# Patient Record
Sex: FEMALE | Race: BLACK OR AFRICAN AMERICAN | NOT HISPANIC OR LATINO
[De-identification: names, ages, dates, MRNs, and addresses within clinical notes are randomized per-mention and may not be internally consistent; named-entity substitution may affect disease eponyms.]

---

## 2019-09-03 PROBLEM — Z00.00 ENCOUNTER FOR PREVENTIVE HEALTH EXAMINATION: Status: ACTIVE | Noted: 2019-09-03

## 2020-01-10 ENCOUNTER — APPOINTMENT (OUTPATIENT)
Dept: ENDOCRINOLOGY | Facility: CLINIC | Age: 60
End: 2020-01-10
Payer: MEDICAID

## 2020-01-10 PROCEDURE — G0108 DIAB MANAGE TRN  PER INDIV: CPT

## 2020-01-10 RX ORDER — LANCETS 33 GAUGE
EACH MISCELLANEOUS
Qty: 1 | Refills: 0 | Status: ACTIVE | COMMUNITY
Start: 2020-01-10 | End: 1900-01-01

## 2020-01-10 RX ORDER — BLOOD SUGAR DIAGNOSTIC
STRIP MISCELLANEOUS TWICE DAILY
Qty: 2 | Refills: 0 | Status: ACTIVE | COMMUNITY
Start: 2020-01-10 | End: 1900-01-01

## 2020-01-17 ENCOUNTER — APPOINTMENT (OUTPATIENT)
Dept: ENDOCRINOLOGY | Facility: CLINIC | Age: 60
End: 2020-01-17
Payer: MEDICAID

## 2020-01-17 VITALS
WEIGHT: 155 LBS | OXYGEN SATURATION: 97 % | HEART RATE: 89 BPM | SYSTOLIC BLOOD PRESSURE: 124 MMHG | HEIGHT: 55 IN | DIASTOLIC BLOOD PRESSURE: 82 MMHG | BODY MASS INDEX: 35.87 KG/M2

## 2020-01-17 DIAGNOSIS — Z82.49 FAMILY HISTORY OF ISCHEMIC HEART DISEASE AND OTHER DISEASES OF THE CIRCULATORY SYSTEM: ICD-10-CM

## 2020-01-17 DIAGNOSIS — Z86.39 PERSONAL HISTORY OF OTHER ENDOCRINE, NUTRITIONAL AND METABOLIC DISEASE: ICD-10-CM

## 2020-01-17 DIAGNOSIS — Z95.5 PRESENCE OF CORONARY ANGIOPLASTY IMPLANT AND GRAFT: ICD-10-CM

## 2020-01-17 PROCEDURE — 99205 OFFICE O/P NEW HI 60 MIN: CPT

## 2020-01-17 NOTE — PHYSICAL EXAM
[Alert] : alert [No Acute Distress] : no acute distress [Well Nourished] : well nourished [Well Developed] : well developed [PERRL] : pupils equal, round and reactive to light [Normal Sclera/Conjunctiva] : normal sclera/conjunctiva [EOMI] : extra ocular movement intact [Normal Outer Ear/Nose] : the ears and nose were normal in appearance [Normal TMs] : both tympanic membranes were normal [Normal Hearing] : hearing was normal [No Neck Mass] : no neck mass was observed [Supple] : the neck was supple [Thyroid Not Enlarged] : the thyroid was not enlarged [Normal Rate and Effort] : normal respiratory rhythm and effort [No Accessory Muscle Use] : no accessory muscle use [Clear to Auscultation] : lungs were clear to auscultation bilaterally [Normal Rate] : heart rate was normal  [Normal S1, S2] : normal S1 and S2 [Regular Rhythm] : with a regular rhythm [Normal Bowel Sounds] : normal bowel sounds [Not Tender] : non-tender [Not Distended] : not distended [Soft] : abdomen soft [Normal] : normal and non tender [No CVA Tenderness] : no ~M costovertebral angle tenderness [Normal Gait] : normal gait [No Joint Swelling] : no joint swelling seen [No Clubbing, Cyanosis] : no clubbing  or cyanosis of the fingernails [Normal Strength/Tone] : muscle strength and tone were normal [No Rash] : no rash [No Skin Lesions] : no skin lesions [Normal Reflexes] : deep tendon reflexes were 2+ and symmetric [No Motor Deficits] : the motor exam was normal [No Tremors] : no tremors [Oriented x3] : oriented to person, place, and time [Normal Affect] : the affect was normal [Normal Insight/Judgement] : insight and judgment were intact [Normal Mood] : the mood was normal

## 2020-01-20 NOTE — ASSESSMENT
[FreeTextEntry1] : 59 year old female with history of DM Type 2, HTN here for evaluation. \par Her HgA1C is 8.0% \par At this time, discussed with patient to cut back on high carb meals and have lower glycemic carbs.\par Will make an addition to jardiance at this time. \par \par -Continue Janumet 50/1000 mg BID \par -Continue Glyburide 2.5 mg BID \par -Will make the addition of Jardiance 10 mg daily \par -Carb consistent diet \par -Check SMBGs 2-3 times per day \par \par Hypertension \par -Continue Amlopidine 10 mg daily \par -Continue Ibersartan-Hctz \par -Continue Nifedipine 60 mg daily \par -Continue Metoprolol 100 mg daily \par \par HLD\par -Check lipid panel \par -Continue Simvastatin 10 mg daily \par \par -Follow up in 3-4 months \par

## 2020-01-20 NOTE — REVIEW OF SYSTEMS
[Decreased Appetite] : appetite not decreased [Fatigue] : no fatigue [Recent Weight Gain (___ Lbs)] : no recent weight gain [Visual Field Defect] : no visual field defect [Recent Weight Loss (___ Lbs)] : no recent weight loss [Blurry Vision] : no blurred vision [Dry Eyes] : no dryness of the eyes [Eyes Itch] : no itching of the eyes [Dysphagia] : no dysphagia [Dysphonia] : no dysphonia [Neck Pain] : no neck pain [Nasal Congestion] : no nasal congestion [Chest Pain] : no chest pain [Palpitations] : no palpitations [Heart Rate Is Slow] : the heart rate was not slow [Shortness Of Breath] : no shortness of breath [Wheezing] : no wheezing was heard [Heart Rate Is Fast] : the heart rate was not fast [Cough] : no cough [SOB on Exertion] : no shortness of breath during exertion [Vomiting] : no vomiting was observed [Nausea] : no nausea [Constipation] : no constipation [Polyuria] : no polyuria [Diarrhea] : no diarrhea [Joint Pain] : no joint pain [Irregular Menses] : regular menses [Muscle Weakness] : no muscle weakness [Muscle Cramps] : no muscle cramps [Joint Stiffness] : no joint stiffness [Acanthosis] : no acanthosis  [Acne] : no acne [Hirsutism] : no hirsutism [Hair Loss] : no hair loss [Headache] : no headaches [Dizziness] : no dizziness [Tremors] : no tremors [Anxiety] : no anxiety [Polydipsia] : no polydipsia [Depression] : no depression [Cold Intolerance] : cold tolerant [Galactorrhea] : no galactorrhea  [Heat Intolerance] : heat tolerant [Easy Bleeding] : no ~M tendency for easy bleeding [Swelling] : no swelling [Easy Bruising] : no tendency for easy bruising

## 2020-01-20 NOTE — HISTORY OF PRESENT ILLNESS
[FreeTextEntry1] : Diabetes New Patient HPI\par \par CC\par Patient referred by Dr. Meléndez  for diabetes management.\par \par \par HPI:\par \par Duration of Diabetes:  10 years      \par Is patient on Insulin?  No         \par If yes, how long on insulin?        \par \par List Current Medications for Glycemic control and the doses:\par 1-     Janumet 50/1000 mg BID      \par 2-     Glyburide 2.5 mg BID  \par 3-        \par \par SMBG (self monitored blood glucose) readings: \par - Name of glucometer:          \par - How often does the patient check BG?   once a day          \par - Does the patient keep a log?           \par \par If detailed record is available, what is the range of most of the BG readings?\par - Before Breakfast: 200s\par - Before Lunch:128\par - Before Dinner: 134-143\par - Before Bedtime:229-260\par \par \par Does patient get Hypoglycemic episodes? None              \par If yes how frequent?            \par Hoe low do the BG readings reach?           \par When do most of those episodes occur?           \par What symptoms does the patient get during those episodes?           \par \par Diabetic Complications: Is patient aware of having any of those complications?\par - Eyes: Retinopathy?  None          \par        	When was the last fully dilated eye exam? 2 months ago             \par - Feet: 	Neuropathy?   In the hands          \par         	Foot Ulcers?     None     \par 	When was the last time patient saw a Podiatrist? None        \par - Kidneys: Nephropathy?           \par - Erectile Dysfunction?        \par \par Diet: review patient's diet:      \par Breakfast: oatmeal, bagel, butter \par Lunch: Trade, italian bread \par Dinner: Fruits, yogurt, rice, fish\par Snacks: yogurt\par Juice or soda: None \par Dessert: Cake \par \par \par Exercise: review patient exercise habits:  Walking        \par \par \par

## 2020-01-21 LAB
GLUCOSE BLDC GLUCOMTR-MCNC: 120
HBA1C MFR BLD HPLC: 8

## 2020-05-18 ENCOUNTER — RX RENEWAL (OUTPATIENT)
Age: 60
End: 2020-05-18

## 2020-06-10 ENCOUNTER — APPOINTMENT (OUTPATIENT)
Dept: ENDOCRINOLOGY | Facility: CLINIC | Age: 60
End: 2020-06-10
Payer: MEDICAID

## 2020-06-10 VITALS
HEART RATE: 101 BPM | DIASTOLIC BLOOD PRESSURE: 78 MMHG | BODY MASS INDEX: 26.31 KG/M2 | TEMPERATURE: 98.6 F | OXYGEN SATURATION: 98 % | SYSTOLIC BLOOD PRESSURE: 124 MMHG | HEIGHT: 62 IN | WEIGHT: 143 LBS

## 2020-06-10 LAB
GLUCOSE BLDC GLUCOMTR-MCNC: 181
HBA1C MFR BLD HPLC: 7.7

## 2020-06-10 PROCEDURE — 82962 GLUCOSE BLOOD TEST: CPT

## 2020-06-10 PROCEDURE — 99214 OFFICE O/P EST MOD 30 MIN: CPT | Mod: 25

## 2020-06-10 PROCEDURE — 83036 HEMOGLOBIN GLYCOSYLATED A1C: CPT | Mod: QW

## 2020-06-10 NOTE — HISTORY OF PRESENT ILLNESS
[FreeTextEntry1] : CC\par Follow up for T2DM\par \par Since last visit in January:\par Insurance no longer covering Janumet, has only been on Metformin 1000 BID \par Saw the neurologist Dr. Chauhan ([phone]) for sleepwalking. MRI done with no pathology. Per patient, was started on Plavix per her neurologist, unclear why. \par Also endorses yeast infections since starting Jardiance. Uses OTC Vagisil cream for itchiness. \par \par PCP: Dr. Rico ([phone])\par Cardiologist: Dr. Mary Nazario\par \par HPI:\par \par Duration of Diabetes: 10 years \par Is patient on Insulin? No \par If yes, how long on insulin? \par \par List Current Medications for Glycemic control and the doses:\par 1- Metformin 1000 mg BID \par 2- Glyburide 2.5 mg BID \par 3- Jardiance 10 mg/daily\par \par SMBG (self monitored blood glucose) readings: \par - Name of glucometer: \par - How often does the patient check BG? twice a day, AM and after dinner\par - Does the patient keep a log? No\par \par If detailed record is available, what is the range of most of the BG readings?\par - Before Breakfast: 112-160\par - Before Lunch: N/A\par - After Dinner: 215-265\par - Before Bedtime: N/A\par \par Does patient get Hypoglycemic episodes? None \par If yes how frequent? \par How low do the BG readings reach? \par When do most of those episodes occur? \par What symptoms does the patient get during those episodes? \par \par Diabetic Complications: Is patient aware of having any of those complications?\par - Eyes: Retinopathy? None \par  	When was the last fully dilated eye exam? 1 week ago\par - Feet: 	Neuropathy? In the hands \par  	Foot Ulcers? None \par 	When was the last time patient saw a Podiatrist? None \par - Kidneys: Nephropathy? \par -CV: No Strokes or MIs in the past \par - Erectile Dysfunction? \par \par Diet: review patient's diet: \par Breakfast: oatmeal, black coffee\par Lunch: brown rice, fish rodríguez, diet iced tea\par Dinner: sandwich with whole wheat bread, egg, banana, water\par Snacks: no salt crackers\par Juice or soda: None \par Dessert: None\par \par Exercise: review patient exercise habits: Walking 30 mins daily\par \par \par \par

## 2020-06-10 NOTE — PHYSICAL EXAM
[Alert] : alert [No Acute Distress] : no acute distress [EOMI] : extra ocular movement intact [Normal Sclera/Conjunctiva] : normal sclera/conjunctiva [Normal Hearing] : hearing was normal [Normal Outer Ear/Nose] : the ears and nose were normal in appearance [No Neck Mass] : no neck mass was observed [No Respiratory Distress] : no respiratory distress [Normal Rate] : heart rate was normal [Kyphosis] : no kyphosis present [Normal Bowel Sounds] : normal bowel sounds [Scoliosis] : no scoliosis [Normal Gait] : normal gait [No Skin Lesions] : no skin lesions [No Rash] : no rash [No Motor Deficits] : the motor exam was normal [Oriented x3] : oriented to person, place, and time [No Tremors] : no tremors [Normal Affect] : the affect was normal

## 2020-06-10 NOTE — ASSESSMENT
[FreeTextEntry1] : 61 y/o female with PMHx of T2DM, HTN, HLD presenting for follow up visit. \par \par #T2DM\par -has not been taking Janumet 50/1000 since insurance stopped covering it in May. Has only been taking Metformin 1000 BID, Glyburide 2.5 BID, and jardiance 10 mg daily\par -A1c this visit 7.7 (last 8.0). Has been compliant with diet and exercise \par -stop Jardiance given yeast infections\par -start Trulicity 0.75 mg weekly, continue Metformin 1000 BID and Glyburide 2.5 BID. Can hold Januvia at this time and monitor response to Trulicity\par -check repeat CMP, Lipid panel, and microalbumin/Cr \par -off ASA, on Plavix per neurology\par -RTC in 3 months for follow up\par \par #HTN\par -in office BP stable, but patient endorses elevated SBP at home to 200s at times\par -recommended to discuss BP control with PCP and cardiologist\par -unclear of her home HTN meds, she knows Amlodipine was stopped at last visit \par \par #HLD \par -continue Simvastatin \par \par Follow up in 3 months

## 2020-06-12 LAB
ALBUMIN SERPL ELPH-MCNC: 5 G/DL
ALP BLD-CCNC: 82 U/L
ALT SERPL-CCNC: 48 U/L
ANION GAP SERPL CALC-SCNC: 19 MMOL/L
AST SERPL-CCNC: 33 U/L
BILIRUB SERPL-MCNC: <0.2 MG/DL
BUN SERPL-MCNC: 18 MG/DL
CALCIUM SERPL-MCNC: 10.3 MG/DL
CHLORIDE SERPL-SCNC: 95 MMOL/L
CHOLEST SERPL-MCNC: 167 MG/DL
CHOLEST/HDLC SERPL: 3.4 RATIO
CO2 SERPL-SCNC: 26 MMOL/L
CREAT SERPL-MCNC: 0.72 MG/DL
GLUCOSE SERPL-MCNC: 201 MG/DL
HDLC SERPL-MCNC: 48 MG/DL
LDLC SERPL CALC-MCNC: 87 MG/DL
POTASSIUM SERPL-SCNC: 4.3 MMOL/L
PROT SERPL-MCNC: 7.3 G/DL
SODIUM SERPL-SCNC: 140 MMOL/L
TRIGL SERPL-MCNC: 158 MG/DL

## 2020-06-15 LAB
CREAT SPEC-SCNC: 56 MG/DL
MICROALBUMIN 24H UR DL<=1MG/L-MCNC: 1.4 MG/DL
MICROALBUMIN/CREAT 24H UR-RTO: 24 MG/G

## 2020-09-15 ENCOUNTER — APPOINTMENT (OUTPATIENT)
Dept: ENDOCRINOLOGY | Facility: CLINIC | Age: 60
End: 2020-09-15
Payer: MEDICAID

## 2020-09-15 ENCOUNTER — RESULT CHARGE (OUTPATIENT)
Age: 60
End: 2020-09-15

## 2020-09-15 VITALS
SYSTOLIC BLOOD PRESSURE: 120 MMHG | HEART RATE: 93 BPM | WEIGHT: 147 LBS | OXYGEN SATURATION: 98 % | TEMPERATURE: 98.2 F | HEIGHT: 62 IN | BODY MASS INDEX: 27.05 KG/M2 | DIASTOLIC BLOOD PRESSURE: 80 MMHG

## 2020-09-15 LAB
GLUCOSE BLDC GLUCOMTR-MCNC: 125
HBA1C MFR BLD HPLC: 7

## 2020-09-15 PROCEDURE — 99214 OFFICE O/P EST MOD 30 MIN: CPT | Mod: 25

## 2020-09-15 PROCEDURE — 83036 HEMOGLOBIN GLYCOSYLATED A1C: CPT | Mod: QW

## 2020-09-15 RX ORDER — EMPAGLIFLOZIN 10 MG/1
10 TABLET, FILM COATED ORAL
Qty: 30 | Refills: 2 | Status: DISCONTINUED | COMMUNITY
Start: 2020-01-17 | End: 2020-09-15

## 2020-09-18 NOTE — HISTORY OF PRESENT ILLNESS
[FreeTextEntry1] : CC\par Follow up for T2DM\par \par Since last visit in January:\par Insurance no longer covering Janumet, has only been on Metformin 1000 BID \par Saw the neurologist Dr. Chauhan ([phone]) for sleepwalking. MRI done with no pathology. Per patient, was started on Plavix per her neurologist, unclear why. \par Also endorses yeast infections since starting Jardiance. Uses OTC Vagisil cream for itchiness. \par \par PCP: Dr. Rico ([phone])\par Cardiologist: Dr. Mary Nazario\par \par HPI:\par \par Duration of Diabetes: 10 years \par Is patient on Insulin? No \par If yes, how long on insulin? \par \par List Current Medications for Glycemic control and the doses:\par 1- Metformin 1000 mg BID \par 2- Glyburide 2.5 mg BID \par 3- Trulicity 0.75 mcg weekly \par \par SMBG (self monitored blood glucose) readings: \par - Name of glucometer: \par - How often does the patient check BG? twice a day, AM and after dinner\par - Does the patient keep a log? No\par \par If detailed record is available, what is the range of most of the BG readings?\par - Before Breakfast: 130-145 \par - Before Lunch: N/A\par - Before Dinner: 130-150\par - Before Bedtime: N/A\par \par Does patient get Hypoglycemic episodes? None \par If yes how frequent? \par How low do the BG readings reach? \par When do most of those episodes occur? \par What symptoms does the patient get during those episodes? \par \par Diabetic Complications: Is patient aware of having any of those complications?\par - Eyes: Retinopathy? None \par  	When was the last fully dilated eye exam? 06/2020\par - Feet: 	Neuropathy? In the hands \par  	Foot Ulcers? None \par 	When was the last time patient saw a Podiatrist? None \par - Kidneys: Nephropathy? \par -CV: No Strokes or MIs in the past \par - Erectile Dysfunction? \par \par Diet: review patient's diet: \par Breakfast: oatmeal, black coffee\par Lunch: brown rice, fish rodríguez, diet iced tea\par Dinner: sandwich with whole wheat bread, egg, banana, water\par Snacks: no salt crackers\par Juice or soda: None \par Dessert: None\par \par Exercise: review patient exercise habits: Walking 30 mins daily\par

## 2020-09-18 NOTE — ASSESSMENT
[FreeTextEntry1] : 61 y/o female with PMHx of T2DM, HTN, HLD presenting for follow up visit\par \par #T2DM\par -A1c this visit 7.0 %\par -Continue Trulicity 0.75 mg weekly,  Metformin 1000 BID and Glyburide 2.5 BID\par -Check repeat CMP, Lipid panel, and microalbumin/Cr \par -On Plavix per neurology\par -RTC in 3 months for follow up\par \par #HTN\par \par -On Nifedipine 30 mg, irbersartan and metoprolol \par -recommended to discuss BP control with PCP and cardiologist\par \par \par #HLD \par -continue Simvastatin \par \par Follow up in 3 months.

## 2020-09-18 NOTE — REVIEW OF SYSTEMS
[Fatigue] : no fatigue [Decreased Appetite] : appetite not decreased [Recent Weight Gain (___ Lbs)] : no recent weight gain [Recent Weight Loss (___ Lbs)] : no recent weight loss [Visual Field Defect] : no visual field defect [Dry Eyes] : no dryness [Neck Pain] : no neck pain [Nasal Congestion] : no nasal congestion [Chest Pain] : no chest pain [Slow Heart Rate] : heart rate is not slow [Palpitations] : no palpitations [Fast Heart Rate] : heart rate is not fast [Shortness Of Breath] : no shortness of breath [Cough] : no cough [Nausea] : no nausea [Constipation] : no constipation [Vomiting] : no vomiting [Diarrhea] : no diarrhea [Polyuria] : no polyuria [Irregular Menses] : regular menses [Joint Pain] : no joint pain [Muscle Weakness] : no muscle weakness [Headaches] : no headaches [Dizziness] : no dizziness [Tremors] : no tremors [Depression] : no depression [Polydipsia] : no polydipsia [Cold Intolerance] : no cold intolerance [Easy Bleeding] : no ~M tendency for easy bleeding [Easy Bruising] : no tendency for easy bruising

## 2020-09-18 NOTE — PHYSICAL EXAM
[Alert] : alert [Well Nourished] : well nourished [No Acute Distress] : no acute distress [Normal Sclera/Conjunctiva] : normal sclera/conjunctiva [EOMI] : extra ocular movement intact [PERRL] : pupils equal, round and reactive to light [No Proptosis] : no proptosis [Normal Outer Ear/Nose] : the ears and nose were normal in appearance [Normal Hearing] : hearing was normal [Normal TMs] : both tympanic membranes were normal [No Neck Mass] : no neck mass was observed [Thyroid Not Enlarged] : the thyroid was not enlarged [No Respiratory Distress] : no respiratory distress [Clear to Auscultation] : lungs were clear to auscultation bilaterally [Normal S1, S2] : normal S1 and S2 [Normal Rate] : heart rate was normal [Regular Rhythm] : with a regular rhythm [Normal Bowel Sounds] : normal bowel sounds [Not Tender] : non-tender [Soft] : abdomen soft [Normal Gait] : normal gait [No Clubbing, Cyanosis] : no clubbing  or cyanosis of the fingernails [No Joint Swelling] : no joint swelling seen [Normal Strength/Tone] : muscle strength and tone were normal [No Rash] : no rash [No Skin Lesions] : no skin lesions [No Motor Deficits] : the motor exam was normal [Normal Reflexes] : deep tendon reflexes were 2+ and symmetric [No Tremors] : no tremors [Oriented x3] : oriented to person, place, and time [Normal Affect] : the affect was normal [Normal Insight/Judgement] : insight and judgment were intact [Normal Mood] : the mood was normal

## 2021-01-13 ENCOUNTER — APPOINTMENT (OUTPATIENT)
Dept: NEUROLOGY | Facility: CLINIC | Age: 61
End: 2021-01-13
Payer: MEDICAID

## 2021-01-13 VITALS
WEIGHT: 150 LBS | DIASTOLIC BLOOD PRESSURE: 84 MMHG | TEMPERATURE: 98.2 F | HEART RATE: 97 BPM | HEIGHT: 62 IN | OXYGEN SATURATION: 98 % | SYSTOLIC BLOOD PRESSURE: 149 MMHG | BODY MASS INDEX: 27.6 KG/M2

## 2021-01-13 DIAGNOSIS — Z78.9 OTHER SPECIFIED HEALTH STATUS: ICD-10-CM

## 2021-01-13 DIAGNOSIS — F80.9 DEVELOPMENTAL DISORDER OF SPEECH AND LANGUAGE, UNSPECIFIED: ICD-10-CM

## 2021-01-13 PROCEDURE — 99205 OFFICE O/P NEW HI 60 MIN: CPT

## 2021-01-13 PROCEDURE — 99072 ADDL SUPL MATRL&STAF TM PHE: CPT

## 2021-01-13 RX ORDER — NIFEDIPINE 10 MG/1
10 CAPSULE ORAL
Refills: 0 | Status: DISCONTINUED | COMMUNITY
End: 2021-01-13

## 2021-01-13 RX ORDER — ASPIRIN 81 MG
81 TABLET, DELAYED RELEASE (ENTERIC COATED) ORAL
Refills: 0 | Status: ACTIVE | COMMUNITY

## 2021-01-13 RX ORDER — METFORMIN HYDROCHLORIDE 1000 MG/1
1000 TABLET, COATED ORAL
Refills: 0 | Status: DISCONTINUED | COMMUNITY
End: 2021-01-13

## 2021-01-13 RX ORDER — CLOPIDOGREL BISULFATE 75 MG/1
75 TABLET, FILM COATED ORAL
Refills: 0 | Status: ACTIVE | COMMUNITY

## 2021-01-13 RX ORDER — METOPROLOL SUCCINATE 100 MG/1
100 TABLET, EXTENDED RELEASE ORAL
Refills: 0 | Status: ACTIVE | COMMUNITY

## 2021-01-13 RX ORDER — CLOPIDOGREL BISULFATE 75 MG/1
75 TABLET, FILM COATED ORAL
Refills: 0 | Status: DISCONTINUED | COMMUNITY
End: 2021-01-13

## 2021-01-13 RX ORDER — METFORMIN HYDROCHLORIDE 1000 MG/1
1000 TABLET, COATED ORAL
Refills: 0 | Status: ACTIVE | COMMUNITY

## 2021-01-13 RX ORDER — DOCUSATE SODIUM 100 MG/1
100 CAPSULE, LIQUID FILLED ORAL
Refills: 0 | Status: ACTIVE | COMMUNITY

## 2021-01-13 RX ORDER — NIFEDIPINE 30 MG/1
30 TABLET, EXTENDED RELEASE ORAL
Refills: 0 | Status: ACTIVE | COMMUNITY

## 2021-01-13 NOTE — PHYSICAL EXAM
[General Appearance - Alert] : alert [General Appearance - In No Acute Distress] : in no acute distress [Person] : oriented to person [Place] : oriented to place [Time] : oriented to time [Registration Intact] : recent registration memory intact [Concentration Intact] : normal concentrating ability [Visual Intact] : visual attention was ~T not ~L decreased [Naming Objects] : no difficulty naming common objects [Repeating Phrases] : no difficulty repeating a phrase [Fluency] : fluency intact [Comprehension] : comprehension intact [Vocabulary] : adequate range of vocabulary [Cranial Nerves Optic (II)] : visual acuity intact bilaterally,  visual fields full to confrontation, pupils equal round and reactive to light [Cranial Nerves Oculomotor (III)] : extraocular motion intact [Cranial Nerves Trigeminal (V)] : facial sensation intact symmetrically [Cranial Nerves Facial (VII)] : face symmetrical [Cranial Nerves Vestibulocochlear (VIII)] : hearing was intact bilaterally [Cranial Nerves Glossopharyngeal (IX)] : tongue and palate midline [Cranial Nerves Accessory (XI - Cranial And Spinal)] : head turning and shoulder shrug symmetric [Cranial Nerves Hypoglossal (XII)] : there was no tongue deviation with protrusion [Motor Tone] : muscle tone was normal in all four extremities [Motor Strength] : muscle strength was normal in all four extremities [Involuntary Movements] : no involuntary movements were seen [Sensation Tactile Decrease] : light touch was intact [Sensation Pain / Temperature Decrease] : pain and temperature was intact [Sensation Vibration Decrease] : vibration was intact [Proprioception] : proprioception was intact [Romberg's Sign] : a positive Romberg's sign was present [Abnormal Walk] : normal gait [Balance] : balance was intact [1+] : Ankle jerk left 1+ [Full Pulse] : the pedal pulses are present [Coordination - Dysmetria Impaired Finger-to-Nose Bilateral] : not present [Coordination - Dysmetria Impaired Heel-to-Shin Bilateral] : not present [Plantar Reflex Right Only] : normal on the right [Plantar Reflex Left Only] : normal on the left [FreeTextEntry5] : fundi not visualized

## 2021-01-13 NOTE — CONSULT LETTER
[Dear  ___] : Dear  [unfilled], [Consult Letter:] : I had the pleasure of evaluating your patient, [unfilled]. [Please see my note below.] : Please see my note below. [Consult Closing:] : Thank you very much for allowing me to participate in the care of this patient.  If you have any questions, please do not hesitate to contact me. [Sincerely,] : Sincerely, [FreeTextEntry3] : Fabiana Vaughn MD, MPH\par

## 2021-01-13 NOTE — HISTORY OF PRESENT ILLNESS
[FreeTextEntry1] : The patient is here for evaluation of headache which started some time ago.  The headache could  be any place of the head, severe pain, with associated photo but no phonophobia.  No nausea or vomiting.  The headache occurs about twice per month, resolved with Tylenol.  \par \par Additionally, the patient says that her daughter in law says that since November 2021, she "doesn't sounds right."  She is not completing her sentences.  She denies any double or loss of vision, difficulty with speech or swallowing.  No focal weakness or sensory loss.  \par \par The patient has tingling in bilateral legs as well as balance problems present for the past few weeks.  NO falls.  SHe has had Type 2 DMx 3 years.

## 2021-01-13 NOTE — ASSESSMENT
[FreeTextEntry1] : Headache with some migrainous features\par Language problems concerning for a stroke\par Tingling in bl legs with +min Romberg concerning for peripheral neuropathy\par \par WIll get MRI/MRA head\par LDL\par neuropathy labs\par the patient will take Tylenol prn at onset of headache and repeat in 2 hours if she continues to have the headache

## 2021-01-13 NOTE — DATA REVIEWED
[de-identified] : endo note appreciated\par HbA1C 7\par EXAM: PELVIS CT WITH CONTRAST \par PROCEDURE DATE: Oct 25 2010 \par . \par INTERPRETATION: EXAM DATED: 10/25/2010 at 1818 hours \par CT ABDOMEN / PELVIS \par HISTORY: Right lower quadrant pain \par TECHNIQUE: 2.5 mm axial sections of the abdomen were obtained from the domes \par of the diaphragm to the pubic symphysis after administration of oral and 100 \par cc of intravenous Omnipaque -350. 0 cc were discarded. \par FINDINGS: No prior studies are available for comparison. \par The heart size is normal. There is minimal bibasilar linear atelectasis. \par The liver, gallbladder, pancreas, spleen, and adrenal glands are \par unremarkable. \par Multiple bilateral renal cysts are present. The ureters and urinary bladder \par are unremarkable. The uterus demonstrates multiple intramural fibroids which \par compress the endometrium. There is central low attenuation within the \par fibroids suggestive of necrosis. The uterus measures 8.4 x 12.6 x 8.9 cm. A \par few droplets of air are present in the lower uterine segment. Clinical \par correlation for possible endometritis or recent instrumentation is \par recommended. Small bilateral adnexal cysts are noted. \par The colon is unremarkable. The appendix fills with contrast proximally and \par measures 0.6 cm. There are no periappendiceal inflammatory changes to \par suggest acute appendicitis. The small bowel is within normal limits. There \par is no mesenteric, retroperitoneal, or inguinal lymphadenopathy. The osseous \par structures are within normal limits. \par IMPRESSION: No evidence of acute appendicitis. \par Enlarged fibroid uterus measuring 8.4, 12.6 x 0.9 cm. \par A few droplets of air in the endometrium. Clinical correlation for \par endometritis or recent truncation is recommended. \par This case was discussed with Dr. Barajas shortly after the exam was performed. \par YANDEL AVILES M.D., RADIOLOGY RESIDENT \par CONSTANZA AGUAYO M.D., ATTENDING RADIOLOGIST \par This examination was interpreted on:  {orig_read_dtime } This document has \par been electronically signed. Oct 25 2010 10:13PM

## 2021-01-14 ENCOUNTER — LABORATORY RESULT (OUTPATIENT)
Age: 61
End: 2021-01-14

## 2021-01-22 LAB
ACE BLD-CCNC: 35 U/L
ALBUMIN MFR SERPL ELPH: 59.3 %
ALBUMIN SERPL ELPH-MCNC: 5 G/DL
ALBUMIN SERPL-MCNC: 4.3 G/DL
ALBUMIN/GLOB SERPL: 1.4 RATIO
ALP BLD-CCNC: 89 U/L
ALPHA1 GLOB MFR SERPL ELPH: 3.8 %
ALPHA1 GLOB SERPL ELPH-MCNC: 0.3 G/DL
ALPHA2 GLOB MFR SERPL ELPH: 10.7 %
ALPHA2 GLOB SERPL ELPH-MCNC: 0.8 G/DL
ALT SERPL-CCNC: 82 U/L
ANA SER IF-ACNC: NEGATIVE
ANION GAP SERPL CALC-SCNC: 16 MMOL/L
AST SERPL-CCNC: 53 U/L
B BURGDOR AB SER-IMP: NEGATIVE
B BURGDOR IGM PATRN SER IB-IMP: NEGATIVE
B BURGDOR18KD IGG SER QL IB: NORMAL
B BURGDOR23KD IGG SER QL IB: NORMAL
B BURGDOR23KD IGM SER QL IB: NORMAL
B BURGDOR28KD IGG SER QL IB: NORMAL
B BURGDOR30KD IGG SER QL IB: NORMAL
B BURGDOR31KD IGG SER QL IB: NORMAL
B BURGDOR39KD IGG SER QL IB: NORMAL
B BURGDOR39KD IGM SER QL IB: NORMAL
B BURGDOR41KD IGG SER QL IB: PRESENT
B BURGDOR41KD IGM SER QL IB: NORMAL
B BURGDOR45KD IGG SER QL IB: NORMAL
B BURGDOR58KD IGG SER QL IB: NORMAL
B BURGDOR66KD IGG SER QL IB: NORMAL
B BURGDOR93KD IGG SER QL IB: NORMAL
B-GLOBULIN MFR SERPL ELPH: 14 %
B-GLOBULIN SERPL ELPH-MCNC: 1 G/DL
BASOPHILS # BLD AUTO: 0.04 K/UL
BASOPHILS NFR BLD AUTO: 0.6 %
BILIRUB SERPL-MCNC: 0.3 MG/DL
BUN SERPL-MCNC: 13 MG/DL
CALCIUM SERPL-MCNC: 10.2 MG/DL
CARDIOLIPIN AB SER IA-ACNC: NEGATIVE
CHLORIDE SERPL-SCNC: 102 MMOL/L
CHOLEST SERPL-MCNC: 148 MG/DL
CMV IGG AVIDITY SERPL IA-RTO: 1
CO2 SERPL-SCNC: 25 MMOL/L
CREAT SERPL-MCNC: 0.69 MG/DL
CRP SERPL-MCNC: 0.3 MG/DL
EBV DNA SERPL NAA+PROBE-ACNC: NOT DETECTED IU/ML
ENA SCL70 IGG SER IA-ACNC: <0.2 AL
ENA SS-A AB SER IA-ACNC: 1.2 AL
ENA SS-B AB SER IA-ACNC: <0.2 AL
EOSINOPHIL # BLD AUTO: 0.38 K/UL
EOSINOPHIL NFR BLD AUTO: 5.6 %
ERYTHROCYTE [SEDIMENTATION RATE] IN BLOOD BY WESTERGREN METHOD: 35 MM/HR
ESTIMATED AVERAGE GLUCOSE: 169 MG/DL
FOLATE SERPL-MCNC: >20 NG/ML
GAMMA GLOB FLD ELPH-MCNC: 0.9 G/DL
GAMMA GLOB MFR SERPL ELPH: 12.2 %
GLUCOSE BS SERPL-MCNC: 143 MG/DL
GLUCOSE SERPL-MCNC: 147 MG/DL
HBA1C MFR BLD HPLC: 7.5 %
HCT VFR BLD CALC: 43.1 %
HDLC SERPL-MCNC: 49 MG/DL
HGB BLD-MCNC: 13.3 G/DL
HIV1+2 AB SPEC QL IA.RAPID: NONREACTIVE
HTLV I+II AB SER QL: NORMAL
IMM GRANULOCYTES NFR BLD AUTO: 0.3 %
INTERPRETATION SERPL IEP-IMP: NORMAL
LDLC SERPL CALC-MCNC: 83 MG/DL
LUPUS ANTICOAGULANT CASCADE REFLEX: NORMAL
LYMPHOCYTES # BLD AUTO: 1.27 K/UL
LYMPHOCYTES NFR BLD AUTO: 18.8 %
M PROTEIN SPEC IFE-MCNC: NORMAL
MAN DIFF?: NORMAL
MCHC RBC-ENTMCNC: 24.5 PG
MCHC RBC-ENTMCNC: 30.9 GM/DL
MCV RBC AUTO: 79.4 FL
MONOCYTES # BLD AUTO: 0.65 K/UL
MONOCYTES NFR BLD AUTO: 9.6 %
NEUTROPHILS # BLD AUTO: 4.41 K/UL
NEUTROPHILS NFR BLD AUTO: 65.1 %
NONHDLC SERPL-MCNC: 99 MG/DL
PLATELET # BLD AUTO: 301 K/UL
POTASSIUM SERPL-SCNC: 4.1 MMOL/L
PROT SERPL-MCNC: 7.3 G/DL
RBC # BLD: 5.43 M/UL
RBC # FLD: 13.4 %
SODIUM SERPL-SCNC: 143 MMOL/L
T PALLIDUM AB SER QL IA: NEGATIVE
T3 SERPL-MCNC: 166 NG/DL
T4 SERPL-MCNC: 7.9 UG/DL
TRIGL SERPL-MCNC: 81 MG/DL
TSH SERPL-ACNC: 1.2 UIU/ML
VIT B12 SERPL-MCNC: 335 PG/ML
WBC # FLD AUTO: 6.77 K/UL

## 2021-01-26 LAB
TTG IGA SER IA-ACNC: <1.2 U/ML
TTG IGA SER-ACNC: NEGATIVE
TTG IGG SER IA-ACNC: <1.2 U/ML
TTG IGG SER IA-ACNC: NEGATIVE

## 2021-01-29 LAB — METHYLMALONATE SERPL-SCNC: 295 NMOL/L

## 2021-02-05 ENCOUNTER — APPOINTMENT (OUTPATIENT)
Dept: NEUROLOGY | Facility: CLINIC | Age: 61
End: 2021-02-05

## 2021-02-25 ENCOUNTER — APPOINTMENT (OUTPATIENT)
Dept: RHEUMATOLOGY | Facility: CLINIC | Age: 61
End: 2021-02-25
Payer: MEDICAID

## 2021-02-25 VITALS
WEIGHT: 152 LBS | HEART RATE: 88 BPM | RESPIRATION RATE: 16 BRPM | TEMPERATURE: 97.3 F | OXYGEN SATURATION: 98 % | SYSTOLIC BLOOD PRESSURE: 136 MMHG | BODY MASS INDEX: 27.97 KG/M2 | DIASTOLIC BLOOD PRESSURE: 90 MMHG | HEIGHT: 62 IN

## 2021-02-25 DIAGNOSIS — R20.0 ANESTHESIA OF SKIN: ICD-10-CM

## 2021-02-25 DIAGNOSIS — H04.123 DRY EYE SYNDROME OF BILATERAL LACRIMAL GLANDS: ICD-10-CM

## 2021-02-25 DIAGNOSIS — M25.561 PAIN IN RIGHT KNEE: ICD-10-CM

## 2021-02-25 DIAGNOSIS — M25.562 PAIN IN RIGHT KNEE: ICD-10-CM

## 2021-02-25 DIAGNOSIS — M25.579 PAIN IN UNSPECIFIED ANKLE AND JOINTS OF UNSPECIFIED FOOT: ICD-10-CM

## 2021-02-25 PROCEDURE — 99072 ADDL SUPL MATRL&STAF TM PHE: CPT

## 2021-02-25 PROCEDURE — 99204 OFFICE O/P NEW MOD 45 MIN: CPT

## 2021-03-03 ENCOUNTER — OUTPATIENT (OUTPATIENT)
Dept: OUTPATIENT SERVICES | Facility: HOSPITAL | Age: 61
LOS: 1 days | End: 2021-03-03
Payer: MEDICAID

## 2021-03-03 ENCOUNTER — APPOINTMENT (OUTPATIENT)
Dept: RADIOLOGY | Facility: CLINIC | Age: 61
End: 2021-03-03

## 2021-03-03 ENCOUNTER — RESULT REVIEW (OUTPATIENT)
Age: 61
End: 2021-03-03

## 2021-03-03 DIAGNOSIS — M25.561 PAIN IN RIGHT KNEE: ICD-10-CM

## 2021-03-03 DIAGNOSIS — M25.579 PAIN IN UNSPECIFIED ANKLE AND JOINTS OF UNSPECIFIED FOOT: ICD-10-CM

## 2021-03-03 PROCEDURE — 73610 X-RAY EXAM OF ANKLE: CPT

## 2021-03-03 PROCEDURE — 73610 X-RAY EXAM OF ANKLE: CPT | Mod: 26,50

## 2021-03-03 PROCEDURE — 73562 X-RAY EXAM OF KNEE 3: CPT

## 2021-03-03 PROCEDURE — 73562 X-RAY EXAM OF KNEE 3: CPT | Mod: 26,50

## 2021-03-04 ENCOUNTER — APPOINTMENT (OUTPATIENT)
Dept: NEUROLOGY | Facility: CLINIC | Age: 61
End: 2021-03-04
Payer: MEDICAID

## 2021-03-04 VITALS
HEART RATE: 72 BPM | SYSTOLIC BLOOD PRESSURE: 186 MMHG | OXYGEN SATURATION: 98 % | BODY MASS INDEX: 27.97 KG/M2 | WEIGHT: 152 LBS | HEIGHT: 62 IN | TEMPERATURE: 97.3 F | DIASTOLIC BLOOD PRESSURE: 87 MMHG

## 2021-03-04 VITALS — DIASTOLIC BLOOD PRESSURE: 93 MMHG | SYSTOLIC BLOOD PRESSURE: 161 MMHG

## 2021-03-04 PROCEDURE — 99214 OFFICE O/P EST MOD 30 MIN: CPT

## 2021-03-04 PROCEDURE — 99072 ADDL SUPL MATRL&STAF TM PHE: CPT

## 2021-03-04 NOTE — HISTORY OF PRESENT ILLNESS
[FreeTextEntry1] : The patient is here for evaluation of headache which started some time ago. The headache could be any place of the head, severe pain, with associated photo but no phonophobia. No nausea or vomiting. The headache occurs about twice per month, resolved with Tylenol. \par \par Additionally, the patient says that her daughter in law says that since November 2021, she "doesn't sounds right." She is not completing her sentences. She denies any double or loss of vision, difficulty with speech or swallowing. No focal weakness or sensory loss. \par \par The patient has tingling in bilateral legs as well as balance problems present for the past few weeks. NO falls. SHe has had Type 2 DMx 3 years. \par \par No clinical change since last visit.

## 2021-03-04 NOTE — PHYSICAL EXAM
[General Appearance - Alert] : alert [General Appearance - In No Acute Distress] : in no acute distress [Person] : oriented to person [Place] : oriented to place [Time] : oriented to time [Registration Intact] : recent registration memory intact [Naming Objects] : no difficulty naming common objects [Fluency] : fluency intact [Comprehension] : comprehension intact [Vocabulary] : adequate range of vocabulary [Romberg's Sign] : a positive Romberg's sign was present [Abnormal Walk] : normal gait [Balance] : balance was intact

## 2021-03-04 NOTE — ASSESSMENT
[FreeTextEntry1] : Headache with some migrainous features\par the patient will take Tylenol prn at onset of headache and repeat in 2 hours if she continues to have the headache.\par \par Tingling in bl legs with +min Romberg concerning for peripheral neuropathy, due to DM type 2 or Sjogren's (pending Shirmer tetsing), will get ncs/emg, will start Cymbalta 30mg for pain, patient advised on need to lose weight, control HTn and HLD and DM in order to improve the neuropathy.\par \par HTN, repeat BP improved but still not normal, patient will fu with PMD for hypertension management\par  \par

## 2021-03-04 NOTE — DATA REVIEWED
[de-identified] : wmid [de-identified] : rheum note appreciated\par labs notable for fasting glu 143, esr 43, HbA1C 7.5\par SS-A +min, SS-B -min; pending Shirmer testing

## 2021-03-23 ENCOUNTER — APPOINTMENT (OUTPATIENT)
Dept: RHEUMATOLOGY | Facility: CLINIC | Age: 61
End: 2021-03-23
Payer: MEDICAID

## 2021-03-23 VITALS
TEMPERATURE: 97.2 F | RESPIRATION RATE: 16 BRPM | HEIGHT: 62 IN | DIASTOLIC BLOOD PRESSURE: 92 MMHG | WEIGHT: 154 LBS | OXYGEN SATURATION: 98 % | SYSTOLIC BLOOD PRESSURE: 174 MMHG | HEART RATE: 80 BPM | BODY MASS INDEX: 28.34 KG/M2

## 2021-03-23 LAB
C4 SERPL-MCNC: 29 MG/DL
CCP AB SER IA-ACNC: 159 UNITS
RF+CCP IGG SER-IMP: ABNORMAL
RHEUMATOID FACT SER QL: <10 IU/ML

## 2021-03-23 PROCEDURE — 99072 ADDL SUPL MATRL&STAF TM PHE: CPT

## 2021-03-23 PROCEDURE — 99214 OFFICE O/P EST MOD 30 MIN: CPT

## 2021-03-23 NOTE — ASSESSMENT
[FreeTextEntry1] : #New onset of arthralgias of knees and ankles\par overall appear non inflammatory in pattern, no evidence of synovitis on exam\par +CCP, negative RF CRP normal/ ESR WNL adjusted to age\par \par #+SSA noted on recent labs, in the setting of reported dry eyes and arthralgias can be suggestive of Sjogren's \par \par #Numbness of hands and LE \par (RUTHIE negative, SSA 1.9, SPEP/IF normal,APS labs negative)\par neuropathy can be seen in Sjogren's, usually first line therapy is symptomatic, steroids not typically beneficial \par \par \par -Notes from neurology evaluation reviewed today\par -brain MRI showed microvascular changes, no acute findings\par -Blood work done at last visit reviewed today with patient +CCP \par -X-rays of the knees and ankles normal \par \par Recommend:\par -obtain baseline X-rays of the hands/feet to r.o erosive disease \par -opthalmology evaluation recommended for Shirmer testing for confirmation of sicca\par Patient saw Renan Nazario MD [phone]\par Will contact office to obtain report\par -EMG scheduled for 4/15/21\par \par \par Fup after above\par \par \par Patient aware of my assessment and plan, all questions answered.\par \par

## 2021-03-23 NOTE — PHYSICAL EXAM
[General Appearance - Alert] : alert [General Appearance - In No Acute Distress] : in no acute distress [Musculoskeletal - Swelling] : no joint swelling seen [Oriented To Time, Place, And Person] : oriented to person, place, and time

## 2021-03-23 NOTE — HISTORY OF PRESENT ILLNESS
[FreeTextEntry1] : 61 yo W, referred for evaluation of joint pain.\par \par Initial history:\par \par =started few months ago\par pain involves both knees, ankles \par pain is 3/10, intermittent, occurs when sitting \par no associated swelling or redness \par she reports numbness of the hands was using brace but stopped a month because it was uncomfortable\par \par -reports dry eyes, frequent watering \par saw opthalmology maybe a year ago \par uses refresh tears\par no dry mouth \par no swelling of glands\par no skin rashes or hives\par \par -recently evaluated by neurology Dr Vaughn for headaches and neuropathy, ordered MRI/MRA\par \par

## 2021-04-15 ENCOUNTER — APPOINTMENT (OUTPATIENT)
Dept: NEUROLOGY | Facility: CLINIC | Age: 61
End: 2021-04-15
Payer: MEDICAID

## 2021-04-15 VITALS — TEMPERATURE: 98.2 F

## 2021-04-15 PROCEDURE — 99072 ADDL SUPL MATRL&STAF TM PHE: CPT

## 2021-04-15 PROCEDURE — 95886 MUSC TEST DONE W/N TEST COMP: CPT

## 2021-04-15 PROCEDURE — 95910 NRV CNDJ TEST 7-8 STUDIES: CPT

## 2021-05-20 ENCOUNTER — APPOINTMENT (OUTPATIENT)
Dept: RHEUMATOLOGY | Facility: CLINIC | Age: 61
End: 2021-05-20
Payer: MEDICAID

## 2021-05-20 VITALS
RESPIRATION RATE: 16 BRPM | HEIGHT: 62 IN | WEIGHT: 150 LBS | DIASTOLIC BLOOD PRESSURE: 78 MMHG | TEMPERATURE: 97.4 F | SYSTOLIC BLOOD PRESSURE: 127 MMHG | HEART RATE: 79 BPM | OXYGEN SATURATION: 98 % | BODY MASS INDEX: 27.6 KG/M2

## 2021-05-20 DIAGNOSIS — R20.0 ANESTHESIA OF SKIN: ICD-10-CM

## 2021-05-20 DIAGNOSIS — R76.8 OTHER SPECIFIED ABNORMAL IMMUNOLOGICAL FINDINGS IN SERUM: ICD-10-CM

## 2021-05-20 DIAGNOSIS — R20.2 ANESTHESIA OF SKIN: ICD-10-CM

## 2021-05-20 PROCEDURE — 99213 OFFICE O/P EST LOW 20 MIN: CPT

## 2021-05-27 ENCOUNTER — APPOINTMENT (OUTPATIENT)
Dept: RADIOLOGY | Facility: CLINIC | Age: 61
End: 2021-05-27
Payer: MEDICAID

## 2021-05-27 ENCOUNTER — OUTPATIENT (OUTPATIENT)
Dept: OUTPATIENT SERVICES | Facility: HOSPITAL | Age: 61
LOS: 1 days | End: 2021-05-27
Payer: MEDICAID

## 2021-05-27 ENCOUNTER — RESULT REVIEW (OUTPATIENT)
Age: 61
End: 2021-05-27

## 2021-05-27 DIAGNOSIS — Z00.8 ENCOUNTER FOR OTHER GENERAL EXAMINATION: ICD-10-CM

## 2021-05-27 DIAGNOSIS — R76.8 OTHER SPECIFIED ABNORMAL IMMUNOLOGICAL FINDINGS IN SERUM: ICD-10-CM

## 2021-05-27 PROCEDURE — 73630 X-RAY EXAM OF FOOT: CPT | Mod: 26,50

## 2021-05-27 PROCEDURE — 73130 X-RAY EXAM OF HAND: CPT | Mod: 26,50

## 2021-05-27 PROCEDURE — 73630 X-RAY EXAM OF FOOT: CPT

## 2021-05-27 PROCEDURE — 73130 X-RAY EXAM OF HAND: CPT

## 2021-06-09 ENCOUNTER — APPOINTMENT (OUTPATIENT)
Dept: NEUROLOGY | Facility: CLINIC | Age: 61
End: 2021-06-09
Payer: MEDICAID

## 2021-06-09 VITALS
TEMPERATURE: 97.7 F | BODY MASS INDEX: 27.05 KG/M2 | WEIGHT: 147 LBS | DIASTOLIC BLOOD PRESSURE: 87 MMHG | HEART RATE: 80 BPM | HEIGHT: 62 IN | SYSTOLIC BLOOD PRESSURE: 152 MMHG | OXYGEN SATURATION: 100 %

## 2021-06-09 DIAGNOSIS — G62.9 POLYNEUROPATHY, UNSPECIFIED: ICD-10-CM

## 2021-06-09 DIAGNOSIS — R51.9 HEADACHE, UNSPECIFIED: ICD-10-CM

## 2021-06-09 DIAGNOSIS — G89.29 HEADACHE, UNSPECIFIED: ICD-10-CM

## 2021-06-09 PROCEDURE — 99214 OFFICE O/P EST MOD 30 MIN: CPT

## 2021-06-09 NOTE — HISTORY OF PRESENT ILLNESS
[FreeTextEntry1] : The patient is here for evaluation of headache which started some time ago. The headache could be any place of the head, severe pain, with associated photo but no phonophobia. No nausea or vomiting. The headache occurs about twice per month, resolved with Tylenol. \par \par Additionally, the patient says that her daughter in law says that since November 2021, she "doesn't sounds right." She is not completing her sentences. She denies any double or loss of vision, difficulty with speech or swallowing. No focal weakness or sensory loss. \par \par The patient has tingling in bilateral legs as well as balance problems present for the past few weeks. NO falls. SHe has had Type 2 DMx 3 years. \par \par No clinical change since last visit.  Had emg.  Has not tried Cymbalta yet.

## 2021-06-09 NOTE — DATA REVIEWED
[de-identified] : WMID, mucus retention cyst decreased in size since prior imaging [de-identified] : emg shows distal sensory axonal neuropathy\par rheum note appreciated

## 2021-06-09 NOTE — ASSESSMENT
[FreeTextEntry1] : Headache with some migrainous features\par the patient will take Tylenol prn at onset of headache and repeat in 2 hours if she continues to have the headache.\par \par Tingling in bl legs with +min Romberg concerning for peripheral neuropathy, due to DM type 2 or Sjogren's (pending Shirmer tetsing), will get ncs/emg, will start Cymbalta 30mg for pain, patient advised on need to lose weight, control HTn and HLD and DM in order to improve the neuropathy.\par \par HTN, pt will fu with PMD\par \par I spent the time noted on the day of this patient encounter preparing for, providing and documenting the above E/M service and counseling and educate patient on differential, workup, disease course, and treatment/management. Education was provided to the patient during this encounter. All questions and concerns were answered and addressed in detail. The patient verbalized understanding and agreed to plan. Patient was advised to continue to monitor for neurologic symptoms and to notify my office or go to the nearest emergency room if there are any changes. Any orders/referrals and communications were provided as well. \par Side effects of the above medications were discussed in detail including but not limited to applicable black box warning and teratogenicity as appropriate. \par Patient was advised to bring previous records to my office, including CD of imaging, when applicable. \par \par

## 2021-07-08 ENCOUNTER — APPOINTMENT (OUTPATIENT)
Dept: ENDOCRINOLOGY | Facility: CLINIC | Age: 61
End: 2021-07-08
Payer: MEDICAID

## 2021-07-08 VITALS
WEIGHT: 149 LBS | DIASTOLIC BLOOD PRESSURE: 82 MMHG | TEMPERATURE: 98 F | BODY MASS INDEX: 27.42 KG/M2 | HEIGHT: 62 IN | SYSTOLIC BLOOD PRESSURE: 132 MMHG | HEART RATE: 88 BPM | OXYGEN SATURATION: 98 %

## 2021-07-08 DIAGNOSIS — I10 ESSENTIAL (PRIMARY) HYPERTENSION: ICD-10-CM

## 2021-07-08 PROCEDURE — 83036 HEMOGLOBIN GLYCOSYLATED A1C: CPT | Mod: QW

## 2021-07-08 PROCEDURE — 99214 OFFICE O/P EST MOD 30 MIN: CPT

## 2021-07-08 NOTE — ASSESSMENT
[FreeTextEntry1] : 62 y/o female with PMHx of T2DM, HTN, HLD presenting for follow up visit.\par \par #T2DM\par -A1c this visit 7.5 %\par -Increase Trulicity  to 1.5 mcg weekly \par -Continue Metformin 1000 mg BID \par -Glimeperide 2 mg 1 tab in am and 1/2 tab in pm\par  -Check repeat CMP, Lipid panel, and microalbumin/Cr \par -On Plavix per neurology\par \par #HTN\par -On Nifedipine 30 mg, irbersartan and metoprolol \par -recommended to discuss BP control with PCP and cardiologist\par \par #HLD \par -continue Simvastatin \par \par -Follow up in 3 months. \par

## 2021-07-08 NOTE — HISTORY OF PRESENT ILLNESS
[FreeTextEntry1] : CC\par Follow up for T2DM\par \par Since last visit in January:\par Insurance no longer covering Janumet, has only been on Metformin 1000 BID \par Saw the neurologist Dr. Chauhan ([phone]) for sleepwalking. MRI done with no pathology. Per patient, was started on Plavix per her neurologist, unclear why. \par Also endorses yeast infections since starting Jardiance. Uses OTC Vagisil cream for itchiness. \par \par PCP: Dr. Rico ([phone])\par Cardiologist: Dr. Mayr Nazario\par \par \par Patient is currently on Duloxetine \par \par \par HPI:\par \par Duration of Diabetes: 10 years \par Is patient on Insulin? No \par If yes, how long on insulin? \par \par List Current Medications for Glycemic control and the doses:\par 1- Metformin 1000 mg BID \par 2- Glyburide 2.5 mg BID \par 3- Trulicity 0.75 mcg weekly \par \par SMBG (self monitored blood glucose) readings: \par - Name of glucometer: \par - How often does the patient check BG? twice a day, AM and after dinner\par - Does the patient keep a log? No\par \par If detailed record is available, what is the range of most of the BG readings?\par - Before Bedtime: 110-180s\par \par Does patient get Hypoglycemic episodes? None \par If yes how frequent? \par How low do the BG readings reach? \par When do most of those episodes occur? \par What symptoms does the patient get during those episodes? \par \par Diabetic Complications: Is patient aware of having any of those complications?\par - Eyes: Retinopathy? None, some eye pressure \par  	When was the last fully dilated eye exam? 06/2021\par - Feet: 	Neuropathy? In the hands and feet and has been placed on cymbalta \par  	Foot Ulcers? None \par 	When was the last time patient saw a Podiatrist? None \par \par \par Diet: review patient's diet: \par Breakfast: oatmeal, black coffee\par Lunch: brown rice, fish rodríguez, diet iced tea\par Dinner: sandwich with whole wheat bread, egg, banana, water\par Snacks: no salt crackers\par Juice or soda: None \par Dessert: None\par \par Exercise: review patient exercise habits: Walking 30 mins daily\par \par  \par

## 2021-07-25 LAB
ALBUMIN SERPL ELPH-MCNC: 4.7 G/DL
ALP BLD-CCNC: 82 U/L
ALT SERPL-CCNC: 75 U/L
ANION GAP SERPL CALC-SCNC: 14 MMOL/L
AST SERPL-CCNC: 55 U/L
BILIRUB SERPL-MCNC: 0.2 MG/DL
BUN SERPL-MCNC: 15 MG/DL
CALCIUM SERPL-MCNC: 10.4 MG/DL
CHLORIDE SERPL-SCNC: 101 MMOL/L
CHOLEST SERPL-MCNC: 152 MG/DL
CO2 SERPL-SCNC: 25 MMOL/L
CREAT SERPL-MCNC: 0.66 MG/DL
CREAT SPEC-SCNC: 145 MG/DL
GLUCOSE SERPL-MCNC: 104 MG/DL
HBA1C MFR BLD HPLC: 7.5
HDLC SERPL-MCNC: 51 MG/DL
LDLC SERPL CALC-MCNC: 86 MG/DL
MICROALBUMIN 24H UR DL<=1MG/L-MCNC: 2.7 MG/DL
MICROALBUMIN/CREAT 24H UR-RTO: 18 MG/G
NONHDLC SERPL-MCNC: 101 MG/DL
POTASSIUM SERPL-SCNC: 4.5 MMOL/L
PROT SERPL-MCNC: 7.4 G/DL
SODIUM SERPL-SCNC: 140 MMOL/L
TRIGL SERPL-MCNC: 76 MG/DL

## 2021-08-11 ENCOUNTER — FORM ENCOUNTER (OUTPATIENT)
Age: 61
End: 2021-08-11

## 2021-10-04 ENCOUNTER — RX RENEWAL (OUTPATIENT)
Age: 61
End: 2021-10-04

## 2021-10-11 ENCOUNTER — APPOINTMENT (OUTPATIENT)
Dept: NEUROLOGY | Facility: CLINIC | Age: 61
End: 2021-10-11

## 2021-11-02 ENCOUNTER — EMERGENCY (EMERGENCY)
Facility: HOSPITAL | Age: 61
LOS: 1 days | Discharge: HOME | End: 2021-11-02
Attending: STUDENT IN AN ORGANIZED HEALTH CARE EDUCATION/TRAINING PROGRAM
Payer: MEDICAID

## 2021-11-02 VITALS
DIASTOLIC BLOOD PRESSURE: 70 MMHG | RESPIRATION RATE: 18 BRPM | SYSTOLIC BLOOD PRESSURE: 148 MMHG | OXYGEN SATURATION: 99 % | HEART RATE: 98 BPM

## 2021-11-02 VITALS
DIASTOLIC BLOOD PRESSURE: 94 MMHG | HEART RATE: 116 BPM | WEIGHT: 147.93 LBS | OXYGEN SATURATION: 99 % | HEIGHT: 62 IN | SYSTOLIC BLOOD PRESSURE: 165 MMHG | TEMPERATURE: 98 F | RESPIRATION RATE: 16 BRPM

## 2021-11-02 LAB
ALBUMIN SERPL ELPH-MCNC: 4.6 G/DL — SIGNIFICANT CHANGE UP (ref 3.3–5)
ALP SERPL-CCNC: 109 U/L — SIGNIFICANT CHANGE UP (ref 40–120)
ALT FLD-CCNC: 51 U/L — HIGH (ref 10–45)
ANION GAP SERPL CALC-SCNC: 16 MMOL/L — SIGNIFICANT CHANGE UP (ref 5–17)
ANISOCYTOSIS BLD QL: SLIGHT — SIGNIFICANT CHANGE UP
APTT BLD: 29.3 SEC — SIGNIFICANT CHANGE UP (ref 27.5–35.5)
AST SERPL-CCNC: 32 U/L — SIGNIFICANT CHANGE UP (ref 10–40)
BASE EXCESS BLDV CALC-SCNC: 1.1 MMOL/L — SIGNIFICANT CHANGE UP (ref -2–2)
BASOPHILS # BLD AUTO: 0.15 K/UL — SIGNIFICANT CHANGE UP (ref 0–0.2)
BASOPHILS NFR BLD AUTO: 1.7 % — SIGNIFICANT CHANGE UP (ref 0–2)
BILIRUB SERPL-MCNC: 0.1 MG/DL — LOW (ref 0.2–1.2)
BUN SERPL-MCNC: 16 MG/DL — SIGNIFICANT CHANGE UP (ref 7–23)
CA-I SERPL-SCNC: 1.22 MMOL/L — SIGNIFICANT CHANGE UP (ref 1.15–1.33)
CALCIUM SERPL-MCNC: 9.6 MG/DL — SIGNIFICANT CHANGE UP (ref 8.4–10.5)
CHLORIDE BLDV-SCNC: 100 MMOL/L — SIGNIFICANT CHANGE UP (ref 96–108)
CHLORIDE SERPL-SCNC: 101 MMOL/L — SIGNIFICANT CHANGE UP (ref 96–108)
CO2 BLDV-SCNC: 29 MMOL/L — HIGH (ref 22–26)
CO2 SERPL-SCNC: 21 MMOL/L — LOW (ref 22–31)
CREAT SERPL-MCNC: 0.62 MG/DL — SIGNIFICANT CHANGE UP (ref 0.5–1.3)
DACRYOCYTES BLD QL SMEAR: SLIGHT — SIGNIFICANT CHANGE UP
ELLIPTOCYTES BLD QL SMEAR: SLIGHT — SIGNIFICANT CHANGE UP
EOSINOPHIL # BLD AUTO: 1.24 K/UL — HIGH (ref 0–0.5)
EOSINOPHIL NFR BLD AUTO: 13.8 % — HIGH (ref 0–6)
GAS PNL BLDV: 135 MMOL/L — LOW (ref 136–145)
GAS PNL BLDV: SIGNIFICANT CHANGE UP
GAS PNL BLDV: SIGNIFICANT CHANGE UP
GLUCOSE BLDV-MCNC: 243 MG/DL — HIGH (ref 70–99)
GLUCOSE SERPL-MCNC: 288 MG/DL — HIGH (ref 70–99)
HCO3 BLDV-SCNC: 27 MMOL/L — SIGNIFICANT CHANGE UP (ref 22–29)
HCT VFR BLD CALC: 37.7 % — SIGNIFICANT CHANGE UP (ref 34.5–45)
HCT VFR BLDA CALC: 36 % — SIGNIFICANT CHANGE UP (ref 34.5–46.5)
HGB BLD CALC-MCNC: 11.9 G/DL — SIGNIFICANT CHANGE UP (ref 11.7–16.1)
HGB BLD-MCNC: 12 G/DL — SIGNIFICANT CHANGE UP (ref 11.5–15.5)
INR BLD: 1.11 RATIO — SIGNIFICANT CHANGE UP (ref 0.88–1.16)
LACTATE BLDV-MCNC: 3 MMOL/L — HIGH (ref 0.7–2)
LACTATE BLDV-MCNC: 3.3 MMOL/L — HIGH (ref 0.7–2)
LIDOCAIN IGE QN: 79 U/L — HIGH (ref 7–60)
LYMPHOCYTES # BLD AUTO: 1.47 K/UL — SIGNIFICANT CHANGE UP (ref 1–3.3)
LYMPHOCYTES # BLD AUTO: 16.4 % — SIGNIFICANT CHANGE UP (ref 13–44)
MACROCYTES BLD QL: SLIGHT — SIGNIFICANT CHANGE UP
MANUAL SMEAR VERIFICATION: SIGNIFICANT CHANGE UP
MCHC RBC-ENTMCNC: 23.4 PG — LOW (ref 27–34)
MCHC RBC-ENTMCNC: 31.8 GM/DL — LOW (ref 32–36)
MCV RBC AUTO: 73.5 FL — LOW (ref 80–100)
MICROCYTES BLD QL: SIGNIFICANT CHANGE UP
MONOCYTES # BLD AUTO: 1.01 K/UL — HIGH (ref 0–0.9)
MONOCYTES NFR BLD AUTO: 11.2 % — SIGNIFICANT CHANGE UP (ref 2–14)
NEUTROPHILS # BLD AUTO: 5.11 K/UL — SIGNIFICANT CHANGE UP (ref 1.8–7.4)
NEUTROPHILS NFR BLD AUTO: 56.9 % — SIGNIFICANT CHANGE UP (ref 43–77)
OVALOCYTES BLD QL SMEAR: SLIGHT — SIGNIFICANT CHANGE UP
PCO2 BLDV: 48 MMHG — HIGH (ref 39–42)
PH BLDV: 7.36 — SIGNIFICANT CHANGE UP (ref 7.32–7.43)
PLAT MORPH BLD: NORMAL — SIGNIFICANT CHANGE UP
PLATELET # BLD AUTO: 309 K/UL — SIGNIFICANT CHANGE UP (ref 150–400)
PO2 BLDV: 29 MMHG — SIGNIFICANT CHANGE UP (ref 25–45)
POLYCHROMASIA BLD QL SMEAR: SLIGHT — SIGNIFICANT CHANGE UP
POTASSIUM BLDV-SCNC: 3.8 MMOL/L — SIGNIFICANT CHANGE UP (ref 3.5–5.1)
POTASSIUM SERPL-MCNC: 3.8 MMOL/L — SIGNIFICANT CHANGE UP (ref 3.5–5.3)
POTASSIUM SERPL-SCNC: 3.8 MMOL/L — SIGNIFICANT CHANGE UP (ref 3.5–5.3)
PROT SERPL-MCNC: 7.5 G/DL — SIGNIFICANT CHANGE UP (ref 6–8.3)
PROTHROM AB SERPL-ACNC: 13.3 SEC — SIGNIFICANT CHANGE UP (ref 10.6–13.6)
RBC # BLD: 5.13 M/UL — SIGNIFICANT CHANGE UP (ref 3.8–5.2)
RBC # FLD: 13.7 % — SIGNIFICANT CHANGE UP (ref 10.3–14.5)
RBC BLD AUTO: ABNORMAL
SAO2 % BLDV: 48.3 % — LOW (ref 67–88)
SODIUM SERPL-SCNC: 138 MMOL/L — SIGNIFICANT CHANGE UP (ref 135–145)
WBC # BLD: 8.98 K/UL — SIGNIFICANT CHANGE UP (ref 3.8–10.5)
WBC # FLD AUTO: 8.98 K/UL — SIGNIFICANT CHANGE UP (ref 3.8–10.5)

## 2021-11-02 PROCEDURE — 72125 CT NECK SPINE W/O DYE: CPT | Mod: 26,MA

## 2021-11-02 PROCEDURE — 96366 THER/PROPH/DIAG IV INF ADDON: CPT

## 2021-11-02 PROCEDURE — 73502 X-RAY EXAM HIP UNI 2-3 VIEWS: CPT

## 2021-11-02 PROCEDURE — 84295 ASSAY OF SERUM SODIUM: CPT

## 2021-11-02 PROCEDURE — 90471 IMMUNIZATION ADMIN: CPT

## 2021-11-02 PROCEDURE — 82803 BLOOD GASES ANY COMBINATION: CPT

## 2021-11-02 PROCEDURE — 85730 THROMBOPLASTIN TIME PARTIAL: CPT

## 2021-11-02 PROCEDURE — 85025 COMPLETE CBC W/AUTO DIFF WBC: CPT

## 2021-11-02 PROCEDURE — 70450 CT HEAD/BRAIN W/O DYE: CPT | Mod: MA

## 2021-11-02 PROCEDURE — 74177 CT ABD & PELVIS W/CONTRAST: CPT | Mod: MA

## 2021-11-02 PROCEDURE — 83690 ASSAY OF LIPASE: CPT

## 2021-11-02 PROCEDURE — 82962 GLUCOSE BLOOD TEST: CPT

## 2021-11-02 PROCEDURE — 82330 ASSAY OF CALCIUM: CPT

## 2021-11-02 PROCEDURE — 80053 COMPREHEN METABOLIC PANEL: CPT

## 2021-11-02 PROCEDURE — 83605 ASSAY OF LACTIC ACID: CPT

## 2021-11-02 PROCEDURE — 82435 ASSAY OF BLOOD CHLORIDE: CPT

## 2021-11-02 PROCEDURE — 70450 CT HEAD/BRAIN W/O DYE: CPT | Mod: 26,MA

## 2021-11-02 PROCEDURE — 73564 X-RAY EXAM KNEE 4 OR MORE: CPT

## 2021-11-02 PROCEDURE — 70486 CT MAXILLOFACIAL W/O DYE: CPT | Mod: MA

## 2021-11-02 PROCEDURE — 99284 EMERGENCY DEPT VISIT MOD MDM: CPT | Mod: 25

## 2021-11-02 PROCEDURE — 73502 X-RAY EXAM HIP UNI 2-3 VIEWS: CPT | Mod: 26,RT

## 2021-11-02 PROCEDURE — 99285 EMERGENCY DEPT VISIT HI MDM: CPT

## 2021-11-02 PROCEDURE — 74177 CT ABD & PELVIS W/CONTRAST: CPT | Mod: 26,MA

## 2021-11-02 PROCEDURE — 73564 X-RAY EXAM KNEE 4 OR MORE: CPT | Mod: 26,RT

## 2021-11-02 PROCEDURE — 90715 TDAP VACCINE 7 YRS/> IM: CPT

## 2021-11-02 PROCEDURE — 36415 COLL VENOUS BLD VENIPUNCTURE: CPT

## 2021-11-02 PROCEDURE — 76376 3D RENDER W/INTRP POSTPROCES: CPT | Mod: 26

## 2021-11-02 PROCEDURE — 76377 3D RENDER W/INTRP POSTPROCES: CPT | Mod: 26

## 2021-11-02 PROCEDURE — 76377 3D RENDER W/INTRP POSTPROCES: CPT

## 2021-11-02 PROCEDURE — 96365 THER/PROPH/DIAG IV INF INIT: CPT | Mod: XU

## 2021-11-02 PROCEDURE — 70486 CT MAXILLOFACIAL W/O DYE: CPT | Mod: 26,MA

## 2021-11-02 PROCEDURE — 85610 PROTHROMBIN TIME: CPT

## 2021-11-02 PROCEDURE — 84132 ASSAY OF SERUM POTASSIUM: CPT

## 2021-11-02 PROCEDURE — 71260 CT THORAX DX C+: CPT | Mod: 26,MA

## 2021-11-02 PROCEDURE — 93010 ELECTROCARDIOGRAM REPORT: CPT

## 2021-11-02 PROCEDURE — 71260 CT THORAX DX C+: CPT | Mod: MA

## 2021-11-02 PROCEDURE — 72125 CT NECK SPINE W/O DYE: CPT | Mod: MA

## 2021-11-02 PROCEDURE — 85018 HEMOGLOBIN: CPT

## 2021-11-02 PROCEDURE — 85014 HEMATOCRIT: CPT

## 2021-11-02 PROCEDURE — 82947 ASSAY GLUCOSE BLOOD QUANT: CPT

## 2021-11-02 PROCEDURE — 93005 ELECTROCARDIOGRAM TRACING: CPT

## 2021-11-02 RX ORDER — ACETAMINOPHEN 500 MG
1000 TABLET ORAL ONCE
Refills: 0 | Status: COMPLETED | OUTPATIENT
Start: 2021-11-02 | End: 2021-11-02

## 2021-11-02 RX ORDER — LIDOCAINE 4 G/100G
1 CREAM TOPICAL ONCE
Refills: 0 | Status: COMPLETED | OUTPATIENT
Start: 2021-11-02 | End: 2021-11-02

## 2021-11-02 RX ORDER — TETANUS TOXOID, REDUCED DIPHTHERIA TOXOID AND ACELLULAR PERTUSSIS VACCINE, ADSORBED 5; 2.5; 8; 8; 2.5 [IU]/.5ML; [IU]/.5ML; UG/.5ML; UG/.5ML; UG/.5ML
0.5 SUSPENSION INTRAMUSCULAR ONCE
Refills: 0 | Status: COMPLETED | OUTPATIENT
Start: 2021-11-02 | End: 2021-11-02

## 2021-11-02 RX ADMIN — Medication 1000 MILLIGRAM(S): at 20:12

## 2021-11-02 RX ADMIN — LIDOCAINE 1 PATCH: 4 CREAM TOPICAL at 20:13

## 2021-11-02 RX ADMIN — TETANUS TOXOID, REDUCED DIPHTHERIA TOXOID AND ACELLULAR PERTUSSIS VACCINE, ADSORBED 0.5 MILLILITER(S): 5; 2.5; 8; 8; 2.5 SUSPENSION INTRAMUSCULAR at 17:55

## 2021-11-02 RX ADMIN — Medication 400 MILLIGRAM(S): at 17:55

## 2021-11-02 NOTE — ED PROVIDER NOTE - NSFOLLOWUPINSTRUCTIONS_ED_ALL_ED_FT
1. TAKE ALL MEDICATIONS AS DIRECTED.    2. FOR PAIN OR FEVER YOU CAN TAKE IBUPROFEN (MOTRIN, ADVIL) OR ACETAMINOPHEN (TYLENOL) AS NEEDED, AS DIRECTED ON PACKAGING.  3. FOLLOW UP WITH YOUR PRIMARY DOCTOR WITHIN 5 DAYS AS DIRECTED.  4. IF YOU HAD LABS OR IMAGING DONE, YOU WERE GIVEN COPIES OF ALL LABS AND/OR IMAGING RESULTS FROM YOUR ER VISIT--PLEASE TAKE THEM WITH YOU TO YOUR FOLLOW UP APPOINTMENTS.  5. RETURN TO THE ER FOR ANY WORSENING SYMPTOMS OR CONCERNS.    PLEASE DISCUSS THESE FINDINGS WITH YOUR PRIMARY CARE DOCTOR   3.4 x 3.1 cm hypodense left thyroid mass resulting in rightward tracheal deviation and minimal narrowing of the trachea. Recommend ultrasound for further evaluation.    PLEASE SEE AN ORTHOPEDIST FOR YOUR KNEE PAIN    MUSCULOSKELETAL PAIN     Muscle pain can be dull, achy, or sharp. You may have pain and tenderness to the touch as well. It can occur anywhere on your body and is often brought on by exercise. Muscle pain may occur from an injury, such as a sprain, tendonitis, or bone fracture. Muscle pain can also be the result of medical conditions, such as polymyositis, fibromyalgia, and connective tissue disorders.     DISCHARGE INSTRUCTIONS:    Self care:   •Rest as directed and avoid activity that causes pain. You may be able to return to normal activity when you can move without pain. Follow directions for rest and activity. You are at risk for injury for 3 weeks after your symptoms go away.     •Ice your painful muscle area to decrease pain and swelling. Use an ice pack, or put ice in a plastic bag and cover it with a towel. Always put a cloth between the ice and your skin. Apply the ice as often as directed for the first 24 to 48 hours.     •Compression with a splint, brace, or elastic bandage helps decrease pain and swelling. This may be needed for muscle pain in arms or legs. A splint, brace, or bandage will also help protect the painful area when you move around.     •Elevate a painful arm or leg to reduce swelling and pain. Elevate your limb while you are sitting or lying down. Prop a painful leg on pillows to keep it above the level of your heart.    Medicines:   •NSAIDs help decrease swelling and pain or fever. This medicine is available with or without a doctor's order. NSAIDs can cause stomach bleeding or kidney problems in certain people. If you take blood thinner medicine, always ask your healthcare provider if NSAIDs are safe for you. Always read the medicine label and follow directions.    •Acetaminophen is used to decrease pain. It is available without a doctor's order. Ask your healthcare provider how much to take and when to take it. Follow directions. Acetaminophen can cause liver damage if not taken correctly. Do not take more than one medicine that contains acetaminophen unless directed.     •Muscle relaxers help relax your muscles to decrease pain and muscle spasms.     •Steroids may be given to decrease redness, pain, and swelling.    •Take your medicine as directed. Contact your healthcare provider if you think your medicine is not helping or if you have side effects. Tell him if you are allergic to any medicine. Keep a list of the medicines, vitamins, and herbs you take. Include the amounts, and when and why you take them. Bring the list or the pill bottles to follow-up visits. Carry your medicine list with you in case of an emergency.    Follow up with your healthcare provider as directed: You may need more tests to help healthcare providers find the cause of your muscle pain. You may need physical therapy to learn muscle strengthening exercises. Write down your questions so you remember to ask them during your visits.     Contact your healthcare provider if:   •You have a fever.   •You have trouble sleeping because of your pain.   •Your painful area becomes more tender, red, and warm to the touch.   •You have decreased movement of the painful area.   •You have questions or concerns about your condition or care.    Return to the emergency department if:   •You have increased severe pain when you move the painful muscle area.   •You lose feeling in your painful muscle area.   •You have new or worse swelling in the painful area. Your skin may feel tight.

## 2021-11-02 NOTE — ED PROVIDER NOTE - NSFOLLOWUPCLINICSTOKEN_GEN_ALL_ED_FT
090217:1-3 Days|| ||00\01||False; 280426:1-3 Days|| ||00\01||False;188855:1-3 Days|| ||00\01||False;

## 2021-11-02 NOTE — ED PROVIDER NOTE - CLINICAL SUMMARY MEDICAL DECISION MAKING FREE TEXT BOX
tim attending- 60yo f pmhx dm, htn, hld, on asp plavix, mechanical slip and fall down 8steps fell onto right side no loc +hs, co left cheek pain, right ant chest pain, right knee pain, concerning for significant fall height down 8stairs, ambulatory reassuring exam will eval for ic facial, chest ap trauma, otherwise xray knee pelvis hip, analgesia, reassess for disposition

## 2021-11-02 NOTE — ED PROVIDER NOTE - OBJECTIVE STATEMENT
tim attending- 62yo f pmhx dm, htn, hld, on asp plavix, mechanical slip and fall down 8steps fell onto right side no loc +hs, co left cheek pain, right ant chest pain, right knee pain, Denies other recent trauma, fevers, chills, headache, dizziness, nausea, vomiting, dysuria, freq, hematuria, diarrhea, constipation, chest pain, shortness of breath, cough. able to ambulate wo assistance unk last tetanus

## 2021-11-02 NOTE — ED ADULT NURSE NOTE - NSIMPLEMENTINTERV_GEN_ALL_ED
Implemented All Fall Risk Interventions:  [place] to call system. Call bell, personal items and telephone within reach. Instruct patient to call for assistance. Room bathroom lighting operational. Non-slip footwear when patient is off stretcher. Physically safe environment: no spills, clutter or unnecessary equipment. Stretcher in lowest position, wheels locked, appropriate side rails in place. Provide visual cue, wrist band, yellow gown, etc. Monitor gait and stability. Monitor for mental status changes and reorient to person, place, and time. Review medications for side effects contributing to fall risk. Reinforce activity limits and safety measures with patient and family.

## 2021-11-02 NOTE — ED PROVIDER NOTE - PATIENT PORTAL LINK FT
You can access the FollowMyHealth Patient Portal offered by [facility] by registering at the following website: [email or web address]. By joining [employer]’s FollowMyHealth portal, you will also be able to view your health information using other applications (apps) compatible with our system.

## 2021-11-02 NOTE — ED PROVIDER NOTE - NSFOLLOWUPCLINICS_GEN_ALL_ED_FT
[facility]  Orthopedic Surgery  [address] [address]  Phone: [phone]  Fax:   Follow Up Time: 1-3 Days     [facility]  Orthopedic Surgery  [address] [address]  Phone: [phone]  Fax:   Follow Up Time: 1-3 Days    [facility]  Neurology  [address] [place] [id number]  Phone: [phone]  Fax:   Follow Up Time: 1-3 Days

## 2021-11-02 NOTE — ED PROVIDER NOTE - PROGRESS NOTE DETAILS
pettet- Given the significant and immediate threats to this patient based on initial presentation, the benefits of emergency contrast-enhanced CT imaging without obtaining GFR/creatinine serum level results greatly outweigh the potential risk of harm due to contrast-induced nephropathy. pt reevaluated, pt with improvement of pain, has been ambulatory with no complaints, would like to go home. -Deonna Yu PA-C [facility] ED Attending- Patient feels well, tolerating PO. Discussed lab and radiology findings with patient. Patient feels comfortable going home. Gave home care and follow up instructions. Discussed which symptoms to look out for and when to return to the ED for further evaluation. Patient given opportunity to ask questions about their medical condition and had all questions answered.

## 2021-11-02 NOTE — ED ADULT NURSE NOTE - OBJECTIVE STATEMENT
pt fell down 8 stairs  she has a laceration over her eyebrow that is not bleeding now.  c/o head pain  pt is on blood thinners and is a diabetic that takes metformin

## 2021-11-02 NOTE — ED PROVIDER NOTE - PHYSICAL EXAMINATION
Primary Survey  A - airway intact  B - bilateral breath sounds and good chest rise  C - initially BP: 160/111 , palpable pulses in all extremities  D - GCS15 on arrival  Exposure obtained    Secondary survey  Gen: NAD  HEENT: NC, C-spine no ttp, pupils 3mm equal & reactive, left maxillary facial ttp without stepoff no nasal septal hematoma, eomi  CV: pulse regularly present  Pulm: CTA B/L  Chest: intact without ttp  Abd: Soft, ND, NT, no rebound, no guarding  Groin: Normal appearing  Ext: Palp radial b/l, palp DP b/l, right knee pain pelvis stable, FROM right knee, sensation equal bilateral le's and ue's   Back: no TTP, no palpable runoff, stepoff, or deformity Primary Survey  A - airway intact  B - bilateral breath sounds and good chest rise  C - initially BP: 160/111 , palpable pulses in all extremities  D - GCS15 on arrival  Exposure obtained    Secondary survey  Gen: NAD  HEENT: NC, C-spine no ttp, pupils 3mm equal & reactive, left maxillary facial ttp without stepoff no nasal septal hematoma, eomi  CV: pulse regularly present, tachycardia   Pulm: CTA B/L  Chest: intact without ttp  Abd: Soft, ND, NT, no rebound, no guarding  Groin: Normal appearing  Ext: Palp radial b/l, palp DP b/l, right knee pain pelvis stable, FROM right knee, sensation equal bilateral le's and ue's   Back: no TTP, no palpable runoff, stepoff, or deformity

## 2021-11-04 NOTE — ED POST DISCHARGE NOTE - ADDITIONAL DOCUMENTATION
Incidental Findings: Thyroid mass. ED team aware of finding and advised pt. D/c paper state "PLEASE DISCUSS THESE FINDINGS WITH YOUR PRIMARY CARE DOCTOR 3.4 x 3.1 cm hypodense left thyroid mass resulting in rightward tracheal deviation and minimal narrowing of the trachea. Recommend ultrasound for further evaluation." - Yesy Arrington PA-C

## 2021-11-10 ENCOUNTER — APPOINTMENT (OUTPATIENT)
Dept: ENDOCRINOLOGY | Facility: CLINIC | Age: 61
End: 2021-11-10
Payer: MEDICAID

## 2021-11-10 VITALS
BODY MASS INDEX: 27.23 KG/M2 | WEIGHT: 148 LBS | DIASTOLIC BLOOD PRESSURE: 80 MMHG | HEART RATE: 84 BPM | HEIGHT: 62 IN | SYSTOLIC BLOOD PRESSURE: 128 MMHG | TEMPERATURE: 97.1 F | OXYGEN SATURATION: 99 %

## 2021-11-10 DIAGNOSIS — I10 ESSENTIAL (PRIMARY) HYPERTENSION: ICD-10-CM

## 2021-11-10 DIAGNOSIS — E78.5 HYPERLIPIDEMIA, UNSPECIFIED: ICD-10-CM

## 2021-11-10 PROCEDURE — 99214 OFFICE O/P EST MOD 30 MIN: CPT

## 2021-11-10 PROCEDURE — 83036 HEMOGLOBIN GLYCOSYLATED A1C: CPT | Mod: QW

## 2021-11-10 RX ORDER — DULAGLUTIDE 0.75 MG/.5ML
0.75 INJECTION, SOLUTION SUBCUTANEOUS
Qty: 2 | Refills: 0 | Status: DISCONTINUED | COMMUNITY
Start: 2020-06-10 | End: 2021-11-10

## 2021-11-10 RX ORDER — GLIMEPIRIDE 2 MG/1
2 TABLET ORAL
Refills: 0 | Status: DISCONTINUED | COMMUNITY
End: 2021-11-10

## 2021-11-10 RX ORDER — DULAGLUTIDE 1.5 MG/.5ML
1.5 INJECTION, SOLUTION SUBCUTANEOUS
Qty: 1 | Refills: 3 | Status: ACTIVE | COMMUNITY
Start: 2021-11-10 | End: 1900-01-01

## 2021-11-10 NOTE — HISTORY OF PRESENT ILLNESS
[FreeTextEntry1] : CC\par Follow up for T2DM\par \par Since last visit in January:\par Insurance no longer covering umet, has only been on Metformin 1000 BID \par Saw the neurologist Dr. Chauhan ([phone]) for sleepwalking. MRI done with no pathology. Per patient, was started on Plavix per her neurologist, unclear why. \par Also endorses yeast infections since starting Jardiance. Uses OTC Vagisil cream for itchiness. \par \par PCP: Dr. Rico ([phone])\par Cardiologist: Dr. Mary Nazario\par \par \par Patient is currently on Duloxetine \par \par \par HPI:\par \par Duration of Diabetes: 10 years \par Is patient on Insulin? No \par If yes, how long on insulin? \par \par List Current Medications for Glycemic control and the doses:\par 1- Metformin 1000 mg BID \par 2- Glyburide 2.5 mg BID \par 3- Trulicity 0.75 mcg weekly \par \par SMBG (self monitored blood glucose) readings: \par - Name of glucometer: \par - How often does the patient check BG? twice a day, AM and after dinner\par - Does the patient keep a log? No\par \par If detailed record is available, what is the range of most of the BG readings?\par Fastins \par Afternoon:200s \par - Before Bedtime: 110-180s\par \par Does patient get Hypoglycemic episodes? None \par If yes how frequent? \par How low do the BG readings reach? \par When do most of those episodes occur? \par What symptoms does the patient get during those episodes? \par \par Diabetic Complications: Is patient aware of having any of those complications?\par - Eyes: Retinopathy? None, some eye pressure \par  	When was the last fully dilated eye exam? 2021\par - Feet: 	Neuropathy? In the hands and feet ( Not currently on cymbalta)\par  	Foot Ulcers? None \par 	When was the last time patient saw a Podiatrist? None \par \par \par Diet: review patient's diet: \par Breakfast: oatmeal, black coffee\par Lunch: brown rice, fish rodríguez, diet iced tea\par Dinner: sandwich with whole wheat bread, egg, banana, water\par Snacks: no salt crackers\par Juice or soda: None \par Dessert: None\par \par Exercise: review patient exercise habits: Walking 30 min

## 2021-11-10 NOTE — CONSULT LETTER
[Dear  ___] : Dear  [unfilled], [Consult Letter:] : I had the pleasure of evaluating your patient, [unfilled]. [Please see my note below.] : Please see my note below. [Consult Closing:] : Thank you very much for allowing me to participate in the care of this patient.  If you have any questions, please do not hesitate to contact me. [Sincerely,] : Sincerely, [FreeTextEntry3] : Dr. Cee Aguirre

## 2021-11-10 NOTE — ASSESSMENT
[FreeTextEntry1] : \par 62 y/o female with PMHx of T2DM, HTN, HLD presenting for follow up visit.\par \par #T2DM\par -A1c this visit 8.7%\par -Increase Trulicity to 1.5 mcg weekly \par -Continue Metformin 1000 mg BID \par -Will increase Glyburide to 5 mg in am and 2.5 mg in PM\par  -Check repeat CMP, Lipid panel, and microalbumin/Cr \par -On Plavix per neurology\par \par #HTN\par -On Nifedipine 30 mg, irbersartan and metoprolol \par -recommended to discuss BP control with PCP and cardiologist\par \par #HLD \par -continue Simvastatin \par \par -Follow up in 3 months.

## 2021-11-11 LAB — HBA1C MFR BLD HPLC: 8.4

## 2021-11-24 ENCOUNTER — APPOINTMENT (OUTPATIENT)
Dept: ORTHOPEDIC SURGERY | Facility: CLINIC | Age: 61
End: 2021-11-24

## 2021-12-27 ENCOUNTER — RESULT REVIEW (OUTPATIENT)
Age: 61
End: 2021-12-27

## 2022-01-26 ENCOUNTER — APPOINTMENT (OUTPATIENT)
Dept: NEUROLOGY | Facility: CLINIC | Age: 62
End: 2022-01-26

## 2022-02-16 ENCOUNTER — APPOINTMENT (OUTPATIENT)
Dept: NEUROLOGY | Facility: CLINIC | Age: 62
End: 2022-02-16

## 2022-03-30 ENCOUNTER — APPOINTMENT (OUTPATIENT)
Dept: ENDOCRINOLOGY | Facility: CLINIC | Age: 62
End: 2022-03-30

## 2022-04-05 ENCOUNTER — APPOINTMENT (OUTPATIENT)
Dept: NEUROLOGY | Facility: HOSPITAL | Age: 62
End: 2022-04-05

## 2022-10-23 ENCOUNTER — INPATIENT (INPATIENT)
Facility: HOSPITAL | Age: 62
LOS: 7 days | Discharge: HOME | DRG: 884 | End: 2022-10-31
Attending: HOSPITALIST | Admitting: STUDENT IN AN ORGANIZED HEALTH CARE EDUCATION/TRAINING PROGRAM
Payer: MEDICAID

## 2022-10-23 VITALS
SYSTOLIC BLOOD PRESSURE: 166 MMHG | WEIGHT: 179.9 LBS | TEMPERATURE: 99 F | HEIGHT: 62 IN | OXYGEN SATURATION: 99 % | DIASTOLIC BLOOD PRESSURE: 78 MMHG | RESPIRATION RATE: 20 BRPM | HEART RATE: 93 BPM

## 2022-10-23 DIAGNOSIS — I10 ESSENTIAL (PRIMARY) HYPERTENSION: ICD-10-CM

## 2022-10-23 DIAGNOSIS — R26.81 UNSTEADINESS ON FEET: ICD-10-CM

## 2022-10-23 DIAGNOSIS — E11.9 TYPE 2 DIABETES MELLITUS WITHOUT COMPLICATIONS: ICD-10-CM

## 2022-10-23 DIAGNOSIS — R41.89 OTHER SYMPTOMS AND SIGNS INVOLVING COGNITIVE FUNCTIONS AND AWARENESS: ICD-10-CM

## 2022-10-23 DIAGNOSIS — Z90.710 ACQUIRED ABSENCE OF BOTH CERVIX AND UTERUS: Chronic | ICD-10-CM

## 2022-10-23 DIAGNOSIS — I65.21 OCCLUSION AND STENOSIS OF RIGHT CAROTID ARTERY: ICD-10-CM

## 2022-10-23 DIAGNOSIS — I25.10 ATHEROSCLEROTIC HEART DISEASE OF NATIVE CORONARY ARTERY WITHOUT ANGINA PECTORIS: ICD-10-CM

## 2022-10-23 LAB
ALBUMIN SERPL ELPH-MCNC: 4.7 G/DL — SIGNIFICANT CHANGE UP (ref 3.3–5)
ALP SERPL-CCNC: 103 U/L — SIGNIFICANT CHANGE UP (ref 40–120)
ALT FLD-CCNC: 23 U/L — SIGNIFICANT CHANGE UP (ref 10–45)
ANION GAP SERPL CALC-SCNC: 14 MMOL/L — SIGNIFICANT CHANGE UP (ref 5–17)
APPEARANCE UR: CLEAR — SIGNIFICANT CHANGE UP
AST SERPL-CCNC: 31 U/L — SIGNIFICANT CHANGE UP (ref 10–40)
BASE EXCESS BLDV CALC-SCNC: 4.3 MMOL/L — HIGH (ref -2–3)
BASOPHILS # BLD AUTO: 0.04 K/UL — SIGNIFICANT CHANGE UP (ref 0–0.2)
BASOPHILS NFR BLD AUTO: 0.6 % — SIGNIFICANT CHANGE UP (ref 0–2)
BILIRUB SERPL-MCNC: 0.2 MG/DL — SIGNIFICANT CHANGE UP (ref 0.2–1.2)
BILIRUB UR-MCNC: NEGATIVE — SIGNIFICANT CHANGE UP
BUN SERPL-MCNC: 12 MG/DL — SIGNIFICANT CHANGE UP (ref 7–23)
CA-I SERPL-SCNC: 1.23 MMOL/L — SIGNIFICANT CHANGE UP (ref 1.15–1.33)
CALCIUM SERPL-MCNC: 10.2 MG/DL — SIGNIFICANT CHANGE UP (ref 8.4–10.5)
CHLORIDE BLDV-SCNC: 102 MMOL/L — SIGNIFICANT CHANGE UP (ref 96–108)
CHLORIDE SERPL-SCNC: 101 MMOL/L — SIGNIFICANT CHANGE UP (ref 96–108)
CO2 BLDV-SCNC: 32 MMOL/L — HIGH (ref 22–26)
CO2 SERPL-SCNC: 26 MMOL/L — SIGNIFICANT CHANGE UP (ref 22–31)
COLOR SPEC: SIGNIFICANT CHANGE UP
CREAT SERPL-MCNC: 0.57 MG/DL — SIGNIFICANT CHANGE UP (ref 0.5–1.3)
DIFF PNL FLD: NEGATIVE — SIGNIFICANT CHANGE UP
EGFR: 101 ML/MIN/1.73M2 — SIGNIFICANT CHANGE UP
EOSINOPHIL # BLD AUTO: 0.43 K/UL — SIGNIFICANT CHANGE UP (ref 0–0.5)
EOSINOPHIL NFR BLD AUTO: 6.2 % — HIGH (ref 0–6)
GAS PNL BLDV: 138 MMOL/L — SIGNIFICANT CHANGE UP (ref 136–145)
GAS PNL BLDV: SIGNIFICANT CHANGE UP
GAS PNL BLDV: SIGNIFICANT CHANGE UP
GLUCOSE BLDV-MCNC: 183 MG/DL — HIGH (ref 70–99)
GLUCOSE SERPL-MCNC: 185 MG/DL — HIGH (ref 70–99)
GLUCOSE UR QL: NEGATIVE — SIGNIFICANT CHANGE UP
HCO3 BLDV-SCNC: 30 MMOL/L — HIGH (ref 22–29)
HCT VFR BLD CALC: 40.8 % — SIGNIFICANT CHANGE UP (ref 34.5–45)
HCT VFR BLDA CALC: 40 % — SIGNIFICANT CHANGE UP (ref 34.5–46.5)
HGB BLD CALC-MCNC: 13.2 G/DL — SIGNIFICANT CHANGE UP (ref 11.7–16.1)
HGB BLD-MCNC: 12.8 G/DL — SIGNIFICANT CHANGE UP (ref 11.5–15.5)
IMM GRANULOCYTES NFR BLD AUTO: 0.4 % — SIGNIFICANT CHANGE UP (ref 0–0.9)
KETONES UR-MCNC: NEGATIVE — SIGNIFICANT CHANGE UP
LACTATE BLDV-MCNC: 2.8 MMOL/L — HIGH (ref 0.5–2)
LEUKOCYTE ESTERASE UR-ACNC: NEGATIVE — SIGNIFICANT CHANGE UP
LYMPHOCYTES # BLD AUTO: 1.42 K/UL — SIGNIFICANT CHANGE UP (ref 1–3.3)
LYMPHOCYTES # BLD AUTO: 20.4 % — SIGNIFICANT CHANGE UP (ref 13–44)
MAGNESIUM SERPL-MCNC: 2.1 MG/DL — SIGNIFICANT CHANGE UP (ref 1.6–2.6)
MCHC RBC-ENTMCNC: 23.9 PG — LOW (ref 27–34)
MCHC RBC-ENTMCNC: 31.4 GM/DL — LOW (ref 32–36)
MCV RBC AUTO: 76.3 FL — LOW (ref 80–100)
MONOCYTES # BLD AUTO: 0.73 K/UL — SIGNIFICANT CHANGE UP (ref 0–0.9)
MONOCYTES NFR BLD AUTO: 10.5 % — SIGNIFICANT CHANGE UP (ref 2–14)
NEUTROPHILS # BLD AUTO: 4.31 K/UL — SIGNIFICANT CHANGE UP (ref 1.8–7.4)
NEUTROPHILS NFR BLD AUTO: 61.9 % — SIGNIFICANT CHANGE UP (ref 43–77)
NITRITE UR-MCNC: NEGATIVE — SIGNIFICANT CHANGE UP
NRBC # BLD: 0 /100 WBCS — SIGNIFICANT CHANGE UP (ref 0–0)
NT-PROBNP SERPL-SCNC: 39 PG/ML — SIGNIFICANT CHANGE UP (ref 0–300)
PCO2 BLDV: 50 MMHG — HIGH (ref 39–42)
PH BLDV: 7.39 — SIGNIFICANT CHANGE UP (ref 7.32–7.43)
PH UR: 6.5 — SIGNIFICANT CHANGE UP (ref 5–8)
PLATELET # BLD AUTO: 333 K/UL — SIGNIFICANT CHANGE UP (ref 150–400)
PO2 BLDV: 37 MMHG — SIGNIFICANT CHANGE UP (ref 25–45)
POTASSIUM BLDV-SCNC: 3.6 MMOL/L — SIGNIFICANT CHANGE UP (ref 3.5–5.1)
POTASSIUM SERPL-MCNC: 3.6 MMOL/L — SIGNIFICANT CHANGE UP (ref 3.5–5.3)
POTASSIUM SERPL-SCNC: 3.6 MMOL/L — SIGNIFICANT CHANGE UP (ref 3.5–5.3)
PROT SERPL-MCNC: 8.1 G/DL — SIGNIFICANT CHANGE UP (ref 6–8.3)
PROT UR-MCNC: NEGATIVE — SIGNIFICANT CHANGE UP
RAPID RVP RESULT: SIGNIFICANT CHANGE UP
RBC # BLD: 5.35 M/UL — HIGH (ref 3.8–5.2)
RBC # FLD: 13.3 % — SIGNIFICANT CHANGE UP (ref 10.3–14.5)
SAO2 % BLDV: 60.9 % — LOW (ref 67–88)
SARS-COV-2 RNA SPEC QL NAA+PROBE: SIGNIFICANT CHANGE UP
SODIUM SERPL-SCNC: 141 MMOL/L — SIGNIFICANT CHANGE UP (ref 135–145)
SP GR SPEC: 1.03 — HIGH (ref 1.01–1.02)
TROPONIN T, HIGH SENSITIVITY RESULT: 7 NG/L — SIGNIFICANT CHANGE UP (ref 0–51)
UROBILINOGEN FLD QL: NEGATIVE — SIGNIFICANT CHANGE UP
WBC # BLD: 6.96 K/UL — SIGNIFICANT CHANGE UP (ref 3.8–10.5)
WBC # FLD AUTO: 6.96 K/UL — SIGNIFICANT CHANGE UP (ref 3.8–10.5)

## 2022-10-23 PROCEDURE — 99223 1ST HOSP IP/OBS HIGH 75: CPT

## 2022-10-23 PROCEDURE — 71045 X-RAY EXAM CHEST 1 VIEW: CPT | Mod: 26

## 2022-10-23 PROCEDURE — 70496 CT ANGIOGRAPHY HEAD: CPT | Mod: 26,MA

## 2022-10-23 PROCEDURE — 99285 EMERGENCY DEPT VISIT HI MDM: CPT

## 2022-10-23 PROCEDURE — 70498 CT ANGIOGRAPHY NECK: CPT | Mod: 26,MA

## 2022-10-23 RX ORDER — ENOXAPARIN SODIUM 100 MG/ML
40 INJECTION SUBCUTANEOUS EVERY 24 HOURS
Refills: 0 | Status: DISCONTINUED | OUTPATIENT
Start: 2022-10-23 | End: 2022-10-31

## 2022-10-23 RX ORDER — LOSARTAN POTASSIUM 100 MG/1
50 TABLET, FILM COATED ORAL DAILY
Refills: 0 | Status: DISCONTINUED | OUTPATIENT
Start: 2022-10-23 | End: 2022-10-31

## 2022-10-23 RX ORDER — METOPROLOL TARTRATE 50 MG
100 TABLET ORAL DAILY
Refills: 0 | Status: DISCONTINUED | OUTPATIENT
Start: 2022-10-23 | End: 2022-10-31

## 2022-10-23 RX ORDER — GLUCAGON INJECTION, SOLUTION 0.5 MG/.1ML
1 INJECTION, SOLUTION SUBCUTANEOUS ONCE
Refills: 0 | Status: DISCONTINUED | OUTPATIENT
Start: 2022-10-23 | End: 2022-10-31

## 2022-10-23 RX ORDER — DEXTROSE 50 % IN WATER 50 %
12.5 SYRINGE (ML) INTRAVENOUS ONCE
Refills: 0 | Status: DISCONTINUED | OUTPATIENT
Start: 2022-10-23 | End: 2022-10-31

## 2022-10-23 RX ORDER — DEXTROSE 50 % IN WATER 50 %
25 SYRINGE (ML) INTRAVENOUS ONCE
Refills: 0 | Status: DISCONTINUED | OUTPATIENT
Start: 2022-10-23 | End: 2022-10-31

## 2022-10-23 RX ORDER — SODIUM CHLORIDE 9 MG/ML
1000 INJECTION, SOLUTION INTRAVENOUS
Refills: 0 | Status: DISCONTINUED | OUTPATIENT
Start: 2022-10-23 | End: 2022-10-31

## 2022-10-23 RX ORDER — INSULIN LISPRO 100/ML
VIAL (ML) SUBCUTANEOUS AT BEDTIME
Refills: 0 | Status: DISCONTINUED | OUTPATIENT
Start: 2022-10-23 | End: 2022-10-31

## 2022-10-23 RX ORDER — NIFEDIPINE 30 MG
60 TABLET, EXTENDED RELEASE 24 HR ORAL DAILY
Refills: 0 | Status: DISCONTINUED | OUTPATIENT
Start: 2022-10-23 | End: 2022-10-31

## 2022-10-23 RX ORDER — INSULIN LISPRO 100/ML
VIAL (ML) SUBCUTANEOUS
Refills: 0 | Status: DISCONTINUED | OUTPATIENT
Start: 2022-10-23 | End: 2022-10-31

## 2022-10-23 RX ORDER — DEXTROSE 50 % IN WATER 50 %
15 SYRINGE (ML) INTRAVENOUS ONCE
Refills: 0 | Status: DISCONTINUED | OUTPATIENT
Start: 2022-10-23 | End: 2022-10-31

## 2022-10-23 RX ORDER — GABAPENTIN 400 MG/1
100 CAPSULE ORAL THREE TIMES A DAY
Refills: 0 | Status: DISCONTINUED | OUTPATIENT
Start: 2022-10-23 | End: 2022-10-31

## 2022-10-23 RX ORDER — CLOPIDOGREL BISULFATE 75 MG/1
75 TABLET, FILM COATED ORAL DAILY
Refills: 0 | Status: DISCONTINUED | OUTPATIENT
Start: 2022-10-23 | End: 2022-10-31

## 2022-10-23 RX ORDER — ASPIRIN/CALCIUM CARB/MAGNESIUM 324 MG
81 TABLET ORAL DAILY
Refills: 0 | Status: DISCONTINUED | OUTPATIENT
Start: 2022-10-23 | End: 2022-10-31

## 2022-10-23 NOTE — H&P ADULT - NSHPLABSRESULTS_GEN_ALL_CORE
Personally reviewed old records.  Personally reviewed labs.  Personally reviewed imaging.                          12.8   6.96  )-----------( 333      ( 23 Oct 2022 18:53 )             40.8       10-    141  |  101  |  12  ----------------------------<  185<H>  3.6   |  26  |  0.57    Ca    10.2      23 Oct 2022 18:53  Mg     2.1     10-23    TPro  8.1  /  Alb  4.7  /  TBili  0.2  /  DBili  x   /  AST  31  /  ALT  23  /  AlkPhos  103  10            LIVER FUNCTIONS - ( 23 Oct 2022 18:53 )  Alb: 4.7 g/dL / Pro: 8.1 g/dL / ALK PHOS: 103 U/L / ALT: 23 U/L / AST: 31 U/L / GGT: x                 Urinalysis Basic - ( 23 Oct 2022 22:12 )    Color: Light Yellow / Appearance: Clear / S.032 / pH: x  Gluc: x / Ketone: Negative  / Bili: Negative / Urobili: Negative   Blood: x / Protein: Negative / Nitrite: Negative   Leuk Esterase: Negative / RBC: x / WBC x   Sq Epi: x / Non Sq Epi: x / Bacteria: x

## 2022-10-23 NOTE — CONSULT NOTE ADULT - SUBJECTIVE AND OBJECTIVE BOX
Neurology  Consult Note  10-23-22    Name:  ALYSE AVENDAÑO; 62y (1960)    Chief Complaint: difficulty ambulating   HPI: 62 y.o female with PMHx HTN, DM, HLD, CAD s/p 2 stents on plavix and ASA presenting with a month history of worsening falls. Patient and daughter who is at bedside said patient fell and hit her head one year ago. Per daughter, neuroimaging did not reveal any abnormalities. Ever since one month ago, patient has been lethargic and has had difficulty walking where she feels unsteady, but denies falling to one side more than the other. Says she bought a cane and uses that to ambulate. Denies dizziness, lightheadedness, focal weakness, focal numbness, diplopia, eye pain. Patient says she had cataract surgery and has of 5 months ago has had blurred vision and photobia in her left eye and has not seen her eye doctor. In the past 2 weeks, patient's daughter says patient fell 5-6x.     Review of Systems:  As states in HPI.        PMHx:   No pertinent past medical history    HTN (hypertension)    HLD (hyperlipidemia)    CAD (coronary artery disease)      Medications:  MEDICATIONS  (STANDING):    MEDICATIONS  (PRN):    Vitals:  T(C): 37.6 (10-23-22 @ 17:59), Max: 37.6 (10-23-22 @ 17:59)  HR: 87 (10-23-22 @ 17:59) (87 - 93)  BP: 168/85 (10-23-22 @ 17:59) (166/78 - 168/85)  RR: 20 (10-23-22 @ 17:59) (20 - 20)  SpO2: 99% (10-23-22 @ 17:59) (99% - 99%)    Labs:                        12.8   6.96  )-----------( 333      ( 23 Oct 2022 18:53 )             40.8     10-23    141  |  101  |  12  ----------------------------<  185<H>  3.6   |  26  |  0.57    Ca    10.2      23 Oct 2022 18:53  Mg     2.1     10-23    TPro  8.1  /  Alb  4.7  /  TBili  0.2  /  DBili  x   /  AST  31  /  ALT  23  /  AlkPhos  103  10-23    CAPILLARY BLOOD GLUCOSE        LIVER FUNCTIONS - ( 23 Oct 2022 18:53 )  Alb: 4.7 g/dL / Pro: 8.1 g/dL / ALK PHOS: 103 U/L / ALT: 23 U/L / AST: 31 U/L / GGT: x             PHYSICAL EXAMINATION:  General: Well-developed, well nourished, in no acute distress.  Eyes: Conjunctiva and sclera clear.  Neurologic:  - Mental Status:  Alert, awake, oriented to person, place, and time; Speech is not fluent. Able to speak, but has many pauses and speech is difficult to understand. Good overall fund of knowledge.   - Cranial Nerves II-XII:    II:  Visual fields are full to confrontation; Pupils are equal, round, and reactive to light  III, IV, VI:  Extraocular movements are intact without nystagmus.  V:  Facial sensation is intact in the V1-V3 distribution bilaterally.  VII:  Face is symmetric with normal eye closure and smile  VIII:  Hearing is intact to finger rub.  IX, X:  Uvula is midline and soft palate rises symmetrically  XI:  Head turning and shoulder shrug are intact.  XII:  Tongue protudes in the midline.  - Motor:  Strength is 5/5 throughout.  Normal muscle bulk and tone throughout. Negative pronator drift  - Reflexes:  2+ and symmetric at the biceps, triceps, brachioradialis, knees, and ankles.  Plantar responses flexor.  - Sensory:  Intact throughout to light touch, pin prick.  - Coordination:  Dysmetria on finger to nose bilaterally. Unclear if left eye visual symptoms contributing.   - Gait:  Caution based gait, unable to ambulate independently.  Romberg testing is negative.    Radiology:  < from: CT Angio Head w/ IV Cont (10.23.22 @ 19:30) >  CT HEAD: No acute intra-cranial hemorrhage, mass effect, or midline shift.    CTA BRAIN/NECK: Bilateral calcified plaque with significant stenoses,   right worse than left.    Left thyroid lobe hypodense lesion. Recommend thyroid ultrasound for   further evaluation.    < end of copied text >   Neurology  Consult Note  10-23-22    Name:  ALYSE AVENDAÑO; 62y (1960)    Chief Complaint: difficulty ambulating   HPI: 62 y.o female with PMHx HTN, DM, HLD, CAD s/p 2 stents on plavix and ASA presenting with a month history of worsening falls. Patient and daughter who is at bedside said patient fell and hit her head one year ago. Per daughter, neuroimaging did not reveal any abnormalities. Ever since one month ago, patient has been lethargic and has had difficulty walking where she feels unsteady, but denies falling to one side more than the other. Says she bought a cane and uses that to ambulate. Denies dizziness, lightheadedness, focal weakness, focal numbness, diplopia, eye pain. Patient says she had cataract surgery and has of 5 months ago has had blurred vision and photobia in her left eye and has not seen her eye doctor. In the past 2 weeks, patient's daughter says patient fell 5-6x.     Review of Systems:  As states in HPI. All other systems negative except as documented in HPI        PMHx/PSHx:   No pertinent past medical history    HTN (hypertension)    HLD (hyperlipidemia)    CAD (coronary artery disease)    FHx: Non-contributory    SHx: No reports of current tobacco, alcohol, or illicit drug use    Medications:  MEDICATIONS  (STANDING):    MEDICATIONS  (PRN):    Vitals:  T(C): 37.6 (10-23-22 @ 17:59), Max: 37.6 (10-23-22 @ 17:59)  HR: 87 (10-23-22 @ 17:59) (87 - 93)  BP: 168/85 (10-23-22 @ 17:59) (166/78 - 168/85)  RR: 20 (10-23-22 @ 17:59) (20 - 20)  SpO2: 99% (10-23-22 @ 17:59) (99% - 99%)    Labs:                        12.8   6.96  )-----------( 333      ( 23 Oct 2022 18:53 )             40.8     10-23    141  |  101  |  12  ----------------------------<  185<H>  3.6   |  26  |  0.57    Ca    10.2      23 Oct 2022 18:53  Mg     2.1     10-23    TPro  8.1  /  Alb  4.7  /  TBili  0.2  /  DBili  x   /  AST  31  /  ALT  23  /  AlkPhos  103  10-23    CAPILLARY BLOOD GLUCOSE        LIVER FUNCTIONS - ( 23 Oct 2022 18:53 )  Alb: 4.7 g/dL / Pro: 8.1 g/dL / ALK PHOS: 103 U/L / ALT: 23 U/L / AST: 31 U/L / GGT: x             PHYSICAL EXAMINATION:  General: Well-developed, well nourished, in no acute distress.  Eyes: Conjunctiva and sclera clear. Fundoscopy not well visualized  CV: Peripheral pulses palpable, no edema    Neurologic:  - Mental Status:  Alert, awake, oriented to person, place, and time; Speech is not fluent. Able to speak, but has many pauses and speech is difficult to understand. Rep dec, naming intact. Follows commands. Good overall fund of knowledge. Attn/conc, recent and remote memory mildly dec  - Cranial Nerves II-XII:    II:  Visual fields are full to confrontation; Pupils are equal, round, and reactive to light  III, IV, VI:  Extraocular movements are intact without nystagmus.  V:  Facial sensation is intact in the V1-V3 distribution bilaterally.  VII:  Face is symmetric with normal eye closure and smile  VIII:  Hearing is intact to finger rub.  IX, X:  Uvula is midline and soft palate rises symmetrically  XI:  Head turning and shoulder shrug are intact.  XII:  Tongue protrudes in the midline.  - Motor:  Strength is 5/5 throughout.  Normal muscle bulk and tone throughout. Negative pronator drift  - Reflexes:  2+ and symmetric at the biceps, triceps, brachioradialis, knees, and ankles.  Plantar responses flexor.  - Sensory:  Intact throughout to light touch, pin prick.  - Coordination:  Dysmetria on finger to nose bilaterally. Unclear if left eye visual symptoms contributing.   - Gait:  Cautious and wide based casual gait, unable to ambulate independently.  Romberg testing is negative.    Radiology:  < from: CT Angio Head w/ IV Cont (10.23.22 @ 19:30) >  CT HEAD: No acute intra-cranial hemorrhage, mass effect, or midline shift.    CTA BRAIN/NECK: Bilateral calcified plaque with significant stenoses,   right worse than left.    Left thyroid lobe hypodense lesion. Recommend thyroid ultrasound for   further evaluation.    < end of copied text >

## 2022-10-23 NOTE — ED PROVIDER NOTE - ATTENDING CONTRIBUTION TO CARE
I, July Wilder, performed a history and physical exam of the patient and discussed their management with the resident and /or advanced care provider. I reviewed the resident and /or ACP's note and agree with the documented findings and plan of care. I was present and available for all procedures.     82-year-old female past medical history of high blood pressure, high cholesterol, diabetes and CAD status post 2 stents presenting to the ED with daughter for frequent falls.  Daughter reports that patient lives at home with family however at the family works throughout the day patient is home alone frequently.  Patient has been generally weak and unsteady on her feet worsening over the past year.  Had a fall last year in which patient fell and hit her head had CTs which were negative at that time.  Over the past several weeks patient has been progressively worsening and has had approximately 5 falls over this period of time no head strikes or LOC's.  Denies any lightheadedness dizziness, no chest pain shortness of breath palpitations.  No headache, fevers or chills.  No nausea vomiting diarrhea or abdominal pain.  No lower extremity edema.  Patient well-appearing in no acute distress.  Regular rate and rhythm, lungs clear to auscultation bilaterally saturating well on room air.  Strength and sensation intact x4.  Unsteady gait.  Will obtain labs, UA, CT.  Will evaluate for possible metabolic versus infectious etiology today unstable gait.  Likely admission for neurology consultation, PT/OT.

## 2022-10-23 NOTE — ED ADULT NURSE NOTE - OBJECTIVE STATEMENT
61YO female with PMH of DM, HTN, HLD and stent-Plavix & aspirin via walk in presenting with complaints of  mx falls. Pt states having unsteady gait when walking since falling a year ago and hitting her head, the last 2 weeks falls have been more frequent, approx. 5-6 with in the last 2weeks, with feelings of generalized weakness and dizziness. C/o of numbness and tingling b/l UE. Pt Axox4, PERRL 2mm, baseline blurriness in left eye. Lungs upper and lower extremities 5/5, respirations even, & non-labored. Radial pulses strong and equal bilaterally. Skin warm, dry, and intact. Pt denies feelings of dizziness prior to falling, LOC, hitting head, chest pain, palpitations, shortness of breath, fever, chills, diaphoresis,  nausea, or vomiting. Pt placed in position of comfort. Pt educated on call bell system and provided call bell. Bed in lowest position, wheels locked, appropriate side rails raised. Pt denies needs at this time.

## 2022-10-23 NOTE — H&P ADULT - NSHPPHYSICALEXAM_GEN_ALL_CORE
PHYSICAL EXAM:   GENERAL: Alert. +mildly confused. No acute distress. Not thin. Not cachectic. Not obese.  HEAD:  Atraumatic. Normocephalic.  EYES: EOMI. PERRLA. Normal conjunctiva/sclera.  ENT: Neck supple. No JVD. Moist oral mucosa. Not edentulous. No thrush.  LYMPH: Normal supraclavicular/cervical lymph nodes.   CARDIAC: Not tachy, Not gibson. Regular rhythm. Not irregularly irregular. S1. S2. No murmur. No rub. No distant heart sounds.  LUNG/CHEST: CTAB. BS equal bilaterally. No wheezes. No rales. No rhonchi.  ABDOMEN: Soft. No tenderness. No distension. No fluid wave. Normal bowel sounds.  BACK: No midline/vertebral tenderness. No flank tenderness.  VASCULAR: +2 b/l radial or ulnar pulses. Palpable DP pulses.  EXTREMITIES:  No clubbing. No cyanosis. No edema. Moving all 4.  NEUROLOGY: A&Ox3. Non-focal exam. Cranial nerves intact. Mildly slurred speech. Sensation intact.  PSYCH: Normal behavior. Normal affect.  SKIN: No jaundice. No erythema. No rash/lesion.  ICU Vital Signs Last 24 Hrs  T(C): 36.7 (23 Oct 2022 22:06), Max: 37.6 (23 Oct 2022 17:59)  T(F): 98.1 (23 Oct 2022 22:06), Max: 99.6 (23 Oct 2022 17:59)  HR: 88 (23 Oct 2022 22:06) (87 - 93)  BP: 149/81 (23 Oct 2022 22:06) (149/81 - 168/85)  BP(mean): 102 (23 Oct 2022 22:06) (102 - 109)  ABP: --  ABP(mean): --  RR: 20 (23 Oct 2022 22:06) (20 - 20)  SpO2: 100% (23 Oct 2022 22:06) (99% - 100%)    O2 Parameters below as of 23 Oct 2022 22:06  Patient On (Oxygen Delivery Method): room air          I&O's Summary

## 2022-10-23 NOTE — ED PROVIDER NOTE - CLINICAL SUMMARY MEDICAL DECISION MAKING FREE TEXT BOX
62-year-old female with a past medical history of HTN, HLD, DM2, CAD status post 2 stents presents to the ED with frequent falls. Initial vitals nonactionable.  Physical exam as noted above.  Patient is denying any acute distress.  Patient has dysmetria and has sluggish movements.  No tremors noted patient has a nonantalgic gait however unsteady while ambulating.  Good strength upper and lower extremities bilaterally.  Sensation grossly intact.  Cranial nerves are grossly intact.  No vision changes.  Differential diagnosis includes but not limited to metabolic derangements versus viral syndrome versus possible intracranial pathology.  Will order labs, EKG, meds, imaging, and reassess.  Will likely admit given patient's frequent falls.

## 2022-10-23 NOTE — ED PROVIDER NOTE - PROGRESS NOTE DETAILS
Venu Armenta, DO PGY-2: Signed out to me, patient with dysmetria and ataxia with frequent falls.  Neurology consulted and will see patient shortly.  Will admit to medicine Venu Armenta, DO PGY-2: CTA showing carotid artery stenosis.  Neurology recommends medicine admission.  Will admit to medicine.

## 2022-10-23 NOTE — ED PROVIDER NOTE - PHYSICAL EXAMINATION
GENERAL: no acute distress, non-toxic appearing  HEAD: normocephalic, atraumatic  HEENT: normal conjunctiva, oral mucosa moist, neck supple  CARDIAC: regular rate and rhythm, normal S1 and S2,  no appreciable murmurs  PULM: clear to ascultation bilaterally, no crackles, rales, rhonchi, or wheezing  GI: abdomen nondistended, soft, nontender, no guarding or rebound tenderness  : no CVA tenderness, no suprapubic tenderness    NEURO: CN2-12 grossly intact, A&Ox4, MS +5/5 in UE and LE BL, finger to nose slow uncoordinated, gross sensation intact in UE and LE BL, gait slow and unsteady, negative rhomberg, negative pronator drift    MSK: no visible deformities, no peripheral edema, calf tenderness/redness/swelling  SKIN: no visible rashes, dry, well-perfused  PSYCH: appropriate mood and affect

## 2022-10-23 NOTE — H&P ADULT - PROBLEM SELECTOR PLAN 1
- poss vasc dementia vs other form of dementia  - pt refusing closed MRI  - seen by neuro, who recs labs  - will check TSH, T3/T4, RPR, vitamin B1, B6, B12, folate, homocysteine, methylmalonic acid, lactate, creatnine kinase, ceruloplasmin, ESR, CRP  - pt can f/u with neuro as outpt for open MRI  - pt should have outpt thyroid ultrasound imaging  - monitor for fever/infection off antibiotics - poss vasc dementia vs other form of dementia  - pt refusing closed MRI  - seen by neuro, who recs labs  - will check TSH, T3/T4, RPR, vitamin B12, lactate, creatnine kinase, ceruloplasmin, ESR, CRP  - will cont pt's sinemet for now, but pt likely does not have PD  - pt can f/u with neuro as outpt for open MRI  - pt should have outpt thyroid ultrasound imaging  - monitor for fever/infection off antibiotics

## 2022-10-23 NOTE — H&P ADULT - NSHPREVIEWOFSYSTEMS_GEN_ALL_CORE
REVIEW OF SYSTEMS:  CONSTITUTIONAL: +weakness. No fevers. No chills. No rigors. No poor appetite.  EYES: +chronic left blurry vision. No eye pain.  ENT: No hearing difficulty. No sore throat. No Sinusitis/rhinorrhea.   NECK: No pain. No stiffness/rigidity.  CARDIAC: No chest pain. No palpitations. No lightheadedness. No syncope.  RESPIRATORY: No cough. No SOB.   GASTROINTESTINAL: No abdominal pain. No nausea. No vomiting. No diarrhea. No constipation.   GENITOURINARY: No dysuria. No frequency. No oliguria.  NEUROLOGICAL: No numbness/tingling. +mild left leg weakness. No headache. +unsteady gait.  BACK: No back pain. No flank pain.  EXTREMITIES: No lower extremity edema. Full ROM. No joint pain.  SKIN: No rashes. No itching. No other lesions.  PSYCHIATRIC: No depression. No anxiety.   ALLERGIC: No lip swelling. No hives.  All other review of systems is negative unless indicated above.  Unless indicated above, unable to assess ROS 2/2

## 2022-10-23 NOTE — CONSULT NOTE ADULT - ASSESSMENT
62 y.o female with PMHx HTN, DM, HLD, CAD s/p 2 stents on plavix and ASA presenting with a month history of worsening falls. Patient and daughter who is at bedside said patient fell and hit her head one year ago. Per daughter, neuroimaging did not reveal any abnormalities. Ever since one month ago, patient has been lethargic and has had difficulty walking where she feels unsteady, but denies falling to one side more than the other. Says she bought a cane and uses that to ambulate. Denies dizziness, lightheadedness, focal weakness, focal numbness, diplopia, eye pain. Patient says she had cataract surgery and has of 5 months ago has had blurred vision and photobia in her left eye and has not seen her eye doctor. In the past 2 weeks, patient's daughter says patient fell 5-6x.     Impression: Nonfocal neurological sx on exam accompanied by b/l dysmetria (unclear if true dysmetria vs patient's L eye visual symptoms), no visual field deficits, neuroimaging s/f carotid plaques. Speech changes are also as of one month ago. Would r/o vascular etiology vs toxic metabolic disturbance.     Recommendations:  62 y.o female with PMHx HTN, DM, HLD, CAD s/p 2 stents on plavix and ASA presenting with a month history of worsening falls. Patient and daughter who is at bedside said patient fell and hit her head one year ago. Per daughter, neuroimaging did not reveal any abnormalities. Ever since one month ago, patient has been lethargic and has had difficulty walking where she feels unsteady, but denies falling to one side more than the other. Says she bought a cane and uses that to ambulate. Denies dizziness, lightheadedness, focal weakness, focal numbness, diplopia, eye pain. Patient says she had cataract surgery and has of 5 months ago has had blurred vision and photobia in her left eye and has not seen her eye doctor. In the past 2 weeks, patient's daughter says patient fell 5-6x.     Impression: Nonfocal neurological sx on exam accompanied by b/l dysmetria (unclear if true dysmetria vs patient's L eye visual symptoms), no visual field deficits, neuroimaging s/f carotid plaques. Speech changes are also as of one month ago. Would r/o  toxic metabolic disturbances. Left thyroid lobe hypodense lesion, would r/o hypothyroid etiology.    Recommendations:   [] check UA, Utox, TSH, T3/T4, RPR, antithyroglobulin Ab, TPO Ab, vitamin B1, B6, B12, folate, homocysteine, methylmalonic acid, lactate, creatnine kinase, ammonia, Cu, ceruloplasmin, SPEP, ESR, CRP, Zn  []Medicine admission  []PT/OT  []Optho consult for L eye blurry vision s/p cataract surgery   []Send HgA1c and Lipid panel   []If symptoms persist, can obtain MRI brain w/wo contrast   []Can obtain vascular surgery consult for Right ICA stenosis     Case to be seen by neurology attending.

## 2022-10-23 NOTE — H&P ADULT - NSHPSOCIALHISTORY_GEN_ALL_CORE
Social History:    Marital Status: (  ) , (  ) Single, ( x ) , (  ) , (  )   # of Children: 2  Lives with: (  ) alone, ( x ) children, (  ) spouse, (  ) parents, (  ) siblings, (  ) friends, (  ) other:   Occupation:     Substance Use/Illicit Drugs: (  ) never used vs other:   Tobacco Usage: ( x ) never smoked, (  ) former smoker, (  ) current smoker and Total Pack-Years:   Last Alcohol Usage/Frequency/Amount/Withdrawal/Hx of Abuse:  none  Foreign travel:   Animal exposure:

## 2022-10-23 NOTE — H&P ADULT - HISTORY OF PRESENT ILLNESS
62F [place] c hx htn, dm2, cad s/p recent 2 stents (@ [facility] "couple weeks ago"), left eye blurry vision since cataract surgery, pw 1 month of progressive unsteady gait, slurred speech, memory loss, lethargy.    History from pt's daughter in law and primary decision maker Ryanne Osorio at bedside.  Pt has had 1 month of noticeble decline in gait, speech, memory. Pt previous was completing her own ADLs, with good memory. Now pt is unable to ambulate by self to bathroom without assistance, dragging left leg. Pt also with slurred speech, short term memory loss. Pt was started on sinemet about 6 weeks ago by outpt neuro, but there was no improvement. Pt last had an open MRI of head 1 year ago when she had a fall, but it reportedly did not show anything. DIL thinks pt's current symptoms though are new compared to the fall pt had 1 year ago. Pt denies fevers, URI symptoms, GI symptoms, urinary symptoms. Pt denies any relieving or exacerbating factors.

## 2022-10-23 NOTE — ED PROVIDER NOTE - OBJECTIVE STATEMENT
62-year-old female with a past medical history of HTN, HLD, DM2, CAD status post 2 stents presents to the ED with frequent falls.  As per daughter at bedside, patient endorsing generalized weakness and being unsteady on her feet.  She has fallen approximately 5 times in the last 2 weeks.  No exacerbating or alleviating factors.  Patient denies any vertigo or syncope symptoms.  She denies any chest pain, shortness of breath, headaches, fevers, chills, nausea, vomiting, diarrhea.  No recent vaccinations.  She denies any other symptoms at bedside.

## 2022-10-23 NOTE — H&P ADULT - ASSESSMENT
62F [place] c hx htn, dm2, cad s/p recent 2 stents (@ [facility] "couple weeks ago"), left eye blurry vision since cataract surgery, pw 1 month of progressive unsteady gait, slurred speech, memory loss, lethargy concerning for poss dementia, vascular or other.

## 2022-10-24 ENCOUNTER — NON-APPOINTMENT (OUTPATIENT)
Age: 62
End: 2022-10-24

## 2022-10-24 DIAGNOSIS — I65.23 OCCLUSION AND STENOSIS OF BILATERAL CAROTID ARTERIES: ICD-10-CM

## 2022-10-24 DIAGNOSIS — R55 SYNCOPE AND COLLAPSE: ICD-10-CM

## 2022-10-24 LAB
A1C WITH ESTIMATED AVERAGE GLUCOSE RESULT: 7 % — HIGH (ref 4–5.6)
ALBUMIN SERPL ELPH-MCNC: 4.5 G/DL — SIGNIFICANT CHANGE UP (ref 3.3–5)
ALP SERPL-CCNC: 96 U/L — SIGNIFICANT CHANGE UP (ref 40–120)
ALT FLD-CCNC: 11 U/L — SIGNIFICANT CHANGE UP (ref 10–45)
ANION GAP SERPL CALC-SCNC: 18 MMOL/L — HIGH (ref 5–17)
AST SERPL-CCNC: 23 U/L — SIGNIFICANT CHANGE UP (ref 10–40)
BASOPHILS # BLD AUTO: 0.04 K/UL — SIGNIFICANT CHANGE UP (ref 0–0.2)
BASOPHILS NFR BLD AUTO: 0.4 % — SIGNIFICANT CHANGE UP (ref 0–2)
BILIRUB SERPL-MCNC: 0.3 MG/DL — SIGNIFICANT CHANGE UP (ref 0.2–1.2)
BUN SERPL-MCNC: 11 MG/DL — SIGNIFICANT CHANGE UP (ref 7–23)
CALCIUM SERPL-MCNC: 9.7 MG/DL — SIGNIFICANT CHANGE UP (ref 8.4–10.5)
CERULOPLASMIN SERPL-MCNC: 23 MG/DL — SIGNIFICANT CHANGE UP (ref 16–45)
CHLORIDE SERPL-SCNC: 97 MMOL/L — SIGNIFICANT CHANGE UP (ref 96–108)
CK SERPL-CCNC: 146 U/L — SIGNIFICANT CHANGE UP (ref 25–170)
CO2 SERPL-SCNC: 23 MMOL/L — SIGNIFICANT CHANGE UP (ref 22–31)
CREAT SERPL-MCNC: 0.61 MG/DL — SIGNIFICANT CHANGE UP (ref 0.5–1.3)
CRP SERPL-MCNC: <3 MG/L — SIGNIFICANT CHANGE UP (ref 0–4)
EGFR: 100 ML/MIN/1.73M2 — SIGNIFICANT CHANGE UP
EOSINOPHIL # BLD AUTO: 0.6 K/UL — HIGH (ref 0–0.5)
EOSINOPHIL NFR BLD AUTO: 6.5 % — HIGH (ref 0–6)
ERYTHROCYTE [SEDIMENTATION RATE] IN BLOOD: 41 MM/HR — HIGH (ref 0–20)
ESTIMATED AVERAGE GLUCOSE: 154 MG/DL — HIGH (ref 68–114)
GLUCOSE BLDC GLUCOMTR-MCNC: 116 MG/DL — HIGH (ref 70–99)
GLUCOSE BLDC GLUCOMTR-MCNC: 154 MG/DL — HIGH (ref 70–99)
GLUCOSE BLDC GLUCOMTR-MCNC: 174 MG/DL — HIGH (ref 70–99)
GLUCOSE BLDC GLUCOMTR-MCNC: 195 MG/DL — HIGH (ref 70–99)
GLUCOSE BLDC GLUCOMTR-MCNC: 256 MG/DL — HIGH (ref 70–99)
GLUCOSE SERPL-MCNC: 276 MG/DL — HIGH (ref 70–99)
HCT VFR BLD CALC: 39.8 % — SIGNIFICANT CHANGE UP (ref 34.5–45)
HCV AB S/CO SERPL IA: 0.08 S/CO — SIGNIFICANT CHANGE UP (ref 0–0.99)
HCV AB SERPL-IMP: SIGNIFICANT CHANGE UP
HCYS SERPL-MCNC: 10.2 UMOL/L — SIGNIFICANT CHANGE UP
HGB BLD-MCNC: 12.5 G/DL — SIGNIFICANT CHANGE UP (ref 11.5–15.5)
IMM GRANULOCYTES NFR BLD AUTO: 0.7 % — SIGNIFICANT CHANGE UP (ref 0–0.9)
LACTATE SERPL-SCNC: 2.1 MMOL/L — HIGH (ref 0.5–2)
LACTATE SERPL-SCNC: 5.4 MMOL/L — CRITICAL HIGH (ref 0.5–2)
LDH SERPL L TO P-CCNC: 171 U/L — SIGNIFICANT CHANGE UP (ref 50–242)
LYMPHOCYTES # BLD AUTO: 1.59 K/UL — SIGNIFICANT CHANGE UP (ref 1–3.3)
LYMPHOCYTES # BLD AUTO: 17.3 % — SIGNIFICANT CHANGE UP (ref 13–44)
MAGNESIUM SERPL-MCNC: 2 MG/DL — SIGNIFICANT CHANGE UP (ref 1.6–2.6)
MCHC RBC-ENTMCNC: 24 PG — LOW (ref 27–34)
MCHC RBC-ENTMCNC: 31.4 GM/DL — LOW (ref 32–36)
MCV RBC AUTO: 76.5 FL — LOW (ref 80–100)
MONOCYTES # BLD AUTO: 0.78 K/UL — SIGNIFICANT CHANGE UP (ref 0–0.9)
MONOCYTES NFR BLD AUTO: 8.5 % — SIGNIFICANT CHANGE UP (ref 2–14)
NEUTROPHILS # BLD AUTO: 6.13 K/UL — SIGNIFICANT CHANGE UP (ref 1.8–7.4)
NEUTROPHILS NFR BLD AUTO: 66.6 % — SIGNIFICANT CHANGE UP (ref 43–77)
NRBC # BLD: 0 /100 WBCS — SIGNIFICANT CHANGE UP (ref 0–0)
PHOSPHATE SERPL-MCNC: 4.2 MG/DL — SIGNIFICANT CHANGE UP (ref 2.5–4.5)
PLATELET # BLD AUTO: 309 K/UL — SIGNIFICANT CHANGE UP (ref 150–400)
POTASSIUM SERPL-MCNC: 3.7 MMOL/L — SIGNIFICANT CHANGE UP (ref 3.5–5.3)
POTASSIUM SERPL-SCNC: 3.7 MMOL/L — SIGNIFICANT CHANGE UP (ref 3.5–5.3)
PROT SERPL-MCNC: 7.6 G/DL — SIGNIFICANT CHANGE UP (ref 6–8.3)
RBC # BLD: 5.2 M/UL — SIGNIFICANT CHANGE UP (ref 3.8–5.2)
RBC # FLD: 13.2 % — SIGNIFICANT CHANGE UP (ref 10.3–14.5)
SODIUM SERPL-SCNC: 138 MMOL/L — SIGNIFICANT CHANGE UP (ref 135–145)
T PALLIDUM AB TITR SER: NEGATIVE — SIGNIFICANT CHANGE UP
TROPONIN T, HIGH SENSITIVITY RESULT: 7 NG/L — SIGNIFICANT CHANGE UP (ref 0–51)
WBC # BLD: 9.2 K/UL — SIGNIFICANT CHANGE UP (ref 3.8–10.5)
WBC # FLD AUTO: 9.2 K/UL — SIGNIFICANT CHANGE UP (ref 3.8–10.5)

## 2022-10-24 PROCEDURE — 99221 1ST HOSP IP/OBS SF/LOW 40: CPT

## 2022-10-24 PROCEDURE — 99233 SBSQ HOSP IP/OBS HIGH 50: CPT

## 2022-10-24 PROCEDURE — 99223 1ST HOSP IP/OBS HIGH 75: CPT | Mod: GC

## 2022-10-24 RX ORDER — SODIUM CHLORIDE 9 MG/ML
1000 INJECTION INTRAMUSCULAR; INTRAVENOUS; SUBCUTANEOUS
Refills: 0 | Status: DISCONTINUED | OUTPATIENT
Start: 2022-10-24 | End: 2022-10-25

## 2022-10-24 RX ORDER — CARBIDOPA AND LEVODOPA 25; 100 MG/1; MG/1
1 TABLET ORAL THREE TIMES A DAY
Refills: 0 | Status: DISCONTINUED | OUTPATIENT
Start: 2022-10-24 | End: 2022-10-31

## 2022-10-24 RX ADMIN — Medication 1 TABLET(S): at 11:21

## 2022-10-24 RX ADMIN — GABAPENTIN 100 MILLIGRAM(S): 400 CAPSULE ORAL at 13:50

## 2022-10-24 RX ADMIN — Medication 1: at 17:39

## 2022-10-24 RX ADMIN — GABAPENTIN 100 MILLIGRAM(S): 400 CAPSULE ORAL at 22:24

## 2022-10-24 RX ADMIN — CARBIDOPA AND LEVODOPA 1 TABLET(S): 25; 100 TABLET ORAL at 05:21

## 2022-10-24 RX ADMIN — Medication 100 MILLIGRAM(S): at 05:22

## 2022-10-24 RX ADMIN — Medication 1 DROP(S): at 22:41

## 2022-10-24 RX ADMIN — CARBIDOPA AND LEVODOPA 1 TABLET(S): 25; 100 TABLET ORAL at 22:23

## 2022-10-24 RX ADMIN — GABAPENTIN 100 MILLIGRAM(S): 400 CAPSULE ORAL at 05:22

## 2022-10-24 RX ADMIN — Medication 3: at 09:22

## 2022-10-24 RX ADMIN — CLOPIDOGREL BISULFATE 75 MILLIGRAM(S): 75 TABLET, FILM COATED ORAL at 11:20

## 2022-10-24 RX ADMIN — LOSARTAN POTASSIUM 50 MILLIGRAM(S): 100 TABLET, FILM COATED ORAL at 05:22

## 2022-10-24 RX ADMIN — Medication 60 MILLIGRAM(S): at 05:22

## 2022-10-24 RX ADMIN — SODIUM CHLORIDE 60 MILLILITER(S): 9 INJECTION INTRAMUSCULAR; INTRAVENOUS; SUBCUTANEOUS at 17:15

## 2022-10-24 RX ADMIN — CARBIDOPA AND LEVODOPA 1 TABLET(S): 25; 100 TABLET ORAL at 13:50

## 2022-10-24 RX ADMIN — Medication 81 MILLIGRAM(S): at 11:20

## 2022-10-24 RX ADMIN — ENOXAPARIN SODIUM 40 MILLIGRAM(S): 100 INJECTION SUBCUTANEOUS at 05:21

## 2022-10-24 NOTE — PATIENT PROFILE ADULT - FUNCTIONAL SCREEN CURRENT LEVEL: COMMUNICATION, MLM
0 = understands/communicates without difficulty Minocycline Counseling: Patient advised regarding possible photosensitivity and discoloration of the teeth, skin, lips, tongue and gums.  Patient instructed to avoid sunlight, if possible.  When exposed to sunlight, patients should wear protective clothing, sunglasses, and sunscreen.  The patient was instructed to call the office immediately if the following severe adverse effects occur:  hearing changes, easy bruising/bleeding, severe headache, or vision changes.  The patient verbalized understanding of the proper use and possible adverse effects of minocycline.  All of the patient's questions and concerns were addressed.

## 2022-10-24 NOTE — CONSULT NOTE ADULT - ASSESSMENT
INCOMPLETE NOTE, FINAL RECS TO FOLLOW    Assessment and Recommendations:  62y female w/ pmhx/ochx of *** consulted for ***. On exam, patient's visual acuity was *** in the right eye, *** in the left eye. There was no APD. IOP were measured to be *** right eye, *** left eye. Extraocular movements, confrontational visual fields, and color plates were full in both eyes. Anterior segment exam with penlight revealed ***. Posterior segment exam with 20D lens after dilation revealed ***.   #   - Findings and plan discussed with patient and primary team.    Patient seen and discussed with  ***    Outpatient follow-up: Patient should follow-up with his/her ophthalmologist or with [facility] Department of Ophthalmology at the address below     [address] [place] [address]. [address]  [place] [address] [phone]     Assessment and Recommendations:  62y female w/ pmhx/ochx of CAD s/p stents, HTN, T2DM, bilateral cataract surgery consulted for blurry vision, found to have dry eyes and need for reading glasses.  # Dry eyes, both eyes  # Presbyopia  - Vision intact, no sign of optic nerve dysfunction   - Vision improved with +2.50 reading glasses  - No upgaze motility issue, patient is able to look up with effort  - Anterior segment exam with decreased tear breakup time and trace punctate epithelial erosions in both eyes  - Posterior segment exam unremarkable  - Start artificial tears QID and lacrilube ointment QHS OU  - Findings and plan discussed with patient and primary team.    Patient seen and discussed with Dr. Doll    Outpatient follow-up: Patient should follow-up with his/her ophthalmologist or with [facility] Department of Ophthalmology at the address below     [address] [place] [address]. [address]  [place] [address]  [phone]     Assessment and Recommendations:  62y female w/ pmhx/ochx of CAD s/p stents, HTN, T2DM, bilateral cataract surgery consulted for blurry vision, found to have dry eyes and need for reading glasses.  # Dry eyes, both eyes  # Presbyopia  - Vision intact, no sign of optic nerve dysfunction   - Vision improved with +2.50 reading glasses  - No upgaze motility issue, patient is able to look up with effort  - Anterior segment exam with decreased tear breakup time and trace punctate epithelial erosions in both eyes  - Posterior segment exam unremarkable  - Start artificial tears QID and lacrilube ointment QHS OU  - appreciate neuro input  - Findings and plan discussed with patient and primary team.    Patient seen and discussed with Dr. Doll    Outpatient follow-up: Patient should follow-up with his/her ophthalmologist or with [facility] Department of Ophthalmology at the address below within 1 week of discharge, sooner if symptoms worsen or change    [address]  [place] [address]  [phone]

## 2022-10-24 NOTE — CONSULT NOTE ADULT - SUBJECTIVE AND OBJECTIVE BOX
[facility] DEPARTMENT OF OPHTHALMOLOGY - INITIAL ADULT CONSULT  -----------------------------------------------------------------------------------------------------------------  Justin Conklin MD PGY 3  [phone]  -----------------------------------------------------------------------------------------------------------------    HPI: 62F with history of CAD s/p stents, HTN, T2DM, bilateral cataract surgery most recent left eye 5 months ago currently admitted for cognitive decline. Patient reports since cataract surgery 5 months ago she hasn't been able to see well out of the left eye. Reports vision improves with reading glasses. Denies any headaches, jaw claudication, fevers, shoulder/hip pain. Reports that sometimes she watches TV for too long and her vision "goes away." Not able to tell writer how long vision goes away, or whether it's blurry or completely dark. Last saw her eye doctor Dr Pritesh Lloyd 5months ago, has an appointment next month.    PAST MEDICAL & SURGICAL HISTORY:  HTN (hypertension)      HLD (hyperlipidemia)      CAD (coronary artery disease)      S/P hysterectomy        Past Ocular History: cataract surgery OU  Ophthalmic Medications: none  FAMILY HISTORY:  No pertinent family history in first degree relatives      Social History: denies etoh/tobacco    MEDICATIONS  (STANDING):  aspirin enteric coated 81 milliGRAM(s) Oral daily  carbidopa/levodopa  10/100 1 Tablet(s) Oral three times a day  clopidogrel Tablet 75 milliGRAM(s) Oral daily  dextrose 5%. 1000 milliLiter(s) (100 mL/Hr) IV Continuous <Continuous>  dextrose 5%. 1000 milliLiter(s) (50 mL/Hr) IV Continuous <Continuous>  dextrose 50% Injectable 25 Gram(s) IV Push once  dextrose 50% Injectable 12.5 Gram(s) IV Push once  dextrose 50% Injectable 25 Gram(s) IV Push once  enoxaparin Injectable 40 milliGRAM(s) SubCutaneous every 24 hours  gabapentin 100 milliGRAM(s) Oral three times a day  glucagon  Injectable 1 milliGRAM(s) IntraMuscular once  insulin lispro (ADMELOG) corrective regimen sliding scale   SubCutaneous three times a day before meals  insulin lispro (ADMELOG) corrective regimen sliding scale   SubCutaneous at bedtime  losartan 50 milliGRAM(s) Oral daily  metoprolol succinate  milliGRAM(s) Oral daily  multivitamin 1 Tablet(s) Oral daily  NIFEdipine XL 60 milliGRAM(s) Oral daily  sodium chloride 0.9%. 1000 milliLiter(s) (60 mL/Hr) IV Continuous <Continuous>    MEDICATIONS  (PRN):  dextrose Oral Gel 15 Gram(s) Oral once PRN Blood Glucose LESS THAN 70 milliGRAM(s)/deciliter    Allergies & Intolerances:     Review of Systems:  Constitutional: No fever, chills  Eyes: No blurry vision, flashes, floaters, FBS, erythema, discharge, double vision, OU  Neuro: No tremors  Cardiovascular: No chest pain, palpitations  Respiratory: No SOB, no cough  GI: No nausea, vomiting, abdominal pain  : No dysuria  Skin: no rash  Psych: no depression  Endocrine: no polyuria, polydipsia  Heme/lymph: no swelling    VITALS: T(C): 36.8 (10-24-22 @ 11:49)  T(F): 98.3 (10-24-22 @ 11:49), Max: 99.6 (10-23-22 @ 17:59)  HR: 88 (10-24-22 @ 11:49) (65 - 93)  BP: 113/83 (10-24-22 @ 11:49) (113/83 - 169/91)  RR:  (16 - 20)  SpO2:  (98% - 100%)  Wt(kg): --  General: AAO x 3, appropriate mood and affect    Ophthalmology Exam:  Visual acuity (sc): 20/20 OD, 20/30 OS  Pupils: PERRL OU, no APD  Ttono: 19 OD, 18 OS  Extraocular movements (EOMs): 80-90% restriction in upgaze OU with diplopia  Confrontational Visual Field (CVF): Full OD, full OS  Color Plates: 12/12 OD, 12/12 OS    Pen Light Exam (PLE)  External: Flat OU  Lids/Lashes/Lacrimal Ducts: Flat OU    Sclera/Conjunctiva: W+Q OU  Cornea: Cl OU  Anterior Chamber: D+F OU    Iris: Flat OU  Lens: PCIOL OU    Fundus Exam: dilated with 1% tropicamide and 2.5% phenylephrine  Approval obtained from primary team for dilation  Patient aware that pupils can remained dilated for at least 4-6 hours  Exam performed with 20D lens    Vitreous: wnl OU  Disc, cup/disc: sharp and pink, 0.4 OU  Macula: wnl OU  Vessels: wnl OU  Periphery: wnl OU    Labs/Imaging:  CTA H&N    IMPRESSION:    CT HEAD: No acute intra-cranial hemorrhage, mass effect, or midline shift.    CTA BRAIN/NECK: Bilateral calcified plaque with significant stenoses,   right worse than left.    Left thyroid lobe hypodense lesion. Recommend thyroid ultrasound for   further evaluation.    --- End of Report ---       LIANG PELLETIER DO; Resident Radiologist  This document has been electronically signed.  OK PASTOR MD; Attending Radiologist  This document has been electronically signed. Oct 23 2022  8:01PM   [facility] DEPARTMENT OF OPHTHALMOLOGY - INITIAL ADULT CONSULT  -----------------------------------------------------------------------------------------------------------------  Justin Conklin MD PGY 3  [phone]  -----------------------------------------------------------------------------------------------------------------    HPI: 62F with history of CAD s/p stents, HTN, T2DM, bilateral cataract surgery most recent left eye 5 months ago currently admitted for cognitive decline. Patient reports since cataract surgery 5 months ago she hasn't been able to see well out of the left eye. Reports vision improves with reading glasses. Denies any headaches, jaw claudication, fevers, shoulder/hip pain. Reports that sometimes she watches TV for too long and her vision gets blurry. Last saw her eye doctor Dr Pritesh Lloyd 5months ago, has an appointment next month.    PAST MEDICAL & SURGICAL HISTORY:  HTN (hypertension)      HLD (hyperlipidemia)      CAD (coronary artery disease)      S/P hysterectomy        Past Ocular History: cataract surgery OU  Ophthalmic Medications: none  FAMILY HISTORY:  No pertinent family history in first degree relatives      Social History: denies etoh/tobacco    MEDICATIONS  (STANDING):  aspirin enteric coated 81 milliGRAM(s) Oral daily  carbidopa/levodopa  10/100 1 Tablet(s) Oral three times a day  clopidogrel Tablet 75 milliGRAM(s) Oral daily  dextrose 5%. 1000 milliLiter(s) (100 mL/Hr) IV Continuous <Continuous>  dextrose 5%. 1000 milliLiter(s) (50 mL/Hr) IV Continuous <Continuous>  dextrose 50% Injectable 25 Gram(s) IV Push once  dextrose 50% Injectable 12.5 Gram(s) IV Push once  dextrose 50% Injectable 25 Gram(s) IV Push once  enoxaparin Injectable 40 milliGRAM(s) SubCutaneous every 24 hours  gabapentin 100 milliGRAM(s) Oral three times a day  glucagon  Injectable 1 milliGRAM(s) IntraMuscular once  insulin lispro (ADMELOG) corrective regimen sliding scale   SubCutaneous three times a day before meals  insulin lispro (ADMELOG) corrective regimen sliding scale   SubCutaneous at bedtime  losartan 50 milliGRAM(s) Oral daily  metoprolol succinate  milliGRAM(s) Oral daily  multivitamin 1 Tablet(s) Oral daily  NIFEdipine XL 60 milliGRAM(s) Oral daily  sodium chloride 0.9%. 1000 milliLiter(s) (60 mL/Hr) IV Continuous <Continuous>    MEDICATIONS  (PRN):  dextrose Oral Gel 15 Gram(s) Oral once PRN Blood Glucose LESS THAN 70 milliGRAM(s)/deciliter    Allergies & Intolerances:     Review of Systems:  Constitutional: No fever, chills  Eyes: No blurry vision, flashes, floaters, FBS, erythema, discharge, double vision, OU  Neuro: No tremors  Cardiovascular: No chest pain, palpitations  Respiratory: No SOB, no cough  GI: No nausea, vomiting, abdominal pain  : No dysuria  Skin: no rash  Psych: no depression  Endocrine: no polyuria, polydipsia  Heme/lymph: no swelling    VITALS: T(C): 36.8 (10-24-22 @ 11:49)  T(F): 98.3 (10-24-22 @ 11:49), Max: 99.6 (10-23-22 @ 17:59)  HR: 88 (10-24-22 @ 11:49) (65 - 93)  BP: 113/83 (10-24-22 @ 11:49) (113/83 - 169/91)  RR:  (16 - 20)  SpO2:  (98% - 100%)  Wt(kg): --  General: AAO x 3, appropriate mood and affect    Ophthalmology Exam:  Visual acuity (sc): 20/20 OD, 20/30 OS  Pupils: PERRL OU, no APD  Ttono: 19 OD, 18 OS  Extraocular movements (EOMs): Essentially full OU, no pain, no diplopia  Confrontational Visual Field (CVF): Full OD, full OS  Color Plates: 12/12 OD, 12/12 OS    Pen Light Exam (PLE)  External: Flat OU  Lids/Lashes/Lacrimal Ducts: Flat OU    Sclera/Conjunctiva: W+Q OU  Cornea: DTBUT and tr PEE OU  Anterior Chamber: D+F OU    Iris: Flat OU  Lens: PCIOL OU    Fundus Exam: dilated with 1% tropicamide and 2.5% phenylephrine  Approval obtained from primary team for dilation  Patient aware that pupils can remained dilated for at least 4-6 hours  Exam performed with 20D lens    Vitreous: wnl OU  Disc, cup/disc: sharp and pink, 0.4 OU  Macula: wnl OU  Vessels: wnl OU  Periphery: wnl OU    Labs/Imaging:  CTA H&N    IMPRESSION:    CT HEAD: No acute intra-cranial hemorrhage, mass effect, or midline shift.    CTA BRAIN/NECK: Bilateral calcified plaque with significant stenoses,   right worse than left.    Left thyroid lobe hypodense lesion. Recommend thyroid ultrasound for   further evaluation.    --- End of Report ---       LIANG PELLETIER DO; Resident Radiologist  This document has been electronically signed.  OK PASTOR MD; Attending Radiologist  This document has been electronically signed. Oct 23 2022  8:01PM   [facility] DEPARTMENT OF OPHTHALMOLOGY - INITIAL ADULT CONSULT  -----------------------------------------------------------------------------------------------------------------  Justin Conklin MD PGY 3  [phone]  -----------------------------------------------------------------------------------------------------------------    HPI: 62F with history of CAD s/p stents, HTN, T2DM, bilateral cataract surgery most recent left eye 5 months ago currently admitted for cognitive decline. Patient reports since cataract surgery 5 months ago she hasn't been able to see well out of the left eye. Reports vision improves with reading glasses. Denies any headaches, jaw claudication, fevers, shoulder/hip pain. Reports that sometimes she watches TV for too long and her vision gets blurry. Last saw her eye doctor Dr Pritesh Lloyd 5months ago, has an appointment next month.    PAST MEDICAL & SURGICAL HISTORY:  HTN (hypertension)      HLD (hyperlipidemia)      CAD (coronary artery disease)      S/P hysterectomy        Past Ocular History: cataract surgery OU  Ophthalmic Medications: none  FAMILY HISTORY:  No pertinent family history in first degree relatives  No glaucoma, no ARMD    Social History: denies etoh/tobacco    MEDICATIONS  (STANDING):  aspirin enteric coated 81 milliGRAM(s) Oral daily  carbidopa/levodopa  10/100 1 Tablet(s) Oral three times a day  clopidogrel Tablet 75 milliGRAM(s) Oral daily  dextrose 5%. 1000 milliLiter(s) (100 mL/Hr) IV Continuous <Continuous>  dextrose 5%. 1000 milliLiter(s) (50 mL/Hr) IV Continuous <Continuous>  dextrose 50% Injectable 25 Gram(s) IV Push once  dextrose 50% Injectable 12.5 Gram(s) IV Push once  dextrose 50% Injectable 25 Gram(s) IV Push once  enoxaparin Injectable 40 milliGRAM(s) SubCutaneous every 24 hours  gabapentin 100 milliGRAM(s) Oral three times a day  glucagon  Injectable 1 milliGRAM(s) IntraMuscular once  insulin lispro (ADMELOG) corrective regimen sliding scale   SubCutaneous three times a day before meals  insulin lispro (ADMELOG) corrective regimen sliding scale   SubCutaneous at bedtime  losartan 50 milliGRAM(s) Oral daily  metoprolol succinate  milliGRAM(s) Oral daily  multivitamin 1 Tablet(s) Oral daily  NIFEdipine XL 60 milliGRAM(s) Oral daily  sodium chloride 0.9%. 1000 milliLiter(s) (60 mL/Hr) IV Continuous <Continuous>    MEDICATIONS  (PRN):  dextrose Oral Gel 15 Gram(s) Oral once PRN Blood Glucose LESS THAN 70 milliGRAM(s)/deciliter    Allergies & Intolerances: NKDA    Review of Systems:  Constitutional: No fever, chills  Eyes: No blurry vision, flashes, floaters, FBS, erythema, discharge, double vision, OU  Neuro: No tremors  Cardiovascular: No chest pain, palpitations  Respiratory: No SOB, no cough  GI: No nausea, vomiting, abdominal pain  : No dysuria  Skin: no rash  Psych: no depression  Endocrine: no polyuria, polydipsia  Heme/lymph: no swelling    VITALS: T(C): 36.8 (10-24-22 @ 11:49)  T(F): 98.3 (10-24-22 @ 11:49), Max: 99.6 (10-23-22 @ 17:59)  HR: 88 (10-24-22 @ 11:49) (65 - 93)  BP: 113/83 (10-24-22 @ 11:49) (113/83 - 169/91)  RR:  (16 - 20)  SpO2:  (98% - 100%)  Wt(kg): --  General: AAO x 3, appropriate mood and affect    Ophthalmology Exam:  Visual acuity (sc): 20/20 OD, 20/30 OS  Pupils: PERRL OU, no APD  Ttono: 19 OD, 18 OS  Extraocular movements (EOMs): full OU, no pain, no diplopia  Confrontational Visual Field (CVF): Full OD, full OS  Color Plates: 12/12 OD, 12/12 OS    Pen Light Exam (PLE)  External: Flat OU  Lids/Lashes/Lacrimal Ducts: Flat OU    Sclera/Conjunctiva: W+Q OU  Cornea: DTBUT and tr PEE OU  Anterior Chamber: D+F OU    Iris: Flat OU  Lens: PCIOL OU    Fundus Exam: dilated with 1% tropicamide and 2.5% phenylephrine  Approval obtained from primary team for dilation  Patient aware that pupils can remained dilated for at least 4-6 hours  Exam performed with 20D lens    Vitreous: wnl OU  Disc, cup/disc: sharp and pink, 0.4 OU  Macula: wnl OU  Vessels: wnl OU  Periphery: wnl OU    Labs/Imaging:  CTA H&N    IMPRESSION:    CT HEAD: No acute intra-cranial hemorrhage, mass effect, or midline shift.    CTA BRAIN/NECK: Bilateral calcified plaque with significant stenoses,   right worse than left.    Left thyroid lobe hypodense lesion. Recommend thyroid ultrasound for   further evaluation.    --- End of Report ---       LIANG PELLETIER DO; Resident Radiologist  This document has been electronically signed.  OK PASTOR MD; Attending Radiologist  This document has been electronically signed. Oct 23 2022  8:01PM

## 2022-10-24 NOTE — CONSULT NOTE ADULT - ASSESSMENT
62F [place] c hx htn, dm2, cad s/p recent 2 stents (@ [facility] "couple weeks ago"), left eye blurry vision since cataract surgery, pw 1 month of progressive unsteady gait, slurred speech, memory loss, lethargy. Vascular surgery consulted to eval for R ICA stenosis.    Plan/Recommendations:  - Patient does not describe symptoms of TIA, therefore unlikely that ICA stenosis is the etiology of current symptoms  - Recommend carotid duplex for further eval    D/w vascular fellow on behalf of attending    SHYAM Roca, PGY3  Vascular Surgery [id number]  62F [place] c hx htn, dm2, cad s/p recent 2 stents (@ [facility] "couple weeks ago"), left eye blurry vision since cataract surgery, pw 1 month of progressive unsteady gait, slurred speech, memory loss, lethargy. Vascular surgery consulted to eval for R ICA stenosis.    Plan/Recommendations:  - Patient does not describe symptoms of TIA, therefore unlikely that ICA stenosis is the etiology of current symptoms  - Recommend carotid duplex for further eval  recommend to continue neuro w/u  will follow       D/w vascular fellow on behalf of attending    SHYAM Roca, PGY3  Vascular Surgery [id number]

## 2022-10-24 NOTE — CONSULT NOTE ADULT - PROBLEM SELECTOR RECOMMENDATION 2
ODESSA Ramirez MD performed a history and physical exam of the patient and discussed  the findings and plan with the house officer. I reviewed the resident note and agree with the findings and plan   I Negro Ramirez MD have personally seen and examined the patient at bedside today at  7 pm

## 2022-10-24 NOTE — CONSULT NOTE ADULT - ATTENDING COMMENTS
HPI as per resident note, personally verified by me. Patient with 1 month history of worsening speech and falls. Had a fall one year ago and hit her head but no head strike recently. May be leaning more toward the L side. She can be doing any activity during falls but usually ambulating and does not feel she trips but can drop with legs "coming out from under me". Has fell about 5-6 times in the past two weeks. No clear focal neurologic deficits, denies any back pain.    Neurologic exam as per resident note with additions as below:  AAO x2.25 (October only, not rest of date), speech with moderate to severe dysarthria  CN's II-XII intact except for poor vision in L eye  Strength 5/5 all  Sens intact all  DTR's 2+ BUE's, 2+ KJ b/l, 1+ AJ b/l, and downgoing b/l plantar response  FtN intact b/l (LUE without any dysmetria as normal when she closes her L eye)    UA (-)    A/P:  Falls  Dysarthria  Encephalopathy  HTN  DM type 2  CAD s/p stents on ASA and Plavix  R > L ICA stenoses  Left sided thyroid mass    - Etiology for symptoms is broad and includes toxic/metabolic process, infectious, inflammatory, neurodegenerative disorder, cerebrovascular, or myelopathy (no symptoms on exam). Falls could also be secondary to worsening vision in L eye following cataract surgery but would not expect that to cause dysarthria or some cognitive dysfunction  - MRI brain and c-spine w/ and w/o to assess for above  - Labs as per resident note  - Appreciate ophthalmology consult  - Vascular surgery input for ICA stenoses  - PT/OT as tolerated  - Continue to address above medical problems, as you are doing  - Will continue to follow patient with you
I have interviewed and examined the patient and reviewed the residents note including the history, exam, assessment, and plan.  I agree with the residents assessment and plan.    62y female w/ pmhx/ochx of CAD s/p stents, HTN, T2DM, bilateral cataract surgery consulted for blurry vision, found to have dry eyes and need for reading glasses.  # Dry eyes, both eyes  # Presbyopia  - Vision intact, no sign of optic nerve dysfunction   - Vision improved with +2.50 reading glasses  - No upgaze motility issue, patient is able to look up with effort  - Anterior segment exam with decreased tear breakup time and trace punctate epithelial erosions in both eyes  - Posterior segment exam unremarkable  - Start artificial tears QID and lacrilube ointment QHS OU  - appreciate neuro input  - Findings and plan discussed with patient and primary team.    Edelmira Doll MD
ODESSA Ramirez MD performed a history and physical exam of the patient and discussed  the findings and plan with the house officer. I reviewed the resident note and agree with the findings and plan   I Negro Ramirez MD have personally seen and examined the patient at bedside today at  7 pm

## 2022-10-24 NOTE — CONSULT NOTE ADULT - SUBJECTIVE AND OBJECTIVE BOX
VASCULAR SURGERY CONSULT  ========================    HPI:  62F Spanish c hx htn, dm2, cad s/p recent 2 stents (@ [facility] "couple weeks ago"), left eye blurry vision since cataract surgery, pw 1 month of progressive unsteady gait, slurred speech, memory loss, lethargy. Pt has had 1 month of noticeble decline in gait, speech, memory. Pt previous was completing her own ADLs, with good memory. Now pt is unable to ambulate by self to bathroom without assistance, dragging left leg. Pt also with slurred speech, short term memory loss. Pt was started on sinemet about 6 weeks ago by outpt neuro, but there was no improvement. Pt last had an open MRI of head 1 year ago when she had a fall, but it reportedly did not show anything. DIL thinks pt's current symptoms though are new compared to the fall pt had 1 year ago. Pt denies fevers, URI symptoms, GI symptoms, urinary symptoms. Pt denies any relieving or exacerbating factors.     Vascular surgery consulted to evaluate R ICA stenosis seen on CTA H&N. Patient endorses occasional bilateral blurry vision since her cataract surgery. Endorses feeling like her vision blacks out on both eyes but denies a curtain falling over the visual field. Denies unilateral weakness but does endorses generalized weakness with ambulation. Lives alone. Has never had a stroke.      PAST MEDICAL & SURGICAL HISTORY:  HTN (hypertension)    HLD (hyperlipidemia)    CAD (coronary artery disease)    S/P hysterectomy      MEDICATIONS  (STANDING):  artificial  tears Solution 1 Drop(s) Both EYES four times a day  aspirin enteric coated 81 milliGRAM(s) Oral daily  carbidopa/levodopa  10/100 1 Tablet(s) Oral three times a day  clopidogrel Tablet 75 milliGRAM(s) Oral daily  dextrose 5%. 1000 milliLiter(s) (100 mL/Hr) IV Continuous <Continuous>  dextrose 5%. 1000 milliLiter(s) (50 mL/Hr) IV Continuous <Continuous>  dextrose 50% Injectable 25 Gram(s) IV Push once  dextrose 50% Injectable 12.5 Gram(s) IV Push once  dextrose 50% Injectable 25 Gram(s) IV Push once  enoxaparin Injectable 40 milliGRAM(s) SubCutaneous every 24 hours  gabapentin 100 milliGRAM(s) Oral three times a day  glucagon  Injectable 1 milliGRAM(s) IntraMuscular once  insulin lispro (ADMELOG) corrective regimen sliding scale   SubCutaneous three times a day before meals  insulin lispro (ADMELOG) corrective regimen sliding scale   SubCutaneous at bedtime  losartan 50 milliGRAM(s) Oral daily  metoprolol succinate  milliGRAM(s) Oral daily  multivitamin 1 Tablet(s) Oral daily  NIFEdipine XL 60 milliGRAM(s) Oral daily  petrolatum Ophthalmic Ointment 1 Application(s) Both EYES at bedtime  sodium chloride 0.9%. 1000 milliLiter(s) (60 mL/Hr) IV Continuous <Continuous>      ALLERGIES:  NKDA    ___________________________________________  REVIEW OF SYSTEMS:  All ROS negative except as per HPI.    ___________________________________________  VITALS:  Vital Signs Last 24 Hrs  T(C): 36.7 (24 Oct 2022 16:34), Max: 36.8 (24 Oct 2022 04:30)  T(F): 98.1 (24 Oct 2022 16:34), Max: 98.3 (24 Oct 2022 11:49)  HR: 88 (24 Oct 2022 16:34) (65 - 88)  BP: 147/87 (24 Oct 2022 16:34) (113/83 - 169/91)  BP(mean): 117 (24 Oct 2022 02:44) (102 - 117)  RR: 18 (24 Oct 2022 16:34) (16 - 20)  SpO2: 98% (24 Oct 2022 16:34) (98% - 100%)    Parameters below as of 24 Oct 2022 16:34  Patient On (Oxygen Delivery Method): room air        CAPILLARY BLOOD GLUCOSE  POCT Blood Glucose.: 154 mg/dL (24 Oct 2022 17:04)      PHYSICAL EXAM:  General: AAOx3, no acute distress.  Respiratory: breathing comfortably, no increased WOB   Abdomen: soft, nontender, nondistended, no rebound, no guarding  Extremities: Moves all four.   Neuro: CN II-XII intact, no gross motor or sensory deficits, slightly slurred speech    ____________________________________________  LABS:  CBC Full  -  ( 24 Oct 2022 09:04 )  WBC Count : 9.20 K/uL  RBC Count : 5.20 M/uL  Hemoglobin : 12.5 g/dL  Hematocrit : 39.8 %  Platelet Count - Automated : 309 K/uL  Mean Cell Volume : 76.5 fl  Mean Cell Hemoglobin : 24.0 pg  Mean Cell Hemoglobin Concentration : 31.4 gm/dL  Auto Neutrophil # : 6.13 K/uL  Auto Lymphocyte # : 1.59 K/uL  Auto Monocyte # : 0.78 K/uL  Auto Eosinophil # : 0.60 K/uL  Auto Basophil # : 0.04 K/uL  Auto Neutrophil % : 66.6 %  Auto Lymphocyte % : 17.3 %  Auto Monocyte % : 8.5 %  Auto Eosinophil % : 6.5 %  Auto Basophil % : 0.4 %    10-    138  |  97  |  11  ----------------------------<  276<H>  3.7   |  23  |  0.61    Ca    9.7      24 Oct 2022 09:06  Phos  4.2     10-24  Mg     2.0     10-    TPro  7.6  /  Alb  4.5  /  TBili  0.3  /  DBili  x   /  AST  23  /  ALT  11  /  AlkPhos  96  10-24    LIVER FUNCTIONS - ( 24 Oct 2022 09:06 )  Alb: 4.5 g/dL / Pro: 7.6 g/dL / ALK PHOS: 96 U/L / ALT: 11 U/L / AST: 23 U/L / GGT: x             Urinalysis Basic - ( 23 Oct 2022 22:12 )    Color: Light Yellow / Appearance: Clear / S.032 / pH: x  Gluc: x / Ketone: Negative  / Bili: Negative / Urobili: Negative   Blood: x / Protein: Negative / Nitrite: Negative   Leuk Esterase: Negative / RBC: x / WBC x   Sq Epi: x / Non Sq Epi: x / Bacteria: x      CARDIAC MARKERS ( 24 Oct 2022 09:06 )  x     / x     / 146 U/L / x     / x        ____________________________________________  RADIOLOGY & ADDITIONAL STUDIES:  < from: CT Angio Neck w/ IV Cont (10.23.22 @ 19:31) >    ACC: [id number] EXAM:  CT ANGIO NECK (W)AW IC                        ACC: [id number] EXAM:  CT ANGIO BRAIN (W)AW IC                          PROCEDURE DATE:  10/23/2022          INTERPRETATION:  HISTORY: Weakness and gait instability with multiple   mechanical falls    COMPARISON: CT head 2021    TECHNIQUE: Noncontrast CT of the head was performed followed by CT   angiogram of the neck and brain after administration of intravenous   contrast. MIP and 3D sagittal and coronal reconstructions were performed.    Total of 70 cc Omnipaque 350 intravenous contrast was administered   without complication. 0 cc intravenous contrast discarded.    FINDINGS:    CT HEAD    Gray-white differentiation is intact.    Sulcal and ventricular prominence consistent with age appropriate   involutional change. Scattered bilateral periventricular and subcortical   white matter hypodensities, non-specific but likely consistent with white   matter microvascular type changes.    There is no acute intracranial hemorrhage, mass effect, hydrocephalus, or   midline shift.  There are no extra-axial collections.    The visualized paranasal sinuses and mastoid air cells are clear.    Bilateral intraocular lens implants.    The calvarium is intact.    CTA BRAIN/NECK    INTERNAL CAROTID ARTERIES:  The intracranial segments of the ICA are   patent bilaterally without flow limiting stenosis or occlusion.    ANTERIOR CEREBRAL ARTERIES: No flow-limiting stenosis or occlusion.   Anterior communicating artery is unremarkable without aneurysm. Absent or   hypoplastic right A1 segment.    MIDDLE CEREBRAL ARTERIES: No flow-limiting stenosis or occlusion. MCA   bifurcations are unremarkable without aneurysm.    POSTERIOR CEREBRAL ARTERIES: No flow-limiting stenosis or occlusion.   Persistent fetal origin of right PCA with hypoplastic or absent right P1   segment.    VERTEBRAL ARTERIES:  Normal in course and caliber without flow-limiting   stenosis or occlusion. Origin of the vertebral arteries are unremarkable.    CAROTID ARTERIES:  Right: Calcified plaque with greater than 70% stenosis suspected at the   origin of the right ICA.  Left: Calcified plaque with suspected 50-69% stenosis suspected at the   origin of the left ICA.    AORTIC ARCH: Origin of the greatvessels are unremarkable.    SOFT TISSUE: Left thyroid lobe hypodense lesion, measuring 3.2 x 3.2 x   3.4 cm.    VISUALIZED LUNG APICES: Within normal limits.      IMPRESSION:    CT HEAD: No acute intra-cranial hemorrhage, mass effect, or midline shift.    CTA BRAIN/NECK: Bilateral calcified plaque with significant stenoses,   right worse than left.    Left thyroid lobe hypodense lesion. Recommend thyroid ultrasound for   further evaluation.    < end of copied text >   VASCULAR SURGERY CONSULT  ========================    HPI:  62F [place] c hx htn, dm2, cad s/p recent 2 stents (@ [facility] "couple weeks ago"), left eye blurry vision since cataract surgery, pw 1 month of progressive unsteady gait, slurred speech, memory loss, lethargy. Pt has had 1 month of noticeble decline in gait, speech, memory. Pt previous was completing her own ADLs, with good memory. Now pt is unable to ambulate by self to bathroom without assistance, dragging left leg. Pt also with slurred speech, short term memory loss. Pt was started on sinemet about 6 weeks ago by outpt neuro, but there was no improvement. Pt last had an open MRI of head 1 year ago when she had a fall, but it reportedly did not show anything. DIL thinks pt's current symptoms though are new compared to the fall pt had 1 year ago. Pt denies fevers, URI symptoms, GI symptoms, urinary symptoms. Pt denies any relieving or exacerbating factors.     Vascular surgery consulted to evaluate R ICA stenosis seen on CTA H&N. Patient endorses occasional bilateral blurry vision since her cataract surgery. Endorses feeling like her vision blacks out on both eyes but denies a curtain falling over the visual field. Denies unilateral weakness but does endorses generalized weakness with ambulation. Lives alone. Has never had a stroke.  VASCULAR SURG ATT   Pt states  2-3 x/day events of passing out     PAST MEDICAL & SURGICAL HISTORY:  HTN (hypertension)    HLD (hyperlipidemia)    CAD (coronary artery disease)    S/P hysterectomy      MEDICATIONS  (STANDING):  artificial  tears Solution 1 Drop(s) Both EYES four times a day  aspirin enteric coated 81 milliGRAM(s) Oral daily  carbidopa/levodopa  10/100 1 Tablet(s) Oral three times a day  clopidogrel Tablet 75 milliGRAM(s) Oral daily  dextrose 5%. 1000 milliLiter(s) (100 mL/Hr) IV Continuous <Continuous>  dextrose 5%. 1000 milliLiter(s) (50 mL/Hr) IV Continuous <Continuous>  dextrose 50% Injectable 25 Gram(s) IV Push once  dextrose 50% Injectable 12.5 Gram(s) IV Push once  dextrose 50% Injectable 25 Gram(s) IV Push once  enoxaparin Injectable 40 milliGRAM(s) SubCutaneous every 24 hours  gabapentin 100 milliGRAM(s) Oral three times a day  glucagon  Injectable 1 milliGRAM(s) IntraMuscular once  insulin lispro (ADMELOG) corrective regimen sliding scale   SubCutaneous three times a day before meals  insulin lispro (ADMELOG) corrective regimen sliding scale   SubCutaneous at bedtime  losartan 50 milliGRAM(s) Oral daily  metoprolol succinate  milliGRAM(s) Oral daily  multivitamin 1 Tablet(s) Oral daily  NIFEdipine XL 60 milliGRAM(s) Oral daily  petrolatum Ophthalmic Ointment 1 Application(s) Both EYES at bedtime  sodium chloride 0.9%. 1000 milliLiter(s) (60 mL/Hr) IV Continuous <Continuous>      ALLERGIES:  NKDA    ___________________________________________  REVIEW OF SYSTEMS:  All ROS negative except as per HPI.    ___________________________________________  VITALS:  Vital Signs Last 24 Hrs  T(C): 36.7 (24 Oct 2022 16:34), Max: 36.8 (24 Oct 2022 04:30)  T(F): 98.1 (24 Oct 2022 16:34), Max: 98.3 (24 Oct 2022 11:49)  HR: 88 (24 Oct 2022 16:34) (65 - 88)  BP: 147/87 (24 Oct 2022 16:34) (113/83 - 169/91)  BP(mean): 117 (24 Oct 2022 02:44) (102 - 117)  RR: 18 (24 Oct 2022 16:34) (16 - 20)  SpO2: 98% (24 Oct 2022 16:34) (98% - 100%)    Parameters below as of 24 Oct 2022 16:34  Patient On (Oxygen Delivery Method): room air        CAPILLARY BLOOD GLUCOSE  POCT Blood Glucose.: 154 mg/dL (24 Oct 2022 17:04)      PHYSICAL EXAM:  General: AAOx3, no acute distress.  Respiratory: breathing comfortably, no increased WOB   Abdomen: soft, nontender, nondistended, no rebound, no guarding  Extremities: Moves all four.   Neuro: CN II-XII intact, no gross motor or sensory deficits, slightly slurred speech    ____________________________________________  LABS:  CBC Full  -  ( 24 Oct 2022 09:04 )  WBC Count : 9.20 K/uL  RBC Count : 5.20 M/uL  Hemoglobin : 12.5 g/dL  Hematocrit : 39.8 %  Platelet Count - Automated : 309 K/uL  Mean Cell Volume : 76.5 fl  Mean Cell Hemoglobin : 24.0 pg  Mean Cell Hemoglobin Concentration : 31.4 gm/dL  Auto Neutrophil # : 6.13 K/uL  Auto Lymphocyte # : 1.59 K/uL  Auto Monocyte # : 0.78 K/uL  Auto Eosinophil # : 0.60 K/uL  Auto Basophil # : 0.04 K/uL  Auto Neutrophil % : 66.6 %  Auto Lymphocyte % : 17.3 %  Auto Monocyte % : 8.5 %  Auto Eosinophil % : 6.5 %  Auto Basophil % : 0.4 %    10-    138  |  97  |  11  ----------------------------<  276<H>  3.7   |  23  |  0.61    Ca    9.7      24 Oct 2022 09:06  Phos  4.2     10-24  Mg     2.0     10-24    TPro  7.6  /  Alb  4.5  /  TBili  0.3  /  DBili  x   /  AST  23  /  ALT  11  /  AlkPhos  96  10-24    LIVER FUNCTIONS - ( 24 Oct 2022 09:06 )  Alb: 4.5 g/dL / Pro: 7.6 g/dL / ALK PHOS: 96 U/L / ALT: 11 U/L / AST: 23 U/L / GGT: x             Urinalysis Basic - ( 23 Oct 2022 22:12 )    Color: Light Yellow / Appearance: Clear / S.032 / pH: x  Gluc: x / Ketone: Negative  / Bili: Negative / Urobili: Negative   Blood: x / Protein: Negative / Nitrite: Negative   Leuk Esterase: Negative / RBC: x / WBC x   Sq Epi: x / Non Sq Epi: x / Bacteria: x      CARDIAC MARKERS ( 24 Oct 2022 09:06 )  x     / x     / 146 U/L / x     / x        ____________________________________________  RADIOLOGY & ADDITIONAL STUDIES:  < from: CT Angio Neck w/ IV Cont (10.23.22 @ 19:31) >    ACC: [id number] EXAM:  CT ANGIO NECK (W)AW IC                        ACC: [id number] EXAM:  CT ANGIO BRAIN (W)AW IC                          PROCEDURE DATE:  10/23/2022          INTERPRETATION:  HISTORY: Weakness and gait instability with multiple   mechanical falls    COMPARISON: CT head 2021    TECHNIQUE: Noncontrast CT of the head was performed followed by CT   angiogram of the neck and brain after administration of intravenous   contrast. MIP and 3D sagittal and coronal reconstructions were performed.    Total of 70 cc Omnipaque 350 intravenous contrast was administered   without complication. 0 cc intravenous contrast discarded.    FINDINGS:    CT HEAD    Gray-white differentiation is intact.    Sulcal and ventricular prominence consistent with age appropriate   involutional change. Scattered bilateral periventricular and subcortical   white matter hypodensities, non-specific but likely consistent with white   matter microvascular type changes.    There is no acute intracranial hemorrhage, mass effect, hydrocephalus, or   midline shift.  There are no extra-axial collections.    The visualized paranasal sinuses and mastoid air cells are clear.    Bilateral intraocular lens implants.    The calvarium is intact.    CTA BRAIN/NECK    INTERNAL CAROTID ARTERIES:  The intracranial segments of the ICA are   patent bilaterally without flow limiting stenosis or occlusion.    ANTERIOR CEREBRAL ARTERIES: No flow-limiting stenosis or occlusion.   Anterior communicating artery is unremarkable without aneurysm. Absent or   hypoplastic right A1 segment.    MIDDLE CEREBRAL ARTERIES: No flow-limiting stenosis or occlusion. MCA   bifurcations are unremarkable without aneurysm.    POSTERIOR CEREBRAL ARTERIES: No flow-limiting stenosis or occlusion.   Persistent fetal origin of right PCA with hypoplastic or absent right P1   segment.    VERTEBRAL ARTERIES:  Normal in course and caliber without flow-limiting   stenosis or occlusion. Origin of the vertebral arteries are unremarkable.    CAROTID ARTERIES:  Right: Calcified plaque with greater than 70% stenosis suspected at the   origin of the right ICA.  Left: Calcified plaque with suspected 50-69% stenosis suspected at the   origin of the left ICA.    AORTIC ARCH: Origin of the greatvessels are unremarkable.    SOFT TISSUE: Left thyroid lobe hypodense lesion, measuring 3.2 x 3.2 x   3.4 cm.    VISUALIZED LUNG APICES: Within normal limits.      IMPRESSION:    CT HEAD: No acute intra-cranial hemorrhage, mass effect, or midline shift.    CTA BRAIN/NECK: Bilateral calcified plaque with significant stenoses,   right worse than left.    Left thyroid lobe hypodense lesion. Recommend thyroid ultrasound for   further evaluation.    < end of copied text >

## 2022-10-25 LAB
ANION GAP SERPL CALC-SCNC: 13 MMOL/L — SIGNIFICANT CHANGE UP (ref 5–17)
BUN SERPL-MCNC: 14 MG/DL — SIGNIFICANT CHANGE UP (ref 7–23)
CALCIUM SERPL-MCNC: 9.5 MG/DL — SIGNIFICANT CHANGE UP (ref 8.4–10.5)
CHLORIDE SERPL-SCNC: 102 MMOL/L — SIGNIFICANT CHANGE UP (ref 96–108)
CO2 SERPL-SCNC: 25 MMOL/L — SIGNIFICANT CHANGE UP (ref 22–31)
CREAT SERPL-MCNC: 0.58 MG/DL — SIGNIFICANT CHANGE UP (ref 0.5–1.3)
CULTURE RESULTS: NO GROWTH — SIGNIFICANT CHANGE UP
EGFR: 101 ML/MIN/1.73M2 — SIGNIFICANT CHANGE UP
GLUCOSE BLDC GLUCOMTR-MCNC: 135 MG/DL — HIGH (ref 70–99)
GLUCOSE BLDC GLUCOMTR-MCNC: 153 MG/DL — HIGH (ref 70–99)
GLUCOSE BLDC GLUCOMTR-MCNC: 251 MG/DL — HIGH (ref 70–99)
GLUCOSE BLDC GLUCOMTR-MCNC: 252 MG/DL — HIGH (ref 70–99)
GLUCOSE SERPL-MCNC: 156 MG/DL — HIGH (ref 70–99)
HCT VFR BLD CALC: 38.7 % — SIGNIFICANT CHANGE UP (ref 34.5–45)
HGB BLD-MCNC: 12.3 G/DL — SIGNIFICANT CHANGE UP (ref 11.5–15.5)
LACTATE BLDV-MCNC: 1.6 MMOL/L — SIGNIFICANT CHANGE UP (ref 0.5–2)
LACTATE SERPL-SCNC: 1.9 MMOL/L — SIGNIFICANT CHANGE UP (ref 0.5–2)
MAGNESIUM SERPL-MCNC: 2.1 MG/DL — SIGNIFICANT CHANGE UP (ref 1.6–2.6)
MCHC RBC-ENTMCNC: 24.3 PG — LOW (ref 27–34)
MCHC RBC-ENTMCNC: 31.8 GM/DL — LOW (ref 32–36)
MCV RBC AUTO: 76.5 FL — LOW (ref 80–100)
NRBC # BLD: 0 /100 WBCS — SIGNIFICANT CHANGE UP (ref 0–0)
PLATELET # BLD AUTO: 297 K/UL — SIGNIFICANT CHANGE UP (ref 150–400)
POTASSIUM SERPL-MCNC: 3.7 MMOL/L — SIGNIFICANT CHANGE UP (ref 3.5–5.3)
POTASSIUM SERPL-SCNC: 3.7 MMOL/L — SIGNIFICANT CHANGE UP (ref 3.5–5.3)
RBC # BLD: 5.06 M/UL — SIGNIFICANT CHANGE UP (ref 3.8–5.2)
RBC # FLD: 13.6 % — SIGNIFICANT CHANGE UP (ref 10.3–14.5)
SODIUM SERPL-SCNC: 140 MMOL/L — SIGNIFICANT CHANGE UP (ref 135–145)
SPECIMEN SOURCE: SIGNIFICANT CHANGE UP
T3FREE SERPL-MCNC: 3.44 PG/ML — SIGNIFICANT CHANGE UP (ref 2–4.4)
T4 FREE SERPL-MCNC: 1.1 NG/DL — SIGNIFICANT CHANGE UP (ref 0.9–1.8)
TSH SERPL-MCNC: 0.31 UIU/ML — SIGNIFICANT CHANGE UP (ref 0.27–4.2)
VIT B12 SERPL-MCNC: 317 PG/ML — SIGNIFICANT CHANGE UP (ref 232–1245)
WBC # BLD: 5.69 K/UL — SIGNIFICANT CHANGE UP (ref 3.8–10.5)
WBC # FLD AUTO: 5.69 K/UL — SIGNIFICANT CHANGE UP (ref 3.8–10.5)

## 2022-10-25 PROCEDURE — 70491 CT SOFT TISSUE NECK W/DYE: CPT | Mod: 26

## 2022-10-25 PROCEDURE — 93880 EXTRACRANIAL BILAT STUDY: CPT | Mod: 26

## 2022-10-25 PROCEDURE — 99233 SBSQ HOSP IP/OBS HIGH 50: CPT

## 2022-10-25 PROCEDURE — 99232 SBSQ HOSP IP/OBS MODERATE 35: CPT

## 2022-10-25 RX ADMIN — GABAPENTIN 100 MILLIGRAM(S): 400 CAPSULE ORAL at 05:28

## 2022-10-25 RX ADMIN — CARBIDOPA AND LEVODOPA 1 TABLET(S): 25; 100 TABLET ORAL at 22:11

## 2022-10-25 RX ADMIN — Medication 1 DROP(S): at 12:52

## 2022-10-25 RX ADMIN — Medication 1: at 12:47

## 2022-10-25 RX ADMIN — Medication 3: at 17:02

## 2022-10-25 RX ADMIN — Medication 1 APPLICATION(S): at 22:11

## 2022-10-25 RX ADMIN — CLOPIDOGREL BISULFATE 75 MILLIGRAM(S): 75 TABLET, FILM COATED ORAL at 12:34

## 2022-10-25 RX ADMIN — Medication 81 MILLIGRAM(S): at 12:34

## 2022-10-25 RX ADMIN — Medication 1 TABLET(S): at 12:34

## 2022-10-25 RX ADMIN — GABAPENTIN 100 MILLIGRAM(S): 400 CAPSULE ORAL at 22:11

## 2022-10-25 RX ADMIN — Medication 1 DROP(S): at 17:04

## 2022-10-25 RX ADMIN — LOSARTAN POTASSIUM 50 MILLIGRAM(S): 100 TABLET, FILM COATED ORAL at 05:28

## 2022-10-25 RX ADMIN — ENOXAPARIN SODIUM 40 MILLIGRAM(S): 100 INJECTION SUBCUTANEOUS at 05:28

## 2022-10-25 RX ADMIN — CARBIDOPA AND LEVODOPA 1 TABLET(S): 25; 100 TABLET ORAL at 05:28

## 2022-10-25 RX ADMIN — Medication 60 MILLIGRAM(S): at 05:29

## 2022-10-25 RX ADMIN — CARBIDOPA AND LEVODOPA 1 TABLET(S): 25; 100 TABLET ORAL at 13:21

## 2022-10-25 RX ADMIN — Medication 1 DROP(S): at 07:01

## 2022-10-25 RX ADMIN — Medication 100 MILLIGRAM(S): at 05:28

## 2022-10-25 RX ADMIN — Medication 1: at 22:10

## 2022-10-25 RX ADMIN — GABAPENTIN 100 MILLIGRAM(S): 400 CAPSULE ORAL at 13:21

## 2022-10-25 NOTE — PHYSICAL THERAPY INITIAL EVALUATION ADULT - PLANNED THERAPY INTERVENTIONS, PT EVAL
GOAL Stair train: pt will negotiate 6 steps with HR with close supervision steady in 2 weeks/balance training/bed mobility training/gait training/strengthening/transfer training

## 2022-10-25 NOTE — OCCUPATIONAL THERAPY INITIAL EVALUATION ADULT - NS ASR FOLLOW COMMAND OT EVAL
Unable to complete short blessed test 2/2 poor command follow./75% of the time/able to follow single-step instructions

## 2022-10-25 NOTE — OCCUPATIONAL THERAPY INITIAL EVALUATION ADULT - ADDITIONAL COMMENTS
Doppler 10/25: There is an incompletely imaged 3.5 cm cystic mass in the left neck with internal echoes.  Further investigation is recommended to determine its nature.

## 2022-10-25 NOTE — PHYSICAL THERAPY INITIAL EVALUATION ADULT - IMPAIRMENTS FOUND, PT EVAL
numbness hands B but can feel light touch/cognitive impairment/gait, locomotion, and balance/muscle strength/sensory integrity

## 2022-10-25 NOTE — PHYSICAL THERAPY INITIAL EVALUATION ADULT - REFERRING PHYSICIAN, REHAB EVAL
Nicola Griffin MD ORDER PT EVAL and TREAT Nicola Griffin MD ORDER PT EVAL and TREAT, amb with assist

## 2022-10-25 NOTE — PHYSICAL THERAPY INITIAL EVALUATION ADULT - GAIT DEVIATIONS NOTED, PT EVAL
decreased misha/increased time in double stance/decreased step length/decreased stride length/decreased weight-shifting ability

## 2022-10-25 NOTE — PHYSICAL THERAPY INITIAL EVALUATION ADULT - ORIENTATION, REHAB EVAL
pt answers but response is delayed briefly as pt thinking about response , pt count back from 10 but difficult mild/oriented to person, place, time and situation

## 2022-10-25 NOTE — PHYSICAL THERAPY INITIAL EVALUATION ADULT - PERTINENT HX OF CURRENT PROBLEM, REHAB EVAL
62 y.o female with PMHx HTN, DM, HLD, CAD s/p 2 stents on plavix and ASA presenting with a month history of worsening falls. Patient and daughter who is at bedside said patient fell and hit her head one year ago. Per daughter, neuroimaging did not reveal any abnormalities. Ever since one month ago, patient has been lethargic and has had difficulty walking where she feels unsteady, but denies falling to one side more than the other. Says she bought a cane and uses that to ambulate. Denies dizziness, lightheadedness, focal weakness, focal numbness, diplopia, eye pain. Patient says she had cataract surgery and has of 5 months ago has had blurred vision and photobia in her left eye and has not seen her eye doctor. In the past 2 weeks, patient's daughter says patient fell 5-6x. 10/23/22 CT HEAD: No acute intra-cranial hemorrhage, mass effect, or midline shift. CTA BRAIN/NECK: Bilateral calcified plaque with significant stenoses, right worse than left. Left thyroid lobe hypodense lesion. Ophthomology / Neuro/vascular consulted 62 y.o female with PMHx HTN, DM, HLD, CAD s/p 2 stents on plavix and ASA presenting with a month history of worsening falls. Patient and daughter per chart patient fell and hit her head one year ago. Per daughter, neuroimaging did not reveal any abnormalities. Ever since one month ago, patient has been lethargic and has had difficulty walking where she feels unsteady, but denies falling to one side more than the other. Says she bought a cane and uses that to ambulate. Denies dizziness, lightheadedness, focal weakness, focal numbness, diplopia, eye pain. Patient says she had cataract surgery and has of 5 months ago has had blurred vision and photobia in her left eye and has not seen her eye doctor. In the past 2 weeks, patient's daughter says patient fell 5-6x. 10/23/22 CT HEAD: No acute intra-cranial hemorrhage, mass effect, or midline shift. CTA BRAIN/NECK: Bilateral calcified plaque with significant stenoses, right worse than left. Left thyroid lobe hypodense lesion. Ophthomology / Neuro/vascular consulted; Await MRI HEAD

## 2022-10-25 NOTE — OCCUPATIONAL THERAPY INITIAL EVALUATION ADULT - PERTINENT HX OF CURRENT PROBLEM, REHAB EVAL
62F [place] c hx htn, dm2, cad s/p recent 2 stents (@ [facility] "couple weeks ago"), left eye blurry vision since cataract surgery, pw 1 month of progressive unsteady gait, slurred speech, memory loss, lethargy.  History from pt's daughter in law and primary decision maker Ryanne Osorio at bedside. Pt has had 1 month of noticeble decline in gait, speech, memory. Pt previous was completing her own ADLs, with good memory. Now pt is unable to ambulate by self to bathroom without assistance, dragging left leg. Pt also with slurred speech, short term memory loss. Pt was started on sinemet about 6 weeks ago by outpt neuro, but there was no improvement. Pt last had an open MRI of head 1 year ago when she had a fall, but it reportedly did not show anything. DIL thinks pt's current symptoms though are new compared to the fall pt had 1 year ago. Pt denies fevers, URI symptoms, GI symptoms, urinary symptoms. Pt denies any relieving or exacerbating factors.  Chest Xray 10/23: (-)   Head CT 10/23: No acute intra-cranial hemorrhage, mass effect, or midline shift. CTA BRAIN/NECK: Bilateral calcified plaque with significant stenoses, right worse than left. Left thyroid lobe hypodense lesion. Recommend thyroid ultrasound for further evaluation.

## 2022-10-25 NOTE — PHYSICAL THERAPY INITIAL EVALUATION ADULT - ADDITIONAL COMMENTS
lives alone lives alone in house with 5-6 steps to enter with HR once in everything on one level per pt ; no caregiver ID ; has 2 sons and dtr in laws and grand children ; pt has tub shower with shower seat and grab bars ;pt has been perform ADls independent per pt ; pt has a std cane but unsure of where it is and uses to amb per pt (pt thinks may have left at outpt PT place (which pt goes 1x/wk every Tues per pt ); pt report does no go out only with family

## 2022-10-25 NOTE — PHYSICAL THERAPY INITIAL EVALUATION ADULT - GENERAL OBSERVATIONS, REHAB EVAL
pt received with PCA cg of1 walking from BR 15 ft mild unsteady , in stand pt change gown and standing balance good ; PT noted R nare bleed had pt sit on bed BP taken 168/103 pt asx , tissue given R dillone slow sm amount blood peg Patterson inform and in to see , PT also inform WHITNEY Mcarthur as well , pt lying in bed HOB 35 degrees /86 HR 84 RR 18 room air pulse ox 97% ; defer further amb and stair assess at this time ;

## 2022-10-25 NOTE — PHYSICAL THERAPY INITIAL EVALUATION ADULT - IMPAIRED TRANSFERS: SIT/STAND, REHAB EVAL
stand balance static good while pt change gown no LOB , dynamic fair - to fair/impaired postural control/decreased strength

## 2022-10-25 NOTE — OCCUPATIONAL THERAPY INITIAL EVALUATION ADULT - LIVES WITH, PROFILE
Pt states lives alone in a house, 5STE, +tub shower with shower chair, grab bars. PTA pt was independent with all ADLs, pt states wishing to have HHA for assistance. Pt states utilizing SC for mobility, unsure where it is.

## 2022-10-25 NOTE — PHYSICAL THERAPY INITIAL EVALUATION ADULT - IMPAIRMENTS CONTRIBUTING IMPAIRED BED MOBILITY, REHAB EVAL
----- Message from Josseline Owen CNM sent at 6/29/2022  1:53 PM CDT -----  Review all labs as negative. Let her or her grandmother know she has normal discharge which changes d/t fluctuations in hormones before and after menstrual cycles. Use organic cotton pads or tampons. Change underwear often if sweating. Loose clothing when home. Follow up if worsens.   
pt denies any cc's/decreased strength

## 2022-10-26 DIAGNOSIS — Z29.9 ENCOUNTER FOR PROPHYLACTIC MEASURES, UNSPECIFIED: ICD-10-CM

## 2022-10-26 LAB
ANION GAP SERPL CALC-SCNC: 11 MMOL/L — SIGNIFICANT CHANGE UP (ref 5–17)
BUN SERPL-MCNC: 16 MG/DL — SIGNIFICANT CHANGE UP (ref 7–23)
CALCIUM SERPL-MCNC: 9.5 MG/DL — SIGNIFICANT CHANGE UP (ref 8.4–10.5)
CHLORIDE SERPL-SCNC: 108 MMOL/L — SIGNIFICANT CHANGE UP (ref 96–108)
CO2 SERPL-SCNC: 25 MMOL/L — SIGNIFICANT CHANGE UP (ref 22–31)
CREAT SERPL-MCNC: 0.65 MG/DL — SIGNIFICANT CHANGE UP (ref 0.5–1.3)
EGFR: 98 ML/MIN/1.73M2 — SIGNIFICANT CHANGE UP
GLUCOSE BLDC GLUCOMTR-MCNC: 158 MG/DL — HIGH (ref 70–99)
GLUCOSE BLDC GLUCOMTR-MCNC: 176 MG/DL — HIGH (ref 70–99)
GLUCOSE BLDC GLUCOMTR-MCNC: 192 MG/DL — HIGH (ref 70–99)
GLUCOSE BLDC GLUCOMTR-MCNC: 236 MG/DL — HIGH (ref 70–99)
GLUCOSE SERPL-MCNC: 163 MG/DL — HIGH (ref 70–99)
HCT VFR BLD CALC: 38.1 % — SIGNIFICANT CHANGE UP (ref 34.5–45)
HGB BLD-MCNC: 12.2 G/DL — SIGNIFICANT CHANGE UP (ref 11.5–15.5)
MCHC RBC-ENTMCNC: 24.1 PG — LOW (ref 27–34)
MCHC RBC-ENTMCNC: 32 GM/DL — SIGNIFICANT CHANGE UP (ref 32–36)
MCV RBC AUTO: 75.3 FL — LOW (ref 80–100)
NRBC # BLD: 0 /100 WBCS — SIGNIFICANT CHANGE UP (ref 0–0)
PLATELET # BLD AUTO: 290 K/UL — SIGNIFICANT CHANGE UP (ref 150–400)
POTASSIUM SERPL-MCNC: 4.1 MMOL/L — SIGNIFICANT CHANGE UP (ref 3.5–5.3)
POTASSIUM SERPL-SCNC: 4.1 MMOL/L — SIGNIFICANT CHANGE UP (ref 3.5–5.3)
RBC # BLD: 5.06 M/UL — SIGNIFICANT CHANGE UP (ref 3.8–5.2)
RBC # FLD: 13.6 % — SIGNIFICANT CHANGE UP (ref 10.3–14.5)
SODIUM SERPL-SCNC: 144 MMOL/L — SIGNIFICANT CHANGE UP (ref 135–145)
WBC # BLD: 6.28 K/UL — SIGNIFICANT CHANGE UP (ref 3.8–10.5)
WBC # FLD AUTO: 6.28 K/UL — SIGNIFICANT CHANGE UP (ref 3.8–10.5)

## 2022-10-26 PROCEDURE — 99233 SBSQ HOSP IP/OBS HIGH 50: CPT

## 2022-10-26 RX ADMIN — Medication 1 APPLICATION(S): at 22:31

## 2022-10-26 RX ADMIN — CARBIDOPA AND LEVODOPA 1 TABLET(S): 25; 100 TABLET ORAL at 22:31

## 2022-10-26 RX ADMIN — CLOPIDOGREL BISULFATE 75 MILLIGRAM(S): 75 TABLET, FILM COATED ORAL at 12:16

## 2022-10-26 RX ADMIN — Medication 1 DROP(S): at 00:55

## 2022-10-26 RX ADMIN — LOSARTAN POTASSIUM 50 MILLIGRAM(S): 100 TABLET, FILM COATED ORAL at 06:14

## 2022-10-26 RX ADMIN — Medication 100 MILLIGRAM(S): at 06:14

## 2022-10-26 RX ADMIN — CARBIDOPA AND LEVODOPA 1 TABLET(S): 25; 100 TABLET ORAL at 14:28

## 2022-10-26 RX ADMIN — Medication 1 TABLET(S): at 12:16

## 2022-10-26 RX ADMIN — Medication 1: at 17:11

## 2022-10-26 RX ADMIN — Medication 60 MILLIGRAM(S): at 06:14

## 2022-10-26 RX ADMIN — Medication 1 DROP(S): at 12:20

## 2022-10-26 RX ADMIN — ENOXAPARIN SODIUM 40 MILLIGRAM(S): 100 INJECTION SUBCUTANEOUS at 06:13

## 2022-10-26 RX ADMIN — GABAPENTIN 100 MILLIGRAM(S): 400 CAPSULE ORAL at 06:14

## 2022-10-26 RX ADMIN — Medication 1: at 09:03

## 2022-10-26 RX ADMIN — GABAPENTIN 100 MILLIGRAM(S): 400 CAPSULE ORAL at 14:27

## 2022-10-26 RX ADMIN — Medication 81 MILLIGRAM(S): at 12:16

## 2022-10-26 RX ADMIN — Medication 1 DROP(S): at 17:12

## 2022-10-26 RX ADMIN — GABAPENTIN 100 MILLIGRAM(S): 400 CAPSULE ORAL at 22:31

## 2022-10-26 RX ADMIN — Medication 1 DROP(S): at 23:57

## 2022-10-26 RX ADMIN — CARBIDOPA AND LEVODOPA 1 TABLET(S): 25; 100 TABLET ORAL at 06:14

## 2022-10-26 RX ADMIN — Medication 1: at 12:17

## 2022-10-26 RX ADMIN — Medication 1 DROP(S): at 06:00

## 2022-10-27 DIAGNOSIS — R45.1 RESTLESSNESS AND AGITATION: ICD-10-CM

## 2022-10-27 LAB
ANION GAP SERPL CALC-SCNC: 11 MMOL/L — SIGNIFICANT CHANGE UP (ref 5–17)
BUN SERPL-MCNC: 16 MG/DL — SIGNIFICANT CHANGE UP (ref 7–23)
CALCIUM SERPL-MCNC: 9.1 MG/DL — SIGNIFICANT CHANGE UP (ref 8.4–10.5)
CHLORIDE SERPL-SCNC: 105 MMOL/L — SIGNIFICANT CHANGE UP (ref 96–108)
CO2 SERPL-SCNC: 25 MMOL/L — SIGNIFICANT CHANGE UP (ref 22–31)
CREAT SERPL-MCNC: 0.67 MG/DL — SIGNIFICANT CHANGE UP (ref 0.5–1.3)
EGFR: 98 ML/MIN/1.73M2 — SIGNIFICANT CHANGE UP
GLUCOSE BLDC GLUCOMTR-MCNC: 136 MG/DL — HIGH (ref 70–99)
GLUCOSE BLDC GLUCOMTR-MCNC: 183 MG/DL — HIGH (ref 70–99)
GLUCOSE BLDC GLUCOMTR-MCNC: 237 MG/DL — HIGH (ref 70–99)
GLUCOSE BLDC GLUCOMTR-MCNC: 244 MG/DL — HIGH (ref 70–99)
GLUCOSE SERPL-MCNC: 229 MG/DL — HIGH (ref 70–99)
HCT VFR BLD CALC: 35.4 % — SIGNIFICANT CHANGE UP (ref 34.5–45)
HGB BLD-MCNC: 11.4 G/DL — LOW (ref 11.5–15.5)
MCHC RBC-ENTMCNC: 24.4 PG — LOW (ref 27–34)
MCHC RBC-ENTMCNC: 32.2 GM/DL — SIGNIFICANT CHANGE UP (ref 32–36)
MCV RBC AUTO: 75.8 FL — LOW (ref 80–100)
NRBC # BLD: 0 /100 WBCS — SIGNIFICANT CHANGE UP (ref 0–0)
PLATELET # BLD AUTO: 276 K/UL — SIGNIFICANT CHANGE UP (ref 150–400)
POTASSIUM SERPL-MCNC: 4.1 MMOL/L — SIGNIFICANT CHANGE UP (ref 3.5–5.3)
POTASSIUM SERPL-SCNC: 4.1 MMOL/L — SIGNIFICANT CHANGE UP (ref 3.5–5.3)
RBC # BLD: 4.67 M/UL — SIGNIFICANT CHANGE UP (ref 3.8–5.2)
RBC # FLD: 13.5 % — SIGNIFICANT CHANGE UP (ref 10.3–14.5)
SODIUM SERPL-SCNC: 141 MMOL/L — SIGNIFICANT CHANGE UP (ref 135–145)
WBC # BLD: 6.68 K/UL — SIGNIFICANT CHANGE UP (ref 3.8–10.5)
WBC # FLD AUTO: 6.68 K/UL — SIGNIFICANT CHANGE UP (ref 3.8–10.5)

## 2022-10-27 PROCEDURE — 70551 MRI BRAIN STEM W/O DYE: CPT | Mod: 26

## 2022-10-27 PROCEDURE — 99222 1ST HOSP IP/OBS MODERATE 55: CPT

## 2022-10-27 PROCEDURE — 99233 SBSQ HOSP IP/OBS HIGH 50: CPT

## 2022-10-27 RX ORDER — LANOLIN ALCOHOL/MO/W.PET/CERES
3 CREAM (GRAM) TOPICAL AT BEDTIME
Refills: 0 | Status: DISCONTINUED | OUTPATIENT
Start: 2022-10-27 | End: 2022-10-31

## 2022-10-27 RX ORDER — QUETIAPINE FUMARATE 200 MG/1
25 TABLET, FILM COATED ORAL AT BEDTIME
Refills: 0 | Status: DISCONTINUED | OUTPATIENT
Start: 2022-10-27 | End: 2022-10-31

## 2022-10-27 RX ADMIN — Medication 3 MILLIGRAM(S): at 21:56

## 2022-10-27 RX ADMIN — Medication 1 DROP(S): at 06:28

## 2022-10-27 RX ADMIN — CLOPIDOGREL BISULFATE 75 MILLIGRAM(S): 75 TABLET, FILM COATED ORAL at 13:07

## 2022-10-27 RX ADMIN — Medication 1 DROP(S): at 17:21

## 2022-10-27 RX ADMIN — Medication 1 DROP(S): at 13:09

## 2022-10-27 RX ADMIN — Medication 1 TABLET(S): at 13:08

## 2022-10-27 RX ADMIN — LOSARTAN POTASSIUM 50 MILLIGRAM(S): 100 TABLET, FILM COATED ORAL at 06:27

## 2022-10-27 RX ADMIN — GABAPENTIN 100 MILLIGRAM(S): 400 CAPSULE ORAL at 21:56

## 2022-10-27 RX ADMIN — CARBIDOPA AND LEVODOPA 1 TABLET(S): 25; 100 TABLET ORAL at 13:08

## 2022-10-27 RX ADMIN — ENOXAPARIN SODIUM 40 MILLIGRAM(S): 100 INJECTION SUBCUTANEOUS at 06:27

## 2022-10-27 RX ADMIN — Medication 81 MILLIGRAM(S): at 13:07

## 2022-10-27 RX ADMIN — Medication 1: at 08:38

## 2022-10-27 RX ADMIN — Medication 100 MILLIGRAM(S): at 06:27

## 2022-10-27 RX ADMIN — CARBIDOPA AND LEVODOPA 1 TABLET(S): 25; 100 TABLET ORAL at 06:27

## 2022-10-27 RX ADMIN — Medication 60 MILLIGRAM(S): at 06:27

## 2022-10-27 RX ADMIN — CARBIDOPA AND LEVODOPA 1 TABLET(S): 25; 100 TABLET ORAL at 21:56

## 2022-10-27 RX ADMIN — Medication 1 APPLICATION(S): at 21:56

## 2022-10-27 RX ADMIN — GABAPENTIN 100 MILLIGRAM(S): 400 CAPSULE ORAL at 13:07

## 2022-10-27 RX ADMIN — QUETIAPINE FUMARATE 25 MILLIGRAM(S): 200 TABLET, FILM COATED ORAL at 21:57

## 2022-10-27 RX ADMIN — GABAPENTIN 100 MILLIGRAM(S): 400 CAPSULE ORAL at 06:27

## 2022-10-27 RX ADMIN — Medication 2: at 16:26

## 2022-10-27 NOTE — SPEECH LANGUAGE PATHOLOGY EVALUATION - SLP PERTINENT HISTORY OF CURRENT PROBLEM
63 y/o F with PMH of HTN, DM2, CAD s/p recent 2 stents (@ [facility] "couple weeks ago"), L eye blurry vision since cataract surgery, pw 1 month of progressive unsteady gait, slurred speech, memory loss, lethargy concerning for poss dementia, vascular or other. Found to have acute cognitive decline, stenosis of R ICA, and gait instability. Pt states 2-3 x/day events of passing out. Ophthalmology consulted for blurry vision; found to have dry eyes and need for reading glasses. Vascular surgery consulted to evaluate R ICA stenosis seen on CTA H&N; no plan for vascular surgical intervention.

## 2022-10-27 NOTE — CHART NOTE - NSCHARTNOTEFT_GEN_A_CORE
Notified by RN that pt was very agitated. Per RN she ripped out her IV, ripped off her hospital bands, and then refused to get back into bed. Pt was back in bed when I arrived but agitated and refusing to cooperate. Pt came to the hospital for AMS, CT H showed no acute changes and she has an MRI pending in the morning. Ix IM ativan was ordered. Will continue to monitor and reorient as needed. Follow up in AM.     Shelby Mathias PA-C  Department of Medicine Notified by RN that pt was very agitated. Per RN she ripped out her IV, ripped off her hospital bands, and then refused to get back into bed. Pt was back in bed when I arrived but agitated and refusing to cooperate. Pt came to the hospital for AMS, CT H showed no acute changes and she has an MRI pending in the morning. Ix IM ativan was ordered. Will continue to monitor and reorient as needed. Follow up in AM.     Addendum:  Pt agitation resolved. Pt did not require the ativan. Pt is back at her baseline mental status.     Shelby Mathias PA-C  Department of Medicine

## 2022-10-27 NOTE — BH CONSULTATION LIAISON ASSESSMENT NOTE - NSBHATTESTCOMMENTATTENDFT_PSY_A_CORE
This is a 62-y.o. F patient with PMH htn, dm2, cad s/p recent 2 stents (@ [facility] "couple weeks ago"), left eye blurry vision since cataract surgery, pw 1 month of progressive unsteady gait, slurred speech, memory loss, lethargy. History from pt's daughter in law and primary decision maker Ryanne Osorio at bedside.  Pt has had 1 month of noticeable decline in gait, speech, memory. Pt previous was completing her own ADLs, with good memory. Now pt is unable to ambulate by self to bathroom without assistance, dragging left leg. Pt also with slurred speech, short term memory loss.    I have seen and evaluated this patient myself. Chart, labs, meds reviewed. I agree with trainee's assessment and plan.

## 2022-10-27 NOTE — BH CONSULTATION LIAISON ASSESSMENT NOTE - RISK ASSESSMENT
Risk factors: Multiple medical comorbidities, , age, gradual change in mental status    Protective factors: no current SIIP/HIIP, no h/o SA/SIB, no h/o psych admissions, no access to weapons, no active substance abuse, no psychosis, domiciled, social supports.    Overall, pt is a low risk of harm to self/others and does not meet criteria for psychiatric admission.

## 2022-10-27 NOTE — SPEECH LANGUAGE PATHOLOGY EVALUATION - PARAGRAPH LENGTH:
Impaired reading comprehension at paragraph level, able to retell details of story with verbal cues. Mildly impaired reading comprehension at paragraph level, able to retell details of story with verbal cues.  Suspect increased deficits as complexity of material increased.

## 2022-10-27 NOTE — BH CONSULTATION LIAISON ASSESSMENT NOTE - SUMMARY
62yF with PMH htn, dm2, cad s/p recent 2 stents (@ [facility] "couple weeks ago"), left eye blurry vision since cataract surgery, pw 1 month of progressive unsteady gait, slurred speech, memory loss, lethargy. History from pt's daughter in law and primary decision maker Ryanne Osorio at bedside.  Pt has had 1 month of noticeable decline in gait, speech, memory. Pt previous was completing her own ADLs, with good memory. Now pt is unable to ambulate by self to bathroom without assistance, dragging left leg. Pt also with slurred speech, short term memory loss.    Psych consult for agitation and med recs. Last night pt has agitated episode, pulling out IV and trying to take clothes off, wandering down [place] needing redirection from staff. Pt interviewed today at bedside, presenting A&OX3, calm, cooperative, and pleasant. Pt reports waking up from dream with this feeling that she wanted to leave, which caused the agitated behavior. Pt reports she frequently has poor and difficulty falling back asleep sleep and felt anxious about an ordered MRI which led to poor sleep overnight. Pt is open to beginning and medication to help sleep. The nurse reports the pt appears confused and impulsive at times, and will get out of bed without assistance. Nurse also reports pt has been cooperative with fingerstick checks, vital signs, and has adequate PO intake.     PLAN:  Seroquel 25mg PO hs    Melatonin 3mg PO hs    62yF with PMH htn, dm2, cad s/p recent 2 stents (@ [facility] "couple weeks ago"), left eye blurry vision since cataract surgery, pw 1 month of progressive unsteady gait, slurred speech, memory loss, lethargy. History from pt's daughter in law and primary decision maker Ryanne Osorio at bedside.  Pt has had 1 month of noticeable decline in gait, speech, memory. Pt previous was completing her own ADLs, with good memory. Now pt is unable to ambulate by self to bathroom without assistance, dragging left leg. Pt also with slurred speech, short term memory loss.    Psych consult for agitation and med recs. Last night pt has agitated episode, pulling out IV and trying to take clothes off, wandering down [place] needing redirection from staff. Pt interviewed today at bedside, presenting A&OX3, calm, cooperative, and pleasant. Pt reports waking up from dream with this feeling that she wanted to leave, which caused the agitated behavior. Pt reports she frequently has poor and difficulty falling back asleep sleep and felt anxious about an ordered MRI which led to poor sleep overnight. Pt is open to beginning and medication to help sleep. The nurse reports the pt appears confused and impulsive at times, and will get out of bed without assistance. Nurse also reports pt has been cooperative with fingerstick checks, vital signs, and has adequate PO intake.     PLAN:  Seroquel 25mg PO hs (hold for QTC >500)    Melatonin 3mg PO hs

## 2022-10-27 NOTE — SPEECH LANGUAGE PATHOLOGY EVALUATION - ALTERNATING RAPID SOUNDS
Slowed rate of alternating DDK's/impaired Slowed rate of alternating sounds w/ diadochokinesis/impaired

## 2022-10-27 NOTE — BH CONSULTATION LIAISON ASSESSMENT NOTE - CURRENT MEDICATION
MEDICATIONS  (STANDING):  artificial  tears Solution 1 Drop(s) Both EYES four times a day  aspirin enteric coated 81 milliGRAM(s) Oral daily  carbidopa/levodopa  10/100 1 Tablet(s) Oral three times a day  clopidogrel Tablet 75 milliGRAM(s) Oral daily  dextrose 5%. 1000 milliLiter(s) (100 mL/Hr) IV Continuous <Continuous>  dextrose 5%. 1000 milliLiter(s) (50 mL/Hr) IV Continuous <Continuous>  dextrose 50% Injectable 25 Gram(s) IV Push once  dextrose 50% Injectable 12.5 Gram(s) IV Push once  dextrose 50% Injectable 25 Gram(s) IV Push once  enoxaparin Injectable 40 milliGRAM(s) SubCutaneous every 24 hours  gabapentin 100 milliGRAM(s) Oral three times a day  glucagon  Injectable 1 milliGRAM(s) IntraMuscular once  insulin lispro (ADMELOG) corrective regimen sliding scale   SubCutaneous three times a day before meals  insulin lispro (ADMELOG) corrective regimen sliding scale   SubCutaneous at bedtime  losartan 50 milliGRAM(s) Oral daily  metoprolol succinate  milliGRAM(s) Oral daily  multivitamin 1 Tablet(s) Oral daily  NIFEdipine XL 60 milliGRAM(s) Oral daily  petrolatum Ophthalmic Ointment 1 Application(s) Both EYES at bedtime    MEDICATIONS  (PRN):  dextrose Oral Gel 15 Gram(s) Oral once PRN Blood Glucose LESS THAN 70 milliGRAM(s)/deciliter

## 2022-10-27 NOTE — SPEECH LANGUAGE PATHOLOGY EVALUATION - VOLUME
Pt noted with reduced vocal volume however, stimulable to cueing for increased intensity. Pt noted with reduced vocal intensity however, stimulable to cueing for increased intensity at phrase level; decreased intrapersonal monitoring for carryover of strategy

## 2022-10-27 NOTE — BH CONSULTATION LIAISON ASSESSMENT NOTE - NSBHCHARTREVIEWVS_PSY_A_CORE FT
Vital Signs Last 24 Hrs  T(C): 36.5 (27 Oct 2022 05:40), Max: 36.9 (26 Oct 2022 19:52)  T(F): 97.7 (27 Oct 2022 05:40), Max: 98.4 (26 Oct 2022 19:52)  HR: 88 (27 Oct 2022 05:40) (87 - 88)  BP: 161/89 (27 Oct 2022 05:40) (145/84 - 161/89)  BP(mean): --  RR: 17 (27 Oct 2022 05:40) (17 - 18)  SpO2: 96% (27 Oct 2022 05:40) (96% - 96%)    Parameters below as of 27 Oct 2022 05:40  Patient On (Oxygen Delivery Method): room air

## 2022-10-27 NOTE — BH CONSULTATION LIAISON ASSESSMENT NOTE - NSBHCHARTREVIEWLAB_PSY_A_CORE FT
11.4   6.68  )-----------( 276      ( 27 Oct 2022 07:10 )             35.4     10-27    141  |  105  |  16  ----------------------------<  229<H>  4.1   |  25  |  0.67    Ca    9.1      27 Oct 2022 07:14

## 2022-10-27 NOTE — BH CONSULTATION LIAISON ASSESSMENT NOTE - NSBHMSEINTELL_PSY_A_CORE
I have reviewed the notes, assessments, and/or procedures performed by Zaida Westbrook LPN, and I concur with her documentation of Luma Cruz.       Average

## 2022-10-27 NOTE — BH CONSULTATION LIAISON ASSESSMENT NOTE - HPI (INCLUDE ILLNESS QUALITY, SEVERITY, DURATION, TIMING, CONTEXT, MODIFYING FACTORS, ASSOCIATED SIGNS AND SYMPTOMS)
62yF with PMH htn, dm2, cad s/p recent 2 stents (@ [facility] "couple weeks ago"), left eye blurry vision since cataract surgery, pw 1 month of progressive unsteady gait, slurred speech, memory loss, lethargy. History from pt's daughter in law and primary decision maker Ryanne Osorio at bedside.  Pt has had 1 month of noticeable decline in gait, speech, memory. Pt previous was completing her own ADLs, with good memory. Now pt is unable to ambulate by self to bathroom without assistance, dragging left leg. Pt also with slurred speech, short term memory loss.    Psych consult for agitation and med recs. Pt interviewed at bedside, presenting A&OX3, calm, cooperative, and pleasant. Pt reports last night she was sleeping and dreamt that she was crossing a river and had this feeling that she wanted to leave. Pt states she remembers the episode of pulling out her IV and staff redirecting her to her room. She reports her overall mood as "okay, fine." Pt reports she frequently has poor sleep with difficulty falling back asleep, and felt anxious about an ordered MRI which led to poor sleep overnight. Pt is open to beginning medication for sleep. Collateral from primary nurse, pt was agitated overnight, pulling out IV, attempting to pull off gowns, going through the hallways, needed redirection from staff to return to room. The nurse reports the pt appears confused and impulsive at times, and will get out of bed without assistance.  Pt has been cooperative with fingerstick checks, vital signs, and has adequate PO intake.

## 2022-10-27 NOTE — SPEECH LANGUAGE PATHOLOGY EVALUATION - SLP ORAL READING ABILITY
Slow oral reading/impaired/sentences/paragraphs Slow oral reading; word error with self correction x 1/10 sentences/impaired/sentences

## 2022-10-27 NOTE — SPEECH LANGUAGE PATHOLOGY EVALUATION - SLP CONVERSATIONAL SPEECH
Pt engaged in conversation with some awareness of reason for admission and c/o listeners on telephone reporting difficulty understanding her speech x 1 month; "when I talk they don't understand".  Pt reports previous profession is a .

## 2022-10-27 NOTE — SPEECH LANGUAGE PATHOLOGY EVALUATION - COMMENTS
IMAGING:   CT Neck 10/25/22:  IMPRESSION: Redemonstration of a similar-appearing 3.5 x 2.1 x 4.3 cm (AP x TRV x CC) low-density left-sided thyroid nodule. Correlate with prior biopsy results, if available. Serial ultrasound follow-up is recommended. No cervical lymphadenopathy.    CT Head 10/23/22:   IMPRESSION:  CT HEAD: No acute intra-cranial hemorrhage, mass effect, or midline shift.  CTA BRAIN/NECK: Bilateral calcified plaque with significant stenoses,   right worse than left. Left thyroid lobe hypodense lesion. Recommend thyroid ultrasound for further evaluation.    Pt is unknown to this service. DNT Pt presents with both top and bottom dentures worn during evaluation. OME reveals reduced lingual strength and coordination. Comprehension at paragraph level with complex material is impaired; 4/6 Therapy post d/c to improve speech intelligibility and phonation/improve cognitive linguistic skills post d/c; SLP to f/u on inpt basis one time per week, Comprehension at paragraph level with complex material is impaired; 4/6; decreased sustained attention may be contributing factor.  Pt id objects within environment without difficulty. Pt evident difficulty with convergent naming task; ie naming semantically related items.  Pt named actions within multi scene picture with minimal cueing and id objects within picture. Impaired organization with clock drawing and decreased sequencing skills.

## 2022-10-28 LAB
GLUCOSE BLDC GLUCOMTR-MCNC: 201 MG/DL — HIGH (ref 70–99)
GLUCOSE BLDC GLUCOMTR-MCNC: 222 MG/DL — HIGH (ref 70–99)
GLUCOSE BLDC GLUCOMTR-MCNC: 256 MG/DL — HIGH (ref 70–99)
GLUCOSE BLDC GLUCOMTR-MCNC: 289 MG/DL — HIGH (ref 70–99)

## 2022-10-28 PROCEDURE — 99233 SBSQ HOSP IP/OBS HIGH 50: CPT

## 2022-10-28 PROCEDURE — 93010 ELECTROCARDIOGRAM REPORT: CPT

## 2022-10-28 RX ORDER — POLYETHYLENE GLYCOL 3350 17 G/17G
17 POWDER, FOR SOLUTION ORAL DAILY
Refills: 0 | Status: DISCONTINUED | OUTPATIENT
Start: 2022-10-28 | End: 2022-10-31

## 2022-10-28 RX ORDER — SENNA PLUS 8.6 MG/1
2 TABLET ORAL AT BEDTIME
Refills: 0 | Status: DISCONTINUED | OUTPATIENT
Start: 2022-10-28 | End: 2022-10-31

## 2022-10-28 RX ORDER — LABETALOL HCL 100 MG
10 TABLET ORAL ONCE
Refills: 0 | Status: COMPLETED | OUTPATIENT
Start: 2022-10-28 | End: 2022-10-28

## 2022-10-28 RX ADMIN — CLOPIDOGREL BISULFATE 75 MILLIGRAM(S): 75 TABLET, FILM COATED ORAL at 12:18

## 2022-10-28 RX ADMIN — Medication 81 MILLIGRAM(S): at 12:17

## 2022-10-28 RX ADMIN — Medication 1: at 22:06

## 2022-10-28 RX ADMIN — CARBIDOPA AND LEVODOPA 1 TABLET(S): 25; 100 TABLET ORAL at 13:06

## 2022-10-28 RX ADMIN — Medication 1 DROP(S): at 12:58

## 2022-10-28 RX ADMIN — Medication 1 APPLICATION(S): at 22:08

## 2022-10-28 RX ADMIN — Medication 100 MILLIGRAM(S): at 09:02

## 2022-10-28 RX ADMIN — CARBIDOPA AND LEVODOPA 1 TABLET(S): 25; 100 TABLET ORAL at 09:01

## 2022-10-28 RX ADMIN — Medication 3: at 12:52

## 2022-10-28 RX ADMIN — Medication 10 MILLIGRAM(S): at 01:25

## 2022-10-28 RX ADMIN — GABAPENTIN 100 MILLIGRAM(S): 400 CAPSULE ORAL at 09:02

## 2022-10-28 RX ADMIN — Medication 3 MILLIGRAM(S): at 22:06

## 2022-10-28 RX ADMIN — CARBIDOPA AND LEVODOPA 1 TABLET(S): 25; 100 TABLET ORAL at 22:06

## 2022-10-28 RX ADMIN — SENNA PLUS 2 TABLET(S): 8.6 TABLET ORAL at 22:06

## 2022-10-28 RX ADMIN — Medication 1 TABLET(S): at 12:18

## 2022-10-28 RX ADMIN — Medication 2: at 08:48

## 2022-10-28 RX ADMIN — Medication 2: at 17:17

## 2022-10-28 RX ADMIN — GABAPENTIN 100 MILLIGRAM(S): 400 CAPSULE ORAL at 13:05

## 2022-10-28 RX ADMIN — Medication 1 DROP(S): at 17:05

## 2022-10-28 RX ADMIN — Medication 1 DROP(S): at 09:50

## 2022-10-28 RX ADMIN — QUETIAPINE FUMARATE 25 MILLIGRAM(S): 200 TABLET, FILM COATED ORAL at 22:06

## 2022-10-28 RX ADMIN — Medication 60 MILLIGRAM(S): at 09:02

## 2022-10-28 RX ADMIN — ENOXAPARIN SODIUM 40 MILLIGRAM(S): 100 INJECTION SUBCUTANEOUS at 09:03

## 2022-10-28 RX ADMIN — GABAPENTIN 100 MILLIGRAM(S): 400 CAPSULE ORAL at 22:06

## 2022-10-28 RX ADMIN — Medication 1 DROP(S): at 00:30

## 2022-10-28 RX ADMIN — LOSARTAN POTASSIUM 50 MILLIGRAM(S): 100 TABLET, FILM COATED ORAL at 09:02

## 2022-10-28 NOTE — PROVIDER CONTACT NOTE (OTHER) - ASSESSMENT
Tried to wake patient for vitals and AM medications-patient became agitated and refusing to be touched or take anything.
VS: /95, HR 94, 98.4 temp, 18 RR, 96% spo2 room air. Patient asymptomatic. Pt refused manual recheck BP
RN performing hourly rounding and noted IV on table by bedside and wrist bands off of patient. Patient refuses to put wrist bands on and very agitated-using force. Pt a&ox1, reorientation given. Patient also appeared in hallway attempting to leave unit using force against all assistance.

## 2022-10-28 NOTE — PROVIDER CONTACT NOTE (OTHER) - ACTION/TREATMENT ORDERED:
Ativan one time dose ordered. Will continue to monitor patient.
Re attempt vitals and AM medications later in AM when patient calm.
One time dose labetolol ordered and administered IV push. Will continue to monitor patient.

## 2022-10-28 NOTE — PROVIDER CONTACT NOTE (OTHER) - RECOMMENDATIONS
Continue to monitor patient and recheck BP.
One time dose ativan IM. Continue to monitor
Pass along to day shift nurse, attempt later

## 2022-10-28 NOTE — PROVIDER CONTACT NOTE (OTHER) - BACKGROUND
Pt admitted for a cute cognitive decline, stenosis of right ICA, gait instability, and cystic mass in the neck.
Patient admitted for unsteadiness on feet; diagnosed with acute cognitive decline, stenosis of right ICA, gait instability and cystic mass in neck.
Patient diagnosed with acute cognitive decline, stenosis of right ICA, gait instability

## 2022-10-29 LAB
GLUCOSE BLDC GLUCOMTR-MCNC: 185 MG/DL — HIGH (ref 70–99)
GLUCOSE BLDC GLUCOMTR-MCNC: 191 MG/DL — HIGH (ref 70–99)
GLUCOSE BLDC GLUCOMTR-MCNC: 238 MG/DL — HIGH (ref 70–99)
GLUCOSE BLDC GLUCOMTR-MCNC: 255 MG/DL — HIGH (ref 70–99)

## 2022-10-29 PROCEDURE — 93010 ELECTROCARDIOGRAM REPORT: CPT

## 2022-10-29 PROCEDURE — 99232 SBSQ HOSP IP/OBS MODERATE 35: CPT

## 2022-10-29 RX ADMIN — Medication 100 MILLIGRAM(S): at 05:20

## 2022-10-29 RX ADMIN — GABAPENTIN 100 MILLIGRAM(S): 400 CAPSULE ORAL at 14:04

## 2022-10-29 RX ADMIN — Medication 81 MILLIGRAM(S): at 12:01

## 2022-10-29 RX ADMIN — Medication 2: at 12:33

## 2022-10-29 RX ADMIN — GABAPENTIN 100 MILLIGRAM(S): 400 CAPSULE ORAL at 21:08

## 2022-10-29 RX ADMIN — CARBIDOPA AND LEVODOPA 1 TABLET(S): 25; 100 TABLET ORAL at 05:20

## 2022-10-29 RX ADMIN — Medication 1: at 22:03

## 2022-10-29 RX ADMIN — Medication 1 DROP(S): at 17:23

## 2022-10-29 RX ADMIN — Medication 1 TABLET(S): at 12:01

## 2022-10-29 RX ADMIN — GABAPENTIN 100 MILLIGRAM(S): 400 CAPSULE ORAL at 05:20

## 2022-10-29 RX ADMIN — Medication 1: at 17:22

## 2022-10-29 RX ADMIN — POLYETHYLENE GLYCOL 3350 17 GRAM(S): 17 POWDER, FOR SOLUTION ORAL at 12:02

## 2022-10-29 RX ADMIN — Medication 3 MILLIGRAM(S): at 21:08

## 2022-10-29 RX ADMIN — LOSARTAN POTASSIUM 50 MILLIGRAM(S): 100 TABLET, FILM COATED ORAL at 05:20

## 2022-10-29 RX ADMIN — SENNA PLUS 2 TABLET(S): 8.6 TABLET ORAL at 21:09

## 2022-10-29 RX ADMIN — CARBIDOPA AND LEVODOPA 1 TABLET(S): 25; 100 TABLET ORAL at 14:03

## 2022-10-29 RX ADMIN — Medication 1 DROP(S): at 12:03

## 2022-10-29 RX ADMIN — Medication 60 MILLIGRAM(S): at 05:20

## 2022-10-29 RX ADMIN — Medication 1 APPLICATION(S): at 21:08

## 2022-10-29 RX ADMIN — CLOPIDOGREL BISULFATE 75 MILLIGRAM(S): 75 TABLET, FILM COATED ORAL at 12:02

## 2022-10-29 RX ADMIN — ENOXAPARIN SODIUM 40 MILLIGRAM(S): 100 INJECTION SUBCUTANEOUS at 05:29

## 2022-10-29 RX ADMIN — CARBIDOPA AND LEVODOPA 1 TABLET(S): 25; 100 TABLET ORAL at 21:08

## 2022-10-29 RX ADMIN — Medication 1: at 08:50

## 2022-10-29 RX ADMIN — QUETIAPINE FUMARATE 25 MILLIGRAM(S): 200 TABLET, FILM COATED ORAL at 21:08

## 2022-10-30 ENCOUNTER — TRANSCRIPTION ENCOUNTER (OUTPATIENT)
Age: 62
End: 2022-10-30

## 2022-10-30 LAB
GLUCOSE BLDC GLUCOMTR-MCNC: 183 MG/DL — HIGH (ref 70–99)
GLUCOSE BLDC GLUCOMTR-MCNC: 198 MG/DL — HIGH (ref 70–99)
GLUCOSE BLDC GLUCOMTR-MCNC: 215 MG/DL — HIGH (ref 70–99)
GLUCOSE BLDC GLUCOMTR-MCNC: 227 MG/DL — HIGH (ref 70–99)
SARS-COV-2 RNA SPEC QL NAA+PROBE: SIGNIFICANT CHANGE UP

## 2022-10-30 PROCEDURE — 93010 ELECTROCARDIOGRAM REPORT: CPT

## 2022-10-30 PROCEDURE — 99232 SBSQ HOSP IP/OBS MODERATE 35: CPT

## 2022-10-30 RX ADMIN — LOSARTAN POTASSIUM 50 MILLIGRAM(S): 100 TABLET, FILM COATED ORAL at 05:41

## 2022-10-30 RX ADMIN — GABAPENTIN 100 MILLIGRAM(S): 400 CAPSULE ORAL at 05:40

## 2022-10-30 RX ADMIN — Medication 1 DROP(S): at 23:23

## 2022-10-30 RX ADMIN — Medication 2: at 17:59

## 2022-10-30 RX ADMIN — Medication 100 MILLIGRAM(S): at 05:41

## 2022-10-30 RX ADMIN — Medication 1: at 09:23

## 2022-10-30 RX ADMIN — Medication 60 MILLIGRAM(S): at 05:41

## 2022-10-30 RX ADMIN — Medication 1 DROP(S): at 05:42

## 2022-10-30 RX ADMIN — CARBIDOPA AND LEVODOPA 1 TABLET(S): 25; 100 TABLET ORAL at 14:59

## 2022-10-30 RX ADMIN — Medication 1 TABLET(S): at 12:00

## 2022-10-30 RX ADMIN — GABAPENTIN 100 MILLIGRAM(S): 400 CAPSULE ORAL at 21:49

## 2022-10-30 RX ADMIN — Medication 81 MILLIGRAM(S): at 11:59

## 2022-10-30 RX ADMIN — Medication 1 DROP(S): at 13:26

## 2022-10-30 RX ADMIN — SENNA PLUS 2 TABLET(S): 8.6 TABLET ORAL at 21:51

## 2022-10-30 RX ADMIN — Medication 1: at 13:01

## 2022-10-30 RX ADMIN — GABAPENTIN 100 MILLIGRAM(S): 400 CAPSULE ORAL at 14:59

## 2022-10-30 RX ADMIN — CARBIDOPA AND LEVODOPA 1 TABLET(S): 25; 100 TABLET ORAL at 21:49

## 2022-10-30 RX ADMIN — Medication 3 MILLIGRAM(S): at 21:50

## 2022-10-30 RX ADMIN — CARBIDOPA AND LEVODOPA 1 TABLET(S): 25; 100 TABLET ORAL at 05:42

## 2022-10-30 RX ADMIN — QUETIAPINE FUMARATE 25 MILLIGRAM(S): 200 TABLET, FILM COATED ORAL at 21:50

## 2022-10-30 RX ADMIN — ENOXAPARIN SODIUM 40 MILLIGRAM(S): 100 INJECTION SUBCUTANEOUS at 05:40

## 2022-10-30 RX ADMIN — POLYETHYLENE GLYCOL 3350 17 GRAM(S): 17 POWDER, FOR SOLUTION ORAL at 12:00

## 2022-10-30 RX ADMIN — Medication 1 DROP(S): at 18:02

## 2022-10-30 RX ADMIN — CLOPIDOGREL BISULFATE 75 MILLIGRAM(S): 75 TABLET, FILM COATED ORAL at 11:59

## 2022-10-30 RX ADMIN — Medication 1 APPLICATION(S): at 21:51

## 2022-10-30 NOTE — DISCHARGE NOTE PROVIDER - PROVIDER TOKENS
PROVIDER:[TOKEN:[[id number]],FOLLOWUP:[1 week]] PROVIDER:[TOKEN:[[id number]],FOLLOWUP:[1 week]],FREE:[LAST:[Neurology],PHONE:[phone]],FAX:[(   )    -],ADDRESS:[[address] [place] [address]. [address].],FOLLOWUP:[1 week]],PROVIDER:[TOKEN:[[id number]],FOLLOWUP:[2 weeks]] PROVIDER:[TOKEN:[[id number]],FOLLOWUP:[1 week]],PROVIDER:[TOKEN:[[id number]],FOLLOWUP:[2 weeks]],FREE:[LAST:[Neurology],PHONE:[phone]],FAX:[(   )    -],ADDRESS:[[address] [place] [address].],FOLLOWUP:[1 week]],PROVIDER:[TOKEN:[[id number]],FOLLOWUP:[1 week]]

## 2022-10-30 NOTE — DISCHARGE NOTE PROVIDER - NSDCMRMEDTOKEN_GEN_ALL_CORE_FT
Aspirin Enteric Coated 81 mg oral delayed release tablet: 1 tab(s) orally once a day  gabapentin enacarbil 300 mg oral tablet, extended release: 1 tab(s) orally 2 times a day  glyburide-metformin 2.5 mg-500 mg oral tablet: 1 tab(s) orally 2 times a day  irbesartan-hydrochlorothiazide 150 mg-12.5 mg oral tablet: 1 tab(s) orally once a day  Lipitor 10 mg oral tablet: 1 tab(s) orally once a day  metoprolol succinate 100 mg oral tablet, extended release: 1 tab(s) orally 2 times a day  NIFEdipine 60 mg oral tablet, extended release: 1 tab(s) orally once a day  Pepcid 20 mg oral tablet: 1 tab(s) orally once a day  Plavix 75 mg oral tablet: 1 tab(s) orally once a day  Sinemet 25 mg-100 mg oral tablet: 1 tab(s) orally 2 times a day   Aspirin Enteric Coated 81 mg oral delayed release tablet: 1 tab(s) orally once a day  carbidopa-levodopa 10 mg-100 mg oral tablet: 1 tab(s) orally 3 times a day  gabapentin 100 mg oral capsule: 1 cap(s) orally 3 times a day  glyburide-metformin 2.5 mg-500 mg oral tablet: 1 tab(s) orally 2 times a day  irbesartan-hydrochlorothiazide 150 mg-12.5 mg oral tablet: 1 tab(s) orally once a day  Lipitor 10 mg oral tablet: 1 tab(s) orally once a day  losartan 50 mg oral tablet: 1 tab(s) orally once a day  melatonin 3 mg oral tablet: 1 tab(s) orally once a day (at bedtime)  metoprolol succinate 100 mg oral tablet, extended release: 1 tab(s) orally once a day  Multiple Vitamins oral tablet: 1 tab(s) orally once a day  NIFEdipine 60 mg oral tablet, extended release: 1 tab(s) orally once a day  ocular lubricant ophthalmic ointment: 1 application to each affected eye once a day (at bedtime)  ocular lubricant ophthalmic solution: 1 drop(s) to each affected eye 4 times a day  Pepcid 20 mg oral tablet: 1 tab(s) orally once a day  Plavix 75 mg oral tablet: 1 tab(s) orally once a day  QUEtiapine 25 mg oral tablet: 1 tab(s) orally once a day (at bedtime)   Aspirin Enteric Coated 81 mg oral delayed release tablet: 1 tab(s) orally once a day  carbidopa-levodopa 10 mg-100 mg oral tablet: 1 tab(s) orally 3 times a day  gabapentin 100 mg oral capsule: 1 cap(s) orally 3 times a day  gabapentin 100 mg oral capsule: 1 cap(s) orally 3 times a day  glyburide-metformin 2.5 mg-500 mg oral tablet: 1 tab(s) orally 2 times a day  irbesartan-hydrochlorothiazide 150 mg-12.5 mg oral tablet: 1 tab(s) orally once a day  Lipitor 10 mg oral tablet: 1 tab(s) orally once a day  losartan 50 mg oral tablet: 1 tab(s) orally once a day  losartan 50 mg oral tablet: 1 tab(s) orally once a day  melatonin 3 mg oral tablet: 1 tab(s) orally once a day (at bedtime)  melatonin 3 mg oral tablet: 1 tab(s) orally once a day (at bedtime)  metoprolol succinate 100 mg oral tablet, extended release: 1 tab(s) orally once a day  metoprolol succinate 100 mg oral tablet, extended release: 1 tab(s) orally once a day  Multiple Vitamins oral tablet: 1 tab(s) orally once a day  Multiple Vitamins oral tablet: 1 tab(s) orally once a day  NIFEdipine 60 mg oral tablet, extended release: 1 tab(s) orally once a day  ocular lubricant ophthalmic ointment: 1 application to each affected eye once a day (at bedtime)  ocular lubricant ophthalmic ointment: 1 application to each affected eye once a day (at bedtime)  ocular lubricant ophthalmic solution: 1 drop(s) to each affected eye 4 times a day  ocular lubricant ophthalmic solution: 1 drop(s) to each affected eye 4 times a day  Pepcid 20 mg oral tablet: 1 tab(s) orally once a day  Plavix 75 mg oral tablet: 1 tab(s) orally once a day  QUEtiapine 25 mg oral tablet: 1 tab(s) orally once a day (at bedtime)  QUEtiapine 25 mg oral tablet: 1 tab(s) orally once a day (at bedtime)

## 2022-10-30 NOTE — DISCHARGE NOTE PROVIDER - NSFOLLOWUPCLINICS_GEN_ALL_ED_FT
[facility]  Psychiatry  [address] [place] [address]  Phone: [phone]  Fax:   Follow Up Time: Routine

## 2022-10-30 NOTE — DISCHARGE NOTE PROVIDER - CARE PROVIDER_API CALL
Edelmira Doll (MD)  Ophthalmology  [address] [place] [address] [address]  [place] [address]  Phone: [phone]  Fax: [phone]  Follow Up Time: 1 week   Edelmira Doll)  Ophthalmology  [address] [facility], [address]  [place] [address]  Phone: [phone]  Fax: [phone]  Follow Up Time: 1 week    Neurology,   [address] [facility], [address], [place]. [address].  Phone: [phone]  Fax: (   )    -  Follow Up Time: 1 week    Negro Ramirez)  Vascular Surgery  [address] [facility], [address]  [place] [address]  Phone: [phone]  Fax: [phone]  Follow Up Time: 2 weeks   Edelmira Doll)  Ophthalmology  [address] [facility] [address]  [place] [address]  Phone: [phone]  Fax: [phone]  Follow Up Time: 1 week    Negro Ramirez)  Vascular Surgery  [address] [facility], [address]  [place] [address]  Phone: [phone]  Fax: [phone]  Follow Up Time: 2 weeks    Neurology,   [address] [facility], [address], [place]. [address].  Phone: [phone]  Fax: (   )    -  Follow Up Time: 1 week    Pritesh Lloyd  OPHTHALMOLOGY  [address] [place] [address]  Phone: [phone]  Fax: [phone]  Follow Up Time: 1 week

## 2022-10-30 NOTE — DISCHARGE NOTE PROVIDER - HOSPITAL COURSE
62yoF w/ PMHx of HTN, DM2, CAD s/p recent 2 stents (@ [facility] "couple weeks ago"), left eye blurry vision since cataract surgery, pw 1 month of progressive unsteady gait, slurred speech, memory loss, lethargy concerning for dementia       Problem/Plan - 1:  ·  Problem: Acute cognitive decline.   ·  Plan: - likely vascular dementia given mild chronic white matter microvascular type changes seen on brain MRI   - CT head negative for acute infarct   - CT neck with Bilateral calcified plaque with significant stenoses, right worse than left.   - Imaging shows Left thyroid lobe hypodense lesion.  - MRI brain without bleed or infarct  - TSH, T3/T4, RPR, vitamin B12, lactate, creatnine kinase, ceruloplasmin, ESR, CRP wnl  - will cont pt's sinemet for now, but pt likely does not have PD  - monitor for fever/infection off antibiotics  - elevated lactate on AM of 10/24, unclear etiology - resolved  - management of ICA stenosis as below   - pt needs outpt neuro/tomasa follow up for workup of dementia  - outpt thyroid sono for workup of thyroid nodule  - Neurology following.     Problem/Plan - 2:  ·  Problem: Agitation.   ·  Plan: - no agitation reported overnight   - calm and cooperative this AM  - being managed for dementia with behavioral disturbances  - c/w Seroquel 25mg po qhs  - QTc 439 today 10/29 ; c/t monitor with serial EKGs.     Problem/Plan - 3:  ·  Problem: Gait instability.   ·  Plan: - PT reccs home  - fall precautions.     Problem/Plan - 4:  ·  Problem: Stenosis of right internal carotid artery.   Finding: carotid duplex w stenosis rt ica  approx 50-60% and Lt ICA less 50% by ryne criteria  ·  Plan: - no vascular surgical intervention for MARCO ANTONIO stenosis   - c/w asprin and plavix.     Problem/Plan - 5:  ·  Problem: DM2 (diabetes mellitus, type 2).   ·  Plan: - ISS w/ serial FS monitoring.     Problem/Plan - 6:  ·  Problem: HTN (hypertension).   ·  Plan: - c/w Toprol  qd   - c/t hold hctz  - cont nifedipine, and arb equivalent.     Problem/Plan - 7:  ·  Problem: CAD (coronary artery disease).   ·  Plan: - cont asa, plavix, lipitor  - per pt she had a stent placed 5-6 years ago, and another 1 placed 3 months ago by Dr. Genia Nazario      Problem/Plam - 8:  blurry vision, found to have dry eyes and need for reading glasses  - ophthal appreciated.   - Vision intact, no sign of optic nerve dysfunction   - Vision improved with +2.50 reading glasses  - No upgaze motility issue, patient is able to look up with effort  - Anterior segment exam with decreased tear breakup time and trace punctate epithelial erosions in both eyes  - Posterior segment exam unremarkable  - Start artificial tears QID and lacrilube ointment QHS OU  - Patient should follow-up with his/her ophthalmologist or with [facility] Department of Ophthalmology at the address below within 1 week of discharge, sooner if symptoms worsen or change  [address] [facility]. [address]  [place] [address]  [phone]    Patient is doing well on Seroquel, medically stable and cleared for discharge as per .

## 2022-10-30 NOTE — DISCHARGE NOTE PROVIDER - NSDCCPCAREPLAN_GEN_ALL_CORE_FT
PRINCIPAL DISCHARGE DIAGNOSIS  Diagnosis: Unsteady gait  Assessment and Plan of Treatment: ·  Plan: - PT reccs home  - fall precautions.      SECONDARY DISCHARGE DIAGNOSES  Diagnosis: Stenosis of right internal carotid artery  Assessment and Plan of Treatment: Finding: carotid duplex w stenosis rt ica  approx 50-60% and Lt ICA less 50% by ryne criteria  ·  Plan: - no vascular surgical intervention for MARCO ANTONIO stenosis   - c/w asprin and plavix.    Diagnosis: Acute cognitive decline  Assessment and Plan of Treatment: ·  Plan: - likely vascular dementia given mild chronic white matter microvascular type changes seen on brain MRI   - CT head negative for acute infarct   - CT neck with Bilateral calcified plaque with significant stenoses, right worse than left.   - Imaging shows Left thyroid lobe hypodense lesion.  - MRI brain without bleed or infarct  - TSH, T3/T4, RPR, vitamin B12, lactate, creatnine kinase, ceruloplasmin, ESR, CRP wnl  - will cont pt's sinemet for now, but pt likely does not have PD  - monitor for fever/infection off antibiotics  - elevated lactate on AM of 10/24, unclear etiology - resolved  - management of ICA stenosis as below   - pt needs outpt neuro/tomasa follow up for workup of dementia  - outpt thyroid sono for workup of thyroid nodule  - Neurology following.    Diagnosis: HTN (hypertension)  Assessment and Plan of Treatment: ·  Plan: - c/w Toprol  qd   - c/t hold hctz  - cont nifedipine, and arb equivalent.    Diagnosis: Agitation  Assessment and Plan of Treatment: ·  Plan: - no agitation reported overnight   - calm and cooperative this AM  - being managed for dementia with behavioral disturbances  - c/w Seroquel 25mg po qhs  - QTc 439 today 10/29 ; c/t monitor with serial EKGs.    Diagnosis: Dry eyes  Assessment and Plan of Treatment: - ophthal appreciated.   - Vision intact, no sign of optic nerve dysfunction   - Vision improved with +2.50 reading glasses  - No upgaze motility issue, patient is able to look up with effort  - Anterior segment exam with decreased tear breakup time and trace punctate epithelial erosions in both eyes  - Posterior segment exam unremarkable  - Start artificial tears QID and lacrilube ointment QHS OU  - You should follow-up with your ophthalmologist or with [facility] Department of Ophthalmology at the address below within 1 week of discharge, sooner if symptoms worsen or change  [address]  [place] [address]  [phone]     PRINCIPAL DISCHARGE DIAGNOSIS  Diagnosis: Unsteady gait  Assessment and Plan of Treatment: ·  Plan: - PT reccs home  - fall precautions.      SECONDARY DISCHARGE DIAGNOSES  Diagnosis: Stenosis of right internal carotid artery  Assessment and Plan of Treatment: Finding: carotid duplex w stenosis rt ica  approx 50-60% and Lt ICA less 50% by ryne criteria  ·  Plan: - no vascular surgical intervention for MARCO ANTONIO stenosis   - c/w asprin and plavix.    Diagnosis: Acute cognitive decline  Assessment and Plan of Treatment: ·  Plan: - likely vascular dementia given mild chronic white matter microvascular type changes seen on brain MRI   - CT head negative for acute infarct   - CT neck with Bilateral calcified plaque with significant stenoses, right worse than left.   - Imaging shows Left thyroid lobe hypodense lesion.  - MRI brain without bleed or infarct  - TSH, T3/T4, RPR, vitamin B12, lactate, creatnine kinase, ceruloplasmin, ESR, CRP wnl  - will cont pt's sinemet for now, but pt likely does not have PD  - monitor for fever/infection off antibiotics  - elevated lactate on AM of 10/24, unclear etiology - resolved  - management of ICA stenosis as below   - pt needs outpt neuro/tomasa follow up for workup of dementia  - outpt thyroid sono for workup of thyroid nodule      Diagnosis: HTN (hypertension)  Assessment and Plan of Treatment: ·  Plan: - c/w Toprol  qd   - c/t hold hctz  - cont nifedipine, and arb equivalent.    Diagnosis: Agitation  Assessment and Plan of Treatment: ·  Plan: - no agitation reported overnight   - calm and cooperative this AM  - being managed for dementia with behavioral disturbances  - c/w Seroquel 25mg po qhs    Diagnosis: Dry eyes  Assessment and Plan of Treatment: - ophthal appreciated.   - Vision intact, no sign of optic nerve dysfunction   - Vision improved with +2.50 reading glasses  - No upgaze motility issue, patient is able to look up with effort  - Anterior segment exam with decreased tear breakup time and trace punctate epithelial erosions in both eyes  - Posterior segment exam unremarkable  - Start artificial tears QID and lacrilube ointment QHS OU  - You should follow-up with your ophthalmologist or with [facility] Department of Ophthalmology at the address below within 1 week of discharge, sooner if symptoms worsen or change  [address]  [place] [address]  [phone]     PRINCIPAL DISCHARGE DIAGNOSIS  Diagnosis: Unsteady gait  Assessment and Plan of Treatment: ·  Plan: - PT reccs home  - fall precautions.      SECONDARY DISCHARGE DIAGNOSES  Diagnosis: Stenosis of right internal carotid artery  Assessment and Plan of Treatment: Finding: carotid duplex w stenosis rt ica  approx 50-60% and Lt ICA less 50% by ryne criteria  ·  Plan: - no vascular surgical intervention for MARCO ANTONIO stenosis   - c/w asprin and plavix.  Please follow up with neurology as outpatient    Diagnosis: Acute cognitive decline  Assessment and Plan of Treatment: ·  Plan: - likely vascular dementia given mild chronic white matter microvascular type changes seen on brain MRI   - CT head negative for acute infarct   - CT neck with Bilateral calcified plaque with significant stenoses, right worse than left.   - Imaging shows Left thyroid lobe hypodense lesion.  - MRI brain without bleed or infarct  - TSH, T3/T4, RPR, vitamin B12, lactate, creatnine kinase, ceruloplasmin, ESR, CRP wnl  - elevated lactate on AM of 10/24, unclear etiology - resolved  - pt needs outpt neuro/tomasa follow up for workup of dementia  - outpt thyroid sono for workup of thyroid nodule      Diagnosis: HTN (hypertension)  Assessment and Plan of Treatment: ·  Plan: please continue with BP meds as prescribed    Diagnosis: Agitation  Assessment and Plan of Treatment: ·  Plan: - no agitation reported overnight   - calm and cooperative this AM  - being managed for dementia with behavioral disturbances  - c/w Seroquel 25mg po qhs    Diagnosis: Dry eyes  Assessment and Plan of Treatment: - ophthal appreciated.   - Vision intact, no sign of optic nerve dysfunction   - Vision improved with +2.50 reading glasses  - No upgaze motility issue, patient is able to look up with effort  - Anterior segment exam with decreased tear breakup time and trace punctate epithelial erosions in both eyes  - Posterior segment exam unremarkable  - Start artificial tears QID and lacrilube ointment QHS OU  - You should follow-up with your ophthalmologist or with [facility] Department of Ophthalmology at the address below within 1 week of discharge, sooner if symptoms worsen or change  [address]  [place] [address]  [phone]

## 2022-10-30 NOTE — DISCHARGE NOTE PROVIDER - CARE PROVIDERS DIRECT ADDRESSES
,[email or web address] ,[email or web address] ,[email or web address],DirectAddress_Unknown,DirectAddress_Unknown

## 2022-10-31 ENCOUNTER — TRANSCRIPTION ENCOUNTER (OUTPATIENT)
Age: 62
End: 2022-10-31

## 2022-10-31 VITALS
RESPIRATION RATE: 18 BRPM | HEART RATE: 82 BPM | DIASTOLIC BLOOD PRESSURE: 80 MMHG | OXYGEN SATURATION: 100 % | SYSTOLIC BLOOD PRESSURE: 140 MMHG

## 2022-10-31 LAB
GLUCOSE BLDC GLUCOMTR-MCNC: 233 MG/DL — HIGH (ref 70–99)
GLUCOSE BLDC GLUCOMTR-MCNC: 261 MG/DL — HIGH (ref 70–99)

## 2022-10-31 PROCEDURE — 84439 ASSAY OF FREE THYROXINE: CPT

## 2022-10-31 PROCEDURE — 84295 ASSAY OF SERUM SODIUM: CPT

## 2022-10-31 PROCEDURE — 85014 HEMATOCRIT: CPT

## 2022-10-31 PROCEDURE — 70498 CT ANGIOGRAPHY NECK: CPT | Mod: MA

## 2022-10-31 PROCEDURE — 97116 GAIT TRAINING THERAPY: CPT

## 2022-10-31 PROCEDURE — 80053 COMPREHEN METABOLIC PANEL: CPT

## 2022-10-31 PROCEDURE — 84484 ASSAY OF TROPONIN QUANT: CPT

## 2022-10-31 PROCEDURE — 84481 FREE ASSAY (FT-3): CPT

## 2022-10-31 PROCEDURE — 85027 COMPLETE CBC AUTOMATED: CPT

## 2022-10-31 PROCEDURE — 86780 TREPONEMA PALLIDUM: CPT

## 2022-10-31 PROCEDURE — 99285 EMERGENCY DEPT VISIT HI MDM: CPT

## 2022-10-31 PROCEDURE — 86140 C-REACTIVE PROTEIN: CPT

## 2022-10-31 PROCEDURE — 97530 THERAPEUTIC ACTIVITIES: CPT

## 2022-10-31 PROCEDURE — 85018 HEMOGLOBIN: CPT

## 2022-10-31 PROCEDURE — 92523 SPEECH SOUND LANG COMPREHEN: CPT

## 2022-10-31 PROCEDURE — 82435 ASSAY OF BLOOD CHLORIDE: CPT

## 2022-10-31 PROCEDURE — 83090 ASSAY OF HOMOCYSTEINE: CPT

## 2022-10-31 PROCEDURE — 82390 ASSAY OF CERULOPLASMIN: CPT

## 2022-10-31 PROCEDURE — 84100 ASSAY OF PHOSPHORUS: CPT

## 2022-10-31 PROCEDURE — 85652 RBC SED RATE AUTOMATED: CPT

## 2022-10-31 PROCEDURE — 93005 ELECTROCARDIOGRAM TRACING: CPT

## 2022-10-31 PROCEDURE — 97165 OT EVAL LOW COMPLEX 30 MIN: CPT

## 2022-10-31 PROCEDURE — 71045 X-RAY EXAM CHEST 1 VIEW: CPT

## 2022-10-31 PROCEDURE — 83880 ASSAY OF NATRIURETIC PEPTIDE: CPT

## 2022-10-31 PROCEDURE — 70551 MRI BRAIN STEM W/O DYE: CPT

## 2022-10-31 PROCEDURE — 97162 PT EVAL MOD COMPLEX 30 MIN: CPT

## 2022-10-31 PROCEDURE — 84443 ASSAY THYROID STIM HORMONE: CPT

## 2022-10-31 PROCEDURE — U0003: CPT

## 2022-10-31 PROCEDURE — 82607 VITAMIN B-12: CPT

## 2022-10-31 PROCEDURE — 82330 ASSAY OF CALCIUM: CPT

## 2022-10-31 PROCEDURE — 83735 ASSAY OF MAGNESIUM: CPT

## 2022-10-31 PROCEDURE — 87086 URINE CULTURE/COLONY COUNT: CPT

## 2022-10-31 PROCEDURE — 82803 BLOOD GASES ANY COMBINATION: CPT

## 2022-10-31 PROCEDURE — 99239 HOSP IP/OBS DSCHRG MGMT >30: CPT

## 2022-10-31 PROCEDURE — 83036 HEMOGLOBIN GLYCOSYLATED A1C: CPT

## 2022-10-31 PROCEDURE — 82947 ASSAY GLUCOSE BLOOD QUANT: CPT

## 2022-10-31 PROCEDURE — 83605 ASSAY OF LACTIC ACID: CPT

## 2022-10-31 PROCEDURE — 83615 LACTATE (LD) (LDH) ENZYME: CPT

## 2022-10-31 PROCEDURE — 80048 BASIC METABOLIC PNL TOTAL CA: CPT

## 2022-10-31 PROCEDURE — 93880 EXTRACRANIAL BILAT STUDY: CPT

## 2022-10-31 PROCEDURE — 0225U NFCT DS DNA&RNA 21 SARSCOV2: CPT

## 2022-10-31 PROCEDURE — 81003 URINALYSIS AUTO W/O SCOPE: CPT

## 2022-10-31 PROCEDURE — 85025 COMPLETE CBC W/AUTO DIFF WBC: CPT

## 2022-10-31 PROCEDURE — 86803 HEPATITIS C AB TEST: CPT

## 2022-10-31 PROCEDURE — 70496 CT ANGIOGRAPHY HEAD: CPT | Mod: MA

## 2022-10-31 PROCEDURE — 84132 ASSAY OF SERUM POTASSIUM: CPT

## 2022-10-31 PROCEDURE — 36415 COLL VENOUS BLD VENIPUNCTURE: CPT

## 2022-10-31 PROCEDURE — 82962 GLUCOSE BLOOD TEST: CPT

## 2022-10-31 PROCEDURE — 82550 ASSAY OF CK (CPK): CPT

## 2022-10-31 PROCEDURE — U0005: CPT

## 2022-10-31 PROCEDURE — 70491 CT SOFT TISSUE NECK W/DYE: CPT

## 2022-10-31 RX ORDER — QUETIAPINE FUMARATE 200 MG/1
1 TABLET, FILM COATED ORAL
Qty: 0 | Refills: 0 | DISCHARGE
Start: 2022-10-31

## 2022-10-31 RX ORDER — LOSARTAN POTASSIUM 100 MG/1
1 TABLET, FILM COATED ORAL
Qty: 30 | Refills: 0
Start: 2022-10-31 | End: 2022-11-29

## 2022-10-31 RX ORDER — LANOLIN ALCOHOL/MO/W.PET/CERES
1 CREAM (GRAM) TOPICAL
Qty: 30 | Refills: 0
Start: 2022-10-31 | End: 2022-11-29

## 2022-10-31 RX ORDER — METOPROLOL TARTRATE 50 MG
1 TABLET ORAL
Qty: 0 | Refills: 0 | DISCHARGE

## 2022-10-31 RX ORDER — METOPROLOL TARTRATE 50 MG
1 TABLET ORAL
Qty: 0 | Refills: 0 | DISCHARGE
Start: 2022-10-31

## 2022-10-31 RX ORDER — QUETIAPINE FUMARATE 200 MG/1
1 TABLET, FILM COATED ORAL
Qty: 30 | Refills: 0
Start: 2022-10-31 | End: 2022-11-29

## 2022-10-31 RX ORDER — CARBIDOPA AND LEVODOPA 25; 100 MG/1; MG/1
1 TABLET ORAL
Qty: 0 | Refills: 0 | DISCHARGE

## 2022-10-31 RX ORDER — METOPROLOL TARTRATE 50 MG
1 TABLET ORAL
Qty: 30 | Refills: 0
Start: 2022-10-31 | End: 2022-11-29

## 2022-10-31 RX ORDER — LANOLIN ALCOHOL/MO/W.PET/CERES
1 CREAM (GRAM) TOPICAL
Qty: 0 | Refills: 0 | DISCHARGE
Start: 2022-10-31

## 2022-10-31 RX ORDER — GABAPENTIN 400 MG/1
1 CAPSULE ORAL
Qty: 90 | Refills: 0
Start: 2022-10-31 | End: 2022-11-29

## 2022-10-31 RX ORDER — CARBIDOPA AND LEVODOPA 25; 100 MG/1; MG/1
1 TABLET ORAL
Qty: 0 | Refills: 0 | DISCHARGE
Start: 2022-10-31

## 2022-10-31 RX ORDER — GABAPENTIN 400 MG/1
1 CAPSULE ORAL
Qty: 0 | Refills: 0 | DISCHARGE

## 2022-10-31 RX ORDER — GABAPENTIN 400 MG/1
1 CAPSULE ORAL
Qty: 0 | Refills: 0 | DISCHARGE
Start: 2022-10-31

## 2022-10-31 RX ORDER — LOSARTAN POTASSIUM 100 MG/1
1 TABLET, FILM COATED ORAL
Qty: 0 | Refills: 0 | DISCHARGE
Start: 2022-10-31

## 2022-10-31 RX ADMIN — POLYETHYLENE GLYCOL 3350 17 GRAM(S): 17 POWDER, FOR SOLUTION ORAL at 13:00

## 2022-10-31 RX ADMIN — ENOXAPARIN SODIUM 40 MILLIGRAM(S): 100 INJECTION SUBCUTANEOUS at 05:51

## 2022-10-31 RX ADMIN — Medication 81 MILLIGRAM(S): at 12:59

## 2022-10-31 RX ADMIN — CARBIDOPA AND LEVODOPA 1 TABLET(S): 25; 100 TABLET ORAL at 05:52

## 2022-10-31 RX ADMIN — GABAPENTIN 100 MILLIGRAM(S): 400 CAPSULE ORAL at 14:20

## 2022-10-31 RX ADMIN — Medication 1 TABLET(S): at 13:00

## 2022-10-31 RX ADMIN — Medication 100 MILLIGRAM(S): at 05:52

## 2022-10-31 RX ADMIN — GABAPENTIN 100 MILLIGRAM(S): 400 CAPSULE ORAL at 05:52

## 2022-10-31 RX ADMIN — Medication 60 MILLIGRAM(S): at 05:52

## 2022-10-31 RX ADMIN — CARBIDOPA AND LEVODOPA 1 TABLET(S): 25; 100 TABLET ORAL at 14:21

## 2022-10-31 RX ADMIN — Medication 2: at 08:27

## 2022-10-31 RX ADMIN — CLOPIDOGREL BISULFATE 75 MILLIGRAM(S): 75 TABLET, FILM COATED ORAL at 13:00

## 2022-10-31 RX ADMIN — Medication 1 DROP(S): at 13:01

## 2022-10-31 RX ADMIN — Medication 1 DROP(S): at 05:52

## 2022-10-31 RX ADMIN — LOSARTAN POTASSIUM 50 MILLIGRAM(S): 100 TABLET, FILM COATED ORAL at 05:52

## 2022-10-31 RX ADMIN — Medication 3: at 13:04

## 2022-10-31 NOTE — PROGRESS NOTE ADULT - PROBLEM SELECTOR PLAN 7
- cont asa, plavix, lipitor  - per pt she had a stent placed 5-6 years ago, and another 1 placed 3 months ago by a Dr. Genia Nazario?
- DVT ppx lovenox   - Dispo pending PT eval     D/w daughter 10/26 updated on full plan of care
- cont asa, plavix, lipitor  - per pt she had a stent placed 5-6 years ago, and another 1 placed 3 months ago by a Dr. Genia Nazario?

## 2022-10-31 NOTE — PROGRESS NOTE ADULT - PROBLEM SELECTOR PROBLEM 7
CAD (coronary artery disease)
Prophylactic measure
CAD (coronary artery disease)

## 2022-10-31 NOTE — PROGRESS NOTE ADULT - PROBLEM SELECTOR PLAN 6
- c/w Toprol  qd   - c/t hold hctz  - cont nifedipine, and arb equivalent
- cont asa, plavix, lipitor  - per pt she had a stent placed 5-6 years ago, and another 1 placed 3 months ago by a Dr. Genia Nazario?
- c/w Toprol  qd   - c/t hold hctz  - cont nifedipine, and arb equivalent
- will reduce metoprolol to 100 qd and hold hctz  - cont nifedipine, and arb equivalent
- cont asa, plavix, lipitor  - per pt she had a stent placed 5-6 years ago, and another 1 placed 3 months ago by a Dr. Genia Nazario?
- will reduce metoprolol to 100 qd and hold hctz  - cont nifedipine, and arb equivalent
- cont asa, plavix, lipitor  - per pt she had a stent placed 5-6 years ago, and another 1 placed 3 months ago by a Dr. Genia Nazario?
- c/w Toprol  qd   - c/t hold hctz  - cont nifedipine, and arb equivalent

## 2022-10-31 NOTE — PROGRESS NOTE ADULT - PROVIDER SPECIALTY LIST ADULT
Hospitalist
Vascular Surgery
Hospitalist

## 2022-10-31 NOTE — PROGRESS NOTE ADULT - PROBLEM SELECTOR PROBLEM 5
HTN (hypertension)
DM2 (diabetes mellitus, type 2)
DM2 (diabetes mellitus, type 2)
HTN (hypertension)
DM2 (diabetes mellitus, type 2)
DM2 (diabetes mellitus, type 2)
HTN (hypertension)
DM2 (diabetes mellitus, type 2)

## 2022-10-31 NOTE — PROGRESS NOTE ADULT - REASON FOR ADMISSION
confusion, unsteady gait, memory loss

## 2022-10-31 NOTE — DISCHARGE NOTE NURSING/CASE MANAGEMENT/SOCIAL WORK - PATIENT PORTAL LINK FT
You can access the FollowMyHealth Patient Portal offered by [facility] by registering at the following website: [email or web address]. By joining [email or web address]’s FollowMyHealth portal, you will also be able to view your health information using other applications (apps) compatible with our system.

## 2022-10-31 NOTE — PROGRESS NOTE ADULT - PROBLEM SELECTOR PLAN 3
- no vascular surgical intervention for MARCO ANTONIO stenosis   - c/w asprin and plavix
- f/u vascular surgery recommendations for poss carotid endarterectomy
- PT reccs home PT ; CM to facilitate  - fall precautions
- PT eval  - fall precautions
I Negro Ramirez MD have personally  seen and examined the patient today and have noted the findings and formulated the plan of care.  I Negro Ramirez MD have personally seen and examined the patient at bedside today at  8am
- f/u vascular surgery recommendations for poss carotid endarterectomy  - vascular follow up today  - CT neck for cystic mass on left neck
- PT eval-home  - fall precautions
- PT reccs home  - fall precautions
- PT reccs home PT ; CM facilitating  - fall precautions

## 2022-10-31 NOTE — PROGRESS NOTE ADULT - PROBLEM SELECTOR PLAN 1
- likely vascular dementia vs other forms of dementia  - CT head negative for acute infarct   - CT neck with Bilateral calcified plaque with significant stenoses, right worse than left. Left thyroid lobe hypodense lesion.  - MRI brain without bleed or infarct  - TSH, T3/T4, RPR, vitamin B12, lactate, creatnine kinase, ceruloplasmin, ESR, CRP wnl  - will cont pt's sinemet for now, but pt likely does not have PD  - monitor for fever/infection off antibiotics  - elevated lactate on AM of 10/24, unclear etiology - resolved  - management of ICA stenosis as below   - pt needs outpt neuro/tomasa follow up for workup of dementia  - outpt thyroid sono for workup of thyroid nodule  - d/w neuro 10/28 pending follow up
- likely vascular dementia given mild chronic white matter microvascular type changes seen on brain MRI   - CT head negative for acute infarct   - CT neck with Bilateral calcified plaque with significant stenoses, right worse than left.   - Imaging shows Left thyroid lobe hypodense lesion.  - MRI brain without bleed or infarct  - TSH, T3/T4, RPR, vitamin B12, lactate, creatnine kinase, ceruloplasmin, ESR, CRP wnl  - will cont pt's sinemet for now, but pt likely does not have PD  - monitor for fever/infection off antibiotics  - elevated lactate on AM of 10/24, unclear etiology - resolved  - management of ICA stenosis as below   - pt needs outpt neuro/tomasa follow up for workup of dementia  - outpt thyroid sono for workup of thyroid nodule  - Neurology following
I Negro Ramirez MD have personally  seen and examined the patient today and have noted the findings and formulated the plan of care.  I Negro Ramirez MD have personally seen and examined the patient at bedside today at  8am
- poss vasc dementia vs other form of dementia  - pt refusing closed MRI  - neuro consult appreciated: ophthalmology and vascular surgery consults called  - f/u TSH, T3/T4, RPR, vitamin B12, lactate, creatnine kinase, ceruloplasmin, ESR, CRP  - will cont pt's sinemet for now, but pt likely does not have PD  - pt can f/u with neuro as outpt for open MRI  - monitor for fever/infection off antibiotics  - elevated lactate on AM of 10/24, unclear etiology - resolved
- likely vascular dementia given mild chronic white matter microvascular type changes seen on brain MRI   - CT head negative for acute infarct   - CT neck with Bilateral calcified plaque with significant stenoses, right worse than left.   - Imaging shows Left thyroid lobe hypodense lesion.  - MRI brain without bleed or infarct  - TSH, T3/T4, RPR, vitamin B12, lactate, creatnine kinase, ceruloplasmin, ESR, CRP wnl  - will cont pt's sinemet for now, but pt likely does not have PD  - monitor for fever/infection off antibiotics  - elevated lactate on AM of 10/24, unclear etiology - resolved  - management of ICA stenosis as below   - pt needs outpt neuro/tomasa follow up for workup of dementia  - outpt thyroid sono for workup of thyroid nodule  - Neurology following
- likely vascular dementia given mild chronic white matter microvascular type changes seen on brain MRI   - CT head negative for acute infarct   - CT neck with Bilateral calcified plaque with significant stenoses, right worse than left.   - Imaging shows Left thyroid lobe hypodense lesion.  - MRI brain without bleed or infarct  - TSH, T3/T4, RPR, vitamin B12, lactate, creatnine kinase, ceruloplasmin, ESR, CRP wnl  - will cont pt's sinemet for now, but pt likely does not have PD  - monitor for fever/infection off antibiotics  - elevated lactate on AM of 10/24, unclear etiology - resolved  - management of ICA stenosis as below   - pt needs outpt neuro/tomasa follow up for workup of dementia  - outpt thyroid sono for workup of thyroid nodule  - Neurology following
- poss vasc dementia vs other form of dementia  - pt refusing closed MRI  - neuro consult appreciated: ophthalmology and vascular surgery consults called  - f/u TSH, T3/T4, RPR, vitamin B12, lactate, creatnine kinase, ceruloplasmin, ESR, CRP  - will cont pt's sinemet for now, but pt likely does not have PD  - pt can f/u with neuro as outpt for open MRI  - pt should have outpt thyroid ultrasound imaging  - monitor for fever/infection off antibiotics  - elevated lactate on AM of 10/24, unclear etiology -- will repeat, administer IVF, and monitor
- likely vascular dementia vs other form of dementia  - CT head negative for acute infarct   - CT neck with Bilateral calcified plaque with significant stenoses, right worse than left. Left thyroid lobe hypodense lesion.  - pt agreeable for MRI brain  - TSH, T3/T4, RPR, vitamin B12, lactate, creatnine kinase, ceruloplasmin, ESR, CRP wnl  - will cont pt's sinemet for now, but pt likely does not have PD  - monitor for fever/infection off antibiotics  - elevated lactate on AM of 10/24, unclear etiology - resolved  - management of ICA stenosis as below   - pt need outpt neuro/tomasa follow up for workup of dementia  - outpt thyroid sono for workup of thyroid nodule  - d/w neuro 10/27 pending follow up
- possible vascular dementia vs other form of dementia  - CT head negative for acute infarct   - CT neck with Bilateral calcified plaque with significant stenoses, right worse than left. Left thyroid lobe hypodense lesion.  - pt agreeable for MRI brain  - TSH, T3/T4, RPR, vitamin B12, lactate, creatnine kinase, ceruloplasmin, ESR, CRP wnl  - will cont pt's sinemet for now, but pt likely does not have PD  - monitor for fever/infection off antibiotics  - elevated lactate on AM of 10/24, unclear etiology - resolved  - management of ICA stenosis as below   - pt need outpt neuro/tomasa follow up for workup of dementia  - outpt thyroid sono for workup of thyroid nodule

## 2022-10-31 NOTE — PROGRESS NOTE ADULT - PROBLEM SELECTOR PROBLEM 2
Agitation
Gait instability
Gait instability
Agitation
Gait instability
Carotid stenosis, bilateral
Agitation

## 2022-10-31 NOTE — PROGRESS NOTE ADULT - TIME BILLING
case complexity and discharge planning. Pt is clinically stable for discharge. She is f/up with PCP, Neurology, Vascular Sx

## 2022-10-31 NOTE — PROGRESS NOTE ADULT - NSPROGADDITIONALINFOA_GEN_ALL_CORE
D/w MIKA Garcia
D/w MIKA Govea
d/w ACP Jose    Pt's daughter in law [facility] updated on plan of care
d/w MIKA Garcia
D/w MIKA Garcia
D/w MIKA Govea
d/w ACP Kauffman

## 2022-10-31 NOTE — PROGRESS NOTE ADULT - PROBLEM SELECTOR PLAN 8
- DVT ppx lovenox   - Dispo pending PT eval
- DVT ppx lovenox   - Dispo home    Monitor on seroquel over weekend then dc planning monday
- DVT ppx lovenox   - Dispo home    Monitor on seroquel over weekend then dc planning Monday 10/31
- DVT ppx lovenox   - Dispo home    Monitor on seroquel over weekend then dc planning monday  D/w daughter in law 10/28 updated on full plan of care
- DVT ppx lovenox   - Dispo home w/home care

## 2022-10-31 NOTE — PROGRESS NOTE ADULT - PROBLEM SELECTOR PROBLEM 1
Acute cognitive decline
Gait instability
Acute cognitive decline

## 2022-10-31 NOTE — DISCHARGE NOTE NURSING/CASE MANAGEMENT/SOCIAL WORK - NSDCPEFALRISK_GEN_ALL_CORE
For information on Fall & Injury Prevention, visit: [email or web address] OR  [email or web address] OR  https://www.cdc.gov/steadi/patient.html

## 2022-10-31 NOTE — PROGRESS NOTE ADULT - ASSESSMENT
62F German c hx htn, dm2, cad s/p recent 2 stents (@ [facility] "couple weeks ago"), left eye blurry vision since cataract surgery, pw 1 month of progressive unsteady gait, slurred speech, memory loss, lethargy. Vascular surgery consulted to eval for R ICA stenosis.    Plan/Recommendations:  - from a vasc surg standpoint no indication for  cea or carotid intervention  carotid duplex w stenosis rt ica  approx 50-60% and Lt ICA less 50% by ryne criteria  w/u as per neurology and primary service   reconsult prn 
62yoF w/ PMHx of HTN, DM2, CAD s/p recent 2 stents (@ [facility] "couple weeks ago"), left eye blurry vision since cataract surgery, pw 1 month of progressive unsteady gait, slurred speech, memory loss, lethargy concerning for dementia
62yoF w/ PMHx of HTN, DM2, CAD s/p recent 2 stents (@ [facility] "couple weeks ago"), left eye blurry vision since cataract surgery, pw 1 month of progressive unsteady gait, slurred speech, memory loss, lethargy concerning for dementia
62yoF w/ PMHx of htn, dm2, cad s/p recent 2 stents (@ [facility] "couple weeks ago"), left eye blurry vision since cataract surgery, pw 1 month of progressive unsteady gait, slurred speech, memory loss, lethargy concerning for poss dementia, vascular or other.
62yoF w/ PMHx of HTN, DM2, CAD s/p recent 2 stents (@ [facility] "couple weeks ago"), left eye blurry vision since cataract surgery, pw 1 month of progressive unsteady gait, slurred speech, memory loss, lethargy concerning for dementia
62yoF w/ PMHx of HTN, DM2, CAD s/p recent 2 stents (@ [facility] "couple weeks ago"), left eye blurry vision since cataract surgery, pw 1 month of progressive unsteady gait, slurred speech, memory loss, lethargy concerning for dementia
62yoF w/ PMHx of HTN, DM2, CAD s/p recent 2 stents (@ [facility] "couple weeks ago"), left eye blurry vision since cataract surgery, pw 1 month of progressive unsteady gait, slurred speech, memory loss, lethargy concerning for dementia
62yoF w/ PMHx of htn, dm2, cad s/p recent 2 stents (@ [facility] "couple weeks ago"), left eye blurry vision since cataract surgery, pw 1 month of progressive unsteady gait, slurred speech, memory loss, lethargy concerning for poss dementia, vascular or other.
62yoF w/ PMHx of htn, dm2, cad s/p recent 2 stents (@ [facility] "couple weeks ago"), left eye blurry vision since cataract surgery, pw 1 month of progressive unsteady gait, slurred speech, memory loss, lethargy concerning for dementia

## 2022-10-31 NOTE — PROGRESS NOTE ADULT - PROBLEM SELECTOR PROBLEM 6
HTN (hypertension)
CAD (coronary artery disease)
CAD (coronary artery disease)
HTN (hypertension)
HTN (hypertension)
CAD (coronary artery disease)
HTN (hypertension)
HTN (hypertension)

## 2022-10-31 NOTE — PROGRESS NOTE ADULT - PROBLEM SELECTOR PLAN 5
- will reduce metoprolol to 100 qd and hold hctz  - cont nifedipine, and arb equivalent
- will reduce metoprolol to 100 qd and hold hctz  - cont nifedipine, and arb equivalent
- ISS w/ serial FS monitoring
- ISS w/ serial FS monitoring
- will reduce metoprolol to 100 qd and hold hctz  - cont nifedipine, and arb equivalent
- ISS w/ serial FS monitoring

## 2022-10-31 NOTE — PROGRESS NOTE ADULT - SUBJECTIVE AND OBJECTIVE BOX
Patient is a 62y old  Female who presents with a chief complaint of confusion, unsteady gait, memory loss (24 Oct 2022 19:15)      SUBJECTIVE / OVERNIGHT EVENTS: feels ok, blurry vision is improved,no cp, sob    MEDICATIONS  (STANDING):  artificial  tears Solution 1 Drop(s) Both EYES four times a day  aspirin enteric coated 81 milliGRAM(s) Oral daily  carbidopa/levodopa  10/100 1 Tablet(s) Oral three times a day  clopidogrel Tablet 75 milliGRAM(s) Oral daily  dextrose 5%. 1000 milliLiter(s) (100 mL/Hr) IV Continuous <Continuous>  dextrose 5%. 1000 milliLiter(s) (50 mL/Hr) IV Continuous <Continuous>  dextrose 50% Injectable 25 Gram(s) IV Push once  dextrose 50% Injectable 12.5 Gram(s) IV Push once  dextrose 50% Injectable 25 Gram(s) IV Push once  enoxaparin Injectable 40 milliGRAM(s) SubCutaneous every 24 hours  gabapentin 100 milliGRAM(s) Oral three times a day  glucagon  Injectable 1 milliGRAM(s) IntraMuscular once  insulin lispro (ADMELOG) corrective regimen sliding scale   SubCutaneous three times a day before meals  insulin lispro (ADMELOG) corrective regimen sliding scale   SubCutaneous at bedtime  losartan 50 milliGRAM(s) Oral daily  metoprolol succinate  milliGRAM(s) Oral daily  multivitamin 1 Tablet(s) Oral daily  NIFEdipine XL 60 milliGRAM(s) Oral daily  petrolatum Ophthalmic Ointment 1 Application(s) Both EYES at bedtime  sodium chloride 0.9%. 1000 milliLiter(s) (60 mL/Hr) IV Continuous <Continuous>    MEDICATIONS  (PRN):  dextrose Oral Gel 15 Gram(s) Oral once PRN Blood Glucose LESS THAN 70 milliGRAM(s)/deciliter        CAPILLARY BLOOD GLUCOSE      POCT Blood Glucose.: 153 mg/dL (25 Oct 2022 12:19)  POCT Blood Glucose.: 135 mg/dL (25 Oct 2022 08:23)  POCT Blood Glucose.: 195 mg/dL (24 Oct 2022 21:25)  POCT Blood Glucose.: 154 mg/dL (24 Oct 2022 17:04)    I&O's Summary    24 Oct 2022 07:01  -  25 Oct 2022 07:00  --------------------------------------------------------  IN: 0 mL / OUT: 0 mL / NET: 0 mL    25 Oct 2022 07:01  -  25 Oct 2022 13:53  --------------------------------------------------------  IN: 240 mL / OUT: 0 mL / NET: 240 mL        PHYSICAL EXAM:  GENERAL: NAD, well-developed  HEAD:  Atraumatic, Normocephalic  EYES: conjunctiva and sclera clear  NECK: No JVD  CHEST/LUNG: Clear to auscultation bilaterally; No wheeze  HEART: Regular rate and rhythm; No murmurs, rubs, or gallops  ABDOMEN: Soft, Nontender, Nondistended; Bowel sounds present  EXTREMITIES:  2+ Peripheral Pulses, No clubbing, cyanosis, or edema  PSYCH: AAOx3  NEUROLOGY: non-focal  SKIN: No rashes or lesions    LABS:                        12.3   5.69  )-----------( 297      ( 25 Oct 2022 07:31 )             38.7     10-25    140  |  102  |  14  ----------------------------<  156<H>  3.7   |  25  |  0.58    Ca    9.5      25 Oct 2022 07:32  Phos  4.2     10-24  Mg     2.1     10-25    TPro  7.6  /  Alb  4.5  /  TBili  0.3  /  DBili  x   /  AST  23  /  ALT  11  /  AlkPhos  96  10-24      CARDIAC MARKERS ( 24 Oct 2022 09:06 )  x     / x     / 146 U/L / x     / x          Urinalysis Basic - ( 23 Oct 2022 22:12 )    Color: Light Yellow / Appearance: Clear / S.032 / pH: x  Gluc: x / Ketone: Negative  / Bili: Negative / Urobili: Negative   Blood: x / Protein: Negative / Nitrite: Negative   Leuk Esterase: Negative / RBC: x / WBC x   Sq Epi: x / Non Sq Epi: x / Bacteria: x        RADIOLOGY & ADDITIONAL TESTS:    Imaging Personally Reviewed:    Consultant(s) Notes Reviewed:      Care Discussed with Consultants/Other Providers:  
Patient is a 62y old  Female who presents with a chief complaint of confusion, unsteady gait, memory loss (27 Oct 2022 13:15)      SUBJECTIVE / OVERNIGHT EVENTS: feels ok, denies cp, sob, abd pain     MEDICATIONS  (STANDING):  artificial  tears Solution 1 Drop(s) Both EYES four times a day  aspirin enteric coated 81 milliGRAM(s) Oral daily  carbidopa/levodopa  10/100 1 Tablet(s) Oral three times a day  clopidogrel Tablet 75 milliGRAM(s) Oral daily  dextrose 5%. 1000 milliLiter(s) (100 mL/Hr) IV Continuous <Continuous>  dextrose 5%. 1000 milliLiter(s) (50 mL/Hr) IV Continuous <Continuous>  dextrose 50% Injectable 25 Gram(s) IV Push once  dextrose 50% Injectable 12.5 Gram(s) IV Push once  dextrose 50% Injectable 25 Gram(s) IV Push once  enoxaparin Injectable 40 milliGRAM(s) SubCutaneous every 24 hours  gabapentin 100 milliGRAM(s) Oral three times a day  glucagon  Injectable 1 milliGRAM(s) IntraMuscular once  insulin lispro (ADMELOG) corrective regimen sliding scale   SubCutaneous three times a day before meals  insulin lispro (ADMELOG) corrective regimen sliding scale   SubCutaneous at bedtime  losartan 50 milliGRAM(s) Oral daily  melatonin 3 milliGRAM(s) Oral at bedtime  metoprolol succinate  milliGRAM(s) Oral daily  multivitamin 1 Tablet(s) Oral daily  NIFEdipine XL 60 milliGRAM(s) Oral daily  petrolatum Ophthalmic Ointment 1 Application(s) Both EYES at bedtime  QUEtiapine 25 milliGRAM(s) Oral at bedtime    MEDICATIONS  (PRN):  dextrose Oral Gel 15 Gram(s) Oral once PRN Blood Glucose LESS THAN 70 milliGRAM(s)/deciliter        CAPILLARY BLOOD GLUCOSE      POCT Blood Glucose.: 256 mg/dL (28 Oct 2022 12:38)  POCT Blood Glucose.: 222 mg/dL (28 Oct 2022 08:20)  POCT Blood Glucose.: 237 mg/dL (27 Oct 2022 21:31)  POCT Blood Glucose.: 244 mg/dL (27 Oct 2022 15:55)    I&O's Summary    27 Oct 2022 07:01  -  28 Oct 2022 07:00  --------------------------------------------------------  IN: 240 mL / OUT: 0 mL / NET: 240 mL        PHYSICAL EXAM:  GENERAL: NAD, well-developed  HEAD:  Atraumatic, Normocephalic  EYES:  conjunctiva and sclera clear  NECK: Supple, No JVD  CHEST/LUNG: Clear to auscultation bilaterally; No wheeze  HEART: Regular rate and rhythm; No murmurs, rubs, or gallops  ABDOMEN: Soft, Nontender, Nondistended; Bowel sounds present  EXTREMITIES:  2+ Peripheral Pulses, No clubbing, cyanosis, or edema  PSYCH: AAOx2    LABS:                        11.4   6.68  )-----------( 276      ( 27 Oct 2022 07:10 )             35.4     10-27    141  |  105  |  16  ----------------------------<  229<H>  4.1   |  25  |  0.67    Ca    9.1      27 Oct 2022 07:14                RADIOLOGY & ADDITIONAL TESTS:    Imaging Personally Reviewed:    Consultant(s) Notes Reviewed:      Care Discussed with Consultants/Other Providers:  
Patient is a 62y old  Female who presents with a chief complaint of confusion, unsteady gait, memory loss (31 Oct 2022 11:22)      SUBJECTIVE / OVERNIGHT EVENTS:  Pt seen and examined. No acute events overnight. She denies CP, SOB, headache, dizziness.    MEDICATIONS  (STANDING):  artificial  tears Solution 1 Drop(s) Both EYES four times a day  aspirin enteric coated 81 milliGRAM(s) Oral daily  carbidopa/levodopa  10/100 1 Tablet(s) Oral three times a day  clopidogrel Tablet 75 milliGRAM(s) Oral daily  dextrose 5%. 1000 milliLiter(s) (100 mL/Hr) IV Continuous <Continuous>  dextrose 5%. 1000 milliLiter(s) (50 mL/Hr) IV Continuous <Continuous>  dextrose 50% Injectable 25 Gram(s) IV Push once  dextrose 50% Injectable 12.5 Gram(s) IV Push once  dextrose 50% Injectable 25 Gram(s) IV Push once  enoxaparin Injectable 40 milliGRAM(s) SubCutaneous every 24 hours  gabapentin 100 milliGRAM(s) Oral three times a day  glucagon  Injectable 1 milliGRAM(s) IntraMuscular once  insulin lispro (ADMELOG) corrective regimen sliding scale   SubCutaneous three times a day before meals  insulin lispro (ADMELOG) corrective regimen sliding scale   SubCutaneous at bedtime  losartan 50 milliGRAM(s) Oral daily  melatonin 3 milliGRAM(s) Oral at bedtime  metoprolol succinate  milliGRAM(s) Oral daily  multivitamin 1 Tablet(s) Oral daily  NIFEdipine XL 60 milliGRAM(s) Oral daily  petrolatum Ophthalmic Ointment 1 Application(s) Both EYES at bedtime  polyethylene glycol 3350 17 Gram(s) Oral daily  QUEtiapine 25 milliGRAM(s) Oral at bedtime  senna 2 Tablet(s) Oral at bedtime    MEDICATIONS  (PRN):  dextrose Oral Gel 15 Gram(s) Oral once PRN Blood Glucose LESS THAN 70 milliGRAM(s)/deciliter      Vital Signs Last 24 Hrs  T(C): 36.8 (31 Oct 2022 04:43), Max: 37.3 (30 Oct 2022 20:05)  T(F): 98.3 (31 Oct 2022 04:43), Max: 99.1 (30 Oct 2022 20:05)  HR: 82 (31 Oct 2022 13:07) (80 - 98)  BP: 140/80 (31 Oct 2022 13:07) (139/80 - 150/90)  BP(mean): --  RR: 18 (31 Oct 2022 13:07) (18 - 18)  SpO2: 100% (31 Oct 2022 13:07) (99% - 100%)    Parameters below as of 31 Oct 2022 13:07  Patient On (Oxygen Delivery Method): room air      CAPILLARY BLOOD GLUCOSE      POCT Blood Glucose.: 261 mg/dL (31 Oct 2022 13:02)  POCT Blood Glucose.: 233 mg/dL (31 Oct 2022 08:19)  POCT Blood Glucose.: 215 mg/dL (30 Oct 2022 21:30)  POCT Blood Glucose.: 227 mg/dL (30 Oct 2022 17:07)    I&O's Summary    30 Oct 2022 07:01  -  31 Oct 2022 07:00  --------------------------------------------------------  IN: 780 mL / OUT: 4 mL / NET: 776 mL        PHYSICAL EXAM:  GENERAL: NAD, well-groomed  HEAD:  Atraumatic, Normocephalic  EYES: conjunctiva and sclera clear  NECK: Supple, No JVD  CHEST/LUNG: Clear to auscultation bilaterally; No wheeze  HEART: Regular rate and rhythm; No murmurs, rubs, or gallops  ABDOMEN: Soft, Nontender, Nondistended; Bowel sounds present  EXTREMITIES:  2+ Peripheral Pulses, No clubbing, cyanosis, or edema  PSYCH: AAOx2  NEUROLOGY: non-focal  SKIN: No rashes or lesions                      
Patient is a 62y old  Female who presents with a chief complaint of confusion, unsteady gait, memory loss (26 Oct 2022 12:18)      SUBJECTIVE / OVERNIGHT EVENTS: feels ok, reports overnight she had nightmares, no cp, sob, abd pain     MEDICATIONS  (STANDING):  artificial  tears Solution 1 Drop(s) Both EYES four times a day  aspirin enteric coated 81 milliGRAM(s) Oral daily  carbidopa/levodopa  10/100 1 Tablet(s) Oral three times a day  clopidogrel Tablet 75 milliGRAM(s) Oral daily  dextrose 5%. 1000 milliLiter(s) (100 mL/Hr) IV Continuous <Continuous>  dextrose 5%. 1000 milliLiter(s) (50 mL/Hr) IV Continuous <Continuous>  dextrose 50% Injectable 25 Gram(s) IV Push once  dextrose 50% Injectable 12.5 Gram(s) IV Push once  dextrose 50% Injectable 25 Gram(s) IV Push once  enoxaparin Injectable 40 milliGRAM(s) SubCutaneous every 24 hours  gabapentin 100 milliGRAM(s) Oral three times a day  glucagon  Injectable 1 milliGRAM(s) IntraMuscular once  insulin lispro (ADMELOG) corrective regimen sliding scale   SubCutaneous three times a day before meals  insulin lispro (ADMELOG) corrective regimen sliding scale   SubCutaneous at bedtime  losartan 50 milliGRAM(s) Oral daily  metoprolol succinate  milliGRAM(s) Oral daily  multivitamin 1 Tablet(s) Oral daily  NIFEdipine XL 60 milliGRAM(s) Oral daily  petrolatum Ophthalmic Ointment 1 Application(s) Both EYES at bedtime    MEDICATIONS  (PRN):  dextrose Oral Gel 15 Gram(s) Oral once PRN Blood Glucose LESS THAN 70 milliGRAM(s)/deciliter        CAPILLARY BLOOD GLUCOSE      POCT Blood Glucose.: 136 mg/dL (27 Oct 2022 12:22)  POCT Blood Glucose.: 183 mg/dL (27 Oct 2022 08:14)  POCT Blood Glucose.: 236 mg/dL (26 Oct 2022 21:05)  POCT Blood Glucose.: 192 mg/dL (26 Oct 2022 17:10)    I&O's Summary    26 Oct 2022 07:01  -  27 Oct 2022 07:00  --------------------------------------------------------  IN: 810 mL / OUT: 0 mL / NET: 810 mL        PHYSICAL EXAM:  GENERAL: NAD, well-developed  HEAD:  Atraumatic, Normocephalic  EYES: EOMI, PERRLA, conjunctiva and sclera clear  NECK: Supple, No JVD  CHEST/LUNG: Clear to auscultation bilaterally; No wheeze  HEART: Regular rate and rhythm; No murmurs, rubs, or gallops  ABDOMEN: Soft, Nontender, Nondistended; Bowel sounds present  EXTREMITIES:  2+ Peripheral Pulses, No clubbing, cyanosis, or edema  PSYCH: AAOx2-3    LABS:                        11.4   6.68  )-----------( 276      ( 27 Oct 2022 07:10 )             35.4     10-27    141  |  105  |  16  ----------------------------<  229<H>  4.1   |  25  |  0.67    Ca    9.1      27 Oct 2022 07:14                RADIOLOGY & ADDITIONAL TESTS:    Imaging Personally Reviewed:    Consultant(s) Notes Reviewed:      Care Discussed with Consultants/Other Providers:  
Patient is a 62y old  Female who presents with a chief complaint of confusion, unsteady gait, memory loss (25 Oct 2022 13:52)      Vascular Surgery Attending Progress Note    Interval HPI: pt w/o new c/o     Medications:  artificial  tears Solution 1 Drop(s) Both EYES four times a day  aspirin enteric coated 81 milliGRAM(s) Oral daily  carbidopa/levodopa  10/100 1 Tablet(s) Oral three times a day  clopidogrel Tablet 75 milliGRAM(s) Oral daily  dextrose 5%. 1000 milliLiter(s) IV Continuous <Continuous>  dextrose 5%. 1000 milliLiter(s) IV Continuous <Continuous>  dextrose 50% Injectable 25 Gram(s) IV Push once  dextrose 50% Injectable 12.5 Gram(s) IV Push once  dextrose 50% Injectable 25 Gram(s) IV Push once  dextrose Oral Gel 15 Gram(s) Oral once PRN  enoxaparin Injectable 40 milliGRAM(s) SubCutaneous every 24 hours  gabapentin 100 milliGRAM(s) Oral three times a day  glucagon  Injectable 1 milliGRAM(s) IntraMuscular once  insulin lispro (ADMELOG) corrective regimen sliding scale   SubCutaneous three times a day before meals  insulin lispro (ADMELOG) corrective regimen sliding scale   SubCutaneous at bedtime  losartan 50 milliGRAM(s) Oral daily  metoprolol succinate  milliGRAM(s) Oral daily  multivitamin 1 Tablet(s) Oral daily  NIFEdipine XL 60 milliGRAM(s) Oral daily  petrolatum Ophthalmic Ointment 1 Application(s) Both EYES at bedtime      Vital Signs Last 24 Hrs  T(C): 36.8 (25 Oct 2022 19:20), Max: 36.9 (24 Oct 2022 20:26)  T(F): 98.2 (25 Oct 2022 19:20), Max: 98.4 (24 Oct 2022 20:26)  HR: 83 (25 Oct 2022 19:20) (82 - 94)  BP: 141/83 (25 Oct 2022 19:20) (122/80 - 168/103)  BP(mean): --  RR: 18 (25 Oct 2022 19:20) (15 - 18)  SpO2: 97% (25 Oct 2022 19:20) (96% - 98%)    Parameters below as of 25 Oct 2022 19:20  Patient On (Oxygen Delivery Method): room air      I&O's Summary    24 Oct 2022 07:01  -  25 Oct 2022 07:00  --------------------------------------------------------  IN: 0 mL / OUT: 0 mL / NET: 0 mL    25 Oct 2022 07:01  -  25 Oct 2022 20:23  --------------------------------------------------------  IN: 440 mL / OUT: 0 mL / NET: 440 mL        Physical Exam:  Neuro  A&Ox3 VSS  Vascular:  stable     LABS:                        12.3   5.69  )-----------( 297      ( 25 Oct 2022 07:31 )             38.7     10-25    140  |  102  |  14  ----------------------------<  156<H>  3.7   |  25  |  0.58    Ca    9.5      25 Oct 2022 07:32  Phos  4.2     10-24  Mg     2.1     10-25    TPro  7.6  /  Alb  4.5  /  TBili  0.3  /  DBili  x   /  AST  23  /  ALT  11  /  AlkPhos  96  10-24        carotid duplex reviewed     MADINA YUAN MD  [phone] Cell [phone]
Patient is a 62y old  Female who presents with a chief complaint of confusion, unsteady gait, memory loss (30 Oct 2022 11:42)      SUBJECTIVE / OVERNIGHT EVENTS:  Pt seen and examined with family at bedside. No acute events overnight. She denies CP, SOB, abd pain.    MEDICATIONS  (STANDING):  artificial  tears Solution 1 Drop(s) Both EYES four times a day  aspirin enteric coated 81 milliGRAM(s) Oral daily  carbidopa/levodopa  10/100 1 Tablet(s) Oral three times a day  clopidogrel Tablet 75 milliGRAM(s) Oral daily  dextrose 5%. 1000 milliLiter(s) (100 mL/Hr) IV Continuous <Continuous>  dextrose 5%. 1000 milliLiter(s) (50 mL/Hr) IV Continuous <Continuous>  dextrose 50% Injectable 25 Gram(s) IV Push once  dextrose 50% Injectable 12.5 Gram(s) IV Push once  dextrose 50% Injectable 25 Gram(s) IV Push once  enoxaparin Injectable 40 milliGRAM(s) SubCutaneous every 24 hours  gabapentin 100 milliGRAM(s) Oral three times a day  glucagon  Injectable 1 milliGRAM(s) IntraMuscular once  insulin lispro (ADMELOG) corrective regimen sliding scale   SubCutaneous three times a day before meals  insulin lispro (ADMELOG) corrective regimen sliding scale   SubCutaneous at bedtime  losartan 50 milliGRAM(s) Oral daily  melatonin 3 milliGRAM(s) Oral at bedtime  metoprolol succinate  milliGRAM(s) Oral daily  multivitamin 1 Tablet(s) Oral daily  NIFEdipine XL 60 milliGRAM(s) Oral daily  petrolatum Ophthalmic Ointment 1 Application(s) Both EYES at bedtime  polyethylene glycol 3350 17 Gram(s) Oral daily  QUEtiapine 25 milliGRAM(s) Oral at bedtime  senna 2 Tablet(s) Oral at bedtime    MEDICATIONS  (PRN):  dextrose Oral Gel 15 Gram(s) Oral once PRN Blood Glucose LESS THAN 70 milliGRAM(s)/deciliter      Vital Signs Last 24 Hrs  T(C): 36.7 (30 Oct 2022 07:44), Max: 37.4 (29 Oct 2022 19:13)  T(F): 98 (30 Oct 2022 07:44), Max: 99.4 (29 Oct 2022 19:13)  HR: 78 (30 Oct 2022 07:44) (69 - 83)  BP: 144/84 (30 Oct 2022 07:44) (120/74 - 144/84)  BP(mean): --  RR: 18 (30 Oct 2022 07:44) (18 - 18)  SpO2: 100% (30 Oct 2022 07:44) (96% - 100%)    Parameters below as of 30 Oct 2022 07:44  Patient On (Oxygen Delivery Method): room air      CAPILLARY BLOOD GLUCOSE      POCT Blood Glucose.: 183 mg/dL (30 Oct 2022 08:31)  POCT Blood Glucose.: 255 mg/dL (29 Oct 2022 21:47)  POCT Blood Glucose.: 191 mg/dL (29 Oct 2022 16:55)    I&O's Summary    29 Oct 2022 07:01  -  30 Oct 2022 07:00  --------------------------------------------------------  IN: 1260 mL / OUT: 0 mL / NET: 1260 mL    30 Oct 2022 07:01  -  30 Oct 2022 12:43  --------------------------------------------------------  IN: 240 mL / OUT: 0 mL / NET: 240 mL        PHYSICAL EXAM:  GENERAL: NAD, well-groomed  HEAD:  Atraumatic, Normocephalic  EYES: conjunctiva and sclera clear  NECK: Supple, No JVD  CHEST/LUNG: Clear to auscultation bilaterally; No wheeze  HEART: Regular rate and rhythm; No murmurs, rubs, or gallops  ABDOMEN: Soft, Nontender, Nondistended; Bowel sounds present  EXTREMITIES:  2+ Peripheral Pulses, No clubbing, cyanosis, or edema  PSYCH: AAOx2  NEUROLOGY: non-focal  SKIN: No rashes or lesions      
Nicola Griffin M.D.  Division of Hospital Medicine  Available on TEAMS    Patient is a 62y old  Female who presents with a chief complaint of confusion, unsteady gait, memory loss (23 Oct 2022 23:36)    SUBJECTIVE / OVERNIGHT EVENTS: Patient seen and examined with family member at bedside. No acute overnight events, no subjective complaints.    OBJECTIVE:  Vital Signs Last 24 Hrs  T(F): 98.3 (24 Oct 2022 11:49), Max: 99.6 (23 Oct 2022 17:59)  HR: 88 (24 Oct 2022 11:49) (65 - 93)  BP: 113/83 (24 Oct 2022 11:49) (113/83 - 169/91)  RR: 19 (24 Oct 2022 11:49) (16 - 20)  SpO2: 98% (24 Oct 2022 11:49) (98% - 100%)    Parameters below as of 24 Oct 2022 11:49  Patient On (Oxygen Delivery Method): room air    Physical Examination:  GEN: middle aged woman, laying in stretcher in NAD  PSYCH: A&Ox3, mood and affect appear appropriate   NEURO: no focal neurologic deficits appreciated -- patient able to ambulate from wheelchair to stretcher independently; mild dysarthric speech  NECK: supple, no e/o elevated JVP  RESPI: no accessory muscle use, B/L air entry, CTAB   CARDIO: regular rate/rhythm, no LE edema B/L  ABD: soft, NT, ND  EXT: patient able to move all extremities spontaneously  VASC: peripheral pulses palpated    Labs:  CAPILLARY BLOOD GLUCOSE  POCT Blood Glucose.: 116 mg/dL (24 Oct 2022 13:30)  POCT Blood Glucose.: 256 mg/dL (24 Oct 2022 09:18)  POCT Blood Glucose.: 174 mg/dL (24 Oct 2022 00:23)                        12.5   9.20  )-----------( 309      ( 24 Oct 2022 09:04 )             39.8     10-24  138  |  97  |  11  ----------------------------<  276<H>  3.7   |  23  |  0.61    Ca    9.7      24 Oct 2022 09:06  Phos  4.2     10-24  Mg     2.0     10-24    TPro  7.6  /  Alb  4.5  /  TBili  0.3  /  DBili  x   /  AST  23  /  ALT  11  /  AlkPhos  96  10-24    CARDIAC MARKERS ( 24 Oct 2022 09:06 )  x     / x     / 146 U/L / x     / x        Urinalysis Basic - ( 23 Oct 2022 22:12 )  Color: Light Yellow / Appearance: Clear / S.032 / pH: x  Gluc: x / Ketone: Negative  / Bili: Negative / Urobili: Negative   Blood: x / Protein: Negative / Nitrite: Negative   Leuk Esterase: Negative / RBC: x / WBC x   Sq Epi: x / Non Sq Epi: x / Bacteria: x    Consultant(s) Notes Reviewed: Neurology  Care Discussed with Consultants/Other Providers: MIKA Graf    MEDICATIONS  (STANDING):  aspirin enteric coated 81 milliGRAM(s) Oral daily  carbidopa/levodopa  10/100 1 Tablet(s) Oral three times a day  clopidogrel Tablet 75 milliGRAM(s) Oral daily  dextrose 5%. 1000 milliLiter(s) (100 mL/Hr) IV Continuous <Continuous>  dextrose 5%. 1000 milliLiter(s) (50 mL/Hr) IV Continuous <Continuous>  dextrose 50% Injectable 25 Gram(s) IV Push once  dextrose 50% Injectable 12.5 Gram(s) IV Push once  dextrose 50% Injectable 25 Gram(s) IV Push once  enoxaparin Injectable 40 milliGRAM(s) SubCutaneous every 24 hours  gabapentin 100 milliGRAM(s) Oral three times a day  glucagon  Injectable 1 milliGRAM(s) IntraMuscular once  insulin lispro (ADMELOG) corrective regimen sliding scale   SubCutaneous three times a day before meals  insulin lispro (ADMELOG) corrective regimen sliding scale   SubCutaneous at bedtime  losartan 50 milliGRAM(s) Oral daily  metoprolol succinate  milliGRAM(s) Oral daily  multivitamin 1 Tablet(s) Oral daily  NIFEdipine XL 60 milliGRAM(s) Oral daily  sodium chloride 0.9%. 1000 milliLiter(s) (60 mL/Hr) IV Continuous <Continuous>    MEDICATIONS  (PRN):  dextrose Oral Gel 15 Gram(s) Oral once PRN Blood Glucose LESS THAN 70 milliGRAM(s)/deciliter
Patient is a 62y old  Female who presents with a chief complaint of confusion, unsteady gait, memory loss (25 Oct 2022 20:23)      SUBJECTIVE / OVERNIGHT EVENTS: feels better, vision is better no cp, sob, abd pain     MEDICATIONS  (STANDING):  artificial  tears Solution 1 Drop(s) Both EYES four times a day  aspirin enteric coated 81 milliGRAM(s) Oral daily  carbidopa/levodopa  10/100 1 Tablet(s) Oral three times a day  clopidogrel Tablet 75 milliGRAM(s) Oral daily  dextrose 5%. 1000 milliLiter(s) (100 mL/Hr) IV Continuous <Continuous>  dextrose 5%. 1000 milliLiter(s) (50 mL/Hr) IV Continuous <Continuous>  dextrose 50% Injectable 25 Gram(s) IV Push once  dextrose 50% Injectable 12.5 Gram(s) IV Push once  dextrose 50% Injectable 25 Gram(s) IV Push once  enoxaparin Injectable 40 milliGRAM(s) SubCutaneous every 24 hours  gabapentin 100 milliGRAM(s) Oral three times a day  glucagon  Injectable 1 milliGRAM(s) IntraMuscular once  insulin lispro (ADMELOG) corrective regimen sliding scale   SubCutaneous three times a day before meals  insulin lispro (ADMELOG) corrective regimen sliding scale   SubCutaneous at bedtime  losartan 50 milliGRAM(s) Oral daily  metoprolol succinate  milliGRAM(s) Oral daily  multivitamin 1 Tablet(s) Oral daily  NIFEdipine XL 60 milliGRAM(s) Oral daily  petrolatum Ophthalmic Ointment 1 Application(s) Both EYES at bedtime    MEDICATIONS  (PRN):  dextrose Oral Gel 15 Gram(s) Oral once PRN Blood Glucose LESS THAN 70 milliGRAM(s)/deciliter        CAPILLARY BLOOD GLUCOSE      POCT Blood Glucose.: 176 mg/dL (26 Oct 2022 12:13)  POCT Blood Glucose.: 158 mg/dL (26 Oct 2022 09:00)  POCT Blood Glucose.: 251 mg/dL (25 Oct 2022 21:38)  POCT Blood Glucose.: 252 mg/dL (25 Oct 2022 16:40)    I&O's Summary    25 Oct 2022 07:01  -  26 Oct 2022 07:00  --------------------------------------------------------  IN: 660 mL / OUT: 0 mL / NET: 660 mL        PHYSICAL EXAM:  GENERAL: NAD, well-developed  HEAD:  Atraumatic, Normocephalic  EYES: conjunctiva and sclera clear  NECK: Supple, No JVD  CHEST/LUNG: Clear to auscultation bilaterally; No wheeze  HEART: Regular rate and rhythm; No murmurs, rubs, or gallops  ABDOMEN: Soft, Nontender, Nondistended; Bowel sounds present  EXTREMITIES:  2+ Peripheral Pulses, No clubbing, cyanosis, or edema  PSYCH: AAOx3      LABS:                        12.2   6.28  )-----------( 290      ( 26 Oct 2022 07:16 )             38.1     10-26    144  |  108  |  16  ----------------------------<  163<H>  4.1   |  25  |  0.65    Ca    9.5      26 Oct 2022 07:19  Mg     2.1     10-25                RADIOLOGY & ADDITIONAL TESTS:    Imaging Personally Reviewed:    Consultant(s) Notes Reviewed:      Care Discussed with Consultants/Other Providers:  
Patient is a 62y old  Female who presents with a chief complaint of confusion, unsteady gait, memory loss (29 Oct 2022 09:53)      SUBJECTIVE / OVERNIGHT EVENTS:  Pt seen and examined. No acute events overnight. She denies CP SOB, headache, dizziness.    MEDICATIONS  (STANDING):  artificial  tears Solution 1 Drop(s) Both EYES four times a day  aspirin enteric coated 81 milliGRAM(s) Oral daily  carbidopa/levodopa  10/100 1 Tablet(s) Oral three times a day  clopidogrel Tablet 75 milliGRAM(s) Oral daily  dextrose 5%. 1000 milliLiter(s) (100 mL/Hr) IV Continuous <Continuous>  dextrose 5%. 1000 milliLiter(s) (50 mL/Hr) IV Continuous <Continuous>  dextrose 50% Injectable 25 Gram(s) IV Push once  dextrose 50% Injectable 12.5 Gram(s) IV Push once  dextrose 50% Injectable 25 Gram(s) IV Push once  enoxaparin Injectable 40 milliGRAM(s) SubCutaneous every 24 hours  gabapentin 100 milliGRAM(s) Oral three times a day  glucagon  Injectable 1 milliGRAM(s) IntraMuscular once  insulin lispro (ADMELOG) corrective regimen sliding scale   SubCutaneous three times a day before meals  insulin lispro (ADMELOG) corrective regimen sliding scale   SubCutaneous at bedtime  losartan 50 milliGRAM(s) Oral daily  melatonin 3 milliGRAM(s) Oral at bedtime  metoprolol succinate  milliGRAM(s) Oral daily  multivitamin 1 Tablet(s) Oral daily  NIFEdipine XL 60 milliGRAM(s) Oral daily  petrolatum Ophthalmic Ointment 1 Application(s) Both EYES at bedtime  polyethylene glycol 3350 17 Gram(s) Oral daily  QUEtiapine 25 milliGRAM(s) Oral at bedtime  senna 2 Tablet(s) Oral at bedtime    MEDICATIONS  (PRN):  dextrose Oral Gel 15 Gram(s) Oral once PRN Blood Glucose LESS THAN 70 milliGRAM(s)/deciliter      Vital Signs Last 24 Hrs  T(C): 36.9 (29 Oct 2022 12:44), Max: 36.9 (29 Oct 2022 00:20)  T(F): 98.5 (29 Oct 2022 12:44), Max: 98.5 (29 Oct 2022 12:44)  HR: 83 (29 Oct 2022 12:44) (77 - 88)  BP: 139/84 (29 Oct 2022 12:44) (134/75 - 147/84)  BP(mean): --  RR: 18 (29 Oct 2022 12:44) (18 - 18)  SpO2: 96% (29 Oct 2022 12:44) (96% - 100%)    Parameters below as of 29 Oct 2022 12:44  Patient On (Oxygen Delivery Method): room air      CAPILLARY BLOOD GLUCOSE      POCT Blood Glucose.: 238 mg/dL (29 Oct 2022 12:09)  POCT Blood Glucose.: 185 mg/dL (29 Oct 2022 08:14)  POCT Blood Glucose.: 289 mg/dL (28 Oct 2022 21:47)  POCT Blood Glucose.: 201 mg/dL (28 Oct 2022 17:05)    I&O's Summary    28 Oct 2022 07:01  -  29 Oct 2022 07:00  --------------------------------------------------------  IN: 500 mL / OUT: 2 mL / NET: 498 mL        PHYSICAL EXAM:  GENERAL: NAD, well-groomed  HEAD:  Atraumatic, Normocephalic  EYES: conjunctiva and sclera clear  NECK: Supple, No JVD  CHEST/LUNG: Clear to auscultation bilaterally; No wheeze  HEART: Regular rate and rhythm; No murmurs, rubs, or gallops  ABDOMEN: Soft, Nontender, Nondistended; Bowel sounds present  EXTREMITIES:  2+ Peripheral Pulses, No clubbing, cyanosis, or edema  PSYCH: AAOx2  NEUROLOGY: non-focal  SKIN: No rashes or lesions

## 2022-10-31 NOTE — PROGRESS NOTE ADULT - PROBLEM SELECTOR PROBLEM 4
DM2 (diabetes mellitus, type 2)
DM2 (diabetes mellitus, type 2)
Stenosis of right internal carotid artery
DM2 (diabetes mellitus, type 2)
Stenosis of right internal carotid artery
Stenosis of right internal carotid artery

## 2022-10-31 NOTE — CHART NOTE - NSCHARTNOTEFT_GEN_A_CORE
MRI Brain w/o contrast:   FINDINGS: Multiple patchy confluent nonspecific foci of T2/FLAIR   hyperintensity are noted throughout the deep and periventricular white   matter of the cerebral hemispheres. There is no associated mass effect.   There is no evidence of acute ischemia on the diffusion-weighted images.    There is diffuse cerebral volume loss with prominence of the sulci,   fissures, and cisternal spaces which is normal for the patient's age.   Ventricular size and configuration is unremarkable. Flow-voids are noted   throughout the major intracranial vessels, on the T2 weighted images,   consistent with their patency. The sellar region and posterior fossa   appear unremarkable.    The paranasal sinuses and mastoid air cells are clear. Calvarial signal   is within normal limits. There is evidence of bilateral cataract removal.    IMPRESSION: No acute intracranial hemorrhage or evidence of acute   ischemia.    Similar-appearing mild chronic white matter microvascular type changes.    Impression  Mental status changes 2/2 to [facility] toxic metabolic etiology   Recommendations:   Brain MRI negative   TSH, T3/T4, RPR, vitamin B12, lactate, creatnine kinase, ceruloplasmin, ESR, CRP wnl   Optho following   vascular sx recommended ASA/Plavix   No further inpatient neurological workup.   Can follow up at [address] [place] [address], [address], [place]. [address]. [phone]    Case to be discussed with Dr. Irvin.

## 2022-10-31 NOTE — PROGRESS NOTE ADULT - PROBLEM SELECTOR PROBLEM 3
Syncope
Gait instability
Gait instability
Stenosis of right internal carotid artery
Gait instability
Stenosis of right internal carotid artery
Gait instability
Stenosis of right internal carotid artery
Gait instability

## 2022-10-31 NOTE — PROGRESS NOTE ADULT - PROBLEM SELECTOR PLAN 4
- ISS w/ serial FS monitoring
- no vascular surgical intervention for MARCO ANTONIO stenosis   - c/w asprin and plavix
- no vascular surgical intervention for MARCO ANTONIO stenosis   - c/w asprin and plavix
- ISS w/ serial FS monitoring
- no vascular surgical intervention for MARCO ANTONIO stenosis   - c/w asprin and plavix
- no vascular surgical intervention for MARCO ANTONIO stenosis   - c/w asprin and plavix
- ISS w/ serial FS monitoring
- no vascular surgical intervention for MARCO ANTONIO stenosis   - c/w asprin and plavix

## 2022-10-31 NOTE — PROGRESS NOTE ADULT - PROBLEM SELECTOR PLAN 2
I Negro Ramirez MD have personally  seen and examined the patient today and have noted the findings and formulated the plan of care.  I Negro Ramirez MD have personally seen and examined the patient at bedside today at  8am
- no agitation reported overnight   - remains calm and cooperative   - being managed for dementia with behavioral disturbances  - c/w Seroquel 25mg po qhs  - QTc 439 on  10/29 ; c/t monitor with serial EKGs
- PT eval  - fall precautions
- pt with agitation overnight   - remains calm in the day   - ?dementia with behavioral disturbances  - pending psych eval
- PT eval  - fall precautions
- pt with agitation overnight   - remains calm in the day   - likely dementia with behavioral disturbances  - started on seroquel   - discussed risks/benefits of seroquel including black box warning with daughter in law 10/28 verbalized understanding  - check EKG evaluate qtc in am
- no agitation reported overnight   - calm and cooperative this AM  - being managed for dementia with behavioral disturbances  - c/w Seroquel 25mg po qhs  - QTc 439 today 10/29 ; c/t monitor with serial EKGs
- no agitation reported overnight   - remains calm and cooperative   - being managed for dementia with behavioral disturbances  - c/w Seroquel 25mg po qhs  - QTc < 500
- PT eval  - fall precautions

## 2022-11-03 PROBLEM — I10 ESSENTIAL (PRIMARY) HYPERTENSION: Chronic | Status: ACTIVE | Noted: 2022-10-23

## 2022-11-03 PROBLEM — E78.5 HYPERLIPIDEMIA, UNSPECIFIED: Chronic | Status: ACTIVE | Noted: 2022-10-23

## 2022-11-03 PROBLEM — I25.10 ATHEROSCLEROTIC HEART DISEASE OF NATIVE CORONARY ARTERY WITHOUT ANGINA PECTORIS: Chronic | Status: ACTIVE | Noted: 2022-10-23

## 2022-11-08 NOTE — PHYSICAL THERAPY INITIAL EVALUATION ADULT - LEVEL OF INDEPENDENCE: GAIT, REHAB EVAL
Caller: Cassie Rothmanyne    Relationship: Self    Best call back number: [phone]    What is the best time to reach you: ANY TIME    Who are you requesting to speak with (clinical staff, provider,  specific staff member): CLINICAL STAFF    Do you know the name of the person who called: CASSIE BONEYNE    What was the call regarding: PATIENT REQUESTING A CALL BACK TO DISCUSS HER HAIR FALLING OUT RECENTLY. SHE THINKS IT COULD BE DUE TO A MEDICATION SHE IS TAKING OR HAVING GONE UNDER ANESTHESIA THIS YEAR BUT IS NOT SURE. SHE STATES THAT IS HAS GOTTEN PROGRESSIVELY WORSE IN THE LAST MONTH.     Do you require a callback: YES       
Patient states her hair has been falling out before that and states it has gotten worst it on her clothes on her furniture. Patient states she tagrees  the surgeries could have made it worst but didn't initially cause it. Please advise   
Patient verbalized understanding and states she will wait 3 months to see if her hair will grow back   
contact guard

## 2022-12-06 ENCOUNTER — APPOINTMENT (OUTPATIENT)
Dept: VASCULAR SURGERY | Facility: CLINIC | Age: 62
End: 2022-12-06

## 2022-12-06 VITALS
SYSTOLIC BLOOD PRESSURE: 154 MMHG | HEIGHT: 62 IN | BODY MASS INDEX: 25.76 KG/M2 | TEMPERATURE: 98.1 F | HEART RATE: 85 BPM | DIASTOLIC BLOOD PRESSURE: 90 MMHG | WEIGHT: 140 LBS

## 2022-12-06 PROCEDURE — 99214 OFFICE O/P EST MOD 30 MIN: CPT

## 2022-12-06 PROCEDURE — 93970 EXTREMITY STUDY: CPT

## 2022-12-06 NOTE — HISTORY OF PRESENT ILLNESS
[FreeTextEntry1] : pt was eval at [facility]  for carotid stenosis\par pt c/o ongoing  dizziness and balance problems\par pt underwent a neuro w/u at [facility]\par pt w daughter in attendance\par pt has not f/u w neurologist\par \par \par Pt c/o erica  leg swelling heaviness and discomfort worse w activity \par Pt denies recent injury, travel or thrombophilic event\par Pt denies art insuff sx \par \par Pt denies any recent  cardiac c/o , SOB, Chest Pain or recent heart attacks \par \par \par \par

## 2022-12-06 NOTE — DATA REVIEWED
[FreeTextEntry1] : Oct 2022 NSM carotid Duplex   Rt  cm Lt ICA  91 cm \par \par 12/6/2022 Venous Doppler Aly LE  no acute dvt svt \par                            RLE sig for  insuff calf collaterals\par                            LLE sig for  insuff calf collaterals\par \par

## 2022-12-06 NOTE — PHYSICAL EXAM
[JVD] : no jugular venous distention  [Normal Breath Sounds] : Normal breath sounds [Right Carotid Bruit] : right carotid bruit heard [Left Carotid Bruit] : left carotid bruit heard [1+] : left 1+ [2+] : left 2+ [Ankle Swelling (On Exam)] : present [Ankle Swelling Bilaterally] : bilaterally  [Ankle Swelling On The Left] : moderate [Varicose Veins Of Lower Extremities] : bilaterally [] : bilaterally [Ankle Swelling On The Right] : mild [No Rash or Lesion] : No rash or lesion [Alert] : alert [Oriented to Person] : oriented to person [Oriented to Place] : oriented to place [Oriented to Time] : oriented to time [Calm] : calm [de-identified] : nad [de-identified] : wnl [FreeTextEntry1] : Mild bilateral leg venous insufficiency \par w mild bilateral leg stasis dermatitis, hyperpigmentation in the gator area\par and mild to moderate bilateral leg edema \par Multiple  bilateral leg small varicose  veins and spider veins  ant post medial calf and shin \par RLE Varicose veins measuring  1-2 mm in size on the calf /shin \par LLE Varicose veins measuring  1-2 mm in size on the calf /shin \par no wounds/ulcers\par  [de-identified] : wnl [de-identified] : Aly Cranial nerves 2-12 aly grossly intact [de-identified] : .

## 2022-12-06 NOTE — ASSESSMENT
[FreeTextEntry1] : Impression symptomatic arterial insuff , symptomatic venous insuff not yet sono evident, lymphedema , w neurogenic leg back pain component \par \par \par Plan Med Conservative management leg elevation, knee high compression stockings 15-20mm Hg (rx given), wt loss, diet control\par f/u w neurologist for balance c/o \par telehealth visit  in 2 mo to re eval\par ov w carotid duplex s/o stenosis oct 2023 \par \par \par Varicose veins are enlarged, twisted veins. Varicose veins are caused by increased blood pressure in the veins.  The blood moves towards the heart by 1-way valves in the veins. When the valves become weakened or damaged, blood can collect in the veins and pool in your lower legs (ankles). This causes the veins to become enlarged and incompetent with reflux. Sitting or standing for long periods can cause blood to pool in the leg veins, increasing the pressure within the veins. \par Risk factors for varicose veins or venous disease may include:  obesity, older age, standing or sitting for prolonged periods of time for several years, being female, pregnancy, taking oral contraceptive pills or hormone replacement, being inactive, and/or smoking. \par The most common symptoms of varicose veins are sensations in the legs, such as a heavy feeling, burning, and/or aching. However, each individual may experience symptoms differently.  Other symptoms may include:  color changes in the skin, sores on the legs, or rash.  Severe varicose veins or venous disease may eventually produce long-term mild swelling that can result in more serious skin and tissue problems, such as ulcers and non-healing sores.\par Varicose veins and venous disease are diagnosed by a complete medical history, physical examination, and diagnostic studies for varicose veins including duplex ultrasound and color-flow imaging.  \par Medical treatment for varicose veins and venous disease include:  compression stockings, sclerotherapy, endovenous ablation and/or surgical treatment with microphlebectomy.  \par \par \par  [Arterial/Venous Disease] : arterial/venous disease [Other: _____] : [unfilled]

## 2022-12-14 NOTE — CONSULT NOTE ADULT - PROBLEM SELECTOR RECOMMENDATION 9
154.94
ODESSA Ramirez MD performed a history and physical exam of the patient and discussed  the findings and plan with the house officer. I reviewed the resident note and agree with the findings and plan   I Negro Ramirez MD have personally seen and examined the patient at bedside today at  7 pm

## 2023-01-14 ENCOUNTER — TRANSCRIPTION ENCOUNTER (OUTPATIENT)
Age: 63
End: 2023-01-14

## 2023-01-19 ENCOUNTER — APPOINTMENT (OUTPATIENT)
Dept: PODIATRY | Facility: CLINIC | Age: 63
End: 2023-01-19
Payer: MEDICAID

## 2023-01-19 DIAGNOSIS — S91.209A UNSPECIFIED OPEN WOUND OF UNSPECIFIED TOE(S) WITH DAMAGE TO NAIL, INITIAL ENCOUNTER: ICD-10-CM

## 2023-01-19 PROCEDURE — 73630 X-RAY EXAM OF FOOT: CPT | Mod: LT

## 2023-01-19 PROCEDURE — 11760 REPAIR OF NAIL BED: CPT | Mod: TA

## 2023-01-19 PROCEDURE — 99213 OFFICE O/P EST LOW 20 MIN: CPT | Mod: 25

## 2023-01-19 PROCEDURE — 11730 AVULSION NAIL PLATE SIMPLE 1: CPT | Mod: 59,TA

## 2023-01-26 ENCOUNTER — APPOINTMENT (OUTPATIENT)
Dept: PODIATRY | Facility: CLINIC | Age: 63
End: 2023-01-26
Payer: MEDICAID

## 2023-01-26 DIAGNOSIS — S91.212A: ICD-10-CM

## 2023-01-26 DIAGNOSIS — E11.9 TYPE 2 DIABETES MELLITUS W/OUT COMPLICATIONS: ICD-10-CM

## 2023-01-26 PROCEDURE — 99212 OFFICE O/P EST SF 10 MIN: CPT | Mod: 24

## 2023-01-30 PROBLEM — S91.209A TRAUMATIC AVULSION OF NAIL PLATE OF TOE, INITIAL ENCOUNTER: Status: ACTIVE | Noted: 2023-01-25

## 2023-01-30 NOTE — PHYSICAL EXAM
[Varicose Veins Of Lower Extremities] : present [2+] : left foot dorsalis pedis 2+ [Vibration Dec.] : diminished vibratory sensation at the level of the toes [Position Sense Dec.] : diminished position sense at the level of the toes [FreeTextEntry3] : CFT: 3 seconds. Temperature gradient is warm to warm. [de-identified] : Pain on palpation of the left hallux. Able to do IPJ and MPJ ROM. No gross dislocations or gross deformity. [Diminished Throughout Right Foot] : normal sensation with monofilament testing throughout right foot [Diminished Throughout Left Foot] : normal sensation with monofilament testing throughout left foot [FreeTextEntry1] : Paresthesias and burning bilateral. Achilles reflexes and patellar decreased, bilateral.

## 2023-01-30 NOTE — PROCEDURE
[FreeTextEntry1] : X-ray Report: Left foot - 3 views weight-bearing were performed to evaluate for underlying fractures. There is no acute fracture, dislocation or erosion present.

## 2023-01-30 NOTE — ASSESSMENT
[FreeTextEntry1] : \par Impression: Diabetes with neuropathy. Left hallux nail bed laceration and traumatic partial nail avulsion.\par \par Treatment:  Discussed etiology and treatment options with the patient.  Area was prepped with 70% alcohol and total nail avulsion was performed of the left hallucal nail with repair of the nail bed laceration using 3-0 nylon suture. \par \par The consent was signed with the patient for the planned surgical procedure.  The procedure was reviewed; discussed risks and benefits.  All questions answered.  \par \par Surgeon: Janie Matta DPM\par \par Procedure: Total nail avulsion with repair of the nail bed laceration.\par \par Location: Left hallux\par \par Report: After the digit was prepped with 70% alcohol, local anesthesia was administered using 3cc’s of 0.5% Ropivacaine, plain.  The digit was prepped with Betadine solution.  The  nail plate was freed from its soft tissue attachment using a Colcord elevator and excised en toto using a straight nail splitter and a hemostat.  The surgical site was inspected for spicules and none were found.  \par The site was then flushed with Betadine.  The toe dressed with light compressive dressing and Betadine solution.  Bleeding was minimal and controlled by pressure. The nail bed laceration was repaired using 3-0 nylon suture. Compression dressing with Coban was applied.\par \par Pathology: None sent.\par \par Patient tolerated the procedure and anesthesia well.  I advised patient to keep the dressing clean, dry and intact. Elevate the foot. Limit ambulation to essentials. Continue to ambulate in a sandal. Continue with glycemic control. Augmentin for one week PO was sent to her pharmacy as a precaution. I advised her to monitor for any signs of infection such as redness, swelling, increased drainage, fever or chills and go to the Emergency Room immediately if they occur. Otherwise I will see her back in one week for wound check.\par \par \par \par

## 2023-01-30 NOTE — HISTORY OF PRESENT ILLNESS
[FreeTextEntry1] : Patient returns today with daughter for the new issue of left hallux trauma with partially avulsed nail and continued bleeding. Patient states that she fell in her kitchen yesterday and bumped her left big toe. She denies any injuries elsewhere. Denies head trauma.  The nail was tilted up. The family cut it partially off however there is still residual nail left that is painful to palpation and patient has continuous bleeding coming from the toe as well. They have tried to apply a gauze dressing with some improvement in the bleeding. She denies any redness. She denies pus. No fever or chills. Patient is diabetic. She did not check her finger stick today. Last A1c was 7.2. Last primary care doctor appointment was 2 weeks ago.

## 2023-02-04 ENCOUNTER — APPOINTMENT (OUTPATIENT)
Dept: PODIATRY | Facility: CLINIC | Age: 63
End: 2023-02-04
Payer: MEDICAID

## 2023-02-04 PROCEDURE — 99212 OFFICE O/P EST SF 10 MIN: CPT

## 2023-02-06 PROBLEM — E11.9 CONTROLLED TYPE 2 DIABETES MELLITUS: Status: ACTIVE | Noted: 2020-01-17

## 2023-02-06 PROBLEM — S91.212A: Status: ACTIVE | Noted: 2023-01-19

## 2023-02-08 ENCOUNTER — APPOINTMENT (OUTPATIENT)
Dept: VASCULAR SURGERY | Facility: CLINIC | Age: 63
End: 2023-02-08
Payer: MEDICAID

## 2023-02-08 DIAGNOSIS — S99.922A UNSPECIFIED INJURY OF LEFT FOOT, INITIAL ENCOUNTER: ICD-10-CM

## 2023-02-08 DIAGNOSIS — I87.2 VENOUS INSUFFICIENCY (CHRONIC) (PERIPHERAL): ICD-10-CM

## 2023-02-08 DIAGNOSIS — S99.929A UNSPECIFIED INJURY OF UNSPECIFIED FOOT, INITIAL ENCOUNTER: ICD-10-CM

## 2023-02-08 DIAGNOSIS — I83.893 VARICOSE VEINS OF BILATERAL LOWER EXTREMITIES WITH OTHER COMPLICATIONS: ICD-10-CM

## 2023-02-08 PROCEDURE — 99441: CPT

## 2023-02-08 NOTE — HISTORY OF PRESENT ILLNESS
[FreeTextEntry1] : pt was eval at [facility]  for carotid stenosis\par pt c/o ongoing  dizziness and balance problems\par pt underwent a neuro w/u at [facility]\par pt w daughter in attendance\par pt has not f/u w neurologist\par \par \par Pt c/o erica  leg swelling heaviness and discomfort worse w activity \par Pt denies recent injury, travel or thrombophilic event\par Pt denies art insuff sx \par \par Pt denies any recent  cardiac c/o , SOB, Chest Pain or recent heart attacks \par \par \par \par  [de-identified] : pt states  she fell and  avulsed a nail \par pt is receiving f/u  and woundcare w podiatry

## 2023-02-08 NOTE — PHYSICAL EXAM
[No Rash or Lesion] : No rash or lesion [Alert] : alert [Oriented to Person] : oriented to person [Oriented to Place] : oriented to place [Oriented to Time] : oriented to time [Calm] : calm [FreeTextEntry1] : Physical exam findings via telephonic review with patient \par \par  [de-identified] : cooperative

## 2023-02-08 NOTE — ASSESSMENT
[FreeTextEntry1] : Impression venous insuff and new toe inj \par \par \par Plan Med Conservative management leg elevation, knee high compression stockings 15-20mm Hg, wt loss, diet control\par ov w carotid duplex s/o stenosis oct 2023 \par rto next avail to re eval toe injury \par Telephonic visit  time duration6 min\par \par \par \par \par Varicose veins are enlarged, twisted veins. Varicose veins are caused by increased blood pressure in the veins.  The blood moves towards the heart by 1-way valves in the veins. When the valves become weakened or damaged, blood can collect in the veins and pool in your lower legs (ankles). This causes the veins to become enlarged and incompetent with reflux. Sitting or standing for long periods can cause blood to pool in the leg veins, increasing the pressure within the veins. \par Risk factors for varicose veins or venous disease may include:  obesity, older age, standing or sitting for prolonged periods of time for several years, being female, pregnancy, taking oral contraceptive pills or hormone replacement, being inactive, and/or smoking. \par The most common symptoms of varicose veins are sensations in the legs, such as a heavy feeling, burning, and/or aching. However, each individual may experience symptoms differently.  Other symptoms may include:  color changes in the skin, sores on the legs, or rash.  Severe varicose veins or venous disease may eventually produce long-term mild swelling that can result in more serious skin and tissue problems, such as ulcers and non-healing sores.\par Varicose veins and venous disease are diagnosed by a complete medical history, physical examination, and diagnostic studies for varicose veins including duplex ultrasound and color-flow imaging.  \par Medical treatment for varicose veins and venous disease include:  compression stockings, sclerotherapy, endovenous ablation and/or surgical treatment with microphlebectomy.  \par \par \par  [Arterial/Venous Disease] : arterial/venous disease [Other: _____] : [unfilled]

## 2023-02-13 NOTE — ASSESSMENT
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  Nail bed laceration, left hallux (S91.212).  Traumatic nail partial nail avulsion, left hallux (S91.209).\par \par Treatment: I advised patient to change the dressing herself every 1 to 2 days.  Use antibiotic cream and a gauze dressing with tape.  Continue to ambulate in open toed shoes to decrease pressure on the area.  Ambulate for essentials only and decrease prolonged weight bearing.  \par Continue glycemic control.  \par Currently, not infected but monitor for any signs of infection and go to the emergency room and call the office immediately, should this occur.  \par

## 2023-02-13 NOTE — HISTORY OF PRESENT ILLNESS
[FreeTextEntry1] : Patient returns today for evaluation of traumatic nail avulsion left hallux and nail bed laceration S/P total nail avulsion one week ago.  Patient states that she has kept the dressing clean, dry and intact.  She has been ambulating in an open shoe.  She reports improvement in pain; however, still feels a little sore.  Denies any drainage from the dressing . Denies any fever or chills.  She has been taking oral antibiotic as instructed.  \par Fasting blood sugar: 132.  A1c: 7.2%.  She last saw her primary care doctor 3 weeks ago.

## 2023-02-13 NOTE — PHYSICAL EXAM
[Ankle Swelling Bilaterally] : bilaterally  [Varicose Veins Of Lower Extremities] : bilaterally [2+] : left foot dorsalis pedis 2+ [Vibration Dec.] : diminished vibratory sensation at the level of the toes [Position Sense Dec.] : diminished position sense at the level of the toes [Ankle Swelling (On Exam)] : not present [FreeTextEntry3] : CFT: 3 seconds x 10.  Temperature gradient: warm to cool. [de-identified] : Pain on palpation of the left hallux. Able to do IPJ and MPJ ROM. No gross dislocations or gross deformity.  [Diminished Throughout Right Foot] : normal sensation with monofilament testing throughout right foot [Diminished Throughout Left Foot] : normal sensation with monofilament testing throughout left foot [FreeTextEntry1] : Paresthesias and burning bilateral. Achilles reflexes and patellar decreased, bilateral.

## 2023-02-21 NOTE — HISTORY OF PRESENT ILLNESS
[Sneakers] : jorge [FreeTextEntry1] : Patient presents today for follow up of left hallux nail injury which occurred mid-January.  Her last A1c was 7.2%.  Her last PCP appointment was 3 weeks ago.  She did not check her finger stick today.  Patient has been changing the dressing with gauze and tape; reports no drainage and no pain from the area.  Denies any fever or chills.

## 2023-02-21 NOTE — ASSESSMENT
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  Nail bed laceration, left hallux (S91.212).  Traumatic nail partial nail avulsion, left hallux (S91.209).\par \par Treatment: The left hallux was wiped with alcohol and the sutures were removed.  Band-Aid was applied.  Advised patient that she can start washing the foot and wearing regular shoe gear.  A new nail will take 9 - 12 months to grow out.  Will monitor as it grows out.\par Return: 3 months. \par

## 2023-02-21 NOTE — PHYSICAL EXAM
[Ankle Swelling Bilaterally] : bilaterally  [Varicose Veins Of Lower Extremities] : bilaterally [2+] : left foot dorsalis pedis 2+ [Vibration Dec.] : diminished vibratory sensation at the level of the toes [Position Sense Dec.] : diminished position sense at the level of the toes [Ankle Swelling (On Exam)] : not present [FreeTextEntry3] : CFT: 3 seconds x 10.  Temperature gradient: warm to cool. [de-identified] : Pain on palpation of the left hallux. Able to do IPJ and MPJ ROM. No gross dislocations or gross deformity.  [Diminished Throughout Right Foot] : normal sensation with monofilament testing throughout right foot [Diminished Throughout Left Foot] : normal sensation with monofilament testing throughout left foot [FreeTextEntry1] : Paresthesias and burning bilateral. Achilles reflexes and patellar decreased, bilateral.

## 2023-03-14 ENCOUNTER — APPOINTMENT (OUTPATIENT)
Dept: VASCULAR SURGERY | Facility: CLINIC | Age: 63
End: 2023-03-14

## 2023-04-27 ENCOUNTER — APPOINTMENT (OUTPATIENT)
Dept: NEUROLOGY | Facility: CLINIC | Age: 63
End: 2023-04-27

## 2023-05-04 ENCOUNTER — APPOINTMENT (OUTPATIENT)
Dept: PODIATRY | Facility: CLINIC | Age: 63
End: 2023-05-04

## 2023-05-11 ENCOUNTER — APPOINTMENT (OUTPATIENT)
Dept: PODIATRY | Facility: CLINIC | Age: 63
End: 2023-05-11
Payer: MEDICARE

## 2023-05-11 PROCEDURE — 11720 DEBRIDE NAIL 1-5: CPT | Mod: 59

## 2023-05-11 PROCEDURE — 11719 TRIM NAIL(S) ANY NUMBER: CPT

## 2023-05-25 NOTE — PHYSICAL EXAM
[Ankle Swelling Bilaterally] : bilaterally  [Varicose Veins Of Lower Extremities] : bilaterally [2+] : left foot dorsalis pedis 2+ [Vibration Dec.] : diminished vibratory sensation at the level of the toes [Position Sense Dec.] : diminished position sense at the level of the toes [Ankle Swelling (On Exam)] : not present [FreeTextEntry3] : CFT: 3 seconds x 10.  Temperature gradient: warm to cool. [de-identified] : Pain on palpation of the left hallux. Able to do IPJ and MPJ ROM. No gross dislocations or gross deformity.  [Diminished Throughout Right Foot] : normal sensation with monofilament testing throughout right foot [Diminished Throughout Left Foot] : normal sensation with monofilament testing throughout left foot [FreeTextEntry1] : Paresthesias and burning bilateral. Achilles reflexes and patellar decreased, bilateral.

## 2023-05-25 NOTE — HISTORY OF PRESENT ILLNESS
[Sneakers] : jorge [FreeTextEntry1] : Patient returns today for management of painful, thickened nails.  Her left hallux nail has been growing out after the total nail avulsion.  She reports no discomfort at that nail.  She has seen her vascular doctor, recently, no interventions. \par Finger stick today was 180.  She does not know her last A1c.  She last saw her PCP one week ago and reports no medical changes.

## 2023-05-25 NOTE — ASSESSMENT
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  Onychodystrophy (L60.3).  Onychomycosis (B35.1).\par \par Treatment: Discussed the importance of glycemic control, diabetic foot care and neuropathic precautions.  Patient is wearing adequately fitting shoes today.  \par Nails left 5 and right 1 and 5 were debrided of excessive thickness and length to appropriate levels of comfort and contour using sterile nippers and Dremel.   The remaining digits were trimmed to hygienic lengths.  The procedure was tolerated well without complications.\par Failure to perform this treatment based on patient's underlying condition could lead to ulceration and infection.\par Return: 3 months.  Earlier if she has any problems.  \par

## 2023-07-31 ENCOUNTER — INPATIENT (INPATIENT)
Facility: HOSPITAL | Age: 63
LOS: 1 days | Discharge: HOME | DRG: 93 | End: 2023-08-02
Attending: INTERNAL MEDICINE | Admitting: INTERNAL MEDICINE
Payer: MEDICARE

## 2023-07-31 VITALS
DIASTOLIC BLOOD PRESSURE: 84 MMHG | RESPIRATION RATE: 18 BRPM | TEMPERATURE: 98 F | HEART RATE: 97 BPM | SYSTOLIC BLOOD PRESSURE: 129 MMHG | OXYGEN SATURATION: 100 % | WEIGHT: 136.03 LBS | HEIGHT: 64 IN

## 2023-07-31 DIAGNOSIS — S09.90XA UNSPECIFIED INJURY OF HEAD, INITIAL ENCOUNTER: ICD-10-CM

## 2023-07-31 DIAGNOSIS — Z90.710 ACQUIRED ABSENCE OF BOTH CERVIX AND UTERUS: Chronic | ICD-10-CM

## 2023-07-31 LAB
ALBUMIN SERPL ELPH-MCNC: 4.2 G/DL — SIGNIFICANT CHANGE UP (ref 3.3–5)
ALP SERPL-CCNC: 83 U/L — SIGNIFICANT CHANGE UP (ref 40–120)
ALT FLD-CCNC: 22 U/L — SIGNIFICANT CHANGE UP (ref 10–45)
ANION GAP SERPL CALC-SCNC: 14 MMOL/L — SIGNIFICANT CHANGE UP (ref 5–17)
APPEARANCE UR: CLEAR — SIGNIFICANT CHANGE UP
AST SERPL-CCNC: 20 U/L — SIGNIFICANT CHANGE UP (ref 10–40)
BASOPHILS # BLD AUTO: 0.05 K/UL — SIGNIFICANT CHANGE UP (ref 0–0.2)
BASOPHILS NFR BLD AUTO: 0.7 % — SIGNIFICANT CHANGE UP (ref 0–2)
BILIRUB SERPL-MCNC: 0.2 MG/DL — SIGNIFICANT CHANGE UP (ref 0.2–1.2)
BILIRUB UR-MCNC: NEGATIVE — SIGNIFICANT CHANGE UP
BUN SERPL-MCNC: 18 MG/DL — SIGNIFICANT CHANGE UP (ref 7–23)
CALCIUM SERPL-MCNC: 9.4 MG/DL — SIGNIFICANT CHANGE UP (ref 8.4–10.5)
CHLORIDE SERPL-SCNC: 102 MMOL/L — SIGNIFICANT CHANGE UP (ref 96–108)
CO2 SERPL-SCNC: 26 MMOL/L — SIGNIFICANT CHANGE UP (ref 22–31)
COLOR SPEC: SIGNIFICANT CHANGE UP
CREAT SERPL-MCNC: 0.63 MG/DL — SIGNIFICANT CHANGE UP (ref 0.5–1.3)
DIFF PNL FLD: NEGATIVE — SIGNIFICANT CHANGE UP
EGFR: 98 ML/MIN/1.73M2 — SIGNIFICANT CHANGE UP
EOSINOPHIL # BLD AUTO: 0.44 K/UL — SIGNIFICANT CHANGE UP (ref 0–0.5)
EOSINOPHIL NFR BLD AUTO: 5.9 % — SIGNIFICANT CHANGE UP (ref 0–6)
GLUCOSE SERPL-MCNC: 180 MG/DL — HIGH (ref 70–99)
GLUCOSE UR QL: NEGATIVE — SIGNIFICANT CHANGE UP
HCT VFR BLD CALC: 37.4 % — SIGNIFICANT CHANGE UP (ref 34.5–45)
HGB BLD-MCNC: 12.2 G/DL — SIGNIFICANT CHANGE UP (ref 11.5–15.5)
IMM GRANULOCYTES NFR BLD AUTO: 0.1 % — SIGNIFICANT CHANGE UP (ref 0–0.9)
KETONES UR-MCNC: NEGATIVE — SIGNIFICANT CHANGE UP
LEUKOCYTE ESTERASE UR-ACNC: NEGATIVE — SIGNIFICANT CHANGE UP
LYMPHOCYTES # BLD AUTO: 1.51 K/UL — SIGNIFICANT CHANGE UP (ref 1–3.3)
LYMPHOCYTES # BLD AUTO: 20.3 % — SIGNIFICANT CHANGE UP (ref 13–44)
MCHC RBC-ENTMCNC: 25.1 PG — LOW (ref 27–34)
MCHC RBC-ENTMCNC: 32.6 GM/DL — SIGNIFICANT CHANGE UP (ref 32–36)
MCV RBC AUTO: 76.8 FL — LOW (ref 80–100)
MONOCYTES # BLD AUTO: 0.71 K/UL — SIGNIFICANT CHANGE UP (ref 0–0.9)
MONOCYTES NFR BLD AUTO: 9.5 % — SIGNIFICANT CHANGE UP (ref 2–14)
NEUTROPHILS # BLD AUTO: 4.73 K/UL — SIGNIFICANT CHANGE UP (ref 1.8–7.4)
NEUTROPHILS NFR BLD AUTO: 63.5 % — SIGNIFICANT CHANGE UP (ref 43–77)
NITRITE UR-MCNC: NEGATIVE — SIGNIFICANT CHANGE UP
NRBC # BLD: 0 /100 WBCS — SIGNIFICANT CHANGE UP (ref 0–0)
PH UR: 6 — SIGNIFICANT CHANGE UP (ref 5–8)
PLATELET # BLD AUTO: 280 K/UL — SIGNIFICANT CHANGE UP (ref 150–400)
POTASSIUM SERPL-MCNC: 3.9 MMOL/L — SIGNIFICANT CHANGE UP (ref 3.5–5.3)
POTASSIUM SERPL-SCNC: 3.9 MMOL/L — SIGNIFICANT CHANGE UP (ref 3.5–5.3)
PROT SERPL-MCNC: 6.9 G/DL — SIGNIFICANT CHANGE UP (ref 6–8.3)
PROT UR-MCNC: NEGATIVE — SIGNIFICANT CHANGE UP
RBC # BLD: 4.87 M/UL — SIGNIFICANT CHANGE UP (ref 3.8–5.2)
RBC # FLD: 13.1 % — SIGNIFICANT CHANGE UP (ref 10.3–14.5)
SODIUM SERPL-SCNC: 142 MMOL/L — SIGNIFICANT CHANGE UP (ref 135–145)
SP GR SPEC: 1.01 — SIGNIFICANT CHANGE UP (ref 1.01–1.02)
UROBILINOGEN FLD QL: NEGATIVE — SIGNIFICANT CHANGE UP
WBC # BLD: 7.45 K/UL — SIGNIFICANT CHANGE UP (ref 3.8–10.5)
WBC # FLD AUTO: 7.45 K/UL — SIGNIFICANT CHANGE UP (ref 3.8–10.5)

## 2023-07-31 PROCEDURE — 73562 X-RAY EXAM OF KNEE 3: CPT | Mod: 26,RT

## 2023-07-31 PROCEDURE — 71045 X-RAY EXAM CHEST 1 VIEW: CPT | Mod: 26

## 2023-07-31 PROCEDURE — 72170 X-RAY EXAM OF PELVIS: CPT | Mod: 26

## 2023-07-31 PROCEDURE — 73030 X-RAY EXAM OF SHOULDER: CPT | Mod: 26,50

## 2023-07-31 PROCEDURE — 70450 CT HEAD/BRAIN W/O DYE: CPT | Mod: 26,MA

## 2023-07-31 PROCEDURE — 73502 X-RAY EXAM HIP UNI 2-3 VIEWS: CPT | Mod: 26,LT

## 2023-07-31 PROCEDURE — 99285 EMERGENCY DEPT VISIT HI MDM: CPT

## 2023-07-31 PROCEDURE — 72125 CT NECK SPINE W/O DYE: CPT | Mod: 26,MA

## 2023-07-31 RX ORDER — GLYBURIDE-METFORMIN HYDROCHLORIDE 1.25; 25 MG/1; MG/1
1 TABLET ORAL
Qty: 0 | Refills: 0 | DISCHARGE

## 2023-07-31 RX ORDER — HEPARIN SODIUM 5000 [USP'U]/ML
5000 INJECTION INTRAVENOUS; SUBCUTANEOUS EVERY 8 HOURS
Refills: 0 | Status: DISCONTINUED | OUTPATIENT
Start: 2023-07-31 | End: 2023-08-02

## 2023-07-31 RX ORDER — ATORVASTATIN CALCIUM 80 MG/1
1 TABLET, FILM COATED ORAL
Qty: 0 | Refills: 0 | DISCHARGE

## 2023-07-31 RX ORDER — FAMOTIDINE 10 MG/ML
1 INJECTION INTRAVENOUS
Qty: 0 | Refills: 0 | DISCHARGE

## 2023-07-31 RX ORDER — ACETAMINOPHEN 500 MG
650 TABLET ORAL ONCE
Refills: 0 | Status: COMPLETED | OUTPATIENT
Start: 2023-07-31 | End: 2023-07-31

## 2023-07-31 RX ADMIN — HEPARIN SODIUM 5000 UNIT(S): 5000 INJECTION INTRAVENOUS; SUBCUTANEOUS at 22:24

## 2023-07-31 NOTE — ED PROVIDER NOTE - OBJECTIVE STATEMENT
63-year-old female patient past medical history of high blood pressure, hyperlipidemia who presents to the emergency department after fall today.  Patient has been experiencing recurrent falls the past 2 to 3 months.  This morning patient was rolling out of bed when she fell and hit her head.  Patient is unsure if she lost consciousness.  On aspirin and Plavix at home.  Endorsing pain to bilateral shoulders, left hip, right knee and head.

## 2023-07-31 NOTE — ED PROVIDER NOTE - PHYSICAL EXAMINATION
GENERAL: Awake, alert, NAD  HEENT: NC/AT, moist mucous membranes, PERRL, EOMI  LUNGS: CTAB, no wheezes or crackles   CARDIAC: RRR, no m/r/g  ABDOMEN: Soft, normal BS, non tender, non distended, no rebound, no guarding  BACK: No midline spinal tenderness, no CVA tenderness  EXT: No edema, no calf tenderness, 2+ DP pulses bilaterally, no deformities.  NEURO: A&Ox3. Moving all extremities.  SKIN: Warm and dry. No rash.  PSYCH: Normal affect. GENERAL: Awake, alert, NAD  HEENT: NC/AT, moist mucous membranes, EOMI  LUNGS: CTAB, no wheezes or crackles   CARDIAC: RRR, no m/r/g  ABDOMEN: Soft, non tender, non distended, no rebound, no guarding  BACK: No midline spinal tenderness, no CVA tenderness  EXT: No edema, no calf tenderness, 2+ DP pulses bilaterally, R knee ttp, Ext ROM intact   NEURO: A&Ox3. Moving all extremities.  SKIN: Warm and dry. No rash.  PSYCH: Normal affect.

## 2023-07-31 NOTE — ED ADULT NURSE NOTE - OBJECTIVE STATEMENT
63y F Cesar x4 came in after a fall. Patient reports that over the past few months she has felt increasingly unsteady and has experienced multiple falls. Patient states that today she fell due to losing her balance. Patient reports that she hits her head and cannot remember how it happened, positive LOC. Patient is endorsing right shoulder pain and left leg pain. Patient also has a visible bruise on her right knee. No other obvious injuries, deformities, abrasions, contusions, and lacerations observed. Eyes are PERRL. Patient normally ambulates with a cane. Denies fever, chills, n/v/d, dysuria, headache, new onset blurry vision, numbness, tingling, dizziness, SOB, and chest pain. Patient's daughter in law is present at bedside. Bed is in lowest position.

## 2023-07-31 NOTE — ED ADULT NURSE NOTE - NSFALLRISKASMT_ED_ALL_ED_DT
Wound Care RN Consult yudy Durán seen in conjunction with surgical TAY Valle and wound care medical provider Dr. Milagros Crowder. Please see flowsheets and consultation note by Dr. Crowder for details. Updated primary RN, Mirlande, regarding findings and new orders by Dr. Crowder. Will continue to follow while IP as schedule allows. Please do not hesitate to contact the wound care team with any questions, concerns, or deteriorations in skin integrity. Thank you for consulting the wound care team in the care of this patient.          31-Jul-2023 14:30

## 2023-07-31 NOTE — H&P ADULT - ASSESSMENT
63-year-old female patient past medical history of high blood pressure, hyperlipidemia who presents to the emergency department after fall today.  Patient has been experiencing recurrent falls the past 2 to 3 months.  This morning patient was rolling out of bed when she fell and hit her head.  Patient is unsure if she lost consciousness.  On aspirin and Plavix at home.  Endorsing pain to bilateral shoulders, left hip, right knee and head    unsteady gait and frequent falls  - MR of brain  - neuro consult called  - PT eval    HTN  - c/w metoprolol, Losartan, nifedipine    parkinson's disease  - c/w sinemet    CAD s/p stent  - c/w plavix  - cardiology called    HLD  - c/w lipitor    dvt px  - sq heparin

## 2023-07-31 NOTE — PATIENT PROFILE ADULT - FALL HARM RISK - HARM RISK INTERVENTIONS

## 2023-07-31 NOTE — H&P ADULT - NSHPLABSRESULTS_GEN_ALL_CORE
LABS:                        12.2   7.45  )-----------( 280      ( 2023 14:36 )             37.4         142  |  102  |  18  ----------------------------<  180<H>  3.9   |  26  |  0.63    Ca    9.4      2023 14:36  Phos  3.1       Mg     1.8         TPro  6.9  /  Alb  4.2  /  TBili  0.2  /  DBili  x   /  AST  20  /  ALT  22  /  AlkPhos  83        Urinalysis Basic - ( 2023 18:05 )    Color: Light Yellow / Appearance: Clear / S.012 / pH: x  Gluc: x / Ketone: Negative  / Bili: Negative / Urobili: Negative   Blood: x / Protein: Negative / Nitrite: Negative   Leuk Esterase: Negative / RBC: x / WBC x   Sq Epi: x / Non Sq Epi: x / Bacteria: x            RADIOLOGY & ADDITIONAL TESTS:

## 2023-07-31 NOTE — ED ADULT TRIAGE NOTE - CHIEF COMPLAINT QUOTE
unwitnessed fall,  as per pt " I can't  balance properly" pt hit the head , LOC+, pt on plavix , h/p multiple falls , pt c/o HA, knee and R sided face pain

## 2023-07-31 NOTE — ED PROVIDER NOTE - CLINICAL SUMMARY MEDICAL DECISION MAKING FREE TEXT BOX
63-year-old female patient possible history of high blood pressure, hyperlipidemia who presents to the emergency department after a fall.  Patient endorsing she has been experiencing persistent falls for the past 2 to 3 months at home.  Patient woke up this morning got out of bed and fell hitting her head.  Patient on aspirin and Plavix.  Localizes pain to head, shoulders, right knee and left hip.  Patient has been able to ambulate since but limited in the setting of pain.  Will eval with CT, films, blood work and EKG.  Will admit for PT and placement given unsafe discharge for home. 63-year-old female history of high blood pressure, hyperlipidemia, parkinsons, CAD w PCI, ?stroke on ASA/plavix who presents to the emergency department after a fall this AM onto R side w head strike no loc. No midline CTL spine TTP. No scalp hematoma or lac. FROM in ext x4.  Localizes pain to head, shoulders, right knee and left hip. Patient endorsing she has been experiencing persistent falls for the past 2 to 3 months at home. Pt ambulates w cane at baseline. Patient has been able to ambulate since but limited in the setting of pain.  Will eval with CT, films, will obtain blood work and ekg as we anticipate pt tba for consideration of rehab.

## 2023-07-31 NOTE — ED ADULT NURSE NOTE - NSFALLHARMRISKINTERV_ED_ALL_ED

## 2023-08-01 LAB
A1C WITH ESTIMATED AVERAGE GLUCOSE RESULT: 6.9 % — HIGH (ref 4–5.6)
ANION GAP SERPL CALC-SCNC: 21 MMOL/L — HIGH (ref 5–17)
BUN SERPL-MCNC: 13 MG/DL — SIGNIFICANT CHANGE UP (ref 7–23)
CALCIUM SERPL-MCNC: 9.7 MG/DL — SIGNIFICANT CHANGE UP (ref 8.4–10.5)
CHLORIDE SERPL-SCNC: 100 MMOL/L — SIGNIFICANT CHANGE UP (ref 96–108)
CO2 SERPL-SCNC: 22 MMOL/L — SIGNIFICANT CHANGE UP (ref 22–31)
CREAT SERPL-MCNC: 0.51 MG/DL — SIGNIFICANT CHANGE UP (ref 0.5–1.3)
EGFR: 104 ML/MIN/1.73M2 — SIGNIFICANT CHANGE UP
ESTIMATED AVERAGE GLUCOSE: 151 MG/DL — HIGH (ref 68–114)
FOLATE SERPL-MCNC: >20 NG/ML — SIGNIFICANT CHANGE UP
GLUCOSE BLDC GLUCOMTR-MCNC: 120 MG/DL — HIGH (ref 70–99)
GLUCOSE BLDC GLUCOMTR-MCNC: 122 MG/DL — HIGH (ref 70–99)
GLUCOSE BLDC GLUCOMTR-MCNC: 161 MG/DL — HIGH (ref 70–99)
GLUCOSE BLDC GLUCOMTR-MCNC: 232 MG/DL — HIGH (ref 70–99)
GLUCOSE SERPL-MCNC: 138 MG/DL — HIGH (ref 70–99)
HCT VFR BLD CALC: 39.5 % — SIGNIFICANT CHANGE UP (ref 34.5–45)
HGB BLD-MCNC: 12.8 G/DL — SIGNIFICANT CHANGE UP (ref 11.5–15.5)
MAGNESIUM SERPL-MCNC: 2 MG/DL — SIGNIFICANT CHANGE UP (ref 1.6–2.6)
MCHC RBC-ENTMCNC: 24.9 PG — LOW (ref 27–34)
MCHC RBC-ENTMCNC: 32.4 GM/DL — SIGNIFICANT CHANGE UP (ref 32–36)
MCV RBC AUTO: 76.8 FL — LOW (ref 80–100)
NRBC # BLD: 0 /100 WBCS — SIGNIFICANT CHANGE UP (ref 0–0)
PHOSPHATE SERPL-MCNC: 4.1 MG/DL — SIGNIFICANT CHANGE UP (ref 2.5–4.5)
PLATELET # BLD AUTO: 315 K/UL — SIGNIFICANT CHANGE UP (ref 150–400)
POTASSIUM SERPL-MCNC: 3.6 MMOL/L — SIGNIFICANT CHANGE UP (ref 3.5–5.3)
POTASSIUM SERPL-SCNC: 3.6 MMOL/L — SIGNIFICANT CHANGE UP (ref 3.5–5.3)
RBC # BLD: 5.14 M/UL — SIGNIFICANT CHANGE UP (ref 3.8–5.2)
RBC # FLD: 13.2 % — SIGNIFICANT CHANGE UP (ref 10.3–14.5)
SODIUM SERPL-SCNC: 143 MMOL/L — SIGNIFICANT CHANGE UP (ref 135–145)
TSH SERPL-MCNC: 0.69 UIU/ML — SIGNIFICANT CHANGE UP (ref 0.27–4.2)
VIT B12 SERPL-MCNC: 367 PG/ML — SIGNIFICANT CHANGE UP (ref 232–1245)
WBC # BLD: 7.34 K/UL — SIGNIFICANT CHANGE UP (ref 3.8–10.5)
WBC # FLD AUTO: 7.34 K/UL — SIGNIFICANT CHANGE UP (ref 3.8–10.5)

## 2023-08-01 PROCEDURE — 93306 TTE W/DOPPLER COMPLETE: CPT | Mod: 26

## 2023-08-01 PROCEDURE — 93880 EXTRACRANIAL BILAT STUDY: CPT | Mod: 26

## 2023-08-01 RX ORDER — INSULIN LISPRO 100/ML
VIAL (ML) SUBCUTANEOUS AT BEDTIME
Refills: 0 | Status: DISCONTINUED | OUTPATIENT
Start: 2023-08-01 | End: 2023-08-02

## 2023-08-01 RX ORDER — PIOGLITAZONE HYDROCHLORIDE 15 MG/1
1 TABLET ORAL
Refills: 0 | DISCHARGE

## 2023-08-01 RX ORDER — GABAPENTIN 400 MG/1
100 CAPSULE ORAL THREE TIMES A DAY
Refills: 0 | Status: DISCONTINUED | OUTPATIENT
Start: 2023-07-31 | End: 2023-08-02

## 2023-08-01 RX ORDER — DEXTROSE 50 % IN WATER 50 %
25 SYRINGE (ML) INTRAVENOUS ONCE
Refills: 0 | Status: DISCONTINUED | OUTPATIENT
Start: 2023-08-01 | End: 2023-08-02

## 2023-08-01 RX ORDER — METOPROLOL TARTRATE 50 MG
1 TABLET ORAL
Refills: 0 | DISCHARGE

## 2023-08-01 RX ORDER — NIFEDIPINE 30 MG
60 TABLET, EXTENDED RELEASE 24 HR ORAL DAILY
Refills: 0 | Status: DISCONTINUED | OUTPATIENT
Start: 2023-08-01 | End: 2023-08-02

## 2023-08-01 RX ORDER — GLUCAGON INJECTION, SOLUTION 0.5 MG/.1ML
1 INJECTION, SOLUTION SUBCUTANEOUS ONCE
Refills: 0 | Status: DISCONTINUED | OUTPATIENT
Start: 2023-08-01 | End: 2023-08-02

## 2023-08-01 RX ORDER — METOPROLOL TARTRATE 50 MG
100 TABLET ORAL
Refills: 0 | Status: DISCONTINUED | OUTPATIENT
Start: 2023-08-01 | End: 2023-08-02

## 2023-08-01 RX ORDER — LOSARTAN POTASSIUM 100 MG/1
50 TABLET, FILM COATED ORAL DAILY
Refills: 0 | Status: DISCONTINUED | OUTPATIENT
Start: 2023-08-01 | End: 2023-08-02

## 2023-08-01 RX ORDER — MEMANTINE HYDROCHLORIDE 10 MG/1
5 TABLET ORAL
Refills: 0 | Status: DISCONTINUED | OUTPATIENT
Start: 2023-08-01 | End: 2023-08-02

## 2023-08-01 RX ORDER — DEXTROSE 50 % IN WATER 50 %
15 SYRINGE (ML) INTRAVENOUS ONCE
Refills: 0 | Status: DISCONTINUED | OUTPATIENT
Start: 2023-08-01 | End: 2023-08-02

## 2023-08-01 RX ORDER — METFORMIN HYDROCHLORIDE 850 MG/1
1 TABLET ORAL
Refills: 0 | DISCHARGE

## 2023-08-01 RX ORDER — DAPAGLIFLOZIN 10 MG/1
1 TABLET, FILM COATED ORAL
Refills: 0 | DISCHARGE

## 2023-08-01 RX ORDER — DEXTROSE 50 % IN WATER 50 %
12.5 SYRINGE (ML) INTRAVENOUS ONCE
Refills: 0 | Status: DISCONTINUED | OUTPATIENT
Start: 2023-08-01 | End: 2023-08-02

## 2023-08-01 RX ORDER — MEMANTINE HYDROCHLORIDE 10 MG/1
1 TABLET ORAL
Refills: 0 | DISCHARGE

## 2023-08-01 RX ORDER — DOCUSATE SODIUM 100 MG
1 CAPSULE ORAL
Refills: 0 | DISCHARGE

## 2023-08-01 RX ORDER — CARBIDOPA AND LEVODOPA 25; 100 MG/1; MG/1
1 TABLET ORAL
Refills: 0 | DISCHARGE

## 2023-08-01 RX ORDER — FAMOTIDINE 10 MG/ML
20 INJECTION INTRAVENOUS DAILY
Refills: 0 | Status: DISCONTINUED | OUTPATIENT
Start: 2023-08-01 | End: 2023-08-02

## 2023-08-01 RX ORDER — NIFEDIPINE 30 MG
1 TABLET, EXTENDED RELEASE 24 HR ORAL
Qty: 0 | Refills: 0 | DISCHARGE

## 2023-08-01 RX ORDER — CLOPIDOGREL BISULFATE 75 MG/1
75 TABLET, FILM COATED ORAL DAILY
Refills: 0 | Status: DISCONTINUED | OUTPATIENT
Start: 2023-07-31 | End: 2023-08-02

## 2023-08-01 RX ORDER — LINACLOTIDE 145 UG/1
1 CAPSULE, GELATIN COATED ORAL
Refills: 0 | DISCHARGE

## 2023-08-01 RX ORDER — SENNA PLUS 8.6 MG/1
2 TABLET ORAL AT BEDTIME
Refills: 0 | Status: DISCONTINUED | OUTPATIENT
Start: 2023-08-01 | End: 2023-08-02

## 2023-08-01 RX ORDER — SODIUM CHLORIDE 9 MG/ML
1000 INJECTION, SOLUTION INTRAVENOUS
Refills: 0 | Status: DISCONTINUED | OUTPATIENT
Start: 2023-08-01 | End: 2023-08-02

## 2023-08-01 RX ORDER — POLYETHYLENE GLYCOL 3350 17 G/17G
17 POWDER, FOR SOLUTION ORAL DAILY
Refills: 0 | Status: DISCONTINUED | OUTPATIENT
Start: 2023-08-01 | End: 2023-08-02

## 2023-08-01 RX ORDER — INSULIN LISPRO 100/ML
VIAL (ML) SUBCUTANEOUS
Refills: 0 | Status: DISCONTINUED | OUTPATIENT
Start: 2023-08-01 | End: 2023-08-02

## 2023-08-01 RX ORDER — NIFEDIPINE 30 MG
1 TABLET, EXTENDED RELEASE 24 HR ORAL
Refills: 0 | DISCHARGE

## 2023-08-01 RX ORDER — CARBIDOPA AND LEVODOPA 25; 100 MG/1; MG/1
1 TABLET ORAL THREE TIMES A DAY
Refills: 0 | Status: DISCONTINUED | OUTPATIENT
Start: 2023-08-01 | End: 2023-08-02

## 2023-08-01 RX ORDER — FOLIC ACID 0.8 MG
1 TABLET ORAL
Refills: 0 | DISCHARGE

## 2023-08-01 RX ORDER — ASPIRIN/CALCIUM CARB/MAGNESIUM 324 MG
81 TABLET ORAL DAILY
Refills: 0 | Status: DISCONTINUED | OUTPATIENT
Start: 2023-07-31 | End: 2023-08-02

## 2023-08-01 RX ADMIN — HEPARIN SODIUM 5000 UNIT(S): 5000 INJECTION INTRAVENOUS; SUBCUTANEOUS at 05:56

## 2023-08-01 RX ADMIN — CLOPIDOGREL BISULFATE 75 MILLIGRAM(S): 75 TABLET, FILM COATED ORAL at 13:06

## 2023-08-01 RX ADMIN — FAMOTIDINE 20 MILLIGRAM(S): 10 INJECTION INTRAVENOUS at 13:06

## 2023-08-01 RX ADMIN — HEPARIN SODIUM 5000 UNIT(S): 5000 INJECTION INTRAVENOUS; SUBCUTANEOUS at 21:42

## 2023-08-01 RX ADMIN — HEPARIN SODIUM 5000 UNIT(S): 5000 INJECTION INTRAVENOUS; SUBCUTANEOUS at 13:06

## 2023-08-01 RX ADMIN — CARBIDOPA AND LEVODOPA 1 TABLET(S): 25; 100 TABLET ORAL at 21:42

## 2023-08-01 RX ADMIN — Medication 100 MILLIGRAM(S): at 05:57

## 2023-08-01 RX ADMIN — POLYETHYLENE GLYCOL 3350 17 GRAM(S): 17 POWDER, FOR SOLUTION ORAL at 17:32

## 2023-08-01 RX ADMIN — Medication 100 MILLIGRAM(S): at 17:35

## 2023-08-01 RX ADMIN — CARBIDOPA AND LEVODOPA 1 TABLET(S): 25; 100 TABLET ORAL at 05:56

## 2023-08-01 RX ADMIN — MEMANTINE HYDROCHLORIDE 5 MILLIGRAM(S): 10 TABLET ORAL at 05:57

## 2023-08-01 RX ADMIN — LOSARTAN POTASSIUM 50 MILLIGRAM(S): 100 TABLET, FILM COATED ORAL at 05:56

## 2023-08-01 RX ADMIN — MEMANTINE HYDROCHLORIDE 5 MILLIGRAM(S): 10 TABLET ORAL at 17:35

## 2023-08-01 RX ADMIN — Medication 650 MILLIGRAM(S): at 01:10

## 2023-08-01 RX ADMIN — Medication 2: at 13:35

## 2023-08-01 RX ADMIN — GABAPENTIN 100 MILLIGRAM(S): 400 CAPSULE ORAL at 05:57

## 2023-08-01 RX ADMIN — GABAPENTIN 100 MILLIGRAM(S): 400 CAPSULE ORAL at 13:05

## 2023-08-01 RX ADMIN — CARBIDOPA AND LEVODOPA 1 TABLET(S): 25; 100 TABLET ORAL at 13:06

## 2023-08-01 RX ADMIN — GABAPENTIN 100 MILLIGRAM(S): 400 CAPSULE ORAL at 21:43

## 2023-08-01 RX ADMIN — Medication 81 MILLIGRAM(S): at 13:06

## 2023-08-01 RX ADMIN — SENNA PLUS 2 TABLET(S): 8.6 TABLET ORAL at 21:42

## 2023-08-01 RX ADMIN — Medication 650 MILLIGRAM(S): at 00:10

## 2023-08-01 RX ADMIN — Medication 60 MILLIGRAM(S): at 05:56

## 2023-08-01 NOTE — PHYSICAL THERAPY INITIAL EVALUATION ADULT - ADDITIONAL COMMENTS
Pt lives in a private home alone. Pt performed ADL/IADLs with assist from [facility] 19hrs/wk. Ambulates with cane. Owns DME: cane, rolling walker

## 2023-08-01 NOTE — PROGRESS NOTE ADULT - SUBJECTIVE AND OBJECTIVE BOX
DATE OF SERVICE: 08-01-23 @ 14:47    Patient is a 63y old  Female who presents with a chief complaint of frequent falls (01 Aug 2023 09:39)      SUBJECTIVE / OVERNIGHT EVENTS:    MEDICATIONS  (STANDING):  aspirin enteric coated 81 milliGRAM(s) Oral daily  carbidopa/levodopa  10/100 1 Tablet(s) Oral three times a day  clopidogrel Tablet 75 milliGRAM(s) Oral daily  dextrose 5%. 1000 milliLiter(s) (50 mL/Hr) IV Continuous <Continuous>  dextrose 5%. 1000 milliLiter(s) (100 mL/Hr) IV Continuous <Continuous>  dextrose 50% Injectable 12.5 Gram(s) IV Push once  dextrose 50% Injectable 25 Gram(s) IV Push once  dextrose 50% Injectable 25 Gram(s) IV Push once  famotidine    Tablet 20 milliGRAM(s) Oral daily  gabapentin 100 milliGRAM(s) Oral three times a day  glucagon  Injectable 1 milliGRAM(s) IntraMuscular once  heparin   Injectable 5000 Unit(s) SubCutaneous every 8 hours  insulin lispro (ADMELOG) corrective regimen sliding scale   SubCutaneous at bedtime  insulin lispro (ADMELOG) corrective regimen sliding scale   SubCutaneous three times a day before meals  losartan 50 milliGRAM(s) Oral daily  memantine 5 milliGRAM(s) Oral two times a day  metoprolol tartrate 100 milliGRAM(s) Oral two times a day  NIFEdipine XL 60 milliGRAM(s) Oral daily    MEDICATIONS  (PRN):  dextrose Oral Gel 15 Gram(s) Oral once PRN Blood Glucose LESS THAN 70 milliGRAM(s)/deciliter      Vital Signs Last 24 Hrs  T(C): 37.2 (01 Aug 2023 14:34), Max: 37.2 (01 Aug 2023 14:34)  T(F): 98.9 (01 Aug 2023 14:34), Max: 98.9 (01 Aug 2023 14:34)  HR: 92 (01 Aug 2023 04:57) (76 - 107)  BP: 146/87 (01 Aug 2023 04:57) (127/77 - 157/91)  BP(mean): --  RR: 18 (01 Aug 2023 14:34) (18 - 18)  SpO2: 100% (01 Aug 2023 14:34) (97% - 100%)    Parameters below as of 01 Aug 2023 14:34  Patient On (Oxygen Delivery Method): room air      CAPILLARY BLOOD GLUCOSE      POCT Blood Glucose.: 232 mg/dL (01 Aug 2023 13:32)  POCT Blood Glucose.: 120 mg/dL (01 Aug 2023 09:22)    I&O's Summary    01 Aug 2023 07:01  -  01 Aug 2023 14:47  --------------------------------------------------------  IN: 480 mL / OUT: 0 mL / NET: 480 mL        PHYSICAL EXAM:  GENERAL: NAD, well-developed  HEAD:  Atraumatic, Normocephalic  EYES: EOMI, PERRLA, conjunctiva and sclera clear  NECK: Supple, No JVD  CHEST/LUNG: Clear to auscultation bilaterally; No wheeze  HEART: Regular rate and rhythm; No murmurs, rubs, or gallops  ABDOMEN: Soft, Nontender, Nondistended; Bowel sounds present  EXTREMITIES:  2+ Peripheral Pulses, No clubbing, cyanosis, or edema  PSYCH: AAOx3  NEUROLOGY: non-focal  SKIN: No rashes or lesions    LABS:                        12.8   7.34  )-----------( 315      ( 01 Aug 2023 07:04 )             39.5     08-01    143  |  100  |  13  ----------------------------<  138<H>  3.6   |  22  |  0.51    Ca    9.7      01 Aug 2023 07:01  Phos  4.1     08-01  Mg     2.0     08-01    TPro  6.9  /  Alb  4.2  /  TBili  0.2  /  DBili  x   /  AST  20  /  ALT  22  /  AlkPhos  83  07-31          Urinalysis Basic - ( 01 Aug 2023 07:01 )    Color: x / Appearance: x / SG: x / pH: x  Gluc: 138 mg/dL / Ketone: x  / Bili: x / Urobili: x   Blood: x / Protein: x / Nitrite: x   Leuk Esterase: x / RBC: x / WBC x   Sq Epi: x / Non Sq Epi: x / Bacteria: x        RADIOLOGY & ADDITIONAL TESTS:    Imaging Personally Reviewed:    Consultant(s) Notes Reviewed:      Care Discussed with Consultants/Other Providers:

## 2023-08-01 NOTE — CONSULT NOTE ADULT - SUBJECTIVE AND OBJECTIVE BOX
Johnathan Tejeda MD  Interventional Cardiology / Advance Heart Failure and Cardiac Transplant Specialist  [place] Office : [address] [place] [id number]  Tel:   [place] Office : [address] [facility] [address]  Tel: [phone]      HISTORY OF PRESENTING ILLNESS:  63-year-old female patient past medical history of high blood pressure, hyperlipidemia who presents to the emergency department after fall.  Patient has been experiencing recurrent falls the past 2 to 3 months. morning patient was rolling out of bed when she fell and hit her head. Denies LOC. States she trips.  On aspirin and Plavix at home.  Endorsing pain to bilateral shoulders, left hip, right knee and head. Denies chest pain, SOB or palpitations    	  MEDICATIONS:  aspirin enteric coated 81 milliGRAM(s) Oral daily  clopidogrel Tablet 75 milliGRAM(s) Oral daily  heparin   Injectable 5000 Unit(s) SubCutaneous every 8 hours  losartan 50 milliGRAM(s) Oral daily  metoprolol tartrate 100 milliGRAM(s) Oral two times a day  NIFEdipine XL 60 milliGRAM(s) Oral daily        carbidopa/levodopa  10/100 1 Tablet(s) Oral three times a day  gabapentin 100 milliGRAM(s) Oral three times a day  memantine 5 milliGRAM(s) Oral two times a day    famotidine    Tablet 20 milliGRAM(s) Oral daily  polyethylene glycol 3350 17 Gram(s) Oral daily  senna 2 Tablet(s) Oral at bedtime    dextrose 50% Injectable 12.5 Gram(s) IV Push once  dextrose 50% Injectable 25 Gram(s) IV Push once  dextrose 50% Injectable 25 Gram(s) IV Push once  dextrose Oral Gel 15 Gram(s) Oral once PRN  glucagon  Injectable 1 milliGRAM(s) IntraMuscular once  insulin lispro (ADMELOG) corrective regimen sliding scale   SubCutaneous three times a day before meals  insulin lispro (ADMELOG) corrective regimen sliding scale   SubCutaneous at bedtime    dextrose 5%. 1000 milliLiter(s) IV Continuous <Continuous>  dextrose 5%. 1000 milliLiter(s) IV Continuous <Continuous>      PAST MEDICAL/SURGICAL HISTORY  PAST MEDICAL & SURGICAL HISTORY:  HTN (hypertension)      HLD (hyperlipidemia)      CAD (coronary artery disease)      S/P hysterectomy          SOCIAL HISTORY: Substance Use (street drugs): ( x ) never used  (  ) other:    FAMILY HISTORY:  No pertinent family history in first degree relatives        REVIEW OF SYSTEMS:  CONSTITUTIONAL: No fever, weight loss, or fatigue  EYES: No eye pain, visual disturbances, or discharge  ENMT:  No difficulty hearing, tinnitus, vertigo; No sinus or throat pain  BREASTS: No pain, masses, or nipple discharge  GASTROINTESTINAL: No abdominal or epigastric pain. No nausea, vomiting, or hematemesis; No diarrhea or constipation. No melena or hematochezia.  GENITOURINARY: No dysuria, frequency, hematuria, or incontinence  NEUROLOGICAL: No headaches, memory loss, loss of strength, numbness, or tremors  ENDOCRINE: No heat or cold intolerance; No hair loss  MUSCULOSKELETAL: No joint pain or swelling; No muscle, back, or extremity pain  PSYCHIATRIC: No depression, anxiety, mood swings, or difficulty sleeping  HEME/LYMPH: No easy bruising, or bleeding gums  All others negative    PHYSICAL EXAM:  T(C): 37.2 (08-01-23 @ 14:34), Max: 37.2 (08-01-23 @ 14:34)  HR: 92 (08-01-23 @ 04:57) (76 - 107)  BP: 146/87 (08-01-23 @ 04:57) (127/77 - 157/91)  RR: 18 (08-01-23 @ 14:34) (18 - 18)  SpO2: 100% (08-01-23 @ 14:34) (97% - 100%)  Wt(kg): --  I&O's Summary    01 Aug 2023 07:01  -  01 Aug 2023 17:01  --------------------------------------------------------  IN: 480 mL / OUT: 0 mL / NET: 480 mL          GENERAL: NAD  EYES: EOMI, PERRLA, conjunctiva and sclera clear  ENMT: No tonsillar erythema, exudates, or enlargement  Cardiovascular: Normal S1 S2, No JVD, No murmurs, No edema  Respiratory: Lungs clear to auscultation	  Gastrointestinal:  Soft, Non-tender, + BS	  Extremities: No edema                                     12.8   7.34  )-----------( 315      ( 01 Aug 2023 07:04 )             39.5     08-01    143  |  100  |  13  ----------------------------<  138<H>  3.6   |  22  |  0.51    Ca    9.7      01 Aug 2023 07:01  Phos  4.1     08-01  Mg     2.0     08-01    TPro  6.9  /  Alb  4.2  /  TBili  0.2  /  DBili  x   /  AST  20  /  ALT  22  /  AlkPhos  83  07-31    proBNP:   Lipid Profile:   HgA1c:   TSH: Thyroid Stimulating Hormone, Serum: 0.69 uIU/mL (08-01 @ 07:04)      Consultant(s) Notes Reviewed:  [x ] YES  [ ] NO    Care Discussed with Consultants/Other Providers [ x] YES  [ ] NO    Imaging Personally Reviewed independently:  [x] YES  [ ] NO    All labs, radiologic studies, vitals, orders and medications list reviewed. Patient is seen and examined at bedside. Case discussed with medical team.        
Neurology Consult    Reason for Consult: Patient is a 63y old  Female who presents with a chief complaint of frequent falls (31 Jul 2023 19:28)      HPI:  63-year-old female patient past medical history of high blood pressure, hyperlipidemia who presents to the emergency department after fall today.  Patient has been experiencing recurrent falls the past 2 to 3 months.  This morning patient was rolling out of bed when she fell and hit her head.  Patient is unsure if she lost consciousness.  On aspirin and Plavix at home.  Endorsing pain to bilateral shoulders, left hip, right knee and head. (31 Jul 2023 19:28)       PAST MEDICAL & SURGICAL HISTORY:  HTN (hypertension)      HLD (hyperlipidemia)      CAD (coronary artery disease)      S/P hysterectomy          Allergies: Allergies    No Known Allergies    Intolerances        Social History: Denies toxic habits including tobacco, ETOH or illicit drugs.    Family History: FAMILY HISTORY:  No pertinent family history in first degree relatives    . No family history of strokes    Medications: MEDICATIONS  (STANDING):  aspirin enteric coated 81 milliGRAM(s) Oral daily  carbidopa/levodopa  10/100 1 Tablet(s) Oral three times a day  clopidogrel Tablet 75 milliGRAM(s) Oral daily  dextrose 5%. 1000 milliLiter(s) (50 mL/Hr) IV Continuous <Continuous>  dextrose 5%. 1000 milliLiter(s) (100 mL/Hr) IV Continuous <Continuous>  dextrose 50% Injectable 12.5 Gram(s) IV Push once  dextrose 50% Injectable 25 Gram(s) IV Push once  dextrose 50% Injectable 25 Gram(s) IV Push once  famotidine    Tablet 20 milliGRAM(s) Oral daily  gabapentin 100 milliGRAM(s) Oral three times a day  glucagon  Injectable 1 milliGRAM(s) IntraMuscular once  heparin   Injectable 5000 Unit(s) SubCutaneous every 8 hours  insulin lispro (ADMELOG) corrective regimen sliding scale   SubCutaneous at bedtime  insulin lispro (ADMELOG) corrective regimen sliding scale   SubCutaneous three times a day before meals  losartan 50 milliGRAM(s) Oral daily  memantine 5 milliGRAM(s) Oral two times a day  metoprolol tartrate 100 milliGRAM(s) Oral two times a day  NIFEdipine XL 60 milliGRAM(s) Oral daily    MEDICATIONS  (PRN):  dextrose Oral Gel 15 Gram(s) Oral once PRN Blood Glucose LESS THAN 70 milliGRAM(s)/deciliter      Review of Systems:  CONSTITUTIONAL:  No weight loss, fever, chills, weakness or fatigue.  HEENT:  Eyes:  No visual loss, blurred vision, double vision or yellow sclera. Ears, Nose, Throat:  No hearing loss, sneezing, congestion, runny nose or sore throat.  SKIN:  No rash or itching.  CARDIOVASCULAR:  No chest pain, chest pressure or chest discomfort. No palpitations or edema.  RESPIRATORY:  No shortness of breath, cough or sputum.  GASTROINTESTINAL:  No anorexia, nausea, vomiting or diarrhea. No abdominal pain or blood.  GENITOURINARY:  No burning on urination or incontinence   NEUROLOGICAL:  No headache, dizziness, syncope, paralysis, ataxia, numbness or tingling in the extremities. No change in bowel or bladder control. no limb weakness. no vision changes.   MUSCULOSKELETAL:   shoulder pain from fall   HEMATOLOGIC:  No anemia, bleeding or bruising.  LYMPHATICS:  No enlarged nodes. No history of splenectomy.  PSYCHIATRIC:  No history of depression or anxiety.  ENDOCRINOLOGIC:  No reports of sweating, cold or heat intolerance. No polyuria or polydipsia.      Vitals:  Vital Signs Last 24 Hrs  T(C): 36.6 (01 Aug 2023 04:57), Max: 36.9 (31 Jul 2023 22:27)  T(F): 97.9 (01 Aug 2023 04:57), Max: 98.4 (31 Jul 2023 22:27)  HR: 92 (01 Aug 2023 04:57) (76 - 107)  BP: 146/87 (01 Aug 2023 04:57) (127/77 - 157/91)  BP(mean): --  RR: 18 (01 Aug 2023 04:57) (18 - 18)  SpO2: 98% (01 Aug 2023 04:57) (97% - 100%)    Parameters below as of 01 Aug 2023 04:57  Patient On (Oxygen Delivery Method): room air    Orthostatic VS      General Exam:   General Appearance: Appropriately dressed and in no acute distress       Head: Normocephalic, atraumatic and no dysmorphic features  Ear, Nose, and Throat: Moist mucous membranes  CVS: S1S2+  Resp: No SOB, no wheeze or rhonchi  GI: soft NT/ND  Extremities: No edema or cyanosis  Skin: No bruises or rashes     Neurological Exam:  Mental Status: Awake, alert and oriented x 2.  Able to follow simple and complex verbal commands. Able to name and repeat. fluent speech. No obvious aphasia mild dysarthria noted.   Cranial Nerves: PERRL, EOMI, VFFC, sensation V1-V3 intact,  no obvious facial asymmetry, equal elevation of palate, scm/trap 5/5, tongue is midline on protrusion. no obvious papilledema on fundoscopic exam. hearing is grossly intact.   Motor: Normal bulk, tone and strength throughout. Fine finger movements were intact and symmetric. no tremors or drift noted.    Sensation: Intact to light touch and pinprick throughout. no right/left confusion. no extinction to tactile on DSS.    Reflexes: 1+ throughout at biceps, brachioradialis, triceps, patellars and ankles bilaterally and equal. No clonus. R toe and L toe were both downgoing.  Coordination: No dysmetria on FNF   Gait: deferred     Data/Labs/Imaging which I personally reviewed.     Labs:     CBC Full  -  ( 01 Aug 2023 07:04 )  WBC Count : 7.34 K/uL  RBC Count : 5.14 M/uL  Hemoglobin : 12.8 g/dL  Hematocrit : 39.5 %  Platelet Count - Automated : 315 K/uL  Mean Cell Volume : 76.8 fl  Mean Cell Hemoglobin : 24.9 pg  Mean Cell Hemoglobin Concentration : 32.4 gm/dL  Auto Neutrophil # : x  Auto Lymphocyte # : x  Auto Monocyte # : x  Auto Eosinophil # : x  Auto Basophil # : x  Auto Neutrophil % : x  Auto Lymphocyte % : x  Auto Monocyte % : x  Auto Eosinophil % : x  Auto Basophil % : x    08-01    143  |  100  |  13  ----------------------------<  138<H>  3.6   |  22  |  0.51    Ca    9.7      01 Aug 2023 07:01  Phos  4.1     08-01  Mg     2.0     08-01    TPro  6.9  /  Alb  4.2  /  TBili  0.2  /  DBili  x   /  AST  20  /  ALT  22  /  AlkPhos  83  07-31    LIVER FUNCTIONS - ( 31 Jul 2023 14:36 )  Alb: 4.2 g/dL / Pro: 6.9 g/dL / ALK PHOS: 83 U/L / ALT: 22 U/L / AST: 20 U/L / GGT: x             Urinalysis Basic - ( 01 Aug 2023 07:01 )    Color: x / Appearance: x / SG: x / pH: x  Gluc: 138 mg/dL / Ketone: x  / Bili: x / Urobili: x   Blood: x / Protein: x / Nitrite: x   Leuk Esterase: x / RBC: x / WBC x   Sq Epi: x / Non Sq Epi: x / Bacteria: x      < from: CT Head No Cont (07.31.23 @ 17:36) >    ACC: [id number] EXAM:  CT CERVICAL SPINE   ORDERED BY: SARAH BETH MASTERSON     ACC: [id number] EXAM:  CT BRAIN   ORDERED BY: SARAH BETH MASTERSON     PROCEDURE DATE:  07/31/2023          INTERPRETATION:  CT HEAD, CT CERVICAL SPINE    INDICATIONS: eval after head trauma, 63-year-old female patient past   medical history of high blood pressure, hyperlipidemia who presents to   the emergency department after fall today. Patient has been experiencing   recurrent falls the past 2 to 3 months.This morning patient was rolling   out of bed when she fell and hit her head. Patient is unsure if she lost   consciousness. On aspirin and Plavix at home. Endorsing pain to bilateral   shoulders, left hip, right knee and head.    CT BRAIN:    TECHNIQUE:  Multiple contiguous axial images were obtained from the skull   base to the vertex without the use of intravenous contrast.    COMPARISON EXAMINATION: Brain MRI 10/27/2022, head and cervical spine CT   11/2/2021    FINDINGS:  Ventricles and sulci: The ventricles and sulci are normal in appearance.  Intra-axial: No intracranial mass, acute hemorrhage, or significant   midline shift is present.  Extra-axial: There is no extra-axial collection.  Visualized sinuses: No air-fluid levels are identified.  Visualized mastoids: Clear.  Calvarium: Unremarkable.  Miscellaneous:  None.      IMPRESSION: See below    ===========================================================================  ======      CT CERVICAL SPINE:    TECHNIQUE:  Axial images were obtained through the cervical spine using   multislice helical technique.  Reformatted coronal and sagittal images   were performed.    COMPARISON EXAMINATION:  None available at this time.    FINDINGS:  On the sagittal reformations, there is no prevertebral soft tissue   swelling. There is no splaying of the spinous processes.  On the coronal reformations, occipital condyles are normal. Lateral   masses of C1 align normally with C2.  On the axial images, no lucent fracture line is identified.    Straightening of the normal lordotic curvature.    Miscellaneous:  3.1 cm cyst or nodule in the left thyroid lobe.    IMPRESSION:    Head CT: No CT evidence of acute intracranial hemorrhage.    C-spine CT:  No acute fracture.    --- End of Report---            OK PASTOR MD; Attending Radiologist  This document has been electronically signed. Jul 31 2023  5:46PM    < end of copied text >

## 2023-08-01 NOTE — CONSULT NOTE ADULT - ASSESSMENT
63-year-old female patient past medical history of high blood pressure, hyperlipidemia who presents to the emergency department after fall.     EKG: NSR first degree AVB, inferior and anterior q waves    1. s/p fall  -no LOC per patient, sounds mechanical  -denies CP, SOB  -orthostatics negative  -TTE normal LV function  -CT negative  -MRI pending  -f/u neuro recs    2. CAD s/p PCI  -denies CP  -c/w asa, plavix    3. HTN  -controlled  -c/w losartan, metoprolol  -continue to monitor BP
63-year-old female with CAD s/p stent high blood pressure, hyperlipidemia, PD?  who presents to the emergency department after fall.  Patient has been experiencing recurrent falls the past 2 to 3 months.    states no LOC, no incontience, no convulsions or post ictal confusion   CTH neg   CT C spine neg     Imprssion:   frequent falls/unsteady gait --> states she tripped on rug,  mechanical   parkinsons r/o orthostatis     - on sinemet 10/100 TID for PD?   - check orthostatics   - MRI brain pending   - check carotid duplex   - c/w AP and statin for h/o stent   - TTE  - telemetry  - PT/OT   - check FS, glucose control <180  - GI/DVT ppx  - Counseling on diet, exercise, and medication adherence was done  - Counseling on smoking cessation and alcohol consumption offered when appropriate.  - Pain assessed and judicious use of narcotics when appropriate was discussed.    - Stroke education given when appropriate.  - Importance of fall prevention discussed.   - Differential diagnosis and plan of care discussed with patient and/or family and primary team  - Thank you for allowing me to participate in the care of this patient. Call with questions.   Tirso Mercado MD  Vascular Neurology  Office: [phone]

## 2023-08-01 NOTE — PHYSICAL THERAPY INITIAL EVALUATION ADULT - PERTINENT HX OF CURRENT PROBLEM, REHAB EVAL
63-year-old female patient past medical history of high blood pressure, hyperlipidemia who presents to the emergency department after fall today.  Patient has been experiencing recurrent falls the past 2 to 3 months.  This morning patient was rolling out of bed when she fell and hit her head.  Patient is unsure if she lost consciousness.  On aspirin and Plavix at home.  Endorsing pain to bilateral shoulders, left hip, right knee and head. CXR Clear lungs no pneumothorax. CTH neg   CT C spine neg. XR pelvis/bilateral shoulders/left hip/R knee negative for fracture.

## 2023-08-02 ENCOUNTER — TRANSCRIPTION ENCOUNTER (OUTPATIENT)
Age: 63
End: 2023-08-02

## 2023-08-02 VITALS
TEMPERATURE: 98 F | SYSTOLIC BLOOD PRESSURE: 115 MMHG | RESPIRATION RATE: 18 BRPM | DIASTOLIC BLOOD PRESSURE: 77 MMHG | HEART RATE: 78 BPM | OXYGEN SATURATION: 98 %

## 2023-08-02 LAB
ANION GAP SERPL CALC-SCNC: 16 MMOL/L — SIGNIFICANT CHANGE UP (ref 5–17)
BUN SERPL-MCNC: 14 MG/DL — SIGNIFICANT CHANGE UP (ref 7–23)
CALCIUM SERPL-MCNC: 9.6 MG/DL — SIGNIFICANT CHANGE UP (ref 8.4–10.5)
CHLORIDE SERPL-SCNC: 103 MMOL/L — SIGNIFICANT CHANGE UP (ref 96–108)
CO2 SERPL-SCNC: 21 MMOL/L — LOW (ref 22–31)
CREAT SERPL-MCNC: 0.56 MG/DL — SIGNIFICANT CHANGE UP (ref 0.5–1.3)
CULTURE RESULTS: SIGNIFICANT CHANGE UP
EGFR: 101 ML/MIN/1.73M2 — SIGNIFICANT CHANGE UP
GLUCOSE BLDC GLUCOMTR-MCNC: 153 MG/DL — HIGH (ref 70–99)
GLUCOSE BLDC GLUCOMTR-MCNC: 158 MG/DL — HIGH (ref 70–99)
GLUCOSE SERPL-MCNC: 180 MG/DL — HIGH (ref 70–99)
POTASSIUM SERPL-MCNC: 4 MMOL/L — SIGNIFICANT CHANGE UP (ref 3.5–5.3)
POTASSIUM SERPL-SCNC: 4 MMOL/L — SIGNIFICANT CHANGE UP (ref 3.5–5.3)
SODIUM SERPL-SCNC: 140 MMOL/L — SIGNIFICANT CHANGE UP (ref 135–145)
SPECIMEN SOURCE: SIGNIFICANT CHANGE UP

## 2023-08-02 PROCEDURE — 70553 MRI BRAIN STEM W/O & W/DYE: CPT

## 2023-08-02 PROCEDURE — 84100 ASSAY OF PHOSPHORUS: CPT

## 2023-08-02 PROCEDURE — 73502 X-RAY EXAM HIP UNI 2-3 VIEWS: CPT

## 2023-08-02 PROCEDURE — 72170 X-RAY EXAM OF PELVIS: CPT

## 2023-08-02 PROCEDURE — 80048 BASIC METABOLIC PNL TOTAL CA: CPT

## 2023-08-02 PROCEDURE — 81003 URINALYSIS AUTO W/O SCOPE: CPT

## 2023-08-02 PROCEDURE — 70553 MRI BRAIN STEM W/O & W/DYE: CPT | Mod: 26

## 2023-08-02 PROCEDURE — 93306 TTE W/DOPPLER COMPLETE: CPT

## 2023-08-02 PROCEDURE — 97162 PT EVAL MOD COMPLEX 30 MIN: CPT

## 2023-08-02 PROCEDURE — C8929: CPT

## 2023-08-02 PROCEDURE — 85027 COMPLETE CBC AUTOMATED: CPT

## 2023-08-02 PROCEDURE — 83036 HEMOGLOBIN GLYCOSYLATED A1C: CPT

## 2023-08-02 PROCEDURE — 85025 COMPLETE CBC W/AUTO DIFF WBC: CPT

## 2023-08-02 PROCEDURE — 73030 X-RAY EXAM OF SHOULDER: CPT

## 2023-08-02 PROCEDURE — 93880 EXTRACRANIAL BILAT STUDY: CPT

## 2023-08-02 PROCEDURE — 72125 CT NECK SPINE W/O DYE: CPT | Mod: MA

## 2023-08-02 PROCEDURE — 84443 ASSAY THYROID STIM HORMONE: CPT

## 2023-08-02 PROCEDURE — 80053 COMPREHEN METABOLIC PANEL: CPT

## 2023-08-02 PROCEDURE — 71045 X-RAY EXAM CHEST 1 VIEW: CPT

## 2023-08-02 PROCEDURE — 82746 ASSAY OF FOLIC ACID SERUM: CPT

## 2023-08-02 PROCEDURE — 87086 URINE CULTURE/COLONY COUNT: CPT

## 2023-08-02 PROCEDURE — 73562 X-RAY EXAM OF KNEE 3: CPT

## 2023-08-02 PROCEDURE — 82962 GLUCOSE BLOOD TEST: CPT

## 2023-08-02 PROCEDURE — 82607 VITAMIN B-12: CPT

## 2023-08-02 PROCEDURE — 83735 ASSAY OF MAGNESIUM: CPT

## 2023-08-02 PROCEDURE — 99285 EMERGENCY DEPT VISIT HI MDM: CPT

## 2023-08-02 PROCEDURE — 70450 CT HEAD/BRAIN W/O DYE: CPT | Mod: MA

## 2023-08-02 PROCEDURE — 96374 THER/PROPH/DIAG INJ IV PUSH: CPT

## 2023-08-02 PROCEDURE — A9585: CPT

## 2023-08-02 RX ORDER — IRBESARTAN AND HYDROCHLOROTHIAZIDE 12.5; 3 MG/1; MG/1
1 TABLET ORAL
Qty: 0 | Refills: 0 | DISCHARGE

## 2023-08-02 RX ORDER — LOSARTAN POTASSIUM 100 MG/1
1 TABLET, FILM COATED ORAL
Qty: 30 | Refills: 0
Start: 2023-08-02 | End: 2023-08-31

## 2023-08-02 RX ADMIN — HEPARIN SODIUM 5000 UNIT(S): 5000 INJECTION INTRAVENOUS; SUBCUTANEOUS at 13:11

## 2023-08-02 RX ADMIN — LOSARTAN POTASSIUM 50 MILLIGRAM(S): 100 TABLET, FILM COATED ORAL at 05:37

## 2023-08-02 RX ADMIN — GABAPENTIN 100 MILLIGRAM(S): 400 CAPSULE ORAL at 13:11

## 2023-08-02 RX ADMIN — CARBIDOPA AND LEVODOPA 1 TABLET(S): 25; 100 TABLET ORAL at 13:11

## 2023-08-02 RX ADMIN — FAMOTIDINE 20 MILLIGRAM(S): 10 INJECTION INTRAVENOUS at 12:44

## 2023-08-02 RX ADMIN — CLOPIDOGREL BISULFATE 75 MILLIGRAM(S): 75 TABLET, FILM COATED ORAL at 12:44

## 2023-08-02 RX ADMIN — CARBIDOPA AND LEVODOPA 1 TABLET(S): 25; 100 TABLET ORAL at 05:38

## 2023-08-02 RX ADMIN — Medication 1: at 08:44

## 2023-08-02 RX ADMIN — Medication 60 MILLIGRAM(S): at 05:37

## 2023-08-02 RX ADMIN — Medication 1: at 12:44

## 2023-08-02 RX ADMIN — HEPARIN SODIUM 5000 UNIT(S): 5000 INJECTION INTRAVENOUS; SUBCUTANEOUS at 05:37

## 2023-08-02 RX ADMIN — MEMANTINE HYDROCHLORIDE 5 MILLIGRAM(S): 10 TABLET ORAL at 05:37

## 2023-08-02 RX ADMIN — GABAPENTIN 100 MILLIGRAM(S): 400 CAPSULE ORAL at 05:37

## 2023-08-02 RX ADMIN — Medication 1 MILLIGRAM(S): at 10:41

## 2023-08-02 RX ADMIN — Medication 100 MILLIGRAM(S): at 05:37

## 2023-08-02 RX ADMIN — Medication 81 MILLIGRAM(S): at 12:44

## 2023-08-02 NOTE — DISCHARGE NOTE PROVIDER - NSFOLLOWUPCLINICS_GEN_ALL_ED_FT
[facility] General Internal Medicine  General Internal Medicine  [address] [id number]  Phone: [phone]  Fax:   Follow Up Time: 1 week

## 2023-08-02 NOTE — DISCHARGE NOTE NURSING/CASE MANAGEMENT/SOCIAL WORK - NSDCVIVACCINE_GEN_ALL_CORE_FT
Tdap; 02-Nov-2021 17:55; Marcyhuy Farrell (REGINE); [employer]; [id number] (Exp. Date: 29-Jun-2023); IntraMuscular; Deltoid Left.; 0.5 milliLiter(s); VIS (VIS Published: 09-May-2013, VIS Presented: 02-Nov-2021);

## 2023-08-02 NOTE — PROGRESS NOTE ADULT - SUBJECTIVE AND OBJECTIVE BOX
DATE OF SERVICE: 08-02-23 @ 14:38    Patient is a 63y old  Female who presents with a chief complaint of frequent falls (02 Aug 2023 07:50)      SUBJECTIVE / OVERNIGHT EVENTS:  No chest pain. No shortness of breath. No complaints. No events overnight.     MEDICATIONS  (STANDING):  aspirin enteric coated 81 milliGRAM(s) Oral daily  carbidopa/levodopa  10/100 1 Tablet(s) Oral three times a day  clopidogrel Tablet 75 milliGRAM(s) Oral daily  dextrose 5%. 1000 milliLiter(s) (100 mL/Hr) IV Continuous <Continuous>  dextrose 5%. 1000 milliLiter(s) (50 mL/Hr) IV Continuous <Continuous>  dextrose 50% Injectable 12.5 Gram(s) IV Push once  dextrose 50% Injectable 25 Gram(s) IV Push once  dextrose 50% Injectable 25 Gram(s) IV Push once  famotidine    Tablet 20 milliGRAM(s) Oral daily  gabapentin 100 milliGRAM(s) Oral three times a day  glucagon  Injectable 1 milliGRAM(s) IntraMuscular once  heparin   Injectable 5000 Unit(s) SubCutaneous every 8 hours  insulin lispro (ADMELOG) corrective regimen sliding scale   SubCutaneous three times a day before meals  insulin lispro (ADMELOG) corrective regimen sliding scale   SubCutaneous at bedtime  losartan 50 milliGRAM(s) Oral daily  memantine 5 milliGRAM(s) Oral two times a day  metoprolol tartrate 100 milliGRAM(s) Oral two times a day  NIFEdipine XL 60 milliGRAM(s) Oral daily  polyethylene glycol 3350 17 Gram(s) Oral daily  senna 2 Tablet(s) Oral at bedtime    MEDICATIONS  (PRN):  dextrose Oral Gel 15 Gram(s) Oral once PRN Blood Glucose LESS THAN 70 milliGRAM(s)/deciliter      Vital Signs Last 24 Hrs  T(C): 36.4 (02 Aug 2023 13:59), Max: 36.9 (01 Aug 2023 21:14)  T(F): 97.6 (02 Aug 2023 13:59), Max: 98.5 (01 Aug 2023 21:14)  HR: 78 (02 Aug 2023 13:59) (78 - 84)  BP: 115/77 (02 Aug 2023 13:59) (115/77 - 146/84)  BP(mean): --  RR: 18 (02 Aug 2023 13:59) (18 - 18)  SpO2: 98% (02 Aug 2023 13:59) (97% - 98%)    Parameters below as of 02 Aug 2023 13:59  Patient On (Oxygen Delivery Method): room air      CAPILLARY BLOOD GLUCOSE      POCT Blood Glucose.: 153 mg/dL (02 Aug 2023 12:37)  POCT Blood Glucose.: 158 mg/dL (02 Aug 2023 08:16)  POCT Blood Glucose.: 161 mg/dL (01 Aug 2023 21:42)  POCT Blood Glucose.: 122 mg/dL (01 Aug 2023 17:23)    I&O's Summary    01 Aug 2023 07:01  -  02 Aug 2023 07:00  --------------------------------------------------------  IN: 720 mL / OUT: 0 mL / NET: 720 mL        PHYSICAL EXAM:  GENERAL: NAD, well-developed  HEAD:  Atraumatic, Normocephalic  EYES: EOMI, PERRLA, conjunctiva and sclera clear  NECK: Supple, No JVD  CHEST/LUNG: Clear to auscultation bilaterally; No wheeze  HEART: Regular rate and rhythm; No murmurs, rubs, or gallops  ABDOMEN: Soft, Nontender, Nondistended; Bowel sounds present  EXTREMITIES:  2+ Peripheral Pulses, No clubbing, cyanosis, or edema  PSYCH: AAOx3  NEUROLOGY: non-focal  SKIN: No rashes or lesions    LABS:                        12.8   7.34  )-----------( 315      ( 01 Aug 2023 07:04 )             39.5     08-02    140  |  103  |  14  ----------------------------<  180<H>  4.0   |  21<L>  |  0.56    Ca    9.6      02 Aug 2023 07:37  Phos  4.1     08-01  Mg     2.0     08-01      < from: MR Head w/wo IV Cont (08.02.23 @ 11:59) >  FINDINGS:    The ventricles, sulci and basal cisterns appear unremarkable. There is no   evidence of acute infarct, hemorrhage, or mass lesion. There is a chronic   lacunar infarct in the right anterior corona radiata. There are multiple   small foci of T2/FLAIR hyperintense signal in the periventricular white   matter, suggestive of mild chronic microvascular ischemic changes. There   is no evidence of abnormal enhancement. There is no evidence of midline   shift or herniation pattern. No mass effect is found in the brain.    The vertebral and internal carotid arteries demonstrate expected flow   voids indicating their patency.    The paranasal sinuses are clear.  IMPRESSION:  No evidence of acute infarct, hemorrhage, or mass lesion.   There is a chronic lacunar infarct in the right anterior corona radiata.   There are multiple small foci of T2/FLAIR hyperintense signal in the   periventricular white matter, suggestive of mild chronic microvascular   ischemic changes. There is no evidence of abnormal enhancement.    --- End of Report ---    < end of copied text >    < from: VA Duplex Carotid, Bilat (08.01.23 @ 11:57) >  IMPRESSION:    There is a moderate, 50-69% stenosis of the right internal carotid artery.    No hemodynamically significant left carotid artery stenosis is identified.    Measurement of carotid stenosis is based on the 2022, revised velocity   parameters that correlate the residual internal carotid diameter with   that of the more distal vessel in accordance with a method such as the   North American Symptomatic Carotid Endarterectomy Trial (NASCET).    --- End of Report ---    < end of copied text >      < from: TTE W or WO Ultrasound Enhancing Agent (08.01.23 @ 08:36) >  CONCLUSIONS:      1. Normal left ventricular cavity size.The left ventricular wall thickness is normal. The left ventricular systolic function is normal with an ejection fraction of 72 % by Mina's method of disks. There are no regional wall motion abnormalities seen.   2. Normal right ventricular cavitysize and normal right ventricular systolic function.    ________________________________________________________________________________________  FINDINGS:     Left Ventricle:  Normal left ventricular cavity size. The left ventricular wall thickness is normal. The left ventricular systolic function is normal with a calculated ejection fraction of 72 % by the Mina's biplane method of disks. There are no regional wall motion abnormalities seen. There is normal left ventricular diastolic function.     Right Ventricle:  Normal right ventricular cavity size and normal right ventricular systolic function.     Left Atrium:  The left atrium is normal in size with an indexed volume of 24.69 ml/m².     Right Atrium:  The right atrium is normal in size with an indexed area of 8.87 cm²/m².     Aortic Valve:  The aortic valve is tricuspid with normal structure without stenosis. There is no evidence of aortic regurgitation.     Mitral Valve:  Structurally normal mitral valve with normal leaflet excursion. There is trace mitral regurgitation.     Tricuspid Valve:  Structurally normal tricuspid valve with normal leaflet excursion. There is trace tricuspid regurgitation.     Pulmonic Valve:  Structurally normal pulmonic valve with normal leaflet excursion.     Aorta:  The aortic annulus and aortic root appear normal in size. The aortic sinuses of Valsalva diameter is normal.  The ascending aorta diameter is normal.     Pericardium:  No pericardial effusion seen.  ____________________________________________________________________    < end of copied text >    Urinalysis Basic - ( 02 Aug 2023 07:37 )    Color: x / Appearance: x / SG: x / pH: x  Gluc: 180 mg/dL / Ketone: x  / Bili: x / Urobili: x   Blood: x / Protein: x / Nitrite: x   Leuk Esterase: x / RBC: x / WBC x   Sq Epi: x / Non Sq Epi: x / Bacteria: x        RADIOLOGY & ADDITIONAL TESTS:    Imaging Personally Reviewed:    Consultant(s) Notes Reviewed:      Care Discussed with Consultants/Other Providers:

## 2023-08-02 NOTE — DISCHARGE NOTE NURSING/CASE MANAGEMENT/SOCIAL WORK - PATIENT PORTAL LINK FT
You can access the FollowMyHealth Patient Portal offered by [facility] by registering at the following website: [email or web address]. By joining [facility]’s FollowMyHealth portal, you will also be able to view your health information using other applications (apps) compatible with our system.

## 2023-08-02 NOTE — DISCHARGE NOTE PROVIDER - NSDCMRMEDTOKEN_GEN_ALL_CORE_FT
Aspirin Enteric Coated 81 mg oral delayed release tablet: 1 tab(s) orally once a day  carbidopa-levodopa 25 mg-100 mg oral tablet: 1 tab(s) orally once a day  Colace 100 mg oral capsule: 1 cap(s) orally once a day  diclofenac 1% topical gel: Apply topically to affected area  famotidine 20 mg oral tablet: 1 tablet orally once a day  Farxiga 10 mg oral tablet: 1 tab(s) orally once a day  folic acid 1 mg oral tablet: 1 tab(s) orally once a day  gabapentin 100 mg oral capsule: 1 cap(s) orally 3 times a day  irbesartan-hydrochlorothiazide 150 mg-12.5 mg oral tablet: 1 tab(s) orally once a day  Linzess 72 mcg oral capsule: 1 cap(s) orally once a day  memantine 5 mg oral tablet: 1 tab(s) orally 2 times a day  MetFORMIN (Eqv-Fortamet) 500 mg oral tablet, extended release: 1 tab(s) orally 2 times a day  metoprolol tartrate 100 mg oral tablet: 1 tab(s) orally 2 times a day  NIFEdipine (Eqv-Adalat CC) 60 mg oral tablet, extended release: 1 tab(s) orally 2 times a day  pioglitazone 15 mg oral tablet: 1 tab(s) orally once a day  Plavix 75 mg oral tablet: 1 tab(s) orally once a day  Trulicity Pen 3 mg/0.5 mL subcutaneous solution: 3 milligram(s) subcutaneously once a week   Aspirin Enteric Coated 81 mg oral delayed release tablet: 1 tab(s) orally once a day  carbidopa-levodopa 25 mg-100 mg oral tablet: 1 tab(s) orally once a day  Colace 100 mg oral capsule: 1 cap(s) orally once a day  diclofenac 1% topical gel: Apply topically to affected area  famotidine 20 mg oral tablet: 1 tablet orally once a day  Farxiga 10 mg oral tablet: 1 tab(s) orally once a day  folic acid 1 mg oral tablet: 1 tab(s) orally once a day  gabapentin 100 mg oral capsule: 1 cap(s) orally 3 times a day  Linzess 72 mcg oral capsule: 1 cap(s) orally once a day  losartan 50 mg oral tablet: 1 tab(s) orally once a day  memantine 5 mg oral tablet: 1 tab(s) orally 2 times a day  MetFORMIN (Eqv-Fortamet) 500 mg oral tablet, extended release: 1 tab(s) orally 2 times a day  metoprolol tartrate 100 mg oral tablet: 1 tab(s) orally 2 times a day  NIFEdipine (Eqv-Adalat CC) 60 mg oral tablet, extended release: 1 tab(s) orally 2 times a day  pioglitazone 15 mg oral tablet: 1 tab(s) orally once a day  Plavix 75 mg oral tablet: 1 tab(s) orally once a day  Trulicity Pen 3 mg/0.5 mL subcutaneous solution: 3 milligram(s) subcutaneously once a week

## 2023-08-02 NOTE — DISCHARGE NOTE PROVIDER - NSDCFUSCHEDAPPT_GEN_ALL_CORE_FT
[facility]  NEUROLOGY [address]  Scheduled Appointment: 08/08/2023    [facility]  NEUROLOGY [address]  Scheduled Appointment: 08/08/2023    Soni Matta  [facility]  PODIATRY [address]  Scheduled Appointment: 08/10/2023    [facility]  VASCULAR [address]  Scheduled Appointment: 10/31/2023    Negro Ramirez  [facility]  VASCULAR [address]  Scheduled Appointment: 10/31/2023     [facility]  NEUROLOGY [address]  Scheduled Appointment: 08/08/2023    [facility]  NEUROLOGY [address]  Scheduled Appointment: 08/08/2023    Soni Matta  [facility]  PODIATRY [address]  Scheduled Appointment: 08/10/2023    Manny Hernandes  [facility]  INTMED [address]  Scheduled Appointment: 08/14/2023    [facility]  VASCULAR [address]  Scheduled Appointment: 10/31/2023    Negro Ramirez  [facility]  VASCULAR [address]  Scheduled Appointment: 10/31/2023

## 2023-08-02 NOTE — PROGRESS NOTE ADULT - SUBJECTIVE AND OBJECTIVE BOX
Neurology Progress Note    S: Patient seen and examined. No new events overnight. patient denied CP, SOB, HA or pain.     Medication:  aspirin enteric coated 81 milliGRAM(s) Oral daily  carbidopa/levodopa  10/100 1 Tablet(s) Oral three times a day  clopidogrel Tablet 75 milliGRAM(s) Oral daily  dextrose 5%. 1000 milliLiter(s) IV Continuous <Continuous>  dextrose 5%. 1000 milliLiter(s) IV Continuous <Continuous>  dextrose 50% Injectable 12.5 Gram(s) IV Push once  dextrose 50% Injectable 25 Gram(s) IV Push once  dextrose 50% Injectable 25 Gram(s) IV Push once  dextrose Oral Gel 15 Gram(s) Oral once PRN  famotidine    Tablet 20 milliGRAM(s) Oral daily  gabapentin 100 milliGRAM(s) Oral three times a day  glucagon  Injectable 1 milliGRAM(s) IntraMuscular once  heparin   Injectable 5000 Unit(s) SubCutaneous every 8 hours  insulin lispro (ADMELOG) corrective regimen sliding scale   SubCutaneous three times a day before meals  insulin lispro (ADMELOG) corrective regimen sliding scale   SubCutaneous at bedtime  LORazepam     Tablet 1 milliGRAM(s) Oral once  losartan 50 milliGRAM(s) Oral daily  memantine 5 milliGRAM(s) Oral two times a day  metoprolol tartrate 100 milliGRAM(s) Oral two times a day  NIFEdipine XL 60 milliGRAM(s) Oral daily  polyethylene glycol 3350 17 Gram(s) Oral daily  senna 2 Tablet(s) Oral at bedtime      Vitals:  Vital Signs Last 24 Hrs  T(C): 36.5 (02 Aug 2023 04:06), Max: 37.2 (01 Aug 2023 14:34)  T(F): 97.7 (02 Aug 2023 04:06), Max: 98.9 (01 Aug 2023 14:34)  HR: 84 (02 Aug 2023 04:06) (81 - 102)  BP: 144/87 (02 Aug 2023 04:06) (144/87 - 161/100)  BP(mean): --  RR: 18 (02 Aug 2023 04:06) (18 - 18)  SpO2: 98% (02 Aug 2023 04:06) (97% - 100%)    Parameters below as of 02 Aug 2023 04:06  Patient On (Oxygen Delivery Method): room air    Orthostatic VS    08-01-23 @ 14:34  Lying BP: Orthostatic BP (Lying Systolic): 146/Orthostatic BP (Lying Diastolic (mm Hg)): 83 HR: Orthostatic Pulse (Heart Rate (beats/min)): 77   Sitting BP: Orthostatic BP (Sitting Systolic): 157/Orthostatic BP (Sitting Diastolic (mm Hg)): 95 HR: Orthostatic Pulse (Heart Rate (beats/min)): 9  Standing BP: Orthostatic BP (Standing Systolic): 149/Orthostatic BP (Standing Diastolic (mm Hg)): 89 HR: Orthostatic Pulse (Heart Rate (beats/min)): 94  Site: Orthostatic BP/Pulse (Site): upper right arm   Mode: Orthostatic BP/ Pulse (Mode): electronic        General Exam:   General Appearance: Appropriately dressed and in no acute distress       Head: Normocephalic, atraumatic and no dysmorphic features  Ear, Nose, and Throat: Moist mucous membranes  CVS: S1S2+  Resp: No SOB, no wheeze or rhonchi  GI: soft NT/ND  Extremities: No edema or cyanosis  Skin: No bruises or rashes     Neurological Exam:  Mental Status: Awake, alert and oriented x 2.  Able to follow simple and complex verbal commands. Able to name and repeat. fluent speech. No obvious aphasia mild dysarthria noted.   Cranial Nerves: PERRL, EOMI, VFFC, sensation V1-V3 intact,  no obvious facial asymmetry, equal elevation of palate, scm/trap 5/5, tongue is midline on protrusion. no obvious papilledema on fundoscopic exam. hearing is grossly intact.   Motor: Normal bulk, tone and strength throughout. Fine finger movements were intact and symmetric. no tremors or drift noted.    Sensation: Intact to light touch and pinprick throughout. no right/left confusion. no extinction to tactile on DSS.    Reflexes: 1+ throughout at biceps, brachioradialis, triceps, patellars and ankles bilaterally and equal. No clonus. R toe and L toe were both downgoing.  Coordination: No dysmetria on FNF   Gait: deferred         I personally reviewed the below data/images/labs:      CBC Full  -  ( 01 Aug 2023 07:04 )  WBC Count : 7.34 K/uL  RBC Count : 5.14 M/uL  Hemoglobin : 12.8 g/dL  Hematocrit : 39.5 %  Platelet Count - Automated : 315 K/uL  Mean Cell Volume : 76.8 fl  Mean Cell Hemoglobin : 24.9 pg  Mean Cell Hemoglobin Concentration : 32.4 gm/dL  Auto Neutrophil # : x  Auto Lymphocyte # : x  Auto Monocyte # : x  Auto Eosinophil # : x  Auto Basophil # : x  Auto Neutrophil % : x  Auto Lymphocyte % : x  Auto Monocyte % : x  Auto Eosinophil % : x  Auto Basophil % : x    08-02    140  |  103  |  14  ----------------------------<  180<H>  4.0   |  21<L>  |  0.56    Ca    9.6      02 Aug 2023 07:37  Phos  4.1     08-01  Mg     2.0     08-01    TPro  6.9  /  Alb  4.2  /  TBili  0.2  /  DBili  x   /  AST  20  /  ALT  22  /  AlkPhos  83  07-31    LIVER FUNCTIONS - ( 31 Jul 2023 14:36 )  Alb: 4.2 g/dL / Pro: 6.9 g/dL / ALK PHOS: 83 U/L / ALT: 22 U/L / AST: 20 U/L / GGT: x             Urinalysis Basic - ( 02 Aug 2023 07:37 )    Color: x / Appearance: x / SG: x / pH: x  Gluc: 180 mg/dL / Ketone: x  / Bili: x / Urobili: x   Blood: x / Protein: x / Nitrite: x   Leuk Esterase: x / RBC: x / WBC x   Sq Epi: x / Non Sq Epi: x / Bacteria: x      < from: CT Head No Cont (07.31.23 @ 17:36) >    ACC: [id number] EXAM:  CT CERVICAL SPINE   ORDERED BY: SARAH BETH MASTERSON     ACC: [id number] EXAM:  CT BRAIN   ORDERED BY: SARAH BETH MASTERSON     PROCEDURE DATE:  07/31/2023          INTERPRETATION:  CT HEAD, CT CERVICAL SPINE    INDICATIONS: eval after head trauma, 63-year-old female patient past   medical history of high blood pressure, hyperlipidemia who presents to   the emergency department after fall today. Patient has been experiencing   recurrent falls the past 2 to 3 months.This morning patient was rolling   out of bed when she fell and hit her head. Patient is unsure if she lost   consciousness. On aspirin and Plavix at home. Endorsing pain to bilateral   shoulders, left hip, right knee and head.    CT BRAIN:    TECHNIQUE:  Multiple contiguous axial images were obtained from the skull   base to the vertex without the use of intravenous contrast.    COMPARISON EXAMINATION: Brain MRI 10/27/2022, head and cervical spine CT   11/2/2021    FINDINGS:  Ventricles and sulci: The ventricles and sulci are normal in appearance.  Intra-axial: No intracranial mass, acute hemorrhage, or significant   midline shift is present.  Extra-axial: There is no extra-axial collection.  Visualized sinuses: No air-fluid levels are identified.  Visualized mastoids: Clear.  Calvarium: Unremarkable.  Miscellaneous:  None.      IMPRESSION: See below    ===========================================================================  ======      CT CERVICAL SPINE:    TECHNIQUE:  Axial images were obtained through the cervical spine using   multislice helical technique.  Reformatted coronal and sagittal images   were performed.    COMPARISON EXAMINATION:  None available at this time.    FINDINGS:  On the sagittal reformations, there is no prevertebral soft tissue   swelling. There is no splaying of the spinous processes.  On the coronal reformations, occipital condyles are normal. Lateral   masses of C1 align normally with C2.  On the axial images, no lucent fracture line is identified.    Straightening of the normal lordotic curvature.    Miscellaneous:  3.1 cm cyst or nodule in the left thyroid lobe.    IMPRESSION:    Head CT: No CT evidence of acute intracranial hemorrhage.    C-spine CT:  No acute fracture.    --- End of Report---            OK PASTOR MD; Attending Radiologist  This document has been electronically signed. Jul 31 2023  5:46PM    < end of copied text >         < from: TTE W or WO Ultrasound Enhancing Agent (08.01.23 @ 08:36) >    TRANSTHORACIC ECHOCARDIOGRAM REPORT  ________________________________________________________________________________                                      _______       Pt. Name:       ALYSE AVENDAÑO Study Date:    8/1/2023  MRN:            [id number]      YOB: 1960  Accession #:    [id number]       Age:           63 years  Account#:       [id number]    Gender:        F  Heart Rate:     92 bpm          Height:        64.00 in (162.56 cm)  Rhythm:         sinus rhythm    Weight:       135.00 lb (61.23 kg)  Blood Pressure: 146/57 mmHg     BSA/BMI:       1.66 m² / 23.17 kg/m²  ________________________________________________________________________________________  Referring Physician:    [id number] Rebecca Mercado  Interpreting Physician: Juan A Arreola MD  Primary Sonographer:    Janneth George    CPT:                ECHO TTE WO CON COMP W DOPP - 62126.m;ECHO TTE WITH CON COMP                      W DOPP - .m;DEFINITY ECHO CONTRAST PER ML -                      .m;DEFINITY ECHO CONTRAST PER ML WASTED - .m  Indication(s):      Dyspnea, unspecified - R06.00  Procedure:          Transthoracic echocardiogram with 2-D, M-mode and complete                      spectral and color flow Doppler.  Ordering Location:  [facility]  Admission Status:   Inpatient  Contrast Injection: Verbal consent was obtained for injection of Ultrasonic                      Enhancing Agent following a discussion of risks and                      benefits.                      Endocardial visualization enhanced with 2 ml of Definity                      Ultrasound enhancing agent (Lot#:[id number] Exp.Date:08/2024                      Discarded Dose:8ml).  UEA Reaction:       Patient had no adverse reaction after injection of            Ultrasound Enhancing Agent.  Study Information:  Image quality for this study is adequate.    _______________________________________________________________________________________  CONCLUSIONS:      1. Normal left ventricular cavity size.The left ventricular wall thickness is normal. The left ventricular systolic function is normal with an ejection fraction of 72 % by Mina's method of disks. There are no regional wall motion abnormalities seen.   2. Normal right ventricular cavitysize and normal right ventricular systolic function.    ________________________________________________________________________________________  FINDINGS:     Left Ventricle:  Normal left ventricular cavity size. The left ventricular wall thickness is normal. The left ventricular systolic function is normal with a calculated ejection fraction of 72 % by the Mina's biplane method of disks. There are no regional wall motion abnormalities seen. There is normal left ventricular diastolic function.     Right Ventricle:  Normal right ventricular cavity size and normal right ventricular systolic function.     Left Atrium:  The left atrium is normal in size with an indexed volume of 24.69 ml/m².     Right Atrium:  The right atrium is normal in size with an indexed area of 8.87 cm²/m².     Aortic Valve:  The aortic valve is tricuspid with normal structure without stenosis. There is no evidence of aortic regurgitation.     Mitral Valve:  Structurally normal mitral valve with normal leaflet excursion. There is trace mitral regurgitation.     Tricuspid Valve:  Structurally normal tricuspid valve with normal leaflet excursion. There is trace tricuspid regurgitation.     Pulmonic Valve:  Structurally normal pulmonic valve with normal leaflet excursion.     Aorta:  The aortic annulus and aortic root appear normal in size. The aortic sinuses of Valsalva diameter is normal.  The ascending aorta diameter is normal.     Pericardium:  No pericardial effusion seen.  ____________________________________________________________________  Quantitative Data:  Left Ventricle Measurements: (Indexed to BSA)     IVSd (2D):   1.0 cm  LVPWd (2D):  0.8 cm  LVIDd (2D):  4.0 cm  LVIDs (2D):  2.7 cm  LV Mass:     104 g  62.6 g/m²  LV Vol d, MOD A2C: 70.5 ml 42.60 ml/m²  LV Vol d, MOD A4C: 73.1 ml 44.16 ml/m²  LV Vol d, MOD BP:  73.1 ml 44.15 ml/m²  LV Vol s, MOD A2C: 16.4 ml 9.89 ml/m²  LV Vol s, MOD A4C: 24.1 ml 14.56 ml/m²  LV Vol s, MOD BP:  20.4 ml 12.33 ml/m²  LVOT SV MOD BP:    52.7 ml  LV EF% MOD BP:     72 %     MV E Vmax:    0.95 m/s  e' lateral:   11.00 cm/s  e' medial:    12.00 cm/s  E/e' lateral: 8.64  E/e' medial:  7.92  E/e' Average: 8.26    Aorta Measurements:     Ao Sinus:    3.2 cm  Ao Root:     3.2 cm  Ao Asc:      3.1 cm  Ao Asc prox: 3.10 cm       Left Atrium Measurements: (Indexed to BSA)  LA Diam 2D: 3.08 cm       LVOT / RVOT/ Qp/Qs Data: (Indexed to BSA)  LVOT Diameter: 1.99 cm  LVOT Vmax:     0.73 m/s  LVOT VTI:      14.63 cm  LVOT SV:       45.3 ml  27.36 ml/m²    Mitral Valve Measurements:     MV E Vmax: 1.0 m/s       Tricuspid Valve Measurements:     TR Vmax:          2.1 m/s  TR Peak Gradient: 17.7 mmHg    ________________________________________________________________________________________  Electronically signed on 8/1/2023 at 10:42:19 AM by Juan A Arreola MD         *** Final ***    < end of copied text >

## 2023-08-02 NOTE — DISCHARGE NOTE PROVIDER - NSDCFUADDAPPT_GEN_ALL_CORE_FT
APPTS ARE READY TO BE MADE: [X ] YES    Best Family or Patient Contact (if needed):    Additional Information about above appointments (if needed):    1:   2:   3:     Other comments or requests:    APPTS ARE READY TO BE MADE: [X ] YES    Best Family or Patient Contact (if needed):    Additional Information about above appointments (if needed):    1:   2:   3:     Other comments or requests:   Patient was scheduled for an appointment on 08/14 10:20a at [address] [address]  with Dr. Manny Hernandes. Patient/Caregiver was advised of appointment details.

## 2023-08-02 NOTE — PROGRESS NOTE ADULT - ASSESSMENT
63-year-old female patient past medical history of high blood pressure, hyperlipidemia who presents to the emergency department after fall today.  Patient has been experiencing recurrent falls the past 2 to 3 months.  This morning patient was rolling out of bed when she fell and hit her head.  Patient is unsure if she lost consciousness.  On aspirin and Plavix at home.  Endorsing pain to bilateral shoulders, left hip, right knee and head    unsteady gait and frequent falls  - MR of brain  - neuro consult sa pt  - carotid doppler  - orthostatics  - PT eval    HTN  - c/w metoprolol, Losartan, nifedipine    parkinson's disease  - c/w sinemet    CAD s/p stent  - c/w plavix  - cardiology called    HLD  - c/w lipitor    dvt px  - sq heparin          
 63-year-old female patient past medical history of high blood pressure, hyperlipidemia who presents to the emergency department after fall today.  Patient has been experiencing recurrent falls the past 2 to 3 months.  This morning patient was rolling out of bed when she fell and hit her head.  Patient is unsure if she lost consciousness.  On aspirin and Plavix at home.  Endorsing pain to bilateral shoulders, left hip, right knee and head    unsteady gait and frequent falls  - MR of brain done  - neuro consult saw pt  - carotid doppler done  - orthostatics - normal  - PT eval    HTN  - c/w metoprolol, Losartan, nifedipine    parkinson's disease  - c/w sinemet    CAD s/p stent  - c/w plavix  - cardiology following    HLD  - c/w lipitor    dvt px  - sq heparin          
63-year-old female with CAD s/p stent high blood pressure, hyperlipidemia, PD?  who presents to the emergency department after fall.  Patient has been experiencing recurrent falls the past 2 to 3 months.    states no LOC, no incontience, no convulsions or post ictal confusion   CTH neg   CT C spine neg   orthostatics neg   TTE 8/1 as above   CD with R ICA 50-69%     Imprssion:   frequent falls/unsteady gait --> states she tripped on rug,  mechanical   parkinsons r/o orthostatis     - on sinemet 10/100 TID for PD   - MRI brain pending   - check carotid duplex   - c/w AP and statin for h/o stent   - f/u cardio   - telemetry  - PT/OT   - check FS, glucose control <180  - GI/DVT ppx  - Thank you for allowing me to participate in the care of this patient. Call with questions.   Tirso Mercado MD  Vascular Neurology  Office: [phone]

## 2023-08-02 NOTE — DISCHARGE NOTE PROVIDER - HOSPITAL COURSE
63-year-old female patient past medical history of high blood pressure, hyperlipidemia who presents to the emergency department after fall today.  Patient has been experiencing recurrent falls the past 2 to 3 months.  This morning patient was rolling out of bed when she fell and hit her head.  Patient is unsure if she lost consciousness.  On aspirin and Plavix at home.  Endorsing pain to bilateral shoulders, left hip, right knee and head    unsteady gait and frequent falls  - MR of brain done  - neuro consult saw pt  - carotid doppler done  - orthostatics - normal  - PT eval    HTN  - c/w metoprolol, Losartan, nifedipine    parkinson's disease  - c/w sinemet    CAD s/p stent  - c/w plavix  - cardiology following    HLD  - c/w lipitor    dvt px  - sq heparin  63-year-old female patient past medical history of high blood pressure, hyperlipidemia who presents to the emergency department after fall today.  Patient has been experiencing recurrent falls the past 2 to 3 months.  This morning patient was rolling out of bed when she fell and hit her head.  Patient is unsure if she lost consciousness.  On aspirin and Plavix at home.  Endorsing pain to bilateral shoulders, left hip, right knee and head    unsteady gait and frequent falls  - MR of brain neg for hemorrhage/mass/infarct  - neuro followed   - carotid doppler done  - orthostatics - normal  - PT: recc home PT     HTN  - c/w metoprolol, Losartan, nifedipine    parkinson's disease  - c/w sinemet    CAD s/p stent  - c/w plavix  - cardiology following    HLD  - c/w lipitor    dvt px  - sq heparin    Medically cleared to be dc'ed home with home PT on 8/2/23 per attending Dr. Mercado

## 2023-08-02 NOTE — DISCHARGE NOTE PROVIDER - NSDCCPCAREPLAN_GEN_ALL_CORE_FT
PRINCIPAL DISCHARGE DIAGNOSIS  Diagnosis: Head trauma  Assessment and Plan of Treatment: You presented with falls with head trauma. Neurology team followed you during your stay. CT head and C-spine were negative for hemorrhage and fracture. MRI head was negative for hemorrhage/infarct. Follow up with your outpatient neurologist as scheduled on 8/8/23. Follow up with your PCP in 1-2 weeks.

## 2023-08-02 NOTE — DISCHARGE NOTE NURSING/CASE MANAGEMENT/SOCIAL WORK - NSDCPEFALRISK_GEN_ALL_CORE
For information on Fall & Injury Prevention, visit: [email or web address] OR  [email or web address] OR  https://www.cdc.gov/steadi/patient.html

## 2023-08-06 NOTE — SPEECH LANGUAGE PATHOLOGY EVALUATION - SLP LONG TERM MEMORY
Interval History:  Patient seen and examined.  Significant temperature instability overnight.  Remains nonverbal and nonreactive.      Objective:     Vital Signs (Most Recent):  Temp: 97.5 °F (36.4 °C) (08/06/23 1101)  Pulse: 74 (08/06/23 1508)  Resp: (!) 28 (08/06/23 1508)  BP: 110/74 (08/06/23 1303)  SpO2: 100 % (08/06/23 1508) Vital Signs (24h Range):  Temp:  [94.5 °F (34.7 °C)-98.8 °F (37.1 °C)] 97.5 °F (36.4 °C)  Pulse:  [53-77] 74  Resp:  [11-37] 28  SpO2:  [96 %-100 %] 100 %  BP: ()/(53-75) 110/74     Weight: 86.5 kg (190 lb 11.2 oz)  Body mass index is 33.78 kg/m².    Intake/Output Summary (Last 24 hours) at 8/6/2023 1525  Last data filed at 8/6/2023 1203  Gross per 24 hour   Intake 1659.7 ml   Output 915 ml   Net 744.7 ml           Physical Exam  Vitals and nursing note reviewed.   Constitutional:       General: She is not in acute distress.  Eyes:      Pupils: Pupils are equal, round, and reactive to light.   Neck:      Comments: Tracheostomy in place  Cardiovascular:      Rate and Rhythm: Normal rate and regular rhythm.      Heart sounds: No murmur heard.  Pulmonary:      Comments: Increased respiratory rate with coarse bilateral breath sounds noted  Abdominal:      General: There is no distension.      Palpations: Abdomen is soft.      Tenderness: There is no abdominal tenderness.      Comments: Percutaneous gastrostomy in place   Genitourinary:     Comments: Hamilton catheter in place  Skin:     Coloration: Skin is not pale.      Findings: No rash.   Neurological:      Mental Status: She is alert.      Comments: Nonverbal, nonreactive.  Significant spasticity noted.             Significant Labs: All pertinent labs within the past 24 hours have been reviewed.    Significant Imaging: I have reviewed all pertinent imaging results/findings within the past 24 hours.     intact

## 2023-08-08 ENCOUNTER — APPOINTMENT (OUTPATIENT)
Dept: NEUROLOGY | Facility: CLINIC | Age: 63
End: 2023-08-08
Payer: MEDICARE

## 2023-08-08 PROCEDURE — 93886 INTRACRANIAL COMPLETE STUDY: CPT

## 2023-08-08 PROCEDURE — 93880 EXTRACRANIAL BILAT STUDY: CPT

## 2023-08-10 ENCOUNTER — APPOINTMENT (OUTPATIENT)
Dept: PODIATRY | Facility: CLINIC | Age: 63
End: 2023-08-10

## 2023-08-14 ENCOUNTER — APPOINTMENT (OUTPATIENT)
Dept: INTERNAL MEDICINE | Facility: CLINIC | Age: 63
End: 2023-08-14

## 2023-08-21 NOTE — HEALTH RISK ASSESSMENT
[0] : 2) Feeling down, depressed, or hopeless: Not at all (0) [PHQ-2 Negative - No further assessment needed] : PHQ-2 Negative - No further assessment needed [Never] : Never [[id number]] : 0

## 2023-08-21 NOTE — HISTORY OF PRESENT ILLNESS
[Post-hospitalization from ___ Hospital] : Post-hospitalization from [unfilled] Hospital [Admitted on: ___] : The patient was admitted on [unfilled] [Discharged on ___] : discharged on [unfilled] [Discharge Summary] : discharge summary [Pertinent Labs] : pertinent labs [Radiology Findings] : radiology findings [Discharge Med List] : discharge medication list [Med Reconciliation] : medication reconciliation has been completed [FreeTextEntry2] : Reason for Admission frequent falls  Hospital Course    63-year-old female patient past medical history of high blood pressure,  hyperlipidemia who presents to the emergency department after fall today.  Patient has been experiencing recurrent falls the past 2 to 3 months.  This  morning patient was rolling out of bed when she fell and hit her head.  Patient  is unsure if she lost consciousness.  On aspirin and Plavix at home.  Endorsing  pain to bilateral shoulders, left hip, right knee and head    unsteady gait and frequent falls  - MR of brain neg for hemorrhage/mass/infarct  - neuro followed  - carotid doppler done  - orthostatics - normal  - PT: recc home PT    HTN  - c/w metoprolol, Losartan, nifedipine    parkinson's disease  - c/w sinemet    CAD s/p stent  - c/w plavix  - cardiology following    HLD  - c/w lipitor    dvt px  - sq heparin    Medically cleared to be dc'ed home with home PT on 8/2/23 per attending Dr. Mercado  Since discharge,  Denies chest pain, SOB, PIERCE, dizziness, diaphoresis, palpitations, LE swelling, orthopnea, syncope, n/v, headache.

## 2023-08-23 ENCOUNTER — APPOINTMENT (OUTPATIENT)
Dept: NEUROLOGY | Facility: CLINIC | Age: 63
End: 2023-08-23
Payer: MEDICARE

## 2023-08-23 VITALS
HEART RATE: 89 BPM | DIASTOLIC BLOOD PRESSURE: 90 MMHG | WEIGHT: 136 LBS | HEIGHT: 62 IN | BODY MASS INDEX: 25.03 KG/M2 | SYSTOLIC BLOOD PRESSURE: 157 MMHG

## 2023-08-23 DIAGNOSIS — I63.81 OTHER CEREBRAL INFARCTION DUE TO OCCLUSION OR STENOSIS OF SMALL ARTERY: ICD-10-CM

## 2023-08-23 PROCEDURE — 99205 OFFICE O/P NEW HI 60 MIN: CPT

## 2023-08-23 RX ORDER — CARBIDOPA AND LEVODOPA 25; 100 MG/1; MG/1
25-100 TABLET ORAL 3 TIMES DAILY
Qty: 90 | Refills: 3 | Status: ACTIVE | COMMUNITY
Start: 2023-08-23 | End: 1900-01-01

## 2023-08-23 NOTE — HISTORY OF PRESENT ILLNESS
[FreeTextEntry1] : Ms. Llamas is a 63 year old female PMHx CAD s/p stent, ? Parkinsons disease, HTN, HLD, DM who presents today for post hospitalization follow up after a fall on 7/31/2023   63-year-old female who presents to the emergency department after fall today.  Patient has been experiencing recurrent falls the past 2 to 3 months.  This morning patient was rolling out of bed when she fell and hit her head.  Patient is unsure if she lost consciousness.  On aspirin and Plavix at home.  Endorsing pain to bilateral shoulders, left hip, right knee and head  MRI: There is a chronic lacunar infarct in the right anterior corona radiata. There are multiple small foci of T2/FLAIR hyperintense signal in the periventricular white matter, suggestive of mild chronic microvascular ischemic changes.  Carotid doppler: There is a moderate, 50-69% stenosis of the right internal carotid artery.  In office Carotid Doppler reports stenosis of 40-60% on R and 45% on left   Family and patient are poor historians. They are unsure when she was dx with Parkinson's dx and report that they saw a neurology at [facility] who said she didn't have any movement disorders but her medication list reveals she is on carbidopa- levodopa. They believe she stopped the med a few months ago and she has progressively gotten worse with increase falls and that the hospital restarted her on the med. She denies any hx of stroke like symptoms but notes over the last 6-7 months her gait has worsened and she now walks with a cane for the last 4 months. She follows with Dr. Ramirez for known R ICA stenosis. She complains of L blurry vision for month and seeing ophthalmology this week.

## 2023-08-23 NOTE — DISCUSSION/SUMMARY
[FreeTextEntry1] : Impression: She presented to hospital with falls. Found to have a chronic lacunar right anterior corona radiata infarct. She known moderate R ICA stenosis and follows with vascular surgery. There is no new pathology for falls. She has a questionable Parkinson's diagnosis and needs movement disorder evaluation.   Overall patient is neurologically stable. Continue ASA 81 mg once daily for secondary stroke reduction. Continue to follow up with PCP/cardiology for BP and statin management (goal LDL <70) as well as all routine primary care needs. Continue with PT. Screened patient for sleep apnea with no identifiable risk factors at this time. Will reevaluate next visit. We discussed aggressive vascular strict risk factor control. She was discharged home on carbidopa-levodopa 25 mg-100 mg oral tablet PO TID   Follow up with us as needed. Transcranial and Carotid Doppler's to be obtained at next appointment. Patient and family were educated on signs and symptoms of stroke and to contact 911 immediately if experiencing any.  [Antithrombotic therapy with ___] : antithrombotic therapy with  [unfilled] [Intensive Blood Pressure Control] : intensive blood pressure control [Lipid Lowering Therapy] : lipid lowering therapy [Patient encouraged to discuss with Primary MD] : I encouraged the patient to discuss these important issues with ~his/her~ primary care doctor [Goals and Counseling] : I have reviewed the goals of stroke risk factor modification. I counseled the patient on measures to reduce stroke risk, including the importance of medication compliance, risk factor control, exercise, healthy diet and avoidance of smoking. I reviewed stroke warning signs and symptoms and appropriate actions to take if such occur.

## 2023-08-23 NOTE — PHYSICAL EXAM
[FreeTextEntry1] : General Exam:  General Appearance: Appropriately dressed and in no acute distress      Head: Normocephalic, atraumatic and no dysmorphic features Ear, Nose, and Throat: Moist mucous membranes CVS: S1S2+ Resp: No SOB, no wheeze or rhonchi GI: soft NT/ND Extremities: No edema or cyanosis Skin: No bruises or rashes   Neurological Exam: Mental Status: Awake, alert and oriented x 2.  Able to follow simple and complex verbal commands. Able to name and repeat. fluent speech. No obvious aphasia mild dysarthria noted.  Cranial Nerves: PERRL, EOMI, VFFC, sensation V1-V3 intact,  no obvious facial asymmetry, equal elevation of palate, scm/trap 5/5, tongue is midline on protrusion. no obvious papilledema on fundoscopic exam. hearing is grossly intact.  Motor: Normal bulk, tone and strength throughout. Fine finger movements were intact and symmetric. no tremors or drift noted.   Sensation: Intact to light touch and pinprick throughout. no right/left confusion. no extinction to tactile on DSS.   Reflexes: 1+ throughout at biceps, brachioradialis, triceps, patellars and ankles bilaterally and equal. No clonus. R toe and L toe were both downgoing. Coordination: No dysmetria on FNF  Gait: shuffling gait

## 2023-08-25 ENCOUNTER — APPOINTMENT (OUTPATIENT)
Dept: PODIATRY | Facility: CLINIC | Age: 63
End: 2023-08-25
Payer: MEDICARE

## 2023-08-25 PROCEDURE — 11720 DEBRIDE NAIL 1-5: CPT | Mod: 59

## 2023-08-25 PROCEDURE — 11719 TRIM NAIL(S) ANY NUMBER: CPT

## 2023-08-29 NOTE — HISTORY OF PRESENT ILLNESS
[Sneakers] : jorge [FreeTextEntry1] : Patient returns today for management of painful, thickened nails.  Her left hallux nail has been growing out after the total nail avulsion.  She reports no discomfort at that nail.  She has seen her vascular doctor, recently, no interventions.  Fingerstick today was 135.  Her last A1c was 7%.  She last saw her PCP in August and reports no medical changes.

## 2023-08-29 NOTE — PHYSICAL EXAM
[Ankle Swelling Bilaterally] : bilaterally  [Varicose Veins Of Lower Extremities] : bilaterally [2+] : left foot dorsalis pedis 2+ [Vibration Dec.] : diminished vibratory sensation at the level of the toes [Position Sense Dec.] : diminished position sense at the level of the toes [Ankle Swelling (On Exam)] : not present [FreeTextEntry3] : CFT: 3 seconds x 10.  Temperature gradient: warm to cool. [de-identified] : No pain with pedal joints ROM.  No gross deformities.  Muscle power: 5/5, all pedal groups.   [Diminished Throughout Right Foot] : normal sensation with monofilament testing throughout right foot [Diminished Throughout Left Foot] : normal sensation with monofilament testing throughout left foot [FreeTextEntry1] : Paresthesias and burning bilateral. Achilles reflexes and patellar decreased, bilateral.

## 2023-08-29 NOTE — ASSESSMENT
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  Onychodystrophy (L60.3).  Onychomycosis (B35.1).  Treatment: Discussed the importance of glycemic control, diabetic foot care and neuropathic precautions.  Patient is wearing adequately fitting shoes today.   Nails left 5 and right 1 and 5 were debrided of excessive thickness and length to appropriate levels of comfort and contour using sterile nippers and Dremel.   The remaining digits were trimmed to hygienic lengths.  The procedure was tolerated well without complications. Failure to perform this treatment based on patient's underlying condition could lead to ulceration and infection. Return: 3 months.  Earlier if she has any problems.

## 2023-09-22 ENCOUNTER — EMERGENCY (EMERGENCY)
Facility: HOSPITAL | Age: 63
LOS: 1 days | Discharge: HOME | End: 2023-09-22
Attending: EMERGENCY MEDICINE
Payer: MEDICARE

## 2023-09-22 VITALS
RESPIRATION RATE: 18 BRPM | OXYGEN SATURATION: 99 % | SYSTOLIC BLOOD PRESSURE: 151 MMHG | TEMPERATURE: 98 F | DIASTOLIC BLOOD PRESSURE: 83 MMHG | HEART RATE: 82 BPM

## 2023-09-22 VITALS
HEIGHT: 64 IN | SYSTOLIC BLOOD PRESSURE: 146 MMHG | RESPIRATION RATE: 18 BRPM | DIASTOLIC BLOOD PRESSURE: 89 MMHG | HEART RATE: 81 BPM | WEIGHT: 139.99 LBS | TEMPERATURE: 98 F | OXYGEN SATURATION: 98 %

## 2023-09-22 DIAGNOSIS — Z90.710 ACQUIRED ABSENCE OF BOTH CERVIX AND UTERUS: Chronic | ICD-10-CM

## 2023-09-22 PROCEDURE — 99284 EMERGENCY DEPT VISIT MOD MDM: CPT

## 2023-09-22 PROCEDURE — 72125 CT NECK SPINE W/O DYE: CPT | Mod: MA

## 2023-09-22 PROCEDURE — 72100 X-RAY EXAM L-S SPINE 2/3 VWS: CPT | Mod: 26

## 2023-09-22 PROCEDURE — 70450 CT HEAD/BRAIN W/O DYE: CPT | Mod: 26,MA

## 2023-09-22 PROCEDURE — 72100 X-RAY EXAM L-S SPINE 2/3 VWS: CPT

## 2023-09-22 PROCEDURE — 72170 X-RAY EXAM OF PELVIS: CPT

## 2023-09-22 PROCEDURE — 72170 X-RAY EXAM OF PELVIS: CPT | Mod: 26

## 2023-09-22 PROCEDURE — 72125 CT NECK SPINE W/O DYE: CPT | Mod: 26,MA

## 2023-09-22 PROCEDURE — 70450 CT HEAD/BRAIN W/O DYE: CPT | Mod: MA

## 2023-09-22 PROCEDURE — 99284 EMERGENCY DEPT VISIT MOD MDM: CPT | Mod: 25

## 2023-09-22 RX ORDER — GABAPENTIN 400 MG/1
100 CAPSULE ORAL ONCE
Refills: 0 | Status: COMPLETED | OUTPATIENT
Start: 2023-09-22 | End: 2023-09-22

## 2023-09-22 RX ADMIN — GABAPENTIN 100 MILLIGRAM(S): 400 CAPSULE ORAL at 13:13

## 2023-09-22 NOTE — ED PROVIDER NOTE - PHYSICAL EXAMINATION
Gen: NAD, non-toxic appearing  Head: normal appearing  HEENT: normal conjunctiva, oral mucosa moist  Lung: no respiratory distress, speaking in full sentences, CTA b/l     CV: regular rate and rhythm, no murmurs  Abd: soft, non distended, non tender   MSK: no visible deformities, normal sensation b/l, neurovascularly intact, 5/5 strength right, 4/5 strength left leg, no calf tenderness, positive straight leg raise on left leg  Neuro: No focal deficits, AAOx3  Skin: Warm  Psych: normal affect Gen: NAD, non-toxic appearing  Head: normal appearing  HEENT: normal conjunctiva, oral mucosa moist  Lung: no respiratory distress, speaking in full sentences, CTA b/l     CV: regular rate and rhythm, no murmurs  Abd: soft, non distended, non tender   MSK: no visible deformities, normal sensation b/l, neurovascularly intact, 5/5 strength right, 4/5 strength left leg, no calf tenderness, positive straight leg raise on left leg, Normal ROM of all extremities  Neuro: No focal deficits, AAOx3  Skin: Warm  Psych: normal affect

## 2023-09-22 NOTE — ED PROVIDER NOTE - PATIENT PORTAL LINK FT
You can access the FollowMyHealth Patient Portal offered by [facility] by registering at the following website: [email or web address]. By joining [facility]’s FollowMyHealth portal, you will also be able to view your health information using other applications (apps) compatible with our system.

## 2023-09-22 NOTE — ED ADULT NURSE NOTE - NSFALLRISKINTERV_ED_ALL_ED

## 2023-09-22 NOTE — ED PROVIDER NOTE - TEST CONSIDERED BUT NOT PERFORMED
Tests Considered But Not Performed MRI considered but given normal neuro exam this can be done as outpatient.

## 2023-09-22 NOTE — ED ADULT NURSE NOTE - OBJECTIVE STATEMENT
Patient c/o pain in left arm and left leg x 3 weeks. Patient reports that pain is primarily in left leg and is a shooting pain. Patient states that she took tylenol 650 this morning and it provided slight relief. Patient able to ambulate with cane which is baseline. Denies n/v/d, fever, chills, sob, chest pain. Denies injury to area. No bruising, swelling or deformity noted. Patient A&Ox4. Hx diabetes. No signs of acute distress at this time. Plan of care in progress.

## 2023-09-22 NOTE — ED PROVIDER NOTE - NSFOLLOWUPINSTRUCTIONS_ED_ALL_ED_FT
Please schedule follow up appointment with PCP for further evaluation of symptoms.     Please take Tylenol up to 650 mg every 6 hours as needed for pain and/or Motrin up to 600 mg every 8 hours as needed for pain    Please take any prescribed medications as instructed by your PMD    Be sure to return to the ED if you develop new or worsening symptoms. Specific signs and symptoms to be vigilant of: fever or chills, chest pain, difficulty breathing, palpitations, loss of consciousness, headache, vision changes, slurred speech, difficulty swallowing or drooling, facial droop, weakness in the arms or legs, numbness or tingling, abdominal pain, nausea or vomiting, diarrhea, constipation, blood in the stool or urine, pain on urination, difficulty urinating.

## 2023-09-22 NOTE — ED PROVIDER NOTE - OBJECTIVE STATEMENT
63-year-old female with past medical history of HTN, DM, HLD, CAD status post stent on Plavix, CVA with risidual deficit of dyarthia presents today with atraumatic left arm and left leg pain for about 3 weeks.  Patient states a progressively worsening constant sharp pain that radiates down the left arm and left leg up to the knee with no associated numbness or tingling.  Denies any urinary incontinence, saddle anesthesia, recent falls, lifting of heavy objects, back pain.  Normal gait with use of cane.  Denies fever, chills, nausea, vomiting, chest pain, SOB, abdominal pain, lower extremity edema.  No recent travels or sick contacts. 63-year-old female with past medical history of HTN, DM, HLD, CAD status post stent on Plavix, CVA with residual deficit of dysarthria presents today with atraumatic left arm and left leg pain for about 3 weeks.  Patient states a progressively worsening constant sharp pain that radiates down the left arm and left leg up to the knee with no associated numbness or tingling.  Denies any urinary incontinence, saddle anesthesia, recent falls, lifting of heavy objects, back pain. Notes that pain in independent of each other and occurs at rest.  Normal gait with use of cane.  Denies fever, chills, nausea, vomiting, chest pain, SOB, abdominal pain, lower extremity edema.  No recent travels or sick contacts.

## 2023-09-22 NOTE — ED PROVIDER NOTE - CLINICAL SUMMARY MEDICAL DECISION MAKING FREE TEXT BOX
63-year-old female with past medical history of HTN, DM, HLD, CAD status post stent on Plavix, CVA with residual deficit of dysarthria presents today with atraumatic left arm and left leg pain for about 3 weeks.  Patient states a progressively worsening constant sharp pain that radiates down the left arm and left leg up to the knee with no associated numbness or tingling.  Denies any urinary incontinence, saddle anesthesia, recent falls, lifting of heavy objects, back pain. Notes that pain in independent of each other and occurs at rest.  Normal gait with use of cane.  Denies fever, chills, nausea, vomiting, chest pain, SOB, abdominal pain, lower extremity edema.  No recent travels or sick contacts.    Given Hx of independent left upper and lower extremity pain w/ physical exam finding of decreased left leg strength and post straight left leg likely sciatica but will order CT head and C spine and Xray lumbosacral/ pelvis to r/o fx vs possible stroke. Given Gabapentin, will reassess.

## 2023-09-22 NOTE — ED PROVIDER NOTE - ATTENDING CONTRIBUTION TO CARE
Attending MD Rodriguez:  I personally have seen and examined this patient.  Resident note reviewed and agree on plan of care and except where noted.  Please see my MDM for further details. No acute issues at  this time.  Radiology tests reviewed with patient and/or family.  Patient with no neurologic abnormalities, pain may be caused by spinal stenosis and arthritis.  Will refer for outpatient follow up. Patient stable for discharge.

## 2023-09-22 NOTE — ED PROVIDER NOTE - WR ORDER DATE AND TIME 1
Hatchet Flap Text: The defect edges were debeveled with a #15 scalpel blade.  Given the location of the defect, shape of the defect and the proximity to free margins a hatchet flap was deemed most appropriate.  Using a sterile surgical marker, an appropriate hatchet flap was drawn incorporating the defect and placing the expected incisions within the relaxed skin tension lines where possible.    The area thus outlined was incised deep to adipose tissue with a #15 scalpel blade.  The skin margins were undermined to an appropriate distance in all directions utilizing iris scissors. 22-Sep-2023 12:46

## 2023-09-29 ENCOUNTER — APPOINTMENT (OUTPATIENT)
Age: 63
End: 2023-09-29
Payer: MEDICARE

## 2023-09-29 DIAGNOSIS — M22.2X1 PATELLOFEMORAL DISORDERS, RIGHT KNEE: ICD-10-CM

## 2023-09-29 DIAGNOSIS — M75.32 CALCIFIC TENDINITIS OF LEFT SHOULDER: ICD-10-CM

## 2023-09-29 DIAGNOSIS — M70.61 TROCHANTERIC BURSITIS, RIGHT HIP: ICD-10-CM

## 2023-09-29 PROCEDURE — 99204 OFFICE O/P NEW MOD 45 MIN: CPT

## 2023-10-31 ENCOUNTER — APPOINTMENT (OUTPATIENT)
Dept: VASCULAR SURGERY | Facility: CLINIC | Age: 63
End: 2023-10-31

## 2023-11-24 ENCOUNTER — APPOINTMENT (OUTPATIENT)
Dept: PODIATRY | Facility: CLINIC | Age: 63
End: 2023-11-24

## 2023-12-01 ENCOUNTER — APPOINTMENT (OUTPATIENT)
Dept: ORTHOPEDIC SURGERY | Facility: CLINIC | Age: 63
End: 2023-12-01

## 2023-12-19 ENCOUNTER — OUTPATIENT (OUTPATIENT)
Dept: OUTPATIENT SERVICES | Facility: HOSPITAL | Age: 63
LOS: 1 days | Discharge: HOME | End: 2023-12-19
Payer: MEDICARE

## 2023-12-19 DIAGNOSIS — Z90.710 ACQUIRED ABSENCE OF BOTH CERVIX AND UTERUS: Chronic | ICD-10-CM

## 2023-12-19 DIAGNOSIS — R06.02 SHORTNESS OF BREATH: ICD-10-CM

## 2023-12-19 LAB
ANION GAP SERPL CALC-SCNC: 13 MMOL/L — SIGNIFICANT CHANGE UP (ref 7–14)
ANION GAP SERPL CALC-SCNC: 13 MMOL/L — SIGNIFICANT CHANGE UP (ref 7–14)
BLD GP AB SCN SERPL QL: NEGATIVE — SIGNIFICANT CHANGE UP
BLD GP AB SCN SERPL QL: NEGATIVE — SIGNIFICANT CHANGE UP
BUN SERPL-MCNC: 18 MG/DL — SIGNIFICANT CHANGE UP (ref 7–23)
BUN SERPL-MCNC: 18 MG/DL — SIGNIFICANT CHANGE UP (ref 7–23)
CALCIUM SERPL-MCNC: 9.5 MG/DL — SIGNIFICANT CHANGE UP (ref 8.4–10.5)
CALCIUM SERPL-MCNC: 9.5 MG/DL — SIGNIFICANT CHANGE UP (ref 8.4–10.5)
CHLORIDE SERPL-SCNC: 104 MMOL/L — SIGNIFICANT CHANGE UP (ref 98–107)
CHLORIDE SERPL-SCNC: 104 MMOL/L — SIGNIFICANT CHANGE UP (ref 98–107)
CO2 SERPL-SCNC: 25 MMOL/L — SIGNIFICANT CHANGE UP (ref 22–31)
CO2 SERPL-SCNC: 25 MMOL/L — SIGNIFICANT CHANGE UP (ref 22–31)
CREAT SERPL-MCNC: 0.55 MG/DL — SIGNIFICANT CHANGE UP (ref 0.5–1.3)
CREAT SERPL-MCNC: 0.55 MG/DL — SIGNIFICANT CHANGE UP (ref 0.5–1.3)
EGFR: 102 ML/MIN/1.73M2 — SIGNIFICANT CHANGE UP
EGFR: 102 ML/MIN/1.73M2 — SIGNIFICANT CHANGE UP
GLUCOSE BLDC GLUCOMTR-MCNC: 141 MG/DL — HIGH (ref 70–99)
GLUCOSE BLDC GLUCOMTR-MCNC: 141 MG/DL — HIGH (ref 70–99)
GLUCOSE SERPL-MCNC: 159 MG/DL — HIGH (ref 70–99)
GLUCOSE SERPL-MCNC: 159 MG/DL — HIGH (ref 70–99)
HCT VFR BLD CALC: 40.3 % — SIGNIFICANT CHANGE UP (ref 34.5–45)
HCT VFR BLD CALC: 40.3 % — SIGNIFICANT CHANGE UP (ref 34.5–45)
HGB BLD-MCNC: 12.9 G/DL — SIGNIFICANT CHANGE UP (ref 11.5–15.5)
HGB BLD-MCNC: 12.9 G/DL — SIGNIFICANT CHANGE UP (ref 11.5–15.5)
MAGNESIUM SERPL-MCNC: 1.9 MG/DL — SIGNIFICANT CHANGE UP (ref 1.6–2.6)
MAGNESIUM SERPL-MCNC: 1.9 MG/DL — SIGNIFICANT CHANGE UP (ref 1.6–2.6)
MCHC RBC-ENTMCNC: 24.3 PG — LOW (ref 27–34)
MCHC RBC-ENTMCNC: 24.3 PG — LOW (ref 27–34)
MCHC RBC-ENTMCNC: 32 GM/DL — SIGNIFICANT CHANGE UP (ref 32–36)
MCHC RBC-ENTMCNC: 32 GM/DL — SIGNIFICANT CHANGE UP (ref 32–36)
MCV RBC AUTO: 75.9 FL — LOW (ref 80–100)
MCV RBC AUTO: 75.9 FL — LOW (ref 80–100)
NRBC # BLD: 0 /100 WBCS — SIGNIFICANT CHANGE UP (ref 0–0)
NRBC # BLD: 0 /100 WBCS — SIGNIFICANT CHANGE UP (ref 0–0)
NRBC # FLD: 0 K/UL — SIGNIFICANT CHANGE UP (ref 0–0)
NRBC # FLD: 0 K/UL — SIGNIFICANT CHANGE UP (ref 0–0)
PLATELET # BLD AUTO: 310 K/UL — SIGNIFICANT CHANGE UP (ref 150–400)
PLATELET # BLD AUTO: 310 K/UL — SIGNIFICANT CHANGE UP (ref 150–400)
POTASSIUM SERPL-MCNC: 4 MMOL/L — SIGNIFICANT CHANGE UP (ref 3.5–5.3)
POTASSIUM SERPL-MCNC: 4 MMOL/L — SIGNIFICANT CHANGE UP (ref 3.5–5.3)
POTASSIUM SERPL-SCNC: 4 MMOL/L — SIGNIFICANT CHANGE UP (ref 3.5–5.3)
POTASSIUM SERPL-SCNC: 4 MMOL/L — SIGNIFICANT CHANGE UP (ref 3.5–5.3)
RBC # BLD: 5.31 M/UL — HIGH (ref 3.8–5.2)
RBC # BLD: 5.31 M/UL — HIGH (ref 3.8–5.2)
RBC # FLD: 13.7 % — SIGNIFICANT CHANGE UP (ref 10.3–14.5)
RBC # FLD: 13.7 % — SIGNIFICANT CHANGE UP (ref 10.3–14.5)
RH IG SCN BLD-IMP: POSITIVE — SIGNIFICANT CHANGE UP
SODIUM SERPL-SCNC: 142 MMOL/L — SIGNIFICANT CHANGE UP (ref 135–145)
SODIUM SERPL-SCNC: 142 MMOL/L — SIGNIFICANT CHANGE UP (ref 135–145)
WBC # BLD: 7.35 K/UL — SIGNIFICANT CHANGE UP (ref 3.8–10.5)
WBC # BLD: 7.35 K/UL — SIGNIFICANT CHANGE UP (ref 3.8–10.5)
WBC # FLD AUTO: 7.35 K/UL — SIGNIFICANT CHANGE UP (ref 3.8–10.5)
WBC # FLD AUTO: 7.35 K/UL — SIGNIFICANT CHANGE UP (ref 3.8–10.5)

## 2023-12-19 PROCEDURE — 93010 ELECTROCARDIOGRAM REPORT: CPT

## 2023-12-19 RX ORDER — SODIUM CHLORIDE 9 MG/ML
3 INJECTION INTRAMUSCULAR; INTRAVENOUS; SUBCUTANEOUS EVERY 8 HOURS
Refills: 0 | Status: DISCONTINUED | OUTPATIENT
Start: 2023-12-19 | End: 2024-01-02

## 2023-12-19 RX ORDER — SODIUM CHLORIDE 9 MG/ML
1000 INJECTION INTRAMUSCULAR; INTRAVENOUS; SUBCUTANEOUS
Refills: 0 | Status: DISCONTINUED | OUTPATIENT
Start: 2023-12-19 | End: 2024-01-02

## 2023-12-19 RX ORDER — CLOPIDOGREL BISULFATE 75 MG/1
300 TABLET, FILM COATED ORAL ONCE
Refills: 0 | Status: COMPLETED | OUTPATIENT
Start: 2023-12-19 | End: 2023-12-19

## 2023-12-19 RX ORDER — DICLOFENAC SODIUM 30 MG/G
2 GEL TOPICAL
Refills: 0 | DISCHARGE

## 2023-12-19 RX ORDER — DAPAGLIFLOZIN 10 MG/1
1 TABLET, FILM COATED ORAL
Refills: 0 | DISCHARGE

## 2023-12-19 RX ORDER — FOLIC ACID 0.8 MG
1 TABLET ORAL
Refills: 0 | DISCHARGE

## 2023-12-19 RX ORDER — DOCUSATE SODIUM 100 MG
1 CAPSULE ORAL
Refills: 0 | DISCHARGE

## 2023-12-19 RX ADMIN — CLOPIDOGREL BISULFATE 300 MILLIGRAM(S): 75 TABLET, FILM COATED ORAL at 13:14

## 2023-12-19 RX ADMIN — SODIUM CHLORIDE 120 MILLILITER(S): 9 INJECTION INTRAMUSCULAR; INTRAVENOUS; SUBCUTANEOUS at 13:14

## 2023-12-19 RX ADMIN — SODIUM CHLORIDE 3 MILLILITER(S): 9 INJECTION INTRAMUSCULAR; INTRAVENOUS; SUBCUTANEOUS at 13:49

## 2023-12-19 NOTE — H&P CARDIOLOGY - HISTORY OF PRESENT ILLNESS
62 y/o F with PMH of HTN, HLD, DM type II, CAD(s/p 3 stents placed at [facility] in 2014 to the prox LAD and then 2022), dysarthria 2/2 to CVA(as per patient she is unsure when she had the CVA as recent MRI showed chronic infarct), mild dementia(but AAOx3), left eye blurry vision after cataract surgery, PAD presented to [facility] for bilateral LE angiogram. As per the patient she has been having constant left leg pain from the groin to the knee and below her foot both with ambulation and at rest. Patient stated that these symptoms started in March of this year and has been progressively getting worse. Patient stated she would get intermittent right calf pain and is only able to walk 10-20 feet due to the pain. Patient also endorsed of rest pain in L>R. Patient otherwise denied any numbness or tingling or any ulcers/wounds of the LE. Patient endorsed these symptoms to the doctor who did a CTA aorta which revealed no significant inflow disease, moderate to high-grade focal stenosis involving the superficial femoral and popliteal arteries bilaterally, three-vessel runoff in the calves and is now referred for bilateral LE angiogram. Patient otherwise denied any CP, SOB, fevers, chills, N/V/D/C, abdominal pain, dysuria, melena, hematochezia, recent travel, sick contact, cough, body aches, pleuritic or positional chest pain.  64 y/o F with PMH of HTN, HLD, DM type II, CAD(s/p 3 stents placed at [facility] in 2014 to the prox LAD and then 2022), dysarthria 2/2 to CVA(as per patient she is unsure when she had the CVA as recent MRI showed chronic infarct), mild dementia(but AAOx3), left eye blurry vision after cataract surgery, PAD presented to [facility] for bilateral LE angiogram. As per the patient she has been having constant left leg pain from the groin to the knee and below her foot both with ambulation and at rest. Patient stated that these symptoms started in March of this year and has been progressively getting worse. Patient stated she would get intermittent right calf pain and is only able to walk 10-20 feet due to the pain. Patient also endorsed of rest pain in L>R. Patient otherwise denied any numbness or tingling or any ulcers/wounds of the LE. Patient endorsed these symptoms to the doctor who did a CTA aorta which revealed no significant inflow disease, moderate to high-grade focal stenosis involving the superficial femoral and popliteal arteries bilaterally, three-vessel runoff in the calves and is now referred for bilateral LE angiogram. Patient otherwise denied any CP, SOB, fevers, chills, N/V/D/C, abdominal pain, dysuria, melena, hematochezia, recent travel, sick contact, cough, body aches, pleuritic or positional chest pain.

## 2023-12-19 NOTE — H&P CARDIOLOGY - NSICDXPASTMEDICALHX_GEN_ALL_CORE_FT
PAST MEDICAL HISTORY:  CAD (coronary artery disease)     HLD (hyperlipidemia)     HTN (hypertension)     Mild dementia     PAD (peripheral artery disease)     Parkinson disease     Type II diabetes mellitus

## 2023-12-20 ENCOUNTER — APPOINTMENT (OUTPATIENT)
Dept: VASCULAR SURGERY | Facility: CLINIC | Age: 63
End: 2023-12-20
Payer: MEDICARE

## 2023-12-20 VITALS
BODY MASS INDEX: 26.5 KG/M2 | SYSTOLIC BLOOD PRESSURE: 125 MMHG | WEIGHT: 144 LBS | HEART RATE: 85 BPM | TEMPERATURE: 98.2 F | HEIGHT: 62 IN | DIASTOLIC BLOOD PRESSURE: 75 MMHG

## 2023-12-20 DIAGNOSIS — Z13.6 ENCOUNTER FOR SCREENING FOR CARDIOVASCULAR DISORDERS: ICD-10-CM

## 2023-12-20 DIAGNOSIS — Z12.9 ENCOUNTER FOR SCREENING FOR MALIGNANT NEOPLASM, SITE UNSPECIFIED: ICD-10-CM

## 2023-12-20 DIAGNOSIS — I65.23 OCCLUSION AND STENOSIS OF BILATERAL CAROTID ARTERIES: ICD-10-CM

## 2023-12-20 DIAGNOSIS — Z13.228 ENCOUNTER FOR SCREENING FOR OTHER METABOLIC DISORDERS: ICD-10-CM

## 2023-12-20 DIAGNOSIS — I65.21 OCCLUSION AND STENOSIS OF RIGHT CAROTID ARTERY: ICD-10-CM

## 2023-12-20 PROCEDURE — 93880 EXTRACRANIAL BILAT STUDY: CPT

## 2023-12-20 PROCEDURE — 99203 OFFICE O/P NEW LOW 30 MIN: CPT

## 2023-12-20 RX ORDER — LOSARTAN POTASSIUM 100 MG/1
100 TABLET, FILM COATED ORAL
Refills: 0 | Status: ACTIVE | COMMUNITY

## 2023-12-20 RX ORDER — HYDRALAZINE HYDROCHLORIDE 25 MG/1
25 TABLET ORAL
Refills: 0 | Status: ACTIVE | COMMUNITY

## 2023-12-20 RX ORDER — ATORVASTATIN CALCIUM 40 MG/1
40 TABLET, FILM COATED ORAL
Refills: 0 | Status: ACTIVE | COMMUNITY

## 2023-12-20 RX ORDER — ISOSORBIDE MONONITRATE 30 MG
30 TABLET, EXTENDED RELEASE 24 HR ORAL
Refills: 0 | Status: ACTIVE | COMMUNITY

## 2023-12-20 RX ORDER — GABAPENTIN 300 MG/1
300 CAPSULE ORAL
Refills: 0 | Status: ACTIVE | COMMUNITY

## 2023-12-20 RX ORDER — LINACLOTIDE 72 UG/1
72 CAPSULE, GELATIN COATED ORAL
Refills: 0 | Status: ACTIVE | COMMUNITY

## 2023-12-20 RX ORDER — FAMOTIDINE 10 MG/1
TABLET, FILM COATED ORAL
Refills: 0 | Status: ACTIVE | COMMUNITY

## 2023-12-20 NOTE — HISTORY OF PRESENT ILLNESS
[FreeTextEntry1] : 63-year-old woman with history of hypertension, hyperlipidemia, diabetes mellitus, varicose veins, CVA who presents with existing diagnosis of right carotid artery stenosis. She has residual speech deficits and left-sided weakness from her previous strokes. She also reports that recently she is having trouble swallowing, she is coughing, and choking on her own saliva. She denies any other issues.

## 2023-12-20 NOTE — REASON FOR VISIT
[Initial Eval - Existing Diagnosis] : an initial evaluation of an existing diagnosis [Family Member] : family member [FreeTextEntry1] : carotid artery stenosis

## 2023-12-20 NOTE — ASSESSMENT
[FreeTextEntry1] : Problem #1 right carotid artery stenosis continue dual anti-platelet therapy and statin follow up in 6 months for re-evaluation referral provided to ENT for thyroid nodule and dysphagia

## 2023-12-20 NOTE — PHYSICAL EXAM
[Respiratory Effort] : normal respiratory effort [Normal Rate and Rhythm] : normal rate and rhythm [2+] : left 2+ [Ankle Swelling (On Exam)] : not present [Varicose Veins Of Lower Extremities] : not present [] : not present [Abdomen Tenderness] : ~T ~M No abdominal tenderness [Skin Ulcer] : no ulcer [Alert] : alert [Oriented to Person] : oriented to person [Oriented to Place] : oriented to place [Oriented to Time] : oriented to time [Calm] : calm [de-identified] : appears stated age [de-identified] : normocephalic, atraumatic [de-identified] : supple

## 2023-12-20 NOTE — DATA REVIEWED
[FreeTextEntry1] : R ICA 50-60% stenosis proximal R /60  L ICA <50% stenosis  Left thyroid nodule measuring 2.9cm x 2.5cm with no flow

## 2023-12-22 PROBLEM — I73.9 PERIPHERAL VASCULAR DISEASE, UNSPECIFIED: Chronic | Status: ACTIVE | Noted: 2023-12-19

## 2023-12-22 PROBLEM — G20.A1 PARKINSON'S DISEASE WITHOUT DYSKINESIA, WITHOUT MENTION OF FLUCTUATIONS: Chronic | Status: ACTIVE | Noted: 2023-12-19

## 2023-12-22 PROBLEM — E11.9 TYPE 2 DIABETES MELLITUS WITHOUT COMPLICATIONS: Chronic | Status: ACTIVE | Noted: 2023-12-19

## 2023-12-22 PROBLEM — F03.A0 UNSPECIFIED DEMENTIA, MILD, WITHOUT BEHAVIORAL DISTURBANCE, PSYCHOTIC DISTURBANCE, MOOD DISTURBANCE, AND ANXIETY: Chronic | Status: ACTIVE | Noted: 2023-12-19

## 2023-12-25 ENCOUNTER — NON-APPOINTMENT (OUTPATIENT)
Age: 63
End: 2023-12-25

## 2023-12-26 ENCOUNTER — APPOINTMENT (OUTPATIENT)
Dept: OTOLARYNGOLOGY | Facility: CLINIC | Age: 63
End: 2023-12-26
Payer: MEDICARE

## 2023-12-26 VITALS
OXYGEN SATURATION: 100 % | BODY MASS INDEX: 26.5 KG/M2 | SYSTOLIC BLOOD PRESSURE: 147 MMHG | HEIGHT: 62 IN | DIASTOLIC BLOOD PRESSURE: 89 MMHG | WEIGHT: 144 LBS | HEART RATE: 90 BPM

## 2023-12-26 DIAGNOSIS — Z86.73 PERSONAL HISTORY OF TRANSIENT ISCHEMIC ATTACK (TIA), AND CEREBRAL INFARCTION W/OUT RESIDUAL DEFICITS: ICD-10-CM

## 2023-12-26 DIAGNOSIS — R13.10 DYSPHAGIA, UNSPECIFIED: ICD-10-CM

## 2023-12-26 DIAGNOSIS — G20.B2 PARKINSON'S DISEASE WITH DYSKINESIA, WITH FLUCTUATIONS: ICD-10-CM

## 2023-12-26 DIAGNOSIS — G20.A1 PARKINSON'S DISEASE WITHOUT DYSKINESIA, WITHOUT MENTION OF FLUCTUATIONS: ICD-10-CM

## 2023-12-26 DIAGNOSIS — R49.0 DYSPHONIA: ICD-10-CM

## 2023-12-26 PROCEDURE — 99203 OFFICE O/P NEW LOW 30 MIN: CPT | Mod: 25

## 2023-12-26 PROCEDURE — 31579 LARYNGOSCOPY TELESCOPIC: CPT

## 2023-12-26 RX ORDER — DULOXETINE HYDROCHLORIDE 30 MG/1
30 CAPSULE, DELAYED RELEASE PELLETS ORAL
Qty: 30 | Refills: 5 | Status: COMPLETED | COMMUNITY
Start: 2021-06-09 | End: 2023-12-26

## 2023-12-26 RX ORDER — IRBESARTAN 300 MG/1
300 TABLET, FILM COATED ORAL
Refills: 0 | Status: COMPLETED | COMMUNITY
End: 2023-12-26

## 2023-12-26 RX ORDER — GLYBURIDE 2.5 MG/1
2.5 TABLET ORAL
Qty: 1 | Refills: 1 | Status: COMPLETED | COMMUNITY
Start: 2021-11-10 | End: 2023-12-26

## 2023-12-26 RX ORDER — IRBESARTAN 300 MG/1
TABLET ORAL
Refills: 0 | Status: COMPLETED | COMMUNITY
End: 2023-12-26

## 2023-12-26 RX ORDER — FERROUS SULFATE 325(65) MG
325 TABLET ORAL
Refills: 0 | Status: COMPLETED | COMMUNITY
End: 2023-12-26

## 2023-12-26 RX ORDER — DICLOFENAC SODIUM 1% 10 MG/G
1 GEL TOPICAL
Qty: 1 | Refills: 0 | Status: COMPLETED | COMMUNITY
Start: 2023-09-29 | End: 2023-12-26

## 2023-12-26 RX ORDER — AMOXICILLIN AND CLAVULANATE POTASSIUM 875; 125 MG/1; MG/1
875-125 TABLET, COATED ORAL
Qty: 14 | Refills: 0 | Status: COMPLETED | COMMUNITY
Start: 2023-01-19 | End: 2023-12-26

## 2023-12-26 NOTE — CONSULT LETTER
[Dear  ___] : Dear  [unfilled], [Consult Letter:] : I had the pleasure of evaluating your patient, [unfilled]. [Please see my note below.] : Please see my note below. [Consult Closing:] : Thank you very much for allowing me to participate in the care of this patient.  If you have any questions, please do not hesitate to contact me. [Sincerely,] : Sincerely, [FreeTextEntry2] : Dr. Santiago Norman [FreeTextEntry3] : Dr. Lamb signature:  Bertram Lamb M.D.  Division of Laryngology | Department of Otolaryngology  [place] [phone]

## 2023-12-26 NOTE — HISTORY OF PRESENT ILLNESS
[de-identified] : ALYSE AVENDAÑO is a 63 year old woman who presents to the [facility] with difficulty swallowing for the past 2 months.  This started suddenly and associated with chocking when eating and drinking. She is referred by Dr. Santiago Norman. PMH is significant for PD and CVA in 2021.   They do not note altering their diet to avoid troublesome consistencies. They do not note regurgitation of recently eaten foods. They do note recent weight loss but can't quantify.  No recent PNAs.  They do note frequent classic reflux symptoms - famotidine daily

## 2023-12-26 NOTE — ASSESSMENT
[FreeTextEntry1] : Assessment/Plan: #1 Dysphagia #2 Dysphonia secondary to prior CVA #3 Parkinsons  After a thorough discussion of our findings today, the patient understands we need to do further workup to determine the potential cause of their dysphagia.  As such I have ordered them for a Modified Barium Swallow (MBS) to be performed by our SLP team.  I would like to follow up with them in clinic following this swallow study to go over the results and next steps.  The patient expressed understanding and agreement with this plan.

## 2023-12-26 NOTE — PROCEDURE
[de-identified] : Stroboscopic Laryngoscopy Procedure Note:  Indication:	Assess laryngeal biomechanics and vocal fold oscillation.  Description of Procedure:	Informed consent was verbally obtained from the patient prior to the procedure. The patient was seated in the clinic chair. Topical anesthesia was achieved by first spraying the nasal cavities with 4% lidocaine and nasal decongestant.   Findings:  Supraglottis: no masses or lesions  Glottis:    Structure:                        Right: crisp and shows no lesions or masses                        Left:  crisp and shows no lesions or masses                 Mobility:                        Right:  normal                        Left:  normal                Amplitude:                        Right:  normal                       Left:  normal                Closure: complete                 Wave symmetry:  symmetric  Subglottis: no masses or lesions within the visualized subglottis Visualized airway is widely patent.

## 2024-01-08 ENCOUNTER — RESULT REVIEW (OUTPATIENT)
Age: 64
End: 2024-01-08

## 2024-01-10 ENCOUNTER — NON-APPOINTMENT (OUTPATIENT)
Age: 64
End: 2024-01-10

## 2024-01-10 ENCOUNTER — APPOINTMENT (OUTPATIENT)
Dept: SPEECH THERAPY | Facility: HOSPITAL | Age: 64
End: 2024-01-10
Payer: MEDICARE

## 2024-01-10 ENCOUNTER — APPOINTMENT (OUTPATIENT)
Dept: RADIOLOGY | Facility: HOSPITAL | Age: 64
End: 2024-01-10
Payer: MEDICARE

## 2024-01-10 ENCOUNTER — OUTPATIENT (OUTPATIENT)
Dept: OUTPATIENT SERVICES | Facility: HOSPITAL | Age: 64
LOS: 1 days | Discharge: HOME | End: 2024-01-10

## 2024-01-10 ENCOUNTER — OUTPATIENT (OUTPATIENT)
Dept: OUTPATIENT SERVICES | Facility: HOSPITAL | Age: 64
LOS: 1 days | End: 2024-01-10

## 2024-01-10 DIAGNOSIS — R13.10 DYSPHAGIA, UNSPECIFIED: ICD-10-CM

## 2024-01-10 DIAGNOSIS — Z90.710 ACQUIRED ABSENCE OF BOTH CERVIX AND UTERUS: Chronic | ICD-10-CM

## 2024-01-10 PROCEDURE — 74230 X-RAY XM SWLNG FUNCJ C+: CPT | Mod: 26

## 2024-01-10 NOTE — PLAN
[FreeTextEntry2] :  1.) Continue with current diet regimen of Regular and Thin Liquids 2.) Feeding/Swallowing Guidelines: Upright position, small bites, chew well, single cup sip of thin liquids; two swallows per bite; alternate with a liquid wash after every 2 to 3 bites. 3.) Aspiration Precautions 4.) Reflux Precautions 5.) Maintain Good Oral Hygiene Care 6.) Follow up with Physician 7.) Swallow Therapy to maximize swallow mechanism 8.) Consider Speech Evaluation/Therapy to maximize speech/communication output at MD's discretion.    SLP provided education regarding preliminary results to Patient/Daughter in Law. Both verbalized understanding and will follow up with Physician.

## 2024-01-10 NOTE — ASSESSMENT
[FreeTextEntry1] : Patient presents with Functional Oral and Mild Pharyngeal Stage Dysphagia. The Oral Stage is characterized by adequate oral containment, adequate chewing for solid, adequate bolus manipulation, adequate tongue motion with adequate anterior to posterior transfer of the bolus with adequate oral clearance. The Pharyngeal Stage is characterized by delayed initiation of the pharyngeal swallow (Bolus head is at the vallecular for Thin Liquids), adequate laryngeal elevation, mildly reduced tongue base retraction and adequate pharyngeal constriction. There is trace pharyngeal clearance deficits located vallecular/lateral pharyngeal wall post swallow. There was Trace Laryngeal Penetration during the swallow for Thin Liquids and Mildly Thick Liquids with retrieval and airway protection maintained.  There was No Aspiration observed before, during or after the swallow for puree, solids, mildly thick liquids and thin liquids.   RESULTS: No Aspiration observed before, during or after the swallow for puree, solids, mildly thick liquids and thin liquids. Trace Laryngeal Penetration during the swallow for Thin liquids with retrieval and airway protection maintained.   Of Note: Patient was given a Solid, Thin Liquid and a Barium Tablet in Anterior Posterior (AP) view. An Esophageal Screen was performed. Patient was given a Barium Tablet with a cup of water. The Tablet was observed to course through the pharynx/esophagus without hold up. This cannot be considered as a full complete evaluation of the esophagus.

## 2024-01-10 NOTE — HISTORY OF PRESENT ILLNESS
[FreeTextEntry1] : Patient arrived to Radiology for an OutPatient Modified Barium Swallow Study. Patient was accompanied by her daughter in law (Bri). Patient ambulates with a cane.  Patient is a 62 y/o female with PMH as per EMR:  Active Problems Ankle pain (719.47) (M25.579) Benign essential hypertension (401.1) (I10) Bilateral anterior knee pain (719.46) (M25.561,M25.562) Bilateral hand numbness (782.0) (R20.0) Calcific tendinitis of left shoulder (726.11) (M75.32) Chronic headache (784.0) (R51.9,G89.29) Controlled type 2 diabetes mellitus (250.00) (E11.9) DM (diabetes mellitus), type 2 with neurological complications (250.60) (E11.49) Dry eyes (375.15) (H04.123) HTN (hypertension) (401.9) (I10) Hyperlipidemia, acquired (272.4) (E78.5) Injury of toe on left foot, initial encounter (959.7) (S99.922A) Injury of toe, unspecified laterality, initial encounter (959.7) (S99.929A) Laceration of left great toe without foreign body with damage to nail, initial encounter (893.0) (S91.212A) Lacunar stroke (434.91) (I63.81) Language difficulty (V40.1) (F80.9) Numbness and tingling (782.0) (R20.0,R20.2) Patellofemoral pain syndrome of right knee (719.46) (M22.2X1) Peripheral neuropathy (356.9) (G62.9) Positive anti-CCP test (795.79) (R76.8) Screening for cancer (V76.9) (Z12.9) Screening for heart disease (V81.2) (Z13.6) Screening for metabolic disorder (V77.99) (Z13.228) SS-A antibody positive (795.79) (R76.8) Stenosis of right carotid artery (433.10) (I65.21) Traumatic avulsion of nail plate of toe, initial encounter (893.0) (S91.209A) Trochanteric bursitis of right hip (726.5) (M70.61) Varicose veins of bilateral lower extremities with other complications (454.8) (I83.893) Venous insufficiency of both lower extremities (459.81) (I87.2)     Past Medical History History of Carotid stenosis, asymptomatic, bilateral (433.10,433.30) (I65.23) History of Coronary stent patent (V45.82) (Z95.5) History of type 2 diabetes mellitus (V12.29) (Z86.39) History of Injury of toe on left foot, initial encounter (959.7) (S99.922A) History of Parkinson's disease with dyskinesia and fluctuating manifestations (332.0) (G20.B2)    Surgical History History of Arterial stent placement History of Hysterectomy   ENT Note 12/26/2023 - 63 year old woman who presents to the [facility] with difficulty swallowing for the past 2 months. This started suddenly and associated with chocking when eating and drinking. She is referred by Dr. Santiago Norman. PMH is significant for PD and CVA in 2021. They do not note altering their diet to avoid troublesome consistencies. They do not note regurgitation of recently eaten foods. They do note recent weight loss but can't quantify. No recent PNAs. They do note frequent classic reflux symptoms - famotidine daily   Today, Patient offers c/o "cough a lot" "when I eat food, not liquids" x 2 months. Patient reports that she was diagnosed with Parkinsons after the stroke (2021).  Patient reports coughing only, no choking episodes.  No previous swallow therapy.  No current/repeated pneumonia.  Patient eats Regular with Thin Liquids.  This Modified Barium Swallow Study is to objectively assess the Physiology of the Oral and Pharyngeal Stage swallowing mechanism for treatment plan for least retrictive diet.   Referring MD: Dr. Bertram Lamb

## 2024-01-11 DIAGNOSIS — R13.12 DYSPHAGIA, OROPHARYNGEAL PHASE: ICD-10-CM

## 2024-02-13 ENCOUNTER — APPOINTMENT (OUTPATIENT)
Dept: NEUROLOGY | Facility: CLINIC | Age: 64
End: 2024-02-13

## 2024-02-16 ENCOUNTER — APPOINTMENT (OUTPATIENT)
Dept: PODIATRY | Facility: CLINIC | Age: 64
End: 2024-02-16
Payer: MEDICARE

## 2024-02-16 DIAGNOSIS — E11.49 TYPE 2 DIABETES MELLITUS WITH OTHER DIABETIC NEUROLOGICAL COMPLICATION: ICD-10-CM

## 2024-02-16 PROCEDURE — 99213 OFFICE O/P EST LOW 20 MIN: CPT | Mod: 25

## 2024-02-16 PROCEDURE — 11720 DEBRIDE NAIL 1-5: CPT | Mod: 59

## 2024-02-16 PROCEDURE — 11719 TRIM NAIL(S) ANY NUMBER: CPT

## 2024-02-16 RX ORDER — CICLOPIROX 80 MG/ML
8 SOLUTION TOPICAL
Qty: 1 | Refills: 3 | Status: ACTIVE | COMMUNITY
Start: 2024-02-16 | End: 1900-01-01

## 2024-02-19 ENCOUNTER — INPATIENT (INPATIENT)
Facility: HOSPITAL | Age: 64
LOS: 7 days | Discharge: NURSING, REHAB OR LONG-TERM CARE | DRG: 41 | End: 2024-02-27
Attending: PSYCHIATRY & NEUROLOGY | Admitting: PSYCHIATRY & NEUROLOGY
Payer: MEDICARE

## 2024-02-19 VITALS
WEIGHT: 147.05 LBS | RESPIRATION RATE: 18 BRPM | OXYGEN SATURATION: 99 % | SYSTOLIC BLOOD PRESSURE: 119 MMHG | TEMPERATURE: 98 F | DIASTOLIC BLOOD PRESSURE: 81 MMHG | HEART RATE: 86 BPM | HEIGHT: 62 IN

## 2024-02-19 DIAGNOSIS — I65.29 OCCLUSION AND STENOSIS OF UNSPECIFIED CAROTID ARTERY: ICD-10-CM

## 2024-02-19 DIAGNOSIS — Z90.710 ACQUIRED ABSENCE OF BOTH CERVIX AND UTERUS: Chronic | ICD-10-CM

## 2024-02-19 LAB
ALBUMIN SERPL ELPH-MCNC: 4.8 G/DL — SIGNIFICANT CHANGE UP (ref 3.3–5)
ALP SERPL-CCNC: 98 U/L — SIGNIFICANT CHANGE UP (ref 40–120)
ALT FLD-CCNC: 35 U/L — SIGNIFICANT CHANGE UP (ref 10–45)
ANION GAP SERPL CALC-SCNC: 19 MMOL/L — HIGH (ref 5–17)
APPEARANCE UR: CLEAR — SIGNIFICANT CHANGE UP
APTT BLD: 30.6 SEC — SIGNIFICANT CHANGE UP (ref 24.5–35.6)
AST SERPL-CCNC: 36 U/L — SIGNIFICANT CHANGE UP (ref 10–40)
BACTERIA # UR AUTO: NEGATIVE /HPF — SIGNIFICANT CHANGE UP
BASOPHILS # BLD AUTO: 0 K/UL — SIGNIFICANT CHANGE UP (ref 0–0.2)
BASOPHILS NFR BLD AUTO: 0 % — SIGNIFICANT CHANGE UP (ref 0–2)
BILIRUB SERPL-MCNC: 0.2 MG/DL — SIGNIFICANT CHANGE UP (ref 0.2–1.2)
BILIRUB UR-MCNC: NEGATIVE — SIGNIFICANT CHANGE UP
BUN SERPL-MCNC: 23 MG/DL — SIGNIFICANT CHANGE UP (ref 7–23)
BURR CELLS BLD QL SMEAR: SLIGHT — SIGNIFICANT CHANGE UP
CALCIUM SERPL-MCNC: 10.4 MG/DL — SIGNIFICANT CHANGE UP (ref 8.4–10.5)
CAST: 0 /LPF — SIGNIFICANT CHANGE UP (ref 0–4)
CHLORIDE SERPL-SCNC: 95 MMOL/L — LOW (ref 96–108)
CO2 SERPL-SCNC: 24 MMOL/L — SIGNIFICANT CHANGE UP (ref 22–31)
COLOR SPEC: YELLOW — SIGNIFICANT CHANGE UP
CREAT SERPL-MCNC: 0.64 MG/DL — SIGNIFICANT CHANGE UP (ref 0.5–1.3)
DIFF PNL FLD: NEGATIVE — SIGNIFICANT CHANGE UP
EGFR: 98 ML/MIN/1.73M2 — SIGNIFICANT CHANGE UP
ELLIPTOCYTES BLD QL SMEAR: SLIGHT — SIGNIFICANT CHANGE UP
EOSINOPHIL # BLD AUTO: 0 K/UL — SIGNIFICANT CHANGE UP (ref 0–0.5)
EOSINOPHIL NFR BLD AUTO: 0 % — SIGNIFICANT CHANGE UP (ref 0–6)
GLUCOSE SERPL-MCNC: 178 MG/DL — HIGH (ref 70–99)
GLUCOSE UR QL: NEGATIVE MG/DL — SIGNIFICANT CHANGE UP
HCT VFR BLD CALC: 43.9 % — SIGNIFICANT CHANGE UP (ref 34.5–45)
HGB BLD-MCNC: 13.5 G/DL — SIGNIFICANT CHANGE UP (ref 11.5–15.5)
INR BLD: 1.12 RATIO — SIGNIFICANT CHANGE UP (ref 0.85–1.18)
KETONES UR-MCNC: ABNORMAL MG/DL
LEUKOCYTE ESTERASE UR-ACNC: NEGATIVE — SIGNIFICANT CHANGE UP
LYMPHOCYTES # BLD AUTO: 1.02 K/UL — SIGNIFICANT CHANGE UP (ref 1–3.3)
LYMPHOCYTES # BLD AUTO: 11.8 % — LOW (ref 13–44)
MANUAL SMEAR VERIFICATION: SIGNIFICANT CHANGE UP
MCHC RBC-ENTMCNC: 22.8 PG — LOW (ref 27–34)
MCHC RBC-ENTMCNC: 30.8 GM/DL — LOW (ref 32–36)
MCV RBC AUTO: 74.3 FL — LOW (ref 80–100)
MONOCYTES # BLD AUTO: 0 K/UL — SIGNIFICANT CHANGE UP (ref 0–0.9)
MONOCYTES NFR BLD AUTO: 0 % — LOW (ref 2–14)
NEUTROPHILS # BLD AUTO: 7.62 K/UL — HIGH (ref 1.8–7.4)
NEUTROPHILS NFR BLD AUTO: 88.2 % — HIGH (ref 43–77)
NITRITE UR-MCNC: NEGATIVE — SIGNIFICANT CHANGE UP
PH UR: 5.5 — SIGNIFICANT CHANGE UP (ref 5–8)
PLAT MORPH BLD: NORMAL — SIGNIFICANT CHANGE UP
PLATELET # BLD AUTO: 345 K/UL — SIGNIFICANT CHANGE UP (ref 150–400)
POIKILOCYTOSIS BLD QL AUTO: SLIGHT — SIGNIFICANT CHANGE UP
POTASSIUM SERPL-MCNC: 3.8 MMOL/L — SIGNIFICANT CHANGE UP (ref 3.5–5.3)
POTASSIUM SERPL-SCNC: 3.8 MMOL/L — SIGNIFICANT CHANGE UP (ref 3.5–5.3)
PROT SERPL-MCNC: 7.9 G/DL — SIGNIFICANT CHANGE UP (ref 6–8.3)
PROT UR-MCNC: NEGATIVE MG/DL — SIGNIFICANT CHANGE UP
PROTHROM AB SERPL-ACNC: 12.3 SEC — SIGNIFICANT CHANGE UP (ref 9.5–13)
RBC # BLD: 5.91 M/UL — HIGH (ref 3.8–5.2)
RBC # FLD: 13.5 % — SIGNIFICANT CHANGE UP (ref 10.3–14.5)
RBC BLD AUTO: ABNORMAL
RBC CASTS # UR COMP ASSIST: 0 /HPF — SIGNIFICANT CHANGE UP (ref 0–4)
SODIUM SERPL-SCNC: 138 MMOL/L — SIGNIFICANT CHANGE UP (ref 135–145)
SP GR SPEC: >1.03 — HIGH (ref 1–1.03)
SQUAMOUS # UR AUTO: 1 /HPF — SIGNIFICANT CHANGE UP (ref 0–5)
TARGETS BLD QL SMEAR: SLIGHT — SIGNIFICANT CHANGE UP
TROPONIN T, HIGH SENSITIVITY RESULT: <6 NG/L — SIGNIFICANT CHANGE UP (ref 0–51)
UROBILINOGEN FLD QL: 0.2 MG/DL — SIGNIFICANT CHANGE UP (ref 0.2–1)
WBC # BLD: 8.64 K/UL — SIGNIFICANT CHANGE UP (ref 3.8–10.5)
WBC # FLD AUTO: 8.64 K/UL — SIGNIFICANT CHANGE UP (ref 3.8–10.5)
WBC UR QL: 2 /HPF — SIGNIFICANT CHANGE UP (ref 0–5)

## 2024-02-19 PROCEDURE — 70498 CT ANGIOGRAPHY NECK: CPT | Mod: 26,MA

## 2024-02-19 PROCEDURE — 99285 EMERGENCY DEPT VISIT HI MDM: CPT

## 2024-02-19 PROCEDURE — 70496 CT ANGIOGRAPHY HEAD: CPT | Mod: 26,MA

## 2024-02-19 PROCEDURE — 70450 CT HEAD/BRAIN W/O DYE: CPT | Mod: 26,MD,XU

## 2024-02-19 RX ORDER — GABAPENTIN 400 MG/1
0 CAPSULE ORAL
Refills: 0 | DISCHARGE

## 2024-02-19 RX ORDER — CLOPIDOGREL BISULFATE 75 MG/1
75 TABLET, FILM COATED ORAL DAILY
Refills: 0 | Status: DISCONTINUED | OUTPATIENT
Start: 2024-02-19 | End: 2024-02-27

## 2024-02-19 RX ORDER — ASPIRIN/CALCIUM CARB/MAGNESIUM 324 MG
81 TABLET ORAL DAILY
Refills: 0 | Status: DISCONTINUED | OUTPATIENT
Start: 2024-02-19 | End: 2024-02-21

## 2024-02-19 RX ORDER — LINACLOTIDE 145 UG/1
1 CAPSULE, GELATIN COATED ORAL
Refills: 0 | DISCHARGE

## 2024-02-19 RX ORDER — ACETAMINOPHEN 500 MG
1000 TABLET ORAL ONCE
Refills: 0 | Status: COMPLETED | OUTPATIENT
Start: 2024-02-19 | End: 2024-02-21

## 2024-02-19 RX ORDER — TRAMADOL HYDROCHLORIDE 50 MG/1
50 TABLET ORAL ONCE
Refills: 0 | Status: DISCONTINUED | OUTPATIENT
Start: 2024-02-19 | End: 2024-02-20

## 2024-02-19 RX ORDER — ACETAMINOPHEN 500 MG
1000 TABLET ORAL ONCE
Refills: 0 | Status: COMPLETED | OUTPATIENT
Start: 2024-02-19 | End: 2024-02-19

## 2024-02-19 RX ORDER — HYDRALAZINE HCL 50 MG
1 TABLET ORAL
Refills: 0 | DISCHARGE

## 2024-02-19 RX ORDER — PIOGLITAZONE HYDROCHLORIDE 15 MG/1
1 TABLET ORAL
Refills: 0 | DISCHARGE

## 2024-02-19 RX ORDER — MEMANTINE HYDROCHLORIDE 10 MG/1
1 TABLET ORAL
Refills: 0 | DISCHARGE

## 2024-02-19 RX ORDER — ATORVASTATIN CALCIUM 80 MG/1
80 TABLET, FILM COATED ORAL AT BEDTIME
Refills: 0 | Status: DISCONTINUED | OUTPATIENT
Start: 2024-02-19 | End: 2024-02-27

## 2024-02-19 RX ORDER — VIBEGRON 75 MG/1
1 TABLET, FILM COATED ORAL
Refills: 0 | DISCHARGE

## 2024-02-19 RX ORDER — FAMOTIDINE 10 MG/ML
1 INJECTION INTRAVENOUS
Refills: 0 | DISCHARGE

## 2024-02-19 RX ADMIN — Medication 400 MILLIGRAM(S): at 21:41

## 2024-02-19 RX ADMIN — Medication 1000 MILLIGRAM(S): at 23:00

## 2024-02-19 NOTE — ED PROVIDER NOTE - CLINICAL SUMMARY MEDICAL DECISION MAKING FREE TEXT BOX
63-year-old female with extensive past medical history and previous stroke with symptoms concerning for new posterior stroke, patient's last known well is > 24 hours.  CTA head and neck with CT head noncon.  Labs. CHX. Patient denying need for pain control at this time for headache/arm pain. 63-year-old female with extensive past medical history and previous stroke with symptoms concerning for new posterior stroke, patient's last known well is > 24 hours.  CTA head and neck with CT head noncon.  Labs. CHX. Patient denying need for pain control at this time for headache/arm pain.    Cassie Friend MD - Attending Physician: Pt here with R visual field deficit and unsteadiness in the setting of prior L sided CVA with residual weakness. Unable to ambulate unassisted, baseline moderate dementia. LKN 11am yesterday - outside the window, but needs stroke w/u. Labs, CT, CTA, to admit

## 2024-02-19 NOTE — CONSULT NOTE ADULT - SUBJECTIVE AND OBJECTIVE BOX
Neurology - Consult Note    -  Spectra: [phone] ([facility]), [phone] ([facility])  -    HPI: Patient ALYSE AVENDAÑO is a 63y (1960) blank handed woman with a PMHx significant for ***        HTN, DM, HLD, CAD s/p 2 stents on plavix and ASA    Review of Systems:  INCOMPLETE   CONSTITUTIONAL: No fevers or chills  EYES AND ENT: No visual changes or no throat pain   NECK: No pain or stiffness  RESPIRATORY: No hemoptysis or shortness of breath  CARDIOVASCULAR: No chest pain or palpitations  GASTROINTESTINAL: No melena or hematochezia  GENITOURINARY: No dysuria or hematuria  NEUROLOGICAL: +As stated in HPI above  SKIN: No itching, burning, rashes, or lesions   All other review of systems is negative unless indicated above.    Allergies:  No Known Allergies      PMHx/PSHx/Family Hx: As above, otherwise see below   No pertinent past medical history    HTN (hypertension)    HLD (hyperlipidemia)    CAD (coronary artery disease)    Type II diabetes mellitus    PAD (peripheral artery disease)    Mild dementia    Parkinson disease        Social Hx:  No current use of tobacco, alcohol, or illicit drugs  Lives with ***    Medications:  MEDICATIONS  (STANDING):    MEDICATIONS  (PRN):      Vitals:  T(C): 36.8 (02-19-24 @ 11:18), Max: 36.8 (02-19-24 @ 11:18)  HR: 93 (02-19-24 @ 17:49) (86 - 95)  BP: 144/80 (02-19-24 @ 17:49) (119/81 - 160/90)  RR: 18 (02-19-24 @ 17:49) (18 - 18)  SpO2: 98% (02-19-24 @ 17:49) (98% - 99%)    Physical Examination: INCOMPLETE  General - NAD  Cardiovascular - Peripheral pulses palpable, no edema  Eyes - Fundoscopy with flat, sharp optic discs and no hemorrhage or exudates; Fundoscopy not well visualized; Fundoscopy not performed due to safety precautions in the setting of the COVID-19 pandemic    Neurologic Exam:  Mental status - Awake, Alert, Oriented to person, place, and time. Speech fluent, repetition and naming intact. Follows simple and complex commands. Attention/concentration, recent and remote memory (including registration and recall), and fund of knowledge intact    Cranial nerves - PERRLA, VFF, EOMI, face sensation (V1-V3) intact b/l, facial strength intact without asymmetry b/l, hearing intact b/l, palate with symmetric elevation, trapezius OR sternocleidomastiod 5/5 strength b/l, tongue midline on protrusion with full lateral movement    Motor - Normal bulk and tone throughout. No pronator drift.  Strength testing            Deltoid      Biceps      Triceps     Wrist Extension    Wrist Flexion     Interossei         R            5                 5               5                     5                              5                        5                 5  L             5                 5               5                     5                              5                        5                 5              Hip Flexion    Hip Extension    Knee Flexion    Knee Extension    Dorsiflexion    Plantar Flexion  R              5                           5                       5                           5                            5                          5  L              5                           5                        5                           5                            5                          5    Sensation - Light touch/temperature OR pain/vibration intact throughout    DTR's -             Biceps      Triceps     Brachioradialis      Patellar    Ankle    Toes/plantar response  R             2+             2+                  2+                       2+            2+                 Down  L              2+             2+                 2+                        2+           2+                 Down    Coordination - Finger to Nose intact b/l. No tremors appreciated    Gait and station - Normal casual gait. Romberg (-)    Labs:                        13.5   8.64  )-----------( 345      ( 19 Feb 2024 13:45 )             43.9     02-19    138  |  95<L>  |  23  ----------------------------<  178<H>  3.8   |  24  |  0.64    Ca    10.4      19 Feb 2024 13:45    TPro  7.9  /  Alb  4.8  /  TBili  0.2  /  DBili  x   /  AST  36  /  ALT  35  /  AlkPhos  98  02-19    CAPILLARY BLOOD GLUCOSE        LIVER FUNCTIONS - ( 19 Feb 2024 13:45 )  Alb: 4.8 g/dL / Pro: 7.9 g/dL / ALK PHOS: 98 U/L / ALT: 35 U/L / AST: 36 U/L / GGT: x             PT/INR - ( 19 Feb 2024 13:45 )   PT: 12.3 sec;   INR: 1.12 ratio         PTT - ( 19 Feb 2024 13:45 )  PTT:30.6 sec        Radiology:    CT Head No Cont (02.19.24 @ 15:50)   IMPRESSION:    CT HEAD:  No acute intracranial hemorrhage. Gray/white matter differentiation is   preserved.    CTA NECK:  High-grade (80-90%) stenosis of the LEFT internal carotid artery due to   circumferential calcifications. Critical stenosis (greater than 95%) of   the distal RIGHT common carotid artery and origin of the RIGHTinternal   carotid artery due to extensive calcified plaque.    CTA HEAD:  Moderate stenosis of bilateral precavernous and cavernous internal   carotid arteries.    No evidence of aneurysm. Tiny aneurysms can be beyond the resolution of   CTA technique.    3.6 cm hypodense lesion in the left thyroid lobe.    MR Head w/wo IV Cont (08.02.23 @ 11:59)   FINDINGS:    The ventricles, sulci and basal cisterns appear unremarkable. There is no   evidence of acute infarct, hemorrhage, or mass lesion. There is a chronic   lacunar infarct in the right anterior corona radiata. There are multiple   small foci of T2/FLAIR hyperintense signal in the periventricular white   matter, suggestive of mild chronic microvascular ischemic changes. There   is no evidence of abnormal enhancement. There is no evidence of midline   shift or herniation pattern. No mass effect is found in the brain.    The vertebral and internal carotid arteries demonstrate expected flow   voids indicating their patency.    The paranasal sinuses are clear.  IMPRESSION:  No evidence of acute infarct, hemorrhage, or mass lesion.   There is a chronic lacunar infarct in the right anterior corona radiata.   There are multiple small foci of T2/FLAIR hyperintense signal in the   periventricular white matter, suggestive of mild chronic microvascular   ischemic changes. There is no evidence of abnormal enhancement.    VA Duplex Carotid, Bilat (08.01.23 @ 11:57)   IMPRESSION:    There is a moderate, 50-69% stenosis of the right internal carotid artery.    No hemodynamically significant left carotid artery stenosis is identified.      CT Angio Head w/ IV Cont (10.23.22 @ 19:30)   CTA BRAIN/NECK    INTERNAL CAROTID ARTERIES:  The intracranial segments of the ICA are   patent bilaterally without flow limiting stenosis or occlusion.    ANTERIOR CEREBRAL ARTERIES: No flow-limiting stenosis or occlusion.   Anterior communicating artery is unremarkable without aneurysm. Absent or   hypoplastic right A1 segment.    MIDDLE CEREBRAL ARTERIES: No flow-limiting stenosis or occlusion. MCA   bifurcations are unremarkable without aneurysm.    POSTERIOR CEREBRAL ARTERIES: No flow-limiting stenosis or occlusion.   Persistent fetal origin of right PCA with hypoplastic or absent right P1   segment.    VERTEBRAL ARTERIES:  Normal in course and caliber without flow-limiting   stenosis or occlusion. Origin of the vertebral arteries are unremarkable.    CAROTID ARTERIES:  Right: Calcified plaque with greater than 70% stenosis suspected at the   origin of the right ICA.  Left: Calcified plaque with suspected 50-69% stenosis suspected at the   origin of the left ICA.    AORTIC ARCH: Origin of the greatvessels are unremarkable.    SOFT TISSUE: Left thyroid lobe hypodense lesion, measuring 3.2 x 3.2 x   3.4 cm.    VISUALIZED LUNG APICES: Within normal limits.      IMPRESSION:    CT HEAD: No acute intra-cranial hemorrhage, mass effect, or midline shift.    CTA BRAIN/NECK: Bilateral calcified plaque with significant stenoses,   right worse than left.    Left thyroid lobe hypodense lesion. Recommend thyroid ultrasound for   further evaluation.             Neurology - Consult Note    -  Spectra: [phone] ([facility]), [phone] ([facility])  -    HPI: Patient ALYSE AVENDAÑO is a 63y (1960) right handed woman with a PMHx significant for HTN, DM, HLD, CAD s/p stents on plavix and ASA, PAD, R ICA and L ICA stenosis, prior stroke with L side weakness, Parkinson's disease, cataract surgery    ---------------      HTN, HLD, DM type II, CAD(s/p 3 stents placed at [facility] in 2014 to the prox LAD and then 2022), dysarthria 2/2 to CVA(as per patient she is unsure when she had the CVA as recent MRI showed chronic infarct), mild dementia(but AAOx3), left eye blurry vision after cataract surgery, PAD s/p L popliteal angioplasty and atherectomy.     presents with headache, dizziness, blurred vision, right arm weakness, right shoulder pain     Frequent falls per family, gait instability, feet dragging.     At baseline uses cane, walker and assistance    Lives in an apartment with family in downstairs apartment, she has  HHA.     sees cardiology, vascular surgery, stroke Neurology at [facility]       Review of Systems:    CONSTITUTIONAL: No fevers or chills  EYES AND ENT: No visual changes or no throat pain   NECK: No pain or stiffness  RESPIRATORY: No hemoptysis or shortness of breath  CARDIOVASCULAR: No chest pain or palpitations  GASTROINTESTINAL: No melena or hematochezia  GENITOURINARY: No dysuria or hematuria  NEUROLOGICAL: +As stated in HPI above  SKIN: No itching, burning, rashes, or lesions   All other review of systems is negative unless indicated above.    Allergies:  No Known Allergies      PMHx/PSHx/Family Hx: As above, otherwise see below   HTN (hypertension)  HLD (hyperlipidemia)  CAD (coronary artery disease)  Type II diabetes mellitus  PAD (peripheral artery disease)  Mild dementia  Parkinson disease      Social Hx:  No current use of tobacco, alcohol, or illicit drugs  Lives with ***    Medications:  MEDICATIONS  (STANDING):    MEDICATIONS  (PRN):      Vitals:  T(C): 36.8 (02-19-24 @ 11:18), Max: 36.8 (02-19-24 @ 11:18)  HR: 93 (02-19-24 @ 17:49) (86 - 95)  BP: 144/80 (02-19-24 @ 17:49) (119/81 - 160/90)  RR: 18 (02-19-24 @ 17:49) (18 - 18)  SpO2: 98% (02-19-24 @ 17:49) (98% - 99%)    Physical Examination:   General - NAD  Cardiovascular - Peripheral pulses palpable, no edema  Eyes - FFundoscopy not performed due to safety precautions in the setting of the COVID-19 pandemic    Neurologic Exam:  Mental status - Awake, Alert, Oriented to person, place, and month, not year. Speech fluent, repetition and naming intact. Follows 1 and 2 step crossed commands. Attention/concentration, recent and remote memory and fund of knowledge with mild impairment     Cranial nerves - PERRLA (3mm >1 mm B/L), VFF, EOMI in lateral gaze but no upward or downgaze noted. Face sensation (V1-V3) intact b/l, facial strength intact without asymmetry b/l, hearing intact b/l, palate with symmetric elevation, trapezius 5/5 strength b/l, tongue midline on protrusion with full lateral movement    Motor - Normal bulk and tone throughout. No pronator drift.    Strength testing            Deltoid      Biceps      Triceps                R            5                 5               5                  4  L             5                 5               5                 4+              Hip Flexion        Dorsiflexion    Plantar Flexion  R              4                          4+                   4+  L              4                           4                   4    Sensation - Light touch intact throughout.     No neglect noted     DTR's -             Biceps      Triceps     Brachioradialis      Patellar    Ankle    Toes/plantar response  R             2+             2+                  2+                2+            2+                 Down  L              2+             2+                 2+                0+           2+                 Down    Coordination - Finger to Nose intact b/l. No tremors appreciated    Gait and station - Requires assistance for transfers and to stand, gait not assessed due to fall risk     Labs:                        13.5   8.64  )-----------( 345      ( 19 Feb 2024 13:45 )             43.9     02-19    138  |  95<L>  |  23  ----------------------------<  178<H>  3.8   |  24  |  0.64    Ca    10.4      19 Feb 2024 13:45    TPro  7.9  /  Alb  4.8  /  TBili  0.2  /  DBili  x   /  AST  36  /  ALT  35  /  AlkPhos  98  02-19    CAPILLARY BLOOD GLUCOSE      LIVER FUNCTIONS - ( 19 Feb 2024 13:45 )  Alb: 4.8 g/dL / Pro: 7.9 g/dL / ALK PHOS: 98 U/L / ALT: 35 U/L / AST: 36 U/L / GGT: x             PT/INR - ( 19 Feb 2024 13:45 )   PT: 12.3 sec;   INR: 1.12 ratio         PTT - ( 19 Feb 2024 13:45 )  PTT:30.6 sec        Radiology:    CT Head No Cont (02.19.24 @ 15:50)   IMPRESSION:    CT HEAD:  No acute intracranial hemorrhage. Gray/white matter differentiation is   preserved.    CTA NECK:  High-grade (80-90%) stenosis of the LEFT internal carotid artery due to   circumferential calcifications. Critical stenosis (greater than 95%) of   the distal RIGHT common carotid artery and origin of the RIGHTinternal   carotid artery due to extensive calcified plaque.    CTA HEAD:  Moderate stenosis of bilateral precavernous and cavernous internal   carotid arteries.    No evidence of aneurysm. Tiny aneurysms can be beyond the resolution of   CTA technique.    3.6 cm hypodense lesion in the left thyroid lobe.    MR Head w/wo IV Cont (08.02.23 @ 11:59)   FINDINGS:    The ventricles, sulci and basal cisterns appear unremarkable. There is no   evidence of acute infarct, hemorrhage, or mass lesion. There is a chronic   lacunar infarct in the right anterior corona radiata. There are multiple   small foci of T2/FLAIR hyperintense signal in the periventricular white   matter, suggestive of mild chronic microvascular ischemic changes. There   is no evidence of abnormal enhancement. There is no evidence of midline   shift or herniation pattern. No mass effect is found in the brain.    The vertebral and internal carotid arteries demonstrate expected flow   voids indicating their patency.    The paranasal sinuses are clear.  IMPRESSION:  No evidence of acute infarct, hemorrhage, or mass lesion.   There is a chronic lacunar infarct in the right anterior corona radiata.   There are multiple small foci of T2/FLAIR hyperintense signal in the   periventricular white matter, suggestive of mild chronic microvascular   ischemic changes. There is no evidence of abnormal enhancement.    VA Duplex Carotid, Bilat (08.01.23 @ 11:57)   IMPRESSION:    There is a moderate, 50-69% stenosis of the right internal carotid artery.    No hemodynamically significant left carotid artery stenosis is identified.      CT Angio Head w/ IV Cont (10.23.22 @ 19:30)   CTA BRAIN/NECK    INTERNAL CAROTID ARTERIES:  The intracranial segments of the ICA are   patent bilaterally without flow limiting stenosis or occlusion.    ANTERIOR CEREBRAL ARTERIES: No flow-limiting stenosis or occlusion.   Anterior communicating artery is unremarkable without aneurysm. Absent or   hypoplastic right A1 segment.    MIDDLE CEREBRAL ARTERIES: No flow-limiting stenosis or occlusion. MCA   bifurcations are unremarkable without aneurysm.    POSTERIOR CEREBRAL ARTERIES: No flow-limiting stenosis or occlusion.   Persistent fetal origin of right PCA with hypoplastic or absent right P1   segment.    VERTEBRAL ARTERIES:  Normal in course and caliber without flow-limiting   stenosis or occlusion. Origin of the vertebral arteries are unremarkable.    CAROTID ARTERIES:  Right: Calcified plaque with greater than 70% stenosis suspected at the   origin of the right ICA.  Left: Calcified plaque with suspected 50-69% stenosis suspected at the   origin of the left ICA.    AORTIC ARCH: Origin of the greatvessels are unremarkable.    SOFT TISSUE: Left thyroid lobe hypodense lesion, measuring 3.2 x 3.2 x   3.4 cm.    VISUALIZED LUNG APICES: Within normal limits.      IMPRESSION:    CT HEAD: No acute intra-cranial hemorrhage, mass effect, or midline shift.    CTA BRAIN/NECK: Bilateral calcified plaque with significant stenoses,   right worse than left.    Left thyroid lobe hypodense lesion. Recommend thyroid ultrasound for   further evaluation.

## 2024-02-19 NOTE — ED ADULT NURSE REASSESSMENT NOTE - NS ED NURSE REASSESS COMMENT FT1
ED RN contacted Stroke Unit to give report, attempted 2x. Stroke Unit refusing report. Pt admitted, pending transport. Flow RN made aware, ED RN to sign for transport
pt placed on bedpan for urination, states she is unable to go so placed on primafit. Skin intact. Pt well appearing in no distress
Received report from REGINE Hay. Pt AOx3 with stable VS. Comfort care and safety measures provided.  Neuro at bedside

## 2024-02-19 NOTE — ED ADULT NURSE NOTE - NSFALLRISKINTERV_ED_ALL_ED

## 2024-02-19 NOTE — ED PROVIDER NOTE - PROGRESS NOTE DETAILS
Barb Pedro PGY2 - sign out -63-year-old female with history of CVA with left-sided deficits presenting with right arm and vision deficits today.  Found to have right carotid stenosis.  Admitted to neurology stroke floor for further management.

## 2024-02-19 NOTE — ED ADULT TRIAGE NOTE - CHIEF COMPLAINT QUOTE
worsening loss of balance, slurred speech , blurry vision, dizziness for 2 1`/2 years; also c/o right shoulder pain  hx: stroke   pt being followed up by neurology

## 2024-02-19 NOTE — ED PROVIDER NOTE - OBJECTIVE STATEMENT
63-year-old female with PMH of HTN, HLD, DM T2, CAD s/p 3 stents, CVA (residual dysarthria and left-sided body weakness, left-sided visual deficitto the ED with complaints of new onset ataxia, right eye visual deficit, right arm since exam this morning.  Patient is accompanied by her 2 daughters in law.  Both patient and daughter-in-law is at bedside, patient's last known well was 11 AM yesterday.  Patient woke up with the symptoms at 6 AM.  Patient also endorsing some dizziness and headache.  She denies any right lower limb weakness or sensory deficit. Patient denies any recent illness, recent travel, nausea, vomiting, diarrhea, melena, hematochezia.

## 2024-02-19 NOTE — ED ADULT NURSE NOTE - OBJECTIVE STATEMENT
Patient c/o dizziness, R arm pain, blurred vision intermittent x 2 1/2 years and started up again this morning at 6am. Patient reports hx of stroke, patient had fall 2 1/2 years ago and has chronic blurry vision in R eye and slurred speech. Patient follows up with neurologist, last saw neuro 2-3 months ago. Patient reports no new symptoms, just that symptoms come and go and worsen. Patient A&Ox4, ambulatory with walker at baseline and has HHA. Patient has slurred speech and weakness at baseline. Patient placed on cardiac monitor. Family at bedside. Plan of care in progress.

## 2024-02-19 NOTE — CONSULT NOTE ADULT - ASSESSMENT
LKW:  NIHSS:    Baseline MRS:  Not a Teneceteplase candidate due to   Not a thrombectomy candidate due to        Impression:        Recommendations:    []  []  []  []      To be discussed with stroke fellow Dr. Quan Darden under supervision of attending Dr. Rangel regarding decision against candidacy for Tenecteplase / thrombectomy. Will be formally staffed on morning rounds with attending. Recommendations will be complete once signed by attending.     LKW: 2/18 11AM  NIHSS: 3 (2 for right leg drift and 1 for dysarthria)  Baseline MRS: 4  Not a Tenecteplase candidate due to out of window  Not a thrombectomy candidate due to  out of window      Impression:        Recommendations:    []  []  []  []      To be discussed with stroke fellow Dr. Quan Darden under supervision of attending Dr. Rangel regarding decision against candidacy for Tenecteplase / thrombectomy. Will be formally staffed on morning rounds with attending. Recommendations will be complete once signed by attending.

## 2024-02-19 NOTE — ED PROVIDER NOTE - PHYSICAL EXAMINATION
Gen: NAD, AAOx3, non-toxic appearing  HEENT: NCAT, normal conjunctiva, oral mucosa moist. Left eye lateral field defect, reduced visual acuity. R eye has loss of lateral visual field as well, visual acuity otherwise normal  Lung: speaking in full sentences, good aeration bilaterally, lungs CTA b/l  CV: regular rate and rhythm. cap refill <2x. peripheral pulses 2+bilaterally   Abd: soft, ND, NT  MSK: no visible deformities  Neuro: global left sided weakness. R arm has normal range of motion. R leg has no deficits. patient unable to stand for gait/ataxia evaluation. pt unable to perform heel to shin. patient unable to perform finger to nose. slow, hesitant speech, slightly dysarthric  Skin: Intact

## 2024-02-20 ENCOUNTER — RESULT REVIEW (OUTPATIENT)
Age: 64
End: 2024-02-20

## 2024-02-20 PROBLEM — E11.49 DM (DIABETES MELLITUS), TYPE 2 WITH NEUROLOGICAL COMPLICATIONS: Status: ACTIVE | Noted: 2023-01-25

## 2024-02-20 LAB
A1C WITH ESTIMATED AVERAGE GLUCOSE RESULT: 7.2 % — HIGH (ref 4–5.6)
CHOLEST SERPL-MCNC: 135 MG/DL — SIGNIFICANT CHANGE UP
ESTIMATED AVERAGE GLUCOSE: 160 MG/DL — HIGH (ref 68–114)
GLUCOSE BLDC GLUCOMTR-MCNC: 132 MG/DL — HIGH (ref 70–99)
GLUCOSE BLDC GLUCOMTR-MCNC: 144 MG/DL — HIGH (ref 70–99)
GLUCOSE BLDC GLUCOMTR-MCNC: 163 MG/DL — HIGH (ref 70–99)
GLUCOSE BLDC GLUCOMTR-MCNC: 169 MG/DL — HIGH (ref 70–99)
HDLC SERPL-MCNC: 40 MG/DL — LOW
LIPID PNL WITH DIRECT LDL SERPL: 80 MG/DL — SIGNIFICANT CHANGE UP
NON HDL CHOLESTEROL: 95 MG/DL — SIGNIFICANT CHANGE UP
PA ADP PRP-ACNC: 161 PRU — LOW (ref 194–417)
PLATELET RESPONSE ASPIRIN RESULT: 378 ARU — SIGNIFICANT CHANGE UP
TRIGL SERPL-MCNC: 71 MG/DL — SIGNIFICANT CHANGE UP

## 2024-02-20 PROCEDURE — 93880 EXTRACRANIAL BILAT STUDY: CPT | Mod: 26

## 2024-02-20 PROCEDURE — 93306 TTE W/DOPPLER COMPLETE: CPT | Mod: 26

## 2024-02-20 PROCEDURE — 70547 MR ANGIOGRAPHY NECK W/O DYE: CPT | Mod: 26

## 2024-02-20 PROCEDURE — 99223 1ST HOSP IP/OBS HIGH 75: CPT

## 2024-02-20 PROCEDURE — 70544 MR ANGIOGRAPHY HEAD W/O DYE: CPT | Mod: 26,XU

## 2024-02-20 PROCEDURE — 70551 MRI BRAIN STEM W/O DYE: CPT | Mod: 26

## 2024-02-20 RX ORDER — CARBIDOPA AND LEVODOPA 25; 100 MG/1; MG/1
1 TABLET ORAL DAILY
Refills: 0 | Status: DISCONTINUED | OUTPATIENT
Start: 2024-02-20 | End: 2024-02-27

## 2024-02-20 RX ORDER — ISOSORBIDE MONONITRATE 60 MG/1
30 TABLET, EXTENDED RELEASE ORAL DAILY
Refills: 0 | Status: DISCONTINUED | OUTPATIENT
Start: 2024-02-20 | End: 2024-02-27

## 2024-02-20 RX ORDER — INSULIN LISPRO 100/ML
VIAL (ML) SUBCUTANEOUS
Refills: 0 | Status: DISCONTINUED | OUTPATIENT
Start: 2024-02-20 | End: 2024-02-27

## 2024-02-20 RX ORDER — DEXTROSE 50 % IN WATER 50 %
12.5 SYRINGE (ML) INTRAVENOUS ONCE
Refills: 0 | Status: DISCONTINUED | OUTPATIENT
Start: 2024-02-20 | End: 2024-02-27

## 2024-02-20 RX ORDER — DEXTROSE 50 % IN WATER 50 %
25 SYRINGE (ML) INTRAVENOUS ONCE
Refills: 0 | Status: DISCONTINUED | OUTPATIENT
Start: 2024-02-20 | End: 2024-02-27

## 2024-02-20 RX ORDER — GABAPENTIN 400 MG/1
100 CAPSULE ORAL THREE TIMES A DAY
Refills: 0 | Status: DISCONTINUED | OUTPATIENT
Start: 2024-02-20 | End: 2024-02-27

## 2024-02-20 RX ORDER — FAMOTIDINE 10 MG/ML
20 INJECTION INTRAVENOUS DAILY
Refills: 0 | Status: DISCONTINUED | OUTPATIENT
Start: 2024-02-20 | End: 2024-02-27

## 2024-02-20 RX ORDER — SODIUM CHLORIDE 9 MG/ML
1000 INJECTION, SOLUTION INTRAVENOUS
Refills: 0 | Status: DISCONTINUED | OUTPATIENT
Start: 2024-02-20 | End: 2024-02-27

## 2024-02-20 RX ORDER — SODIUM CHLORIDE 9 MG/ML
1000 INJECTION INTRAMUSCULAR; INTRAVENOUS; SUBCUTANEOUS
Refills: 0 | Status: DISCONTINUED | OUTPATIENT
Start: 2024-02-20 | End: 2024-02-21

## 2024-02-20 RX ORDER — DEXTROSE 50 % IN WATER 50 %
15 SYRINGE (ML) INTRAVENOUS ONCE
Refills: 0 | Status: DISCONTINUED | OUTPATIENT
Start: 2024-02-20 | End: 2024-02-27

## 2024-02-20 RX ORDER — INFLUENZA VIRUS VACCINE 15; 15; 15; 15 UG/.5ML; UG/.5ML; UG/.5ML; UG/.5ML
0.7 SUSPENSION INTRAMUSCULAR ONCE
Refills: 0 | Status: COMPLETED | OUTPATIENT
Start: 2024-02-20 | End: 2024-02-20

## 2024-02-20 RX ORDER — INSULIN LISPRO 100/ML
VIAL (ML) SUBCUTANEOUS AT BEDTIME
Refills: 0 | Status: DISCONTINUED | OUTPATIENT
Start: 2024-02-20 | End: 2024-02-27

## 2024-02-20 RX ORDER — GLUCAGON INJECTION, SOLUTION 0.5 MG/.1ML
1 INJECTION, SOLUTION SUBCUTANEOUS ONCE
Refills: 0 | Status: DISCONTINUED | OUTPATIENT
Start: 2024-02-20 | End: 2024-02-27

## 2024-02-20 RX ADMIN — ISOSORBIDE MONONITRATE 30 MILLIGRAM(S): 60 TABLET, EXTENDED RELEASE ORAL at 12:19

## 2024-02-20 RX ADMIN — GABAPENTIN 100 MILLIGRAM(S): 400 CAPSULE ORAL at 13:39

## 2024-02-20 RX ADMIN — TRAMADOL HYDROCHLORIDE 50 MILLIGRAM(S): 50 TABLET ORAL at 00:00

## 2024-02-20 RX ADMIN — CLOPIDOGREL BISULFATE 75 MILLIGRAM(S): 75 TABLET, FILM COATED ORAL at 12:19

## 2024-02-20 RX ADMIN — Medication 1 MILLIGRAM(S): at 15:14

## 2024-02-20 RX ADMIN — CARBIDOPA AND LEVODOPA 1 TABLET(S): 25; 100 TABLET ORAL at 12:19

## 2024-02-20 RX ADMIN — Medication 81 MILLIGRAM(S): at 12:18

## 2024-02-20 RX ADMIN — TRAMADOL HYDROCHLORIDE 50 MILLIGRAM(S): 50 TABLET ORAL at 01:00

## 2024-02-20 RX ADMIN — Medication 1: at 12:18

## 2024-02-20 RX ADMIN — FAMOTIDINE 20 MILLIGRAM(S): 10 INJECTION INTRAVENOUS at 12:19

## 2024-02-20 NOTE — H&P ADULT - NSHPLABSRESULTS_GEN_ALL_CORE
Labs:                        13.5   8.64  )-----------( 345      ( 19 Feb 2024 13:45 )             43.9     02-19    138  |  95<L>  |  23  ----------------------------<  178<H>  3.8   |  24  |  0.64    Ca    10.4      19 Feb 2024 13:45    TPro  7.9  /  Alb  4.8  /  TBili  0.2  /  DBili  x   /  AST  36  /  ALT  35  /  AlkPhos  98  02-19    CAPILLARY BLOOD GLUCOSE      LIVER FUNCTIONS - ( 19 Feb 2024 13:45 )  Alb: 4.8 g/dL / Pro: 7.9 g/dL / ALK PHOS: 98 U/L / ALT: 35 U/L / AST: 36 U/L / GGT: x             PT/INR - ( 19 Feb 2024 13:45 )   PT: 12.3 sec;   INR: 1.12 ratio         PTT - ( 19 Feb 2024 13:45 )  PTT:30.6 sec    Radiology:    CT Head No Cont (02.19.24 @ 15:50)   No acute intracranial hemorrhage. Gray/white matter differentiation is   preserved.    CTA NECK:  High-grade (80-90%) stenosis of the LEFT internal carotid artery due to   circumferential calcifications. Critical stenosis (greater than 95%) of   the distal RIGHT common carotid artery and origin of the RIGHT internal   carotid artery due to extensive calcified plaque.    CTA HEAD:  Moderate stenosis of bilateral precavernous and cavernous internal   carotid arteries.  No evidence of aneurysm. Tiny aneurysms can be beyond the resolution of   CTA technique.  3.6 cm hypodense lesion in the left thyroid lobe.    MR Head w/wo IV Cont (08.02.23 @ 11:59)   FINDINGS:  The ventricles, sulci and basal cisterns appear unremarkable. There is no   evidence of acute infarct, hemorrhage, or mass lesion. There is a chronic   lacunar infarct in the right anterior corona radiata. There are multiple   small foci of T2/FLAIR hyperintense signal in the periventricular white   matter, suggestive of mild chronic microvascular ischemic changes. There   is no evidence of abnormal enhancement. There is no evidence of midline   shift or herniation pattern. No mass effect is found in the brain.  The vertebral and internal carotid arteries demonstrate expected flow   voids indicating their patency.  The paranasal sinuses are clear.    IMPRESSION:  No evidence of acute infarct, hemorrhage, or mass lesion.   There is a chronic lacunar infarct in the right anterior corona radiata.   There are multiple small foci of T2/FLAIR hyperintense signal in the   periventricular white matter, suggestive of mild chronic microvascular   ischemic changes. There is no evidence of abnormal enhancement.    VA Duplex Carotid, Michelle (08.01.23 @ 11:57)   IMPRESSION:  There is a moderate, 50-69% stenosis of the right internal carotid artery.  No hemodynamically significant left carotid artery stenosis is identified.

## 2024-02-20 NOTE — CONSULT NOTE ADULT - SUBJECTIVE AND OBJECTIVE BOX
p (1480)     63F hx HTN, DM, HLD, known b/l carotid artery stenosis (dx 2022; on DAPT), CAD (s/p 3 stents 2014, 2022),  PAD s/p L popliteal angioplasty and atherectomy 12/23, prior stroke w/ baseline L side weakness, PD w/ frequent falls, b/l cataract surgery, adm stroke s/p episode of headache, dizziness, blurred vision, right arm weakness. CTH w/ no acute heme/hydro. CTA neck w/ severe stenosis of b/l ICAs. Exam: (baseline AOx2) AOx2 (didn't get year), PERRL, no facial, R drift, RUE 4/5, LUE 4+/5, RLE 4/5, LLE 4/5.    --Anticoagulation:  aspirin  chewable 81 milliGRAM(s) Oral daily  clopidogrel Tablet 75 milliGRAM(s) Oral daily    =====================  PAST MEDICAL HISTORY   HTN (hypertension)    HLD (hyperlipidemia)    CAD (coronary artery disease)    Type II diabetes mellitus    PAD (peripheral artery disease)    Mild dementia    Parkinson disease      PAST SURGICAL HISTORY   S/P hysterectomy      No Known Allergies      MEDICATIONS:  Antibiotics:    Neuro:  acetaminophen   IVPB .. 1000 milliGRAM(s) IV Intermittent once  carbidopa/levodopa  25/100 1 Tablet(s) Oral daily  gabapentin 100 milliGRAM(s) Oral three times a day    Other:  atorvastatin 80 milliGRAM(s) Oral at bedtime  dextrose 5%. 1000 milliLiter(s) IV Continuous <Continuous>  dextrose 5%. 1000 milliLiter(s) IV Continuous <Continuous>  dextrose 50% Injectable 25 Gram(s) IV Push once  dextrose 50% Injectable 12.5 Gram(s) IV Push once  dextrose 50% Injectable 25 Gram(s) IV Push once  dextrose Oral Gel 15 Gram(s) Oral once PRN  famotidine    Tablet 20 milliGRAM(s) Oral daily  glucagon  Injectable 1 milliGRAM(s) IntraMuscular once  insulin lispro (ADMELOG) corrective regimen sliding scale   SubCutaneous at bedtime  insulin lispro (ADMELOG) corrective regimen sliding scale   SubCutaneous three times a day before meals  isosorbide   mononitrate ER Tablet (IMDUR) 30 milliGRAM(s) Oral daily      SOCIAL HISTORY:   Occupation:   Marital Status:     FAMILY HISTORY:  No pertinent family history in first degree relatives        ROS: Negative except per HPI    LABS:  PT/INR - ( 19 Feb 2024 13:45 )   PT: 12.3 sec;   INR: 1.12 ratio         PTT - ( 19 Feb 2024 13:45 )  PTT:30.6 sec                        13.5   8.64  )-----------( 345      ( 19 Feb 2024 13:45 )             43.9     02-19    138  |  95<L>  |  23  ----------------------------<  178<H>  3.8   |  24  |  0.64    Ca    10.4      19 Feb 2024 13:45    TPro  7.9  /  Alb  4.8  /  TBili  0.2  /  DBili  x   /  AST  36  /  ALT  35  /  AlkPhos  98  02-19

## 2024-02-20 NOTE — OCCUPATIONAL THERAPY INITIAL EVALUATION ADULT - NSOTDMEREC_GEN_A_CORE
TBD at [facility]. Should pt d/c home, recommend 3:1 commode, transport wheelchair. Patient is confined to a single room without a bathroom and requires a commode to optimize independence and for a safe home discharge.

## 2024-02-20 NOTE — H&P ADULT - ATTENDING COMMENTS
I reviewed available diagnostic studies, and reviewed images personally. I agree with resident's history, exam, orders placed, and plan of care. Medical issues needing to be addressed include: cerebral ischemia, hx of known R ICA/L ICA stenosis, prior stroke with L side weakness, HTN, DM2, HLD, CAD s/p 3 stents, most recent in 2022, cognitive impairment/mild dementia, hx of parkinsons, B/L cataracts s/p surgery, new onset R side weakness and vision changes. Pt is not able to provide detailed history, but family able to. Reports her vision has been better since the cataract surgeries, but recently she would occasionally complain of transient vision deficits. Unclear exact onset, but pt is complaining now of some vision loss in L>R that seems persistent. CTA as above. Will likely need carotid intervention, With recent PAD intervention (12/2023) continues to be on DAPT - perhaps carotid stent if anatomically acceptable could be pursued for L carotid (appears to be the current symptomatic carotid), but will defer to NSGY. Check resistance, risk factor work up, echo. , MRI MR NOVA pending. Service provided on 2/20/2024.

## 2024-02-20 NOTE — H&P ADULT - NSHPPHYSICALEXAM_GEN_ALL_CORE
Physical Examination:   General - NAD  Cardiovascular - Peripheral pulses palpable, no edema  Eyes - Fundoscopy not performed due to safety precautions in the setting of the COVID-19 pandemic    Neurologic Exam:  Mental status - Awake, Alert, Oriented to person, place, and month, not year. Speech fluent, repetition and naming intact. Follows 1 and 2 step crossed commands. Attention/concentration, recent and remote memory and fund of knowledge with mild impairment     Cranial nerves - PERRLA (3mm >1 mm B/L), VFF, EOMI in lateral gaze but no upward or downgaze noted. Face sensation (V1-V3) intact b/l, facial strength intact without asymmetry b/l, hearing intact b/l, palate with symmetric elevation, trapezius 5/5 strength b/l, tongue midline on protrusion with full lateral movement    Motor - Normal bulk and tone throughout. No pronator drift.    Strength testing            Deltoid      Biceps      Triceps                R            5                 5               5                  4  L             5                 5               5                 4+              Hip Flexion        Dorsiflexion    Plantar Flexion  R              4                          4+                   4+  L              4                           4                   4    Sensation - Light touch intact throughout.     No neglect noted     DTR's -             Biceps      Triceps     Brachioradialis      Patellar    Ankle    Toes/plantar response  R             2+             2+                  2+                2+            2+                 Down  L              2+             2+                 2+                0+           2+                 Down    Coordination - Finger to Nose intact b/l. No tremors appreciated    Gait and station - Requires assistance for transfers and to stand, gait not assessed due to fall risk Physical Examination:   General - NAD  Cardiovascular - Peripheral pulses palpable, no edema  Eyes - Fundoscopy not performed due to safety precautions in the setting of the COVID-19 pandemic    Neurologic Exam:  Mental status - Awake, Alert, Oriented to person, place, and month, not year. Speech fluent, repetition and naming intact. Follows 1 and 2 step crossed commands. Attention/concentration, recent and remote memory and fund of knowledge with mild impairment     Cranial nerves - PERRLA (3mm >1 mm B/L), VFF, EOMI in lateral gaze but no upward or downgaze noted. Face sensation (V1-V3) intact b/l, facial strength intact without asymmetry b/l, hearing intact b/l, palate with symmetric elevation, trapezius 5/5 strength b/l, tongue midline on protrusion with full lateral movement    Motor - Normal bulk and tone throughout. Right arm drift     Strength testing            Deltoid      Biceps      Triceps                R            4                4               4                  4  L             4+                 4+              4+                 4+              Hip Flexion        Dorsiflexion    Plantar Flexion  R              4                          4+                   4+  L              4                           4                   4    Sensation - Light touch intact throughout.     No neglect noted     DTR's -             Biceps      Triceps     Brachioradialis      Patellar    Ankle    Toes/plantar response  R             2+             2+                  2+                2+            2+                 Down  L              2+             2+                 2+                0+           2+                 Down    Coordination - Finger to Nose intact b/l. No tremors appreciated    Gait and station - Requires assistance for transfers and to stand, gait not assessed due to fall risk

## 2024-02-20 NOTE — PATIENT PROFILE ADULT - INTERNATIONAL TRAVEL
If you have an active [facility] account, please look for your well child questionnaire to come to your [facility] account before your next well child visit.    Well-Child Checkup: 4 Years     Bicycle safety equipment, such as a helmet, helps keep your child safe.     Even if your child is healthy, keep taking him or her for yearly checkups. This ensures your childs health is protected with scheduled vaccinations and health screenings. Your healthcare provider can make sure your childs growth and development is progressing well. This sheet describes some of what you can expect.  Development and milestones  The healthcare provider will ask questions and observe your childs behavior to get an idea of his or her development. By this visit, your child is likely doing some of the following:  · Enjoy and cooperate with other children  · Talk about what he or she likes (for example, toys, games, people)  · Tell a story, or singing a song  · Recognize most colors and shapes  · Say first and last name  · Use scissors  · Draw a  person with 2 to 4 body parts  · Catch a ball that is bounced to him or her, most of the time  · Stand briefly on one foot  School and social issues  The healthcare provider will ask how your child is getting along with other kids. Talk about your childs experience in group settings such as . If your child isnt in , you could talk instead about behavior at  or during play dates. You may also want to discuss  options and how to help prepare your child for . The healthcare provider may ask about:  · Behavior and participation in group settings. How does your child act at school (or other group setting)? Does he or she follow the routine and take part in group activities? What do teachers or caregivers say about the childs behavior?  · Behavior at home. How does the child act at home? Is behavior at home better or worse than at school? (Be aware that  its common for kids to be better behaved at school than at home.)  · Friendships. Has your child made friends with other children? What are the kids like? How does your child get along with these friends?  · Play. How does the child like to play? For example, does he or she play make believe? Does the child interact with others during playtime?  · [place]. How is your child adjusting to school? How does he or she react when you leave? (Some anxiety is normal. This should subside over time, as the child becomes more independent.)  Nutrition and exercise tips  Healthy eating and activity are two important keys to a healthy future. Its not too early to start teaching your child healthy habits that will last a lifetime. Here are some things you can do:  · Limit juice and sports drinks. These drinks--even pure fruit juice--have too much sugar, which leads to unhealthy weight gain and tooth decay. Water and low-fat or nonfat milk are best to drink. Limit juice to a small glass of 100% juice each day, such as during a meal.  · Dont serve soda. Its healthiest not to let your child have soda. If you do allow soda, save it for very special occasions.  · Offer nutritious foods. Keep a variety of healthy foods on hand for snacks, such as fresh fruits and vegetables, lean meats, and whole grains. Foods like French fries, candy, and snack foods should only be served rarely.  · Serve child-sized portions. Children dont need as much food as adults. Serve your child portions that make sense for his or her age. Let your child stop eating when he or she is full. If the child is still hungry after a meal, offer more vegetables or fruit. It's OK to put limits on how much your child eats.  · Encourage at least 30 minutes to 60 minutes of active play per day. Moving around helps keep your child healthy. Bring your child to the park, ride bikes, or play active games like tag or ball.  · Limit screen time to 1 hour to 2 hours  each day. This includes TV watching, computer use, and video games.  · Ask the healthcare provider about your childs weight. At this age, your child should gain about 4 pounds to 5 pounds each year. If he or she is gaining more than that, talk to the health care provider about healthy eating habits and activity guidelines.  · Take your child to the dentist at least twice a year for teeth cleaning and a checkup.  Safety tips  · When riding a bike, your child should wear a helmet with the strap fastened. While roller-skating or using a scooter or skateboard, its safest to wear wrist guards, elbow pads, and knee pads, and a helmet.  · Keep using a car seat until your child outgrows it. (For many children, this happens around age 4 and a weight of at least 40 pounds.) Ask the health care provider if there are state laws regarding car seat use that you need to know about.  · Once your child outgrows the car seat, switch to a high-back booster seat. This allows the seat belt to fit properly. A booster seat should be used until your child is 4 feet 9 inches tall and between 8 and 12 years of age. All children younger than 13 years old should sit in the back seat.  · Teach your child not to talk to or go anywhere with a stranger.  · Start to teach your child his or her phone number, address, and parents first names. These are important to know in an emergency.  · Teach your child to swim. Many communities offer low-cost swimming lessons.  · If you have a swimming pool, it should be entirely fenced on all sides. Way or doors leading to the pool should be closed and locked. Do not let your child play in or around the pool unattended, even if he or she knows how to swim.  Vaccinations  Based on recommendations from the Centers for Disease Control and Prevention (CDC), at this visit your child may receive the following vaccinations:  · Diphtheria, tetanus, and pertussis  · Influenza (flu), annually  · Measles, mumps, and  rubella  · Polio  · Varicella (chickenpox)  Give your child positive reinforcement  Its easy to tell a child what theyre doing wrong. Its often harder to remember to praise a child for what they do right. Positive reinforcement (rewarding good behavior) helps your child develop confidence and a healthy self-esteem. Here are some tips:  · Give the child praise and attention for behaving well. When appropriate, make sure the whole family knows that the child has done well.  · Reward good behavior with hugs, kisses, and small gifts (such as stickers). When being good has rewards, kids will keep doing those behaviors to get the rewards. Avoid using sweets or candy as rewards. Using these treats as positive reinforcement can lead to unhealthy eating habits and an emotional attachment to food.  · When the child doesnt act the way you want, dont label the child as bad or naughty. Instead, describe why the action is not acceptable. (For example, say Its not nice to hit instead of Youre a bad girl.) When your child chooses the right behavior over the wrong one (such as walking away instead of hitting), remember to praise the good choice!  · Pledge to say 5 nice things to your child every day. Then do it!      Next checkup at: _______________________________     PARENT NOTES:  Date Last Reviewed: 10/1/2014  © 2393-8534 [employer]. [address] [place] [address], [place] [address]. All rights reserved. This information is not intended as a substitute for professional medical care. Always follow your healthcare professional's instructions.         No

## 2024-02-20 NOTE — PATIENT PROFILE ADULT - FUNCTIONAL ASSESSMENT - BASIC MOBILITY 6.
2-calculated by average/Not able to assess (calculate score using [facility] averaging method)

## 2024-02-20 NOTE — CONSULT NOTE ADULT - ATTENDING COMMENTS
Patient with extensive atherosclerotic disease with coronary and lower extremity stents, old stroke and known b/l carotid stenosis, now presenting with acute right sided weakness. CTA shows R>L severe bilateral carotid stenosis. MRI negative for acute infarct. Will take for angio and possible left carotid stenting if degree of stenosis is >50%.

## 2024-02-20 NOTE — OCCUPATIONAL THERAPY INITIAL EVALUATION ADULT - LIVES WITH, PROFILE
Pt lives with son in PH +6 CHET and first floor setup. Pt required assistance with ADLs and functional mobility using RW PTA. Pt has HHA 21 hours/week.

## 2024-02-20 NOTE — CONSULT NOTE ADULT - ASSESSMENT
63F hx HTN, DM, HLD, known b/l carotid artery stenosis (dx 2022; on DAPT), CAD (s/p 3 stents 2014, 2022),  PAD s/p L popliteal angioplasty and atherectomy 12/23, prior stroke w/ baseline L side weakness, PD w/ frequent falls, b/l cataract surgery, adm stroke s/p episode of headache, dizziness, blurred vision, right arm weakness. CTH w/ no acute heme/hydro. CTA neck w/ severe stenosis of b/l ICAs. Exam: (baseline AOx2) AOx2 (didn't get year), PERRL, no facial, R drift, RUE 4/5, LUE 4+/5, RLE 4/5, LLE 4/5.  - MR brain w/wo  - possible intervention pending further discussion

## 2024-02-20 NOTE — H&P ADULT - NSHPSOCIALHISTORY_GEN_ALL_CORE
At baseline uses cane, walker, and assistance  Lives in an upstairs apartment with family in downstairs apartment, she has  HHA.   Denies tobacco, alcohol or drug use

## 2024-02-20 NOTE — PHYSICAL THERAPY INITIAL EVALUATION ADULT - PERTINENT HX OF CURRENT PROBLEM, REHAB EVAL
63-year-old female with PMH of HTN, HLD, DM T2, CAD s/p 3 stents, CVA (residual dysarthria and left-sided body weakness, left-sided visual deficitto the ED with complaints of new onset ataxia, right eye visual deficit, right arm since exam this morning.  Patient is accompanied by her 2 daughters in law.  CT Head No Cont (02.19.24 @ 15:50)   No acute intracranial hemorrhage. Gray/white matter differentiation is   preserved. 63-year-old female with PMH of HTN, HLD, DM T2, CAD s/p 3 stents, CVA (residual dysarthria and left-sided body weakness, left-sided visual deficitto the ED with complaints of new onset ataxia, right eye visual deficit, right arm since exam this morning.  Patient is accompanied by her 2 daughters in law.  CT Head No Cont (02.19.24 @ 15:50)   No acute intracranial hemorrhage. Gray/white matter differentiation is preserved.  R arm weakness likely 2/2 left brain dysfunction possibly 2/2 acute ischemic stroke in setting of L ICA stenosis

## 2024-02-20 NOTE — ASSESSMENT
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  Onychodystrophy (L60.3).  Onychomycosis (B35.1).  Treatment: Discussed the importance of glycemic control, diabetic foot care and neuropathic precautions.  Patient is wearing adequately fitting shoes today.   Nails left 5 and right 1 and 5 were debrided of excessive thickness and length to appropriate levels of comfort and contour using sterile nippers and Dremel.   The remaining digits were trimmed to hygienic lengths.  The procedure was tolerated well without complications. Failure to perform this treatment based on patient's underlying condition could lead to ulceration and infection. Patient was prescribed topical Ciclopirox and advised to clean fomites.   Return: 3 months.

## 2024-02-20 NOTE — H&P ADULT - HISTORY OF PRESENT ILLNESS
HPI: Patient ALYSE AVENDAÑO is a 63y (1960) right handed woman with a PMHx significant for HTN, DM, HLD, CAD(s/p 3 stents placed at [facility] in 2014 to the prox LAD and then 2022),  PAD s/p L popliteal angioplasty and atherectomy 12/23, R ICA and L ICA stenosis, prior stroke with L side weakness, Parkinson's disease, B/L cataract surgery with blurred vision at baseline who presents with headache, dizziness, blurred vision, right arm weakness. Patient was last seen well on 2/18 at 11AM. Family reports she woke up with these symptoms on 2/19. Per family, she is A&Ox2 at baseline, uses walker and assistance to walk. She has frequent falls per family, gait instability, and drags her feet while walking. She carries diagnosis of Parkinson's and is on Sinemet but has not yet been evaluated by movement disorder specialist. Patient has been seen previously by Neurology due to concern for falls and AMS. During evaluations in 2022 and 2023, she was found to have B/L ICA stenosis. Also was found to have R corona radiata infarct on MRI brain. She is followed by vascular surgery.   Carotid duplex (08.01.23) showed moderate, 50-69% stenosis of the right internal carotid artery and no hemodynamically significant stenosis of left carotid artery. She was seen    Was seen in the past 2021, Neurology for headaches.  HPI: Patient ALYSE AVENDAÑO is a 63y (1960) right handed woman with a PMHx significant for HTN, DM, HLD, CAD(s/p 3 stents placed at [facility] in 2014 to the prox LAD and then 2022),  PAD s/p L popliteal angioplasty and atherectomy 12/23, R ICA and L ICA stenosis, prior stroke with L side weakness, Parkinson's disease, B/L cataract surgery with blurred vision at baseline who presents with headache, dizziness, blurred vision, right arm weakness. Patient was last seen well on 2/18 at 11AM. Family reports she woke up with these symptoms on 2/19. Per family, she is A&Ox2 at baseline, uses walker and assistance to walk. She has frequent falls per family, gait instability, and drags her feet while walking. She carries diagnosis of Parkinson's and is on Sinemet but has not yet been evaluated by movement disorder specialist. Patient has been seen previously by Neurology due to concern for falls and AMS. During evaluations in 2022 and 2023, she was found to have B/L ICA stenosis. Also was found to have R corona radiata infarct on MRI brain.   Carotid duplex (08.01.23) showed moderate, 50-69% stenosis of the right internal carotid artery and no hemodynamically significant stenosis of left carotid artery. She was seen by vascular surgery who recommended to continue DAPT and was to be re-assessed in 6 months.   Patient has also been seen in the past by Neurology for headaches (2021) which appeared poorly controlled.    LKW: 2/18 11AM  NIHSS: 3 (2 for right arm drift and 1 for dysarthria)  Baseline MRS: 4  Not a Tenecteplase candidate due to out of window  Not a thrombectomy candidate due to  out of window    Vital Signs  T(C): 36.9 (20 Feb 2024 04:45), Max: 36.9 (19 Feb 2024 20:02)  T(F): 98.4 (20 Feb 2024 04:45), Max: 98.5 (19 Feb 2024 20:02)  HR: 88 (20 Feb 2024 04:45) (86 - 97)  BP: 146/79 (20 Feb 2024 04:45) (119/81 - 160/90)  RR: 18 (20 Feb 2024 04:45) (15 - 18)  SpO2: 100% (20 Feb 2024 04:45) (96% - 100%)    O2 Parameters below as of 20 Feb 2024 04:45  Patient On (Oxygen Delivery Method): room air

## 2024-02-20 NOTE — CONSULT NOTE ADULT - SUBJECTIVE AND OBJECTIVE BOX
[facility] DEPARTMENT OF OPHTHALMOLOGY - INITIAL ADULT CONSULT  -----------------------------------------------------------------------------------------------------------------  Mark Palmer MD  PGY 3  Contact on Teams  -----------------------------------------------------------------------------------------------------------------    HPI:  HPI: Patient ALYSE AVENDAÑO is a 63y (1960) right handed woman with a PMHx significant for HTN, DM, HLD, CAD(s/p 3 stents placed at [facility] in 2014 to the prox LAD and then 2022),  PAD s/p L popliteal angioplasty and atherectomy 12/23, R ICA and L ICA stenosis, prior stroke with L side weakness, Parkinson's disease, B/L cataract surgery with blurred vision at baseline who presents with headache, dizziness, blurred vision, right arm weakness. Patient was last seen well on 2/18 at 11AM. Family reports she woke up with these symptoms on 2/19. Per family, she is A&Ox2 at baseline, uses walker and assistance to walk. She has frequent falls per family, gait instability, and drags her feet while walking. She carries diagnosis of Parkinson's and is on Sinemet but has not yet been evaluated by movement disorder specialist. Patient has been seen previously by Neurology due to concern for falls and AMS. During evaluations in 2022 and 2023, she was found to have B/L ICA stenosis. Also was found to have R corona radiata infarct on MRI brain.   Carotid duplex (08.01.23) showed moderate, 50-69% stenosis of the right internal carotid artery and no hemodynamically significant stenosis of left carotid artery. She was seen by vascular surgery who recommended to continue DAPT and was to be re-assessed in 6 months.   Patient has also been seen in the past by Neurology for headaches (2021) which appeared poorly controlled.    Interval History: Ophtho consulted for intermittent vision loss and possible complete vision loss OS. Patient able to see out of both eyes on exam. States that vision has been worse since cataract surgery about 1-2 years ago, but has been getting worse recently. Patient deneis scalp pain, but endorses jaw pain/occipital headache.     PAST MEDICAL & SURGICAL HISTORY:  HTN (hypertension)      HLD (hyperlipidemia)      CAD (coronary artery disease)      Type II diabetes mellitus      PAD (peripheral artery disease)      Mild dementia      Parkinson disease      S/P hysterectomy        Past Ocular History: CE/PCIOL OU  Ophthalmic Medications: None  FAMILY HISTORY:      MEDICATIONS  (STANDING):  acetaminophen   IVPB .. 1000 milliGRAM(s) IV Intermittent once  aspirin  chewable 81 milliGRAM(s) Oral daily  atorvastatin 80 milliGRAM(s) Oral at bedtime  carbidopa/levodopa  25/100 1 Tablet(s) Oral daily  clopidogrel Tablet 75 milliGRAM(s) Oral daily  dextrose 5%. 1000 milliLiter(s) (50 mL/Hr) IV Continuous <Continuous>  dextrose 5%. 1000 milliLiter(s) (100 mL/Hr) IV Continuous <Continuous>  dextrose 50% Injectable 25 Gram(s) IV Push once  dextrose 50% Injectable 25 Gram(s) IV Push once  dextrose 50% Injectable 12.5 Gram(s) IV Push once  famotidine    Tablet 20 milliGRAM(s) Oral daily  gabapentin 100 milliGRAM(s) Oral three times a day  glucagon  Injectable 1 milliGRAM(s) IntraMuscular once  insulin lispro (ADMELOG) corrective regimen sliding scale   SubCutaneous three times a day before meals  insulin lispro (ADMELOG) corrective regimen sliding scale   SubCutaneous at bedtime  isosorbide   mononitrate ER Tablet (IMDUR) 30 milliGRAM(s) Oral daily    MEDICATIONS  (PRN):  dextrose Oral Gel 15 Gram(s) Oral once PRN Blood Glucose LESS THAN 70 milliGRAM(s)/deciliter    Allergies & Intolerances:   No Known Allergies    Review of Systems:  Constitutional: No fever, chills  Eyes: blurry vision OU, intermittent decreased vision OU; No flashes, floaters, FBS, erythema, discharge, double vision, OU  Neuro: No tremors  Cardiovascular: No chest pain, palpitations  Respiratory: No SOB, no cough  GI: No nausea, vomiting, abdominal pain  : No dysuria  Skin: no rash  Psych: no depression  Endocrine: no polyuria, polydipsia  Heme/lymph: no swelling    VITALS: T(C): 36.8 (02-20-24 @ 12:25)  T(F): 98.3 (02-20-24 @ 12:25), Max: 98.5 (02-19-24 @ 20:02)  HR: 105 (02-20-24 @ 12:25) (82 - 105)  BP: 134/81 (02-20-24 @ 12:25) (121/81 - 162/90)  RR:  (15 - 18)  SpO2:  (96% - 100%)  Wt(kg): --  General: AAO x 3, appropriate mood and affect    Ophthalmology Exam:  Visual acuity (cc): 20/100 OD/OS PHNI OU  Pupils: PERRL OU, no APD  Ttono: STP OU  Extraocular movements (EOMs): Full OU, no pain, no diplopia  Confrontational Visual Field (CVF): Full OU  Color Plates: 12/12 OD 12/12 OS ***    Pen Light Exam (PLE)  External: Flat OU  Lids/Lashes/Lacrimal Ducts: Flat OU    Sclera/Conjunctiva: W+Q OU  Cornea: Cl OU  Anterior Chamber: D+F OU    Iris: Flat OU  Lens: PCIOL OU    Fundus Exam: dilated with 1% tropicamide and 2.5% phenylephrine  Approval obtained from primary team for dilation  Patient aware that pupils can remained dilated for at least 4-6 hours  Exam performed with 20D lens    NOT YET PERFORMED     Vitreous: wnl OU  Disc, cup/disc: sharp and pink, 0.4 OU  Macula: wnl OU  Vessels: wnl OU  Periphery: wnl OU    Labs/Imaging:  ***   [facility] DEPARTMENT OF OPHTHALMOLOGY - INITIAL ADULT CONSULT  -----------------------------------------------------------------------------------------------------------------  Mark Palmer MD  PGY 3  Contact on Teams  -----------------------------------------------------------------------------------------------------------------    HPI:  HPI: Patient ALYSE AVENDAÑO is a 63y (1960) right handed woman with a PMHx significant for HTN, DM, HLD, CAD(s/p 3 stents placed at [facility] in 2014 to the prox LAD and then 2022),  PAD s/p L popliteal angioplasty and atherectomy 12/23, R ICA and L ICA stenosis, prior stroke with L side weakness, Parkinson's disease, B/L cataract surgery with blurred vision at baseline who presents with headache, dizziness, blurred vision, right arm weakness. Patient was last seen well on 2/18 at 11AM. Family reports she woke up with these symptoms on 2/19. Per family, she is A&Ox2 at baseline, uses walker and assistance to walk. She has frequent falls per family, gait instability, and drags her feet while walking. She carries diagnosis of Parkinson's and is on Sinemet but has not yet been evaluated by movement disorder specialist. Patient has been seen previously by Neurology due to concern for falls and AMS. During evaluations in 2022 and 2023, she was found to have B/L ICA stenosis. Also was found to have R corona radiata infarct on MRI brain.   Carotid duplex (08.01.23) showed moderate, 50-69% stenosis of the right internal carotid artery and no hemodynamically significant stenosis of left carotid artery. She was seen by vascular surgery who recommended to continue DAPT and was to be re-assessed in 6 months.   Patient has also been seen in the past by Neurology for headaches (2021) which appeared poorly controlled.    Interval History: Ophtho consulted for intermittent vision loss and possible complete vision loss OS. Patient able to see out of both eyes on exam. States that vision has been worse since cataract surgery about 1-2 years ago, but has been getting worse recently. Patient deneis scalp pain, but endorses jaw pain/occipital headache.     PAST MEDICAL & SURGICAL HISTORY:  HTN (hypertension)  HLD (hyperlipidemia)  CAD (coronary artery disease)  Type II diabetes mellitus  PAD (peripheral artery disease)  Mild dementia  Parkinson disease  S/P hysterectomy    Past Ocular History: CE/PCIOL OU  Ophthalmic Medications: None  FAMILY HISTORY:      MEDICATIONS  (STANDING):  acetaminophen   IVPB .. 1000 milliGRAM(s) IV Intermittent once  aspirin  chewable 81 milliGRAM(s) Oral daily  atorvastatin 80 milliGRAM(s) Oral at bedtime  carbidopa/levodopa  25/100 1 Tablet(s) Oral daily  clopidogrel Tablet 75 milliGRAM(s) Oral daily  dextrose 5%. 1000 milliLiter(s) (50 mL/Hr) IV Continuous <Continuous>  dextrose 5%. 1000 milliLiter(s) (100 mL/Hr) IV Continuous <Continuous>  dextrose 50% Injectable 25 Gram(s) IV Push once  dextrose 50% Injectable 25 Gram(s) IV Push once  dextrose 50% Injectable 12.5 Gram(s) IV Push once  famotidine    Tablet 20 milliGRAM(s) Oral daily  gabapentin 100 milliGRAM(s) Oral three times a day  glucagon  Injectable 1 milliGRAM(s) IntraMuscular once  insulin lispro (ADMELOG) corrective regimen sliding scale   SubCutaneous three times a day before meals  insulin lispro (ADMELOG) corrective regimen sliding scale   SubCutaneous at bedtime  isosorbide   mononitrate ER Tablet (IMDUR) 30 milliGRAM(s) Oral daily    MEDICATIONS  (PRN):  dextrose Oral Gel 15 Gram(s) Oral once PRN Blood Glucose LESS THAN 70 milliGRAM(s)/deciliter    Allergies & Intolerances:   No Known Allergies    Review of Systems:  Constitutional: No fever, chills  Eyes: blurry vision OU, intermittent decreased vision OU; No flashes, floaters, FBS, erythema, discharge, double vision, OU  Neuro: No tremors  Cardiovascular: No chest pain, palpitations  Respiratory: No SOB, no cough  GI: No nausea, vomiting, abdominal pain  : No dysuria  Skin: no rash  Psych: no depression  Endocrine: no polyuria, polydipsia  Heme/lymph: no swelling    VITALS: T(C): 36.8 (02-20-24 @ 12:25)  T(F): 98.3 (02-20-24 @ 12:25), Max: 98.5 (02-19-24 @ 20:02)  HR: 105 (02-20-24 @ 12:25) (82 - 105)  BP: 134/81 (02-20-24 @ 12:25) (121/81 - 162/90)  RR:  (15 - 18)  SpO2:  (96% - 100%)  Wt(kg): --  General: AAO x 3, appropriate mood and affect    Ophthalmology Exam:  Visual acuity (cc): 20/100 OD/OS PHNI OU  Pupils: PERRL OU, no APD  Ttono: STP OU  Extraocular movements (EOMs): Full OU, no pain, no diplopia  Confrontational Visual Field (CVF): Full OU    Pen Light Exam (PLE)  External: Flat OU  Lids/Lashes/Lacrimal Ducts: Flat OU    Sclera/Conjunctiva: W+Q OU  Cornea: Cl OU  Anterior Chamber: D+F OU    Iris: Flat OU  Lens: PCIOL OU    Fundus Exam: dilated with 1% tropicamide and 2.5% phenylephrine  Approval obtained from primary team for dilation  Patient aware that pupils can remained dilated for at least 4-6 hours  Exam performed with 20D lens    Dialted exam NOT YET PERFORMED, patient was at MRI. Will repeat exam tomorrow.          Labs/Imaging:    ACC: [id number] EXAM:  MR ANGIO BRAIN   ORDERED BY: CROW CALDERON     ACC: [id number] EXAM:  MR ANGIO NECK   ORDERED BY: CROW CALDERON     ACC: [id number] EXAM:  MR BRAIN   ORDERED BY:  CHINA MARTINEZ     PROCEDURE DATE:  02/20/2024          INTERPRETATION:  CLINICAL INDICATION: Left-sided weakness, bilateral   high-grade stenosis right greater than left.      Magnetic resonance imaging of the brain was carried out with transaxial   SPGR, FLAIR, fast spin echo T2 weighted images, axial susceptibility   weighted series, diffusion weighted series and sagittal T1 weighted   series on a 1.5 Melissa magnet.    Comparison is made with the prior CT/CTA of 2/19/2024.    The fourth, third and lateral ventricles are normal in size and position.   There is no hemorrhage, mass or shift of the midline structures. Small   vessel white matter ischemic changes are noted. No acute infarcts are   seen.    A 2 D and 3-D axial noncontrast MRA were performed on the cervical and   intracranial vessels, respectively. Intravascular flow quantification was   performed using gated 2D phase contrast MR, imaged perpendicular to the   vessel axis.  Images were post processed NOVA software and a NOVA flow   study report is available.    There is significant stenosis at the carotid bifurcations bilaterally,   right greater than left, with both bifurcations measuring greater than   75% using NASCET criteria. There is slightly diminished flow in the right   middle cerebral artery compared with the left. There is a hypoplastic   right A1 segment. The right A2 segment is supplied across the midline   from a patent left anterior communicating artery. Flow is as follows in   milliliters per minute.        RCCA 283, MARCO ANTONIO 131, RMCA 90, RACA2 61    LCCA 312, LICA 166, LMCA 102, LACA 110, LACA2 59    RVA 57, LVA 51, BA 46, RPCA 49, RPCOM 43 anterior to posterior, LPCA 32    IMPRESSION: Significant bilateral carotid stenosis without evidence of   acute infarct. Noninvasive flow MR angiography as above.    --- End of Report ---

## 2024-02-20 NOTE — H&P ADULT - ASSESSMENT
LKW: 2/18 11AM  NIHSS: 3 (2 for right leg drift and 1 for dysarthria)  Baseline MRS: 4  Not a Tenecteplase candidate due to out of window  Not a thrombectomy candidate due to  out of window      Impression:  1. R arm weakness likely 2/2 left brain dysfunction possibly 2/2 acute ischemic stroke in setting of L ICA stenosis   2. High-grade (80-90%) stenosis of the LEFT ICA and critical stenosis (greater than 95%) of  the distal RIGHT CCA and origin of the RIGHT ICA  3. Chronic right anterior corona radiata infarct   4. Parkinson's disease     Plan:    [] Admit to Stroke floor under Dr. Rangel  [] Contine DAPT for now with Aspirin 81mg daily and Plavix 75mg daily  [] Increase home statin to Atorvastatin 80 qhs  [] Send ARU and PRU, A1C, Lipid panel  [] Obtain MRI brain w/o  [] Carotid dopplers  [] NSGY/ Vascular consult pending carotid dopplers  [] TTE  [] q4 neurochecks and vitals   [] SBP: allow for permissive HTN for another 24hr followed by gradual normotension  [] telemetry, EKG  - Tight glucose control (long-term goal HgbA1c < 6%)  - Stroke education and counseling  - Dysphagia screen. If fails, speech/swallow eval  - aspiration, fall precautions  - STAT CT head non-contrast for change in neuro exam.   - PT/ OT   - DVT ppx     Discussed with stroke fellow Dr. Quan Darden under supervision of attending Dr. Rangel regarding decision against candidacy for Tenecteplase / thrombectomy. Will be formally staffed on morning rounds with attending. Recommendations will be complete once signed by attending.         LKW: 2/18 11AM  NIHSS: 3 (2 for right arm drift and 1 for dysarthria)  Baseline MRS: 4  Not a Tenecteplase candidate due to out of window  Not a thrombectomy candidate due to  out of window      Impression:  1. R arm weakness likely 2/2 left brain dysfunction possibly 2/2 acute ischemic stroke in setting of L ICA stenosis   2. High-grade (80-90%) stenosis of the LEFT ICA and critical stenosis (greater than 95%) of  the distal RIGHT CCA and origin of the RIGHT ICA  3. Chronic right anterior corona radiata infarct   4. Parkinson's disease     Plan:    [] Admit to Stroke floor under Dr. Rangel  [] Contine DAPT for now with Aspirin 81mg daily and Plavix 75mg daily  [] Increase home statin to Atorvastatin 80 qhs  [] Send ARU and PRU, A1C, Lipid panel  [] Obtain MRI brain w/o  [] Carotid dopplers  [] NSGY/ Vascular consult pending carotid dopplers  [] TTE  [] q4 neurochecks and vitals   [] SBP: allow for permissive HTN for another 24hr followed by gradual normotension  [] telemetry, EKG  - Tight glucose control (long-term goal HgbA1c < 6%)  - Stroke education and counseling  - Dysphagia screen. If fails, speech/swallow eval  - aspiration, fall precautions  - STAT CT head non-contrast for change in neuro exam.   - PT/ OT   - DVT ppx     Discussed with stroke fellow Dr. Quan Darden under supervision of attending Dr. Rangel regarding decision against candidacy for Tenecteplase / thrombectomy. Will be formally staffed on morning rounds with attending. Recommendations will be complete once signed by attending.       HPI: Patient ALYSE AVENDAÑO is a 63y (1960) right handed woman with a PMHx significant for HTN, DM, HLD, CAD(s/p 3 stents placed at [facility] in 2014 to the prox LAD and then 2022),  PAD s/p L popliteal angioplasty and atherectomy 12/23, R ICA and L ICA stenosis, prior stroke with L side weakness, Parkinson's disease, B/L cataract surgery with blurred vision at baseline who presents with headache, dizziness, blurred vision, right arm weakness. Patient was last seen well on 2/18 at 11AM. Family reports she woke up with these symptoms on 2/19. Per family, she is A&Ox2 at baseline, uses walker and assistance to walk. She has frequent falls per family, gait instability, and drags her feet while walking. She carries diagnosis of Parkinson's and is on Sinemet but has not yet been evaluated by movement disorder specialist. Patient has been seen previously by Neurology due to concern for falls and AMS. During evaluations in 2022 and 2023, she was found to have B/L ICA stenosis. Also was found to have R corona radiata infarct on MRI brain.   Carotid duplex (08.01.23) showed moderate, 50-69% stenosis of the right internal carotid artery and no hemodynamically significant stenosis of left carotid artery. She was seen by vascular surgery who recommended to continue DAPT and was to be re-assessed in 6 months.   Patient has also been seen in the past by Neurology for headaches (2021) which appeared poorly controlled.    LKW: 2/18 11AM  NIHSS: 3 (2 for right arm drift and 1 for dysarthria)  Baseline MRS: 4  Not a Tenecteplase candidate due to out of window  Not a thrombectomy candidate due to  out of window      Impression:  1. R arm weakness likely 2/2 left brain dysfunction possibly 2/2 acute ischemic stroke in setting of L ICA stenosis   2. High-grade (80-90%) stenosis of the LEFT ICA and critical stenosis (greater than 95%) of  the distal RIGHT CCA and origin of the RIGHT ICA  3. Chronic right anterior corona radiata infarct   4. Parkinson's disease     Plan:    [] Admit to Stroke floor under Dr. Rangel  [] Contine DAPT for now with Aspirin 81mg daily and Plavix 75mg daily  [] Increase home statin to Atorvastatin 80 qhs  [] Send ARU and PRU, A1C, Lipid panel  [] Obtain MRI brain w/o  [] Carotid dopplers  [] NSGY/ Vascular consult pending carotid dopplers  [] TTE  [] q4 neurochecks and vitals   [] SBP: allow for permissive HTN for another 24hr followed by gradual normotension  [] telemetry, EKG  [] continue remainder of home medications   - Tight glucose control (long-term goal HgbA1c < 6%)  - Stroke education and counseling  - Dysphagia screen. If fails, speech/swallow eval  - aspiration, fall precautions  - STAT CT head non-contrast for change in neuro exam.   - PT/ OT   - DVT ppx     Discussed with stroke fellow Dr. Quan Darden under supervision of attending Dr. Rangel regarding decision against candidacy for Tenecteplase / thrombectomy. Will be formally staffed on morning rounds with attending. Recommendations will be complete once signed by attending.       HPI: Patient ALYSE AVENDAÑO is a 63y (1960) right handed woman with a PMHx significant for HTN, DM, HLD, CAD(s/p 3 stents placed at [facility] in 2014 to the prox LAD and then 2022),  PAD s/p L popliteal angioplasty and atherectomy 12/23, R ICA and L ICA stenosis, prior stroke with L side weakness, Parkinson's disease, B/L cataract surgery with blurred vision at baseline who presents with headache, dizziness, blurred vision, right arm weakness. Patient was last seen well on 2/18 at 11AM. Family reports she woke up with these symptoms on 2/19. Per family, she is A&Ox2 at baseline, uses walker and assistance to walk. She has frequent falls per family, gait instability, and drags her feet while walking. She carries diagnosis of Parkinson's and is on Sinemet but has not yet been evaluated by movement disorder specialist. Patient has been seen previously by Neurology due to concern for falls and AMS. During evaluations in 2022 and 2023, she was found to have B/L ICA stenosis. Also was found to have R corona radiata infarct on MRI brain.   Carotid duplex (08.01.23) showed moderate, 50-69% stenosis of the right internal carotid artery and no hemodynamically significant stenosis of left carotid artery. She was seen by vascular surgery who recommended to continue DAPT and was to be re-assessed in 6 months.   Patient has also been seen in the past by Neurology for headaches (2021) which appeared poorly controlled.    LKW: 2/18 11AM  NIHSS: 3 (2 for right arm drift and 1 for dysarthria)  Baseline MRS: 4  Not a Tenecteplase candidate due to out of window  Not a thrombectomy candidate due to  out of window      Impression:  1. R arm weakness likely 2/2 left brain dysfunction possibly 2/2 acute ischemic stroke in setting of L ICA stenosis   2. High-grade (80-90%) stenosis of the LEFT ICA and critical stenosis (greater than 95%) of  the distal RIGHT CCA and origin of the RIGHT ICA  3. Chronic right anterior corona radiata infarct   4. Parkinson's disease   5. B/L cataracts and baseline blurred vision  6. Headache     Plan:    [] Admit to Stroke floor under Dr. Rangel  [] Contine DAPT for now with Aspirin 81mg daily and Plavix 75mg daily  [] Increase home statin to Atorvastatin 80 qhs  [] Send ARU and PRU, A1C, Lipid panel  [] Obtain MRI brain w/o  [] Carotid dopplers  [] NSGY/ Vascular consult pending carotid dopplers  [] TTE  [] q4 neurochecks and vitals   [] SBP: allow for permissive HTN for another 24hr followed by gradual normotension  [] telemetry, EKG  [] continue remainder of home medications   - Tight glucose control (long-term goal HgbA1c < 6%)  - Stroke education and counseling  - Dysphagia screen. If fails, speech/swallow eval  - aspiration, fall precautions  - STAT CT head non-contrast for change in neuro exam.   - PT/ OT   - DVT ppx     Discussed with stroke fellow Dr. Quan Darden under supervision of attending Dr. Rangel regarding decision against candidacy for Tenecteplase / thrombectomy. Will be formally staffed on morning rounds with attending. Recommendations will be complete once signed by attending.

## 2024-02-20 NOTE — PHYSICAL EXAM
[Ankle Swelling (On Exam)] : not present [Ankle Swelling Bilaterally] : bilaterally  [Varicose Veins Of Lower Extremities] : bilaterally [2+] : left foot dorsalis pedis 2+ [FreeTextEntry3] : CFT: 3 seconds x 10.  Temperature gradient: warm to cool. [de-identified] : No pain with pedal joints ROM.  No gross deformities.  Muscle power: 5/5, all pedal groups.   [Vibration Dec.] : diminished vibratory sensation at the level of the toes [Position Sense Dec.] : diminished position sense at the level of the toes [Diminished Throughout Right Foot] : normal sensation with monofilament testing throughout right foot [Diminished Throughout Left Foot] : normal sensation with monofilament testing throughout left foot [FreeTextEntry1] : Paresthesias and burning bilateral. Achilles reflexes and patellar decreased, bilateral.

## 2024-02-20 NOTE — HISTORY OF PRESENT ILLNESS
[Sneakers] : jorge [FreeTextEntry1] : Patient returns today for management of painful, thickened nails.  Patient is interested in topical antifungal therapy.  She denies any new medical changes.  Her last A1c was 7.8%.  She last saw her PCP one month ago.

## 2024-02-20 NOTE — PATIENT PROFILE ADULT - FALL HARM RISK - HARM RISK INTERVENTIONS
Assistance with ambulation/Assistance OOB with selected safe patient handling equipment/Communicate Risk of Fall with Harm to all staff/Discuss with provider need for PT consult/Monitor gait and stability/Provide patient with walking aids - walker, cane, crutches/Reinforce activity limits and safety measures with patient and family/Sit up slowly, dangle for a short time, stand at bedside before walking/Tailored Fall Risk Interventions/Use of alarms - bed, chair and/or voice tab/Visual Cue: Yellow wristband and red socks/Bed in lowest position, wheels locked, appropriate side rails in place/Call bell, personal items and telephone in reach/Instruct patient to call for assistance before getting out of bed or chair/Non-slip footwear when patient is out of bed/[place] to call system/Physically safe environment - no spills, clutter or unnecessary equipment/Purposeful Proactive Rounding/Room/bathroom lighting operational, light cord in reach

## 2024-02-20 NOTE — OCCUPATIONAL THERAPY INITIAL EVALUATION ADULT - PERTINENT HX OF CURRENT PROBLEM, REHAB EVAL
63F with a PMHx significant for HTN, DM, HLD, CAD(s/p 3 stents placed at [facility] in 2014 to the prox LAD and then 2022), PAD s/p L popliteal angioplasty and atherectomy 12/23, R ICA and L ICA stenosis, prior stroke with L side weakness, Parkinson's disease, B/L cataract surgery with blurred vision at baseline who presents with headache, dizziness, blurred vision, right arm weakness. Patient was last seen well on 2/18 at 11AM. Family reports she woke up with these symptoms on 2/19. Per family, she is A&Ox2 at baseline, uses walker and assistance to walk. She has frequent falls per family, gait instability, and drags her feet while walking. She carries diagnosis of Parkinson's and is on Sinemet but has not yet been evaluated by movement disorder specialist. Patient has been seen previously by Neurology due to concern for falls and AMS. During evaluations in 2022 and 2023, she was found to have B/L ICA stenosis. Also was found to have R corona radiata infarct on MRI brain. Carotid duplex (08.01.23) showed moderate, 50-69% stenosis of the right internal carotid artery and no hemodynamically significant stenosis of left carotid artery. She was seen by vascular surgery who recommended to continue DAPT and was to be re-assessed in 6 months. Patient has also been seen in the past by Neurology for headaches (2021) which appeared poorly controlled. IMAGING: CT HEAD: No acute intracranial hemorrhage. Gray/white matter differentiation is preserved. CTA NECK: High-grade (80-90%) stenosis of the LEFT internal carotid artery due to circumferential calcifications. Critical stenosis (greater than 95%) of the distal RIGHT common carotid artery and origin of the RIGHT internal carotid artery due to extensive calcified plaque. CTA HEAD: Moderate stenosis of bilateral precavernous and cavernous internal carotid arteries. No evidence of aneurysm. Tiny aneurysms can be beyond the resolution of CTA technique. 3.6 cm hypodense lesion in the left thyroid lobe.

## 2024-02-20 NOTE — OCCUPATIONAL THERAPY INITIAL EVALUATION ADULT - NSOTDISCHREC_GEN_A_CORE
should pt d/c home, recommend HOT and assistance with all ADLs and functional mobility/Sub-acute Rehab

## 2024-02-20 NOTE — CONSULT NOTE ADULT - ASSESSMENT
Assessment and Recommendations:    HPI:  HPI: Patient ALYSE AVENDAÑO is a 63y (1960) right handed woman with a PMHx significant for  Patient was last seen well on 2/18 at 11AM. Family reports she woke up with these symptoms on 2/19. Per family, she is A&Ox2 at baseline, uses walker and assistance to walk. She has frequent falls per family, gait instability, and drags her feet while walking. She carries diagnosis of Parkinson's and is on Sinemet but has not yet been evaluated by movement disorder specialist. Patient has been seen previously by Neurology due to concern for falls and AMS. During evaluations in 2022 and 2023, she was found to have B/L ICA stenosis. Also was found to have R corona radiata infarct on MRI brain.   Carotid duplex (08.01.23) showed moderate, 50-69% stenosis of the right internal carotid artery and no hemodynamically significant stenosis of left carotid artery. She was seen by vascular surgery who recommended to continue DAPT and was to be re-assessed in 6 months.   Patient has also been seen in the past by Neurology for headaches (2021) which appeared poorly controlled.      63y female with a past medical history/ocular history of HTN, DM, HLD, CAD(s/p 3 stents placed at [facility] in 2014 to the prox LAD and then 2022),  PAD s/p L popliteal angioplasty and atherectomy 12/23, R ICA and L ICA stenosis, prior stroke with L side weakness, Parkinson's disease, B/L cataract surgery with blurred vision at baseline who presents with headache, dizziness, blurred vision, right arm weakness consulted for severe carotis stenosis OS>OD and possible acute on chronic blurry vision OS>OD, found to have ***    ***    Seen and discussed with ***.    Outpatient Follow-up: Patient should follow-up with his/her ophthalmologist or with [facility] Department of Ophthalmology within 1 week of after discharge at:    [address]  [place] [address]  [phone]    Mark Palmer MD, PGY-3  Also available on [employer] Teams       63y female with a past medical history/ocular history of HTN, DM, HLD, CAD(s/p 3 stents placed at [facility] in 2014 to the prox LAD and then 2022),  PAD s/p L popliteal angioplasty and atherectomy 12/23, R ICA and L ICA stenosis, prior stroke with L side weakness, Parkinson's disease, B/L cataract surgery with blurred vision at baseline who presents with headache, dizziness, blurred vision, right arm weakness consulted for severe carotis stenosis OS>OD and possible acute on chronic blurry vision OS>OD.     #Severe carotid stenosis with acute on chronic blurry vision    - Patient with 20/100 vision in each eye individually    - no rAPD. iop wnl, EOMs full, CVF full   - Patient was away at MRI when writer returned to finish dilated exam, will re-dialte eyes tomorrow and re-examine.     Discussed with Dr. Baxter      Outpatient Follow-up: Patient should follow-up with his/her ophthalmologist or with [facility] Department of Ophthalmology within 1 week of after discharge at:    [address]  [place], [place] [id number] [phone]    Mark Palmer MD, PGY-3  Also available on [employer] Teams

## 2024-02-20 NOTE — PHYSICAL THERAPY INITIAL EVALUATION ADULT - ADDITIONAL COMMENTS
Patient lives in pvt house with family  6 steps to enter.  Patient ambulated with rolling walker + 1 person assist. Pt owns rw, sc shower chair, rollator at home.

## 2024-02-21 LAB
BLD GP AB SCN SERPL QL: NEGATIVE — SIGNIFICANT CHANGE UP
CULTURE RESULTS: SIGNIFICANT CHANGE UP
GLUCOSE BLDC GLUCOMTR-MCNC: 130 MG/DL — HIGH (ref 70–99)
GLUCOSE BLDC GLUCOMTR-MCNC: 131 MG/DL — HIGH (ref 70–99)
GLUCOSE BLDC GLUCOMTR-MCNC: 132 MG/DL — HIGH (ref 70–99)
GLUCOSE BLDC GLUCOMTR-MCNC: 154 MG/DL — HIGH (ref 70–99)
RH IG SCN BLD-IMP: POSITIVE — SIGNIFICANT CHANGE UP
SPECIMEN SOURCE: SIGNIFICANT CHANGE UP

## 2024-02-21 PROCEDURE — 99233 SBSQ HOSP IP/OBS HIGH 50: CPT

## 2024-02-21 RX ORDER — ASPIRIN/CALCIUM CARB/MAGNESIUM 324 MG
81 TABLET ORAL DAILY
Refills: 0 | Status: DISCONTINUED | OUTPATIENT
Start: 2024-02-21 | End: 2024-02-27

## 2024-02-21 RX ORDER — ASPIRIN/CALCIUM CARB/MAGNESIUM 324 MG
300 TABLET ORAL DAILY
Refills: 0 | Status: DISCONTINUED | OUTPATIENT
Start: 2024-02-21 | End: 2024-02-21

## 2024-02-21 RX ORDER — QUETIAPINE FUMARATE 200 MG/1
12.5 TABLET, FILM COATED ORAL AT BEDTIME
Refills: 0 | Status: DISCONTINUED | OUTPATIENT
Start: 2024-02-21 | End: 2024-02-23

## 2024-02-21 RX ORDER — ACETAMINOPHEN 500 MG
1000 TABLET ORAL ONCE
Refills: 0 | Status: DISCONTINUED | OUTPATIENT
Start: 2024-02-21 | End: 2024-02-27

## 2024-02-21 RX ORDER — SODIUM CHLORIDE 9 MG/ML
1000 INJECTION INTRAMUSCULAR; INTRAVENOUS; SUBCUTANEOUS
Refills: 0 | Status: DISCONTINUED | OUTPATIENT
Start: 2024-02-21 | End: 2024-02-22

## 2024-02-21 RX ORDER — ENOXAPARIN SODIUM 100 MG/ML
40 INJECTION SUBCUTANEOUS EVERY 24 HOURS
Refills: 0 | Status: DISCONTINUED | OUTPATIENT
Start: 2024-02-21 | End: 2024-02-27

## 2024-02-21 RX ADMIN — CARBIDOPA AND LEVODOPA 1 TABLET(S): 25; 100 TABLET ORAL at 11:51

## 2024-02-21 RX ADMIN — Medication 81 MILLIGRAM(S): at 11:51

## 2024-02-21 RX ADMIN — Medication 1: at 16:47

## 2024-02-21 RX ADMIN — Medication 1000 MILLIGRAM(S): at 12:30

## 2024-02-21 RX ADMIN — Medication 400 MILLIGRAM(S): at 11:09

## 2024-02-21 RX ADMIN — CLOPIDOGREL BISULFATE 75 MILLIGRAM(S): 75 TABLET, FILM COATED ORAL at 11:51

## 2024-02-21 RX ADMIN — GABAPENTIN 100 MILLIGRAM(S): 400 CAPSULE ORAL at 15:04

## 2024-02-21 RX ADMIN — FAMOTIDINE 20 MILLIGRAM(S): 10 INJECTION INTRAVENOUS at 11:51

## 2024-02-21 RX ADMIN — ENOXAPARIN SODIUM 40 MILLIGRAM(S): 100 INJECTION SUBCUTANEOUS at 11:51

## 2024-02-21 RX ADMIN — ISOSORBIDE MONONITRATE 30 MILLIGRAM(S): 60 TABLET, EXTENDED RELEASE ORAL at 11:51

## 2024-02-21 RX ADMIN — GABAPENTIN 100 MILLIGRAM(S): 400 CAPSULE ORAL at 21:03

## 2024-02-21 RX ADMIN — ATORVASTATIN CALCIUM 80 MILLIGRAM(S): 80 TABLET, FILM COATED ORAL at 21:03

## 2024-02-21 NOTE — CONSULT NOTE ADULT - ASSESSMENT
Patient is a 63 year old female with a PMHx of  HTN, DM, HLD, CAD (s/p 3 stents placed at [facility] in 2014 to the prox LAD and then in 2022 - on ASA and Plavix), PAD s/p L popliteal angioplasty and atherectomy 12/23, R ICA and L ICA stenosis, prior stroke with L side weakness, Parkinson's disease, B/L cataract surgery with blurred vision at baseline who presents with headache, dizziness, blurred vision, and right arm weakness. As per documentation and neurology, patient was last seen well on 2/18 at 11 AM. Per documentation, patient reportedly awoke on 2/19 with these symptoms. Per neuro, she is A&Ox2 at baseline, uses walker and assistance to walk. Per family, she has frequent falls, gait instability, and drags her feet while walking. Patient is on Sinemet for Parkinson's disease and is pending evaluation by movement disorder specialist per family. Patient has been seen previously by Neurology due to concern for falls and AMS. Per neurology, she was found to have B/L ICA stenosis and was also found to have R corona radiata infarct on MRI brain. Patient was not a Tenecteplase candidate or a thrombectomy candidate due to being out of window as per neurology. Internal Medicine has been consulted on Ms. Llamas's care for medical management.     Carotid Stenosis   - Pt with history of B/L ICA Stenosis and R Coe Radiata infarct   - CT, CT A H/N w/ high- grade 80-90% stenosis of the L ICA, > 95% stenosis on the R, Moderate stenosis  - MR w/ Significant B/L carotid stenosis without evidence of acute infarct   - CD w/ R external carotid artery stenosis, negative for significant ICA stenosis,   - TTE w/ Neg for shunt, hyperdynamic LVSF, No WM, trace MR, trace TR, trace KS,   - On DAPT w/ ASA and Plavix   - Lipitor 80   - NSGY to decide on possible intervention   - Monitor on telemetry   - Neuro checks as per protocol   - Fall, Seizure and Aspiration precautions  - PT / OT / S+S   - Per stroke neurology / NSGY     Pre-OP Risk Stratification   - Pt with RCRI score of 4  - Pt is deemed to have elevated risk for neurosurgical intervention.     Blurry vision   - Likely 2/2 carotid stenosis   - Optho eval appreciated; F/u recs    Type II DM   - A1C of 7.2  - Hold home PO Medications while admitted  - C/w sliding scale   - Diabetic DASH diet  - Monitor and adjust glucose levels as tolerated     CAD  - S/P 3 stents   - C/w DAPT and Statin   - Repeat TTE as above    PAD  - S/P L popliteal angioplasty and atherectomy 12/23  - On DAPT and Statin therapies     HTN   - On Imdur 30 PO Qd  - Monitor BP, VS and patient closely    Parkinson's disease  - On Sinemet   - Pending evaluation by movement disorder specialist per family. Outpatient follow up with both specialist and neurology upon DC   - Fall, Seizure and Aspiration precautions     HLD  - Lipitor 80   - Diet and lifestyle modifications     Thyroid lesion   - CT w/ 3.6 CM hypodense thyroid lesion   - Outpatient PCP follow up for further work up and evaluation     PPX  - Lovenox      Discussed with Attending. Discussed with daughter at the bedside.  Patient is a 63 year old female with a PMHx of  HTN, DM, HLD, CAD (s/p 3 stents placed at [facility] in 2014 to the prox LAD and then in 2022 - on ASA and Plavix), PAD s/p L popliteal angioplasty and atherectomy 12/23, R ICA and L ICA stenosis, prior stroke with L side weakness, Parkinson's disease, B/L cataract surgery with blurred vision at baseline who presents with headache, dizziness, blurred vision, and right arm weakness. As per documentation and neurology, patient was last seen well on 2/18 at 11 AM. Per documentation, patient reportedly awoke on 2/19 with these symptoms. Per neuro, she is A&Ox2 at baseline, uses walker and assistance to walk. Per family, she has frequent falls, gait instability, and drags her feet while walking. Patient is on Sinemet for Parkinson's disease and is pending evaluation by movement disorder specialist per family. Patient has been seen previously by Neurology due to concern for falls and AMS. Per neurology, she was found to have B/L ICA stenosis and was also found to have R corona radiata infarct on MRI brain. Patient was not a Tenecteplase candidate or a thrombectomy candidate due to being out of window as per neurology. Internal Medicine has been consulted on Ms. Llamas's care for medical management.     Carotid Stenosis   - Pt with history of B/L ICA Stenosis and R Coe Radiata infarct   - CT, CT A H/N w/ high- grade 80-90% stenosis of the L ICA, > 95% stenosis on the R, Moderate stenosis  - MR w/ Significant B/L carotid stenosis without evidence of acute infarct   - CD w/ R external carotid artery stenosis, negative for significant ICA stenosis,   - TTE w/ Neg for shunt, hyperdynamic LVSF, No WM, trace MR, trace TR, trace [place],   - On DAPT w/ ASA and Plavix   - Lipitor 80   - NSGY to decide on possible intervention   - Monitor on telemetry   - Neuro checks as per protocol   - Fall, Seizure and Aspiration precautions  - PT / OT / S+S   - Per stroke neurology / NSGY     Pre-OP Risk Stratification   - Pt with RCRI score of 4  - Pt is deemed to have elevated risk for neurosurgical intervention.     Blurry vision   - Likely 2/2 carotid stenosis   - Optho eval appreciated; F/u recs    Type II DM   - A1C of 7.2  - Hold home PO Medications while admitted  - C/w sliding scale   - Diabetic DASH diet  - Monitor and adjust glucose levels as tolerated     CAD  - S/P 3 stents   - C/w DAPT and Statin   - Repeat TTE as above    PAD  - S/P L popliteal angioplasty and atherectomy 12/23  - On DAPT and Statin therapies     HTN   - On Imdur 30 PO Qd  - Monitor BP, VS and patient closely    Parkinson's disease  - On Sinemet   - Pending evaluation by movement disorder specialist per family. Outpatient follow up with both specialist and neurology upon DC   - Fall, Seizure and Aspiration precautions     HLD  - Lipitor 80   - Diet and lifestyle modifications     Thyroid lesion   - CT w/ 3.6 CM hypodense thyroid lesion   - Check TSH   - Consider in versus outpatient thyroid US for further evaluation     PPX  - Lovenox      Discussed with Attending. Discussed with daughter at the bedside.  Patient is a 63 year old female with a PMHx of  HTN, DM, HLD, CAD (s/p 3 stents placed at [facility] in 2014 to the prox LAD and then in 2022 - on ASA and Plavix), PAD s/p L popliteal angioplasty and atherectomy 12/23, R ICA and L ICA stenosis, prior stroke with L side weakness, Parkinson's disease, B/L cataract surgery with blurred vision at baseline who presents with headache, dizziness, blurred vision, and right arm weakness. As per documentation and neurology, patient was last seen well on 2/18 at 11 AM. Per documentation, patient reportedly awoke on 2/19 with these symptoms. Per neuro, she is A&Ox2 at baseline, uses walker and assistance to walk. Per family, she has frequent falls, gait instability, and drags her feet while walking. Patient is on Sinemet for Parkinson's disease and is pending evaluation by movement disorder specialist per family. Patient has been seen previously by Neurology due to concern for falls and AMS. Per neurology, she was found to have B/L ICA stenosis and was also found to have R corona radiata infarct on MRI brain. Patient was not a Tenecteplase candidate or a thrombectomy candidate due to being out of window as per neurology. Internal Medicine has been consulted on Ms. Llamas's care for medical management.     Carotid Stenosis   - Pt with history of B/L ICA Stenosis and R Coe Radiata infarct   - CT, CT A H/N w/ high- grade 80-90% stenosis of the L ICA, > 95% stenosis on the R, Moderate stenosis  - MR w/ Significant B/L carotid stenosis without evidence of acute infarct   - CD w/ R external carotid artery stenosis, negative for significant ICA stenosis,   - TTE w/ Neg for shunt, hyperdynamic LVSF, No WM, trace MR, trace TR, trace ME,   - On DAPT w/ ASA and Plavix   - Lipitor 80   - NSGY to decide on possible intervention   - Monitor on telemetry   - Neuro checks as per protocol   - Fall, Seizure and Aspiration precautions  - PT / OT / S+S   - Per stroke neurology / NSGY     Pre-OP Risk Stratification   - Pt with RCRI score of 4  - Pt is deemed to have elevated risk for neurosurgical intervention.     Blurry vision   - Likely 2/2 carotid stenosis   - Optho eval appreciated; F/u recs    Type II DM   - A1C of 7.2  - Hold home PO Medications while admitted  - C/w sliding scale   - Diabetic DASH diet  - Monitor and adjust glucose levels as tolerated     CAD  - S/P 3 stents   - C/w DAPT and Statin   - Repeat TTE as above    PAD  - S/P L popliteal angioplasty and atherectomy 12/23  - On DAPT and Statin therapies     HTN   - On Imdur 30 PO Qd  - Monitor BP, VS and patient closely    Parkinson's disease  - On Sinemet   - Pending evaluation by movement disorder specialist per family. Outpatient follow up with both specialist and neurology upon DC   - Fall, Seizure and Aspiration precautions     HLD  - Lipitor 80   - Diet and lifestyle modifications     Thyroid lesion   - CT w/ 3.6 CM hypodense thyroid lesion   - Check TSH   - Check thyroid US for further evaluation     PPX  - Lovenox      Discussed with Attending. Discussed with daughter at the bedside.

## 2024-02-21 NOTE — PROGRESS NOTE ADULT - SUBJECTIVE AND OBJECTIVE BOX
[facility] DEPARTMENT OF OPHTHALMOLOGY  ------------------------------------------------------------------------------  Mark Palmer MD PGY-3  ------------------------------------------------------------------------------    Interval History: No acute events overnight. Today patient reprots that vision is better than its been in months in each eye.     MEDICATIONS  (STANDING):  acetaminophen   IVPB .. 1000 milliGRAM(s) IV Intermittent once  aspirin enteric coated 81 milliGRAM(s) Oral daily  atorvastatin 80 milliGRAM(s) Oral at bedtime  carbidopa/levodopa  25/100 1 Tablet(s) Oral daily  clopidogrel Tablet 75 milliGRAM(s) Oral daily  dextrose 5%. 1000 milliLiter(s) (100 mL/Hr) IV Continuous <Continuous>  dextrose 5%. 1000 milliLiter(s) (50 mL/Hr) IV Continuous <Continuous>  dextrose 50% Injectable 25 Gram(s) IV Push once  dextrose 50% Injectable 12.5 Gram(s) IV Push once  dextrose 50% Injectable 25 Gram(s) IV Push once  enoxaparin Injectable 40 milliGRAM(s) SubCutaneous every 24 hours  famotidine    Tablet 20 milliGRAM(s) Oral daily  gabapentin 100 milliGRAM(s) Oral three times a day  glucagon  Injectable 1 milliGRAM(s) IntraMuscular once  influenza  Vaccine (HIGH DOSE) 0.7 milliLiter(s) IntraMuscular once  insulin lispro (ADMELOG) corrective regimen sliding scale   SubCutaneous three times a day before meals  insulin lispro (ADMELOG) corrective regimen sliding scale   SubCutaneous at bedtime  isosorbide   mononitrate ER Tablet (IMDUR) 30 milliGRAM(s) Oral daily  sodium chloride 0.9%. 1000 milliLiter(s) (75 mL/Hr) IV Continuous <Continuous>    MEDICATIONS  (PRN):  dextrose Oral Gel 15 Gram(s) Oral once PRN Blood Glucose LESS THAN 70 milliGRAM(s)/deciliter  QUEtiapine 12.5 milliGRAM(s) Oral at bedtime PRN agitation      VITALS: T(C): 37.1 (02-21-24 @ 12:30)  T(F): 98.7 (02-21-24 @ 12:30), Max: 98.8 (02-20-24 @ 19:57)  HR: 75 (02-21-24 @ 12:30) (75 - 100)  BP: 167/92 (02-21-24 @ 12:30) (136/84 - 167/92)  RR:  (18 - 20)  SpO2:  (98% - 99%)  Wt(kg): --  General: AAO x 3, appropriate mood and affect    Ophthalmology Exam:  Visual acuity (cc): 20/100 OD PHNI; 20/70 PHNI OS  Pupils: PERRL OU, no APD  Ttono: 18 OU  Extraocular movements (EOMs): Full OU, no pain, no diplopia  Confrontational Visual Field (CVF): Full OU    Pen Light Exam (PLE)  External: Flat OU  Lids/Lashes/Lacrimal Ducts: Flat OU    Sclera/Conjunctiva: W+Q OU  Cornea: Cl OU  Anterior Chamber: D+F OU    Iris: Flat OU  Lens: PCIOL OU    Fundus Exam: dilated with 1% tropicamide and 2.5% phenylephrine  Approval obtained from primary team for dilation  Patient aware that pupils can remained dilated for at least 4-6 hours  Exam performed with 20D lens    Vitreous: wnl OU  Disc, cup/disc: sharp and pink, 0.4 OU; PPA OU  Macula: wnl OU  Vessels: wnl OU  Periphery: wnl OU

## 2024-02-21 NOTE — PROGRESS NOTE ADULT - ASSESSMENT
63y female with a past medical history/ocular history of HTN, DM, HLD, CAD(s/p 3 stents placed at [facility] in 2014 to the prox LAD and then 2022),  PAD s/p L popliteal angioplasty and atherectomy 12/23, R ICA and L ICA stenosis, prior stroke with L side weakness, Parkinson's disease, B/L cataract surgery with blurred vision at baseline who presents with headache, dizziness, blurred vision, right arm weakness consulted for severe carotis stenosis OS>OD and possible acute on chronic blurry vision OS>OD.     #Severe carotid stenosis with acute on chronic blurry vision    - Patient with 20/100 vision in each eye individually    - no rAPD. iop wnl, EOMs full, CVF full, DFE within normal limits.      #TIA   - Patient reports instances with complete vision loss in one eye.   - Full stroke workup per neurology.   - Normal dilated fundus exam at this time,      Discussed with Dr. Bacon      Outpatient Follow-up: Patient should follow-up with his/her ophthalmologist or with [facility] Department of Ophthalmology within 1 week of after discharge at:    [address]  [place] [address]  [phone]    Mark Palmer MD, PGY-3  Also available on [employer] Teams

## 2024-02-21 NOTE — SWALLOW BEDSIDE ASSESSMENT ADULT - ORAL PREPARATORY PHASE
Prolonged manipulation/mastication of minced-moist via tsp. Reduced oral grading to cup. Grossly functional

## 2024-02-21 NOTE — PROVIDER CONTACT NOTE (OTHER) - SITUATION
Patient is no longer following commands, is very lethargic and difficult to arouse, pupils are now unequal, L pupil is 4 round and brisk, R pupil is 6 round and brisk

## 2024-02-21 NOTE — CONSULT NOTE ADULT - NS ATTEND AMEND GEN_ALL_CORE FT
Patient care and plan discussed and reviewed with Advanced Care Provider. Plan as outlined above edited by me to reflect our discussion.
Pt care and plan discussed and reviewed with PA. Plan as outlined above edited by me to reflect our discussion. Advanced care planning/advanced directives discussed with patient/family. DNR status including forceful chest compressions to attempt to restart the heart, ventilator support/artificial breathing, electric shock, artificial nutrition, health care proxy, Molst form all discussed with pt. More than 50% of the visit was spent counseling and/or coordinating care by the attending physician.

## 2024-02-21 NOTE — SWALLOW BEDSIDE ASSESSMENT ADULT - COMMENTS
LKW: 2/18 11AM  NIHSS: 3 (2 for right arm drift and 1 for dysarthria)  Baseline MRS: 4  Not a Tenecteplase candidate due to out of window  Not a thrombectomy candidate due to  out of window  2/20-Neurosurgery consulted; possible intervention pending further discussion. Opthalmology consulted for decreased vision. Patient with 20/100 vision in each eye individually. Dysphagia screen passed, but made NPO overnight 2/20 as not tolerating diet.  2/21- Provider contact note stating change in neuro assessment; pt no longer following commands, very lethargic and difficult to arouse, pupils are now unequal.  Imaging:  MRI Head-Significant bilateral carotid stenosis without evidence of acute infarct. Noninvasive flow MR angiography as above.  CT HEAD: No acute intracranial hemorrhage. Gray/white matter differentiation is preserved.  CTA NECK: High-grade (80-90%) stenosis of the LEFT internal carotid artery due to circumferential calcifications. Critical stenosis (greater than 95%) of the distal RIGHT common carotid artery and origin of the RIGHT internal carotid artery due to extensive calcified plaque.  CTA HEAD: Moderate stenosis of bilateral precavernous and cavernous internal carotid arteries. No evidence of aneurysm. Tiny aneurysms can be beyond the resolution of CTA technique. 3.6 cm hypodense lesion in the left thyroid lobe.    *Patient known to this service. Speech-language evaluation completed 2/27/22 revealed mildly dysarthric speech and cognitive-linguistic deficits.*

## 2024-02-21 NOTE — CONSULT NOTE ADULT - SUBJECTIVE AND OBJECTIVE BOX
DATE OF SERVICE: 02-21-24 @ 16:15    CHIEF COMPLAINT:Patient is a 63y old  Female who presents with a chief complaint of R arm weakness, blurred vision, headache (21 Feb 2024 15:49)      HISTORY OF PRESENT ILLNESS:  HPI: Patient ALYSE AVENDAÑO is a 63y (1960) right handed woman with a PMHx significant for HTN, DM, HLD, CAD(s/p 3 stents placed at [facility] in 2014 to the prox LAD and then 2022),  PAD s/p L popliteal angioplasty and atherectomy 12/23, R ICA and L ICA stenosis, prior stroke with L side weakness, Parkinson's disease, B/L cataract surgery with blurred vision at baseline who presents with headache, dizziness, blurred vision, right arm weakness. Patient was last seen well on 2/18 at 11AM. Family reports she woke up with these symptoms on 2/19. Per family, she is A&Ox2 at baseline, uses walker and assistance to walk. She has frequent falls per family, gait instability, and drags her feet while walking. She carries diagnosis of Parkinson's and is on Sinemet but has not yet been evaluated by movement disorder specialist. Patient has been seen previously by Neurology due to concern for falls and AMS. During evaluations in 2022 and 2023, she was found to have B/L ICA stenosis. Also was found to have R corona radiata infarct on MRI brain.   Carotid duplex (08.01.23) showed moderate, 50-69% stenosis of the right internal carotid artery and no hemodynamically significant stenosis of left carotid artery. She was seen by vascular surgery who recommended to continue DAPT and was to be re-assessed in 6 months.   Patient has also been seen in the past by Neurology for headaches (2021) which appeared poorly controlled.    LKW: 2/18 11AM  NIHSS: 3 (2 for right arm drift and 1 for dysarthria)  Baseline MRS: 4  Not a Tenecteplase candidate due to out of window  Not a thrombectomy candidate due to  out of window    Vital Signs  T(C): 36.9 (20 Feb 2024 04:45), Max: 36.9 (19 Feb 2024 20:02)  T(F): 98.4 (20 Feb 2024 04:45), Max: 98.5 (19 Feb 2024 20:02)  HR: 88 (20 Feb 2024 04:45) (86 - 97)  BP: 146/79 (20 Feb 2024 04:45) (119/81 - 160/90)  RR: 18 (20 Feb 2024 04:45) (15 - 18)  SpO2: 100% (20 Feb 2024 04:45) (96% - 100%)    O2 Parameters below as of 20 Feb 2024 04:45  Patient On (Oxygen Delivery Method): room air         (20 Feb 2024 03:54)      PAST MEDICAL & SURGICAL HISTORY:  HTN (hypertension)      HLD (hyperlipidemia)      CAD (coronary artery disease)      Type II diabetes mellitus      PAD (peripheral artery disease)      Mild dementia      Parkinson disease      S/P hysterectomy              MEDICATIONS:  aspirin enteric coated 81 milliGRAM(s) Oral daily  clopidogrel Tablet 75 milliGRAM(s) Oral daily  enoxaparin Injectable 40 milliGRAM(s) SubCutaneous every 24 hours  isosorbide   mononitrate ER Tablet (IMDUR) 30 milliGRAM(s) Oral daily        acetaminophen   IVPB .. 1000 milliGRAM(s) IV Intermittent once  carbidopa/levodopa  25/100 1 Tablet(s) Oral daily  gabapentin 100 milliGRAM(s) Oral three times a day  QUEtiapine 12.5 milliGRAM(s) Oral at bedtime PRN    famotidine    Tablet 20 milliGRAM(s) Oral daily    atorvastatin 80 milliGRAM(s) Oral at bedtime  dextrose 50% Injectable 25 Gram(s) IV Push once  dextrose 50% Injectable 12.5 Gram(s) IV Push once  dextrose 50% Injectable 25 Gram(s) IV Push once  dextrose Oral Gel 15 Gram(s) Oral once PRN  glucagon  Injectable 1 milliGRAM(s) IntraMuscular once  insulin lispro (ADMELOG) corrective regimen sliding scale   SubCutaneous at bedtime  insulin lispro (ADMELOG) corrective regimen sliding scale   SubCutaneous three times a day before meals    dextrose 5%. 1000 milliLiter(s) IV Continuous <Continuous>  dextrose 5%. 1000 milliLiter(s) IV Continuous <Continuous>  influenza  Vaccine (HIGH DOSE) 0.7 milliLiter(s) IntraMuscular once  sodium chloride 0.9%. 1000 milliLiter(s) IV Continuous <Continuous>      FAMILY HISTORY:      Non-contributory    SOCIAL HISTORY:    [ ] Tobacco  [ ] Drugs  [ ] Alcohol    Allergies    No Known Allergies    Intolerances    	    REVIEW OF SYSTEMS:  CONSTITUTIONAL: No fever  EYES: No eye pain, visual disturbances, or discharge  ENMT:  No difficulty hearing, tinnitus  NECK: No pain or stiffness  RESPIRATORY: No cough, wheezing,  CARDIOVASCULAR: No chest pain, palpitations, passing out, dizziness, or leg swelling  GASTROINTESTINAL:  No nausea, vomiting, diarrhea or constipation. No melena.  GENITOURINARY: No dysuria, hematuria  NEUROLOGICAL: No stroke like symptoms  SKIN: No burning or lesions   ENDOCRINE: No heat or cold intolerance  MUSCULOSKELETAL: No joint pain or swelling  PSYCHIATRIC: No  anxiety, mood swings  HEME/LYMPH: No bleeding gums  ALLERGY AND IMMUNOLOGIC: No hives or eczema	    All other ROS negative    PHYSICAL EXAM:  T(C): 37.1 (02-21-24 @ 12:30), Max: 37.1 (02-20-24 @ 19:57)  HR: 75 (02-21-24 @ 12:30) (75 - 100)  BP: 167/92 (02-21-24 @ 12:30) (136/84 - 167/92)  RR: 20 (02-21-24 @ 12:30) (18 - 20)  SpO2: 98% (02-21-24 @ 12:30) (98% - 99%)  Wt(kg): --  I&O's Summary    20 Feb 2024 07:01  -  21 Feb 2024 07:00  --------------------------------------------------------  IN: 525 mL / OUT: 0 mL / NET: 525 mL        Appearance: Normal	  HEENT:   Normal oral mucosa, EOMI	  Cardiovascular:  S1 S2, No JVD,    Respiratory: Lungs clear to auscultation	  Psychiatry: Alert  Gastrointestinal:  Soft, Non-tender, + BS	  Skin: No rashes   Neurologic: Non-focal  Extremities:  No edema  Vascular: Peripheral pulses palpable    	    	  	  CARDIAC MARKERS:  Labs personally reviewed by me                          EKG: Personally reviewed by me - SR 1st AVB  Radiology: Personally reviewed by me -     < from: TTE W or WO Ultrasound Enhancing Agent (02.20.24 @ 10:23) >   1. Agitated saline injection was negative for intracardiac shunt.   2. Left ventricular cavity is normal in size. Left ventricular wall thickness is normal. Left ventricular systolic function is hyperdynamic. There are no regional wall motion abnormalities seen.   3. Normal left ventricular diastolic function, with normal filling pressure.   4. Normal right ventricular cavity size, with wall thickness, and normal systolic function.   5. Normal left and right atrial size.   6. No significant valvular disease.   7. No pericardial effusion seen.    < end of copied text >  < from: CT Angio Neck w/ IV Cont (02.19.24 @ 15:50) >    IMPRESSION:    CT HEAD:  No acute intracranial hemorrhage. Gray/white matter differentiation is   preserved.    CTA NECK:  High-grade (80-90%) stenosis of the LEFT internal carotid artery due to   circumferential calcifications. Critical stenosis (greater than 95%) of   the distal RIGHT common carotid artery and origin of the RIGHTinternal   carotid artery due to extensive calcified plaque.    CTA HEAD:  Moderate stenosis of bilateral precavernous and cavernous internal   carotid arteries.    No evidence of aneurysm. Tiny aneurysms can be beyond the resolution of   CTA technique.    3.6 cm hypodense lesion in the left thyroid lobe.    < end of copied text >  < from: MR Angio Neck No Cont (02.20.24 @ 16:44) >  IMPRESSION: Significant bilateral carotid stenosis without evidence of   acute infarct. Noninvasive flow MR angiography as above      Assessment /Plan:     Patient is a 63 year old female with a PMHx of  HTN, DM, HLD, CAD (s/p 3 stents placed at [facility] in 2014 to the prox LAD and then in 2022 - on ASA and Plavix), PAD s/p L popliteal angioplasty and atherectomy 12/23, R ICA and L ICA stenosis, prior stroke with L side weakness, Parkinson's disease, B/L cataract surgery with blurred vision at baseline who presents with headache, dizziness, blurred vision, and right arm weakness. Patient was not a Tenecteplase candidate or a thrombectomy candidate due to being out of window as per neurology.    1. Stroke  - not a Tenecteplase candidate or a thrombectomy candidate due to being out of window as per neurology  - TTE shows negative for intracardiac shunt  - No hx of AF--> cont to monitor on tele  - CTA Head shows severe B/L carotid stenosis  - c/w ASA 81mg PO and atorvastatin 80mg PO daily  - Appreciate Neuro and NSGx recommendations    2. Hypertension  - Permissive BP as per Neuro  - c/w Imdur 30mg PO daily    3. Coronary Artery Disease  - ECG with no acute ischemia noted  - s/p 3 stents placed at [facility] in 2014 to the prox LAD and then in 2022  - TTE with preserved EF and no WMA  - c/w ASA, Plavix and statin    4. HLD  - Cholesterol panel noted  - c/w atorvastatin 80mg PO daily    5. Cardiac Risk Stratification  - ECG with no ischemia noted.   - Not in decompensated HF  - No hx of tachy gibson arrhythmia  - No severe AS/MS  - Limited functional capacity 2/2 Parkison's      Differential diagnosis and plan of care discussed with patient after the evaluation. Counseling on diet, nutritional counseling, weight management, exercise and medication compliance was done.   Advanced care planning/advanced directives discussed with patient/family. DNR status including forceful chest compressions to attempt to restart the heart, ventilator support/artificial breathing, electric shock, artificial nutrition, health care proxy, Molst form all discussed with pt. Pt wishes to consider. More than fifteen minutes spent on discussing advanced directives.       Pawan Solano DO [facility]  Cardiovascular Medicine  [address] [place] [address] Dr, [address]  Available for call or text via [employer] TE[employer]  Office [phone]   DATE OF SERVICE: 02-21-24 @ 16:15    CHIEF COMPLAINT:Patient is a 63y old  Female who presents with a chief complaint of R arm weakness, blurred vision, headache (21 Feb 2024 15:49)      HISTORY OF PRESENT ILLNESS:  HPI: Patient ALYSE AVENDAÑO is a 63y (1960) right handed woman with a PMHx significant for HTN, DM, HLD, CAD(s/p 3 stents placed at [facility] in 2014 to the prox LAD and then 2022),  PAD s/p L popliteal angioplasty and atherectomy 12/23, R ICA and L ICA stenosis, prior stroke with L side weakness, Parkinson's disease, B/L cataract surgery with blurred vision at baseline who presents with headache, dizziness, blurred vision, right arm weakness. Patient was last seen well on 2/18 at 11AM. Family reports she woke up with these symptoms on 2/19. Per family, she is A&Ox2 at baseline, uses walker and assistance to walk. She has frequent falls per family, gait instability, and drags her feet while walking. She carries diagnosis of Parkinson's and is on Sinemet but has not yet been evaluated by movement disorder specialist. Patient has been seen previously by Neurology due to concern for falls and AMS. During evaluations in 2022 and 2023, she was found to have B/L ICA stenosis. Also was found to have R corona radiata infarct on MRI brain.   Carotid duplex (08.01.23) showed moderate, 50-69% stenosis of the right internal carotid artery and no hemodynamically significant stenosis of left carotid artery. She was seen by vascular surgery who recommended to continue DAPT and was to be re-assessed in 6 months.   Patient has also been seen in the past by Neurology for headaches (2021) which appeared poorly controlled.    LKW: 2/18 11AM  NIHSS: 3 (2 for right arm drift and 1 for dysarthria)  Baseline MRS: 4  Not a Tenecteplase candidate due to out of window  Not a thrombectomy candidate due to  out of window    Vital Signs  T(C): 36.9 (20 Feb 2024 04:45), Max: 36.9 (19 Feb 2024 20:02)  T(F): 98.4 (20 Feb 2024 04:45), Max: 98.5 (19 Feb 2024 20:02)  HR: 88 (20 Feb 2024 04:45) (86 - 97)  BP: 146/79 (20 Feb 2024 04:45) (119/81 - 160/90)  RR: 18 (20 Feb 2024 04:45) (15 - 18)  SpO2: 100% (20 Feb 2024 04:45) (96% - 100%)    O2 Parameters below as of 20 Feb 2024 04:45  Patient On (Oxygen Delivery Method): room air         (20 Feb 2024 03:54)      PAST MEDICAL & SURGICAL HISTORY:  HTN (hypertension)      HLD (hyperlipidemia)      CAD (coronary artery disease)      Type II diabetes mellitus      PAD (peripheral artery disease)      Mild dementia      Parkinson disease      S/P hysterectomy              MEDICATIONS:  aspirin enteric coated 81 milliGRAM(s) Oral daily  clopidogrel Tablet 75 milliGRAM(s) Oral daily  enoxaparin Injectable 40 milliGRAM(s) SubCutaneous every 24 hours  isosorbide   mononitrate ER Tablet (IMDUR) 30 milliGRAM(s) Oral daily        acetaminophen   IVPB .. 1000 milliGRAM(s) IV Intermittent once  carbidopa/levodopa  25/100 1 Tablet(s) Oral daily  gabapentin 100 milliGRAM(s) Oral three times a day  QUEtiapine 12.5 milliGRAM(s) Oral at bedtime PRN    famotidine    Tablet 20 milliGRAM(s) Oral daily    atorvastatin 80 milliGRAM(s) Oral at bedtime  dextrose 50% Injectable 25 Gram(s) IV Push once  dextrose 50% Injectable 12.5 Gram(s) IV Push once  dextrose 50% Injectable 25 Gram(s) IV Push once  dextrose Oral Gel 15 Gram(s) Oral once PRN  glucagon  Injectable 1 milliGRAM(s) IntraMuscular once  insulin lispro (ADMELOG) corrective regimen sliding scale   SubCutaneous at bedtime  insulin lispro (ADMELOG) corrective regimen sliding scale   SubCutaneous three times a day before meals    dextrose 5%. 1000 milliLiter(s) IV Continuous <Continuous>  dextrose 5%. 1000 milliLiter(s) IV Continuous <Continuous>  influenza  Vaccine (HIGH DOSE) 0.7 milliLiter(s) IntraMuscular once  sodium chloride 0.9%. 1000 milliLiter(s) IV Continuous <Continuous>      FAMILY HISTORY:      Non-contributory    SOCIAL HISTORY:    [ ] Tobacco  [ ] Drugs  [ ] Alcohol    Allergies    No Known Allergies    Intolerances    	    REVIEW OF SYSTEMS:  CONSTITUTIONAL: No fever  EYES: No eye pain, visual disturbances, or discharge  ENMT:  No difficulty hearing, tinnitus  NECK: No pain or stiffness  RESPIRATORY: No cough, wheezing,  CARDIOVASCULAR: No chest pain, palpitations, passing out, dizziness, or leg swelling  GASTROINTESTINAL:  No nausea, vomiting, diarrhea or constipation. No melena.  GENITOURINARY: No dysuria, hematuria  NEUROLOGICAL: No stroke like symptoms  SKIN: No burning or lesions   ENDOCRINE: No heat or cold intolerance  MUSCULOSKELETAL: No joint pain or swelling  PSYCHIATRIC: No  anxiety, mood swings  HEME/LYMPH: No bleeding gums  ALLERGY AND IMMUNOLOGIC: No hives or eczema	    All other ROS negative    PHYSICAL EXAM:  T(C): 37.1 (02-21-24 @ 12:30), Max: 37.1 (02-20-24 @ 19:57)  HR: 75 (02-21-24 @ 12:30) (75 - 100)  BP: 167/92 (02-21-24 @ 12:30) (136/84 - 167/92)  RR: 20 (02-21-24 @ 12:30) (18 - 20)  SpO2: 98% (02-21-24 @ 12:30) (98% - 99%)  Wt(kg): --  I&O's Summary    20 Feb 2024 07:01  -  21 Feb 2024 07:00  --------------------------------------------------------  IN: 525 mL / OUT: 0 mL / NET: 525 mL        Appearance: Normal	  HEENT:   Normal oral mucosa, EOMI	  Cardiovascular:  S1 S2, No JVD,    Respiratory: Lungs clear to auscultation	  Psychiatry: Alert  Gastrointestinal:  Soft, Non-tender, + BS	  Skin: No rashes   Neurologic: Non-focal  Extremities:  No edema  Vascular: Peripheral pulses palpable    	    	  	  CARDIAC MARKERS:  Labs personally reviewed by me             EKG: Personally reviewed by me - SR 1st AVB  Radiology: Personally reviewed by me -     < from: TTE W or WO Ultrasound Enhancing Agent (02.20.24 @ 10:23) >   1. Agitated saline injection was negative for intracardiac shunt.   2. Left ventricular cavity is normal in size. Left ventricular wall thickness is normal. Left ventricular systolic function is hyperdynamic. There are no regional wall motion abnormalities seen.   3. Normal left ventricular diastolic function, with normal filling pressure.   4. Normal right ventricular cavity size, with wall thickness, and normal systolic function.   5. Normal left and right atrial size.   6. No significant valvular disease.   7. No pericardial effusion seen.    < end of copied text >  < from: CT Angio Neck w/ IV Cont (02.19.24 @ 15:50) >    IMPRESSION:    CT HEAD:  No acute intracranial hemorrhage. Gray/white matter differentiation is   preserved.    CTA NECK:  High-grade (80-90%) stenosis of the LEFT internal carotid artery due to   circumferential calcifications. Critical stenosis (greater than 95%) of   the distal RIGHT common carotid artery and origin of the RIGHTinternal   carotid artery due to extensive calcified plaque.    CTA HEAD:  Moderate stenosis of bilateral precavernous and cavernous internal   carotid arteries.    No evidence of aneurysm. Tiny aneurysms can be beyond the resolution of   CTA technique.    3.6 cm hypodense lesion in the left thyroid lobe.    < end of copied text >  < from: MR Angio Neck No Cont (02.20.24 @ 16:44) >  IMPRESSION: Significant bilateral carotid stenosis without evidence of   acute infarct. Noninvasive flow MR angiography as above      Assessment /Plan:     Patient is a 63 year old female with a PMHx of  HTN, DM, HLD, CAD (s/p 3 stents placed at [facility] in 2014 to the prox LAD and then in 2022 - on ASA and Plavix), PAD s/p L popliteal angioplasty and atherectomy 12/23, R ICA and L ICA stenosis, prior stroke with L side weakness, Parkinson's disease, B/L cataract surgery with blurred vision at baseline who presents with headache, dizziness, blurred vision, and right arm weakness. Patient was not a Tenecteplase candidate or a thrombectomy candidate due to being out of window as per neurology.    1. Stroke  - not a Tenecteplase candidate or a thrombectomy candidate due to being out of window as per neurology  - TTE shows negative for intracardiac shunt  - No hx of AF--> cont to monitor on tele  - CTA Head shows severe B/L carotid stenosis  - c/w ASA 81mg PO and atorvastatin 80mg PO daily  - Appreciate Neuro and NSGx recommendations, ?CEA  - ILR if stroke not thought to be due to carotid dz    2. Hypertension  - Permissive BP as per Neuro  - c/w Imdur 30mg PO daily    3. Coronary Artery Disease  - ECG with no acute ischemia noted  - s/p 3 stents placed at [facility] in 2014 to the prox LAD and then in 2022  - TTE with preserved EF and no WMA  - c/w ASA, Plavix and statin    4. HLD  - Cholesterol panel noted  - c/w atorvastatin 80mg PO daily    5. Cardiac Risk Stratification  - ECG with no ischemia noted.   - Not in decompensated HF  - No hx of tachy gibson arrhythmia  - No severe AS/MS  - Limited functional capacity 2/2 Parkison's            Differential diagnosis and plan of care discussed with patient after the evaluation. Counseling on diet, nutritional counseling, weight management, exercise and medication compliance was done.   Advanced care planning/advanced directives discussed with patient/family. DNR status including forceful chest compressions to attempt to restart the heart, ventilator support/artificial breathing, electric shock, artificial nutrition, health care proxy, Molst form all discussed with pt. Pt wishes to consider. More than fifteen minutes spent on discussing advanced directives.       Pawan Solano DO [facility]  Cardiovascular Medicine  [address], [address]  Available for call or text via [employer]  Office [phone]   DATE OF SERVICE: 02-21-24 @ 16:15    CHIEF COMPLAINT:Patient is a 63y old  Female who presents with a chief complaint of R arm weakness, blurred vision, headache (21 Feb 2024 15:49)      HISTORY OF PRESENT ILLNESS:  HPI: Patient ALYSE AVENDAÑO is a 63y (1960) right handed woman with a PMHx significant for HTN, DM, HLD, CAD(s/p 3 stents placed at [facility] in 2014 to the prox LAD and then 2022),  PAD s/p L popliteal angioplasty and atherectomy 12/23, R ICA and L ICA stenosis, prior stroke with L side weakness, Parkinson's disease, B/L cataract surgery with blurred vision at baseline who presents with headache, dizziness, blurred vision, right arm weakness. Patient was last seen well on 2/18 at 11AM. Family reports she woke up with these symptoms on 2/19. Per family, she is A&Ox2 at baseline, uses walker and assistance to walk. She has frequent falls per family, gait instability, and drags her feet while walking. She carries diagnosis of Parkinson's and is on Sinemet but has not yet been evaluated by movement disorder specialist. Patient has been seen previously by Neurology due to concern for falls and AMS. During evaluations in 2022 and 2023, she was found to have B/L ICA stenosis. Also was found to have R corona radiata infarct on MRI brain.   Carotid duplex (08.01.23) showed moderate, 50-69% stenosis of the right internal carotid artery and no hemodynamically significant stenosis of left carotid artery. She was seen by vascular surgery who recommended to continue DAPT and was to be re-assessed in 6 months.   Patient has also been seen in the past by Neurology for headaches (2021) which appeared poorly controlled.    LKW: 2/18 11AM  NIHSS: 3 (2 for right arm drift and 1 for dysarthria)  Baseline MRS: 4  Not a Tenecteplase candidate due to out of window  Not a thrombectomy candidate due to  out of window    Vital Signs  T(C): 36.9 (20 Feb 2024 04:45), Max: 36.9 (19 Feb 2024 20:02)  T(F): 98.4 (20 Feb 2024 04:45), Max: 98.5 (19 Feb 2024 20:02)  HR: 88 (20 Feb 2024 04:45) (86 - 97)  BP: 146/79 (20 Feb 2024 04:45) (119/81 - 160/90)  RR: 18 (20 Feb 2024 04:45) (15 - 18)  SpO2: 100% (20 Feb 2024 04:45) (96% - 100%)    O2 Parameters below as of 20 Feb 2024 04:45  Patient On (Oxygen Delivery Method): room air         (20 Feb 2024 03:54)      PAST MEDICAL & SURGICAL HISTORY:  HTN (hypertension)      HLD (hyperlipidemia)      CAD (coronary artery disease)      Type II diabetes mellitus      PAD (peripheral artery disease)      Mild dementia      Parkinson disease      S/P hysterectomy              MEDICATIONS:  aspirin enteric coated 81 milliGRAM(s) Oral daily  clopidogrel Tablet 75 milliGRAM(s) Oral daily  enoxaparin Injectable 40 milliGRAM(s) SubCutaneous every 24 hours  isosorbide   mononitrate ER Tablet (IMDUR) 30 milliGRAM(s) Oral daily        acetaminophen   IVPB .. 1000 milliGRAM(s) IV Intermittent once  carbidopa/levodopa  25/100 1 Tablet(s) Oral daily  gabapentin 100 milliGRAM(s) Oral three times a day  QUEtiapine 12.5 milliGRAM(s) Oral at bedtime PRN    famotidine    Tablet 20 milliGRAM(s) Oral daily    atorvastatin 80 milliGRAM(s) Oral at bedtime  dextrose 50% Injectable 25 Gram(s) IV Push once  dextrose 50% Injectable 12.5 Gram(s) IV Push once  dextrose 50% Injectable 25 Gram(s) IV Push once  dextrose Oral Gel 15 Gram(s) Oral once PRN  glucagon  Injectable 1 milliGRAM(s) IntraMuscular once  insulin lispro (ADMELOG) corrective regimen sliding scale   SubCutaneous at bedtime  insulin lispro (ADMELOG) corrective regimen sliding scale   SubCutaneous three times a day before meals    dextrose 5%. 1000 milliLiter(s) IV Continuous <Continuous>  dextrose 5%. 1000 milliLiter(s) IV Continuous <Continuous>  influenza  Vaccine (HIGH DOSE) 0.7 milliLiter(s) IntraMuscular once  sodium chloride 0.9%. 1000 milliLiter(s) IV Continuous <Continuous>      FAMILY HISTORY:      Non-contributory    SOCIAL HISTORY:    [ ]not a smoker    Allergies    No Known Allergies    Intolerances    	    REVIEW OF SYSTEMS:  CONSTITUTIONAL: No fever  EYES: No eye pain, visual disturbances, or discharge  ENMT:  No difficulty hearing, tinnitus  NECK: No pain or stiffness  RESPIRATORY: No cough, wheezing,  CARDIOVASCULAR: No chest pain, palpitations, passing out, dizziness, or leg swelling  GASTROINTESTINAL:  No nausea, vomiting, diarrhea or constipation. No melena.  GENITOURINARY: No dysuria, hematuria  NEUROLOGICAL: No stroke like symptoms  SKIN: No burning or lesions   ENDOCRINE: No heat or cold intolerance  MUSCULOSKELETAL: No joint pain or swelling  PSYCHIATRIC: No  anxiety, mood swings  HEME/LYMPH: No bleeding gums  ALLERGY AND IMMUNOLOGIC: No hives or eczema	    All other ROS negative    PHYSICAL EXAM:  T(C): 37.1 (02-21-24 @ 12:30), Max: 37.1 (02-20-24 @ 19:57)  HR: 75 (02-21-24 @ 12:30) (75 - 100)  BP: 167/92 (02-21-24 @ 12:30) (136/84 - 167/92)  RR: 20 (02-21-24 @ 12:30) (18 - 20)  SpO2: 98% (02-21-24 @ 12:30) (98% - 99%)  Wt(kg): --  I&O's Summary    20 Feb 2024 07:01  -  21 Feb 2024 07:00  --------------------------------------------------------  IN: 525 mL / OUT: 0 mL / NET: 525 mL        Appearance: Normal	  HEENT:   Normal oral mucosa, EOMI	  Cardiovascular:  S1 S2, No JVD,    Respiratory: Lungs clear to auscultation	  Psychiatry: Alert  Gastrointestinal:  Soft, Non-tender, + BS	  Skin: No rashes   Neurologic: Non-focal  Extremities:  No edema  Vascular: Peripheral pulses palpable    	    	  	  CARDIAC MARKERS:  Labs personally reviewed by me             EKG: Personally reviewed by me - SR 1st AVB  Radiology: Personally reviewed by me -     < from: TTE W or WO Ultrasound Enhancing Agent (02.20.24 @ 10:23) >   1. Agitated saline injection was negative for intracardiac shunt.   2. Left ventricular cavity is normal in size. Left ventricular wall thickness is normal. Left ventricular systolic function is hyperdynamic. There are no regional wall motion abnormalities seen.   3. Normal left ventricular diastolic function, with normal filling pressure.   4. Normal right ventricular cavity size, with wall thickness, and normal systolic function.   5. Normal left and right atrial size.   6. No significant valvular disease.   7. No pericardial effusion seen.    < end of copied text >  < from: CT Angio Neck w/ IV Cont (02.19.24 @ 15:50) >    IMPRESSION:    CT HEAD:  No acute intracranial hemorrhage. Gray/white matter differentiation is   preserved.    CTA NECK:  High-grade (80-90%) stenosis of the LEFT internal carotid artery due to   circumferential calcifications. Critical stenosis (greater than 95%) of   the distal RIGHT common carotid artery and origin of the RIGHTinternal   carotid artery due to extensive calcified plaque.    CTA HEAD:  Moderate stenosis of bilateral precavernous and cavernous internal   carotid arteries.    No evidence of aneurysm. Tiny aneurysms can be beyond the resolution of   CTA technique.    3.6 cm hypodense lesion in the left thyroid lobe.    < end of copied text >  < from: MR Angio Neck No Cont (02.20.24 @ 16:44) >  IMPRESSION: Significant bilateral carotid stenosis without evidence of   acute infarct. Noninvasive flow MR angiography as above      Assessment /Plan:     Patient is a 63 year old female with a PMHx of  HTN, DM, HLD, CAD (s/p 3 stents placed at [facility] in 2014 to the prox LAD and then in 2022 - on ASA and Plavix), PAD s/p L popliteal angioplasty and atherectomy 12/23, R ICA and L ICA stenosis, prior stroke with L side weakness, Parkinson's disease, B/L cataract surgery with blurred vision at baseline who presents with headache, dizziness, blurred vision, and right arm weakness. Patient was not a Tenecteplase candidate or a thrombectomy candidate due to being out of window as per neurology.    1. Stroke  - not a Tenecteplase candidate or a thrombectomy candidate due to being out of window as per neurology  - TTE shows negative for intracardiac shunt  - No hx of AF--> cont to monitor on tele  - CTA Head shows severe B/L carotid stenosis  - c/w ASA 81mg PO and atorvastatin 80mg PO daily  - Appreciate Neuro and NSGx recommendations, ?CEA  - ILR if stroke not thought to be due to carotid dz    2. Hypertension  - Permissive BP as per Neuro  - c/w Imdur 30mg PO daily    3. Coronary Artery Disease  - ECG with no acute ischemia noted  - s/p 3 stents placed at [facility] in 2014 to the prox LAD and then in 2022  - TTE with preserved EF and no WMA  - c/w ASA, Plavix and statin    4. HLD  - Cholesterol panel noted  - c/w atorvastatin 80mg PO daily    5. Cardiac Risk Stratification  - ECG with no ischemia noted.   - Not in decompensated HF  - No hx of tachy gibson arrhythmia  - No severe AS/MS  - Limited functional capacity 2/2 Parkison's  - Elevated risk for low risk cerebral angio, no cardiac contraindication to proceed             Differential diagnosis and plan of care discussed with patient after the evaluation. Counseling on diet, nutritional counseling, weight management, exercise and medication compliance was done.   Advanced care planning/advanced directives discussed with patient/family. DNR status including forceful chest compressions to attempt to restart the heart, ventilator support/artificial breathing, electric shock, artificial nutrition, health care proxy, Molst form all discussed with pt. Pt wishes to consider. More than fifteen minutes spent on discussing advanced directives.       Pawan Solano DO [facility]  Cardiovascular Medicine  [address], [address]  Available for call or text via [employer]  Office [phone]

## 2024-02-21 NOTE — PROGRESS NOTE ADULT - ASSESSMENT
63F hx HTN, DM, HLD, known b/l carotid artery stenosis (dx 2022; on DAPT), CAD (s/p 3 stents 2014, 2022),  PAD s/p L popliteal angioplasty and atherectomy 12/23, prior stroke w/ baseline L side weakness, PD w/ frequent falls, b/l cataract surgery, adm stroke s/p episode of headache, dizziness, blurred vision, right arm weakness. CTH w/ no acute heme/hydro. CTA neck w/ severe stenosis of b/l ICAs. Exam: (baseline AOx2) AOx2 (didn't get year), PERRL, no facial, R drift, RUE 4/5, LUE 4+/5, RLE 4/5, LLE 4/5.  - MR/MRA/NOVA - done  - possible intervention pending further discussion

## 2024-02-21 NOTE — PROGRESS NOTE ADULT - NS ATTEND AMEND GEN_ALL_CORE FT
cerebral ischemia, hx of known R ICA/L ICA stenosis, prior stroke with L side weakness, HTN, DM2, HLD, CAD s/p 3 stents, most recent in 2022, cognitive impairment/mild dementia, hx of parkinsons, B/L cataracts s/p surgery, new onset R side weakness and vision changes. Pt is not able to provide detailed history, but family able to. Reports her vision has been better since the cataract surgeries, but recently she would occasionally complain of transient vision deficits. Unclear exact onset, but pt is complaining now of some vision loss in L>R that seems persistent. CTA as above. L eye vision is slightly better today per patient compared to yesterday. MRI (-) for acute infarct, but given active eye symptoms and suspected TIA with R side weakness, Will likely need carotid intervention. With recent PAD intervention (12/2023) and multiple stents, continues to be on DAPT - perhaps carotid stent if anatomically acceptable could be pursued for L carotid (appears to be the current symptomatic carotid), case discussed with MEET.

## 2024-02-21 NOTE — PROGRESS NOTE ADULT - ASSESSMENT
ASSESSMENT:     NEURO: Continue close monitoring for neurologic deterioration, permissive HTN to gradual normotension, titrate statin to LDL goal less than 70, MRI Brain w/o, MRA Head w/o and Neck w/contrast, results as noted above. Physical therapy/OT recommending SHANE.  Pending Neurosurgery eval to decide on L ICA revascularization.      Impression:  1. R arm weakness likely 2/2 left brain dysfunction possibly 2/2 acute ischemic stroke in setting of L ICA stenosis   2. High-grade (80-90%) stenosis of the LEFT ICA and critical stenosis (greater than 95%) of  the distal RIGHT CCA and origin of the RIGHT ICA  3. Chronic right anterior corona radiata infarct   4. Parkinson's disease   5. B/L cataracts and baseline blurred vision  6. Headache     ANTITHROMBOTIC THERAPY: Aspirin CO as NPO, plavix when stable enteral access     PULMONARY: protecting airway, saturating well     CARDIOVASCULAR:  TTE shows  no PFO, normal systolic function, continue with cardiac monitoring, no events recorded so far.                              SBP goal: Permissive HTN to gradual normotension      GASTROINTESTINAL:  dysphagia screen passed, but made NPO overnight 2/20 as not tolerating diet. Pending SLP eval.       Diet: NPO    RENAL: BUN/Cr stable, good urine output      Na Goal: Greater than 135     Matos: No     HEMATOLOGY: H/H stable, Platelets 345      DVT ppx: Heparin s.c [] LMWH []     ID: afebrile, no leukocytosis     OTHER: All questions and concerns addressed with patient and family at bedside.     DISPOSITION: SHANE per PT/OT eval once stable and workup is complete    CORE MEASURES:        Admission NIHSS: 3     TPA: [] YES [x] NO      LDL/HDL: 80/40     Depression Screen: p     Statin Therapy: y     Dysphagia Screen: [x] PASS [] FAIL     Smoking [] YES [x] NO      Afib [] YES [x] NO     Stroke Education [] YES [] NO - p ASSESSMENT:     NEURO: Continue close monitoring for neurologic deterioration, permissive HTN to gradual normotension, titrate statin to LDL goal less than 70, MRI Brain w/o, MRA Head w/o and Neck w/contrast, results as noted above. Physical therapy/OT recommending SHANE.  Pending Neurosurgery eval to decide on L ICA revascularization.      Impression:  1. R arm weakness likely 2/2 left brain dysfunction possibly 2/2 acute ischemic stroke in setting of L ICA stenosis   2. High-grade (80-90%) stenosis of the LEFT ICA and critical stenosis (greater than 95%) of  the distal RIGHT CCA and origin of the RIGHT ICA  3. Chronic right anterior corona radiata infarct   4. Parkinson's disease   5. B/L cataracts and baseline blurred vision  6. Headache     ANTITHROMBOTIC THERAPY: Aspirin and plavix     PULMONARY: protecting airway, saturating well     CARDIOVASCULAR:  TTE shows  no PFO, normal systolic function, continue with cardiac monitoring, no events recorded so far.                              SBP goal: Permissive HTN to gradual normotension      GASTROINTESTINAL:  dysphagia screen passed, but made NPO overnight 2/20 as not tolerating diet. SLP eval recommended pureed diet with mildly thickened liquids.       Diet: Pureed with mildly thickened liquids.     RENAL: BUN/Cr stable, good urine output      Na Goal: Greater than 135     Matos: No     HEMATOLOGY: H/H stable, Platelets 345      DVT ppx: Heparin s.c [] LMWH [x]     ID: afebrile, no leukocytosis     OTHER: All questions and concerns addressed with patient and family at bedside. Pending angio with neurosurgery on 2/22 for possible stent.      DISPOSITION: SHANE per PT/OT eval once stable and workup is complete    CORE MEASURES:        Admission NIHSS: 3     TPA: [] YES [x] NO      LDL/HDL: 80/40     Depression Screen: p     Statin Therapy: y     Dysphagia Screen: [x] PASS [] FAIL     Smoking [] YES [x] NO      Afib [] YES [x] NO     Stroke Education [] YES [] NO - p

## 2024-02-21 NOTE — PROGRESS NOTE ADULT - SUBJECTIVE AND OBJECTIVE BOX
Patient seen and examined at bedside.    --Anticoagulation--  aspirin  chewable 81 milliGRAM(s) Oral daily  clopidogrel Tablet 75 milliGRAM(s) Oral daily    T(C): 36.9 (02-20-24 @ 23:00), Max: 37.1 (02-20-24 @ 19:57)  HR: 99 (02-20-24 @ 23:00) (82 - 105)  BP: 151/90 (02-20-24 @ 23:00) (121/81 - 162/90)  RR: 18 (02-20-24 @ 23:00) (18 - 18)  SpO2: 98% (02-20-24 @ 23:00) (98% - 99%)  Wt(kg): --    Exam:(baseline AOx2) AOx2 (didn't get year), PERRL, no facial, R drift, RUE 4/5, LUE 4+/5, RLE 4/5, LLE 4/5.

## 2024-02-21 NOTE — SWALLOW BEDSIDE ASSESSMENT ADULT - PHARYNGEAL PHASE
Reduced hyo-laryngeal elevation upon palpation with audible swallows on trials of thin liquids suggestive of laryngeal/pharyngeal dis-coordination. Timely initiation of pharyngeal swallow with reduced hyo-laryngeal elevation. Patient noted with multiple swallows and throat clear suggestive of pharyngeal residue. Timely initiation of pharyngeal swallow with reduced hyo-laryngeal elevation. No overt s/s of laryngeal penetration/aspiration noted during/after PO intake.

## 2024-02-21 NOTE — SWALLOW BEDSIDE ASSESSMENT ADULT - SWALLOW EVAL: DIAGNOSIS
63Y W w/ PMHx HTN, DM, HLD, CAD, PAD s/p L popliteal angioplasty and atherectomy 12/23, B/L ICA stenosis, prior CVA w/ L side weakness, Parkinson's disease, B/L cataract surgery with blurred vision and A&Ox2 at baseline. Presenting with headache, dizziness, blurred vision, right arm weakness, found to have R corona radiata infarct on MRI brain. As per discussion w/DIL, no hx of dysphagia but pt consistently coughs during mealtime. Pt presenting with evidence of an loren-pharyngeal dysphagia. Oral phase notable for prolonged bolus mastication/manipulation and delayed oral transit time. Pharyngeal phase notable for timely initiation of pharyngeal swallow and reduced hyo-laryngeal elevation. Audible swallow noted on trials of thin liquids suggestive of laryngeal/pharyngeal dis-coordination and multiple swallows with consistent throat clear s/p tsp trials of minced-moist suggestive of pharyngeal residue. No overt s/s of laryngeal penetration/aspiration noted on puree and thickened liquids.

## 2024-02-21 NOTE — CONSULT NOTE ADULT - SUBJECTIVE AND OBJECTIVE BOX
HPI: Patient is a 63 year old female with a PMHx of  HTN, DM, HLD, CA(s/p 3 stents placed at [facility] in 2014 to the prox LAD and then in 2022 - on ASA and Plavix), PAD s/p L popliteal angioplasty and atherectomy 12/23, R ICA and L ICA stenosis, prior stroke with L side weakness, Parkinson's disease, B/L cataract surgery with blurred vision at baseline who presents with headache, dizziness, blurred vision, and right arm weakness. As per documentation and neurology, patient was last seen well on 2/18 at 11 AM. Per documentation, patient reportedly awoke on 2/19 with these symptoms. Per neuro, she is A&Ox2 at baseline, uses walker and assistance to walk. Per family, she has frequent falls, gait instability, and drags her feet while walking. Patient is on Sinemet for Parkinson's disease and is pending evaluation by movement disorder specialist per family. Patient has been seen previously by Neurology due to concern for falls and AMS. Per neurology, she was found to have B/L ICA stenosis and was also found to have R corona radiata infarct on MRI brain. Patient was not a Tenecteplase candidate or a thrombectomy candidate due to being out of window as per neurology. Internal Medicine has been consulted on Ms. Llamas's care for medical management.       REVIEW OF SYSTEMS:    CONSTITUTIONAL: Generalized weakness   EYES/ENT: + Blurry vision   NECK: No pain or stiffness  RESPIRATORY: No cough, wheezing, hemoptysis; No shortness of breath  CARDIOVASCULAR: No chest pain or palpitations  GASTROINTESTINAL: No abdominal or epigastric pain. No nausea, vomiting, or hematemesis; No diarrhea or constipation. No melena or hematochezia.  GENITOURINARY: No dysuria, frequency or hematuria  NEUROLOGICAL: + Weakness; + Dysarthria   SKIN: No itching, burning, rashes, or lesions   All other review of systems is negative unless indicated above.    PAST MEDICAL & SURGICAL HISTORY:  HTN (hypertension)  HLD (hyperlipidemia)  CAD (coronary artery disease)  Type II diabetes mellitus  PAD (peripheral artery disease)  Mild dementia  Parkinson disease  S/P hysterectomy      MEDICATIONS  (STANDING):  acetaminophen   IVPB .. 1000 milliGRAM(s) IV Intermittent once  aspirin enteric coated 81 milliGRAM(s) Oral daily  atorvastatin 80 milliGRAM(s) Oral at bedtime  carbidopa/levodopa  25/100 1 Tablet(s) Oral daily  clopidogrel Tablet 75 milliGRAM(s) Oral daily  dextrose 5%. 1000 milliLiter(s) (100 mL/Hr) IV Continuous <Continuous>  dextrose 5%. 1000 milliLiter(s) (50 mL/Hr) IV Continuous <Continuous>  dextrose 50% Injectable 25 Gram(s) IV Push once  dextrose 50% Injectable 12.5 Gram(s) IV Push once  dextrose 50% Injectable 25 Gram(s) IV Push once  enoxaparin Injectable 40 milliGRAM(s) SubCutaneous every 24 hours  famotidine    Tablet 20 milliGRAM(s) Oral daily  gabapentin 100 milliGRAM(s) Oral three times a day  glucagon  Injectable 1 milliGRAM(s) IntraMuscular once  influenza  Vaccine (HIGH DOSE) 0.7 milliLiter(s) IntraMuscular once  insulin lispro (ADMELOG) corrective regimen sliding scale   SubCutaneous at bedtime  insulin lispro (ADMELOG) corrective regimen sliding scale   SubCutaneous three times a day before meals  isosorbide   mononitrate ER Tablet (IMDUR) 30 milliGRAM(s) Oral daily  sodium chloride 0.9%. 1000 milliLiter(s) (75 mL/Hr) IV Continuous <Continuous>        Allergies    No Known Allergies    Intolerances        Vital Signs Last 24 Hrs  T(C): 36.6 (21 Feb 2024 09:34), Max: 37.1 (20 Feb 2024 19:57)  T(F): 97.9 (21 Feb 2024 09:34), Max: 98.8 (20 Feb 2024 19:57)  HR: 100 (21 Feb 2024 09:34) (98 - 105)  BP: 155/90 (21 Feb 2024 09:34) (134/81 - 155/90)  BP(mean): --  RR: 20 (21 Feb 2024 09:34) (18 - 20)  SpO2: 99% (21 Feb 2024 09:34) (98% - 99%)    Parameters below as of 21 Feb 2024 09:34  Patient On (Oxygen Delivery Method): room air        Appearance: Awake, weak appearing female, lying down in bed 	  HEENT:   Normal oral mucosa, Eyes are open  Lymphatic: No lymphadenopathy grossly   Cardiovascular: Normal S1 S2, No JVD, No murmurs    Respiratory: Lungs clear to auscultation, normal effort 	  Gastrointestinal:  Soft, Non-tender, + BS	  Skin: No rashes, No ecchymoses, No cyanosis, warm to touch  Musculoskeletal: Decreased ROM; Weakness   Psychiatry: Calm   Ext: No edema                            13.5   8.64  )-----------( 345      ( 19 Feb 2024 13:45 )             43.9               02-19    138  |  95<L>  |  23  ----------------------------<  178<H>  3.8   |  24  |  0.64    Ca    10.4      19 Feb 2024 13:45    TPro  7.9  /  Alb  4.8  /  TBili  0.2  /  DBili  x   /  AST  36  /  ALT  35  /  AlkPhos  98  02-19    PT/INR - ( 19 Feb 2024 13:45 )   PT: 12.3 sec;   INR: 1.12 ratio         PTT - ( 19 Feb 2024 13:45 )  PTT:30.6 sec                   Urinalysis Basic - ( 19 Feb 2024 22:45 )    Color: Yellow / Appearance: Clear / SG: >1.030 / pH: x  Gluc: x / Ketone: Trace mg/dL  / Bili: Negative / Urobili: 0.2 mg/dL   Blood: x / Protein: Negative mg/dL / Nitrite: Negative   Leuk Esterase: Negative / RBC: 0 /HPF / WBC 2 /HPF   Sq Epi: x / Non Sq Epi: 1 /HPF / Bacteria: Negative /HPF          < from: CT Head No Cont (02.19.24 @ 15:50) >    IMPRESSION:    CT HEAD:  No acute intracranial hemorrhage. Gray/white matter differentiation is   preserved.    CTA NECK:  High-grade (80-90%) stenosis of the LEFT internal carotid artery due to   circumferential calcifications. Critical stenosis (greater than 95%) of   the distal RIGHT common carotid artery and origin of the RIGHTinternal   carotid artery due to extensive calcified plaque.    CTA HEAD:  Moderate stenosis of bilateral precavernous and cavernous internal   carotid arteries.    No evidence of aneurysm. Tiny aneurysms can be beyond the resolution of   CTA technique.    3.6 cm hypodense lesion in the left thyroid lobe.    < end of copied text >    < from: MR Angio Neck No Cont (02.20.24 @ 16:44) >  IMPRESSION: Significant bilateral carotid stenosis without evidence of   acute infarct. Noninvasive flow MR angiography as above.    < end of copied text >      < from: TTE W or WO Ultrasound Enhancing Agent (02.20.24 @ 10:23) >  CONCLUSIONS:      1. Agitated saline injection was negative for intracardiac shunt.   2. Left ventricular cavity is normal in size. Left ventricular wall thickness is normal. Left ventricular systolic function is hyperdynamic. There are no regional wall motion abnormalities seen.   3. Normal left ventricular diastolic function, with normal filling pressure.   4. Normal right ventricular cavity size, with wall thickness, and normal systolic function.   5. Normal left and right atrial size.   6. No significant valvular disease.   7. No pericardial effusion seen.    ________________________________________________________________________________________  FINDINGS:     Left Ventricle:  The left ventricular cavity is normal in size. Left ventricular wall thickness is normal. Left ventricular systolic function is hyperdynamic. There are no regional wall motion abnormalities seen. There is normal left ventricular diastolic function, with normal filling pressure.  LV Wall Scoring: All segments are normal.          Right Ventricle:  The right ventricular cavity is normal in size, with normal wall thickness and normal systolic function. Tricuspid annular plane systolic excursion (TAPSE) is 1.4 cm (normal >=1.7 cm).     Left Atrium:  The left atrium is normal with an indexed volume of 21.23 ml/m².     Right Atrium:  The right atrium is normal in size.     Interatrial Septum:  The interatrial septum appears intact. Agitated saline injection was negative for intracardiac shunt.     Aortic Valve:  The aortic valve is tricuspid with normal leaflet excursion. There is no aortic valve stenosis. There is no evidence of aortic regurgitation.     Mitral Valve:  Structurally normal mitral valve with normal leaflet excursion. There is no mitral valve stenosis. There is trace mitral regurgitation.     Tricuspid Valve:  Structurally normal tricuspid valve with normal leaflet excursion. There is trace tricuspid regurgitation. Estimated pulmonary artery systolic pressure is 20 mmHg.     Pulmonic Valve:  Structurally normal pulmonic valve with normal leaflet excursion. There is trace pulmonic regurgitation.     Aorta:  The aortic root appears normal in size.     Pericardium:  No pericardial effusion seen.    < end of copied text >          < from: VA Duplex Carotid, Bilat (02.20.24 @ 17:14) >  IMPRESSION: No significant hemodynamic stenosis of either internal   carotid artery.  Right external carotid artery stenosis is suggested.    Measurement of carotid stenosis is based on velocity parameters that   correlate the residual internal carotid diameter with that ofthe more   distal vessel in accordance with a method such as the [place] American   Symptomatic Carotid Endarterectomy Trial (NASCET).    < end of copied text >

## 2024-02-21 NOTE — SWALLOW BEDSIDE ASSESSMENT ADULT - SLP PERTINENT HISTORY OF CURRENT PROBLEM
63Y W w/ PMHx significant for HTN, DM, HLD, CAD(s/p 3 stents placed at [facility] in 2014 to the prox LAD and then 2022),  PAD s/p L popliteal angioplasty and atherectomy 12/23, R ICA and L ICA stenosis, prior stroke with L side weakness, Parkinson's disease, B/L cataract surgery with blurred vision at baseline who presents with headache, dizziness, blurred vision, right arm weakness. Patient was last seen well on 2/18 at 11AM. Family reports she woke up with these symptoms on 2/19. Per family, she is A&Ox2 at baseline, uses walker and assistance to walk. She has frequent falls per family, gait instability, and drags her feet while walking. She carries diagnosis of Parkinson's and is on Sinemet but has not yet been evaluated by movement disorder specialist. Patient has been seen previously by Neurology due to concern for falls and AMS. During evaluations in 2022 and 2023, she was found to have B/L ICA stenosis. Also was found to have R corona radiata infarct on MRI brain.

## 2024-02-21 NOTE — PROGRESS NOTE ADULT - SUBJECTIVE AND OBJECTIVE BOX
THE PATIENT WAS SEEN AND EXAMINED BY ME WITH THE HOUSESTAFF AND STROKE TEAM DURING MORNING ROUNDS.   HPI:  HPI: Patient ALYSE AVENDAÑO is a 63y (1960) right handed woman with a PMHx significant for HTN, DM, HLD, CAD(s/p 3 stents placed at [facility] in 2014 to the prox LAD and then 2022),  PAD s/p L popliteal angioplasty and atherectomy 12/23, R ICA and L ICA stenosis, prior stroke with L side weakness, Parkinson's disease, B/L cataract surgery with blurred vision at baseline who presents with headache, dizziness, blurred vision, right arm weakness. Patient was last seen well on 2/18 at 11AM. Family reports she woke up with these symptoms on 2/19. Per family, she is A&Ox2 at baseline, uses walker and assistance to walk. She has frequent falls per family, gait instability, and drags her feet while walking. She carries diagnosis of Parkinson's and is on Sinemet but has not yet been evaluated by movement disorder specialist. Patient has been seen previously by Neurology due to concern for falls and AMS. During evaluations in 2022 and 2023, she was found to have B/L ICA stenosis. Also was found to have R corona radiata infarct on MRI brain.   Carotid duplex (08.01.23) showed moderate, 50-69% stenosis of the right internal carotid artery and no hemodynamically significant stenosis of left carotid artery. She was seen by vascular surgery who recommended to continue DAPT and was to be re-assessed in 6 months.   Patient has also been seen in the past by Neurology for headaches (2021) which appeared poorly controlled.    LKW: 2/18 11AM  NIHSS: 3 (2 for right arm drift and 1 for dysarthria)  Baseline MRS: 4  Not a Tenecteplase candidate due to out of window  Not a thrombectomy candidate due to  out of window    Vital Signs  T(C): 36.9 (20 Feb 2024 04:45), Max: 36.9 (19 Feb 2024 20:02)  T(F): 98.4 (20 Feb 2024 04:45), Max: 98.5 (19 Feb 2024 20:02)  HR: 88 (20 Feb 2024 04:45) (86 - 97)  BP: 146/79 (20 Feb 2024 04:45) (119/81 - 160/90)  RR: 18 (20 Feb 2024 04:45) (15 - 18)  SpO2: 100% (20 Feb 2024 04:45) (96% - 100%)    O2 Parameters below as of 20 Feb 2024 04:45  Patient On (Oxygen Delivery Method): room air         (20 Feb 2024 03:54)      SUBJECTIVE: No events overnight.  No new neurologic complaints.      acetaminophen   IVPB .. 1000 milliGRAM(s) IV Intermittent once  aspirin  chewable 81 milliGRAM(s) Oral daily  atorvastatin 80 milliGRAM(s) Oral at bedtime  carbidopa/levodopa  25/100 1 Tablet(s) Oral daily  clopidogrel Tablet 75 milliGRAM(s) Oral daily  dextrose 5%. 1000 milliLiter(s) IV Continuous <Continuous>  dextrose 5%. 1000 milliLiter(s) IV Continuous <Continuous>  dextrose 50% Injectable 25 Gram(s) IV Push once  dextrose 50% Injectable 25 Gram(s) IV Push once  dextrose 50% Injectable 12.5 Gram(s) IV Push once  dextrose Oral Gel 15 Gram(s) Oral once PRN  famotidine    Tablet 20 milliGRAM(s) Oral daily  gabapentin 100 milliGRAM(s) Oral three times a day  glucagon  Injectable 1 milliGRAM(s) IntraMuscular once  influenza  Vaccine (HIGH DOSE) 0.7 milliLiter(s) IntraMuscular once  insulin lispro (ADMELOG) corrective regimen sliding scale   SubCutaneous at bedtime  insulin lispro (ADMELOG) corrective regimen sliding scale   SubCutaneous three times a day before meals  isosorbide   mononitrate ER Tablet (IMDUR) 30 milliGRAM(s) Oral daily  sodium chloride 0.9%. 1000 milliLiter(s) IV Continuous <Continuous>      PHYSICAL EXAM:   Vital Signs Last 24 Hrs  T(C): 36.9 (20 Feb 2024 23:00), Max: 37.1 (20 Feb 2024 19:57)  T(F): 98.4 (20 Feb 2024 23:00), Max: 98.8 (20 Feb 2024 19:57)  HR: 99 (20 Feb 2024 23:00) (82 - 105)  BP: 151/90 (20 Feb 2024 23:00) (121/81 - 162/90)  BP(mean): --  RR: 18 (20 Feb 2024 23:00) (18 - 18)  SpO2: 98% (20 Feb 2024 23:00) (98% - 99%)    Parameters below as of 20 Feb 2024 23:00  Patient On (Oxygen Delivery Method): room air        General: No acute distress  HEENT: EOM intact, visual fields full  Abdomen: Soft, nontender, nondistended   Extremities: No edema    NEUROLOGICAL EXAM:  Mental status: Awake, alert, oriented x3, no aphasia, no neglect, normal memory   Cranial Nerves: No facial asymmetry, no nystagmus, no dysarthria,  tongue midline  Motor exam: Normal tone, no drift, 5/5 RUE, 5/5 RLE, 5/5 LUE, 5/5 LLE, normal fine finger movements.  Sensation: Intact to light touch   Coordination/ Gait: No dysmetria, MARIANA intact and symmetric bilaterally    LABS:                        13.5   8.64  )-----------( 345      ( 19 Feb 2024 13:45 )             43.9    02-19    138  |  95<L>  |  23  ----------------------------<  178<H>  3.8   |  24  |  0.64    Ca    10.4      19 Feb 2024 13:45    TPro  7.9  /  Alb  4.8  /  TBili  0.2  /  DBili  x   /  AST  36  /  ALT  35  /  AlkPhos  98  02-19  PT/INR - ( 19 Feb 2024 13:45 )   PT: 12.3 sec;   INR: 1.12 ratio         PTT - ( 19 Feb 2024 13:45 )  PTT:30.6 sec      IMAGING: Reviewed by me.     MRI/MRA/NOVA w/o 2/20:    RCCA 283, MARCO ANTONIO 131, RMCA 90, RACA2 61    LCCA 312, LICA 166, LMCA 102, LACA 110, LACA2 59    RVA 57, LVA 51, BA 46, RPCA 49, RPCOM 43 anterior to posterior, LPCA 32    IMPRESSION: Significant bilateral carotid stenosis without evidence of acute infarct. Noninvasive flow MR angiography as above.     CT HEAD:  No acute intracranial hemorrhage. Gray/white matter differentiation is   preserved.    CTA NECK:  High-grade (80-90%) stenosis of the LEFT internal carotid artery due to   circumferential calcifications. Critical stenosis (greater than 95%) of   the distal RIGHT common carotid artery and origin of the RIGHTinternal   carotid artery due to extensive calcified plaque.    CTA HEAD:  Moderate stenosis of bilateral precavernous and cavernous internal   carotid arteries. No evidence of aneurysm. Tiny aneurysms can be beyond the resolution of CTA technique. 3.6 cm hypodense lesion in the left thyroid lobe.    MR Head w/wo IV Cont (08.02.23 @ 11:59)    No evidence of acute infarct, hemorrhage, or mass lesion.   There is a chronic lacunar infarct in the right anterior corona radiata.   There are multiple small foci of T2/FLAIR hyperintense signal in the   periventricular white matter, suggestive of mild chronic microvascular   ischemic changes. There is no evidence of abnormal enhancement.    VA Duplex Carotid, Bilat (08.01.23 @ 11:57)   There is a moderate, 50-69% stenosis of the right internal carotid artery.  No hemodynamically significant left carotid artery stenosis is identified.     THE PATIENT WAS SEEN AND EXAMINED BY ME WITH THE HOUSESTAFF AND STROKE TEAM DURING MORNING ROUNDS.   HPI:  HPI: Patient ALYSE AVENDAÑO is a 63y (1960) right handed woman with a PMHx significant for HTN, DM, HLD, CAD(s/p 3 stents placed at [facility] in 2014 to the prox LAD and then 2022),  PAD s/p L popliteal angioplasty and atherectomy 12/23, R ICA and L ICA stenosis, prior stroke with L side weakness, Parkinson's disease, B/L cataract surgery with blurred vision at baseline who presents with headache, dizziness, blurred vision, right arm weakness. Patient was last seen well on 2/18 at 11AM. Family reports she woke up with these symptoms on 2/19. Per family, she is A&Ox2 at baseline, uses walker and assistance to walk. She has frequent falls per family, gait instability, and drags her feet while walking. She carries diagnosis of Parkinson's and is on Sinemet but has not yet been evaluated by movement disorder specialist. Patient has been seen previously by Neurology due to concern for falls and AMS. During evaluations in 2022 and 2023, she was found to have B/L ICA stenosis. Also was found to have R corona radiata infarct on MRI brain.   Carotid duplex (08.01.23) showed moderate, 50-69% stenosis of the right internal carotid artery and no hemodynamically significant stenosis of left carotid artery. She was seen by vascular surgery who recommended to continue DAPT and was to be re-assessed in 6 months.   Patient has also been seen in the past by Neurology for headaches (2021) which appeared poorly controlled.    LKW: 2/18 11AM  NIHSS: 3 (2 for right arm drift and 1 for dysarthria)  Baseline MRS: 4  Not a Tenecteplase candidate due to out of window  Not a thrombectomy candidate due to  out of window    Vital Signs  T(C): 36.9 (20 Feb 2024 04:45), Max: 36.9 (19 Feb 2024 20:02)  T(F): 98.4 (20 Feb 2024 04:45), Max: 98.5 (19 Feb 2024 20:02)  HR: 88 (20 Feb 2024 04:45) (86 - 97)  BP: 146/79 (20 Feb 2024 04:45) (119/81 - 160/90)  RR: 18 (20 Feb 2024 04:45) (15 - 18)  SpO2: 100% (20 Feb 2024 04:45) (96% - 100%)    O2 Parameters below as of 20 Feb 2024 04:45  Patient On (Oxygen Delivery Method): room air      SUBJECTIVE: No events overnight.  No new neurologic complaints.      acetaminophen   IVPB .. 1000 milliGRAM(s) IV Intermittent once  aspirin  chewable 81 milliGRAM(s) Oral daily  atorvastatin 80 milliGRAM(s) Oral at bedtime  carbidopa/levodopa  25/100 1 Tablet(s) Oral daily  clopidogrel Tablet 75 milliGRAM(s) Oral daily  dextrose 5%. 1000 milliLiter(s) IV Continuous <Continuous>  dextrose 5%. 1000 milliLiter(s) IV Continuous <Continuous>  dextrose 50% Injectable 25 Gram(s) IV Push once  dextrose 50% Injectable 25 Gram(s) IV Push once  dextrose 50% Injectable 12.5 Gram(s) IV Push once  dextrose Oral Gel 15 Gram(s) Oral once PRN  famotidine    Tablet 20 milliGRAM(s) Oral daily  gabapentin 100 milliGRAM(s) Oral three times a day  glucagon  Injectable 1 milliGRAM(s) IntraMuscular once  influenza  Vaccine (HIGH DOSE) 0.7 milliLiter(s) IntraMuscular once  insulin lispro (ADMELOG) corrective regimen sliding scale   SubCutaneous at bedtime  insulin lispro (ADMELOG) corrective regimen sliding scale   SubCutaneous three times a day before meals  isosorbide   mononitrate ER Tablet (IMDUR) 30 milliGRAM(s) Oral daily  sodium chloride 0.9%. 1000 milliLiter(s) IV Continuous <Continuous>      PHYSICAL EXAM:   Vital Signs Last 24 Hrs  T(C): 36.9 (20 Feb 2024 23:00), Max: 37.1 (20 Feb 2024 19:57)  T(F): 98.4 (20 Feb 2024 23:00), Max: 98.8 (20 Feb 2024 19:57)  HR: 99 (20 Feb 2024 23:00) (82 - 105)  BP: 151/90 (20 Feb 2024 23:00) (121/81 - 162/90)  RR: 18 (20 Feb 2024 23:00) (18 - 18)  SpO2: 98% (20 Feb 2024 23:00) (98% - 99%)    Parameters below as of 20 Feb 2024 23:00  Patient On (Oxygen Delivery Method): room air    General: No acute distress  HEENT: EOM intact, visual fields full  Abdomen: Soft, nontender, nondistended   Extremities: No edema    NEUROLOGICAL EXAM:  Mental status: Eyes open, awake, alert, oriented to person, place, and month, not year. Follows 1 and 2 step crossed commands. Attention/concentration, recent and remote memory and fund of knowledge with mild impairment   Language: Speech fluent, repetition and naming intact.   Cranial nerves - PERRLA (3mm >1 mm B/L), VFF, EOMI in lateral gaze but no upward or downgaze noted. Face sensation (V1-V3) intact b/l, facial strength intact without asymmetry b/l, hearing intact b/l, palate with symmetric elevation, trapezius 5/5 strength b/l, tongue midline on protrusion with full lateral movement  Motor exam:  no drifts UE/LEs  Sensation: Intact to light touch   Coordination/ Gait: No dysmetria, Gait deferred.      LABS:                        13.5   8.64  )-----------( 345      ( 19 Feb 2024 13:45 )             43.9    02-19    138  |  95<L>  |  23  ----------------------------<  178<H>  3.8   |  24  |  0.64    Ca    10.4      19 Feb 2024 13:45    TPro  7.9  /  Alb  4.8  /  TBili  0.2  /  DBili  x   /  AST  36  /  ALT  35  /  AlkPhos  98  02-19  PT/INR - ( 19 Feb 2024 13:45 )   PT: 12.3 sec;   INR: 1.12 ratio         PTT - ( 19 Feb 2024 13:45 )  PTT:30.6 sec      IMAGING: Reviewed by me.     MRI/MRA/NOVA w/o 2/20:    RCCA 283, MARCO ANTONIO 131, RMCA 90, RACA2 61    LCCA 312, LICA 166, LMCA 102, LACA 110, LACA2 59    RVA 57, LVA 51, BA 46, RPCA 49, RPCOM 43 anterior to posterior, LPCA 32    IMPRESSION: Significant bilateral carotid stenosis without evidence of acute infarct. Noninvasive flow MR angiography as above.     CT HEAD:  No acute intracranial hemorrhage. Gray/white matter differentiation is   preserved.    CTA NECK:  High-grade (80-90%) stenosis of the LEFT internal carotid artery due to   circumferential calcifications. Critical stenosis (greater than 95%) of   the distal RIGHT common carotid artery and origin of the RIGHTinternal   carotid artery due to extensive calcified plaque.    CTA HEAD:  Moderate stenosis of bilateral precavernous and cavernous internal   carotid arteries. No evidence of aneurysm. Tiny aneurysms can be beyond the resolution of CTA technique. 3.6 cm hypodense lesion in the left thyroid lobe.    MR Head w/wo IV Cont (08.02.23 @ 11:59)    No evidence of acute infarct, hemorrhage, or mass lesion.   There is a chronic lacunar infarct in the right anterior corona radiata.   There are multiple small foci of T2/FLAIR hyperintense signal in the   periventricular white matter, suggestive of mild chronic microvascular   ischemic changes. There is no evidence of abnormal enhancement.    VA Duplex Carotid, Bilat (08.01.23 @ 11:57)   There is a moderate, 50-69% stenosis of the right internal carotid artery.  No hemodynamically significant left carotid artery stenosis is identified.     THE PATIENT WAS SEEN AND EXAMINED BY ME WITH THE HOUSESTAFF AND STROKE TEAM DURING MORNING ROUNDS.   HPI:  HPI: Patient ALYSE AVENDAÑO is a 63y (1960) right handed woman with a PMHx significant for HTN, DM, HLD, CAD(s/p 3 stents placed at [facility] in 2014 to the prox LAD and then 2022),  PAD s/p L popliteal angioplasty and atherectomy 12/23, R ICA and L ICA stenosis, prior stroke with L side weakness, ?Parkinson's disease, B/L cataract surgery with blurred vision at baseline who presents with headache, dizziness, blurred vision, right arm weakness. Patient was last seen well on 2/18 at 11AM. Family reports she woke up with these symptoms on 2/19. Per family, she is A&Ox2 at baseline, uses walker and assistance to walk. She has frequent falls per family, gait instability, and drags her feet while walking. She carries diagnosis of Parkinson's and is on Sinemet but has not yet been evaluated by movement disorder specialist. Patient has been seen previously by Neurology due to concern for falls and AMS. During evaluations in 2022 and 2023, she was found to have B/L ICA stenosis. Also was found to have R corona radiata infarct on MRI brain.   Carotid duplex (08.01.23) showed moderate, 50-69% stenosis of the right internal carotid artery and no hemodynamically significant stenosis of left carotid artery. She was seen by vascular surgery who recommended to continue DAPT and was to be re-assessed in 6 months.   Patient has also been seen in the past by Neurology for headaches (2021) which appeared poorly controlled.    LKW: 2/18 11AM  NIHSS: 3 (2 for right arm drift and 1 for dysarthria)  Baseline MRS: 4  Not a Tenecteplase candidate due to out of window  Not a thrombectomy candidate due to  out of window    Vital Signs  T(C): 36.9 (20 Feb 2024 04:45), Max: 36.9 (19 Feb 2024 20:02)  T(F): 98.4 (20 Feb 2024 04:45), Max: 98.5 (19 Feb 2024 20:02)  HR: 88 (20 Feb 2024 04:45) (86 - 97)  BP: 146/79 (20 Feb 2024 04:45) (119/81 - 160/90)  RR: 18 (20 Feb 2024 04:45) (15 - 18)  SpO2: 100% (20 Feb 2024 04:45) (96% - 100%)    O2 Parameters below as of 20 Feb 2024 04:45  Patient On (Oxygen Delivery Method): room air      SUBJECTIVE: No events overnight.  No new neurologic complaints.      acetaminophen   IVPB .. 1000 milliGRAM(s) IV Intermittent once  aspirin  chewable 81 milliGRAM(s) Oral daily  atorvastatin 80 milliGRAM(s) Oral at bedtime  carbidopa/levodopa  25/100 1 Tablet(s) Oral daily  clopidogrel Tablet 75 milliGRAM(s) Oral daily  dextrose 5%. 1000 milliLiter(s) IV Continuous <Continuous>  dextrose 5%. 1000 milliLiter(s) IV Continuous <Continuous>  dextrose 50% Injectable 25 Gram(s) IV Push once  dextrose 50% Injectable 25 Gram(s) IV Push once  dextrose 50% Injectable 12.5 Gram(s) IV Push once  dextrose Oral Gel 15 Gram(s) Oral once PRN  famotidine    Tablet 20 milliGRAM(s) Oral daily  gabapentin 100 milliGRAM(s) Oral three times a day  glucagon  Injectable 1 milliGRAM(s) IntraMuscular once  influenza  Vaccine (HIGH DOSE) 0.7 milliLiter(s) IntraMuscular once  insulin lispro (ADMELOG) corrective regimen sliding scale   SubCutaneous at bedtime  insulin lispro (ADMELOG) corrective regimen sliding scale   SubCutaneous three times a day before meals  isosorbide   mononitrate ER Tablet (IMDUR) 30 milliGRAM(s) Oral daily  sodium chloride 0.9%. 1000 milliLiter(s) IV Continuous <Continuous>      PHYSICAL EXAM:   Vital Signs Last 24 Hrs  T(C): 36.9 (20 Feb 2024 23:00), Max: 37.1 (20 Feb 2024 19:57)  T(F): 98.4 (20 Feb 2024 23:00), Max: 98.8 (20 Feb 2024 19:57)  HR: 99 (20 Feb 2024 23:00) (82 - 105)  BP: 151/90 (20 Feb 2024 23:00) (121/81 - 162/90)  RR: 18 (20 Feb 2024 23:00) (18 - 18)  SpO2: 98% (20 Feb 2024 23:00) (98% - 99%)    Parameters below as of 20 Feb 2024 23:00  Patient On (Oxygen Delivery Method): room air    General: No acute distress  HEENT: EOM intact, visual fields full  Abdomen: Soft, nontender, nondistended   Extremities: No edema    NEUROLOGICAL EXAM:  Mental status: Eyes open, awake, alert, oriented to person, place, and month, not year. Follows 1 and 2 step crossed commands. Attention/concentration, recent and remote memory and fund of knowledge with mild impairment   Language: Speech fluent, repetition and naming intact.   Cranial nerves - PERRLA (3mm >1 mm B/L), VFF, EOMI in lateral gaze but no upward or downgaze noted. Face sensation (V1-V3) intact b/l, facial strength intact without asymmetry b/l, hearing intact b/l, palate with symmetric elevation, trapezius 5/5 strength b/l, tongue midline on protrusion with full lateral movement  Motor exam:  no drifts UE/LEs  Sensation: Intact to light touch   Coordination/ Gait: No dysmetria, Gait deferred.      LABS:                        13.5   8.64  )-----------( 345      ( 19 Feb 2024 13:45 )             43.9    02-19    138  |  95<L>  |  23  ----------------------------<  178<H>  3.8   |  24  |  0.64    Ca    10.4      19 Feb 2024 13:45    TPro  7.9  /  Alb  4.8  /  TBili  0.2  /  DBili  x   /  AST  36  /  ALT  35  /  AlkPhos  98  02-19  PT/INR - ( 19 Feb 2024 13:45 )   PT: 12.3 sec;   INR: 1.12 ratio         PTT - ( 19 Feb 2024 13:45 )  PTT:30.6 sec      IMAGING: Reviewed by me.     MRI/MRA/NOVA w/o 2/20:    RCCA 283, MARCO ANTONIO 131, RMCA 90, RACA2 61    LCCA 312, LICA 166, LMCA 102, LACA 110, LACA2 59    RVA 57, LVA 51, BA 46, RPCA 49, RPCOM 43 anterior to posterior, LPCA 32    IMPRESSION: Significant bilateral carotid stenosis without evidence of acute infarct. Noninvasive flow MR angiography as above.     CT HEAD:  No acute intracranial hemorrhage. Gray/white matter differentiation is   preserved.    CTA NECK:  High-grade (80-90%) stenosis of the LEFT internal carotid artery due to   circumferential calcifications. Critical stenosis (greater than 95%) of   the distal RIGHT common carotid artery and origin of the RIGHTinternal   carotid artery due to extensive calcified plaque.    CTA HEAD:  Moderate stenosis of bilateral precavernous and cavernous internal   carotid arteries. No evidence of aneurysm. Tiny aneurysms can be beyond the resolution of CTA technique. 3.6 cm hypodense lesion in the left thyroid lobe.    MR Head w/wo IV Cont (08.02.23 @ 11:59)    No evidence of acute infarct, hemorrhage, or mass lesion.   There is a chronic lacunar infarct in the right anterior corona radiata.   There are multiple small foci of T2/FLAIR hyperintense signal in the   periventricular white matter, suggestive of mild chronic microvascular   ischemic changes. There is no evidence of abnormal enhancement.    VA Duplex Carotid, Bilat (08.01.23 @ 11:57)   There is a moderate, 50-69% stenosis of the right internal carotid artery.  No hemodynamically significant left carotid artery stenosis is identified.

## 2024-02-22 ENCOUNTER — APPOINTMENT (OUTPATIENT)
Dept: NEUROSURGERY | Facility: HOSPITAL | Age: 64
End: 2024-02-22

## 2024-02-22 LAB
GLUCOSE BLDC GLUCOMTR-MCNC: 101 MG/DL — HIGH (ref 70–99)
GLUCOSE BLDC GLUCOMTR-MCNC: 134 MG/DL — HIGH (ref 70–99)
GLUCOSE BLDC GLUCOMTR-MCNC: 137 MG/DL — HIGH (ref 70–99)
GLUCOSE BLDC GLUCOMTR-MCNC: 149 MG/DL — HIGH (ref 70–99)
GLUCOSE BLDC GLUCOMTR-MCNC: 174 MG/DL — HIGH (ref 70–99)
PA ADP PRP-ACNC: 215 PRU — SIGNIFICANT CHANGE UP (ref 194–417)
PLATELET RESPONSE ASPIRIN RESULT: 383 ARU — SIGNIFICANT CHANGE UP
TSH SERPL-MCNC: 0.9 UIU/ML — SIGNIFICANT CHANGE UP (ref 0.27–4.2)

## 2024-02-22 PROCEDURE — 99233 SBSQ HOSP IP/OBS HIGH 50: CPT

## 2024-02-22 PROCEDURE — 76536 US EXAM OF HEAD AND NECK: CPT | Mod: 26

## 2024-02-22 PROCEDURE — 36226 PLACE CATH VERTEBRAL ART: CPT | Mod: LT

## 2024-02-22 PROCEDURE — 36223 PLACE CATH CAROTID/INOM ART: CPT | Mod: 50

## 2024-02-22 RX ORDER — NICARDIPINE HYDROCHLORIDE 30 MG/1
5 CAPSULE, EXTENDED RELEASE ORAL
Qty: 40 | Refills: 0 | Status: DISCONTINUED | OUTPATIENT
Start: 2024-02-22 | End: 2024-02-22

## 2024-02-22 RX ORDER — LABETALOL HCL 100 MG
5 TABLET ORAL ONCE
Refills: 0 | Status: COMPLETED | OUTPATIENT
Start: 2024-02-22 | End: 2024-02-22

## 2024-02-22 RX ADMIN — Medication 5 MILLIGRAM(S): at 17:46

## 2024-02-22 RX ADMIN — ISOSORBIDE MONONITRATE 30 MILLIGRAM(S): 60 TABLET, EXTENDED RELEASE ORAL at 17:42

## 2024-02-22 RX ADMIN — GABAPENTIN 100 MILLIGRAM(S): 400 CAPSULE ORAL at 05:07

## 2024-02-22 RX ADMIN — GABAPENTIN 100 MILLIGRAM(S): 400 CAPSULE ORAL at 14:49

## 2024-02-22 RX ADMIN — ENOXAPARIN SODIUM 40 MILLIGRAM(S): 100 INJECTION SUBCUTANEOUS at 20:54

## 2024-02-22 RX ADMIN — FAMOTIDINE 20 MILLIGRAM(S): 10 INJECTION INTRAVENOUS at 14:49

## 2024-02-22 RX ADMIN — ATORVASTATIN CALCIUM 80 MILLIGRAM(S): 80 TABLET, FILM COATED ORAL at 23:43

## 2024-02-22 RX ADMIN — CLOPIDOGREL BISULFATE 75 MILLIGRAM(S): 75 TABLET, FILM COATED ORAL at 10:59

## 2024-02-22 RX ADMIN — GABAPENTIN 100 MILLIGRAM(S): 400 CAPSULE ORAL at 23:43

## 2024-02-22 RX ADMIN — Medication 81 MILLIGRAM(S): at 10:58

## 2024-02-22 NOTE — PROGRESS NOTE ADULT - ASSESSMENT
Patient is a 63 year old female with a PMHx of  HTN, DM, HLD, CAD (s/p 3 stents placed at [facility] in 2014 to the prox LAD and then in 2022 - on ASA and Plavix), PAD s/p L popliteal angioplasty and atherectomy 12/23, R ICA and L ICA stenosis, prior stroke with L side weakness, Parkinson's disease, B/L cataract surgery with blurred vision at baseline who presents with headache, dizziness, blurred vision, and right arm weakness. As per documentation and neurology, patient was last seen well on 2/18 at 11 AM. Per documentation, patient reportedly awoke on 2/19 with these symptoms. Per neuro, she is A&Ox2 at baseline, uses walker and assistance to walk. Per family, she has frequent falls, gait instability, and drags her feet while walking. Patient is on Sinemet for Parkinson's disease and is pending evaluation by movement disorder specialist per family. Patient has been seen previously by Neurology due to concern for falls and AMS. Per neurology, she was found to have B/L ICA stenosis and was also found to have R corona radiata infarct on MRI brain. Patient was not a Tenecteplase candidate or a thrombectomy candidate due to being out of window as per neurology. Internal Medicine has been consulted on Ms. Llamas's care for medical management.     Carotid Stenosis   - Pt with history of B/L ICA Stenosis and R Coe Radiata infarct   - CT, CT A H/N w/ high- grade 80-90% stenosis of the L ICA, > 95% stenosis on the R, Moderate stenosis  - MR w/ Significant B/L carotid stenosis without evidence of acute infarct   - CD w/ R external carotid artery stenosis, negative for significant ICA stenosis,   - TTE w/ Neg for shunt, hyperdynamic LVSF, No WM, trace MR, trace TR, trace CO,   - On DAPT w/ ASA and Plavix   - Lipitor 80   - Planned for Angio with possible stent w/ NSGY   - Monitor on telemetry   - Neuro checks as per protocol   - Fall, Seizure and Aspiration precautions  - PT / OT / S+S   - Per stroke neurology / NSGY     Pre-OP Risk Stratification   - Pt with RCRI score of 4  - Pt is deemed to have elevated risk for neurosurgical intervention. Cardio clearance as per cardiology.     Blurry vision   - Likely 2/2 carotid stenosis   - Optho eval appreciated; F/u recs    Type II DM   - A1C of 7.2  - Hold home PO Medications while admitted  - C/w sliding scale   - Diabetic DASH diet  - Monitor and adjust glucose levels as tolerated     CAD  - S/P 3 stents   - C/w DAPT and Statin   - Repeat TTE as above    PAD  - S/P L popliteal angioplasty and atherectomy 12/23  - On DAPT and Statin therapies     HTN   - On Imdur 30 PO Qd  - Monitor BP, VS and patient closely    Parkinson's disease  - On Sinemet   - Pending evaluation by movement disorder specialist per family. Outpatient follow up with both specialist and neurology upon DC   - Fall, Seizure and Aspiration precautions     HLD  - Lipitor 80   - Diet and lifestyle modifications     Thyroid lesion   - CT w/ 3.6 CM hypodense thyroid lesion   - TSH WNL  - Check thyroid US for further evaluation     PPX  - Lovenox      Discussed with Attending.

## 2024-02-22 NOTE — PRE PROCEDURE NOTE - PRE PROCEDURE EVALUATION
63F hx HTN, DM, HLD, known b/l carotid artery stenosis (dx 2022; on DAPT), CAD (s/p 3 stents 2014, 2022),  PAD s/p L popliteal angioplasty and atherectomy 12/23, prior stroke w/ baseline L side weakness, PD w/ frequent falls, b/l cataract surgery, adm stroke s/p episode of headache, dizziness, blurred vision, right arm weakness. CTH w/ no acute heme/hydro. CTA neck w/ severe stenosis of b/l ICAs. Exam: (baseline AOx2) AOx2 (didn't get year), PERRL, no facial, R drift, RUE 4/5, LUE 4+/5, RLE 4/5, LLE 4/5.  - MR/MRA/NOVA - done  - pre-op for cerebral angiogram w/ L carotid artery stent today

## 2024-02-22 NOTE — PROGRESS NOTE ADULT - SUBJECTIVE AND OBJECTIVE BOX
Name of Patient : ALYSE AVENDAÑO  MRN: [id number]  Date of visit: 02-22-24 @ 14:19      Subjective: Patient seen and examined. No new events except as noted.   Patient seen earlier this AM. Lying down in bed. Planned for angio with possible stent with NSGY today.   States that she is feeling better overall.    REVIEW OF SYSTEMS:    CONSTITUTIONAL: Generalized weakness   EYES/ENT: No visual changes;  No vertigo or throat pain   NECK: No pain or stiffness  RESPIRATORY: No cough, wheezing, hemoptysis; No shortness of breath  CARDIOVASCULAR: No chest pain or palpitations  GASTROINTESTINAL: No abdominal or epigastric pain. No nausea, vomiting, or hematemesis; No diarrhea or constipation. No melena or hematochezia.  GENITOURINARY: No dysuria, frequency or hematuria  NEUROLOGICAL: Generalized weakness  SKIN: No itching, burning, rashes, or lesions   All other review of systems is negative unless indicated above.    MEDICATIONS:  MEDICATIONS  (STANDING):  acetaminophen   IVPB .. 1000 milliGRAM(s) IV Intermittent once  aspirin enteric coated 81 milliGRAM(s) Oral daily  atorvastatin 80 milliGRAM(s) Oral at bedtime  carbidopa/levodopa  25/100 1 Tablet(s) Oral daily  clopidogrel Tablet 75 milliGRAM(s) Oral daily  dextrose 5%. 1000 milliLiter(s) (100 mL/Hr) IV Continuous <Continuous>  dextrose 5%. 1000 milliLiter(s) (50 mL/Hr) IV Continuous <Continuous>  dextrose 50% Injectable 25 Gram(s) IV Push once  dextrose 50% Injectable 12.5 Gram(s) IV Push once  dextrose 50% Injectable 25 Gram(s) IV Push once  enoxaparin Injectable 40 milliGRAM(s) SubCutaneous every 24 hours  famotidine    Tablet 20 milliGRAM(s) Oral daily  gabapentin 100 milliGRAM(s) Oral three times a day  glucagon  Injectable 1 milliGRAM(s) IntraMuscular once  influenza  Vaccine (HIGH DOSE) 0.7 milliLiter(s) IntraMuscular once  insulin lispro (ADMELOG) corrective regimen sliding scale   SubCutaneous three times a day before meals  insulin lispro (ADMELOG) corrective regimen sliding scale   SubCutaneous at bedtime  isosorbide   mononitrate ER Tablet (IMDUR) 30 milliGRAM(s) Oral daily  niCARdipine Infusion 5 mG/Hr (25 mL/Hr) IV Continuous <Continuous>  sodium chloride 0.9%. 1000 milliLiter(s) (75 mL/Hr) IV Continuous <Continuous>      PHYSICAL EXAM:  T(C): 36.4 (02-22-24 @ 13:15), Max: 37.2 (02-21-24 @ 21:00)  HR: 107 (02-22-24 @ 14:00) (81 - 129)  BP: 141/69 (02-22-24 @ 14:00) (141/69 - 174/96)  RR: 16 (02-22-24 @ 14:00) (15 - 20)  SpO2: 97% (02-22-24 @ 14:00) (96% - 98%)  Wt(kg): --  I&O's Summary    21 Feb 2024 07:01  -  22 Feb 2024 07:00  --------------------------------------------------------  IN: 525 mL / OUT: 0 mL / NET: 525 mL    22 Feb 2024 07:01  -  22 Feb 2024 14:19  --------------------------------------------------------  IN: 200 mL / OUT: 0 mL / NET: 200 mL      Height (cm): 157.5 (02-22 @ 12:02)  Weight (kg): 66.7 (02-22 @ 12:02)  BMI (kg/m2): 26.9 (02-22 @ 12:02)  BSA (m2): 1.68 (02-22 @ 12:02)    Appearance: Awake, weak appearing female, lying down in bed 	  HEENT:  Eyes are open   Lymphatic: No lymphadenopathy grossly   Cardiovascular: Normal    Respiratory: normal effort , clear  Gastrointestinal:  Soft, Non-tender  Skin: No rashes,  warm to touch  Psychiatry:  Mood & affect appropriate  Musculoskeletal: No edema        02-21-24 @ 07:01  -  02-22-24 @ 07:00  --------------------------------------------------------  IN: 525 mL / OUT: 0 mL / NET: 525 mL    02-22-24 @ 07:01  -  02-22-24 @ 14:19  --------------------------------------------------------  IN: 200 mL / OUT: 0 mL / NET: 200 mL            Culture - Urine (02.19.24 @ 22:46)   Specimen Source: Clean Catch Clean Catch (Midstream)  Culture Results: <10,000 CFU/mL Normal Urogenital Zahra

## 2024-02-22 NOTE — PROGRESS NOTE ADULT - NS ATTEND AMEND GEN_ALL_CORE FT
I saw and examined the patient, reviewed diagnostic studies, and reviewed images personally. I agree with PA’s history, exam, orders placed, and plan of care. Medical issues needing to be addressed include: cerebral ischemia, hx of known R ICA/L ICA stenosis, prior stroke with L side weakness, HTN, DM2, HLD, CAD s/p 3 stents, most recent in 2022, cognitive impairment/mild dementia, ?hx of parkinsons, B/L cataracts s/p surgery, new onset R side weakness and vision loss L>R. s/p angio, no stent as L ICA stenosis <50%. R carotid ~ 50%. Will pursue medical manamagement with DAPT, statin. PT/OT/ST. DISPO:NADYA

## 2024-02-22 NOTE — PROGRESS NOTE ADULT - SUBJECTIVE AND OBJECTIVE BOX
DATE OF SERVICE: 02-22-24 @ 12:12    Patient is a 63y old  Female who presents with a chief complaint of R arm weakness, blurred vision, headache (22 Feb 2024 07:43)      INTERVAL HISTORY: Feeling better.     REVIEW OF SYSTEMS:  CONSTITUTIONAL: No weakness  EYES/ENT: No visual changes;  No throat pain   NECK: No pain or stiffness  RESPIRATORY: No cough, wheezing; No shortness of breath  CARDIOVASCULAR: No chest pain or palpitations  GASTROINTESTINAL: No abdominal  pain. No nausea, vomiting, or hematemesis  GENITOURINARY: No dysuria, frequency or hematuria  NEUROLOGICAL: dysarthria   SKIN: No rashes    TELEMETRY Personally reviewed:  	  MEDICATIONS:  isosorbide   mononitrate ER Tablet (IMDUR) 30 milliGRAM(s) Oral daily        PHYSICAL EXAM:  T(C): 36.8 (02-22-24 @ 12:02), Max: 37.2 (02-21-24 @ 21:00)  HR: 99 (02-22-24 @ 12:02) (75 - 99)  BP: 164/94 (02-22-24 @ 12:02) (150/64 - 174/96)  RR: 20 (02-22-24 @ 12:02) (18 - 20)  SpO2: 97% (02-22-24 @ 12:02) (96% - 98%)  Wt(kg): --  I&O's Summary    21 Feb 2024 07:01  -  22 Feb 2024 07:00  --------------------------------------------------------  IN: 525 mL / OUT: 0 mL / NET: 525 mL    22 Feb 2024 07:01  -  22 Feb 2024 12:12  --------------------------------------------------------  IN: 0 mL / OUT: 0 mL / NET: 0 mL      Height (cm): 157.5 (02-22 @ 12:02)  Weight (kg): 66.7 (02-22 @ 12:02)  BMI (kg/m2): 26.9 (02-22 @ 12:02)  BSA (m2): 1.68 (02-22 @ 12:02)    Appearance: In no distress	  HEENT:    PERRL, EOMI	  Cardiovascular:  S1 S2, No JVD  Respiratory: Lungs clear to auscultation	  Gastrointestinal:  Soft, Non-tender, + BS	  Vascularature:  No edema of LE  Psychiatric: Appropriate affect   Neuro: dysarthria improving                     Labs personally reviewed      ASSESSMENT/PLAN: 	    Patient is a 63 year old female with a PMHx of  HTN, DM, HLD, CAD (s/p 3 stents placed at [facility] in 2014 to the prox LAD and then in 2022 - on ASA and Plavix), PAD s/p L popliteal angioplasty and atherectomy 12/23, R ICA and L ICA stenosis, prior stroke with L side weakness, Parkinson's disease, B/L cataract surgery with blurred vision at baseline who presents with headache, dizziness, blurred vision, and right arm weakness. Patient was not a Tenecteplase candidate or a thrombectomy candidate due to being out of window as per neurology.    1. Stroke  - not a Tenecteplase candidate or a thrombectomy candidate due to being out of window as per neurology  - TTE shows negative for intracardiac shunt  - No hx of AF--> cont to monitor on tele  - CTA Head shows severe B/L carotid stenosis  - c/w ASA 81mg PO and atorvastatin 80mg PO daily  - Appreciate Neuro and NSGx recommendations, ?CEA  - ILR if stroke not thought to be due to carotid dz    2. Hypertension  - Permissive BP as per Neuro  - c/w Imdur 30mg PO daily    3. Coronary Artery Disease  - ECG with no acute ischemia noted  - s/p 3 stents placed at [facility] in 2014 to the prox LAD and then in 2022  - TTE with preserved EF and no WMA  - c/w ASA, Plavix and statin    4. HLD  - Cholesterol panel noted  - c/w atorvastatin 80mg PO daily    5. Cardiac Risk Stratification  - ECG with no ischemia noted.   - Not in decompensated HF  - No hx of tachy gibson arrhythmia  - No severe AS/MS  - Limited functional capacity 2/2 Parkison's  - Elevated risk for low risk cerebral angio, no cardiac contraindication to proceed           Jagruti Michelle, RUSLAN-SHERIF Solano DO [facility]  Cardiovascular Medicine  [address], [address]  Available through call or text on [employer]  Office: [phone]

## 2024-02-22 NOTE — PROGRESS NOTE ADULT - ASSESSMENT
63y (1960) right handed woman with a PMHx significant for HTN, DM, HLD, CAD(s/p 3 stents placed at [facility] in 2014 to the prox LAD and then 2022),  PAD s/p L popliteal angioplasty and atherectomy 12/23, ?Parkinson's disease, B/L cataract surgery with blurred vision at baseline who presented with  hx of known R ICA/L ICA stenosis, prior stroke with L side weakness presented with new onset R side weakness and vision changes. Pt was not able to provide detailed history, but family able to. Reported her vision has been better since the cataract surgeries, but recently she would occasionally complain of transient vision deficits. Unclear exact onset, but pt is complaining now of some vision loss in L>R that seems persistent.      NEURO: Continue close monitoring for neurologic deterioration, permissive HTN to gradual normotension, titrate statin to LDL goal less than 70, MRI Brain w/o, MRA Head w/o and Neck w/contrast, results as noted above. Physical therapy/OT recommending SHANE.  Neurosurgery consulted with plan for L ICA revascularization with stent.     Impression:  1. R arm weakness likely 2/2 left brain dysfunction possibly 2/2 acute ischemic stroke in setting of L ICA stenosis   2. High-grade (80-90%) stenosis of the LEFT ICA and critical stenosis (greater than 95%) of  the distal RIGHT CCA and origin of the RIGHT ICA  3. Chronic right anterior corona radiata infarct   4. Parkinson's disease   5. B/L cataracts and baseline blurred vision  6. Headache     ANTITHROMBOTIC THERAPY: Aspirin and plavix     PULMONARY: protecting airway, saturating well     CARDIOVASCULAR:  TTE shows  no PFO, normal systolic function, continue with cardiac monitoring, no events recorded so far. Cardio consulted for clearence: Elevated risk for low risk cerebral angio, no cardiac contraindication to proceed                               SBP goal: Permissive HTN to gradual normotension      GASTROINTESTINAL:  dysphagia screen passed, but made NPO overnight 2/20 as not tolerating diet. SLP eval recommended pureed diet with mildly thickened liquids.       Diet: Pureed with mildly thickened liquids.     RENAL: BUN/Cr stable, good urine output      Na Goal: Greater than 135     Matos: No     HEMATOLOGY: no signs of bleeding     DVT ppx: Heparin s.c [] LMWH [x]     ID: afebrile, no sign of infection    OTHER: All questions and concerns addressed with patient and family at bedside. Pending angio with neurosurgery on 2/22 for possible stent.      DISPOSITION: SHANE per PT/OT eval once stable and workup is complete    CORE MEASURES:        Admission NIHSS: 3     TPA: [] YES [x] NO      LDL/HDL: 80/40     Depression Screen: p     Statin Therapy: y     Dysphagia Screen: [x] PASS [] FAIL     Smoking [] YES [x] NO      Afib [] YES [x] NO     Stroke Education [] YES [] NO - p

## 2024-02-22 NOTE — PROGRESS NOTE ADULT - SUBJECTIVE AND OBJECTIVE BOX
Patient seen and examined at bedside.    --Anticoagulation--  aspirin enteric coated 81 milliGRAM(s) Oral daily  clopidogrel Tablet 75 milliGRAM(s) Oral daily  enoxaparin Injectable 40 milliGRAM(s) SubCutaneous every 24 hours    T(C): 36.4 (02-22-24 @ 16:00), Max: 37.2 (02-21-24 @ 21:00)  HR: 103 (02-22-24 @ 16:00) (81 - 129)  BP: 158/71 (02-22-24 @ 16:00) (140/80 - 174/96)  RR: 15 (02-22-24 @ 16:00) (14 - 20)  SpO2: 99% (02-22-24 @ 16:00) (96% - 99%)  Wt(kg): --    Exam: R groin soft. (baseline AOx2) AOx2 (didn't get year), PERRL, no facial, R drift, RUE 4/5, LUE 4+/5, RLE 4/5, LLE 4/5.

## 2024-02-22 NOTE — PROGRESS NOTE ADULT - SUBJECTIVE AND OBJECTIVE BOX
THE PATIENT WAS SEEN AND EXAMINED BY ME WITH THE HOUSESTAFF AND STROKE TEAM DURING MORNING ROUNDS.   HPI:  HPI: Patient ALYSE AVENDAÑO is a 63y (1960) right handed woman with a PMHx significant for HTN, DM, HLD, CAD(s/p 3 stents placed at [facility] in 2014 to the prox LAD and then 2022),  PAD s/p L popliteal angioplasty and atherectomy 12/23, R ICA and L ICA stenosis, prior stroke with L side weakness, ?Parkinson's disease, B/L cataract surgery with blurred vision at baseline who presented with headache, dizziness, blurred vision, right arm weakness. Patient was last seen well on 2/18 at 11AM. Family reports she woke up with these symptoms on 2/19. Per family, she is A&Ox2 at baseline, uses walker and assistance to walk. She has frequent falls per family, gait instability, and drags her feet while walking. She carries diagnosis of Parkinson's and is on Sinemet but has not yet been evaluated by movement disorder specialist. Patient has been seen previously by Neurology due to concern for falls and AMS. During evaluations in 2022 and 2023, she was found to have B/L ICA stenosis. Also was found to have R corona radiata infarct on MRI brain.   Carotid duplex (08.01.23) showed moderate, 50-69% stenosis of the right internal carotid artery and no hemodynamically significant stenosis of left carotid artery. She was seen by vascular surgery who recommended to continue DAPT and was to be re-assessed in 6 months.   Patient has also been seen in the past by Neurology for headaches (2021) which appeared poorly controlled. LKW: 2/18 11AM. On admission: NIHSS: 3 (2 for right arm drift and 1 for dysarthria)  Baseline MRS: 4. Not a Tenecteplase candidate due to out of window. Not a thrombectomy candidate due to  out of window    SUBJECTIVE: No events overnight.  No new neurologic complaints.  ROS: otherwise negative.    acetaminophen   IVPB .. 1000 milliGRAM(s) IV Intermittent once  aspirin enteric coated 81 milliGRAM(s) Oral daily  atorvastatin 80 milliGRAM(s) Oral at bedtime  carbidopa/levodopa  25/100 1 Tablet(s) Oral daily  clopidogrel Tablet 75 milliGRAM(s) Oral daily  dextrose 5%. 1000 milliLiter(s) IV Continuous <Continuous>  dextrose 5%. 1000 milliLiter(s) IV Continuous <Continuous>  dextrose 50% Injectable 25 Gram(s) IV Push once  dextrose 50% Injectable 25 Gram(s) IV Push once  dextrose 50% Injectable 12.5 Gram(s) IV Push once  dextrose Oral Gel 15 Gram(s) Oral once PRN  enoxaparin Injectable 40 milliGRAM(s) SubCutaneous every 24 hours  famotidine    Tablet 20 milliGRAM(s) Oral daily  gabapentin 100 milliGRAM(s) Oral three times a day  glucagon  Injectable 1 milliGRAM(s) IntraMuscular once  influenza  Vaccine (HIGH DOSE) 0.7 milliLiter(s) IntraMuscular once  insulin lispro (ADMELOG) corrective regimen sliding scale   SubCutaneous at bedtime  insulin lispro (ADMELOG) corrective regimen sliding scale   SubCutaneous three times a day before meals  isosorbide   mononitrate ER Tablet (IMDUR) 30 milliGRAM(s) Oral daily  QUEtiapine 12.5 milliGRAM(s) Oral at bedtime PRN  sodium chloride 0.9%. 1000 milliLiter(s) IV Continuous <Continuous>    PHYSICAL EXAM:   Vital Signs Last 24 Hrs  T(C): 36.9 (22 Feb 2024 04:56), Max: 37.2 (21 Feb 2024 21:00)  T(F): 98.4 (22 Feb 2024 04:56), Max: 98.9 (21 Feb 2024 21:00)  HR: 88 (22 Feb 2024 04:56) (75 - 100)  BP: 174/96 (22 Feb 2024 04:56) (150/64 - 174/96)  RR: 18 (22 Feb 2024 04:56) (18 - 20)  SpO2: 98% (22 Feb 2024 04:56) (96% - 99%)    Parameters below as of 22 Feb 2024 04:56  Patient On (Oxygen Delivery Method): room air      General: No acute distress  HEENT: EOM intact, visual fields full  Abdomen: Soft, nontender, nondistended   Extremities: No edema    NEUROLOGICAL EXAM:  Mental status: Eyes open, awake, alert, oriented to person, place, and month, not year. Follows 1 and 2 step crossed commands. Attention/concentration, recent and remote memory and fund of knowledge with mild impairment   Language: Speech fluent, repetition and naming intact.   Cranial nerves - PERRLA (3mm >1 mm B/L), VFF, EOMI in lateral gaze but no upward or downgaze noted. Face sensation (V1-V3) intact b/l, facial strength intact without asymmetry b/l, hearing intact b/l, palate with symmetric elevation, trapezius 5/5 strength b/l, tongue midline on protrusion with full lateral movement  Motor exam:  no drifts UE/LEs  Sensation: Intact to light touch   Coordination/ Gait: No dysmetria, Gait deferred.      IMAGING: Reviewed by me.       MRI/MRA/NOVA w/o 2/20:  RCCA 283, MARCO ANTONIO 131, RMCA 90, RACA2 61  LCCA 312, LICA 166, LMCA 102, LACA 110, LACA2 59  RVA 57, LVA 51, BA 46, RPCA 49, RPCOM 43 anterior to posterior, LPCA 32  Significant bilateral carotid stenosis without evidence of acute infarct. Noninvasive flow MR angiography as above.     CT HEAD:  No acute intracranial hemorrhage. Gray/white matter differentiation is   preserved.    CTA NECK:  High-grade (80-90%) stenosis of the LEFT internal carotid artery due to   circumferential calcifications. Critical stenosis (greater than 95%) of   the distal RIGHT common carotid artery and origin of the RIGHTinternal   carotid artery due to extensive calcified plaque.    CTA HEAD:  Moderate stenosis of bilateral precavernous and cavernous internal   carotid arteries. No evidence of aneurysm. Tiny aneurysms can be beyond the resolution of CTA technique. 3.6 cm hypodense lesion in the left thyroid lobe.    MR Head w/wo IV Cont (08.02.23 @ 11:59)    No evidence of acute infarct, hemorrhage, or mass lesion.   There is a chronic lacunar infarct in the right anterior corona radiata.   There are multiple small foci of T2/FLAIR hyperintense signal in the   periventricular white matter, suggestive of mild chronic microvascular   ischemic changes. There is no evidence of abnormal enhancement.    VA Duplex Carotid, Bilat (08.01.23 @ 11:57)   There is a moderate, 50-69% stenosis of the right internal carotid artery.  No hemodynamically significant left carotid artery stenosis is identified.

## 2024-02-22 NOTE — PROGRESS NOTE ADULT - ASSESSMENT
Pt now s/p diagnostic angio w/ no hemodynamically significant stenosis.  - further management per stroke neurology

## 2024-02-22 NOTE — PRE-ANESTHESIA EVALUATION ADULT - NSANTHBMIRD_ENT_A_CORE
I received an e-mail from Manfred Young today stating:    After much serious deliberation since our conversation last week, I've decided I do not want to proceed with the evaluation process. 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 Hopefully Shai finds a good match.     I thanked him for considering donation.  I have canceled his appointment with Geeta, which was scheduled for 3/14  
No

## 2024-02-23 LAB
GLUCOSE BLDC GLUCOMTR-MCNC: 150 MG/DL — HIGH (ref 70–99)
GLUCOSE BLDC GLUCOMTR-MCNC: 153 MG/DL — HIGH (ref 70–99)
GLUCOSE BLDC GLUCOMTR-MCNC: 201 MG/DL — HIGH (ref 70–99)
GLUCOSE BLDC GLUCOMTR-MCNC: 276 MG/DL — HIGH (ref 70–99)

## 2024-02-23 PROCEDURE — 99233 SBSQ HOSP IP/OBS HIGH 50: CPT

## 2024-02-23 PROCEDURE — 74230 X-RAY XM SWLNG FUNCJ C+: CPT | Mod: 26

## 2024-02-23 RX ORDER — ACETAMINOPHEN 500 MG
1000 TABLET ORAL ONCE
Refills: 0 | Status: DISCONTINUED | OUTPATIENT
Start: 2024-02-23 | End: 2024-02-27

## 2024-02-23 RX ORDER — ACETAMINOPHEN 500 MG
650 TABLET ORAL ONCE
Refills: 0 | Status: COMPLETED | OUTPATIENT
Start: 2024-02-23 | End: 2024-02-23

## 2024-02-23 RX ADMIN — GABAPENTIN 100 MILLIGRAM(S): 400 CAPSULE ORAL at 05:42

## 2024-02-23 RX ADMIN — Medication 2: at 16:29

## 2024-02-23 RX ADMIN — Medication 81 MILLIGRAM(S): at 11:10

## 2024-02-23 RX ADMIN — ENOXAPARIN SODIUM 40 MILLIGRAM(S): 100 INJECTION SUBCUTANEOUS at 11:17

## 2024-02-23 RX ADMIN — Medication 650 MILLIGRAM(S): at 11:10

## 2024-02-23 RX ADMIN — CLOPIDOGREL BISULFATE 75 MILLIGRAM(S): 75 TABLET, FILM COATED ORAL at 11:11

## 2024-02-23 RX ADMIN — ISOSORBIDE MONONITRATE 30 MILLIGRAM(S): 60 TABLET, EXTENDED RELEASE ORAL at 11:10

## 2024-02-23 RX ADMIN — GABAPENTIN 100 MILLIGRAM(S): 400 CAPSULE ORAL at 21:21

## 2024-02-23 RX ADMIN — CARBIDOPA AND LEVODOPA 1 TABLET(S): 25; 100 TABLET ORAL at 11:11

## 2024-02-23 RX ADMIN — FAMOTIDINE 20 MILLIGRAM(S): 10 INJECTION INTRAVENOUS at 11:11

## 2024-02-23 RX ADMIN — GABAPENTIN 100 MILLIGRAM(S): 400 CAPSULE ORAL at 13:06

## 2024-02-23 RX ADMIN — Medication 650 MILLIGRAM(S): at 12:00

## 2024-02-23 RX ADMIN — Medication 3: at 11:12

## 2024-02-23 RX ADMIN — ATORVASTATIN CALCIUM 80 MILLIGRAM(S): 80 TABLET, FILM COATED ORAL at 21:21

## 2024-02-23 NOTE — SWALLOW VFSS/MBS ASSESSMENT ADULT - DELAYED INITIATION OF THE PHARYNGEAL SWALLOW (IN SECONDS)
Swallow triggered with head of bolus at the valleculae. Pharyngeal swallow triggered with head of bolus in the pyriform sinuses. Swallow triggered with head of bolus in the pyriform sinuses. Pharyngeal swallow triggered with head of bolus descending from the valleculae to the pyriform sinuses.

## 2024-02-23 NOTE — SWALLOW VFSS/MBS ASSESSMENT ADULT - LARYNGEAL PENETRATION DURING THE SWALLOW - COUGH
On single and consecutive cup sips, penetration during the swallow from spillover the arytenoids. Material not completely retrieved with trace residue remaining along laryngeal surface of the epiglottis and does not descend. Complete cleared of material only achieved s/p verbal prompts to perform cough/throat clear and re-swallow./Trace/Mild On single and consecutive cup sips, penetration noted during the swallow from spillover the arytenoids with complete retrieval./Trace On single and consecutive cup sips, trace penetration during the swallow from spillover the arytenoids. On straw sips, mild deep penetration noted from spillover the arytenoids. Material not completely retrieved with trace residue remaining along laryngeal surface of the epiglottis and does not descend. Complete cleared of material only achieved s/p verbal prompts to perform cough/throat clear and re-swallow./Trace/Mild On single and consecutive cup sips, shallow penetration noted during the swallow from spillover the arytenoids with complete retrieval./Trace On single and consecutive cup sips, trace shallow penetration during the swallow from spillover the arytenoids with complete retrieval. On straw sips, mild deep penetration noted from spillover the arytenoids. Material not completely retrieved with trace residue remaining along laryngeal surface of the epiglottis and does not descend. Complete cleared of material only achieved s/p verbal prompts to perform cough/throat clear and re-swallow./Trace/Mild

## 2024-02-23 NOTE — SWALLOW VFSS/MBS ASSESSMENT ADULT - MODE OF PRESENTATION
spoon/fed by clinician cup/spoon/straw/self fed/fed by clinician cup/self fed cup/spoon/self fed/fed by clinician spoon/self fed

## 2024-02-23 NOTE — PROGRESS NOTE ADULT - SUBJECTIVE AND OBJECTIVE BOX
Patient seen and examined at bedside.    --Anticoagulation--  aspirin enteric coated 81 milliGRAM(s) Oral daily  clopidogrel Tablet 75 milliGRAM(s) Oral daily  enoxaparin Injectable 40 milliGRAM(s) SubCutaneous every 24 hours    T(C): 36.9 (02-23-24 @ 04:41), Max: 37.1 (02-22-24 @ 19:23)  HR: 93 (02-23-24 @ 04:41) (93 - 129)  BP: 161/82 (02-23-24 @ 04:41) (131/86 - 164/94)  RR: 18 (02-23-24 @ 04:41) (14 - 20)  SpO2: 98% (02-23-24 @ 04:41) (95% - 99%)  Wt(kg): --    Exam: R groin soft. (baseline AOx2) AOx2 (didn't get year), PERRL, no facial, R drift, RUE 4/5, LUE 4+/5, RLE 4/5, LLE 4/5.

## 2024-02-23 NOTE — PROGRESS NOTE ADULT - SUBJECTIVE AND OBJECTIVE BOX
DATE OF SERVICE: 02-23-24 @ 09:14    Patient is a 63y old  Female who presents with a chief complaint of R arm weakness, blurred vision, headache (23 Feb 2024 07:25)      INTERVAL HISTORY: Feels ok.     REVIEW OF SYSTEMS:  CONSTITUTIONAL: No weakness  EYES/ENT: No visual changes;  No throat pain   NECK: No pain or stiffness  RESPIRATORY: No cough, wheezing; No shortness of breath  CARDIOVASCULAR: No chest pain or palpitations  GASTROINTESTINAL: No abdominal  pain. No nausea, vomiting, or hematemesis  GENITOURINARY: No dysuria, frequency or hematuria  NEUROLOGICAL: No stroke like symptoms  SKIN: No rashes    TELEMETRY Personally reviewed: SR/ST 1st AVB   	  MEDICATIONS:  isosorbide   mononitrate ER Tablet (IMDUR) 30 milliGRAM(s) Oral daily        PHYSICAL EXAM:  T(C): 36.9 (02-23-24 @ 04:41), Max: 37.1 (02-22-24 @ 19:23)  HR: 93 (02-23-24 @ 04:41) (93 - 129)  BP: 161/82 (02-23-24 @ 04:41) (131/86 - 164/94)  RR: 18 (02-23-24 @ 04:41) (14 - 20)  SpO2: 98% (02-23-24 @ 04:41) (95% - 99%)  Wt(kg): --  I&O's Summary    22 Feb 2024 07:01  -  23 Feb 2024 07:00  --------------------------------------------------------  IN: 585 mL / OUT: 900 mL / NET: -315 mL      Height (cm): 157.5 (02-22 @ 12:02)  Weight (kg): 66.7 (02-22 @ 12:02)  BMI (kg/m2): 26.9 (02-22 @ 12:02)  BSA (m2): 1.68 (02-22 @ 12:02)    Appearance: In no distress	  HEENT:    PERRL, EOMI	  Cardiovascular:  S1 S2, No JVD  Respiratory: Lungs clear to auscultation	  Gastrointestinal:  Soft, Non-tender, + BS	  Vascularature:  No edema of LE  Psychiatric: Appropriate affect   Neuro: no acute focal deficits         Labs personally reviewed      ASSESSMENT/PLAN: 	      Patient is a 63 year old female with a PMHx of  HTN, DM, HLD, CAD (s/p 3 stents placed at [facility] in 2014 to the prox LAD and then in 2022 - on ASA and Plavix), PAD s/p L popliteal angioplasty and atherectomy 12/23, R ICA and L ICA stenosis, prior stroke with L side weakness, Parkinson's disease, B/L cataract surgery with blurred vision at baseline who presents with headache, dizziness, blurred vision, and right arm weakness. Patient was not a Tenecteplase candidate or a thrombectomy candidate due to being out of window as per neurology.    1. Stroke  - not a Tenecteplase candidate or a thrombectomy candidate due to being out of window as per neurology  - TTE shows negative for intracardiac shunt  - No hx of AF--> cont to monitor on tele  - CTA Head shows severe B/L carotid stenosis  - c/w ASA 81mg PO and atorvastatin 80mg PO daily  - Appreciate Neuro and NSGx recommendations, ?CEA  - ILR if stroke not thought to be due to carotid dz    2. Hypertension  - Permissive BP as per Neuro  - c/w Imdur 30mg PO daily    3. Coronary Artery Disease  - ECG with no acute ischemia noted  - s/p 3 stents placed at [facility] in 2014 to the prox LAD and then in 2022  - TTE with preserved EF and no WMA  - c/w ASA, Plavix and statin    4. HLD  - Cholesterol panel noted  - c/w atorvastatin 80mg PO daily    5. Cardiac Risk Stratification  - ECG with no ischemia noted.   - Not in decompensated HF  - No hx of tachy gibson arrhythmia  - No severe AS/MS  - Limited functional capacity 2/2 Parkison's  - Elevated risk for low risk cerebral angio, no cardiac contraindication to proceed   - Cerebral angio with no significant stenosis        DANN Hickey DO [facility]  Cardiovascular Medicine  [address], [address]  Available through call or text on [employer]  Office: [phone]

## 2024-02-23 NOTE — PROGRESS NOTE ADULT - SUBJECTIVE AND OBJECTIVE BOX
Name of Patient : ALYSE AVENDAÑO  MRN: [id number]  Date of visit: 02-23-24      Subjective: Patient seen and examined. No new events except as noted.     REVIEW OF SYSTEMS:    CONSTITUTIONAL: No weakness, fevers or chills  EYES/ENT: No visual changes;  No vertigo or throat pain   NECK: No pain or stiffness  RESPIRATORY: No cough, wheezing, hemoptysis; No shortness of breath  CARDIOVASCULAR: No chest pain or palpitations  GASTROINTESTINAL: No abdominal or epigastric pain. No nausea, vomiting, or hematemesis; No diarrhea or constipation. No melena or hematochezia.  GENITOURINARY: No dysuria, frequency or hematuria  NEUROLOGICAL: No numbness or weakness  SKIN: No itching, burning, rashes, or lesions   All other review of systems is negative unless indicated above.    MEDICATIONS:  MEDICATIONS  (STANDING):  acetaminophen   IVPB .. 1000 milliGRAM(s) IV Intermittent once  acetaminophen   IVPB .. 1000 milliGRAM(s) IV Intermittent once  aspirin enteric coated 81 milliGRAM(s) Oral daily  atorvastatin 80 milliGRAM(s) Oral at bedtime  carbidopa/levodopa  25/100 1 Tablet(s) Oral daily  clopidogrel Tablet 75 milliGRAM(s) Oral daily  dextrose 5%. 1000 milliLiter(s) (100 mL/Hr) IV Continuous <Continuous>  dextrose 5%. 1000 milliLiter(s) (50 mL/Hr) IV Continuous <Continuous>  dextrose 50% Injectable 25 Gram(s) IV Push once  dextrose 50% Injectable 25 Gram(s) IV Push once  dextrose 50% Injectable 12.5 Gram(s) IV Push once  enoxaparin Injectable 40 milliGRAM(s) SubCutaneous every 24 hours  famotidine    Tablet 20 milliGRAM(s) Oral daily  gabapentin 100 milliGRAM(s) Oral three times a day  glucagon  Injectable 1 milliGRAM(s) IntraMuscular once  influenza  Vaccine (HIGH DOSE) 0.7 milliLiter(s) IntraMuscular once  insulin lispro (ADMELOG) corrective regimen sliding scale   SubCutaneous three times a day before meals  insulin lispro (ADMELOG) corrective regimen sliding scale   SubCutaneous at bedtime  isosorbide   mononitrate ER Tablet (IMDUR) 30 milliGRAM(s) Oral daily      PHYSICAL EXAM:  T(C): 36.9 (02-23-24 @ 17:00), Max: 37 (02-23-24 @ 12:21)  HR: 97 (02-23-24 @ 17:00) (93 - 97)  BP: 147/83 (02-23-24 @ 17:00) (131/77 - 161/82)  RR: 18 (02-23-24 @ 17:00) (18 - 18)  SpO2: 96% (02-23-24 @ 17:00) (94% - 98%)  Wt(kg): --  I&O's Summary    22 Feb 2024 07:01  -  23 Feb 2024 07:00  --------------------------------------------------------  IN: 585 mL / OUT: 900 mL / NET: -315 mL    Appearance: Normal	  HEENT:  PERRLA   Lymphatic: No lymphadenopathy   Cardiovascular: Normal S1 S2, no JVD  Respiratory: normal effort , clear  Gastrointestinal:  Soft, Non-tender  Skin: No rashes,  warm to touch  Psychiatry:  Mood & affect appropriate  Musculuskeletal: No edema    recent labs, Imaging and EKGs personally reviewed     02-22-24 @ 07:01  -  02-23-24 @ 07:00  --------------------------------------------------------  IN: 585 mL / OUT: 900 mL / NET: -315 mL

## 2024-02-23 NOTE — PROGRESS NOTE ADULT - ASSESSMENT
Patient is a 63 year old female with a PMHx of  HTN, DM, HLD, CAD (s/p 3 stents placed at [facility] in 2014 to the prox LAD and then in 2022 - on ASA and Plavix), PAD s/p L popliteal angioplasty and atherectomy 12/23, R ICA and L ICA stenosis, prior stroke with L side weakness, Parkinson's disease, B/L cataract surgery with blurred vision at baseline who presents with headache, dizziness, blurred vision, and right arm weakness. As per documentation and neurology, patient was last seen well on 2/18 at 11 AM. Per documentation, patient reportedly awoke on 2/19 with these symptoms. Per neuro, she is A&Ox2 at baseline, uses walker and assistance to walk. Per family, she has frequent falls, gait instability, and drags her feet while walking. Patient is on Sinemet for Parkinson's disease and is pending evaluation by movement disorder specialist per family. Patient has been seen previously by Neurology due to concern for falls and AMS. Per neurology, she was found to have B/L ICA stenosis and was also found to have R corona radiata infarct on MRI brain. Patient was not a Tenecteplase candidate or a thrombectomy candidate due to being out of window as per neurology. Internal Medicine has been consulted on Ms. Llamas's care for medical management.     Carotid Stenosis   - Pt with history of B/L ICA Stenosis and R Coe Radiata infarct   - CT, CT A H/N w/ high- grade 80-90% stenosis of the L ICA, > 95% stenosis on the R, Moderate stenosis  - MR w/ Significant B/L carotid stenosis without evidence of acute infarct   - CD w/ R external carotid artery stenosis, negative for significant ICA stenosis,   - TTE w/ Neg for shunt, hyperdynamic LVSF, No WM, trace MR, trace TR, trace NV,   - On DAPT w/ ASA and Plavix   - Lipitor 80   - Planned for Angio with possible stent w/ NSGY   - Monitor on telemetry   - Neuro checks as per protocol   - Fall, Seizure and Aspiration precautions  - PT / OT / S+S   - Per stroke neurology / NSGY     Pre-OP Risk Stratification   - Pt with RCRI score of 4  - Pt is deemed to have elevated risk for neurosurgical intervention. Cardio clearance as per cardiology.     Blurry vision   - Likely 2/2 carotid stenosis   - Optho eval appreciated; F/u recs    Type II DM   - A1C of 7.2  - Hold home PO Medications while admitted  - C/w sliding scale   - Diabetic DASH diet  - Monitor and adjust glucose levels as tolerated     CAD  - S/P 3 stents   - C/w DAPT and Statin   - Repeat TTE as above    PAD  - S/P L popliteal angioplasty and atherectomy 12/23  - On DAPT and Statin therapies     HTN   - On Imdur 30 PO Qd  - Monitor BP, VS and patient closely    Parkinson's disease  - On Sinemet   - Pending evaluation by movement disorder specialist per family. Outpatient follow up with both specialist and neurology upon DC   - Fall, Seizure and Aspiration precautions     HLD  - Lipitor 80   - Diet and lifestyle modifications     Thyroid lesion   - CT w/ 3.6 CM hypodense thyroid lesion   - TSH WNL  - Check thyroid US for further evaluation     PPX  - Lovenox

## 2024-02-23 NOTE — PROGRESS NOTE ADULT - SUBJECTIVE AND OBJECTIVE BOX
THE PATIENT WAS SEEN AND EXAMINED BY ME WITH THE HOUSESTAFF AND STROKE TEAM DURING MORNING ROUNDS.   HPI: Patient ALYSE AVENDAÑO is a 63y (1960) right handed woman with a PMHx significant for HTN, DM, HLD, CAD(s/p 3 stents placed at [facility] in 2014 to the prox LAD and then 2022),  PAD s/p L popliteal angioplasty and atherectomy 12/23, R ICA and L ICA stenosis, prior stroke with L side weakness, ?Parkinson's disease, B/L cataract surgery with blurred vision at baseline who presented with headache, dizziness, blurred vision, right arm weakness. Patient was last seen well on 2/18 at 11AM. Family reports she woke up with these symptoms on 2/19. Per family, she is A&Ox2 at baseline, uses walker and assistance to walk. She has frequent falls per family, gait instability, and drags her feet while walking. She carries diagnosis of Parkinson's and is on Sinemet but has not yet been evaluated by movement disorder specialist. Patient has been seen previously by Neurology due to concern for falls and AMS. During evaluations in 2022 and 2023, she was found to have B/L ICA stenosis. Also was found to have R corona radiata infarct on MRI brain.   Carotid duplex (08.01.23) showed moderate, 50-69% stenosis of the right internal carotid artery and no hemodynamically significant stenosis of left carotid artery. She was seen by vascular surgery who recommended to continue DAPT and was to be re-assessed in 6 months.   Patient has also been seen in the past by Neurology for headaches (2021) which appeared poorly controlled. LKW: 2/18 11AM. On admission: NIHSS: 3 (2 for right arm drift and 1 for dysarthria)  Baseline MRS: 4. Not a Tenecteplase candidate due to out of window. Not a thrombectomy candidate due to  out of window        SUBJECTIVE: No events overnight.  No new neurologic complaints.      acetaminophen   IVPB .. 1000 milliGRAM(s) IV Intermittent once  aspirin enteric coated 81 milliGRAM(s) Oral daily  atorvastatin 80 milliGRAM(s) Oral at bedtime  carbidopa/levodopa  25/100 1 Tablet(s) Oral daily  clopidogrel Tablet 75 milliGRAM(s) Oral daily  dextrose 5%. 1000 milliLiter(s) IV Continuous <Continuous>  dextrose 5%. 1000 milliLiter(s) IV Continuous <Continuous>  dextrose 50% Injectable 25 Gram(s) IV Push once  dextrose 50% Injectable 12.5 Gram(s) IV Push once  dextrose 50% Injectable 25 Gram(s) IV Push once  dextrose Oral Gel 15 Gram(s) Oral once PRN  enoxaparin Injectable 40 milliGRAM(s) SubCutaneous every 24 hours  famotidine    Tablet 20 milliGRAM(s) Oral daily  gabapentin 100 milliGRAM(s) Oral three times a day  glucagon  Injectable 1 milliGRAM(s) IntraMuscular once  influenza  Vaccine (HIGH DOSE) 0.7 milliLiter(s) IntraMuscular once  insulin lispro (ADMELOG) corrective regimen sliding scale   SubCutaneous three times a day before meals  insulin lispro (ADMELOG) corrective regimen sliding scale   SubCutaneous at bedtime  isosorbide   mononitrate ER Tablet (IMDUR) 30 milliGRAM(s) Oral daily  QUEtiapine 12.5 milliGRAM(s) Oral at bedtime PRN      PHYSICAL EXAM:   Vital Signs Last 24 Hrs  T(C): 36.9 (23 Feb 2024 04:41), Max: 37.1 (22 Feb 2024 19:23)  T(F): 98.5 (23 Feb 2024 04:41), Max: 98.7 (22 Feb 2024 19:23)  HR: 93 (23 Feb 2024 04:41) (93 - 129)  BP: 161/82 (23 Feb 2024 04:41) (131/86 - 164/94)  BP(mean): 112 (22 Feb 2024 18:00) (96 - 112)  RR: 18 (23 Feb 2024 04:41) (14 - 20)  SpO2: 98% (23 Feb 2024 04:41) (95% - 99%)    Parameters below as of 23 Feb 2024 04:41  Patient On (Oxygen Delivery Method): room air      General: No acute distress  HEENT: EOM intact, visual fields full  Abdomen: Soft, nontender, nondistended   Extremities: No edema    NEUROLOGICAL EXAM:  Mental status: Eyes open, awake, alert, oriented to person, place, and month, not year. Follows 1 and 2 step crossed commands. Attention/concentration, recent and remote memory and fund of knowledge with mild impairment   Language: Speech fluent, repetition and naming intact.   Cranial nerves - PERRLA (3mm >1 mm B/L), VFF, EOMI in lateral gaze but no upward or downgaze noted. Face sensation (V1-V3) intact b/l, facial strength intact without asymmetry b/l, hearing intact b/l, palate with symmetric elevation, trapezius 5/5 strength b/l, tongue midline on protrusion with full lateral movement  Motor exam:  no drifts UE/LEs  Sensation: Intact to light touch   Coordination/ Gait: No dysmetria, Gait deferred.      LABS:             IMAGING: Reviewed by me.     MRI/MRA/NOVA w/o 2/20:  RCCA 283, MARCO ANTONIO 131, RMCA 90, RACA2 61  LCCA 312, LICA 166, LMCA 102, LACA 110, LACA2 59  RVA 57, LVA 51, BA 46, RPCA 49, RPCOM 43 anterior to posterior, LPCA 32  Significant bilateral carotid stenosis without evidence of acute infarct. Noninvasive flow MR angiography as above.     CT HEAD:  No acute intracranial hemorrhage. Gray/white matter differentiation is   preserved.    CTA NECK:  High-grade (80-90%) stenosis of the LEFT internal carotid artery due to   circumferential calcifications. Critical stenosis (greater than 95%) of   the distal RIGHT common carotid artery and origin of the RIGHTinternal   carotid artery due to extensive calcified plaque.    CTA HEAD:  Moderate stenosis of bilateral precavernous and cavernous internal   carotid arteries. No evidence of aneurysm. Tiny aneurysms can be beyond the resolution of CTA technique. 3.6 cm hypodense lesion in the left thyroid lobe.    MR Head w/wo IV Cont (08.02.23 @ 11:59)    No evidence of acute infarct, hemorrhage, or mass lesion.   There is a chronic lacunar infarct in the right anterior corona radiata.   There are multiple small foci of T2/FLAIR hyperintense signal in the   periventricular white matter, suggestive of mild chronic microvascular   ischemic changes. There is no evidence of abnormal enhancement.    VA Duplex Carotid, Bilat (08.01.23 @ 11:57)   There is a moderate, 50-69% stenosis of the right internal carotid artery.  No hemodynamically significant left carotid artery stenosis is identified. THE PATIENT WAS SEEN AND EXAMINED BY ME WITH THE HOUSESTAFF AND STROKE TEAM DURING MORNING ROUNDS.     HPI: Patient ALYSE AVENDAÑO is a 63y (1960) right handed woman with a PMHx significant for HTN, DM, HLD, CAD(s/p 3 stents placed at [facility] in 2014 to the prox LAD and then 2022),  PAD s/p L popliteal angioplasty and atherectomy 12/23, R ICA and L ICA stenosis, prior stroke with L side weakness, ?Parkinson's disease, B/L cataract surgery with blurred vision at baseline who presented with headache, dizziness, blurred vision, right arm weakness. Patient was last seen well on 2/18 at 11AM. Family reports she woke up with these symptoms on 2/19. Per family, she is A&Ox2 at baseline, uses walker and assistance to walk. She has frequent falls per family, gait instability, and drags her feet while walking. She carries diagnosis of Parkinson's and is on Sinemet but has not yet been evaluated by movement disorder specialist. Patient has been seen previously by Neurology due to concern for falls and AMS. During evaluations in 2022 and 2023, she was found to have B/L ICA stenosis. Also was found to have R corona radiata infarct on MRI brain.   Carotid duplex (08.01.23) showed moderate, 50-69% stenosis of the right internal carotid artery and no hemodynamically significant stenosis of left carotid artery. She was seen by vascular surgery who recommended to continue DAPT and was to be re-assessed in 6 months.   Patient has also been seen in the past by Neurology for headaches (2021) which appeared poorly controlled. LKW: 2/18 11AM. On admission: NIHSS: 3 (2 for right arm drift and 1 for dysarthria)  Baseline MRS: 4. Not a Tenecteplase candidate due to out of window. Not a thrombectomy candidate due to  out of window    SUBJECTIVE: No events overnight.  No new neurologic complaints.      acetaminophen   IVPB .. 1000 milliGRAM(s) IV Intermittent once  aspirin enteric coated 81 milliGRAM(s) Oral daily  atorvastatin 80 milliGRAM(s) Oral at bedtime  carbidopa/levodopa  25/100 1 Tablet(s) Oral daily  clopidogrel Tablet 75 milliGRAM(s) Oral daily  dextrose 5%. 1000 milliLiter(s) IV Continuous <Continuous>  dextrose 5%. 1000 milliLiter(s) IV Continuous <Continuous>  dextrose 50% Injectable 25 Gram(s) IV Push once  dextrose 50% Injectable 12.5 Gram(s) IV Push once  dextrose 50% Injectable 25 Gram(s) IV Push once  dextrose Oral Gel 15 Gram(s) Oral once PRN  enoxaparin Injectable 40 milliGRAM(s) SubCutaneous every 24 hours  famotidine    Tablet 20 milliGRAM(s) Oral daily  gabapentin 100 milliGRAM(s) Oral three times a day  glucagon  Injectable 1 milliGRAM(s) IntraMuscular once  influenza  Vaccine (HIGH DOSE) 0.7 milliLiter(s) IntraMuscular once  insulin lispro (ADMELOG) corrective regimen sliding scale   SubCutaneous three times a day before meals  insulin lispro (ADMELOG) corrective regimen sliding scale   SubCutaneous at bedtime  isosorbide   mononitrate ER Tablet (IMDUR) 30 milliGRAM(s) Oral daily  QUEtiapine 12.5 milliGRAM(s) Oral at bedtime PRN      PHYSICAL EXAM:   Vital Signs Last 24 Hrs  T(C): 36.9 (23 Feb 2024 04:41), Max: 37.1 (22 Feb 2024 19:23)  T(F): 98.5 (23 Feb 2024 04:41), Max: 98.7 (22 Feb 2024 19:23)  HR: 93 (23 Feb 2024 04:41) (93 - 129)  BP: 161/82 (23 Feb 2024 04:41) (131/86 - 164/94)  BP(mean): 112 (22 Feb 2024 18:00) (96 - 112)  RR: 18 (23 Feb 2024 04:41) (14 - 20)  SpO2: 98% (23 Feb 2024 04:41) (95% - 99%)    Parameters below as of 23 Feb 2024 04:41  Patient On (Oxygen Delivery Method): room air      General: No acute distress  HEENT: EOM intact, visual fields full  Abdomen: Soft, nontender, nondistended   Extremities: No edema    NEUROLOGICAL EXAM:  Mental status: Eyes open, awake, alert, oriented to person, place, and month, not year. Follows 1 and 2 step crossed commands. Attention/concentration, recent and remote memory and fund of knowledge with mild impairment   Language: Speech fluent, repetition and naming intact.   Cranial nerves - PERRLA (3mm >1 mm B/L), VFF, EOMI in lateral gaze but no upward or downgaze noted. Face sensation (V1-V3) intact b/l, facial strength intact without asymmetry b/l, hearing intact b/l, palate with symmetric elevation, trapezius 5/5 strength b/l, tongue midline on protrusion with full lateral movement  Motor exam:  no drifts UE/LEs  Sensation: Intact to light touch   Coordination/ Gait: No dysmetria, Gait deferred.      IMAGING: Reviewed by me.     MRI/MRA/NOVA w/o 2/20:  RCCA 283, MARCO ANTONIO 131, RMCA 90, RACA2 61  LCCA 312, LICA 166, LMCA 102, LACA 110, LACA2 59  RVA 57, LVA 51, BA 46, RPCA 49, RPCOM 43 anterior to posterior, LPCA 32  Significant bilateral carotid stenosis without evidence of acute infarct. Noninvasive flow MR angiography as above.     CT HEAD:  No acute intracranial hemorrhage. Gray/white matter differentiation is   preserved.    CTA NECK:  High-grade (80-90%) stenosis of the LEFT internal carotid artery due to   circumferential calcifications. Critical stenosis (greater than 95%) of   the distal RIGHT common carotid artery and origin of the RIGHTinternal   carotid artery due to extensive calcified plaque.    CTA HEAD:  Moderate stenosis of bilateral precavernous and cavernous internal   carotid arteries. No evidence of aneurysm. Tiny aneurysms can be beyond the resolution of CTA technique. 3.6 cm hypodense lesion in the left thyroid lobe.    MR Head w/wo IV Cont (08.02.23 @ 11:59)    No evidence of acute infarct, hemorrhage, or mass lesion.   There is a chronic lacunar infarct in the right anterior corona radiata.   There are multiple small foci of T2/FLAIR hyperintense signal in the   periventricular white matter, suggestive of mild chronic microvascular   ischemic changes. There is no evidence of abnormal enhancement.    VA Duplex Carotid, Bilat (08.01.23 @ 11:57)   There is a moderate, 50-69% stenosis of the right internal carotid artery.  No hemodynamically significant left carotid artery stenosis is identified. THE PATIENT WAS SEEN AND EXAMINED BY ME WITH THE HOUSESTAFF AND STROKE TEAM DURING MORNING ROUNDS.     HPI: Patient ALYSE AVENDAÑO is a 63y (1960) right handed woman with a PMHx significant for HTN, DM, HLD, CAD(s/p 3 stents placed at [facility] in 2014 to the prox LAD and then 2022),  PAD s/p L popliteal angioplasty and atherectomy 12/23, R ICA and L ICA stenosis, prior stroke with L side weakness, ?Parkinson's disease, B/L cataract surgery with blurred vision at baseline who presented with headache, dizziness, blurred vision and intermittent vision loss L>R, right arm weakness. Patient was last seen well on 2/18 at 11AM. Family reports she woke up with these symptoms on 2/19. Per family, she is A&Ox2 at baseline, uses walker and assistance to walk. She has frequent falls per family, gait instability, and drags her feet while walking. She carries diagnosis of Parkinson's and is on Sinemet but has not yet been evaluated by movement disorder specialist. Patient has been seen previously by Neurology due to concern for falls and AMS. During evaluations in 2022 and 2023, she was found to have B/L ICA stenosis. Also was found to have R corona radiata infarct on MRI brain.   Carotid duplex (08.01.23) showed moderate, 50-69% stenosis of the right internal carotid artery and no hemodynamically significant stenosis of left carotid artery. She was seen by vascular surgery who recommended to continue DAPT and was to be re-assessed in 6 months.   Patient has also been seen in the past by Neurology for headaches (2021) which appeared poorly controlled. LKW: 2/18 11AM. On admission: NIHSS: 3 (2 for right arm drift and 1 for dysarthria)  Baseline MRS: 4. Not a Tenecteplase candidate due to out of window. Not a thrombectomy candidate due to  out of window    SUBJECTIVE: No events overnight.  No new neurologic complaints.      acetaminophen   IVPB .. 1000 milliGRAM(s) IV Intermittent once  aspirin enteric coated 81 milliGRAM(s) Oral daily  atorvastatin 80 milliGRAM(s) Oral at bedtime  carbidopa/levodopa  25/100 1 Tablet(s) Oral daily  clopidogrel Tablet 75 milliGRAM(s) Oral daily  dextrose 5%. 1000 milliLiter(s) IV Continuous <Continuous>  dextrose 5%. 1000 milliLiter(s) IV Continuous <Continuous>  dextrose 50% Injectable 25 Gram(s) IV Push once  dextrose 50% Injectable 12.5 Gram(s) IV Push once  dextrose 50% Injectable 25 Gram(s) IV Push once  dextrose Oral Gel 15 Gram(s) Oral once PRN  enoxaparin Injectable 40 milliGRAM(s) SubCutaneous every 24 hours  famotidine    Tablet 20 milliGRAM(s) Oral daily  gabapentin 100 milliGRAM(s) Oral three times a day  glucagon  Injectable 1 milliGRAM(s) IntraMuscular once  influenza  Vaccine (HIGH DOSE) 0.7 milliLiter(s) IntraMuscular once  insulin lispro (ADMELOG) corrective regimen sliding scale   SubCutaneous three times a day before meals  insulin lispro (ADMELOG) corrective regimen sliding scale   SubCutaneous at bedtime  isosorbide   mononitrate ER Tablet (IMDUR) 30 milliGRAM(s) Oral daily  QUEtiapine 12.5 milliGRAM(s) Oral at bedtime PRN      PHYSICAL EXAM:   Vital Signs Last 24 Hrs  T(C): 36.9 (23 Feb 2024 04:41), Max: 37.1 (22 Feb 2024 19:23)  T(F): 98.5 (23 Feb 2024 04:41), Max: 98.7 (22 Feb 2024 19:23)  HR: 93 (23 Feb 2024 04:41) (93 - 129)  BP: 161/82 (23 Feb 2024 04:41) (131/86 - 164/94)  BP(mean): 112 (22 Feb 2024 18:00) (96 - 112)  RR: 18 (23 Feb 2024 04:41) (14 - 20)  SpO2: 98% (23 Feb 2024 04:41) (95% - 99%)    Parameters below as of 23 Feb 2024 04:41  Patient On (Oxygen Delivery Method): room air      General: No acute distress  HEENT: EOM intact, visual fields full  Abdomen: Soft, nontender, nondistended   Extremities: No edema    NEUROLOGICAL EXAM:  Mental status: Eyes open, awake, alert, oriented to person, place, and month, not year. Follows 1 and 2 step crossed commands. Attention/concentration, recent and remote memory and fund of knowledge with mild impairment   Language: Speech fluent, repetition and naming intact.   Cranial nerves - PERRLA (3mm >1 mm B/L), VFF, EOMI in lateral gaze but no upward or downgaze noted. Face sensation (V1-V3) intact b/l, facial strength intact without asymmetry b/l, hearing intact b/l, palate with symmetric elevation, trapezius 5/5 strength b/l, tongue midline on protrusion with full lateral movement  Motor exam:  no drifts UE/LEs  Sensation: Intact to light touch   Coordination/ Gait: No dysmetria, Gait deferred.      IMAGING: Reviewed by me.     MRI/MRA/NOVA w/o 2/20:  RCCA 283, MARCO ANTONIO 131, RMCA 90, RACA2 61  LCCA 312, LICA 166, LMCA 102, LACA 110, LACA2 59  RVA 57, LVA 51, BA 46, RPCA 49, RPCOM 43 anterior to posterior, LPCA 32  Significant bilateral carotid stenosis without evidence of acute infarct. Noninvasive flow MR angiography as above.     CT HEAD:  No acute intracranial hemorrhage. Gray/white matter differentiation is   preserved.    CTA NECK:  High-grade (80-90%) stenosis of the LEFT internal carotid artery due to   circumferential calcifications. Critical stenosis (greater than 95%) of   the distal RIGHT common carotid artery and origin of the RIGHTinternal   carotid artery due to extensive calcified plaque.    CTA HEAD:  Moderate stenosis of bilateral precavernous and cavernous internal   carotid arteries. No evidence of aneurysm. Tiny aneurysms can be beyond the resolution of CTA technique. 3.6 cm hypodense lesion in the left thyroid lobe.    MR Head w/wo IV Cont (08.02.23 @ 11:59)    No evidence of acute infarct, hemorrhage, or mass lesion.   There is a chronic lacunar infarct in the right anterior corona radiata.   There are multiple small foci of T2/FLAIR hyperintense signal in the   periventricular white matter, suggestive of mild chronic microvascular   ischemic changes. There is no evidence of abnormal enhancement.    VA Duplex Carotid, Bilat (08.01.23 @ 11:57)   There is a moderate, 50-69% stenosis of the right internal carotid artery.  No hemodynamically significant left carotid artery stenosis is identified.

## 2024-02-23 NOTE — PROGRESS NOTE ADULT - ASSESSMENT
63y (1960) right handed woman with a PMHx significant for HTN, DM, HLD, CAD(s/p 3 stents placed at [facility] in 2014 to the prox LAD and then 2022),  PAD s/p L popliteal angioplasty and atherectomy 12/23, ?Parkinson's disease, B/L cataract surgery with blurred vision at baseline who presented with  hx of known R ICA/L ICA stenosis, prior stroke with L side weakness presented with new onset R side weakness and vision changes. Pt was not able to provide detailed history, but family able to. Reported her vision has been better since the cataract surgeries, but recently she would occasionally complain of transient vision deficits. Unclear exact onset, but pt is complaining now of some vision loss in L>R that seems persistent.      NEURO: Continue close monitoring for neurologic deterioration, permissive HTN to gradual normotension, titrate statin to LDL goal less than 70, MRI Brain w/o, MRA Head w/o and Neck w/contrast, results as noted above. Physical therapy/OT recommending SHANE.  Neurosurgery consulted: s/p cerebral angiogram which showed no significant left carotid stenosis    Impression:  1. R arm weakness likely 2/2 left brain dysfunction possibly 2/2 acute ischemic stroke in setting of L ICA stenosis   2. High-grade (80-90%) stenosis of the LEFT ICA and critical stenosis (greater than 95%) of  the distal RIGHT CCA and origin of the RIGHT ICA  3. Chronic right anterior corona radiata infarct   4. Parkinson's disease   5. B/L cataracts and baseline blurred vision  6. Headache     ANTITHROMBOTIC THERAPY: Aspirin and plavix     PULMONARY: protecting airway, saturating well     CARDIOVASCULAR:  TTE shows  no PFO, normal systolic function, continue with cardiac monitoring, no events recorded so far. Cardio consulted for clearence: Elevated risk for low risk cerebral angio, no cardiac contraindication to proceed                               SBP goal: Permissive HTN to gradual normotension      GASTROINTESTINAL:  dysphagia screen passed, but made NPO overnight 2/20 as not tolerating diet. SLP eval recommended pureed diet with mildly thickened liquids.       Diet: Pureed with mildly thickened liquids.     RENAL: BUN/Cr stable, good urine output      Na Goal: Greater than 135     Matos: No     HEMATOLOGY: no signs of bleeding     DVT ppx: Heparin s.c [] LMWH [x]     ID: afebrile, no sign of infection    OTHER: All questions and concerns addressed with patient and family at bedside.    DISPOSITION: SHANE per PT/OT eval once stable and workup is complete    CORE MEASURES:        Admission NIHSS: 3     TPA: [] YES [x] NO      LDL/HDL: 80/40     Depression Screen: p     Statin Therapy: y     Dysphagia Screen: [x] PASS [] FAIL     Smoking [] YES [x] NO      Afib [] YES [x] NO     Stroke Education [] YES [] NO - p 63y (1960) right handed woman with a PMHx significant for HTN, DM, HLD, CAD(s/p 3 stents placed at [facility] in 2014 to the prox LAD and then 2022),  PAD s/p L popliteal angioplasty and atherectomy 12/23, ?Parkinson's disease, B/L cataract surgery with blurred vision at baseline who presented with hx of known R ICA/L ICA stenosis, prior stroke with L side weakness presented with new onset R side weakness and vision changes. Pt was not able to provide detailed history, but family able to. Reported her vision has been better since the cataract surgeries, but recently she would occasionally complain of transient vision deficits. Unclear exact onset, but pt is complaining now of some vision loss in L>R that seems persistent.    Impression: R arm weakness with MRI negative for infarct. Likely TIA in the setting of bilateral carotid stenosis.     NEURO: Continue close monitoring for neurologic deterioration, permissive HTN to gradual normotension, titrate statin to LDL goal less than 70, MRI Brain w/o, MRA Head w/o and Neck w/contrast, results as noted above. Physical therapy/OT recommending SHANE.  Neurosurgery consulted: s/p cerebral angiogram which showed no significant left carotid stenosis    ANTITHROMBOTIC THERAPY: Aspirin and Plavix     PULMONARY: protecting airway, saturating well     CARDIOVASCULAR:  TTE shows  no PFO, normal systolic function, continue with cardiac monitoring, no events recorded so far. Cardio consulted for clearence: Elevated risk for low risk cerebral angio, no cardiac contraindication to proceed                               SBP goal: Permissive HTN to gradual normotension      GASTROINTESTINAL:  dysphagia screen passed, but made NPO overnight 2/20 as not tolerating diet. SLP eval recommended pureed diet with mildly thickened liquids. S/p MBS on 2/23, cleared for regular diet.     Diet: Regular diet, carb controlled    RENAL: BUN/Cr stable, good urine output      Na Goal: Greater than 135     Matos: No     HEMATOLOGY: no signs of bleeding     DVT ppx: Heparin s.c [] LMWH [x]     ID: afebrile, no sign of infection    OTHER: All questions and concerns addressed with patient and family at bedside.    DISPOSITION: SHANE per PT/OT eval, medically cleared for discharge    CORE MEASURES:        Admission NIHSS: 3     TPA: [] YES [x] NO      LDL/HDL: 80/40     Depression Screen: p     Statin Therapy: y     Dysphagia Screen: [x] PASS [] FAIL     Smoking [] YES [x] NO      Afib [] YES [x] NO     Stroke Education [] YES [] NO - p

## 2024-02-23 NOTE — SWALLOW VFSS/MBS ASSESSMENT ADULT - ROSENBEK'S PENETRATION ASPIRATION SCALE
(3) contrast remains above the vocal cords, visible residue remains (penetration) (2) contrast enters airway, remains above the vocal cords, no residue remains (penetration) (1) no aspiration, contrast does not enter airway w/ use of straw. PAS 2 (Single cup and consecutive cup sips)/(3) contrast remains above the vocal cords, visible residue remains (penetration)

## 2024-02-23 NOTE — SWALLOW VFSS/MBS ASSESSMENT ADULT - LARYNGEAL PENETRATION DURING THE SWALLOW - SILENT
Shallow penetration during the swallow from spillover the arytenoids. Trace residue remaining along laryngeal surface of epiglottic cleared with spontaneous re-swallow./Trace Penetration noted 1x on trial of regular solids w/o lower dentures from material descending along the laryngeal surface of the epiglottis. Material completely retrieved during the swallow./Trace Shallow laryngeal penetration during the swallow from spillover the arytenoids with complete retrieval./Trace

## 2024-02-23 NOTE — SWALLOW VFSS/MBS ASSESSMENT ADULT - ORAL PREP COMMENTS
Reduced bolus formation. Adequate bolus manipulation and formation. Slow but adequate mastication with reduced bolus formation. Pt reported eating w/o lower dentures therefore dentures removed for additional trials of regular solids. Pt noted with reduced mastication efficiency requiring additional time to masticate regular solids and reduced bolus formation.

## 2024-02-23 NOTE — SWALLOW VFSS/MBS ASSESSMENT ADULT - COMMENTS
LKW: 2/18 11AM  NIHSS: 3 (2 for right arm drift and 1 for dysarthria)  Baseline MRS: 4  Not a Tenecteplase candidate due to out of window  Not a thrombectomy candidate due to  out of window  2/20-Neurosurgery consulted; possible intervention pending further discussion. Opthalmology consulted for decreased vision. Patient with 20/100 vision in each eye individually. Dysphagia screen passed, but made NPO overnight 2/20 as not tolerating diet.  2/21- Provider contact note stating change in neuro assessment; pt no longer following commands, very lethargic and difficult to arouse, pupils are now unequal.  2/22- S/p diagnostic angio w/ no hemodynamically significant stenosis.  Imaging:  MRI Head-Significant bilateral carotid stenosis without evidence of acute infarct. Noninvasive flow MR angiography as above.  CT HEAD: No acute intracranial hemorrhage. Gray/white matter differentiation is preserved.  CTA NECK: High-grade (80-90%) stenosis of the LEFT internal carotid artery due to circumferential calcifications. Critical stenosis (greater than 95%) of the distal RIGHT common carotid artery and origin of the RIGHT internal carotid artery due to extensive calcified plaque.  CTA HEAD: Moderate stenosis of bilateral precavernous and cavernous internal carotid arteries. No evidence of aneurysm. Tiny aneurysms can be beyond the resolution of CTA technique. 3.6 cm hypodense lesion in the left thyroid lobe.    *Patient known to this service. Speech-language evaluation completed 2/27/22 revealed mildly dysarthric speech and cognitive-linguistic deficits. Bedside swallow evaluation completed 2/21/24 revealing evidence of an loren-pharyngeal dysphagia with recommendations for puree/mildly thickened liquid diet and instrumental evaluation to r/o aspiration. *

## 2024-02-23 NOTE — SWALLOW VFSS/MBS ASSESSMENT ADULT - ORAL PHASE
Adequate A-P transport with premature spillover to the pyriform sinuses. Trace residue remaining in the oral cavity after the swallow cleared with spontaneous re-swallow. Timely A-P bolus transport with pre-mature spillover to the valleculae.  Trace-mild residue remaining in the oral cavity cleared with spontaneous repeat swallow. Reduced control for A-P transport with mod bolus spillover to the pyriform sinuses. Trace residue remaining in the oral cavity after the swallow. Reduced organization of A-P transport with spillover to the pyriform sinuses.  Mild residue remaining in the oral cavity after the swallow reduced to trace amounts with re-swallows.

## 2024-02-23 NOTE — SWALLOW VFSS/MBS ASSESSMENT ADULT - CONSISTENCIES ADMINISTERED
thin liquid moderately thick mildly thick pureed minced & moist/soft & bite-sized/easy to chew/regular solid

## 2024-02-23 NOTE — SWALLOW VFSS/MBS ASSESSMENT ADULT - ORAL PHASE COMMENTS
Timely A-P transport with premature spillover of material to the pyriform sinuses. Mild residue remaining in the oral cavity after the swallow cleared with spontaneous re-swallow.

## 2024-02-23 NOTE — SWALLOW VFSS/MBS ASSESSMENT ADULT - DIAGNOSTIC IMPRESSIONS
63Y W w/ PMHx HTN, DM, HLD, CAD, PAD s/p L popliteal angioplasty and atherectomy 12/23, B/L ICA stenosis, prior CVA w/ L side weakness, Parkinson's disease, A&Ox2-3 presenting with headache, dizziness, blurred vision, right arm weakness. Found to have R corona radiata infarct on MRI brain. Patient presenting with loren-pharyngeal dysphagia. Oral phase notable for mildly reduced oral management with need for additional time to efficiently masticate regular solids when lower dentures are removed. Pharyngeal phase notable for delayed initiation of pharyngeal swallow, reduced laryngeal closure, and reduced pharyngeal contractility. Trace laryngeal penetration with complete retrieval on moderately thickened liquids and 1x trial of regular solids w/o lower dentures. Trace laryngeal penetration occurring during the swallow without complete retrieval on cup sips of thin and mildly thickened liquids and Residue noted along the laryngeal surface of the epiglottis and successfully cleared with verbal cue to perform cough/throat clear and re-swallow. No aspiration events noted. This service will continue to follow patient in house for diet monitor and patient/family education.    Disorders: reduced mastication efficiency i/s/o ill-fitting dentures, delay in trigger of the swallow reflex, reduced laryngeal closure, reduced pharyngeal contractility, reduced supraglottic sensation. 63Y W w/ PMHx HTN, DM, HLD, CAD, PAD s/p L popliteal angioplasty and atherectomy 12/23, B/L ICA stenosis, prior CVA w/ L side weakness, Parkinson's disease, A&Ox2-3 presenting with headache, dizziness, blurred vision, right arm weakness. Found to have R corona radiata infarct on MRI brain. Patient presenting with loren-pharyngeal dysphagia. Oral phase notable for mildly reduced oral management with need for additional time to efficiently masticate regular solids when lower dentures are removed. Pharyngeal phase notable for delayed initiation of pharyngeal swallow, reduced laryngeal closure, and reduced pharyngeal contractility. Trace shallow laryngeal penetration with complete retrieval on thickened and thin liquids via single and consecutive cup sips. Trace laryngeal penetration occurring during the swallow without complete retrieval on mildly thickened liquids via straw. Residue noted along the laryngeal surface of the epiglottis and successfully cleared with verbal cue to perform cough/throat clear and re-swallow. No aspiration events noted. This service will continue to follow patient in house for diet monitor and patient/family education.    Disorders: reduced mastication efficiency i/s/o ill-fitting dentures, delay in trigger of the swallow reflex, reduced laryngeal closure, reduced pharyngeal contractility, reduced supraglottic sensation.

## 2024-02-23 NOTE — SWALLOW VFSS/MBS ASSESSMENT ADULT - RESIDUE IN VALLECULAE
Reduced to trace amounts with spontaneous re-swallow./Mild Trace Cleared with spontaneous re-swallow./Mild

## 2024-02-23 NOTE — PROGRESS NOTE ADULT - NS ATTEND AMEND GEN_ALL_CORE FT
I saw and examined the patient, reviewed diagnostic studies, and reviewed images personally. I agree with PA’s history, exam, orders placed, and plan of care. Medical issues needing to be addressed include: cerebral ischemia, hx of known R ICA/L ICA stenosis, prior stroke with L side weakness, HTN, DM2, HLD, CAD s/p 3 stents, most recent in 2022, cognitive impairment/mild dementia, ?hx of parkinsons, B/L cataracts s/p surgery, new onset R side weakness and intermittent vision loss L>R. s/p angio, no stent as L ICA stenosis <50%. R carotid ~ 50%. Will pursue medical manamagement with DAPT, statin. PT/OT/ST. DISPO:NADYA

## 2024-02-23 NOTE — SWALLOW VFSS/MBS ASSESSMENT ADULT - SLP PERTINENT HISTORY OF CURRENT PROBLEM
63Y W w/ PMHx significant for HTN, DM, HLD, CAD(s/p 3 stents placed at [facility] in 2014 to the prox LAD and then 2022),  PAD s/p L popliteal angioplasty and atherectomy 12/23, R ICA and L ICA stenosis, prior stroke with L side weakness, Parkinson's disease, B/L cataract surgery with blurred vision at baseline who presents with headache, dizziness, blurred vision, right arm weakness. Patient was last seen well on 2/18 at 11AM. Family reports she woke up with these symptoms on 2/19. Per family, she is A&Ox2 at baseline, uses walker and assistance to walk. She has frequent falls per family, gait instability, and drags her feet while walking. She carries diagnosis of Parkinson's and is on Sinemet but has not yet been evaluated by movement disorder specialist. Patient has been seen previously by Neurology due to concern for falls and AMS. During evaluations in 2022 and 2023, she was found to have B/L ICA stenosis. Also was found to have R corona radiata infarct on MRI brain.

## 2024-02-24 LAB
GLUCOSE BLDC GLUCOMTR-MCNC: 147 MG/DL — HIGH (ref 70–99)
GLUCOSE BLDC GLUCOMTR-MCNC: 176 MG/DL — HIGH (ref 70–99)
GLUCOSE BLDC GLUCOMTR-MCNC: 186 MG/DL — HIGH (ref 70–99)
GLUCOSE BLDC GLUCOMTR-MCNC: 276 MG/DL — HIGH (ref 70–99)
SARS-COV-2 RNA SPEC QL NAA+PROBE: SIGNIFICANT CHANGE UP

## 2024-02-24 PROCEDURE — 99233 SBSQ HOSP IP/OBS HIGH 50: CPT

## 2024-02-24 RX ORDER — ACETAMINOPHEN 500 MG
650 TABLET ORAL EVERY 6 HOURS
Refills: 0 | Status: DISCONTINUED | OUTPATIENT
Start: 2024-02-24 | End: 2024-02-27

## 2024-02-24 RX ADMIN — Medication 1: at 16:24

## 2024-02-24 RX ADMIN — GABAPENTIN 100 MILLIGRAM(S): 400 CAPSULE ORAL at 22:23

## 2024-02-24 RX ADMIN — Medication 650 MILLIGRAM(S): at 16:25

## 2024-02-24 RX ADMIN — ISOSORBIDE MONONITRATE 30 MILLIGRAM(S): 60 TABLET, EXTENDED RELEASE ORAL at 11:17

## 2024-02-24 RX ADMIN — ENOXAPARIN SODIUM 40 MILLIGRAM(S): 100 INJECTION SUBCUTANEOUS at 11:17

## 2024-02-24 RX ADMIN — Medication 650 MILLIGRAM(S): at 02:09

## 2024-02-24 RX ADMIN — Medication 1 DROP(S): at 17:12

## 2024-02-24 RX ADMIN — Medication 650 MILLIGRAM(S): at 16:51

## 2024-02-24 RX ADMIN — Medication 650 MILLIGRAM(S): at 02:39

## 2024-02-24 RX ADMIN — Medication 81 MILLIGRAM(S): at 11:16

## 2024-02-24 RX ADMIN — ATORVASTATIN CALCIUM 80 MILLIGRAM(S): 80 TABLET, FILM COATED ORAL at 22:23

## 2024-02-24 RX ADMIN — FAMOTIDINE 20 MILLIGRAM(S): 10 INJECTION INTRAVENOUS at 11:17

## 2024-02-24 RX ADMIN — GABAPENTIN 100 MILLIGRAM(S): 400 CAPSULE ORAL at 11:17

## 2024-02-24 RX ADMIN — Medication 3: at 11:54

## 2024-02-24 RX ADMIN — GABAPENTIN 100 MILLIGRAM(S): 400 CAPSULE ORAL at 05:35

## 2024-02-24 RX ADMIN — CLOPIDOGREL BISULFATE 75 MILLIGRAM(S): 75 TABLET, FILM COATED ORAL at 11:16

## 2024-02-24 RX ADMIN — CARBIDOPA AND LEVODOPA 1 TABLET(S): 25; 100 TABLET ORAL at 11:16

## 2024-02-24 NOTE — PROGRESS NOTE ADULT - ASSESSMENT
Patient is a 63 year old female with a PMHx of  HTN, DM, HLD, CAD (s/p 3 stents placed at [facility] in 2014 to the prox LAD and then in 2022 - on ASA and Plavix), PAD s/p L popliteal angioplasty and atherectomy 12/23, R ICA and L ICA stenosis, prior stroke with L side weakness, Parkinson's disease, B/L cataract surgery with blurred vision at baseline who presents with headache, dizziness, blurred vision, and right arm weakness. As per documentation and neurology, patient was last seen well on 2/18 at 11 AM. Per documentation, patient reportedly awoke on 2/19 with these symptoms. Per neuro, she is A&Ox2 at baseline, uses walker and assistance to walk. Per family, she has frequent falls, gait instability, and drags her feet while walking. Patient is on Sinemet for Parkinson's disease and is pending evaluation by movement disorder specialist per family. Patient has been seen previously by Neurology due to concern for falls and AMS. Per neurology, she was found to have B/L ICA stenosis and was also found to have R corona radiata infarct on MRI brain. Patient was not a Tenecteplase candidate or a thrombectomy candidate due to being out of window as per neurology. Internal Medicine has been consulted on Ms. Llamas's care for medical management.     Carotid Stenosis   - Pt with history of B/L ICA Stenosis and R Coe Radiata infarct   - CT, CT A H/N w/ high- grade 80-90% stenosis of the L ICA, > 95% stenosis on the R, Moderate stenosis  - MR w/ Significant B/L carotid stenosis without evidence of acute infarct   - CD w/ R external carotid artery stenosis, negative for significant ICA stenosis,   - TTE w/ Neg for shunt, hyperdynamic LVSF, No WM, trace MR, trace TR, trace [place],   - On DAPT w/ ASA and Plavix   - Lipitor 80   - Planned for Angio with possible stent w/ NSGY   - Monitor on telemetry   - Neuro checks as per protocol   - Fall, Seizure and Aspiration precautions  - PT / OT / S+S   - Per stroke neurology / NSGY     Pre-OP Risk Stratification   - Pt with RCRI score of 4  - Pt is deemed to have elevated risk for neurosurgical intervention. Cardio clearance as per cardiology.     Blurry vision   - Likely 2/2 carotid stenosis   - Optho eval appreciated; F/u recs    Type II DM   - A1C of 7.2  - Hold home PO Medications while admitted  - C/w sliding scale   - Diabetic DASH diet  - Monitor and adjust glucose levels as tolerated     CAD  - S/P 3 stents   - C/w DAPT and Statin   - Repeat TTE as above    PAD  - S/P L popliteal angioplasty and atherectomy 12/23  - On DAPT and Statin therapies     HTN   - On Imdur 30 PO Qd  - Monitor BP, VS and patient closely    Parkinson's disease  - On Sinemet   - Pending evaluation by movement disorder specialist per family. Outpatient follow up with both specialist and neurology upon DC   - Fall, Seizure and Aspiration precautions     HLD  - Lipitor 80   - Diet and lifestyle modifications     Thyroid lesion   - CT w/ 3.6 CM hypodense thyroid lesion   - TSH WNL  - Check thyroid US for further evaluation     PPX  - Lovenox

## 2024-02-24 NOTE — PROGRESS NOTE ADULT - SUBJECTIVE AND OBJECTIVE BOX
Name of Patient : ALYSE AVENDAÑO  MRN: [id number]  Date of visit: 02-24-24 @ 19:57      Subjective: Patient seen and examined. No new events except as noted.     REVIEW OF SYSTEMS:    CONSTITUTIONAL: No weakness, fevers or chills  EYES/ENT: No visual changes;  No vertigo or throat pain   NECK: No pain or stiffness  RESPIRATORY: No cough, wheezing, hemoptysis; No shortness of breath  CARDIOVASCULAR: No chest pain or palpitations  GASTROINTESTINAL: No abdominal or epigastric pain. No nausea, vomiting, or hematemesis; No diarrhea or constipation. No melena or hematochezia.  GENITOURINARY: No dysuria, frequency or hematuria  NEUROLOGICAL: No numbness or weakness  SKIN: No itching, burning, rashes, or lesions   All other review of systems is negative unless indicated above.    MEDICATIONS:  MEDICATIONS  (STANDING):  acetaminophen   IVPB .. 1000 milliGRAM(s) IV Intermittent once  acetaminophen   IVPB .. 1000 milliGRAM(s) IV Intermittent once  artificial  tears Solution 1 Drop(s) Both EYES two times a day  aspirin enteric coated 81 milliGRAM(s) Oral daily  atorvastatin 80 milliGRAM(s) Oral at bedtime  carbidopa/levodopa  25/100 1 Tablet(s) Oral daily  clopidogrel Tablet 75 milliGRAM(s) Oral daily  dextrose 5%. 1000 milliLiter(s) (100 mL/Hr) IV Continuous <Continuous>  dextrose 5%. 1000 milliLiter(s) (50 mL/Hr) IV Continuous <Continuous>  dextrose 50% Injectable 25 Gram(s) IV Push once  dextrose 50% Injectable 25 Gram(s) IV Push once  dextrose 50% Injectable 12.5 Gram(s) IV Push once  enoxaparin Injectable 40 milliGRAM(s) SubCutaneous every 24 hours  famotidine    Tablet 20 milliGRAM(s) Oral daily  gabapentin 100 milliGRAM(s) Oral three times a day  glucagon  Injectable 1 milliGRAM(s) IntraMuscular once  influenza  Vaccine (HIGH DOSE) 0.7 milliLiter(s) IntraMuscular once  insulin lispro (ADMELOG) corrective regimen sliding scale   SubCutaneous at bedtime  insulin lispro (ADMELOG) corrective regimen sliding scale   SubCutaneous three times a day before meals  isosorbide   mononitrate ER Tablet (IMDUR) 30 milliGRAM(s) Oral daily      PHYSICAL EXAM:  T(C): 37.3 (02-24-24 @ 17:03), Max: 37.3 (02-24-24 @ 17:03)  HR: 91 (02-24-24 @ 17:03) (68 - 91)  BP: 153/82 (02-24-24 @ 17:03) (134/75 - 173/92)  RR: 18 (02-24-24 @ 17:03) (18 - 18)  SpO2: 93% (02-24-24 @ 17:03) (93% - 97%)  Wt(kg): --  I&O's Summary        Appearance: Normal	  HEENT:  PERRLA   Lymphatic: No lymphadenopathy   Cardiovascular: Normal S1 S2, no JVD  Respiratory: normal effort , clear  Gastrointestinal:  Soft, Non-tender  Skin: No rashes,  warm to touch  Psychiatry:  Mood & affect appropriate  Musculuskeletal: No edema    recent labs, Imaging and EKGs personally reviewed

## 2024-02-24 NOTE — PROGRESS NOTE ADULT - SUBJECTIVE AND OBJECTIVE BOX
THE PATIENT WAS SEEN AND EXAMINED BY ME WITH THE HOUSESTAFF AND STROKE TEAM DURING MORNING ROUNDS.   HPI:  63y (1960) right handed woman with a PMHx significant for HTN, DM, HLD, CAD(s/p 3 stents placed at [facility] in 2014 to the prox LAD and then 2022),  PAD s/p L popliteal angioplasty and atherectomy 12/23, R ICA and L ICA stenosis, prior stroke with L side weakness, ?Parkinson's disease, B/L cataract surgery with blurred vision at baseline who presented with headache, dizziness, blurred vision and intermittent vision loss L>R, right arm weakness. Patient was last seen well on 2/18 at 11AM. Family reports she woke up with these symptoms on 2/19. Per family, she is A&Ox2 at baseline, uses walker and assistance to walk. She has frequent falls per family, gait instability, and drags her feet while walking. She carries diagnosis of Parkinson's and is on Sinemet but has not yet been evaluated by movement disorder specialist. Patient has been seen previously by Neurology due to concern for falls and AMS. During evaluations in 2022 and 2023, she was found to have B/L ICA stenosis. Also was found to have R corona radiata infarct on MRI brain.   Carotid duplex (08.01.23) showed moderate, 50-69% stenosis of the right internal carotid artery and no hemodynamically significant stenosis of left carotid artery. She was seen by vascular surgery who recommended to continue DAPT and was to be re-assessed in 6 months.   Patient has also been seen in the past by Neurology for headaches (2021) which appeared poorly controlled. LKW: 2/18 11AM. On admission: NIHSS: 3 (2 for right arm drift and 1 for dysarthria)  Baseline MRS: 4. Not a Tenecteplase candidate due to out of window. Not a thrombectomy candidate due to  out of window      SUBJECTIVE: No events overnight.  No new neurologic complaints, ROS reported negative unless otherwise noted.      acetaminophen     Tablet .. 650 milliGRAM(s) Oral every 6 hours PRN  acetaminophen   IVPB .. 1000 milliGRAM(s) IV Intermittent once  acetaminophen   IVPB .. 1000 milliGRAM(s) IV Intermittent once  aspirin enteric coated 81 milliGRAM(s) Oral daily  atorvastatin 80 milliGRAM(s) Oral at bedtime  carbidopa/levodopa  25/100 1 Tablet(s) Oral daily  clopidogrel Tablet 75 milliGRAM(s) Oral daily  dextrose 5%. 1000 milliLiter(s) IV Continuous <Continuous>  dextrose 5%. 1000 milliLiter(s) IV Continuous <Continuous>  dextrose 50% Injectable 12.5 Gram(s) IV Push once  dextrose 50% Injectable 25 Gram(s) IV Push once  dextrose 50% Injectable 25 Gram(s) IV Push once  dextrose Oral Gel 15 Gram(s) Oral once PRN  enoxaparin Injectable 40 milliGRAM(s) SubCutaneous every 24 hours  famotidine    Tablet 20 milliGRAM(s) Oral daily  gabapentin 100 milliGRAM(s) Oral three times a day  glucagon  Injectable 1 milliGRAM(s) IntraMuscular once  influenza  Vaccine (HIGH DOSE) 0.7 milliLiter(s) IntraMuscular once  insulin lispro (ADMELOG) corrective regimen sliding scale   SubCutaneous three times a day before meals  insulin lispro (ADMELOG) corrective regimen sliding scale   SubCutaneous at bedtime  isosorbide   mononitrate ER Tablet (IMDUR) 30 milliGRAM(s) Oral daily      PHYSICAL EXAM:   Vital Signs Last 24 Hrs  T(C): 37.1 (24 Feb 2024 12:40), Max: 37.1 (24 Feb 2024 12:40)  T(F): 98.7 (24 Feb 2024 12:40), Max: 98.7 (24 Feb 2024 12:40)  HR: 80 (24 Feb 2024 12:40) (68 - 97)  BP: 147/89 (24 Feb 2024 12:40) (134/75 - 173/92)  BP(mean): --  RR: 18 (24 Feb 2024 12:40) (18 - 18)  SpO2: 97% (24 Feb 2024 12:40) (95% - 97%)    Parameters below as of 24 Feb 2024 12:40  Patient On (Oxygen Delivery Method): room air        General: No acute distress  HEENT: EOM intact, visual fields full  Abdomen: Soft, nontender, nondistended   Extremities: No edema    NEUROLOGICAL EXAM:  Mental status: Eyes open, awake, alert, oriented to person, place, and month, not year. Follows 1 and 2 step crossed commands. Attention/concentration, recent and remote memory and fund of knowledge with mild impairment   Language: Speech fluent, repetition and naming intact.   Cranial nerves -No facial droop, VFF, EOMI in lateral gaze but no upward or downgaze noted.   Motor exam:  No drifts RUE 5/5, RLE 5/5, LUE 5/5, LLE 5/5  Sensation: Intact to light touch   Coordination/ Gait: No dysmetria, Gait deferred.      Labs:    None       IMAGING: Reviewed by me.     MRI/MRA/NOVA w/o 2/20:  RCCA 283, MARCO ANTONIO 131, RMCA 90, RACA2 61  LCCA 312, LICA 166, LMCA 102, LACA 110, LACA2 59  RVA 57, LVA 51, BA 46, RPCA 49, RPCOM 43 anterior to posterior, LPCA 32  Significant bilateral carotid stenosis without evidence of acute infarct. Noninvasive flow MR angiography as above.     CT HEAD: No acute intracranial hemorrhage. Gray/white matter differentiation is preserved. High-grade (80-90%) stenosis of the LEFT internal carotid artery due to circumferential calcifications. Critical stenosis (greater than 95%) of the distal RIGHT common carotid artery and origin of the RIGHT internal carotid artery due to extensive calcified plaque.    CTA HEAD: Moderate stenosis of bilateral precavernous and cavernous internal carotid arteries. No evidence of aneurysm. Tiny aneurysms can be beyond the resolution of CTA technique. 3.6 cm hypodense lesion in the left thyroid lobe.    MR Head w/wo IV Cont (08.02.23 @ 11:59)  No evidence of acute infarct, hemorrhage, or mass lesion. There is a chronic lacunar infarct in the right anterior corona radiata. There are multiple small foci of T2/FLAIR hyperintense signal in the periventricular white matter, suggestive of mild chronic microvascular ischemic changes. There is no evidence of abnormal enhancement.    VA Duplex Carotid, Bilat (08.01.23 @ 11:57) There is a moderate, 50-69% stenosis of the right internal carotid artery. No hemodynamically significant left carotid artery stenosis is identified.     THE PATIENT WAS SEEN AND EXAMINED BY ME WITH THE HOUSESTAFF AND STROKE TEAM DURING MORNING ROUNDS.   HPI:  63y (1960) right handed woman with a PMHx significant for HTN, DM, HLD, CAD(s/p 3 stents placed at [facility] in 2014 to the prox LAD and then 2022),  PAD s/p L popliteal angioplasty and atherectomy 12/23, R ICA and L ICA stenosis, prior stroke with L side weakness, ?Parkinson's disease, B/L cataract surgery with blurred vision at baseline who presented with headache, dizziness, blurred vision and intermittent vision loss L>R, right arm weakness. Patient was last seen well on 2/18 at 11AM. Family reports she woke up with these symptoms on 2/19. Per family, she is A&Ox2 at baseline, uses walker and assistance to walk. She has frequent falls per family, gait instability, and drags her feet while walking. She carries diagnosis of Parkinson's and is on Sinemet but has not yet been evaluated by movement disorder specialist. Patient has been seen previously by Neurology due to concern for falls and AMS. During evaluations in 2022 and 2023, she was found to have B/L ICA stenosis. Also was found to have R corona radiata infarct on MRI brain.   Carotid duplex (08.01.23) showed moderate, 50-69% stenosis of the right internal carotid artery and no hemodynamically significant stenosis of left carotid artery. She was seen by vascular surgery who recommended to continue DAPT and was to be re-assessed in 6 months.   Patient has also been seen in the past by Neurology for headaches (2021) which appeared poorly controlled. LKW: 2/18 11AM. On admission: NIHSS: 3 (2 for right arm drift and 1 for dysarthria)  Baseline MRS: 4. Not a Tenecteplase candidate due to out of window. Not a thrombectomy candidate due to  out of window      SUBJECTIVE: No events overnight.  No new neurologic complaints, ROS reported negative unless otherwise noted.      acetaminophen     Tablet .. 650 milliGRAM(s) Oral every 6 hours PRN  acetaminophen   IVPB .. 1000 milliGRAM(s) IV Intermittent once  acetaminophen   IVPB .. 1000 milliGRAM(s) IV Intermittent once  aspirin enteric coated 81 milliGRAM(s) Oral daily  atorvastatin 80 milliGRAM(s) Oral at bedtime  carbidopa/levodopa  25/100 1 Tablet(s) Oral daily  clopidogrel Tablet 75 milliGRAM(s) Oral daily  dextrose 5%. 1000 milliLiter(s) IV Continuous <Continuous>  dextrose 5%. 1000 milliLiter(s) IV Continuous <Continuous>  dextrose 50% Injectable 12.5 Gram(s) IV Push once  dextrose 50% Injectable 25 Gram(s) IV Push once  dextrose 50% Injectable 25 Gram(s) IV Push once  dextrose Oral Gel 15 Gram(s) Oral once PRN  enoxaparin Injectable 40 milliGRAM(s) SubCutaneous every 24 hours  famotidine    Tablet 20 milliGRAM(s) Oral daily  gabapentin 100 milliGRAM(s) Oral three times a day  glucagon  Injectable 1 milliGRAM(s) IntraMuscular once  influenza  Vaccine (HIGH DOSE) 0.7 milliLiter(s) IntraMuscular once  insulin lispro (ADMELOG) corrective regimen sliding scale   SubCutaneous three times a day before meals  insulin lispro (ADMELOG) corrective regimen sliding scale   SubCutaneous at bedtime  isosorbide   mononitrate ER Tablet (IMDUR) 30 milliGRAM(s) Oral daily      PHYSICAL EXAM:   Vital Signs Last 24 Hrs  T(C): 37.1 (24 Feb 2024 12:40), Max: 37.1 (24 Feb 2024 12:40)  T(F): 98.7 (24 Feb 2024 12:40), Max: 98.7 (24 Feb 2024 12:40)  HR: 80 (24 Feb 2024 12:40) (68 - 97)  BP: 147/89 (24 Feb 2024 12:40) (134/75 - 173/92)  BP(mean): --  RR: 18 (24 Feb 2024 12:40) (18 - 18)  SpO2: 97% (24 Feb 2024 12:40) (95% - 97%)    Parameters below as of 24 Feb 2024 12:40  Patient On (Oxygen Delivery Method): room air        General: No acute distress  HEENT: EOM intact, visual fields full, decreased visual acuity L>R  Abdomen: Soft, nontender, nondistended   Extremities: No edema    NEUROLOGICAL EXAM:  Mental status: Eyes open, awake, alert, oriented to person, place, and month, not year. Follows 1 and 2 step crossed commands. Attention/concentration, recent and remote memory and fund of knowledge with mild impairment   Language: Speech fluent, repetition and naming intact.   Cranial nerves -No facial droop, VFF, decreased visual acuity L>R, EOMI in lateral gaze but no upward or downgaze noted.   Motor exam:  No drifts RUE 5/5, RLE 5/5, LUE 5/5, LLE 5/5  Sensation: Intact to light touch   Coordination/ Gait: No dysmetria, Gait deferred.      Labs:    None       IMAGING: Reviewed by me.     MRI/MRA/NOVA w/o 2/20:  RCCA 283, MARCO ANTONIO 131, RMCA 90, RACA2 61  LCCA 312, LICA 166, LMCA 102, LACA 110, LACA2 59  RVA 57, LVA 51, BA 46, RPCA 49, RPCOM 43 anterior to posterior, LPCA 32  Significant bilateral carotid stenosis without evidence of acute infarct. Noninvasive flow MR angiography as above.     CT HEAD: No acute intracranial hemorrhage. Gray/white matter differentiation is preserved. High-grade (80-90%) stenosis of the LEFT internal carotid artery due to circumferential calcifications. Critical stenosis (greater than 95%) of the distal RIGHT common carotid artery and origin of the RIGHT internal carotid artery due to extensive calcified plaque.    CTA HEAD: Moderate stenosis of bilateral precavernous and cavernous internal carotid arteries. No evidence of aneurysm. Tiny aneurysms can be beyond the resolution of CTA technique. 3.6 cm hypodense lesion in the left thyroid lobe.    MR Head w/wo IV Cont (08.02.23 @ 11:59)  No evidence of acute infarct, hemorrhage, or mass lesion. There is a chronic lacunar infarct in the right anterior corona radiata. There are multiple small foci of T2/FLAIR hyperintense signal in the periventricular white matter, suggestive of mild chronic microvascular ischemic changes. There is no evidence of abnormal enhancement.    VA Duplex Carotid, Bilat (08.01.23 @ 11:57) There is a moderate, 50-69% stenosis of the right internal carotid artery. No hemodynamically significant left carotid artery stenosis is identified.

## 2024-02-24 NOTE — PROGRESS NOTE ADULT - SUBJECTIVE AND OBJECTIVE BOX
DATE OF SERVICE: 02-24-24 @ 12:01    Patient is a 63y old  Female who presents with a chief complaint of R arm weakness, blurred vision, headache (23 Feb 2024 11:29)      INTERVAL HISTORY: no complaints    REVIEW OF SYSTEMS:  CONSTITUTIONAL: No weakness  EYES/ENT: No visual changes;  No throat pain   NECK: No pain or stiffness  RESPIRATORY: No cough, wheezing; No shortness of breath  CARDIOVASCULAR: No chest pain or palpitations  GASTROINTESTINAL: No abdominal  pain. No nausea, vomiting, or hematemesis  GENITOURINARY: No dysuria, frequency or hematuria  NEUROLOGICAL: No stroke like symptoms  SKIN: No rashes    	  MEDICATIONS:  isosorbide   mononitrate ER Tablet (IMDUR) 30 milliGRAM(s) Oral daily        PHYSICAL EXAM:  T(C): 36.6 (02-24-24 @ 09:05), Max: 37 (02-23-24 @ 12:21)  HR: 83 (02-24-24 @ 09:05) (68 - 97)  BP: 160/94 (02-24-24 @ 09:05) (131/77 - 173/92)  RR: 18 (02-24-24 @ 09:05) (18 - 18)  SpO2: 96% (02-24-24 @ 09:05) (94% - 96%)  Wt(kg): --  I&O's Summary        Appearance: In no distress	  HEENT:    PERRL, EOMI	  Cardiovascular:  S1 S2, No JVD  Respiratory: Lungs clear to auscultation	  Gastrointestinal:  Soft, Non-tender, + BS	  Vascularature:  No edema of LE  Psychiatric: Appropriate affect   Neuro: no acute focal deficits                     Labs personally reviewed      ASSESSMENT/PLAN: 	        Patient is a 63 year old female with a PMHx of  HTN, DM, HLD, CAD (s/p 3 stents placed at [facility] in 2014 to the prox LAD and then in 2022 - on ASA and Plavix), PAD s/p L popliteal angioplasty and atherectomy 12/23, R ICA and L ICA stenosis, prior stroke with L side weakness, Parkinson's disease, B/L cataract surgery with blurred vision at baseline who presents with headache, dizziness, blurred vision, and right arm weakness. Patient was not a Tenecteplase candidate or a thrombectomy candidate due to being out of window as per neurology.    1. Stroke  - not a Tenecteplase candidate or a thrombectomy candidate due to being out of window as per neurology  - TTE shows negative for intracardiac shunt  - No hx of AF--> cont to monitor on tele  - CTA Head shows severe B/L carotid stenosis  - c/w ASA 81mg PO and atorvastatin 80mg PO daily  - Appreciate Neuro and NSGx recommendations, ?CEA  - ILR if stroke not thought to be due to carotid dz    2. Hypertension  - Permissive BP as per Neuro  - c/w Imdur 30mg PO daily    3. Coronary Artery Disease  - ECG with no acute ischemia noted  - s/p 3 stents placed at [facility] in 2014 to the prox LAD and then in 2022  - TTE with preserved EF and no WMA  - c/w ASA, Plavix and statin    4. HLD  - Cholesterol panel noted  - c/w atorvastatin 80mg PO daily    5. Cardiac Risk Stratification  - ECG with no ischemia noted.   - Not in decompensated HF  - No hx of tachy gibson arrhythmia  - No severe AS/MS  - Limited functional capacity 2/2 Parkison's  - Elevated risk for low risk cerebral angio, no cardiac contraindication to proceed   - Cerebral angio with no significant stenosis            HANS Medrano DO [facility]  Cardiovascular Medicine  [address], [address]  Office: [phone]  Available via call/text on [employer] Teams  DATE OF SERVICE: 02-24-24 @ 12:01    Patient is a 63y old  Female who presents with a chief complaint of R arm weakness, blurred vision, headache (23 Feb 2024 11:29)      INTERVAL HISTORY: no complaints    REVIEW OF SYSTEMS:  CONSTITUTIONAL: No weakness  EYES/ENT: No visual changes;  No throat pain   NECK: No pain or stiffness  RESPIRATORY: No cough, wheezing; No shortness of breath  CARDIOVASCULAR: No chest pain or palpitations  GASTROINTESTINAL: No abdominal  pain. No nausea, vomiting, or hematemesis  GENITOURINARY: No dysuria, frequency or hematuria  NEUROLOGICAL: No stroke like symptoms  SKIN: No rashes    	  MEDICATIONS:  isosorbide   mononitrate ER Tablet (IMDUR) 30 milliGRAM(s) Oral daily        PHYSICAL EXAM:  T(C): 36.6 (02-24-24 @ 09:05), Max: 37 (02-23-24 @ 12:21)  HR: 83 (02-24-24 @ 09:05) (68 - 97)  BP: 160/94 (02-24-24 @ 09:05) (131/77 - 173/92)  RR: 18 (02-24-24 @ 09:05) (18 - 18)  SpO2: 96% (02-24-24 @ 09:05) (94% - 96%)  Wt(kg): --  I&O's Summary        Appearance: In no distress	  HEENT:    PERRL, EOMI	  Cardiovascular:  S1 S2, No JVD  Respiratory: Lungs clear to auscultation	  Gastrointestinal:  Soft, Non-tender, + BS	  Vascularature:  No edema of LE  Psychiatric: Appropriate affect   Neuro: no acute focal deficits                     Labs personally reviewed      ASSESSMENT/PLAN: 	        Patient is a 63 year old female with a PMHx of  HTN, DM, HLD, CAD (s/p 3 stents placed at [facility] in 2014 to the prox LAD and then in 2022 - on ASA and Plavix), PAD s/p L popliteal angioplasty and atherectomy 12/23, R ICA and L ICA stenosis, prior stroke with L side weakness, Parkinson's disease, B/L cataract surgery with blurred vision at baseline who presents with headache, dizziness, blurred vision, and right arm weakness. Patient was not a Tenecteplase candidate or a thrombectomy candidate due to being out of window as per neurology.    1. Stroke  - not a Tenecteplase candidate or a thrombectomy candidate due to being out of window as per neurology  - TTE shows negative for intracardiac shunt  - No hx of AF--> cont to monitor on tele  - CTA Head shows severe B/L carotid stenosis  - c/w ASA 81mg PO and atorvastatin 80mg PO daily  - Appreciate Neuro and NSGx recommendations, ?CEA  - ILR if stroke not thought to be due to carotid dz  - ILR to r/o occult AF     2. Hypertension  - Permissive BP as per Neuro  - c/w Imdur 30mg PO daily    3. Coronary Artery Disease  - ECG with no acute ischemia noted  - s/p 3 stents placed at [facility] in 2014 to the prox LAD and then in 2022  - TTE with preserved EF and no WMA  - c/w ASA, Plavix and statin    4. HLD  - Cholesterol panel noted  - c/w atorvastatin 80mg PO daily    5. Cardiac Risk Stratification  - ECG with no ischemia noted.   - Not in decompensated HF  - No hx of tachy gibson arrhythmia  - No severe AS/MS  - Limited functional capacity 2/2 Parkison's  - Elevated risk for low risk cerebral angio, no cardiac contraindication to proceed   - Cerebral angio with no significant stenosis            HANS Medrano DO [facility]  Cardiovascular Medicine  [address], [address]  Office: [phone]  Available via call/text on [employer] Teams  DATE OF SERVICE: 02-24-24 @ 12:01    Patient is a 63y old  Female who presents with a chief complaint of R arm weakness, blurred vision, headache (23 Feb 2024 11:29)      INTERVAL HISTORY: no complaints    REVIEW OF SYSTEMS:  CONSTITUTIONAL: No weakness  EYES/ENT: No visual changes;  No throat pain   NECK: No pain or stiffness  RESPIRATORY: No cough, wheezing; No shortness of breath  CARDIOVASCULAR: No chest pain or palpitations  GASTROINTESTINAL: No abdominal  pain. No nausea, vomiting, or hematemesis  GENITOURINARY: No dysuria, frequency or hematuria  NEUROLOGICAL: No stroke like symptoms  SKIN: No rashes    	  MEDICATIONS:  isosorbide   mononitrate ER Tablet (IMDUR) 30 milliGRAM(s) Oral daily        PHYSICAL EXAM:  T(C): 36.6 (02-24-24 @ 09:05), Max: 37 (02-23-24 @ 12:21)  HR: 83 (02-24-24 @ 09:05) (68 - 97)  BP: 160/94 (02-24-24 @ 09:05) (131/77 - 173/92)  RR: 18 (02-24-24 @ 09:05) (18 - 18)  SpO2: 96% (02-24-24 @ 09:05) (94% - 96%)  Wt(kg): --  I&O's Summary        Appearance: In no distress	  HEENT:    PERRL, EOMI	  Cardiovascular:  S1 S2, No JVD  Respiratory: Lungs clear to auscultation	  Gastrointestinal:  Soft, Non-tender, + BS	  Vascularature:  No edema of LE  Psychiatric: Appropriate affect   Neuro: no acute focal deficits                     Labs personally reviewed      ASSESSMENT/PLAN: 	    Patient is a 63 year old female with a PMHx of  HTN, DM, HLD, CAD (s/p 3 stents placed at [facility] in 2014 to the prox LAD and then in 2022 - on ASA and Plavix), PAD s/p L popliteal angioplasty and atherectomy 12/23, R ICA and L ICA stenosis, prior stroke with L side weakness, Parkinson's disease, B/L cataract surgery with blurred vision at baseline who presents with headache, dizziness, blurred vision, and right arm weakness. Patient was not a Tenecteplase candidate or a thrombectomy candidate due to being out of window as per neurology.    1. Stroke  - not a Tenecteplase candidate or a thrombectomy candidate due to being out of window as per neurology  - TTE shows negative for intracardiac shunt  - No hx of AF--> cont to monitor on tele  - CTA Head shows severe B/L carotid stenosis  - c/w ASA 81mg PO and atorvastatin 80mg PO daily  - Appreciate Neuro and NSGx recommendations, ?CEA  - ILR if stroke not thought to be due to carotid dz  - ILR to r/o occult AF     2. Hypertension  - Permissive BP as per Neuro  - c/w Imdur 30mg PO daily    3. Coronary Artery Disease  - ECG with no acute ischemia noted  - s/p 3 stents placed at [facility] in 2014 to the prox LAD and then in 2022  - TTE with preserved EF and no WMA  - c/w ASA, Plavix and statin    4. HLD  - Cholesterol panel noted  - c/w atorvastatin 80mg PO daily    5. Cardiac Risk Stratification  - ECG with no ischemia noted.   - Not in decompensated HF  - No hx of tachy gibson arrhythmia  - No severe AS/MS  - Limited functional capacity 2/2 Parkison's  - Elevated risk for low risk cerebral angio, no cardiac contraindication to proceed   - Cerebral angio with no significant stenosis            HANS Medrano DO [facility]  Cardiovascular Medicine  [address], [address]  Office: [phone]  Available via call/text on [employer] Teams

## 2024-02-24 NOTE — PROGRESS NOTE ADULT - NS ATTEND AMEND GEN_ALL_CORE FT
I saw and examined the patient, reviewed diagnostic studies, and reviewed images personally. I agree with PA’s history, exam, orders placed, and plan of care. Medical issues needing to be addressed include: cerebral ischemia, hx of known R ICA/L ICA stenosis, prior stroke with L side weakness, HTN, DM2, HLD, CAD s/p 3 stents, most recent in 2022, cognitive impairment/mild dementia, ?hx of parkinsons, B/L cataracts s/p surgery, new onset R side weakness and intermittent vision loss L>R. s/p angio, no stent as L ICA stenosis <50%. R carotid ~ 50%.  DAPT x 3 wks, then aspirin. statin. PT/OT/ST.

## 2024-02-24 NOTE — PROGRESS NOTE ADULT - ASSESSMENT
63y (1960) right handed woman with a PMHx significant for HTN, DM, HLD, CAD(s/p 3 stents placed at [facility] in 2014 to the prox LAD and then 2022),  PAD s/p L popliteal angioplasty and atherectomy 12/23, ?Parkinson's disease, B/L cataract surgery with blurred vision at baseline who presented with hx of known R ICA/L ICA stenosis, prior stroke with L side weakness presented with new onset R side weakness and vision changes. Pt was not able to provide detailed history, but family able to. Reported her vision has been better since the cataract surgeries, but recently she would occasionally complain of transient vision deficits. Unclear exact onset, but pt is complaining now of some vision loss in L>R that seems persistent.    Impression: R arm weakness with MRI negative for infarct. Likely TIA in the setting of bilateral carotid stenosis.     NEURO: Continue close monitoring for neurologic deterioration, permissive HTN to gradual normotension, titrate statin to LDL goal less than 70, MRI Brain w/o, MRA Head w/o and Neck w/contrast, results as noted above. Physical therapy/OT recommending SHANE.  Neurosurgery consulted: s/p cerebral angiogram which showed no significant left carotid stenosis    ANTITHROMBOTIC THERAPY: Aspirin and Plavix  for 3 weeks followed by ASA only as per [facility] protocol for secondary stroke prevention    PULMONARY: protecting airway, saturating well     CARDIOVASCULAR:  TTE shows no PFO, normal systolic function, continue with cardiac monitoring, no events recorded so far.                                SBP goal: Permissive HTN to gradual normotension      GASTROINTESTINAL: Dysphagia screen passed, but made NPO on 2/20 as not tolerating diet. SLP eval recommended pureed diet with mildly thickened liquids. S/p MBS on 2/23, cleared for regular diet, tolerating well.     Diet: Regular diet, carb controlled    RENAL: BUN/Cr stable, good urine output      Na Goal: Greater than 135     Matos: No     HEMATOLOGY: no signs of bleeding     DVT ppx:  LMWH     ID: afebrile, no sign of infection    OTHER: All questions and concerns addressed with patient and family at bedside.    DISPOSITION: [facility] per PT/OT eval, medically cleared for discharge, pending authorization    CORE MEASURES:        Admission NIHSS: 3     TPA: [] YES [x] NO      LDL/HDL: 80/40     Depression Screen: p     Statin Therapy: y     Dysphagia Screen: [x] PASS [] FAIL     Smoking [] YES [x] NO      Afib [] YES [x] NO     Stroke Education [] YES [] NO - p

## 2024-02-25 LAB
GLUCOSE BLDC GLUCOMTR-MCNC: 160 MG/DL — HIGH (ref 70–99)
GLUCOSE BLDC GLUCOMTR-MCNC: 186 MG/DL — HIGH (ref 70–99)
GLUCOSE BLDC GLUCOMTR-MCNC: 211 MG/DL — HIGH (ref 70–99)
GLUCOSE BLDC GLUCOMTR-MCNC: 232 MG/DL — HIGH (ref 70–99)
GLUCOSE BLDC GLUCOMTR-MCNC: 241 MG/DL — HIGH (ref 70–99)

## 2024-02-25 PROCEDURE — 93010 ELECTROCARDIOGRAM REPORT: CPT

## 2024-02-25 PROCEDURE — 99239 HOSP IP/OBS DSCHRG MGMT >30: CPT

## 2024-02-25 RX ORDER — LABETALOL HCL 100 MG
5 TABLET ORAL ONCE
Refills: 0 | Status: COMPLETED | OUTPATIENT
Start: 2024-02-25 | End: 2024-02-25

## 2024-02-25 RX ORDER — NIFEDIPINE 30 MG
30 TABLET, EXTENDED RELEASE 24 HR ORAL DAILY
Refills: 0 | Status: DISCONTINUED | OUTPATIENT
Start: 2024-02-25 | End: 2024-02-27

## 2024-02-25 RX ORDER — HYDRALAZINE HCL 50 MG
10 TABLET ORAL ONCE
Refills: 0 | Status: COMPLETED | OUTPATIENT
Start: 2024-02-25 | End: 2024-02-25

## 2024-02-25 RX ADMIN — GABAPENTIN 100 MILLIGRAM(S): 400 CAPSULE ORAL at 21:30

## 2024-02-25 RX ADMIN — GABAPENTIN 100 MILLIGRAM(S): 400 CAPSULE ORAL at 06:49

## 2024-02-25 RX ADMIN — ATORVASTATIN CALCIUM 80 MILLIGRAM(S): 80 TABLET, FILM COATED ORAL at 21:30

## 2024-02-25 RX ADMIN — Medication 1: at 08:32

## 2024-02-25 RX ADMIN — CLOPIDOGREL BISULFATE 75 MILLIGRAM(S): 75 TABLET, FILM COATED ORAL at 11:52

## 2024-02-25 RX ADMIN — ENOXAPARIN SODIUM 40 MILLIGRAM(S): 100 INJECTION SUBCUTANEOUS at 11:52

## 2024-02-25 RX ADMIN — Medication 10 MILLIGRAM(S): at 01:31

## 2024-02-25 RX ADMIN — Medication 30 MILLIGRAM(S): at 13:54

## 2024-02-25 RX ADMIN — Medication 1 DROP(S): at 06:48

## 2024-02-25 RX ADMIN — Medication 2: at 11:52

## 2024-02-25 RX ADMIN — Medication 2: at 17:49

## 2024-02-25 RX ADMIN — Medication 5 MILLIGRAM(S): at 06:50

## 2024-02-25 RX ADMIN — ISOSORBIDE MONONITRATE 30 MILLIGRAM(S): 60 TABLET, EXTENDED RELEASE ORAL at 11:53

## 2024-02-25 RX ADMIN — Medication 81 MILLIGRAM(S): at 11:52

## 2024-02-25 RX ADMIN — CARBIDOPA AND LEVODOPA 1 TABLET(S): 25; 100 TABLET ORAL at 11:53

## 2024-02-25 RX ADMIN — Medication 1 DROP(S): at 17:47

## 2024-02-25 RX ADMIN — FAMOTIDINE 20 MILLIGRAM(S): 10 INJECTION INTRAVENOUS at 11:53

## 2024-02-25 RX ADMIN — GABAPENTIN 100 MILLIGRAM(S): 400 CAPSULE ORAL at 12:21

## 2024-02-25 NOTE — PROGRESS NOTE ADULT - ASSESSMENT
Patient is a 63 year old female with a PMHx of  HTN, DM, HLD, CAD (s/p 3 stents placed at [facility] in 2014 to the prox LAD and then in 2022 - on ASA and Plavix), PAD s/p L popliteal angioplasty and atherectomy 12/23, R ICA and L ICA stenosis, prior stroke with L side weakness, Parkinson's disease, B/L cataract surgery with blurred vision at baseline who presents with headache, dizziness, blurred vision, and right arm weakness. As per documentation and neurology, patient was last seen well on 2/18 at 11 AM. Per documentation, patient reportedly awoke on 2/19 with these symptoms. Per neuro, she is A&Ox2 at baseline, uses walker and assistance to walk. Per family, she has frequent falls, gait instability, and drags her feet while walking. Patient is on Sinemet for Parkinson's disease and is pending evaluation by movement disorder specialist per family. Patient has been seen previously by Neurology due to concern for falls and AMS. Per neurology, she was found to have B/L ICA stenosis and was also found to have R corona radiata infarct on MRI brain. Patient was not a Tenecteplase candidate or a thrombectomy candidate due to being out of window as per neurology. Internal Medicine has been consulted on Ms. Llamas's care for medical management.     Carotid Stenosis   - Pt with history of B/L ICA Stenosis and R Coe Radiata infarct   - CT, CT A H/N w/ high- grade 80-90% stenosis of the L ICA, > 95% stenosis on the R, Moderate stenosis  - MR w/ Significant B/L carotid stenosis without evidence of acute infarct   - CD w/ R external carotid artery stenosis, negative for significant ICA stenosis,   - TTE w/ Neg for shunt, hyperdynamic LVSF, No WM, trace MR, trace TR, trace ID,   - On DAPT w/ ASA and Plavix   - Lipitor 80   - Planned for Angio with possible stent w/ NSGY   - Monitor on telemetry   - Neuro checks as per protocol   - Fall, Seizure and Aspiration precautions  - PT / OT / S+S   - Per stroke neurology / NSGY     Pre-OP Risk Stratification   - Pt with RCRI score of 4  - Pt is deemed to have elevated risk for neurosurgical intervention. Cardio clearance as per cardiology.     Blurry vision   - Likely 2/2 carotid stenosis   - Optho eval appreciated; F/u recs    Type II DM   - A1C of 7.2  - Hold home PO Medications while admitted  - C/w sliding scale   - Diabetic DASH diet  - Monitor and adjust glucose levels as tolerated     CAD  - S/P 3 stents   - C/w DAPT and Statin   - Repeat TTE as above    PAD  - S/P L popliteal angioplasty and atherectomy 12/23  - On DAPT and Statin therapies     HTN   - On Imdur 30 PO Qd  - Monitor BP, VS and patient closely    Parkinson's disease  - On Sinemet   - Pending evaluation by movement disorder specialist per family. Outpatient follow up with both specialist and neurology upon DC   - Fall, Seizure and Aspiration precautions     HLD  - Lipitor 80   - Diet and lifestyle modifications     Thyroid lesion   - CT w/ 3.6 CM hypodense thyroid lesion   - TSH WNL  - Check thyroid US for further evaluation     PPX  - Lovenox

## 2024-02-25 NOTE — PROGRESS NOTE ADULT - NS ATTEND AMEND GEN_ALL_CORE FT
Thirty five minutes of discharge time was spent on patient exam and discussion including test results, pathophysiology, natural history, stroke risk factors, medication changes and secondary prophylaxis and importance of medication compliance. We also discussed follow up plan. This was discussed with patient/family, who expresses understanding. NATALEE LYNN

## 2024-02-25 NOTE — PROGRESS NOTE ADULT - SUBJECTIVE AND OBJECTIVE BOX
Name of Patient : ALYSE AVENDAÑO  MRN: [id number]  Date of visit: 02-25-24       Subjective: Patient seen and examined. No new events except as noted.     REVIEW OF SYSTEMS:    CONSTITUTIONAL: No weakness, fevers or chills  EYES/ENT: No visual changes;  No vertigo or throat pain   NECK: No pain or stiffness  RESPIRATORY: No cough, wheezing, hemoptysis; No shortness of breath  CARDIOVASCULAR: No chest pain or palpitations  GASTROINTESTINAL: No abdominal or epigastric pain. No nausea, vomiting, or hematemesis; No diarrhea or constipation. No melena or hematochezia.  GENITOURINARY: No dysuria, frequency or hematuria  NEUROLOGICAL: No numbness or weakness  SKIN: No itching, burning, rashes, or lesions   All other review of systems is negative unless indicated above.    MEDICATIONS:  MEDICATIONS  (STANDING):  acetaminophen   IVPB .. 1000 milliGRAM(s) IV Intermittent once  acetaminophen   IVPB .. 1000 milliGRAM(s) IV Intermittent once  artificial  tears Solution 1 Drop(s) Both EYES two times a day  aspirin enteric coated 81 milliGRAM(s) Oral daily  atorvastatin 80 milliGRAM(s) Oral at bedtime  carbidopa/levodopa  25/100 1 Tablet(s) Oral daily  clopidogrel Tablet 75 milliGRAM(s) Oral daily  dextrose 5%. 1000 milliLiter(s) (100 mL/Hr) IV Continuous <Continuous>  dextrose 5%. 1000 milliLiter(s) (50 mL/Hr) IV Continuous <Continuous>  dextrose 50% Injectable 25 Gram(s) IV Push once  dextrose 50% Injectable 12.5 Gram(s) IV Push once  dextrose 50% Injectable 25 Gram(s) IV Push once  enoxaparin Injectable 40 milliGRAM(s) SubCutaneous every 24 hours  famotidine    Tablet 20 milliGRAM(s) Oral daily  gabapentin 100 milliGRAM(s) Oral three times a day  glucagon  Injectable 1 milliGRAM(s) IntraMuscular once  influenza  Vaccine (HIGH DOSE) 0.7 milliLiter(s) IntraMuscular once  insulin lispro (ADMELOG) corrective regimen sliding scale   SubCutaneous at bedtime  insulin lispro (ADMELOG) corrective regimen sliding scale   SubCutaneous three times a day before meals  isosorbide   mononitrate ER Tablet (IMDUR) 30 milliGRAM(s) Oral daily  NIFEdipine XL 30 milliGRAM(s) Oral daily      PHYSICAL EXAM:  T(C): 37.3 (02-25-24 @ 21:00), Max: 37.3 (02-25-24 @ 21:00)  HR: 102 (02-25-24 @ 21:00) (80 - 102)  BP: 168/91 (02-25-24 @ 21:00) (154/83 - 184/97)  RR: 18 (02-25-24 @ 21:00) (18 - 18)  SpO2: 96% (02-25-24 @ 21:00) (96% - 98%)  Wt(kg): --  I&O's Summary        Appearance: Normal	  HEENT:  PERRLA   Lymphatic: No lymphadenopathy   Cardiovascular: Normal S1 S2, no JVD  Respiratory: normal effort , clear  Gastrointestinal:  Soft, Non-tender  Skin: No rashes,  warm to touch  Psychiatry:  Mood & affect appropriate  Musculuskeletal: No edema    recent labs, Imaging and EKGs personally reviewed

## 2024-02-25 NOTE — PROGRESS NOTE ADULT - SUBJECTIVE AND OBJECTIVE BOX
DATE OF SERVICE: 02-25-24 @ 10:51    Patient is a 63y old  Female who presents with a chief complaint of R arm weakness, blurred vision, headache (24 Feb 2024 19:57)      INTERVAL HISTORY: no complaints     REVIEW OF SYSTEMS:  CONSTITUTIONAL: No weakness  EYES/ENT: No visual changes;  No throat pain   NECK: No pain or stiffness  RESPIRATORY: No cough, wheezing; No shortness of breath  CARDIOVASCULAR: No chest pain or palpitations  GASTROINTESTINAL: No abdominal  pain. No nausea, vomiting, or hematemesis  GENITOURINARY: No dysuria, frequency or hematuria  NEUROLOGICAL: No stroke like symptoms  SKIN: No rashes    TELEMETRY Personally reviewed:  	  MEDICATIONS:  isosorbide   mononitrate ER Tablet (IMDUR) 30 milliGRAM(s) Oral daily        PHYSICAL EXAM:  T(C): 37.1 (02-25-24 @ 09:04), Max: 37.3 (02-24-24 @ 17:03)  HR: 84 (02-25-24 @ 09:04) (80 - 91)  BP: 166/89 (02-25-24 @ 09:04) (147/89 - 194/98)  RR: 18 (02-25-24 @ 09:04) (18 - 18)  SpO2: 96% (02-25-24 @ 09:04) (93% - 97%)  Wt(kg): --  I&O's Summary        Appearance: In no distress	  HEENT:    PERRL, EOMI	  Cardiovascular:  S1 S2, No JVD  Respiratory: Lungs clear to auscultation	  Gastrointestinal:  Soft, Non-tender, + BS	  Vascularature:  No edema of LE  Psychiatric: Appropriate affect   Neuro: no acute focal deficits                     Labs personally reviewed      ASSESSMENT/PLAN: 	      Patient is a 63 year old female with a PMHx of  HTN, DM, HLD, CAD (s/p 3 stents placed at [facility] in 2014 to the prox LAD and then in 2022 - on ASA and Plavix), PAD s/p L popliteal angioplasty and atherectomy 12/23, R ICA and L ICA stenosis, prior stroke with L side weakness, Parkinson's disease, B/L cataract surgery with blurred vision at baseline who presents with headache, dizziness, blurred vision, and right arm weakness. Patient was not a Tenecteplase candidate or a thrombectomy candidate due to being out of window as per neurology.    1. Stroke  - not a Tenecteplase candidate or a thrombectomy candidate due to being out of window as per neurology  - TTE shows negative for intracardiac shunt  - No hx of AF--> cont to monitor on tele  - CTA Head shows severe B/L carotid stenosis  - c/w ASA 81mg PO and atorvastatin 80mg PO daily  - Appreciate Neuro and NSGx recommendations, ?CEA  - ILR if stroke not thought to be due to carotid dz  - ILR to r/o occult AF     2. Hypertension  - Permissive BP as per Neuro  - c/w Imdur 30mg PO daily  - Add PO Nifedipine 30mg QD and titrate up gradually     3. Coronary Artery Disease  - ECG with no acute ischemia noted  - s/p 3 stents placed at [facility] in 2014 to the prox LAD and then in 2022  - TTE with preserved EF and no WMA  - c/w ASA, Plavix and statin    4. HLD  - Cholesterol panel noted  - c/w atorvastatin 80mg PO daily    5. Cardiac Risk Stratification  - ECG with no ischemia noted.   - Not in decompensated HF  - No hx of tachy gibson arrhythmia  - No severe AS/MS  - Limited functional capacity 2/2 Parkison's  - Elevated risk for low risk cerebral angio, no cardiac contraindication to proceed   - Cerebral angio with no significant stenosis              HANS Medrano DO [facility]  Cardiovascular Medicine  [address] [place] [address], [address]  Office: [phone]  Available via call/text on [employer] Teams  DATE OF SERVICE: 02-25-24 @ 10:51    Patient is a 63y old  Female who presents with a chief complaint of R arm weakness, blurred vision, headache (24 Feb 2024 19:57)      INTERVAL HISTORY: no complaints     REVIEW OF SYSTEMS:  CONSTITUTIONAL: No weakness  EYES/ENT: No visual changes;  No throat pain   NECK: No pain or stiffness  RESPIRATORY: No cough, wheezing; No shortness of breath  CARDIOVASCULAR: No chest pain or palpitations  GASTROINTESTINAL: No abdominal  pain. No nausea, vomiting, or hematemesis  GENITOURINARY: No dysuria, frequency or hematuria  NEUROLOGICAL: No stroke like symptoms  SKIN: No rashes    TELEMETRY Personally reviewed:  	  MEDICATIONS:  isosorbide   mononitrate ER Tablet (IMDUR) 30 milliGRAM(s) Oral daily        PHYSICAL EXAM:  T(C): 37.1 (02-25-24 @ 09:04), Max: 37.3 (02-24-24 @ 17:03)  HR: 84 (02-25-24 @ 09:04) (80 - 91)  BP: 166/89 (02-25-24 @ 09:04) (147/89 - 194/98)  RR: 18 (02-25-24 @ 09:04) (18 - 18)  SpO2: 96% (02-25-24 @ 09:04) (93% - 97%)  Wt(kg): --  I&O's Summary        Appearance: In no distress	  HEENT:    PERRL, EOMI	  Cardiovascular:  S1 S2, No JVD  Respiratory: Lungs clear to auscultation	  Gastrointestinal:  Soft, Non-tender, + BS	  Vascularature:  No edema of LE  Psychiatric: Appropriate affect   Neuro: no acute focal deficits         Labs personally reviewed      ASSESSMENT/PLAN: 	      Patient is a 63 year old female with a PMHx of  HTN, DM, HLD, CAD (s/p 3 stents placed at [facility] in 2014 to the prox LAD and then in 2022 - on ASA and Plavix), PAD s/p L popliteal angioplasty and atherectomy 12/23, R ICA and L ICA stenosis, prior stroke with L side weakness, Parkinson's disease, B/L cataract surgery with blurred vision at baseline who presents with headache, dizziness, blurred vision, and right arm weakness. Patient was not a Tenecteplase candidate or a thrombectomy candidate due to being out of window as per neurology.    1. Stroke  - not a Tenecteplase candidate or a thrombectomy candidate due to being out of window as per neurology  - TTE shows negative for intracardiac shunt  - No hx of AF--> cont to monitor on tele  - CTA Head shows severe B/L carotid stenosis  - c/w ASA 81mg PO and atorvastatin 80mg PO daily  - Appreciate Neuro and NSGx recommendations, ?CEA  - ILR if stroke not thought to be due to carotid dz    2. Hypertension  - c/w Imdur 30mg PO daily  - Add PO Nifedipine 30mg QD and titrate up gradually     3. Coronary Artery Disease  - ECG with no acute ischemia noted  - s/p 3 stents placed at [facility] in 2014 to the prox LAD and then in 2022  - TTE with preserved EF and no WMA  - c/w ASA, Plavix and statin    4. HLD  - Cholesterol panel noted  - c/w atorvastatin 80mg PO daily    5. Cardiac Risk Stratification  - ECG with no ischemia noted.   - Not in decompensated HF  - No hx of tachy gibson arrhythmia  - No severe AS/MS  - Limited functional capacity 2/2 Parkison's  - Elevated risk for low risk cerebral angio, no cardiac contraindication to proceed   - Cerebral angio with no significant stenosis      Dr Tejeda to assume cardiac care 2/26        HANS Medrano DO [facility]  Cardiovascular Medicine  [address] [place] [address], [address]  Office: [phone]  Available via call/text on Microsoft Teams

## 2024-02-25 NOTE — PROGRESS NOTE ADULT - ASSESSMENT
63y (1960) right handed woman with a PMHx significant for HTN, DM, HLD, CAD(s/p 3 stents placed at [facility] in 2014 to the prox LAD and then 2022),  PAD s/p L popliteal angioplasty and atherectomy 12/23, ?Parkinson's disease, B/L cataract surgery with blurred vision at baseline who presented with hx of known R ICA/L ICA stenosis, prior stroke with L side weakness presented with new onset R side weakness and vision changes. Pt was not able to provide detailed history, but family able to. Reported her vision has been better since the cataract surgeries, but recently she would occasionally complain of transient vision deficits. Unclear exact onset, but pt is complaining now of some vision loss in L>R that seems persistent.    Impression: R arm weakness with MRI negative for infarct. Likely TIA in the setting of bilateral carotid stenosis.     NEURO: Continue close monitoring for neurologic deterioration, permissive HTN to gradual normotension, titrate statin to LDL goal less than 70, MRI Brain w/o, MRA Head w/o and Neck w/contrast, results as noted above. Physical therapy/OT recommending SHANE.  Neurosurgery consulted: s/p cerebral angiogram which showed no significant left carotid stenosis    ANTITHROMBOTIC THERAPY: Aspirin and Plavix  for 3 weeks followed by ASA only as per [facility] protocol for secondary stroke prevention    PULMONARY: protecting airway, saturating well     CARDIOVASCULAR:  TTE shows no PFO, normal systolic function, continue with cardiac monitoring, no events recorded so far.  will continue Imdur 30mg and start nifedipine 30mg  and titrate up gradually  for BP mgt, Dr Solano (cardio) consult appreciated                           SBP goal: Permissive HTN to gradual normotension      GASTROINTESTINAL: Dysphagia screen passed, but made NPO on 2/20 as not tolerating diet. SLP eval recommended pureed diet with mildly thickened liquids. S/p MBS on 2/23, cleared for regular diet, tolerating well.     Diet: Regular diet, carb controlled    RENAL: BUN/Cr stable, good urine output      Na Goal: Greater than 135     Matos: No     HEMATOLOGY: no signs of bleeding     DVT ppx:  LMWH     ID: afebrile, no sign of infection Covid PCR: Negative    OTHER: All questions and concerns addressed with patient and family at bedside.    DISPOSITION: [facility] per PT/OT eval, medically cleared for discharge, pending authorization    CORE MEASURES:        Admission NIHSS: 3     TPA: [] YES [x] NO      LDL/HDL: 80/40     Depression Screen: p     Statin Therapy: y     Dysphagia Screen: [x] PASS [] FAIL     Smoking [] YES [x] NO      Afib [] YES [x] NO     Stroke Education [] YES [] NO - p

## 2024-02-26 LAB
ANION GAP SERPL CALC-SCNC: 16 MMOL/L — SIGNIFICANT CHANGE UP (ref 5–17)
APPEARANCE UR: ABNORMAL
BACTERIA # UR AUTO: ABNORMAL /HPF
BASOPHILS # BLD AUTO: 0.04 K/UL — SIGNIFICANT CHANGE UP (ref 0–0.2)
BASOPHILS NFR BLD AUTO: 0.6 % — SIGNIFICANT CHANGE UP (ref 0–2)
BILIRUB UR-MCNC: NEGATIVE — SIGNIFICANT CHANGE UP
BUN SERPL-MCNC: 15 MG/DL — SIGNIFICANT CHANGE UP (ref 7–23)
CALCIUM SERPL-MCNC: 9.7 MG/DL — SIGNIFICANT CHANGE UP (ref 8.4–10.5)
CAST: 0 /LPF — SIGNIFICANT CHANGE UP (ref 0–4)
CHLORIDE SERPL-SCNC: 104 MMOL/L — SIGNIFICANT CHANGE UP (ref 96–108)
CO2 SERPL-SCNC: 21 MMOL/L — LOW (ref 22–31)
COLOR SPEC: YELLOW — SIGNIFICANT CHANGE UP
CREAT SERPL-MCNC: 0.61 MG/DL — SIGNIFICANT CHANGE UP (ref 0.5–1.3)
DIFF PNL FLD: ABNORMAL
EGFR: 99 ML/MIN/1.73M2 — SIGNIFICANT CHANGE UP
EOSINOPHIL # BLD AUTO: 0.51 K/UL — HIGH (ref 0–0.5)
EOSINOPHIL NFR BLD AUTO: 7.3 % — HIGH (ref 0–6)
GLUCOSE BLDC GLUCOMTR-MCNC: 194 MG/DL — HIGH (ref 70–99)
GLUCOSE BLDC GLUCOMTR-MCNC: 227 MG/DL — HIGH (ref 70–99)
GLUCOSE BLDC GLUCOMTR-MCNC: 235 MG/DL — HIGH (ref 70–99)
GLUCOSE BLDC GLUCOMTR-MCNC: 306 MG/DL — HIGH (ref 70–99)
GLUCOSE SERPL-MCNC: 178 MG/DL — HIGH (ref 70–99)
GLUCOSE UR QL: 500 MG/DL
HCT VFR BLD CALC: 39.6 % — SIGNIFICANT CHANGE UP (ref 34.5–45)
HGB BLD-MCNC: 12.3 G/DL — SIGNIFICANT CHANGE UP (ref 11.5–15.5)
IMM GRANULOCYTES NFR BLD AUTO: 0.3 % — SIGNIFICANT CHANGE UP (ref 0–0.9)
KETONES UR-MCNC: NEGATIVE MG/DL — SIGNIFICANT CHANGE UP
LEUKOCYTE ESTERASE UR-ACNC: ABNORMAL
LYMPHOCYTES # BLD AUTO: 1.55 K/UL — SIGNIFICANT CHANGE UP (ref 1–3.3)
LYMPHOCYTES # BLD AUTO: 22.2 % — SIGNIFICANT CHANGE UP (ref 13–44)
MAGNESIUM SERPL-MCNC: 2 MG/DL — SIGNIFICANT CHANGE UP (ref 1.6–2.6)
MCHC RBC-ENTMCNC: 23.1 PG — LOW (ref 27–34)
MCHC RBC-ENTMCNC: 31.1 GM/DL — LOW (ref 32–36)
MCV RBC AUTO: 74.3 FL — LOW (ref 80–100)
MONOCYTES # BLD AUTO: 0.73 K/UL — SIGNIFICANT CHANGE UP (ref 0–0.9)
MONOCYTES NFR BLD AUTO: 10.4 % — SIGNIFICANT CHANGE UP (ref 2–14)
NEUTROPHILS # BLD AUTO: 4.14 K/UL — SIGNIFICANT CHANGE UP (ref 1.8–7.4)
NEUTROPHILS NFR BLD AUTO: 59.2 % — SIGNIFICANT CHANGE UP (ref 43–77)
NITRITE UR-MCNC: POSITIVE
NRBC # BLD: 0 /100 WBCS — SIGNIFICANT CHANGE UP (ref 0–0)
PH UR: 7 — SIGNIFICANT CHANGE UP (ref 5–8)
PHOSPHATE SERPL-MCNC: 4 MG/DL — SIGNIFICANT CHANGE UP (ref 2.5–4.5)
PLATELET # BLD AUTO: 299 K/UL — SIGNIFICANT CHANGE UP (ref 150–400)
POTASSIUM SERPL-MCNC: 3.5 MMOL/L — SIGNIFICANT CHANGE UP (ref 3.5–5.3)
POTASSIUM SERPL-SCNC: 3.5 MMOL/L — SIGNIFICANT CHANGE UP (ref 3.5–5.3)
PROT UR-MCNC: 30 MG/DL
RBC # BLD: 5.33 M/UL — HIGH (ref 3.8–5.2)
RBC # FLD: 13.9 % — SIGNIFICANT CHANGE UP (ref 10.3–14.5)
RBC CASTS # UR COMP ASSIST: 3 /HPF — SIGNIFICANT CHANGE UP (ref 0–4)
SODIUM SERPL-SCNC: 141 MMOL/L — SIGNIFICANT CHANGE UP (ref 135–145)
SP GR SPEC: 1.02 — SIGNIFICANT CHANGE UP (ref 1–1.03)
SQUAMOUS # UR AUTO: 0 /HPF — SIGNIFICANT CHANGE UP (ref 0–5)
UROBILINOGEN FLD QL: 0.2 MG/DL — SIGNIFICANT CHANGE UP (ref 0.2–1)
WBC # BLD: 6.99 K/UL — SIGNIFICANT CHANGE UP (ref 3.8–10.5)
WBC # FLD AUTO: 6.99 K/UL — SIGNIFICANT CHANGE UP (ref 3.8–10.5)
WBC UR QL: 167 /HPF — HIGH (ref 0–5)

## 2024-02-26 RX ORDER — ERYTHROMYCIN BASE 5 MG/GRAM
1 OINTMENT (GRAM) OPHTHALMIC (EYE) EVERY 4 HOURS
Refills: 0 | Status: DISCONTINUED | OUTPATIENT
Start: 2024-02-26 | End: 2024-02-27

## 2024-02-26 RX ORDER — CEFTRIAXONE 500 MG/1
1000 INJECTION, POWDER, FOR SOLUTION INTRAMUSCULAR; INTRAVENOUS EVERY 24 HOURS
Refills: 0 | Status: DISCONTINUED | OUTPATIENT
Start: 2024-02-26 | End: 2024-02-27

## 2024-02-26 RX ORDER — CEFTRIAXONE 500 MG/1
1 INJECTION, POWDER, FOR SOLUTION INTRAMUSCULAR; INTRAVENOUS
Qty: 0 | Refills: 0 | DISCHARGE
Start: 2024-02-26 | End: 2024-02-29

## 2024-02-26 RX ADMIN — Medication 4: at 12:17

## 2024-02-26 RX ADMIN — FAMOTIDINE 20 MILLIGRAM(S): 10 INJECTION INTRAVENOUS at 12:18

## 2024-02-26 RX ADMIN — GABAPENTIN 100 MILLIGRAM(S): 400 CAPSULE ORAL at 13:21

## 2024-02-26 RX ADMIN — Medication 1 DROP(S): at 05:04

## 2024-02-26 RX ADMIN — GABAPENTIN 100 MILLIGRAM(S): 400 CAPSULE ORAL at 05:04

## 2024-02-26 RX ADMIN — ENOXAPARIN SODIUM 40 MILLIGRAM(S): 100 INJECTION SUBCUTANEOUS at 12:18

## 2024-02-26 RX ADMIN — Medication 1 APPLICATION(S): at 17:23

## 2024-02-26 RX ADMIN — CEFTRIAXONE 100 MILLIGRAM(S): 500 INJECTION, POWDER, FOR SOLUTION INTRAMUSCULAR; INTRAVENOUS at 06:18

## 2024-02-26 RX ADMIN — Medication 30 MILLIGRAM(S): at 05:04

## 2024-02-26 RX ADMIN — CLOPIDOGREL BISULFATE 75 MILLIGRAM(S): 75 TABLET, FILM COATED ORAL at 12:18

## 2024-02-26 RX ADMIN — CARBIDOPA AND LEVODOPA 1 TABLET(S): 25; 100 TABLET ORAL at 12:18

## 2024-02-26 RX ADMIN — Medication 81 MILLIGRAM(S): at 12:18

## 2024-02-26 RX ADMIN — GABAPENTIN 100 MILLIGRAM(S): 400 CAPSULE ORAL at 21:17

## 2024-02-26 RX ADMIN — ATORVASTATIN CALCIUM 80 MILLIGRAM(S): 80 TABLET, FILM COATED ORAL at 21:16

## 2024-02-26 RX ADMIN — Medication 0: at 21:15

## 2024-02-26 RX ADMIN — ISOSORBIDE MONONITRATE 30 MILLIGRAM(S): 60 TABLET, EXTENDED RELEASE ORAL at 12:18

## 2024-02-26 RX ADMIN — Medication 2: at 17:23

## 2024-02-26 RX ADMIN — Medication 1 DROP(S): at 17:23

## 2024-02-26 RX ADMIN — Medication 1: at 08:22

## 2024-02-26 RX ADMIN — Medication 1 APPLICATION(S): at 21:16

## 2024-02-26 NOTE — PROGRESS NOTE ADULT - NS ATTEND AMEND GEN_ALL_CORE FT
To be re-evaluated by PT today as requested by family, pos dc to home vs SHANE  EP consult for ILR placement per STROKE AF trial   DC planning EP consult for ILR placement per STROKE AF trial   DC planning

## 2024-02-26 NOTE — CONSULT NOTE ADULT - REASON FOR ADMISSION
R arm weakness, blurred vision, headache

## 2024-02-26 NOTE — CONSULT NOTE ADULT - SUBJECTIVE AND OBJECTIVE BOX
EP   HISTORY OF PRESENT ILLNESS: HPI:  HPI: Patient ALYSE AVENDAÑO is a 63y (1960) right handed woman with a PMHx significant for HTN, DM, HLD, CAD(s/p 3 stents placed at [facility] in 2014 to the prox LAD and then 2022),  PAD s/p L popliteal angioplasty and atherectomy 12/23, R ICA and L ICA stenosis, prior stroke with L side weakness, Parkinson's disease, B/L cataract surgery with blurred vision at baseline who presents with headache, dizziness, blurred vision, right arm weakness. Patient was last seen well on 2/18 at 11AM. Family reports she woke up with these symptoms on 2/19. Per family, she is A&Ox2 at baseline, uses walker and assistance to walk. She has frequent falls per family, gait instability, and drags her feet while walking. She carries diagnosis of Parkinson's and is on Sinemet but has not yet been evaluated by movement disorder specialist. Patient has been seen previously by Neurology due to concern for falls and AMS. During evaluations in 2022 and 2023, she was found to have B/L ICA stenosis. Also was found to have R corona radiata infarct on MRI brain.   Carotid duplex (08.01.23) showed moderate, 50-69% stenosis of the right internal carotid artery and no hemodynamically significant stenosis of left carotid artery. She was seen by vascular surgery who recommended to continue DAPT and was to be re-assessed in 6 months.   Patient has also been seen in the past by Neurology for headaches (2021) which appeared poorly controlled.    LKW: 2/18 11AM  NIHSS: 3 (2 for right arm drift and 1 for dysarthria)  Baseline MRS: 4  Not a Tenecteplase candidate due to out of window  Not a thrombectomy candidate due to  out of window    Date of service 2/26- patient feeling well.  Remains weak (left side chronically weak), also w/ right arm weakness more acutely.  Requires assistance to get from chair to bed.  EP called for ILR placement, to look for paroxysmal AFib after 2nd lifetime stroke.  No angina. No hx of palpitations.  Sees Dr Johnathan Tejeda for Cardiology, he is being covered by another cardiologist at this time.  A 10 pt ROS is otherwise negative.    PAST MEDICAL & SURGICAL HISTORY:  HTN (hypertension)  HLD (hyperlipidemia)  CAD (coronary artery disease)  Type II diabetes mellitus  PAD (peripheral artery disease)  Mild dementia  Parkinson disease  S/P hysterectomy    MEDICATIONS  (STANDING):  acetaminophen   IVPB .. 1000 milliGRAM(s) IV Intermittent once  acetaminophen   IVPB .. 1000 milliGRAM(s) IV Intermittent once  artificial  tears Solution 1 Drop(s) Both EYES two times a day  aspirin enteric coated 81 milliGRAM(s) Oral daily  atorvastatin 80 milliGRAM(s) Oral at bedtime  carbidopa/levodopa  25/100 1 Tablet(s) Oral daily  cefTRIAXone   IVPB 1000 milliGRAM(s) IV Intermittent every 24 hours  clopidogrel Tablet 75 milliGRAM(s) Oral daily  dextrose 5%. 1000 milliLiter(s) (100 mL/Hr) IV Continuous <Continuous>  dextrose 5%. 1000 milliLiter(s) (50 mL/Hr) IV Continuous <Continuous>  dextrose 50% Injectable 25 Gram(s) IV Push once  dextrose 50% Injectable 12.5 Gram(s) IV Push once  dextrose 50% Injectable 25 Gram(s) IV Push once  enoxaparin Injectable 40 milliGRAM(s) SubCutaneous every 24 hours  erythromycin   Ointment 1 Application(s) Right EYE every 4 hours  famotidine    Tablet 20 milliGRAM(s) Oral daily  gabapentin 100 milliGRAM(s) Oral three times a day  glucagon  Injectable 1 milliGRAM(s) IntraMuscular once  influenza  Vaccine (HIGH DOSE) 0.7 milliLiter(s) IntraMuscular once  insulin lispro (ADMELOG) corrective regimen sliding scale   SubCutaneous three times a day before meals  insulin lispro (ADMELOG) corrective regimen sliding scale   SubCutaneous at bedtime  isosorbide   mononitrate ER Tablet (IMDUR) 30 milliGRAM(s) Oral daily  NIFEdipine XL 30 milliGRAM(s) Oral daily    Allergies    No Known Allergies    Intolerances    FAMILY HISTORY:    Non-contributary for premature coronary disease or sudden cardiac death    SOCIAL HISTORY:    [ x] Non-smoker  [ ] Smoker  [ ] Alcohol  [ ] Unable to obtain    PHYSICAL EXAM:  T(C): 37.1 (02-26-24 @ 12:56), Max: 37.3 (02-25-24 @ 21:00)  HR: 90 (02-26-24 @ 12:56) (87 - 127)  BP: 157/89 (02-26-24 @ 12:56) (154/83 - 189/95)  RR: 18 (02-26-24 @ 12:56) (18 - 18)  SpO2: 98% (02-26-24 @ 12:56) (96% - 98%)  Wt(kg): --    Appearance: Normal appearing adult woman in no acute distress  HEENT:   Normal oral mucosa, PERRL, EOMI	  Lymphatic: No lymphadenopathy , no edema  Cardiovascular: Normal S1 S2, No JVD, No murmurs , Peripheral pulses palpable 2+ bilaterally  Respiratory: Lungs clear to auscultation, normal effort 	  Gastrointestinal:  Soft, Non-tender, + BS	  Skin: No rashes, No ecchymoses, No cyanosis, warm to touch  Musculoskeletal: Normal range of motion, normal strength  Psychiatry:  Mood & affect appropriate      TELEMETRY: NSR, no AFib	    ECG: NSR 	  Echo: < from: TTE W or WO Ultrasound Enhancing Agent (02.20.24 @ 10:23) >  CONCLUSIONS:      1. Agitated saline injection was negative for intracardiac shunt.   2. Left ventricular cavity is normal in size. Left ventricular wall thickness is normal. Left ventricular systolic function is hyperdynamic. There are no regional wall motion abnormalities seen.   3. Normal left ventricular diastolic function, with normal filling pressure.   4. Normal right ventricular cavity size, with wall thickness, and normal systolic function.   5. Normal left and right atrial size.   6. No significant valvular disease.   7. No pericardial effusion seen.    < end of copied text >    < from: CT Angio Neck w/ IV Cont (02.19.24 @ 15:50) >  IMPRESSION:    CT HEAD:  No acute intracranial hemorrhage. Gray/white matter differentiation is   preserved.    CTA NECK:  High-grade (80-90%) stenosis of the LEFT internal carotid artery due to   circumferential calcifications. Critical stenosis (greater than 95%) of   the distal RIGHT common carotid artery and origin of the RIGHTinternal   carotid artery due to extensive calcified plaque.    CTA HEAD:  Moderate stenosis of bilateral precavernous and cavernous internal   carotid arteries.    No evidence of aneurysm. Tiny aneurysms can be beyond the resolution of   CTA technique.    3.6 cm hypodense lesion in the left thyroid lobe.    < end of copied text >    	  LABS:	 	                          12.3   6.99  )-----------( 299      ( 26 Feb 2024 07:30 )             39.6     02-26    141  |  104  |  15  ----------------------------<  178<H>  3.5   |  21<L>  |  0.61    Ca    9.7      26 Feb 2024 07:28  Phos  4.0     02-26  Mg     2.0     02-26    ASSESSMENT/PLAN: Ms Avendaño is a pleasant 63y Female with hypertension and diabetes, here w/ her 2nd lifetime stroke.    She has a history of HTN, and diabetes on insulin, as well as the aforementioned prior stroke.  Her echocardiogram is reassuring, normal EF, no patent foramen ovale.  Head and neck vasculature notable for bilateral moderate carotid artery stenosis (no plan to operate on it, per vascular surgery)  Neurology note suggests that her infarct (coronary radiata) may be embolic due to the size/location, and contributory arrhythmias should be ruled out.  She is referred for a loop recorder specifically for surveillance for paroxysmal atrial fibrillation after an embolic stroke of unknown source.  A repeat stroke from AFib has a high likelihood of being disabling or fatal, and identifying this arrhythmia would change medical management by allowing us to start oral anticoagulation (which she is willing to take).  There is less than 1% risk of infection for this bedside minor surgery.  She prefers this pathway over an event monitor, which would require daily wear for ~30 days at a time.  We will return to bedside later to proceed with implant, see separate operative report.  Afterwards OK for discharge immediately.  We will monitor ILR data in our office ([facility] - Dr Bray).  Routine cardiovascular care w/ Dr Johnathan Tejeda.      Dung Arnold M.D.  Cardiac Electrophysiology    office [phone]  pager [phone]

## 2024-02-26 NOTE — PROVIDER CONTACT NOTE (OTHER) - ACTION/TREATMENT ORDERED:
NP ordered EKG ( sinus) and UA  NP at bedside and assessed patient, no further interventions at this time, continue to monitor
WHITNEY Flores came to bedside for assessment. Patient remained drowsy and difficult to arouse. RN and WHITNEY Flores continuously attempted neuro assessment until patient reverted back to baseline.

## 2024-02-26 NOTE — PROGRESS NOTE ADULT - ASSESSMENT
Patient is a 63 year old female with a PMHx of  HTN, DM, HLD, CAD (s/p 3 stents placed at [facility] in 2014 to the prox LAD and then in 2022 - on ASA and Plavix), PAD s/p L popliteal angioplasty and atherectomy 12/23, R ICA and L ICA stenosis, prior stroke with L side weakness, Parkinson's disease, B/L cataract surgery with blurred vision at baseline who presents with headache, dizziness, blurred vision, and right arm weakness. As per documentation and neurology, patient was last seen well on 2/18 at 11 AM. Per documentation, patient reportedly awoke on 2/19 with these symptoms. Per neuro, she is A&Ox2 at baseline, uses walker and assistance to walk. Per family, she has frequent falls, gait instability, and drags her feet while walking. Patient is on Sinemet for Parkinson's disease and is pending evaluation by movement disorder specialist per family. Patient has been seen previously by Neurology due to concern for falls and AMS. Per neurology, she was found to have B/L ICA stenosis and was also found to have R corona radiata infarct on MRI brain. Patient was not a Tenecteplase candidate or a thrombectomy candidate due to being out of window as per neurology. Internal Medicine has been consulted on Ms. Llamas's care for medical management.     Carotid Stenosis   - Pt with history of B/L ICA Stenosis and R Coe Radiata infarct   - CT, CT A H/N w/ high- grade 80-90% stenosis of the L ICA, > 95% stenosis on the R, Moderate stenosis  - MR w/ Significant B/L carotid stenosis without evidence of acute infarct   - CD w/ R external carotid artery stenosis, negative for significant ICA stenosis,   - TTE w/ Neg for shunt, hyperdynamic LVSF, No WM, trace MR, trace TR, trace CA,   - On DAPT w/ ASA and Plavix   - Lipitor 80   - Planned for Angio with possible stent w/ NSGY   - Monitor on telemetry   - Neuro checks as per protocol   - Fall, Seizure and Aspiration precautions  - PT / OT / S+S   - Per stroke neurology / NSGY   - EP eval for possible ILR placement     Blurry vision   - Likely 2/2 carotid stenosis   - Optho eval appreciated; F/u recs  - Erythromycine     Type II DM   - A1C of 7.2  - Hold home PO Medications while admitted  - C/w sliding scale   - Diabetic DASH diet  - Monitor and adjust glucose levels as tolerated     CAD  - S/P 3 stents   - C/w DAPT and Statin   - Repeat TTE as above    PAD  - S/P L popliteal angioplasty and atherectomy 12/23  - On DAPT and Statin therapies     HTN   - On Imdur 30 PO Qd  - Monitor BP, VS and patient closely    Parkinson's disease  - On Sinemet   - Pending evaluation by movement disorder specialist per family. Outpatient follow up with both specialist and neurology upon DC   - Fall, Seizure and Aspiration precautions     HLD  - Lipitor 80   - Diet and lifestyle modifications     Thyroid lesion   - CT w/ 3.6 CM hypodense thyroid lesion   - TSH WNL  - Check thyroid US for further evaluation     PPX  - Lovenox

## 2024-02-26 NOTE — CONSULT NOTE ADULT - CONSULT REQUESTED DATE/TIME
20-Feb-2024 15:40
26-Feb-2024 13:42
21-Feb-2024 16:15
19-Feb-2024 19:00
20-Feb-2024 15:25
21-Feb-2024 12:05

## 2024-02-26 NOTE — PROVIDER CONTACT NOTE (OTHER) - ASSESSMENT
No visible injury, pt denies injury/discomfort/lightheadedness/dizziness/SOB. Vital signs as follows: 98.1 F, 18 respirations, 96% O2 sat, 177/86, 127 HR
Patient is very lethargic and difficult to arouse, no longer following commands, pupils are unequal in size.

## 2024-02-26 NOTE — CONSULT NOTE ADULT - CONSULT REASON
b/l internal carotid artery stenosis
Decreased vision.
concern for stroke
stroke
Stroke, Carotid Stenosis
Medical management

## 2024-02-26 NOTE — PROGRESS NOTE ADULT - SUBJECTIVE AND OBJECTIVE BOX
DATE OF SERVICE: 02-26-24 @ 12:31    Patient is a 63y old  Female who presents with a chief complaint of R arm weakness, blurred vision, headache (26 Feb 2024 07:37)      INTERVAL HISTORY: Feels well, out of bed to chair and family at bedside     REVIEW OF SYSTEMS:  CONSTITUTIONAL: No weakness  EYES/ENT: No visual changes;  No throat pain   NECK: No pain or stiffness  RESPIRATORY: No cough, wheezing; No shortness of breath  CARDIOVASCULAR: No chest pain or palpitations  GASTROINTESTINAL: No abdominal  pain. No nausea, vomiting, or hematemesis  GENITOURINARY: No dysuria, frequency or hematuria  NEUROLOGICAL: No stroke like symptoms  SKIN: No rashes    TELEMETRY Personally reviewed: SR/ST   	  MEDICATIONS:  isosorbide   mononitrate ER Tablet (IMDUR) 30 milliGRAM(s) Oral daily  NIFEdipine XL 30 milliGRAM(s) Oral daily        PHYSICAL EXAM:  T(C): 36.4 (02-26-24 @ 08:21), Max: 37.3 (02-25-24 @ 21:00)  HR: 96 (02-26-24 @ 05:12) (80 - 127)  BP: 189/95 (02-26-24 @ 05:12) (154/83 - 189/95)  RR: 18 (02-26-24 @ 08:21) (18 - 18)  SpO2: 98% (02-26-24 @ 08:21) (96% - 98%)  Wt(kg): --  I&O's Summary    25 Feb 2024 07:01  -  26 Feb 2024 07:00  --------------------------------------------------------  IN: 340 mL / OUT: 355 mL / NET: -15 mL          Appearance: In no distress	  HEENT:    PERRL, EOMI	  Cardiovascular:  S1 S2, No JVD  Respiratory: Lungs clear to auscultation	  Gastrointestinal:  Soft, Non-tender, + BS	  Vascularature:  No edema of LE  Psychiatric: Appropriate affect   Neuro: no acute focal deficits                               12.3   6.99  )-----------( 299      ( 26 Feb 2024 07:30 )             39.6     02-26    141  |  104  |  15  ----------------------------<  178<H>  3.5   |  21<L>  |  0.61    Ca    9.7      26 Feb 2024 07:28  Phos  4.0     02-26  Mg     2.0     02-26          Labs personally reviewed      ASSESSMENT/PLAN: 	  Patient is a 63 year old female with a PMHx of  HTN, DM, HLD, CAD (s/p 3 stents placed at [facility] in 2014 to the prox LAD and then in 2022 - on ASA and Plavix), PAD s/p L popliteal angioplasty and atherectomy 12/23, R ICA and L ICA stenosis, prior stroke with L side weakness, Parkinson's disease, B/L cataract surgery with blurred vision at baseline who presents with headache, dizziness, blurred vision, and right arm weakness. Patient was not a Tenecteplase candidate or a thrombectomy candidate due to being out of window as per neurology.    1. Stroke  - not a Tenecteplase candidate or a thrombectomy candidate due to being out of window as per neurology  - TTE shows negative for intracardiac shunt  - No hx of AF--> cont to monitor on tele  - CTA Head shows severe B/L carotid stenosis  - c/w ASA 81mg PO and atorvastatin 80mg PO daily  - Appreciate Neuro and NSGx recommendations, ?CEA  - ILR if stroke not thought to be due to carotid dz    2. Hypertension  - c/w Imdur 30mg PO daily  - Continue Nifedipine 30mg QD and titrate up gradually     3. Coronary Artery Disease  - ECG with no acute ischemia noted  - s/p 3 stents placed at [facility] in 2014 to the prox LAD and then in 2022  - TTE with preserved EF and no WMA  - c/w ASA, Plavix and statin    4. HLD  - Cholesterol panel noted  - c/w atorvastatin 80mg PO daily    5. Cardiac Risk Stratification  - ECG with no ischemia noted.   - Not in decompensated HF  - No hx of tachy gibson arrhythmia  - No severe AS/MS  - Limited functional capacity 2/2 Parkison's  - Elevated risk for low risk cerebral angio, no cardiac contraindication to proceed   - Cerebral angio with no significant stenosis      Dr Tejeda to assume cardiac care 2/26          KIRA Boston, DO [facility]  Cardiovascular Medicine  [address], [address]  Available via call or text on [employer]  Office: [phone]

## 2024-02-26 NOTE — CHART NOTE - NSCHARTNOTEFT_GEN_A_CORE
Interventional Neuro- Radiology   Procedure Note    Procedure: Selective Cerebral Angiography   Pre- Procedure Diagnosis: carotid stenosis   Post- Procedure Diagnosis:  no significant stenosis    : Dr. Cong MD  Fellow: Dr. Gabino MD   Physician Assistant: Shelby Restrepo PA-C    RN: Hilda   Tech: Lg/ Lorraine     Anesthesia: Dr. Larry  (MAC)      I/Os:  Fluids: 150cc   Matos: DTV   Contrast: 61cc   Estimated Blood Loss: <10cc      Preliminary Report:  Under MAC, using a 5Fr short sheath to the right femoral artery examination of left vertebral artery/ left common carotid artery/ right common carotid artery via selective cerebral angiography demonstrates no significant carotid stenosis, no stent placement needed. ( Official note to follow).    Patient tolerated procedure well, vital signs stable, hemodynamically stable, no change in neurological status compared to baseline. Results discussed with patient and their family. Groin sheath d/c'ed, manual compression held to hemostasis, no active bleeding, no hematoma, Vascade applied, quick clot and safeguard balloon dressing applied at 1250h. Patient transferred to PACU for further care/ monitoring.     Shelby Restrepo PA-C  [phone]
EVENT:   Called to patients bedside by nurse for PCA lowering patient to floor  As per RN and PCA; Patient attempted to go to the restroom when the alarm went off. PCA went into room to find patient at bedside "weak" and was able to lower patient to floor to prevent fall. Patient is noted to have more frequent urination as of lately. No head strike noted. Vital signs elevated during event; vitals back to baseline afterwards. Denies SOB, dizziness, or chest pain.     acetaminophen     Tablet .. 650 milliGRAM(s) Oral every 6 hours PRN  acetaminophen   IVPB .. 1000 milliGRAM(s) IV Intermittent once  acetaminophen   IVPB .. 1000 milliGRAM(s) IV Intermittent once  artificial  tears Solution 1 Drop(s) Both EYES two times a day  aspirin enteric coated 81 milliGRAM(s) Oral daily  atorvastatin 80 milliGRAM(s) Oral at bedtime  carbidopa/levodopa  25/100 1 Tablet(s) Oral daily  cefTRIAXone   IVPB 1000 milliGRAM(s) IV Intermittent every 24 hours  clopidogrel Tablet 75 milliGRAM(s) Oral daily  dextrose 5%. 1000 milliLiter(s) IV Continuous <Continuous>  dextrose 5%. 1000 milliLiter(s) IV Continuous <Continuous>  dextrose 50% Injectable 12.5 Gram(s) IV Push once  dextrose 50% Injectable 25 Gram(s) IV Push once  dextrose 50% Injectable 25 Gram(s) IV Push once  dextrose Oral Gel 15 Gram(s) Oral once PRN  enoxaparin Injectable 40 milliGRAM(s) SubCutaneous every 24 hours  famotidine    Tablet 20 milliGRAM(s) Oral daily  gabapentin 100 milliGRAM(s) Oral three times a day  glucagon  Injectable 1 milliGRAM(s) IntraMuscular once  influenza  Vaccine (HIGH DOSE) 0.7 milliLiter(s) IntraMuscular once  insulin lispro (ADMELOG) corrective regimen sliding scale   SubCutaneous at bedtime  insulin lispro (ADMELOG) corrective regimen sliding scale   SubCutaneous three times a day before meals  isosorbide   mononitrate ER Tablet (IMDUR) 30 milliGRAM(s) Oral daily  NIFEdipine XL 30 milliGRAM(s) Oral daily      CONSTITUTIONAL: No weight loss, weakness, fevers or chills  EYES/ENT: parital hemianopia noted  NECK: No pain or stiffness, swollen neck  RESPIRATORY: No shortness of breath, cough, wheezing, hemoptysis or orthopnea  CARDIOVASCULAR: No chest pain or palpitations, dyspnea on exertion, LE swelling  GASTROINTESTINAL: No abdominal or epigastric pain; No nausea, vomiting, or hematemesis; No diarrhea or constipation; No melena or hematochezia.  GENITOURINARY: No dysuria, frequency incontinence or hematuria.  NEUROLOGICAL: No loss of sensation,  numbness, tingling, or weakness, seizure or blackouts.   SKIN: No itching, burning, rashes, or lesions   All other review of systems is negative unless indicated above.    Family History  Surgical History    PHYSICAL EXAM:   Vital Signs Last 24 Hrs  T(C): 36.7 (26 Feb 2024 00:00), Max: 37.3 (25 Feb 2024 21:00)  T(F): 98.1 (26 Feb 2024 00:00), Max: 99.2 (25 Feb 2024 21:00)  HR: 127 (26 Feb 2024 00:00) (80 - 127)  BP: 177/86 (26 Feb 2024 00:00) (154/83 - 177/86)  BP(mean): 89 (25 Feb 2024 13:05) (89 - 89)  RR: 18 (26 Feb 2024 00:00) (18 - 18)  SpO2: 97% (26 Feb 2024 00:00) (96% - 98%)    Parameters below as of 26 Feb 2024 00:00  Patient On (Oxygen Delivery Method): room air        General: Alert and Oriented x 2. No acute Distress. Well Nourished, Well Developed  Cardiac: Regular Heart Rate and Rhythm. No Murmur. No Jugular Venous Distension. No Peripheral Edema.  Pulmonary: Clear Breath Sounds to Auscultation Bilateraly. No Accessory Muscle use.   Abdomen: Soft, Nontender, Nondistended. No palpable Mass. Normal Bowel Sounds    NEUROLOGICAL EXAM:  Mental status: Awake, alert, oriented x2, speech slurred, follows commands, no neglect, normal memory   Cranial Nerves: No facial asymmetry, no nystagmus, + dysarthria,  tongue midline  Sensation: Intact to light touch   Coordination/ Gait: gait instability, dragging of feet    LABS:             IMAGING: Reviewed by me.   CT Head:  CT Angio Head/Neck    Impression:      Plan: UA sent (+) Started on treatment; pending Urine culture. Educate patient to utilize call bell  Nursing Interventions; Continue Telemetry Monitor, Continuous Pulse Oximeter, Neuro checks r4h, Oxygen PRN , OOB with assist     Will reassess in 1 hour    Time spent with patient:  15 minutes  Event discussed with: Fellow Quan Darden MD
Interventional Neuro Radiology  Pre-Procedure Note    This is a 62yo female, hx HTN, DM, HLD, known b/l carotid artery stenosis (dx 2022; on DAPT), CAD (s/p 3 stents 2014, 2022),  PAD s/p L popliteal angioplasty and atherectomy 12/23, prior stroke w/ baseline L side weakness, PD w/ frequent falls, b/l cataract surgery, adm stroke s/p episode of headache, dizziness, blurred vision, right arm weakness. CTH w/ no acute heme/hydro. CTA neck w/ severe stenosis of b/l ICAs.     Exam: (baseline AOx2) AOx2 (didn't get year), PERRL, no facial, R drift, RUE 4/5, LUE 4+/5, RLE 4/5, LLE 4/5.    PAST MEDICAL & SURGICAL HISTORY:  HTN (hypertension)  HLD (hyperlipidemia)  CAD (coronary artery disease)  Type II diabetes mellitus  PAD (peripheral artery disease)  Mild dementia  Parkinson disease  S/P hysterectomy    Social History:   Denies tobacco use    FAMILY HISTORY:  No pertinent family history    Allergies:   No Known Allergies      Current Medications:   acetaminophen   IVPB .. 1000 milliGRAM(s) IV Intermittent once  aspirin enteric coated 81 milliGRAM(s) Oral daily  atorvastatin 80 milliGRAM(s) Oral at bedtime  carbidopa/levodopa  25/100 1 Tablet(s) Oral daily  clopidogrel Tablet 75 milliGRAM(s) Oral daily  dextrose 5%. 1000 milliLiter(s) IV Continuous <Continuous>  dextrose 5%. 1000 milliLiter(s) IV Continuous <Continuous>  dextrose 50% Injectable 25 Gram(s) IV Push once  dextrose 50% Injectable 12.5 Gram(s) IV Push once  dextrose 50% Injectable 25 Gram(s) IV Push once  dextrose Oral Gel 15 Gram(s) Oral once PRN  enoxaparin Injectable 40 milliGRAM(s) SubCutaneous every 24 hours  famotidine    Tablet 20 milliGRAM(s) Oral daily  gabapentin 100 milliGRAM(s) Oral three times a day  glucagon  Injectable 1 milliGRAM(s) IntraMuscular once  influenza  Vaccine (HIGH DOSE) 0.7 milliLiter(s) IntraMuscular once  insulin lispro (ADMELOG) corrective regimen sliding scale   SubCutaneous three times a day before meals  insulin lispro (ADMELOG) corrective regimen sliding scale   SubCutaneous at bedtime  isosorbide   mononitrate ER Tablet (IMDUR) 30 milliGRAM(s) Oral daily  QUEtiapine 12.5 milliGRAM(s) Oral at bedtime PRN  sodium chloride 0.9%. 1000 milliLiter(s) IV Continuous <Continuous>      Labs:                         13.5   8.64  )-----------( 345      ( 19 Feb 2024 13:45 )             43.9       02-19    138  |  95<L>  |  23  ----------------------------<  178<H>  3.8   |  24  |  0.64      TPro  7.9  /  Alb  4.8  /  TBili  0.2  /  DBili  x   /  AST  36  /  ALT  35  /  AlkPhos  98  02-19      Blood Bank: 02-21-24  B  --  Positive      Assessment/Plan:   This is a 62yo female  presents with left carotid stenosis. Patient presents to neuro-IR for selective cerebral angiography, and left carotid stent placement. Procedure/ risks/ benefits/ goals/ alternatives were explained. Risks include but are not limited to stroke/ vessel injury/ hemorrhage/ groin hematoma. All questions answered. Informed content obtained from patient. Consent placed in chart.    Shelby Restrepo PA-C  [phone]

## 2024-02-26 NOTE — PROGRESS NOTE ADULT - SUBJECTIVE AND OBJECTIVE BOX
THE PATIENT WAS SEEN AND EXAMINED BY ME WITH THE HOUSESTAFF AND STROKE TEAM DURING MORNING ROUNDS.   HPI:  62 y/o right handed woman with a PMHx significant for HTN, DM, HLD, CAD(s/p 3 stents placed at [facility] in 2014 to the prox LAD and then 2022),  PAD s/p L popliteal angioplasty and atherectomy 12/23, R ICA and L ICA stenosis, prior stroke with L side weakness, ?Parkinson's disease, B/L cataract surgery with blurred vision at baseline who presented with headache, dizziness, blurred vision and intermittent vision loss L>R, right arm weakness. Patient was last seen well on 2/18 at 11AM. Family reports she woke up with these symptoms on 2/19. Per family, she is A&Ox2 at baseline, uses walker and assistance to walk. She has frequent falls per family, gait instability, and drags her feet while walking. She carries diagnosis of Parkinson's and is on Sinemet but has not yet been evaluated by movement disorder specialist. Patient has been seen previously by Neurology due to concern for falls and AMS. During evaluations in 2022 and 2023, she was found to have B/L ICA stenosis. Also was found to have R corona radiata infarct on MRI brain.   Carotid duplex (08.01.23) showed moderate, 50-69% stenosis of the right internal carotid artery and no hemodynamically significant stenosis of left carotid artery. She was seen by vascular surgery who recommended to continue DAPT and was to be re-assessed in 6 months.   Patient has also been seen in the past by Neurology for headaches (2021) which appeared poorly controlled. LKW: 2/18 11AM. On admission: NIHSS: 3 (2 for right arm drift and 1 for dysarthria)  Baseline MRS: 4. Not a Tenecteplase candidate due to out of window. Not a thrombectomy candidate due to  out of window      SUBJECTIVE: No events overnight.  No new neurologic complaints.      acetaminophen     Tablet .. 650 milliGRAM(s) Oral every 6 hours PRN  acetaminophen   IVPB .. 1000 milliGRAM(s) IV Intermittent once  acetaminophen   IVPB .. 1000 milliGRAM(s) IV Intermittent once  artificial  tears Solution 1 Drop(s) Both EYES two times a day  aspirin enteric coated 81 milliGRAM(s) Oral daily  atorvastatin 80 milliGRAM(s) Oral at bedtime  carbidopa/levodopa  25/100 1 Tablet(s) Oral daily  cefTRIAXone   IVPB 1000 milliGRAM(s) IV Intermittent every 24 hours  clopidogrel Tablet 75 milliGRAM(s) Oral daily  dextrose 5%. 1000 milliLiter(s) IV Continuous <Continuous>  dextrose 5%. 1000 milliLiter(s) IV Continuous <Continuous>  dextrose 50% Injectable 12.5 Gram(s) IV Push once  dextrose 50% Injectable 25 Gram(s) IV Push once  dextrose 50% Injectable 25 Gram(s) IV Push once  dextrose Oral Gel 15 Gram(s) Oral once PRN  enoxaparin Injectable 40 milliGRAM(s) SubCutaneous every 24 hours  famotidine    Tablet 20 milliGRAM(s) Oral daily  gabapentin 100 milliGRAM(s) Oral three times a day  glucagon  Injectable 1 milliGRAM(s) IntraMuscular once  influenza  Vaccine (HIGH DOSE) 0.7 milliLiter(s) IntraMuscular once  insulin lispro (ADMELOG) corrective regimen sliding scale   SubCutaneous at bedtime  insulin lispro (ADMELOG) corrective regimen sliding scale   SubCutaneous three times a day before meals  isosorbide   mononitrate ER Tablet (IMDUR) 30 milliGRAM(s) Oral daily  NIFEdipine XL 30 milliGRAM(s) Oral daily      PHYSICAL EXAM:   Vital Signs Last 24 Hrs  T(C): 36.6 (26 Feb 2024 05:12), Max: 37.3 (25 Feb 2024 21:00)  T(F): 97.9 (26 Feb 2024 05:12), Max: 99.2 (25 Feb 2024 21:00)  HR: 96 (26 Feb 2024 05:12) (80 - 127)  BP: 189/95 (26 Feb 2024 05:12) (154/83 - 189/95)  BP(mean): 89 (25 Feb 2024 13:05) (89 - 89)  RR: 18 (26 Feb 2024 05:12) (18 - 18)  SpO2: 98% (26 Feb 2024 05:12) (96% - 98%)    Parameters below as of 26 Feb 2024 05:12  Patient On (Oxygen Delivery Method): room air        General: No acute distress  HEENT: EOM intact, visual fields full, decreased visual acuity L>R  Abdomen: Soft, nontender, nondistended   Extremities: No edema    NEUROLOGICAL EXAM:  Mental status: Eyes open, awake, alert, oriented to person, place, and month, not year. Follows 1 and 2 step crossed commands. Attention/concentration, recent and remote memory and fund of knowledge with mild impairment   Language: Speech fluent, repetition and naming intact.   Cranial nerves -No facial droop, VFF, decreased visual acuity L>R, EOMI in lateral gaze but no upward or downgaze noted.   Motor exam:  No drifts RUE 5/5, RLE 5/5, LUE 5/5, LLE 5/5  Sensation: Intact to light touch   Coordination/ Gait: No dysmetria, Gait deferred.      LABS:             IMAGING: Reviewed by me.   MRI/MRA/NOVA w/o 2/20:  RCCA 283, MARCO ANTONIO 131, RMCA 90, RACA2 61  LCCA 312, LICA 166, LMCA 102, LACA 110, LACA2 59  RVA 57, LVA 51, BA 46, RPCA 49, RPCOM 43 anterior to posterior, LPCA 32  Significant bilateral carotid stenosis without evidence of acute infarct. Noninvasive flow MR angiography as above.     CT HEAD: No acute intracranial hemorrhage. Gray/white matter differentiation is preserved. High-grade (80-90%) stenosis of the LEFT internal carotid artery due to circumferential calcifications. Critical stenosis (greater than 95%) of the distal RIGHT common carotid artery and origin of the RIGHT internal carotid artery due to extensive calcified plaque.    CTA HEAD: Moderate stenosis of bilateral precavernous and cavernous internal carotid arteries. No evidence of aneurysm. Tiny aneurysms can be beyond the resolution of CTA technique. 3.6 cm hypodense lesion in the left thyroid lobe.    MR Head w/wo IV Cont (08.02.23 @ 11:59)  No evidence of acute infarct, hemorrhage, or mass lesion. There is a chronic lacunar infarct in the right anterior corona radiata. There are multiple small foci of T2/FLAIR hyperintense signal in the periventricular white matter, suggestive of mild chronic microvascular ischemic changes. There is no evidence of abnormal enhancement.    VA Duplex Carotid, Bilat (08.01.23 @ 11:57) There is a moderate, 50-69% stenosis of the right internal carotid artery. No hemodynamically significant left carotid artery stenosis is identified.   THE PATIENT WAS SEEN AND EXAMINED BY ME WITH THE HOUSESTAFF AND STROKE TEAM DURING MORNING ROUNDS.   HPI:  64 y/o right handed woman with a PMHx significant for HTN, DM, HLD, CAD(s/p 3 stents placed at [facility] in 2014 to the prox LAD and then 2022),  PAD s/p L popliteal angioplasty and atherectomy 12/23, R ICA and L ICA stenosis, prior stroke with L side weakness, ?Parkinson's disease, B/L cataract surgery with blurred vision at baseline who presented with headache, dizziness, blurred vision and intermittent vision loss L>R, right arm weakness. Patient was last seen well on 2/18 at 11AM. Family reports she woke up with these symptoms on 2/19. Per family, she is A&Ox2 at baseline, uses walker and assistance to walk. She has frequent falls per family, gait instability, and drags her feet while walking. She carries diagnosis of Parkinson's and is on Sinemet but has not yet been evaluated by movement disorder specialist. Patient has been seen previously by Neurology due to concern for falls and AMS. During evaluations in 2022 and 2023, she was found to have B/L ICA stenosis. Also was found to have R corona radiata infarct on MRI brain.   Carotid duplex (08.01.23) showed moderate, 50-69% stenosis of the right internal carotid artery and no hemodynamically significant stenosis of left carotid artery. She was seen by vascular surgery who recommended to continue DAPT and was to be re-assessed in 6 months.   Patient has also been seen in the past by Neurology for headaches (2021) which appeared poorly controlled. LKW: 2/18 11AM. On admission: NIHSS: 3 (2 for right arm drift and 1 for dysarthria)  Baseline MRS: 4. Not a Tenecteplase candidate due to out of window. Not a thrombectomy candidate due to  out of window      SUBJECTIVE: No events overnight.  No new neurologic complaints.      acetaminophen     Tablet .. 650 milliGRAM(s) Oral every 6 hours PRN  acetaminophen   IVPB .. 1000 milliGRAM(s) IV Intermittent once  acetaminophen   IVPB .. 1000 milliGRAM(s) IV Intermittent once  artificial  tears Solution 1 Drop(s) Both EYES two times a day  aspirin enteric coated 81 milliGRAM(s) Oral daily  atorvastatin 80 milliGRAM(s) Oral at bedtime  carbidopa/levodopa  25/100 1 Tablet(s) Oral daily  cefTRIAXone   IVPB 1000 milliGRAM(s) IV Intermittent every 24 hours  clopidogrel Tablet 75 milliGRAM(s) Oral daily  dextrose 5%. 1000 milliLiter(s) IV Continuous <Continuous>  dextrose 5%. 1000 milliLiter(s) IV Continuous <Continuous>  dextrose 50% Injectable 12.5 Gram(s) IV Push once  dextrose 50% Injectable 25 Gram(s) IV Push once  dextrose 50% Injectable 25 Gram(s) IV Push once  dextrose Oral Gel 15 Gram(s) Oral once PRN  enoxaparin Injectable 40 milliGRAM(s) SubCutaneous every 24 hours  famotidine    Tablet 20 milliGRAM(s) Oral daily  gabapentin 100 milliGRAM(s) Oral three times a day  glucagon  Injectable 1 milliGRAM(s) IntraMuscular once  influenza  Vaccine (HIGH DOSE) 0.7 milliLiter(s) IntraMuscular once  insulin lispro (ADMELOG) corrective regimen sliding scale   SubCutaneous at bedtime  insulin lispro (ADMELOG) corrective regimen sliding scale   SubCutaneous three times a day before meals  isosorbide   mononitrate ER Tablet (IMDUR) 30 milliGRAM(s) Oral daily  NIFEdipine XL 30 milliGRAM(s) Oral daily      PHYSICAL EXAM:   Vital Signs Last 24 Hrs  T(C): 36.6 (26 Feb 2024 05:12), Max: 37.3 (25 Feb 2024 21:00)  T(F): 97.9 (26 Feb 2024 05:12), Max: 99.2 (25 Feb 2024 21:00)  HR: 96 (26 Feb 2024 05:12) (80 - 127)  BP: 189/95 (26 Feb 2024 05:12) (154/83 - 189/95)  BP(mean): 89 (25 Feb 2024 13:05) (89 - 89)  RR: 18 (26 Feb 2024 05:12) (18 - 18)  SpO2: 98% (26 Feb 2024 05:12) (96% - 98%)    Parameters below as of 26 Feb 2024 05:12  Patient On (Oxygen Delivery Method): room air        General: No acute distress  HEENT: EOM intact, visual fields full, decreased visual acuity L>R  Abdomen: Soft, nontender, nondistended   Extremities: No edema    NEUROLOGICAL EXAM:  Mental status: Eyes open, awake, alert, oriented to person, place, and month, not year. Follows 1 and 2 step crossed commands. Attention/concentration, recent and remote memory and fund of knowledge with mild impairment   Language: Speech fluent, repetition and naming intact, mild-moderate dysarthria.   Cranial nerves -No facial droop, VFF, decreased visual acuity L>R, EOMI in lateral gaze but no upward or downgaze noted.   Motor exam:  No drifts RUE 5/5, RLE 5/5, LUE 5/5, LLE 5/5  Sensation: Intact to light touch   Coordination/ Gait: No dysmetria, Gait deferred.      LABS:             IMAGING: Reviewed by me.   MRI/MRA/NOVA w/o 2/20:  RCCA 283, MARCO ANTONIO 131, RMCA 90, RACA2 61  LCCA 312, LICA 166, LMCA 102, LACA 110, LACA2 59  RVA 57, LVA 51, BA 46, RPCA 49, RPCOM 43 anterior to posterior, LPCA 32  Significant bilateral carotid stenosis without evidence of acute infarct. Noninvasive flow MR angiography as above.     CT HEAD: No acute intracranial hemorrhage. Gray/white matter differentiation is preserved. High-grade (80-90%) stenosis of the LEFT internal carotid artery due to circumferential calcifications. Critical stenosis (greater than 95%) of the distal RIGHT common carotid artery and origin of the RIGHT internal carotid artery due to extensive calcified plaque.    CTA HEAD: Moderate stenosis of bilateral precavernous and cavernous internal carotid arteries. No evidence of aneurysm. Tiny aneurysms can be beyond the resolution of CTA technique. 3.6 cm hypodense lesion in the left thyroid lobe.    MR Head w/wo IV Cont (08.02.23 @ 11:59)  No evidence of acute infarct, hemorrhage, or mass lesion. There is a chronic lacunar infarct in the right anterior corona radiata. There are multiple small foci of T2/FLAIR hyperintense signal in the periventricular white matter, suggestive of mild chronic microvascular ischemic changes. There is no evidence of abnormal enhancement.    VA Duplex Carotid, Bilat (08.01.23 @ 11:57) There is a moderate, 50-69% stenosis of the right internal carotid artery. No hemodynamically significant left carotid artery stenosis is identified.

## 2024-02-26 NOTE — PROGRESS NOTE ADULT - SUBJECTIVE AND OBJECTIVE BOX
Name of Patient : ALYSE AVENDAÑO  MRN: [id number]  Date of visit: 02-26-24      Subjective: Patient seen and examined. No new events except as noted.   Doing okay  episode of palpitation, tachycardia, no afib   R eye iritation     REVIEW OF SYSTEMS:    CONSTITUTIONAL: No weakness, fevers or chills  EYES/ENT: No visual changes;  No vertigo or throat pain   NECK: No pain or stiffness  RESPIRATORY: No cough, wheezing, hemoptysis; No shortness of breath  CARDIOVASCULAR: No chest pain or palpitations  GASTROINTESTINAL: No abdominal or epigastric pain. No nausea, vomiting, or hematemesis; No diarrhea or constipation. No melena or hematochezia.  GENITOURINARY: No dysuria, frequency or hematuria  NEUROLOGICAL: No numbness or weakness  SKIN: No itching, burning, rashes, or lesions   All other review of systems is negative unless indicated above.    MEDICATIONS:  MEDICATIONS  (STANDING):  acetaminophen   IVPB .. 1000 milliGRAM(s) IV Intermittent once  acetaminophen   IVPB .. 1000 milliGRAM(s) IV Intermittent once  artificial  tears Solution 1 Drop(s) Both EYES two times a day  aspirin enteric coated 81 milliGRAM(s) Oral daily  atorvastatin 80 milliGRAM(s) Oral at bedtime  carbidopa/levodopa  25/100 1 Tablet(s) Oral daily  cefTRIAXone   IVPB 1000 milliGRAM(s) IV Intermittent every 24 hours  clopidogrel Tablet 75 milliGRAM(s) Oral daily  dextrose 5%. 1000 milliLiter(s) (50 mL/Hr) IV Continuous <Continuous>  dextrose 5%. 1000 milliLiter(s) (100 mL/Hr) IV Continuous <Continuous>  dextrose 50% Injectable 25 Gram(s) IV Push once  dextrose 50% Injectable 25 Gram(s) IV Push once  dextrose 50% Injectable 12.5 Gram(s) IV Push once  enoxaparin Injectable 40 milliGRAM(s) SubCutaneous every 24 hours  erythromycin   Ointment 1 Application(s) Right EYE every 4 hours  famotidine    Tablet 20 milliGRAM(s) Oral daily  gabapentin 100 milliGRAM(s) Oral three times a day  glucagon  Injectable 1 milliGRAM(s) IntraMuscular once  influenza  Vaccine (HIGH DOSE) 0.7 milliLiter(s) IntraMuscular once  insulin lispro (ADMELOG) corrective regimen sliding scale   SubCutaneous three times a day before meals  insulin lispro (ADMELOG) corrective regimen sliding scale   SubCutaneous at bedtime  isosorbide   mononitrate ER Tablet (IMDUR) 30 milliGRAM(s) Oral daily  NIFEdipine XL 30 milliGRAM(s) Oral daily      PHYSICAL EXAM:  T(C): 37.4 (02-26-24 @ 22:32), Max: 37.4 (02-26-24 @ 22:32)  HR: 89 (02-26-24 @ 22:32) (89 - 127)  BP: 150/80 (02-26-24 @ 22:32) (150/80 - 189/95)  RR: 18 (02-26-24 @ 22:32) (18 - 18)  SpO2: 95% (02-26-24 @ 22:32) (95% - 98%)  Wt(kg): --  I&O's Summary    25 Feb 2024 07:01  -  26 Feb 2024 07:00  --------------------------------------------------------  IN: 340 mL / OUT: 355 mL / NET: -15 mL      Appearance: Normal	  HEENT:  PERRLA   Lymphatic: No lymphadenopathy   Cardiovascular: Normal S1 S2, no JVD  Respiratory: normal effort , clear  Gastrointestinal:  Soft, Non-tender  Skin: No rashes,  warm to touch  Psychiatry:  Mood & affect appropriate  Musculuskeletal: No edema    recent labs, Imaging and EKGs personally reviewed       02-25-24 @ 07:01  -  02-26-24 @ 07:00  --------------------------------------------------------  IN: 340 mL / OUT: 355 mL / NET: -15 mL                            12.3   6.99  )-----------( 299      ( 26 Feb 2024 07:30 )             39.6               02-26    141  |  104  |  15  ----------------------------<  178<H>  3.5   |  21<L>  |  0.61    Ca    9.7      26 Feb 2024 07:28  Phos  4.0     02-26  Mg     2.0     02-26                         Urinalysis Basic - ( 26 Feb 2024 07:28 )    Color: x / Appearance: x / SG: x / pH: x  Gluc: 178 mg/dL / Ketone: x  / Bili: x / Urobili: x   Blood: x / Protein: x / Nitrite: x   Leuk Esterase: x / RBC: x / WBC x   Sq Epi: x / Non Sq Epi: x / Bacteria: x

## 2024-02-26 NOTE — PROGRESS NOTE ADULT - ASSESSMENT
63y (1960) right handed woman with a PMHx significant for HTN, DM, HLD, CAD(s/p 3 stents placed at [facility] in 2014 to the prox LAD and then 2022),  PAD s/p L popliteal angioplasty and atherectomy 12/23, ?Parkinson's disease, B/L cataract surgery with blurred vision at baseline who presented with hx of known R ICA/L ICA stenosis, prior stroke with L side weakness presented with new onset R side weakness and vision changes. Pt was not able to provide detailed history, but family able to. Reported her vision has been better since the cataract surgeries, but recently she would occasionally complain of transient vision deficits. Unclear exact onset, but pt is complaining now of some vision loss in L>R that seems persistent.    Impression: R arm weakness with MRI negative for infarct. Likely TIA in the setting of bilateral carotid stenosis.     NEURO: Continue close monitoring for neurologic deterioration, permissive HTN to gradual normotension, titrate statin to LDL goal less than 70, MRI Brain w/o, MRA Head w/o and Neck w/contrast, results as noted above. Physical therapy/OT recommending SHANE.  Neurosurgery consulted: s/p cerebral angiogram which showed no significant left carotid stenosis    ANTITHROMBOTIC THERAPY: Aspirin and Plavix  for 3 weeks followed by ASA only as per [facility] protocol for secondary stroke prevention    PULMONARY: protecting airway, saturating well     CARDIOVASCULAR:  TTE shows no PFO, normal systolic function, continue with cardiac monitoring, no events recorded so far.  will continue Imdur 30mg and start nifedipine 30mg  and titrate up gradually  for BP mgt, Dr Solano (cardio) consult appreciated, possible ILR to rule out cardiac source.                            SBP goal: Permissive HTN to gradual normotension      GASTROINTESTINAL: Dysphagia screen passed, but made NPO on 2/20 as not tolerating diet. SLP eval recommended pureed diet with mildly thickened liquids. S/p MBS on 2/23, cleared for regular diet, tolerating well.     Diet: Regular diet, carb controlled    RENAL: BUN/Cr stable, good urine output      Na Goal: Greater than 135     Matos: No     HEMATOLOGY: no signs of bleeding     DVT ppx:  LMWH     ID: afebrile, no sign of infection Covid PCR: Negative, UA: positive for UTI, started on ceftriaxone.     OTHER: US Thyroid: Partially cystic/solid nodule in the left thyroid lower pole   corresponding to the lesion seen on the prior CT. There are no suspicious   lesions. TI-RAD 2: Not suspicious (No FNA)   All questions and concerns addressed with patient and family at bedside.    DISPOSITION: [facility]ANE per PT/OT eval, medically cleared for discharge, pending authorization    CORE MEASURES:        Admission NIHSS: 3     TPA: [] YES [x] NO      LDL/HDL: 80/40     Depression Screen: p     Statin Therapy: y     Dysphagia Screen: [x] PASS [] FAIL     Smoking [] YES [x] NO      Afib [] YES [x] NO     Stroke Education [x] YES [] NO  63y (1960) right handed woman with a PMHx significant for HTN, DM, HLD, CAD(s/p 3 stents placed at [facility] in 2014 to the prox LAD and then 2022),  PAD s/p L popliteal angioplasty and atherectomy 12/23, ?Parkinson's disease, B/L cataract surgery with blurred vision at baseline who presented with hx of known R ICA/L ICA stenosis, prior stroke with L side weakness presented with new onset R side weakness and vision changes. Pt was not able to provide detailed history, but family able to. Reported her vision has been better since the cataract surgeries, but recently she would occasionally complain of transient vision deficits. Unclear exact onset, but pt is complaining now of some vision loss in L>R that seems persistent.    Impression: R arm weakness with MRI negative for infarct. Likely TIA in the setting of bilateral carotid stenosis vs ESUS.     NEURO: Continue close monitoring for neurologic deterioration, permissive HTN to gradual normotension, titrate statin to LDL goal less than 70, MRI Brain w/o, MRA Head w/o and Neck w/contrast, results as noted above. Physical therapy/OT recommending SHANE.  Neurosurgery consulted: s/p cerebral angiogram which showed no significant left carotid stenosis    ANTITHROMBOTIC THERAPY: Aspirin and Plavix  for 3 weeks followed by ASA only as per [facility] protocol for secondary stroke prevention    PULMONARY: protecting airway, saturating well     CARDIOVASCULAR:  TTE shows no PFO, normal systolic function, continue with cardiac monitoring, no events recorded so far.  will continue Imdur 30mg and start nifedipine 30mg  and titrate up gradually  for BP mgt, Dr Solano (cardio) consult appreciated, ILR placed to rule out cardiac source.                            SBP goal: Permissive HTN to gradual normotension      GASTROINTESTINAL: Dysphagia screen passed, but made NPO on 2/20 as not tolerating diet. SLP eval recommended pureed diet with mildly thickened liquids. S/p MBS on 2/23, cleared for regular diet, tolerating well.     Diet: Regular diet, carb controlled    RENAL: BUN/Cr stable, good urine output      Na Goal: Greater than 135     Matos: No     HEMATOLOGY: no signs of bleeding     DVT ppx:  LMWH     ID: afebrile, no sign of infection Covid PCR: Negative, UA: positive for UTI, started on ceftriaxone.     OTHER: US Thyroid: Partially cystic/solid nodule in the left thyroid lower pole   corresponding to the lesion seen on the prior CT. There are no suspicious   lesions. TI-RAD 2: Not suspicious (No FNA)   All questions and concerns addressed with patient and family at bedside.    DISPOSITION: [facility]ANE per PT/OT eval, medically cleared for discharge, pending authorization    CORE MEASURES:        Admission NIHSS: 3     TPA: [] YES [x] NO      LDL/HDL: 80/40     Depression Screen: p     Statin Therapy: y     Dysphagia Screen: [x] PASS [] FAIL     Smoking [] YES [x] NO      Afib [] YES [x] NO     Stroke Education [x] YES [] NO

## 2024-02-26 NOTE — PROVIDER CONTACT NOTE (OTHER) - SITUATION
Patient bed alarm activated, PCA and RN responded, patient observed standing but weak and was assisted by PCA to the ground.

## 2024-02-27 ENCOUNTER — TRANSCRIPTION ENCOUNTER (OUTPATIENT)
Age: 64
End: 2024-02-27

## 2024-02-27 VITALS
SYSTOLIC BLOOD PRESSURE: 150 MMHG | RESPIRATION RATE: 18 BRPM | HEART RATE: 87 BPM | TEMPERATURE: 98 F | DIASTOLIC BLOOD PRESSURE: 84 MMHG | OXYGEN SATURATION: 96 %

## 2024-02-27 LAB
GLUCOSE BLDC GLUCOMTR-MCNC: 178 MG/DL — HIGH (ref 70–99)
GLUCOSE BLDC GLUCOMTR-MCNC: 243 MG/DL — HIGH (ref 70–99)

## 2024-02-27 PROCEDURE — 85576 BLOOD PLATELET AGGREGATION: CPT

## 2024-02-27 PROCEDURE — 74230 X-RAY XM SWLNG FUNCJ C+: CPT

## 2024-02-27 PROCEDURE — 84484 ASSAY OF TROPONIN QUANT: CPT

## 2024-02-27 PROCEDURE — C1760: CPT

## 2024-02-27 PROCEDURE — 86900 BLOOD TYPING SEROLOGIC ABO: CPT

## 2024-02-27 PROCEDURE — 84100 ASSAY OF PHOSPHORUS: CPT

## 2024-02-27 PROCEDURE — 92611 MOTION FLUOROSCOPY/SWALLOW: CPT

## 2024-02-27 PROCEDURE — 93306 TTE W/DOPPLER COMPLETE: CPT

## 2024-02-27 PROCEDURE — C1894: CPT

## 2024-02-27 PROCEDURE — 97535 SELF CARE MNGMENT TRAINING: CPT

## 2024-02-27 PROCEDURE — 70496 CT ANGIOGRAPHY HEAD: CPT | Mod: MA

## 2024-02-27 PROCEDURE — 86850 RBC ANTIBODY SCREEN: CPT

## 2024-02-27 PROCEDURE — 70544 MR ANGIOGRAPHY HEAD W/O DYE: CPT

## 2024-02-27 PROCEDURE — 82962 GLUCOSE BLOOD TEST: CPT

## 2024-02-27 PROCEDURE — 92610 EVALUATE SWALLOWING FUNCTION: CPT

## 2024-02-27 PROCEDURE — 86901 BLOOD TYPING SEROLOGIC RH(D): CPT

## 2024-02-27 PROCEDURE — 99285 EMERGENCY DEPT VISIT HI MDM: CPT | Mod: 25

## 2024-02-27 PROCEDURE — 36223 PLACE CATH CAROTID/INOM ART: CPT

## 2024-02-27 PROCEDURE — 84443 ASSAY THYROID STIM HORMONE: CPT

## 2024-02-27 PROCEDURE — 80061 LIPID PANEL: CPT

## 2024-02-27 PROCEDURE — 87635 SARS-COV-2 COVID-19 AMP PRB: CPT

## 2024-02-27 PROCEDURE — C1887: CPT

## 2024-02-27 PROCEDURE — 87077 CULTURE AEROBIC IDENTIFY: CPT

## 2024-02-27 PROCEDURE — 87186 SC STD MICRODIL/AGAR DIL: CPT

## 2024-02-27 PROCEDURE — 76536 US EXAM OF HEAD AND NECK: CPT

## 2024-02-27 PROCEDURE — 80053 COMPREHEN METABOLIC PANEL: CPT

## 2024-02-27 PROCEDURE — 70450 CT HEAD/BRAIN W/O DYE: CPT | Mod: MD

## 2024-02-27 PROCEDURE — 97530 THERAPEUTIC ACTIVITIES: CPT

## 2024-02-27 PROCEDURE — 92526 ORAL FUNCTION THERAPY: CPT

## 2024-02-27 PROCEDURE — 85610 PROTHROMBIN TIME: CPT

## 2024-02-27 PROCEDURE — 70551 MRI BRAIN STEM W/O DYE: CPT

## 2024-02-27 PROCEDURE — 85025 COMPLETE CBC W/AUTO DIFF WBC: CPT

## 2024-02-27 PROCEDURE — 97166 OT EVAL MOD COMPLEX 45 MIN: CPT

## 2024-02-27 PROCEDURE — 81001 URINALYSIS AUTO W/SCOPE: CPT

## 2024-02-27 PROCEDURE — 97116 GAIT TRAINING THERAPY: CPT

## 2024-02-27 PROCEDURE — 36415 COLL VENOUS BLD VENIPUNCTURE: CPT

## 2024-02-27 PROCEDURE — 93005 ELECTROCARDIOGRAM TRACING: CPT

## 2024-02-27 PROCEDURE — 70547 MR ANGIOGRAPHY NECK W/O DYE: CPT

## 2024-02-27 PROCEDURE — 83036 HEMOGLOBIN GLYCOSYLATED A1C: CPT

## 2024-02-27 PROCEDURE — 70498 CT ANGIOGRAPHY NECK: CPT | Mod: MA

## 2024-02-27 PROCEDURE — 93880 EXTRACRANIAL BILAT STUDY: CPT

## 2024-02-27 PROCEDURE — 80048 BASIC METABOLIC PNL TOTAL CA: CPT

## 2024-02-27 PROCEDURE — 33285 INSJ SUBQ CAR RHYTHM MNTR: CPT

## 2024-02-27 PROCEDURE — 85730 THROMBOPLASTIN TIME PARTIAL: CPT

## 2024-02-27 PROCEDURE — 97162 PT EVAL MOD COMPLEX 30 MIN: CPT

## 2024-02-27 PROCEDURE — 83735 ASSAY OF MAGNESIUM: CPT

## 2024-02-27 PROCEDURE — C1764: CPT

## 2024-02-27 PROCEDURE — C1769: CPT

## 2024-02-27 PROCEDURE — 36226 PLACE CATH VERTEBRAL ART: CPT

## 2024-02-27 PROCEDURE — 87086 URINE CULTURE/COLONY COUNT: CPT

## 2024-02-27 RX ORDER — DULAGLUTIDE 4.5 MG/.5ML
3 INJECTION, SOLUTION SUBCUTANEOUS
Refills: 0 | DISCHARGE

## 2024-02-27 RX ORDER — ENOXAPARIN SODIUM 100 MG/ML
40 INJECTION SUBCUTANEOUS
Qty: 0 | Refills: 0 | DISCHARGE
Start: 2024-02-27 | End: 2024-03-31

## 2024-02-27 RX ORDER — METFORMIN HYDROCHLORIDE 850 MG/1
1 TABLET ORAL
Refills: 0 | DISCHARGE

## 2024-02-27 RX ORDER — ERYTHROMYCIN BASE 5 MG/GRAM
1 OINTMENT (GRAM) OPHTHALMIC (EYE)
Qty: 0 | Refills: 0 | DISCHARGE
Start: 2024-02-27

## 2024-02-27 RX ORDER — NIFEDIPINE 30 MG
1 TABLET, EXTENDED RELEASE 24 HR ORAL
Refills: 0 | DISCHARGE
Start: 2024-02-27

## 2024-02-27 RX ORDER — LOSARTAN/HYDROCHLOROTHIAZIDE 100MG-25MG
1 TABLET ORAL
Refills: 0 | DISCHARGE

## 2024-02-27 RX ORDER — INSULIN LISPRO 100/ML
1 VIAL (ML) SUBCUTANEOUS
Qty: 0 | Refills: 0 | DISCHARGE
Start: 2024-02-27

## 2024-02-27 RX ORDER — NIFEDIPINE 30 MG
1 TABLET, EXTENDED RELEASE 24 HR ORAL
Refills: 0 | DISCHARGE

## 2024-02-27 RX ORDER — INSULIN LISPRO 100/ML
1 VIAL (ML) SUBCUTANEOUS
Qty: 0 | Refills: 0 | DISCHARGE
Start: 2024-02-27 | End: 2024-03-31

## 2024-02-27 RX ORDER — METOPROLOL TARTRATE 50 MG
1 TABLET ORAL
Refills: 0 | DISCHARGE

## 2024-02-27 RX ORDER — HYDRALAZINE HCL 50 MG
1 TABLET ORAL
Refills: 0 | DISCHARGE

## 2024-02-27 RX ADMIN — Medication 30 MILLIGRAM(S): at 04:41

## 2024-02-27 RX ADMIN — Medication 1: at 08:34

## 2024-02-27 RX ADMIN — ISOSORBIDE MONONITRATE 30 MILLIGRAM(S): 60 TABLET, EXTENDED RELEASE ORAL at 10:50

## 2024-02-27 RX ADMIN — Medication 2: at 13:03

## 2024-02-27 RX ADMIN — Medication 81 MILLIGRAM(S): at 10:49

## 2024-02-27 RX ADMIN — Medication 1 APPLICATION(S): at 13:03

## 2024-02-27 RX ADMIN — GABAPENTIN 100 MILLIGRAM(S): 400 CAPSULE ORAL at 04:41

## 2024-02-27 RX ADMIN — Medication 1 APPLICATION(S): at 10:49

## 2024-02-27 RX ADMIN — Medication 1 DROP(S): at 04:40

## 2024-02-27 RX ADMIN — GABAPENTIN 100 MILLIGRAM(S): 400 CAPSULE ORAL at 13:03

## 2024-02-27 RX ADMIN — CARBIDOPA AND LEVODOPA 1 TABLET(S): 25; 100 TABLET ORAL at 10:49

## 2024-02-27 RX ADMIN — FAMOTIDINE 20 MILLIGRAM(S): 10 INJECTION INTRAVENOUS at 10:49

## 2024-02-27 RX ADMIN — CLOPIDOGREL BISULFATE 75 MILLIGRAM(S): 75 TABLET, FILM COATED ORAL at 10:49

## 2024-02-27 RX ADMIN — Medication 1 APPLICATION(S): at 04:41

## 2024-02-27 RX ADMIN — CEFTRIAXONE 100 MILLIGRAM(S): 500 INJECTION, POWDER, FOR SOLUTION INTRAMUSCULAR; INTRAVENOUS at 04:41

## 2024-02-27 RX ADMIN — Medication 1 APPLICATION(S): at 02:26

## 2024-02-27 RX ADMIN — ENOXAPARIN SODIUM 40 MILLIGRAM(S): 100 INJECTION SUBCUTANEOUS at 10:50

## 2024-02-27 NOTE — DISCHARGE NOTE PROVIDER - [id number]_NEU_ALL_CORE
High blood pressure/High cholesterol Carotid stenosis/Diabetes/High blood pressure/High cholesterol/History of a stroke or TIA

## 2024-02-27 NOTE — PROGRESS NOTE ADULT - SUBJECTIVE AND OBJECTIVE BOX
Patient was given discharge instructions and prescriptions, no questions or concerns. Vitals stable from previous set, declined new set. Patient given belongings.    Name of Patient : ALYSE AVENDAÑO  MRN: [id number]  Date of visit: 02-27-24     Subjective: Patient seen and examined. No new events except as noted.     REVIEW OF SYSTEMS:    CONSTITUTIONAL: No weakness, fevers or chills  EYES/ENT: No visual changes;  No vertigo or throat pain   NECK: No pain or stiffness  RESPIRATORY: No cough, wheezing, hemoptysis; No shortness of breath  CARDIOVASCULAR: No chest pain or palpitations  GASTROINTESTINAL: No abdominal or epigastric pain. No nausea, vomiting, or hematemesis; No diarrhea or constipation. No melena or hematochezia.  GENITOURINARY: No dysuria, frequency or hematuria  NEUROLOGICAL: No numbness or weakness  SKIN: No itching, burning, rashes, or lesions   All other review of systems is negative unless indicated above.    MEDICATIONS:  MEDICATIONS  (STANDING):  acetaminophen   IVPB .. 1000 milliGRAM(s) IV Intermittent once  acetaminophen   IVPB .. 1000 milliGRAM(s) IV Intermittent once  artificial  tears Solution 1 Drop(s) Both EYES two times a day  aspirin enteric coated 81 milliGRAM(s) Oral daily  atorvastatin 80 milliGRAM(s) Oral at bedtime  carbidopa/levodopa  25/100 1 Tablet(s) Oral daily  cefTRIAXone   IVPB 1000 milliGRAM(s) IV Intermittent every 24 hours  clopidogrel Tablet 75 milliGRAM(s) Oral daily  dextrose 5%. 1000 milliLiter(s) (100 mL/Hr) IV Continuous <Continuous>  dextrose 5%. 1000 milliLiter(s) (50 mL/Hr) IV Continuous <Continuous>  dextrose 50% Injectable 25 Gram(s) IV Push once  dextrose 50% Injectable 25 Gram(s) IV Push once  dextrose 50% Injectable 12.5 Gram(s) IV Push once  enoxaparin Injectable 40 milliGRAM(s) SubCutaneous every 24 hours  erythromycin   Ointment 1 Application(s) Right EYE every 4 hours  famotidine    Tablet 20 milliGRAM(s) Oral daily  gabapentin 100 milliGRAM(s) Oral three times a day  glucagon  Injectable 1 milliGRAM(s) IntraMuscular once  insulin lispro (ADMELOG) corrective regimen sliding scale   SubCutaneous at bedtime  insulin lispro (ADMELOG) corrective regimen sliding scale   SubCutaneous three times a day before meals  isosorbide   mononitrate ER Tablet (IMDUR) 30 milliGRAM(s) Oral daily  NIFEdipine XL 30 milliGRAM(s) Oral daily      PHYSICAL EXAM:  T(C): 36.7 (02-27-24 @ 16:00), Max: 37.4 (02-26-24 @ 22:32)  HR: 87 (02-27-24 @ 16:00) (79 - 96)  BP: 150/84 (02-27-24 @ 16:00) (133/81 - 161/89)  RR: 18 (02-27-24 @ 16:00) (18 - 18)  SpO2: 96% (02-27-24 @ 16:00) (95% - 97%)  Wt(kg): --  I&O's Summary    26 Feb 2024 07:01  -  27 Feb 2024 07:00  --------------------------------------------------------  IN: 290 mL / OUT: 0 mL / NET: 290 mL          Appearance: Normal	  HEENT:  PERRLA   Lymphatic: No lymphadenopathy   Cardiovascular: Normal S1 S2, no JVD  Respiratory: normal effort , clear  Gastrointestinal:  Soft, Non-tender  Skin: No rashes,  warm to touch  Psychiatry:  Mood & affect appropriate  Musculuskeletal: No edema    recent labs, Imaging and EKGs personally reviewed             02-26-24 @ 07:01  -  02-27-24 @ 07:00  --------------------------------------------------------  IN: 290 mL / OUT: 0 mL / NET: 290 mL

## 2024-02-27 NOTE — DISCHARGE NOTE NURSING/CASE MANAGEMENT/SOCIAL WORK - PATIENT PORTAL LINK FT
You can access the FollowMyHealth Patient Portal offered by [facility] by registering at the following website: [email or web address]. By joining [employer] [facility]’s FollowMyHealth portal, you will also be able to view your health information using other applications (apps) compatible with our system.

## 2024-02-27 NOTE — PROGRESS NOTE ADULT - SUBJECTIVE AND OBJECTIVE BOX
Johnathan Tejeda MD  Interventional Cardiology / Endovascular Specialist  [place] Office : [phone] [place] [id number] Tel:   [place] Office : [address] [facility] [address] [address]  Tel: [phone]      Subjective/Overnight events: Patient sitting up in recliner. No acute distress.   	  MEDICATIONS:  aspirin enteric coated 81 milliGRAM(s) Oral daily  clopidogrel Tablet 75 milliGRAM(s) Oral daily  enoxaparin Injectable 40 milliGRAM(s) SubCutaneous every 24 hours  isosorbide   mononitrate ER Tablet (IMDUR) 30 milliGRAM(s) Oral daily  NIFEdipine XL 30 milliGRAM(s) Oral daily    cefTRIAXone   IVPB 1000 milliGRAM(s) IV Intermittent every 24 hours      acetaminophen     Tablet .. 650 milliGRAM(s) Oral every 6 hours PRN  acetaminophen   IVPB .. 1000 milliGRAM(s) IV Intermittent once  acetaminophen   IVPB .. 1000 milliGRAM(s) IV Intermittent once  carbidopa/levodopa  25/100 1 Tablet(s) Oral daily  gabapentin 100 milliGRAM(s) Oral three times a day    famotidine    Tablet 20 milliGRAM(s) Oral daily    atorvastatin 80 milliGRAM(s) Oral at bedtime  dextrose 50% Injectable 25 Gram(s) IV Push once  dextrose 50% Injectable 25 Gram(s) IV Push once  dextrose 50% Injectable 12.5 Gram(s) IV Push once  dextrose Oral Gel 15 Gram(s) Oral once PRN  glucagon  Injectable 1 milliGRAM(s) IntraMuscular once  insulin lispro (ADMELOG) corrective regimen sliding scale   SubCutaneous at bedtime  insulin lispro (ADMELOG) corrective regimen sliding scale   SubCutaneous three times a day before meals    artificial  tears Solution 1 Drop(s) Both EYES two times a day  dextrose 5%. 1000 milliLiter(s) IV Continuous <Continuous>  dextrose 5%. 1000 milliLiter(s) IV Continuous <Continuous>  erythromycin   Ointment 1 Application(s) Right EYE every 4 hours  influenza  Vaccine (HIGH DOSE) 0.7 milliLiter(s) IntraMuscular once      PAST MEDICAL/SURGICAL HISTORY  PAST MEDICAL & SURGICAL HISTORY:  HTN (hypertension)      HLD (hyperlipidemia)      CAD (coronary artery disease)      Type II diabetes mellitus      PAD (peripheral artery disease)      Mild dementia      Parkinson disease      S/P hysterectomy          SOCIAL HISTORY: Substance Use (street drugs): ( x ) never used  (  ) other:    FAMILY HISTORY:          PHYSICAL EXAM:  T(C): 37.1 (02-27-24 @ 08:55), Max: 37.4 (02-26-24 @ 22:32)  HR: 84 (02-27-24 @ 08:55) (79 - 91)  BP: 156/88 (02-27-24 @ 08:55) (146/80 - 163/81)  RR: 18 (02-27-24 @ 08:55) (18 - 18)  SpO2: 97% (02-27-24 @ 08:55) (95% - 98%)  Wt(kg): --  I&O's Summary    26 Feb 2024 07:01  -  27 Feb 2024 07:00  --------------------------------------------------------  IN: 290 mL / OUT: 0 mL / NET: 290 mL          GENERAL: NAD  EYES:  conjunctiva and sclera clear  ENMT: No tonsillar erythema, exudates, or enlargement  Cardiovascular: Normal S1 S2, No JVD, No murmurs, No edema  Respiratory: Lungs clear to auscultation	  Gastrointestinal:  Soft,   Extremities: No edema                                     12.3   6.99  )-----------( 299      ( 26 Feb 2024 07:30 )             39.6     02-26    141  |  104  |  15  ----------------------------<  178<H>  3.5   |  21<L>  |  0.61    Ca    9.7      26 Feb 2024 07:28  Phos  4.0     02-26  Mg     2.0     02-26      proBNP:   Lipid Profile:   HgA1c:   TSH:     Consultant(s) Notes Reviewed:  [x ] YES  [ ] NO    Care Discussed with Consultants/Other Providers [ x] YES  [ ] NO    Imaging Personally Reviewed independently:  [x] YES  [ ] NO    All labs, radiologic studies, vitals, orders and medications list reviewed. Patient is seen and examined at bedside. Case discussed with medical team.

## 2024-02-27 NOTE — PROGRESS NOTE ADULT - PROVIDER SPECIALTY LIST ADULT
Cardiology
Cardiology
Neurology
Cardiology
Cardiology
Internal Medicine
Cardiology
Electrophysiology
Internal Medicine
Neurology
Neurosurgery
Neurosurgery
Ophthalmology
Cardiology
Internal Medicine
Neurology
Detail Level: Detailed
Neurosurgery
Introduction Text (Please End With A Colon): The following procedure was deferred:
Procedure To Be Performed At Next Visit: Mohs surgery

## 2024-02-27 NOTE — PROGRESS NOTE ADULT - ASSESSMENT
63y (1960) right handed woman with a PMHx significant for HTN, DM, HLD, CAD(s/p 3 stents placed at [facility] in 2014 to the prox LAD and then 2022),  PAD s/p L popliteal angioplasty and atherectomy 12/23, ?Parkinson's disease, B/L cataract surgery with blurred vision at baseline who presented with hx of known R ICA/L ICA stenosis, prior stroke with L side weakness presented with new onset R side weakness and vision changes. Pt was not able to provide detailed history, but family able to. Reported her vision has been better since the cataract surgeries, but recently she would occasionally complain of transient vision deficits. Unclear exact onset, but pt is complaining now of some vision loss in L>R that seems persistent.    Impression: R arm weakness with MRI negative for infarct. Likely TIA in the setting of bilateral carotid stenosis vs ESUS.     NEURO: Continue close monitoring for neurologic deterioration, permissive HTN to gradual normotension, titrate statin to LDL goal less than 70, MRI Brain w/o, MRA Head w/o and Neck w/contrast, results as noted above. Physical therapy/OT recommending [facility].  Neurosurgery consulted: s/p cerebral angiogram which showed no significant left carotid stenosis    ANTITHROMBOTIC THERAPY: Aspirin and Plavix  for 3 weeks followed by ASA only as per [facility] protocol for secondary stroke prevention    PULMONARY: protecting airway, saturating well     CARDIOVASCULAR:  TTE shows no PFO, normal systolic function, continue with cardiac monitoring, no events recorded so far.  will continue Imdur 30mg and start nifedipine 30mg  and titrate up gradually  for BP mgt, Dr Solano (cardio) consult appreciated, ILR placed to rule out cardiac source.                            SBP goal: Permissive HTN to gradual normotension      GASTROINTESTINAL: Dysphagia screen passed, but made NPO on 2/20 as not tolerating diet. SLP eval recommended pureed diet with mildly thickened liquids. S/p MBS on 2/23, cleared for regular diet, tolerating well.     Diet: Regular diet, carb controlled    RENAL: BUN/Cr stable, good urine output      Na Goal: Greater than 135     Matos: No     HEMATOLOGY: no signs of bleeding     DVT ppx:  LMWH     ID: afebrile, no sign of infection Covid PCR: Negative, UA: positive for UTI, started on ceftriaxone.     OTHER: US Thyroid: Partially cystic/solid nodule in the left thyroid lower pole   corresponding to the lesion seen on the prior CT. There are no suspicious   lesions. TI-RAD 2: Not suspicious (No FNA)   All questions and concerns addressed with patient and family at bedside.    DISPOSITION: [facility] per PT/OT eval, medically cleared for discharge, pending authorization    CORE MEASURES:        Admission NIHSS: 3     TPA: [] YES [x] NO      LDL/HDL: 80/40     Depression Screen: p     Statin Therapy: y     Dysphagia Screen: [x] PASS [] FAIL     Smoking [] YES [x] NO      Afib [] YES [x] NO     Stroke Education [x] YES [] NO     Obtain screening lower extremity venous ultrasound in patients who meet 1 or more of the following criteria as patient is high risk for DVT/PE on admission:   [] History of DVT/PE  []Hypercoagulable states (Factor V Leiden, Cancer, OCP, etc. )  []Prolonged immobility (hemiplegia/hemiparesis/post operative or any other extended immobilization)  [] Transferred from outside facility (Rehab or Long term care)  [] Age </= to 50.     63y (1960) right handed woman with a PMHx significant for HTN, DM, HLD, CAD(s/p 3 stents placed at [facility] in 2014 to the prox LAD and then 2022),  PAD s/p L popliteal angioplasty and atherectomy 12/23, ?Parkinson's disease, B/L cataract surgery with blurred vision at baseline who presented with hx of known R ICA/L ICA stenosis, prior stroke with L side weakness presented with new onset R side weakness and vision changes. Pt was not able to provide detailed history, but family able to. Reported her vision has been better since the cataract surgeries, but recently she would occasionally complain of transient vision deficits. Unclear exact onset, but pt is complaining now of some vision loss in L>R that seems persistent.    Impression: R arm weakness with MRI negative for infarct. Likely TIA in the setting of bilateral carotid stenosis vs ESUS.     NEURO: Continue close monitoring for neurologic deterioration, permissive HTN to gradual normotension, titrate statin to LDL goal less than 70, MRI Brain w/o, MRA Head w/o and Neck w/contrast, results as noted above. Physical therapy/OT recommending [facility].  Neurosurgery consulted: s/p cerebral angiogram which showed no significant left carotid stenosis    ANTITHROMBOTIC THERAPY: Aspirin and Plavix  for 3 weeks followed by ASA only as per [facility] protocol for secondary stroke prevention    PULMONARY: protecting airway, saturating well     CARDIOVASCULAR:  TTE shows no PFO, normal systolic function, continue with cardiac monitoring, no events recorded so far.  will continue Imdur 30mg and start nifedipine 30mg  and titrate up gradually  for BP mgt, Dr Solano (cardio) consult appreciated, ILR placed to rule out cardiac source.                            SBP goal: Permissive HTN to gradual normotension      GASTROINTESTINAL: Dysphagia screen passed, but made NPO on 2/20 as not tolerating diet. SLP eval recommended pureed diet with mildly thickened liquids. S/p MBS on 2/23, cleared for regular diet, tolerating well.     Diet: Regular diet, carb controlled    RENAL: BUN/Cr stable, good urine output      Na Goal: Greater than 135     Matos: No     HEMATOLOGY: no signs of bleeding     DVT ppx:  LMWH     ID: afebrile, no sign of infection Covid PCR: Negative, UA: positive for UTI, started on ceftriaxone.     OTHER: US Thyroid: Partially cystic/solid nodule in the left thyroid lower pole   corresponding to the lesion seen on the prior CT. There are no suspicious   lesions. TI-RAD 2: Not suspicious (No FNA)   All questions and concerns addressed with patient and family at bedside.  Conjunctivitis likely viral as with clear discharge noted in both eyes.     DISPOSITION: [facility] per PT/OT eval, medically cleared for discharge, pending authorization    CORE MEASURES:        Admission NIHSS: 3     TPA: [] YES [x] NO      LDL/HDL: 80/40     Depression Screen: p     Statin Therapy: y     Dysphagia Screen: [x] PASS [] FAIL     Smoking [] YES [x] NO      Afib [] YES [x] NO     Stroke Education [x] YES [] NO     Obtain screening lower extremity venous ultrasound in patients who meet 1 or more of the following criteria as patient is high risk for DVT/PE on admission:   [] History of DVT/PE  []Hypercoagulable states (Factor V Leiden, Cancer, OCP, etc. )  []Prolonged immobility (hemiplegia/hemiparesis/post operative or any other extended immobilization)  [] Transferred from outside facility (Rehab or Long term care)  [] Age </= to 50.     63y (1960) right handed woman with a PMHx significant for HTN, DM, HLD, CAD(s/p 3 stents placed at [facility] in 2014 to the prox LAD and then 2022),  PAD s/p L popliteal angioplasty and atherectomy 12/23, ?Parkinson's disease, B/L cataract surgery with blurred vision at baseline who presented with hx of known R ICA/L ICA stenosis, prior stroke with L side weakness presented with new onset R side weakness and vision changes. Pt was not able to provide detailed history, but family able to. Reported her vision has been better since the cataract surgeries, but recently she would occasionally complain of transient vision deficits. Unclear exact onset, but pt is complaining now of some vision loss in L>R that seems persistent.    Impression: R arm weakness with MRI negative for infarct. Likely TIA in the setting of ESUS.     NEURO: Continue close monitoring for neurologic deterioration, permissive HTN to gradual normotension, titrate statin to LDL goal less than 70, MRI Brain w/o, MRA Head w/o and Neck w/contrast, results as noted above. Physical therapy/OT recommending [facility].  Neurosurgery consulted: s/p cerebral angiogram which showed no significant left carotid stenosis    ANTITHROMBOTIC THERAPY: Aspirin and Plavix  for 3 weeks followed by ASA only as per [facility] protocol for secondary stroke prevention    PULMONARY: protecting airway, saturating well     CARDIOVASCULAR:  TTE shows no PFO, normal systolic function, continue with cardiac monitoring, no events recorded so far.  will continue Imdur 30mg and start nifedipine 30mg  and titrate up gradually  for BP mgt, Dr Solano (cardio) consult appreciated, ILR placed to rule out cardiac source.                            SBP goal: Permissive HTN to gradual normotension      GASTROINTESTINAL: Dysphagia screen passed, but made NPO on 2/20 as not tolerating diet. SLP eval recommended pureed diet with mildly thickened liquids. S/p MBS on 2/23, cleared for regular diet, tolerating well.     Diet: Regular diet, carb controlled    RENAL: BUN/Cr stable, good urine output      Na Goal: Greater than 135     Matos: No     HEMATOLOGY: no signs of bleeding     DVT ppx:  LMWH     ID: afebrile, no sign of infection Covid PCR: Negative, UA: positive for UTI, started on ceftriaxone.     OTHER: US Thyroid: Partially cystic/solid nodule in the left thyroid lower pole   corresponding to the lesion seen on the prior CT. There are no suspicious   lesions. TI-RAD 2: Not suspicious (No FNA)   All questions and concerns addressed with patient and family at bedside.  Conjunctivitis likely viral as with clear discharge noted in both eyes.     DISPOSITION: [facility] per PT/OT eval, medically cleared for discharge, pending authorization    CORE MEASURES:        Admission NIHSS: 3     TPA: [] YES [x] NO      LDL/HDL: 80/40     Depression Screen: p     Statin Therapy: y     Dysphagia Screen: [x] PASS [] FAIL     Smoking [] YES [x] NO      Afib [] YES [x] NO     Stroke Education [x] YES [] NO     Obtain screening lower extremity venous ultrasound in patients who meet 1 or more of the following criteria as patient is high risk for DVT/PE on admission:   [] History of DVT/PE  []Hypercoagulable states (Factor V Leiden, Cancer, OCP, etc. )  []Prolonged immobility (hemiplegia/hemiparesis/post operative or any other extended immobilization)  [] Transferred from outside facility (Rehab or Long term care)  [] Age </= to 50.

## 2024-02-27 NOTE — DISCHARGE NOTE PROVIDER - CARE PROVIDER_API CALL
Viki Gray  Neurology  [address], [address]  [place] [address]  Phone: [phone]  Fax: [phone]  Follow Up Time: 2 weeks    Nito Tejeda  Nephrology  [address], [facility] [address]  [place] [address]  Phone: [phone]  Fax: [phone]  Follow Up Time: 2 weeks    Lisandro Waller  Psychology  [address], [address]  [place] [address]  Phone: [phone]  Fax: [phone]  Follow Up Time: Routine    Tremayne Matthews  Neurology  [address], [address]  [place] [address]  Phone: [phone]  Fax: [phone]  Follow Up Time: Routine   Viki Gray  Neurology  [address], [address]  [place] [address]  Phone: [phone]  Fax: [phone]  Follow Up Time: 2 weeks    Lisandro Waller  Psychology  [address], [address]  [place] [address]  Phone: [phone]  Fax: [phone]  Follow Up Time: Routine    Tremayne Matthews  Neurology  [address], [address]  [place] [address]  Phone: [phone]  Fax: [phone]  Follow Up Time: Routine    Johnathan Tejeda  Interventional Cardiology  [address] [place] [address]  Phone: [phone]  Fax: [phone]  Follow Up Time: 1 month

## 2024-02-27 NOTE — DISCHARGE NOTE PROVIDER - CARE PROVIDERS DIRECT ADDRESSES
,DirectAddress_Unknown,DirectAddress_Unknown,[email or web address] ,DirectAddress_Unknown,[email or web address],DirectAddress_Unknown

## 2024-02-27 NOTE — PROGRESS NOTE ADULT - REASON FOR ADMISSION
R arm weakness, blurred vision, headache

## 2024-02-27 NOTE — DISCHARGE NOTE PROVIDER - PROVIDER TOKENS
PROVIDER:[TOKEN:[[id number]],FOLLOWUP:[2 weeks]],PROVIDER:[TOKEN:[[id number]],FOLLOWUP:[2 weeks]],PROVIDER:[TOKEN:[[id number]],FOLLOWUP:[Routine]],PROVIDER:[TOKEN:[[id number]],FOLLOWUP:[Routine]] PROVIDER:[TOKEN:[[id number]],FOLLOWUP:[2 weeks]],PROVIDER:[TOKEN:[[id number]],FOLLOWUP:[Routine]],PROVIDER:[TOKEN:[[id number]],FOLLOWUP:[Routine]],PROVIDER:[TOKEN:[[id number]],FOLLOWUP:[1 month]]

## 2024-02-27 NOTE — DISCHARGE NOTE PROVIDER - HOSPITAL COURSE
HPI: Patient ALYSE AVENDAÑO is a 63y (1960) right handed woman with a PMHx significant for HTN, DM, HLD, CAD(s/p 3 stents placed at [facility] in 2014 to the prox LAD and then 2022),  PAD s/p L popliteal angioplasty and atherectomy 12/23, R ICA and L ICA stenosis, prior stroke with L side weakness, Parkinson's disease, B/L cataract surgery with blurred vision at baseline who presents with headache, dizziness, blurred vision, right arm weakness. Patient was last seen well on 2/18 at 11AM. Family reports she woke up with these symptoms on 2/19. Per family, she is A&Ox2 at baseline, uses walker and assistance to walk. She has frequent falls per family, gait instability, and drags her feet while walking. She carries diagnosis of Parkinson's and is on Sinemet but has not yet been evaluated by movement disorder specialist. Patient has been seen previously by Neurology due to concern for falls and AMS. During evaluations in 2022 and 2023, she was found to have B/L ICA stenosis. Also was found to have R corona radiata infarct on MRI brain.   Carotid duplex (08.01.23) showed moderate, 50-69% stenosis of the right internal carotid artery and no hemodynamically significant stenosis of left carotid artery. She was seen by vascular surgery who recommended to continue DAPT and was to be re-assessed in 6 months.   Patient has also been seen in the past by Neurology for headaches (2021) which appeared poorly controlled.    LKW: 2/18 11AM  NIHSS: 3 (2 for right arm drift and 1 for dysarthria)  Baseline MRS: 4  Not a Tenecteplase candidate due to out of window  Not a thrombectomy candidate due to  out of window      MRI/MRA/NOVA w/o 2/20:  RCCA 283, MARCO ANTONIO 131, RMCA 90, RACA2 61  LCCA 312, LICA 166, LMCA 102, LACA 110, LACA2 59  RVA 57, LVA 51, BA 46, RPCA 49, RPCOM 43 anterior to posterior, LPCA 32    IMPRESSION: Significant bilateral carotid stenosis without evidence of acute infarct. Noninvasive flow MR angiography as above.     ----  CT Head No Cont (02.19.24 @ 15:50)   No acute intracranial hemorrhage. Gray/white matter differentiation is   preserved.    CTA NECK:  High-grade (80-90%) stenosis of the LEFT internal carotid artery due to   circumferential calcifications. Critical stenosis (greater than 95%) of   the distal RIGHT common carotid artery and origin of the RIGHTinternal   carotid artery due to extensive calcified plaque.    CTA HEAD:  Moderate stenosis of bilateral precavernous and cavernous internal   carotid arteries.    No evidence of aneurysm. Tiny aneurysms can be beyond the resolution of   CTA technique.    3.6 cm hypodense lesion in the left thyroid lobe.    MR Head w/wo IV Cont (08.02.23 @ 11:59)   The ventricles, sulci and basal cisterns appear unremarkable. There is no   evidence of acute infarct, hemorrhage, or mass lesion. There is a chronic   lacunar infarct in the right anterior corona radiata. There are multiple   small foci of T2/FLAIR hyperintense signal in the periventricular white   matter, suggestive of mild chronic microvascular ischemic changes. There   is no evidence of abnormal enhancement. There is no evidence of midline   shift or herniation pattern. No mass effect is found in the brain.    The vertebral and internal carotid arteries demonstrate expected flow   voids indicating their patency.    The paranasal sinuses are clear.  IMPRESSION:  No evidence of acute infarct, hemorrhage, or mass lesion.   There is a chronic lacunar infarct in the right anterior corona radiata.   There are multiple small foci of T2/FLAIR hyperintense signal in the   periventricular white matter, suggestive of mild chronic microvascular   ischemic changes. There is no evidence of abnormal enhancement.    VA Duplex Carotid, Bilat (08.01.23 @ 11:57)   RIGHT:  PROX CCA = 51  DIST CCA = 54  PROX ICA = 167  DIST ICA = 73  ECA = 209  LEFT:  PROX CCA = 55  DIST CCA = 57  PROX ICA = 90  DIST ICA = 78  ECA = 153  Antegrade flow is noted within both vertebral arteries.  IMPRESSION: No significant hemodynamic stenosis of either internal carotid artery.  Right external carotid artery stenosis is suggested.      OVERALL IMPRESSION:  headache, dizziness, blurred vision, RUE weakness. High-grade (80-90%) stenosis of the LEFT ICA and critical stenosis (greater than 95%) of  the distal RIGHT CCA and origin of the RIGHT ICA. SAMPRIS. No stent due to no significant stenosis on angio per NSGY.     Hospital course:   Pt was admitted to the stroke floor and monitored. Pt obtained imaging of her brain and found to have high grade stenosis for which angiogram was done and pt was not deemed a candidate for L ICA stent. Cardiology, electrophysiology, were consulted. Pt had a loop recorder placed. Pt was found to have a UTI and started on ceftriaxone (2/26- 2/29). Pt was stabilized for discharge with outpatient follow up. HPI: Patient ALYSE AVENDAÑO is a 63y (1960) right handed woman with a PMHx significant for HTN, DM, HLD, CAD(s/p 3 stents placed at [facility] in 2014 to the prox LAD and then 2022),  PAD s/p L popliteal angioplasty and atherectomy 12/23, R ICA and L ICA stenosis, prior stroke with L side weakness, Parkinson's disease, B/L cataract surgery with blurred vision at baseline who presents with headache, dizziness, blurred vision, right arm weakness. Patient was last seen well on 2/18 at 11AM. Family reports she woke up with these symptoms on 2/19. Per family, she is A&Ox2 at baseline, uses walker and assistance to walk. She has frequent falls per family, gait instability, and drags her feet while walking. She carries diagnosis of Parkinson's and is on Sinemet but has not yet been evaluated by movement disorder specialist. Patient has been seen previously by Neurology due to concern for falls and AMS. During evaluations in 2022 and 2023, she was found to have B/L ICA stenosis. Also was found to have R corona radiata infarct on MRI brain.   Carotid duplex (08.01.23) showed moderate, 50-69% stenosis of the right internal carotid artery and no hemodynamically significant stenosis of left carotid artery. She was seen by vascular surgery who recommended to continue DAPT and was to be re-assessed in 6 months.   Patient has also been seen in the past by Neurology for headaches (2021) which appeared poorly controlled.    LKW: 2/18 11AM  NIHSS: 3 (2 for right arm drift and 1 for dysarthria)  Baseline MRS: 4  Not a Tenecteplase candidate due to out of window  Not a thrombectomy candidate due to  out of window      MRI/MRA/NOVA w/o 2/20:  RCCA 283, MARCO ANTONIO 131, RMCA 90, RACA2 61  LCCA 312, LICA 166, LMCA 102, LACA 110, LACA2 59  RVA 57, LVA 51, BA 46, RPCA 49, RPCOM 43 anterior to posterior, LPCA 32    IMPRESSION: Significant bilateral carotid stenosis without evidence of acute infarct. Noninvasive flow MR angiography as above.     ----  CT Head No Cont (02.19.24 @ 15:50)   No acute intracranial hemorrhage. Gray/white matter differentiation is   preserved.    CTA NECK:  High-grade (80-90%) stenosis of the LEFT internal carotid artery due to   circumferential calcifications. Critical stenosis (greater than 95%) of   the distal RIGHT common carotid artery and origin of the RIGHTinternal   carotid artery due to extensive calcified plaque.    CTA HEAD:  Moderate stenosis of bilateral precavernous and cavernous internal   carotid arteries.    No evidence of aneurysm. Tiny aneurysms can be beyond the resolution of   CTA technique.    3.6 cm hypodense lesion in the left thyroid lobe.    MR Head w/wo IV Cont (08.02.23 @ 11:59)   The ventricles, sulci and basal cisterns appear unremarkable. There is no   evidence of acute infarct, hemorrhage, or mass lesion. There is a chronic   lacunar infarct in the right anterior corona radiata. There are multiple   small foci of T2/FLAIR hyperintense signal in the periventricular white   matter, suggestive of mild chronic microvascular ischemic changes. There   is no evidence of abnormal enhancement. There is no evidence of midline   shift or herniation pattern. No mass effect is found in the brain.    The vertebral and internal carotid arteries demonstrate expected flow   voids indicating their patency.    The paranasal sinuses are clear.  IMPRESSION:  No evidence of acute infarct, hemorrhage, or mass lesion.   There is a chronic lacunar infarct in the right anterior corona radiata.   There are multiple small foci of T2/FLAIR hyperintense signal in the   periventricular white matter, suggestive of mild chronic microvascular   ischemic changes. There is no evidence of abnormal enhancement.    VA Duplex Carotid, Bilat (08.01.23 @ 11:57)   RIGHT:  PROX CCA = 51  DIST CCA = 54  PROX ICA = 167  DIST ICA = 73  ECA = 209  LEFT:  PROX CCA = 55  DIST CCA = 57  PROX ICA = 90  DIST ICA = 78  ECA = 153  Antegrade flow is noted within both vertebral arteries.  IMPRESSION: No significant hemodynamic stenosis of either internal carotid artery.  Right external carotid artery stenosis is suggested.      OVERALL IMPRESSION:  headache, dizziness, blurred vision, RUE weakness. High-grade (80-90%) stenosis of the LEFT ICA and critical stenosis (greater than 95%) of  the distal RIGHT CCA and origin of the RIGHT ICA. SAMPRIS. No stent due to no significant stenosis on angio per NSGY.     Hospital course:   Pt was admitted to the stroke floor and monitored. Pt obtained imaging of her brain and found to have high grade stenosis for which angiogram was done and pt was not deemed a candidate for L ICA stent. Cardiology, electrophysiology, were consulted. Pt had a loop recorder placed. Pt was found to have a UTI and started on ceftriaxone (2/26- 2/27). Please continue augmentin for 7 days. Pt was stabilized for discharge with outpatient follow up. 63y (1960) right handed woman with a PMHx significant for HTN, DM, HLD, CAD(s/p 3 stents placed at [facility] in 2014 to the prox LAD and then 2022),  PAD s/p L popliteal angioplasty and atherectomy 12/23, R ICA and L ICA stenosis, prior stroke with L side weakness, Parkinson's disease, B/L cataract surgery with blurred vision at baseline who presents with headache, dizziness, blurred vision, right arm weakness. Patient was last seen well on 2/18 at 11AM. Family reports she woke up with these symptoms on 2/19. Per family, she is A&Ox2 at baseline, uses walker and assistance to walk. She has frequent falls per family, gait instability, and drags her feet while walking. She carries diagnosis of Parkinson's and is on Sinemet but has not yet been evaluated by movement disorder specialist. Patient has been seen previously by Neurology due to concern for falls and AMS. During evaluations in 2022 and 2023, she was found to have B/L ICA stenosis. Also was found to have R corona radiata infarct on MRI brain. Carotid duplex (08.01.23) showed moderate, 50-69% stenosis of the right internal carotid artery and no hemodynamically significant stenosis of left carotid artery. She was seen by vascular surgery who recommended to continue DAPT and was to be re-assessed in 6 months.     LKW: 2/18 11AM  NIHSS: 3 (2 for right arm drift and 1 for dysarthria)  Baseline MRS: 4  Not a Tenecteplase candidate due to out of window  Not a thrombectomy candidate due to  out of window    MRI/MRA/NOVA w/o 2/20:  RCCA 283, MARCO ANTONIO 131, RMCA 90, RACA2 61  LCCA 312, LICA 166, LMCA 102, LACA 110, LACA2 59  RVA 57, LVA 51, BA 46, RPCA 49, RPCOM 43 anterior to posterior, LPCA 32    IMPRESSION: Significant bilateral carotid stenosis without evidence of acute infarct. Noninvasive flow MR angiography as above.     ----  CT Head No Cont (02.19.24 @ 15:50)   No acute intracranial hemorrhage. Gray/white matter differentiation is   preserved.    CTA NECK:  High-grade (80-90%) stenosis of the LEFT internal carotid artery due to   circumferential calcifications. Critical stenosis (greater than 95%) of   the distal RIGHT common carotid artery and origin of the RIGHTinternal   carotid artery due to extensive calcified plaque.    CTA HEAD:  Moderate stenosis of bilateral precavernous and cavernous internal   carotid arteries.    No evidence of aneurysm. Tiny aneurysms can be beyond the resolution of   CTA technique.    3.6 cm hypodense lesion in the left thyroid lobe.    MR Head w/wo IV Cont (08.02.23 @ 11:59)   The ventricles, sulci and basal cisterns appear unremarkable. There is no   evidence of acute infarct, hemorrhage, or mass lesion. There is a chronic   lacunar infarct in the right anterior corona radiata. There are multiple   small foci of T2/FLAIR hyperintense signal in the periventricular white   matter, suggestive of mild chronic microvascular ischemic changes. There   is no evidence of abnormal enhancement. There is no evidence of midline   shift or herniation pattern. No mass effect is found in the brain.    The vertebral and internal carotid arteries demonstrate expected flow   voids indicating their patency.    The paranasal sinuses are clear.    IMPRESSION:  No evidence of acute infarct, hemorrhage, or mass lesion.   There is a chronic lacunar infarct in the right anterior corona radiata.   There are multiple small foci of T2/FLAIR hyperintense signal in the   periventricular white matter, suggestive of mild chronic microvascular   ischemic changes. There is no evidence of abnormal enhancement.    VA Duplex Carotid, Bilat (08.01.23 @ 11:57)   RIGHT:  PROX CCA = 51  DIST CCA = 54  PROX ICA = 167  DIST ICA = 73  ECA = 209  LEFT:  PROX CCA = 55  DIST CCA = 57  PROX ICA = 90  DIST ICA = 78  ECA = 153  Antegrade flow is noted within both vertebral arteries.  IMPRESSION: No significant hemodynamic stenosis of either internal carotid artery.  Right external carotid artery stenosis is suggested.    OVERALL IMPRESSION:    Impression: R arm weakness with MRI negative for infarct. Likely TIA in the setting of bilateral carotid stenosis.      Hospital course:   Pt was admitted to the stroke floor and monitored. Pt obtained imaging including MR Brain, CTA Head/Neck and Carotid US and was found to have high grade stenosis for which angiogram was done and pt was not deemed a candidate for L ICA stent. Opthalmology was consulted due to complaints of bilateral blurry vision which had worsened in the left eye per the patient. They recommended outpatient follow up, no signs of ischemia on their exam.   Cardiology, electrophysiology, were consulted. Pt had a loop recorder placed per stroke AF trial. She will be placed on ASA/Plavix for three weeks followed by Aspirin monotherapy per....     Regarding patients high blood pressure:  - Continue with Imdur 30mg PO daily  - Continue Nifedipine 30mg QD and titrate up gradually (on 60mg BID at home)  - Home medications include metoprolol tartrate 100mg BID, losartan-HCTZ 100-25 and hydralazine 50mg TID. please resume as tolerated    Pt was found to have a UTI and started on Ceftriaxone (2/26- 2/27). Please continue Augmentin for 7 days. Pt was stabilized for discharge with outpatient follow up. 63y (1960) right handed woman with a PMHx significant for HTN, DM, HLD, CAD(s/p 3 stents placed at [facility] in 2014 to the prox LAD and then 2022),  PAD s/p L popliteal angioplasty and atherectomy 12/23, R ICA and L ICA stenosis, prior stroke with L side weakness, Parkinson's disease, B/L cataract surgery with blurred vision at baseline who presents with headache, dizziness, blurred vision, right arm weakness. Patient was last seen well on 2/18 at 11AM. Family reports she woke up with these symptoms on 2/19. Per family, she is A&Ox2 at baseline, uses walker and assistance to walk. She has frequent falls per family, gait instability, and drags her feet while walking. She carries diagnosis of Parkinson's and is on Sinemet but has not yet been evaluated by movement disorder specialist. Patient has been seen previously by Neurology due to concern for falls and AMS. During evaluations in 2022 and 2023, she was found to have B/L ICA stenosis. Also was found to have R corona radiata infarct on MRI brain. Carotid duplex (08.01.23) showed moderate, 50-69% stenosis of the right internal carotid artery and no hemodynamically significant stenosis of left carotid artery. She was seen by vascular surgery who recommended to continue DAPT and was to be re-assessed in 6 months.     LKW: 2/18 11AM  NIHSS: 3 (2 for right arm drift and 1 for dysarthria)  Baseline MRS: 4  Not a Tenecteplase candidate due to out of window  Not a thrombectomy candidate due to  out of window    MRI/MRA/NOVA w/o 2/20:  RCCA 283, MARCO ANTONIO 131, RMCA 90, RACA2 61  LCCA 312, LICA 166, LMCA 102, LACA 110, LACA2 59  RVA 57, LVA 51, BA 46, RPCA 49, RPCOM 43 anterior to posterior, LPCA 32    IMPRESSION: Significant bilateral carotid stenosis without evidence of acute infarct. Noninvasive flow MR angiography as above.     ----  CT Head No Cont (02.19.24 @ 15:50)   No acute intracranial hemorrhage. Gray/white matter differentiation is   preserved.    CTA NECK:  High-grade (80-90%) stenosis of the LEFT internal carotid artery due to   circumferential calcifications. Critical stenosis (greater than 95%) of   the distal RIGHT common carotid artery and origin of the RIGHTinternal   carotid artery due to extensive calcified plaque.    CTA HEAD:  Moderate stenosis of bilateral precavernous and cavernous internal   carotid arteries.    No evidence of aneurysm. Tiny aneurysms can be beyond the resolution of   CTA technique.    3.6 cm hypodense lesion in the left thyroid lobe.    MR Head w/wo IV Cont (08.02.23 @ 11:59)   The ventricles, sulci and basal cisterns appear unremarkable. There is no   evidence of acute infarct, hemorrhage, or mass lesion. There is a chronic   lacunar infarct in the right anterior corona radiata. There are multiple   small foci of T2/FLAIR hyperintense signal in the periventricular white   matter, suggestive of mild chronic microvascular ischemic changes. There   is no evidence of abnormal enhancement. There is no evidence of midline   shift or herniation pattern. No mass effect is found in the brain.    The vertebral and internal carotid arteries demonstrate expected flow   voids indicating their patency.    The paranasal sinuses are clear.    IMPRESSION:  No evidence of acute infarct, hemorrhage, or mass lesion.   There is a chronic lacunar infarct in the right anterior corona radiata.   There are multiple small foci of T2/FLAIR hyperintense signal in the   periventricular white matter, suggestive of mild chronic microvascular   ischemic changes. There is no evidence of abnormal enhancement.    VA Duplex Carotid, Bilat (08.01.23 @ 11:57)   RIGHT:  PROX CCA = 51  DIST CCA = 54  PROX ICA = 167  DIST ICA = 73  ECA = 209  LEFT:  PROX CCA = 55  DIST CCA = 57  PROX ICA = 90  DIST ICA = 78  ECA = 153  Antegrade flow is noted within both vertebral arteries.  IMPRESSION: No significant hemodynamic stenosis of either internal carotid artery.  Right external carotid artery stenosis is suggested.    OVERALL IMPRESSION:    Impression: R arm weakness with MRI negative for infarct. Likely TIA in the setting of bilateral carotid stenosis.      Hospital course:   Pt was admitted to the stroke floor and monitored. Pt obtained imaging including MR Brain, CTA Head/Neck and Carotid US and was found to have high grade stenosis for which angiogram was done and pt was not deemed a candidate for L ICA stent. Opthalmology was consulted due to complaints of bilateral blurry vision which had worsened in the left eye per the patient. They recommended outpatient follow up, no signs of ischemia on their exam.   Cardiology, electrophysiology, were consulted. Pt had a loop recorder placed per stroke AF trial. She will be placed on ASA/Plavix for three weeks followed by Aspirin monotherapy per [facility].    Regarding patients high blood pressure:  - Continue with Imdur 30mg PO daily  - Continue Nifedipine 30mg QD and titrate up gradually (on 60mg BID at home)  - Home medications include metoprolol tartrate 100mg BID, losartan-HCTZ 100-25 and hydralazine 50mg TID. please resume as tolerated    Pt was found to have a UTI and started on Ceftriaxone (2/26- 2/27). Please continue Augmentin for 7 days. Pt was stabilized for discharge with outpatient follow up.

## 2024-02-27 NOTE — DISCHARGE NOTE PROVIDER - NSDCMRMEDTOKEN_GEN_ALL_CORE_FT
Aspirin Enteric Coated 81 mg oral delayed release tablet: 1 tab(s) orally once a day  atorvastatin 40 mg oral tablet: 1 tab(s) orally once a day  carbidopa-levodopa 25 mg-100 mg oral tablet: 1 tab(s) orally once a day  cefTRIAXone: 1 gram(s) intravenously once a day 1 gram IV  enoxaparin: 40 milligram(s) injectable once a day DVT ppx- please continue med until pt discharged from rehab  erythromycin 0.5% ophthalmic ointment: 1 application to each affected eye every 4 hours  famotidine 20 mg oral tablet: 1 tab(s) orally once a day  gabapentin 100 mg oral capsule: 1 cap(s) orally 3 times a day  insulin lispro 100 units/mL injectable solution: 100 unit(s) injectable prn 100 units/ml - based on sliding scale  insulin lispro 100 units/mL injectable solution: 100 unit(s) injectable prn as needed for DM units/ml based on ISS - please continue med until discharge from rehab  isosorbide mononitrate 30 mg oral tablet, extended release: 1 tab(s) orally once a day  NIFEdipine (Eqv-Adalat CC) 60 mg oral tablet, extended release: 1 tab(s) orally once a day  ocular lubricant ophthalmic solution: 1 drop(s) to each affected eye 2 times a day  Plavix 75 mg oral tablet: 1 tab(s) orally once a day   amoxicillin-clavulanate 875 mg-125 mg oral tablet: 875 milligram(s) orally once a day for UTI  Aspirin Enteric Coated 81 mg oral delayed release tablet: 1 tab(s) orally once a day  atorvastatin 40 mg oral tablet: 1 tab(s) orally once a day  carbidopa-levodopa 25 mg-100 mg oral tablet: 1 tab(s) orally once a day  enoxaparin: 40 milligram(s) injectable once a day DVT ppx- please continue med until pt discharged from rehab  erythromycin 0.5% ophthalmic ointment: 1 application to each affected eye every 4 hours  famotidine 20 mg oral tablet: 1 tab(s) orally once a day  gabapentin 100 mg oral capsule: 1 cap(s) orally 3 times a day  insulin lispro 100 units/mL injectable solution: 100 unit(s) injectable prn 100 units/ml - based on sliding scale  insulin lispro 100 units/mL injectable solution: 100 unit(s) injectable prn as needed for DM units/ml based on ISS - please continue med until discharge from rehab  isosorbide mononitrate 30 mg oral tablet, extended release: 1 tab(s) orally once a day  NIFEdipine (Eqv-Adalat CC) 60 mg oral tablet, extended release: 1 tab(s) orally once a day  ocular lubricant ophthalmic solution: 1 drop(s) to each affected eye 2 times a day  Plavix 75 mg oral tablet: 1 tab(s) orally once a day   Aspirin Enteric Coated 81 mg oral delayed release tablet: 1 tab(s) orally once a day  atorvastatin 40 mg oral tablet: 1 tab(s) orally once a day  Augmentin 875 mg-125 mg oral tablet: 1 tab(s) orally 2 times a day for 7 days  carbidopa-levodopa 25 mg-100 mg oral tablet: 1 tab(s) orally once a day  enoxaparin: 40 milligram(s) injectable once a day DVT ppx- please continue med until pt discharged from rehab  erythromycin 0.5% ophthalmic ointment: 1 application to each affected eye every 4 hours  famotidine 20 mg oral tablet: 1 tab(s) orally once a day  gabapentin 100 mg oral capsule: 1 cap(s) orally 3 times a day  insulin lispro 100 units/mL injectable solution: 1 unit(s) injectable once a day (at bedtime) BEDTIME SLIDING SCALE  0 Unit(s) if Glucose 0 - 250  1 Unit(s) if Glucose 251 - 300  2 Unit(s) if Glucose 301 - 350  3 Unit(s) if Glucose 351 - 400  4 Unit(s) if Glucose Greater Than 400  insulin lispro 100 units/mL injectable solution: 1 unit(s) injectable 3 times a day (before meals) as needed for DM PREMEAL SLIDING SCALE  1 Unit(s) if Glucose 151 - 200  2 Unit(s) if Glucose 201 - 250  3 Unit(s) if Glucose 251 - 300  4 Unit(s) if Glucose 301 - 350  5 Unit(s) if Glucose 351 - 400  6 Unit(s) if Glucose Greater Than 400  isosorbide mononitrate 30 mg oral tablet, extended release: 1 tab(s) orally once a day  NIFEdipine 30 mg oral tablet, extended release: 1 tab(s) orally once a day  ocular lubricant ophthalmic solution: 1 drop(s) to each affected eye 2 times a day  Plavix 75 mg oral tablet: 1 tab(s) orally once a day for 21 days

## 2024-02-27 NOTE — DISCHARGE NOTE PROVIDER - NPI NUMBER (FOR SYSADMIN USE ONLY) :
[[id number] [[id number]]

## 2024-02-27 NOTE — PROGRESS NOTE ADULT - ASSESSMENT
Patient is a 63 year old female with a PMHx of  HTN, DM, HLD, CAD (s/p 3 stents placed at [facility] in 2014 to the prox LAD and then in 2022 - on ASA and Plavix), PAD s/p L popliteal angioplasty and atherectomy 12/23, R ICA and L ICA stenosis, prior stroke with L side weakness, Parkinson's disease, B/L cataract surgery with blurred vision at baseline who presents with headache, dizziness, blurred vision, and right arm weakness. As per documentation and neurology, patient was last seen well on 2/18 at 11 AM. Per documentation, patient reportedly awoke on 2/19 with these symptoms. Per neuro, she is A&Ox2 at baseline, uses walker and assistance to walk. Per family, she has frequent falls, gait instability, and drags her feet while walking. Patient is on Sinemet for Parkinson's disease and is pending evaluation by movement disorder specialist per family. Patient has been seen previously by Neurology due to concern for falls and AMS. Per neurology, she was found to have B/L ICA stenosis and was also found to have R corona radiata infarct on MRI brain. Patient was not a Tenecteplase candidate or a thrombectomy candidate due to being out of window as per neurology. Internal Medicine has been consulted on Ms. Llamas's care for medical management.     Carotid Stenosis   - Pt with history of B/L ICA Stenosis and R Coe Radiata infarct   - CT, CT A H/N w/ high- grade 80-90% stenosis of the L ICA, > 95% stenosis on the R, Moderate stenosis  - MR w/ Significant B/L carotid stenosis without evidence of acute infarct   - CD w/ R external carotid artery stenosis, negative for significant ICA stenosis,   - TTE w/ Neg for shunt, hyperdynamic LVSF, No WM, trace MR, trace TR, trace NJ,   - On DAPT w/ ASA and Plavix   - Lipitor 80   - Planned for Angio with possible stent w/ NSGY   - Monitor on telemetry   - Neuro checks as per protocol   - Fall, Seizure and Aspiration precautions  - PT / OT / S+S   - Per stroke neurology / NSGY   - EP eval for possible ILR placement     Blurry vision   - Likely 2/2 carotid stenosis   - Optho eval appreciated; F/u recs  - Erythromycine     Type II DM   - A1C of 7.2  - Hold home PO Medications while admitted  - C/w sliding scale   - Diabetic DASH diet  - Monitor and adjust glucose levels as tolerated     CAD  - S/P 3 stents   - C/w DAPT and Statin   - Repeat TTE as above    PAD  - S/P L popliteal angioplasty and atherectomy 12/23  - On DAPT and Statin therapies     HTN   - On Imdur 30 PO Qd  - Monitor BP, VS and patient closely    Parkinson's disease  - On Sinemet   - Pending evaluation by movement disorder specialist per family. Outpatient follow up with both specialist and neurology upon DC   - Fall, Seizure and Aspiration precautions     HLD  - Lipitor 80   - Diet and lifestyle modifications     Thyroid lesion   - CT w/ 3.6 CM hypodense thyroid lesion   - TSH WNL  - Check thyroid US for further evaluation     PPX  - Lovenox

## 2024-02-27 NOTE — PROGRESS NOTE ADULT - NS ATTEND AMEND GEN_ALL_CORE FT
Possible TIA - unclear etiology, risk factors for . Carotids < 50% stenosis per angiogram  will continue to treat per CHANCE with DAPT x 3 weeks  Outpatient followup for Parkinsons  Dc planning to rehab

## 2024-02-27 NOTE — DISCHARGE NOTE PROVIDER - NSDCFUSCHEDAPPT_GEN_ALL_CORE_FT
Soni Matta  [facility]  PODIATRY [address]  Scheduled Appointment: 05/17/2024

## 2024-02-27 NOTE — DISCHARGE NOTE NURSING/CASE MANAGEMENT/SOCIAL WORK - NSDCVIVACCINE_GEN_ALL_CORE_FT
Tdap; 02-Nov-2021 17:55; Marychuy Farrell (REGINE); [employer]; [id number] (Exp. Date: 29-Jun-2023); IntraMuscular; Deltoid Left.; 0.5 milliLiter(s); VIS (VIS Published: 09-May-2013, VIS Presented: 02-Nov-2021);

## 2024-02-27 NOTE — PROGRESS NOTE ADULT - SUBJECTIVE AND OBJECTIVE BOX
EP   HISTORY OF PRESENT ILLNESS: HPI:  HPI: Patient ALYSE AVENDAÑO is a 63y (1960) right handed woman with a PMHx significant for HTN, DM, HLD, CAD(s/p 3 stents placed at [facility] in 2014 to the prox LAD and then 2022),  PAD s/p L popliteal angioplasty and atherectomy 12/23, R ICA and L ICA stenosis, prior stroke with L side weakness, Parkinson's disease, B/L cataract surgery with blurred vision at baseline who presents with headache, dizziness, blurred vision, right arm weakness. Patient was last seen well on 2/18 at 11AM. Family reports she woke up with these symptoms on 2/19. Per family, she is A&Ox2 at baseline, uses walker and assistance to walk. She has frequent falls per family, gait instability, and drags her feet while walking. She carries diagnosis of Parkinson's and is on Sinemet but has not yet been evaluated by movement disorder specialist. Patient has been seen previously by Neurology due to concern for falls and AMS. During evaluations in 2022 and 2023, she was found to have B/L ICA stenosis. Also was found to have R corona radiata infarct on MRI brain.   Carotid duplex (08.01.23) showed moderate, 50-69% stenosis of the right internal carotid artery and no hemodynamically significant stenosis of left carotid artery. She was seen by vascular surgery who recommended to continue DAPT and was to be re-assessed in 6 months.   Patient has also been seen in the past by Neurology for headaches (2021) which appeared poorly controlled.    LKW: 2/18 11AM  NIHSS: 3 (2 for right arm drift and 1 for dysarthria)  Baseline MRS: 4  Not a Tenecteplase candidate due to out of window  Not a thrombectomy candidate due to  out of window    2/26- patient feeling well.  Remains weak (left side chronically weak), also w/ right arm weakness more acutely.  Requires assistance to get from chair to bed.  EP called for ILR placement, to look for paroxysmal AFib after 2nd lifetime stroke.  No angina. No hx of palpitations.  Sees Dr Johnathan Tejeda for Cardiology, he is being covered by another cardiologist at this time.  A 10 pt ROS is otherwise negative.  Date of service 2/27- s/p ILR yesterday. feels well.  awaiting DC to rehab.    PAST MEDICAL & SURGICAL HISTORY:  HTN (hypertension)  HLD (hyperlipidemia)  CAD (coronary artery disease)  Type II diabetes mellitus  PAD (peripheral artery disease)  Mild dementia  Parkinson disease  S/P hysterectomy    acetaminophen     Tablet .. 650 milliGRAM(s) Oral every 6 hours PRN  acetaminophen   IVPB .. 1000 milliGRAM(s) IV Intermittent once  acetaminophen   IVPB .. 1000 milliGRAM(s) IV Intermittent once  artificial  tears Solution 1 Drop(s) Both EYES two times a day  aspirin enteric coated 81 milliGRAM(s) Oral daily  atorvastatin 80 milliGRAM(s) Oral at bedtime  carbidopa/levodopa  25/100 1 Tablet(s) Oral daily  cefTRIAXone   IVPB 1000 milliGRAM(s) IV Intermittent every 24 hours  clopidogrel Tablet 75 milliGRAM(s) Oral daily  dextrose 5%. 1000 milliLiter(s) IV Continuous <Continuous>  dextrose 5%. 1000 milliLiter(s) IV Continuous <Continuous>  dextrose 50% Injectable 25 Gram(s) IV Push once  dextrose 50% Injectable 12.5 Gram(s) IV Push once  dextrose 50% Injectable 25 Gram(s) IV Push once  dextrose Oral Gel 15 Gram(s) Oral once PRN  enoxaparin Injectable 40 milliGRAM(s) SubCutaneous every 24 hours  erythromycin   Ointment 1 Application(s) Right EYE every 4 hours  famotidine    Tablet 20 milliGRAM(s) Oral daily  gabapentin 100 milliGRAM(s) Oral three times a day  glucagon  Injectable 1 milliGRAM(s) IntraMuscular once  influenza  Vaccine (HIGH DOSE) 0.7 milliLiter(s) IntraMuscular once  insulin lispro (ADMELOG) corrective regimen sliding scale   SubCutaneous three times a day before meals  insulin lispro (ADMELOG) corrective regimen sliding scale   SubCutaneous at bedtime  isosorbide   mononitrate ER Tablet (IMDUR) 30 milliGRAM(s) Oral daily  NIFEdipine XL 30 milliGRAM(s) Oral daily                            12.3   6.99  )-----------( 299      ( 26 Feb 2024 07:30 )             39.6       02-26    141  |  104  |  15  ----------------------------<  178<H>  3.5   |  21<L>  |  0.61    Ca    9.7      26 Feb 2024 07:28  Phos  4.0     02-26  Mg     2.0     02-26    T(C): 37.1 (02-27-24 @ 08:55), Max: 37.4 (02-26-24 @ 22:32)  HR: 84 (02-27-24 @ 08:55) (79 - 91)  BP: 156/88 (02-27-24 @ 08:55) (146/80 - 163/81)  RR: 18 (02-27-24 @ 08:55) (18 - 18)  SpO2: 97% (02-27-24 @ 08:55) (95% - 98%)  Wt(kg): --    I&O's Summary    26 Feb 2024 07:01  -  27 Feb 2024 07:00  --------------------------------------------------------  IN: 290 mL / OUT: 0 mL / NET: 290 mL    Appearance: Normal appearing adult woman in no acute distress  HEENT:   Normal oral mucosa, PERRL, EOMI	  Lymphatic: No lymphadenopathy , no edema  Cardiovascular: Normal S1 S2, No JVD, No murmurs , Peripheral pulses palpable 2+ bilaterally  Respiratory: Lungs clear to auscultation, normal effort 	  Gastrointestinal:  Soft, Non-tender, + BS	  Skin: No rashes, No ecchymoses, No cyanosis, warm to touch  Musculoskeletal: Normal range of motion, normal strength  Psychiatry:  Mood & affect appropriate      TELEMETRY: NSR, no AFib	    ECG: NSR 	  Echo: < from: TTE W or WO Ultrasound Enhancing Agent (02.20.24 @ 10:23) >  CONCLUSIONS:      1. Agitated saline injection was negative for intracardiac shunt.   2. Left ventricular cavity is normal in size. Left ventricular wall thickness is normal. Left ventricular systolic function is hyperdynamic. There are no regional wall motion abnormalities seen.   3. Normal left ventricular diastolic function, with normal filling pressure.   4. Normal right ventricular cavity size, with wall thickness, and normal systolic function.   5. Normal left and right atrial size.   6. No significant valvular disease.   7. No pericardial effusion seen.    < end of copied text >    < from: CT Angio Neck w/ IV Cont (02.19.24 @ 15:50) >  IMPRESSION:    CT HEAD:  No acute intracranial hemorrhage. Gray/white matter differentiation is   preserved.    CTA NECK:  High-grade (80-90%) stenosis of the LEFT internal carotid artery due to   circumferential calcifications. Critical stenosis (greater than 95%) of   the distal RIGHT common carotid artery and origin of the RIGHTinternal   carotid artery due to extensive calcified plaque.    CTA HEAD:  Moderate stenosis of bilateral precavernous and cavernous internal   carotid arteries.    No evidence of aneurysm. Tiny aneurysms can be beyond the resolution of   CTA technique.    3.6 cm hypodense lesion in the left thyroid lobe.    < end of copied text >      ASSESSMENT/PLAN: Ms Avendaño is a pleasant 63y Female with hypertension and diabetes, here w/ her 2nd lifetime stroke.    She has a history of HTN, and diabetes on insulin, as well as the aforementioned prior stroke.  Her echocardiogram is reassuring, normal EF, no patent foramen ovale.  Head and neck vasculature notable for bilateral moderate carotid artery stenosis (no plan to operate on it, per vascular surgery)  Neurology note suggests that her infarct (coronary radiata) may be embolic due to the size/location, and contributory arrhythmias should be ruled out.  She is referred for a loop recorder specifically for surveillance for paroxysmal atrial fibrillation after an embolic stroke of unknown source.  A repeat stroke from AFib has a high likelihood of being disabling or fatal, and identifying this arrhythmia would change medical management by allowing us to start oral anticoagulation (which she is willing to take).  There is less than 1% risk of infection for this bedside minor surgery.  She prefers this pathway over an event monitor, which would require daily wear for ~30 days at a time.  s/p ILR implant on 2/26.  Feelin well. Awaiting discharge plan.  We will monitor ILR data in our office ([facility] - Dr Bray).  Routine cardiovascular care w/ Dr Johnathan Tejeda.      Dung Arnold M.D.  Cardiac Electrophysiology    office [phone]  pager [phone]

## 2024-02-27 NOTE — PROGRESS NOTE ADULT - SUBJECTIVE AND OBJECTIVE BOX
THE PATIENT WAS SEEN AND EXAMINED BY ME WITH THE HOUSESTAFF AND STROKE TEAM DURING MORNING ROUNDS.   HPI: Patient ALYSE AVENDAÑO is a 63y (1960) right handed woman with a PMHx significant for HTN, DM, HLD, CAD(s/p 3 stents placed at [facility] in 2014 to the prox LAD and then 2022),  PAD s/p L popliteal angioplasty and atherectomy 12/23, R ICA and L ICA stenosis, prior stroke with L side weakness, Parkinson's disease, B/L cataract surgery with blurred vision at baseline who presents with headache, dizziness, blurred vision, right arm weakness. Patient was last seen well on 2/18 at 11AM. Family reports she woke up with these symptoms on 2/19. Per family, she is A&Ox2 at baseline, uses walker and assistance to walk. She has frequent falls per family, gait instability, and drags her feet while walking. She carries diagnosis of Parkinson's and is on Sinemet but has not yet been evaluated by movement disorder specialist. Patient has been seen previously by Neurology due to concern for falls and AMS. During evaluations in 2022 and 2023, she was found to have B/L ICA stenosis. Also was found to have R corona radiata infarct on MRI brain.   Carotid duplex (08.01.23) showed moderate, 50-69% stenosis of the right internal carotid artery and no hemodynamically significant stenosis of left carotid artery. She was seen by vascular surgery who recommended to continue DAPT and was to be re-assessed in 6 months.   Patient has also been seen in the past by Neurology for headaches (2021) which appeared poorly controlled.    LKW: 2/18 11AM  NIHSS: 3 (2 for right arm drift and 1 for dysarthria)  Baseline MRS: 4  Not a Tenecteplase candidate due to out of window  Not a thrombectomy candidate due to  out of window    Vital Signs  T(C): 36.9 (20 Feb 2024 04:45), Max: 36.9 (19 Feb 2024 20:02)  T(F): 98.4 (20 Feb 2024 04:45), Max: 98.5 (19 Feb 2024 20:02)  HR: 88 (20 Feb 2024 04:45) (86 - 97)  BP: 146/79 (20 Feb 2024 04:45) (119/81 - 160/90)  RR: 18 (20 Feb 2024 04:45) (15 - 18)  SpO2: 100% (20 Feb 2024 04:45) (96% - 100%)    O2 Parameters below as of 20 Feb 2024 04:45  Patient On (Oxygen Delivery Method): room air      SUBJECTIVE: No events overnight.  No new neurologic complaints.      acetaminophen     Tablet .. 650 milliGRAM(s) Oral every 6 hours PRN  acetaminophen   IVPB .. 1000 milliGRAM(s) IV Intermittent once  acetaminophen   IVPB .. 1000 milliGRAM(s) IV Intermittent once  artificial  tears Solution 1 Drop(s) Both EYES two times a day  aspirin enteric coated 81 milliGRAM(s) Oral daily  atorvastatin 80 milliGRAM(s) Oral at bedtime  carbidopa/levodopa  25/100 1 Tablet(s) Oral daily  cefTRIAXone   IVPB 1000 milliGRAM(s) IV Intermittent every 24 hours  clopidogrel Tablet 75 milliGRAM(s) Oral daily  dextrose 5%. 1000 milliLiter(s) IV Continuous <Continuous>  dextrose 5%. 1000 milliLiter(s) IV Continuous <Continuous>  dextrose 50% Injectable 25 Gram(s) IV Push once  dextrose 50% Injectable 12.5 Gram(s) IV Push once  dextrose 50% Injectable 25 Gram(s) IV Push once  dextrose Oral Gel 15 Gram(s) Oral once PRN  enoxaparin Injectable 40 milliGRAM(s) SubCutaneous every 24 hours  erythromycin   Ointment 1 Application(s) Right EYE every 4 hours  famotidine    Tablet 20 milliGRAM(s) Oral daily  gabapentin 100 milliGRAM(s) Oral three times a day  glucagon  Injectable 1 milliGRAM(s) IntraMuscular once  influenza  Vaccine (HIGH DOSE) 0.7 milliLiter(s) IntraMuscular once  insulin lispro (ADMELOG) corrective regimen sliding scale   SubCutaneous three times a day before meals  insulin lispro (ADMELOG) corrective regimen sliding scale   SubCutaneous at bedtime  isosorbide   mononitrate ER Tablet (IMDUR) 30 milliGRAM(s) Oral daily  NIFEdipine XL 30 milliGRAM(s) Oral daily      PHYSICAL EXAM:   Vital Signs Last 24 Hrs  T(C): 37.1 (27 Feb 2024 08:55), Max: 37.4 (26 Feb 2024 22:32)  T(F): 98.7 (27 Feb 2024 08:55), Max: 99.4 (26 Feb 2024 22:32)  HR: 84 (27 Feb 2024 08:55) (79 - 91)  BP: 156/88 (27 Feb 2024 08:55) (146/80 - 163/81)  RR: 18 (27 Feb 2024 08:55) (18 - 18)  SpO2: 97% (27 Feb 2024 08:55) (95% - 98%)    Parameters below as of 27 Feb 2024 08:55  Patient On (Oxygen Delivery Method): room air    General: No acute distress  HEENT: EOM intact, visual fields full, decreased visual acuity L>R  Abdomen: Soft, nontender, nondistended   Extremities: No edema    NEUROLOGICAL EXAM:  Mental status: Eyes open, awake, alert, oriented to person, place, and month, not year. Follows 1 and 2 step crossed commands. Attention/concentration, recent and remote memory and fund of knowledge with mild impairment   Language: Speech fluent, repetition and naming intact, mild-moderate dysarthria.   Cranial nerves -No facial droop, VFF, decreased visual acuity L>R, EOMI in lateral gaze but no upward or downgaze noted.   Motor exam:  No drifts RUE 5/5, RLE 5/5, LUE 5/5, LLE 5/5  Sensation: Intact to light touch   Coordination/ Gait: No dysmetria, Gait deferred.      LABS:                        12.3   6.99  )-----------( 299      ( 26 Feb 2024 07:30 )             39.6    02-26    141  |  104  |  15  ----------------------------<  178<H>  3.5   |  21<L>  |  0.61    Ca    9.7      26 Feb 2024 07:28  Phos  4.0     02-26  Mg     2.0     02-26      IMAGING: Reviewed by me.     MRI/MRA/NOVA w/o 2/20:  RCCA 283, MARCO ANTONIO 131, RMCA 90, RACA2 61  LCCA 312, LICA 166, LMCA 102, LACA 110, LACA2 59  RVA 57, LVA 51, BA 46, RPCA 49, RPCOM 43 anterior to posterior, LPCA 32  Significant bilateral carotid stenosis without evidence of acute infarct. Noninvasive flow MR angiography as above.     CT HEAD: No acute intracranial hemorrhage. Gray/white matter differentiation is preserved. High-grade (80-90%) stenosis of the LEFT internal carotid artery due to circumferential calcifications. Critical stenosis (greater than 95%) of the distal RIGHT common carotid artery and origin of the RIGHT internal carotid artery due to extensive calcified plaque.    CTA HEAD: Moderate stenosis of bilateral precavernous and cavernous internal carotid arteries. No evidence of aneurysm. Tiny aneurysms can be beyond the resolution of CTA technique. 3.6 cm hypodense lesion in the left thyroid lobe.    MR Head w/wo IV Cont (08.02.23 @ 11:59)  No evidence of acute infarct, hemorrhage, or mass lesion. There is a chronic lacunar infarct in the right anterior corona radiata. There are multiple small foci of T2/FLAIR hyperintense signal in the periventricular white matter, suggestive of mild chronic microvascular ischemic changes. There is no evidence of abnormal enhancement.    VA Duplex Carotid, Bilat (08.01.23 @ 11:57) There is a moderate, 50-69% stenosis of the right internal carotid artery. No hemodynamically significant left carotid artery stenosis is identified.

## 2024-02-27 NOTE — DISCHARGE NOTE NURSING/CASE MANAGEMENT/SOCIAL WORK - NSDCPEFALRISK_GEN_ALL_CORE
For information on Fall & Injury Prevention, visit: [email or web address] OR  [email or web address] OR  https://www.cdc.gov/steadi/patient.html

## 2024-02-27 NOTE — PROGRESS NOTE ADULT - ASSESSMENT
Patient is a 63 year old female with a PMHx of  HTN, DM, HLD, CAD (s/p 3 stents placed at [facility] in 2014 to the prox LAD and then in 2022 - on ASA and Plavix), PAD s/p L popliteal angioplasty and atherectomy 12/23, R ICA and L ICA stenosis, prior stroke with L side weakness, Parkinson's disease, B/L cataract surgery with blurred vision at baseline who presents with headache, dizziness, blurred vision, and right arm weakness. Patient was not a Tenecteplase candidate or a thrombectomy candidate due to being out of window as per neurology.    1. Stroke  - not a Tenecteplase candidate or a thrombectomy candidate due to being out of window as per neurology  - TTE shows negative for intracardiac shunt  - No hx of AF--> cont to monitor on tele  - CTA Head shows severe B/L carotid stenosis  - c/w ASA 81mg PO and atorvastatin 80mg PO daily  - Appreciate Neuro and NSGx recommendations, s/p Cerebral angio with no significant stenosis  -s/p ILR     2. Hypertension  - c/w Imdur 30mg PO daily  - Continue Nifedipine 30mg QD and titrate up gradually (ON 60mg BID at home)  -home medications include metoprolol tartrate 100mg BID, losartan-HCTZ 100-25 and hydralazine 50mg TID. please resume as tolerated    3. Coronary Artery Disease  - ECG with no acute ischemia noted  - s/p 3 stents placed at [facility] in 2014 to the prox LAD and then in 2022  - TTE with preserved EF and no WMA  - c/w ASA, Plavix and statin    4. HLD  - Cholesterol panel noted  - c/w atorvastatin 80mg PO daily    5. Cardiac Risk Stratification  - ECG with no ischemia noted.   - Not in decompensated HF  - No hx of tachy gibson arrhythmia  - No severe AS/MS  - Limited functional capacity 2/2 Parkison's  - Elevated risk for low risk cerebral angio, no cardiac contraindication to proceed   - Cerebral angio with no significant stenosis

## 2024-02-27 NOTE — DISCHARGE NOTE PROVIDER - NSDCCPCAREPLAN_GEN_ALL_CORE_FT
PRINCIPAL DISCHARGE DIAGNOSIS  Diagnosis: Carotid artery stenosis  Assessment and Plan of Treatment: Impression:  headache, dizziness, blurred vision, RUE weakness. High-grade (80-90%) stenosis of the LEFT ICA and critical stenosis (greater than 95%) of  the distal RIGHT CCA and origin of the RIGHT ICA.  No stent due to no significant stenosis on angio per NSGY.   Please follow up with stroke neurology, Dr. Gray, in outpatient setting. Please follow up with Dr. Tejeda, nephrologist in outpatient setting. Please follow up with Dr. Finley, movement disorder specialist in outpatient setting. Can consider outpatient neurocogitive eval with Dr. Lisandro Waller.   Please return to the hospital for any worsening of symptoms especially stroke like symptoms including new numbness, tingling, focal weakness, changes in vision/hear.         SECONDARY DISCHARGE DIAGNOSES  Diagnosis: Diabetes mellitus  Assessment and Plan of Treatment: Will continue in ISS for now.    Diagnosis: HTN (hypertension)  Assessment and Plan of Treatment: Continue current meds    Diagnosis: CAD (coronary artery disease)  Assessment and Plan of Treatment:      PRINCIPAL DISCHARGE DIAGNOSIS  Diagnosis: Transient ischemic attack  Assessment and Plan of Treatment: Please follow up with neurologist. Continue taking medications as prescribed. Monitor your blood pressure. Reduce fat, cholesterol and salt in your diet. Increase intake of fruits and vegetables. Limit alcohol to minimum and do not smoke. You may be at risk for falling, make changes to your home to help you walk easier. Keep up to date on vaccinations.  If you experience any symptoms of facial drooping, slurred speech, arm or leg weakness, severe headache, vision changes or any worsening symptoms, notify provider immediatley and return to ER.  Please follow up with stroke neurology, Dr. Gray, in outpatient setting. Please follow up with Dr. Finley, movement disorder specialist in outpatient setting. Can consider outpatient neurocogitive eval with Dr. Lisandro Waller.   Please return to the hospital for any worsening of symptoms especially stroke like symptoms including new numbness, tingling, focal weakness, changes in vision/hearing.         SECONDARY DISCHARGE DIAGNOSES  Diagnosis: Diabetes mellitus  Assessment and Plan of Treatment: Will continue in ISS for now.    Diagnosis: HTN (hypertension)  Assessment and Plan of Treatment: Continue current meds    Diagnosis: CAD (coronary artery disease)  Assessment and Plan of Treatment:

## 2024-02-28 LAB
-  AMOXICILLIN/CLAVULANIC ACID: SIGNIFICANT CHANGE UP
-  AMPICILLIN/SULBACTAM: SIGNIFICANT CHANGE UP
-  AMPICILLIN: SIGNIFICANT CHANGE UP
-  AZTREONAM: SIGNIFICANT CHANGE UP
-  CEFAZOLIN: SIGNIFICANT CHANGE UP
-  CEFEPIME: SIGNIFICANT CHANGE UP
-  CEFOXITIN: SIGNIFICANT CHANGE UP
-  CEFTRIAXONE: SIGNIFICANT CHANGE UP
-  CIPROFLOXACIN: SIGNIFICANT CHANGE UP
-  ERTAPENEM: SIGNIFICANT CHANGE UP
-  GENTAMICIN: SIGNIFICANT CHANGE UP
-  IMIPENEM: SIGNIFICANT CHANGE UP
-  LEVOFLOXACIN: SIGNIFICANT CHANGE UP
-  MEROPENEM: SIGNIFICANT CHANGE UP
-  NITROFURANTOIN: SIGNIFICANT CHANGE UP
-  PIPERACILLIN/TAZOBACTAM: SIGNIFICANT CHANGE UP
-  TOBRAMYCIN: SIGNIFICANT CHANGE UP
-  TRIMETHOPRIM/SULFAMETHOXAZOLE: SIGNIFICANT CHANGE UP
CULTURE RESULTS: ABNORMAL
METHOD TYPE: SIGNIFICANT CHANGE UP
ORGANISM # SPEC MICROSCOPIC CNT: ABNORMAL
ORGANISM # SPEC MICROSCOPIC CNT: ABNORMAL
SPECIMEN SOURCE: SIGNIFICANT CHANGE UP

## 2024-04-02 NOTE — PHYSICAL THERAPY INITIAL EVALUATION ADULT - BALANCE TRAINING, PT EVAL
HISTORY OF PRESENT ILLNESS:    Leanne Rodriguez is a 36 y.o. female, ,  Patient's last menstrual period was 2024 (approximate).  for a routine exam complaining of amenorrhea.    This is a planned pregnancy. Pt had a pap and colpo in 2024 thru [facility] download portal to review pap at office visit with Dr. Pruitt.    History reviewed. No pertinent past medical history.    Past Surgical History:   Procedure Laterality Date    CERVICAL BIOPSY  W/ LOOP ELECTRODE EXCISION      reported by pt; done at [facility] surgery         MEDICATIONS AND ALLERGIES:      Current Outpatient Medications:     PNV,calcium 72-iron-folic acid (PRENATAL VITAMIN PLUS LOW IRON) 27 mg iron- 1 mg Tab, , Disp: , Rfl:     aspirin (ECOTRIN) 81 MG EC tablet, Take 1 tablet (81 mg total) by mouth once daily., Disp: , Rfl: 0    famotidine (PEPCID) 20 MG tablet, Take 1 tablet (20 mg total) by mouth 2 (two) times daily as needed for Heartburn (for indigestion)., Disp: 20 tablet, Rfl: 0    lactulose (CHRONULAC) 10 gram/15 mL solution, Take 15 mLs by mouth., Disp: , Rfl:     lactulose (CHRONULAC) 20 gram/30 mL Soln, Take 15 mLs (10 g total) by mouth once daily. for 5 days (Patient not taking: Reported on 2024), Disp: 75 mL, Rfl: 0    prenatal vit no.124/iron/folic (PRENATAL VITAMIN ORAL), Take 1 Dose by mouth Daily., Disp: , Rfl:     Review of patient's allergies indicates:  No Known Allergies    Family History   Problem Relation Age of Onset    Sickle cell anemia Sister     Sickle cell anemia Brother        Social History     Socioeconomic History    Marital status: Single   Tobacco Use    Smoking status: Never     Passive exposure: Never    Smokeless tobacco: Never   Substance and Sexual Activity    Alcohol use: Not Currently    Drug use: Never    Sexual activity: Yes     Partners: Male     Birth control/protection: None   Social History Narrative    ** Merged History Encounter **          Social Determinants of  Health     Financial Resource Strain: Low Risk  (3/24/2024)    Overall Financial Resource Strain (CARDIA)     Difficulty of Paying Living Expenses: Not hard at all   Food Insecurity: No Food Insecurity (3/24/2024)    Hunger Vital Sign     Worried About Running Out of Food in the Last Year: Never true     Ran Out of Food in the Last Year: Never true   Transportation Needs: No Transportation Needs (3/24/2024)    PRAPARE - Transportation     Lack of Transportation (Medical): No     Lack of Transportation (Non-Medical): No   Physical Activity: Inactive (3/24/2024)    Exercise Vital Sign     Days of Exercise per Week: 0 days     Minutes of Exercise per Session: 0 min   Stress: No Stress Concern Present (3/24/2024)    [employer] - Occupational Stress Questionnaire     Feeling of Stress : Not at all   Social Connections: Unknown (3/24/2024)    Social Connection and Isolation Panel [NHANES]     Frequency of Communication with Friends and Family: More than three times a week     Frequency of Social Gatherings with Friends and Family: Once a week     Active Member of Clubs or Organizations: No     Attends Club or Organization Meetings: Never     Marital Status: Never    Housing Stability: Low Risk  (3/24/2024)    Housing Stability Vital Sign     Unable to Pay for Housing in the Last Year: No     Number of Places Lived in the Last Year: 2     Unstable Housing in the Last Year: No       COMPREHENSIVE GYN HISTORY:  PAP History: Denies abnormal Paps.  Infection History: Denies STDs. Denies PID.  Benign History: Denies uterine fibroids. Denies ovarian cysts. Denies endometriosis. Denies other conditions.  Cancer History: Denies cervical cancer. Denies uterine cancer or hyperplasia. Denies ovarian cancer. Denies vulvar cancer or pre-cancer. Denies vaginal cancer or pre-cancer. Denies breast cancer. Denies colon cancer.  Sexual Activity History: Reports currently being sexually active  Menstrual  "History: None.  Contraception: None    ROS:  GENERAL: No weight changes. No swelling. No fatigue. No fever.  CARDIOVASCULAR: No chest pain. No shortness of breath. No leg cramps.   NEUROLOGICAL: No headaches. No vision changes.  BREASTS: No pain. No lumps. No discharge.  ABDOMEN: No pain. No nausea. No vomiting. No diarrhea. Reports constipation  REPRODUCTIVE: No abnormal bleeding.   VULVA: No pain. No lesions. No itching.  VAGINA: No relaxation. No itching. No odor. No discharge. No lesions.  URINARY: No incontinence. No nocturia. No frequency. No dysuria.    Ht 5' 7" (1.702 m)   Wt 79.8 kg (175 lb 14.8 oz)   LMP 01/22/2024 (Approximate)   BMI 27.55 kg/m²     PE:  AFFECT: Alert and oriented X 3. Interactive during exam  GENERAL: Appearance well-nourished, well-developed, in no acute distress.  HEENT: WNL  TEETH: Good dentition.  LUNGS: Easy and unlabored  HEART: Regular rate and rhythm     PROCEDURES:  UPT Positive    DIAGNOSIS:  Gyn exam  IUP with stated LMP of 01/22/24    PLAN:Routine prenatal care    MEDICATIONS PRESCRIBED:    LABS AND TESTS ORDERED:  New Ob Labs      NEW PREGNANCY COUNSELING  Patient was counseled today on:  - Routine prenatal blood tests including HIV and anticipated course of prenatal care  - Prenatal vitamins and folic acid  - Weight gain, nutrition, and exercise  - Seafood and mercury  - Properly heating deli and prepared meats and avoiding unrefrigerated deli  meats, cheeses, and milk products,   - Avoiding cat litter and raw meats due to risk of Toxoplasmosis precautions   - Accuracy of the LMP-based EZEQUIEL and the value of an early TV-u/s  - Aneuploidy and neural tube screening -- cffDNA, sequential screening, and AFP screen at 15 weeks  - OTC medication in the first trimester  - Harmful effects of smoking, etOH, and recreational drugs  - MFM u/s  at 18-20 weeks.  - Common complaints of pregnancy  - Seat belt use  - Childbirth classes and hospital facilities  - All questions were " answered    TERATOLOGY COUNSELING:   Discussed indications and options for aneuploidy screening - pamphlets given    - Pain and bleeding precautions given    -Discussed constipation, changes in GI function from pregnancy, advised high fiber in diet, continue hydration, metamucil. Advised stop PNV for week or two to see if constipation resolves    - Return to clinic in 4 weeks    Susana Hsu CNM    OB/GYN    Total time of 30 minutes, including face-to-face time and non-face-to-face time preparing to see the patient (eg, review of tests), obtaining and/or reviewing separately obtained history, documenting clinical information in the electronic or other health record, independently interpreting results, communicating results to the patient/family/caregiver, or care coordination.   GOAL: Pt will improve  balance during (static/dynamic) (standing) activities by at least 1 balance grade within 3-4 weeks to assist with greater independence during functional mobility and ADL's.

## 2024-04-17 ENCOUNTER — APPOINTMENT (OUTPATIENT)
Dept: VASCULAR SURGERY | Facility: CLINIC | Age: 64
End: 2024-04-17
Payer: MEDICARE

## 2024-04-17 VITALS
DIASTOLIC BLOOD PRESSURE: 83 MMHG | TEMPERATURE: 98.2 F | HEART RATE: 69 BPM | HEIGHT: 62 IN | BODY MASS INDEX: 25.03 KG/M2 | WEIGHT: 136 LBS | SYSTOLIC BLOOD PRESSURE: 163 MMHG

## 2024-04-17 DIAGNOSIS — I65.23 OCCLUSION AND STENOSIS OF BILATERAL CAROTID ARTERIES: ICD-10-CM

## 2024-04-17 PROCEDURE — 99213 OFFICE O/P EST LOW 20 MIN: CPT

## 2024-04-17 PROCEDURE — 93880 EXTRACRANIAL BILAT STUDY: CPT

## 2024-05-03 ENCOUNTER — RESULT REVIEW (OUTPATIENT)
Age: 64
End: 2024-05-03

## 2024-05-03 ENCOUNTER — OUTPATIENT (OUTPATIENT)
Dept: OUTPATIENT SERVICES | Facility: HOSPITAL | Age: 64
LOS: 1 days | End: 2024-05-03
Payer: MEDICARE

## 2024-05-03 ENCOUNTER — APPOINTMENT (OUTPATIENT)
Dept: CV DIAGNOSTICS | Facility: HOSPITAL | Age: 64
End: 2024-05-03

## 2024-05-03 DIAGNOSIS — Z90.710 ACQUIRED ABSENCE OF BOTH CERVIX AND UTERUS: Chronic | ICD-10-CM

## 2024-05-03 DIAGNOSIS — R07.9 CHEST PAIN, UNSPECIFIED: ICD-10-CM

## 2024-05-03 PROCEDURE — 93018 CV STRESS TEST I&R ONLY: CPT | Mod: GC,MC

## 2024-05-03 PROCEDURE — 93016 CV STRESS TEST SUPVJ ONLY: CPT | Mod: GC,MC

## 2024-05-03 PROCEDURE — 78451 HT MUSCLE IMAGE SPECT SING: CPT | Mod: 26,MC

## 2024-05-04 PROBLEM — I65.23 BILATERAL CAROTID ARTERY STENOSIS: Status: ACTIVE | Noted: 2024-05-04

## 2024-05-04 NOTE — PHYSICAL EXAM
[Respiratory Effort] : normal respiratory effort [Normal Rate and Rhythm] : normal rate and rhythm [2+] : left 2+ [Ankle Swelling (On Exam)] : not present [Varicose Veins Of Lower Extremities] : not present [] : not present [Abdomen Tenderness] : ~T ~M No abdominal tenderness [Skin Ulcer] : no ulcer [Alert] : alert [Oriented to Person] : oriented to person [Oriented to Place] : oriented to place [Oriented to Time] : oriented to time [Calm] : calm [de-identified] : appears stated age [de-identified] : normocephalic, atraumatic [de-identified] : supple

## 2024-05-04 NOTE — ASSESSMENT
[FreeTextEntry1] : Problem #1 bilateral carotid artery stenosis R ICA stenosis is on opposite side of previous stroke so remains asymptomatic L ICA stenosis less than 50% continue best medical therapy follow up in 3 months

## 2024-05-04 NOTE — HISTORY OF PRESENT ILLNESS
[FreeTextEntry1] : 63-year-old woman with history of hypertension, hyperlipidemia, diabetes mellitus, varicose veins, CVA who presents with existing diagnosis of right carotid artery stenosis. She has residual speech deficits and right-sided weakness from her previous strokes. She also reports that recently she is having trouble swallowing, she is coughing, and choking on her own saliva. She denies any other issues. [de-identified] : No interval changes. She denies any new complaints today.

## 2024-05-24 ENCOUNTER — APPOINTMENT (OUTPATIENT)
Dept: PODIATRY | Facility: CLINIC | Age: 64
End: 2024-05-24
Payer: MEDICARE

## 2024-05-24 DIAGNOSIS — L60.3 NAIL DYSTROPHY: ICD-10-CM

## 2024-05-24 DIAGNOSIS — B35.1 TINEA UNGUIUM: ICD-10-CM

## 2024-05-24 DIAGNOSIS — E11.42 TYPE 2 DIABETES MELLITUS WITH DIABETIC POLYNEUROPATHY: ICD-10-CM

## 2024-05-24 PROCEDURE — 11719 TRIM NAIL(S) ANY NUMBER: CPT

## 2024-05-24 PROCEDURE — 11720 DEBRIDE NAIL 1-5: CPT | Mod: 59

## 2024-05-29 PROBLEM — E11.42 TYPE 2 DIABETES MELLITUS WITH DIABETIC POLYNEUROPATHY: Status: ACTIVE | Noted: 2024-05-29

## 2024-05-29 PROBLEM — L60.3 NAIL DYSTROPHY: Status: ACTIVE | Noted: 2024-02-20

## 2024-05-29 PROBLEM — B35.1 ONYCHOMYCOSIS: Status: ACTIVE | Noted: 2024-02-16

## 2024-05-30 NOTE — ASSESSMENT
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  Onychodystrophy (L60.3).  Onychomycosis (B35.1).  Treatment: Discussed findings and conditions with the patient.  Continue glycemic control.  Continue with her doctor regarding additional changes to help with glycemic control.  Avoid walking barefoot.  Moisturize feet daily.  Visually inspect feet daily as well.  Avoid hazardous pedal conditions.  Discussed proper fitting shoes. Nails left 5 and right 1 and 5 were prepped and manually and mechanically debrided with a sterile nipper.  Nails were decreased in height and smoothed with a rotary christelle.  The remainder of the nails were trimmed to hygienic lengths.   Return: 3 months.  Continue antifungal medications as well.  With any pain, problems or concerns, patient is to contact the office.

## 2024-05-30 NOTE — PHYSICAL EXAM
[Ankle Swelling Bilaterally] : bilaterally  [Varicose Veins Of Lower Extremities] : bilaterally [2+] : left foot dorsalis pedis 2+ [Vibration Dec.] : diminished vibratory sensation at the level of the toes [Position Sense Dec.] : diminished position sense at the level of the toes [Ankle Swelling (On Exam)] : not present [FreeTextEntry3] : CFT: 3 seconds x 10.  Temperature gradient: warm to cool. [de-identified] : No pain with pedal joints ROM.  No gross deformities.  Muscle power: 5/5, bilateral.   [Diminished Throughout Right Foot] : normal sensation with monofilament testing throughout right foot [Diminished Throughout Left Foot] : normal sensation with monofilament testing throughout left foot [FreeTextEntry1] : Paresthesias and burning bilateral. Achilles reflexes and patellar decreased, bilateral.

## 2024-05-30 NOTE — HISTORY OF PRESENT ILLNESS
[FreeTextEntry1] : Patient presents today for diabetic pedal care and trimming of painful, elongated, thickened nails that she cannot cut herself.  She has been using topical medications on the nails but is only seeing limited improvement.  No changes to her medical histories or medications.  Her most recent hemoglobin A1c was 9.6%, was elevated since her last visit.  Fingerstick was 180.  She last saw her primary doctor approximately 1.5 months ago.  She does have some tingling in her feet, denies any intermittent claudication type symptoms.

## 2024-06-14 ENCOUNTER — INPATIENT (INPATIENT)
Facility: HOSPITAL | Age: 64
LOS: 9 days | Discharge: NURSING, REHAB OR LONG-TERM CARE | DRG: 93 | End: 2024-06-24
Attending: HOSPITALIST | Admitting: STUDENT IN AN ORGANIZED HEALTH CARE EDUCATION/TRAINING PROGRAM
Payer: MEDICARE

## 2024-06-14 VITALS
DIASTOLIC BLOOD PRESSURE: 68 MMHG | TEMPERATURE: 98 F | RESPIRATION RATE: 20 BRPM | HEIGHT: 64 IN | HEART RATE: 70 BPM | OXYGEN SATURATION: 100 % | WEIGHT: 164.91 LBS | SYSTOLIC BLOOD PRESSURE: 116 MMHG

## 2024-06-14 DIAGNOSIS — Z90.710 ACQUIRED ABSENCE OF BOTH CERVIX AND UTERUS: Chronic | ICD-10-CM

## 2024-06-14 DIAGNOSIS — R29.6 REPEATED FALLS: ICD-10-CM

## 2024-06-14 LAB
ALBUMIN SERPL ELPH-MCNC: 3.9 G/DL — SIGNIFICANT CHANGE UP (ref 3.3–5)
ALP SERPL-CCNC: 85 U/L — SIGNIFICANT CHANGE UP (ref 40–120)
ALT FLD-CCNC: 12 U/L — SIGNIFICANT CHANGE UP (ref 10–45)
ANION GAP SERPL CALC-SCNC: 14 MMOL/L — SIGNIFICANT CHANGE UP (ref 5–17)
APTT BLD: 30.4 SEC — SIGNIFICANT CHANGE UP (ref 24.5–35.6)
AST SERPL-CCNC: 19 U/L — SIGNIFICANT CHANGE UP (ref 10–40)
BASOPHILS # BLD AUTO: 0 K/UL — SIGNIFICANT CHANGE UP (ref 0–0.2)
BASOPHILS NFR BLD AUTO: 0 % — SIGNIFICANT CHANGE UP (ref 0–2)
BILIRUB SERPL-MCNC: 0.3 MG/DL — SIGNIFICANT CHANGE UP (ref 0.2–1.2)
BUN SERPL-MCNC: 15 MG/DL — SIGNIFICANT CHANGE UP (ref 7–23)
CALCIUM SERPL-MCNC: 9.3 MG/DL — SIGNIFICANT CHANGE UP (ref 8.4–10.5)
CHLORIDE SERPL-SCNC: 105 MMOL/L — SIGNIFICANT CHANGE UP (ref 96–108)
CO2 SERPL-SCNC: 22 MMOL/L — SIGNIFICANT CHANGE UP (ref 22–31)
CREAT SERPL-MCNC: 0.59 MG/DL — SIGNIFICANT CHANGE UP (ref 0.5–1.3)
DACRYOCYTES BLD QL SMEAR: SLIGHT — SIGNIFICANT CHANGE UP
EGFR: 99 ML/MIN/1.73M2 — SIGNIFICANT CHANGE UP
EOSINOPHIL # BLD AUTO: 0.49 K/UL — SIGNIFICANT CHANGE UP (ref 0–0.5)
EOSINOPHIL NFR BLD AUTO: 7 % — HIGH (ref 0–6)
GLUCOSE SERPL-MCNC: 227 MG/DL — HIGH (ref 70–99)
HCT VFR BLD CALC: 33.2 % — LOW (ref 34.5–45)
HGB BLD-MCNC: 10.3 G/DL — LOW (ref 11.5–15.5)
INR BLD: 1.19 RATIO — HIGH (ref 0.85–1.18)
LYMPHOCYTES # BLD AUTO: 1.53 K/UL — SIGNIFICANT CHANGE UP (ref 1–3.3)
LYMPHOCYTES # BLD AUTO: 22 % — SIGNIFICANT CHANGE UP (ref 13–44)
MANUAL SMEAR VERIFICATION: SIGNIFICANT CHANGE UP
MCHC RBC-ENTMCNC: 22.2 PG — LOW (ref 27–34)
MCHC RBC-ENTMCNC: 31 GM/DL — LOW (ref 32–36)
MCV RBC AUTO: 71.6 FL — LOW (ref 80–100)
MONOCYTES # BLD AUTO: 0.28 K/UL — SIGNIFICANT CHANGE UP (ref 0–0.9)
MONOCYTES NFR BLD AUTO: 4 % — SIGNIFICANT CHANGE UP (ref 2–14)
NEUTROPHILS # BLD AUTO: 4.66 K/UL — SIGNIFICANT CHANGE UP (ref 1.8–7.4)
NEUTROPHILS NFR BLD AUTO: 67 % — SIGNIFICANT CHANGE UP (ref 43–77)
NRBC # BLD: 0 /100 WBCS — SIGNIFICANT CHANGE UP (ref 0–0)
OVALOCYTES BLD QL SMEAR: SLIGHT — SIGNIFICANT CHANGE UP
PLAT MORPH BLD: ABNORMAL
PLATELET # BLD AUTO: 276 K/UL — SIGNIFICANT CHANGE UP (ref 150–400)
PLATELET CLUMP BLD QL SMEAR: ABNORMAL
POIKILOCYTOSIS BLD QL AUTO: SLIGHT — SIGNIFICANT CHANGE UP
POTASSIUM SERPL-MCNC: 4 MMOL/L — SIGNIFICANT CHANGE UP (ref 3.5–5.3)
POTASSIUM SERPL-SCNC: 4 MMOL/L — SIGNIFICANT CHANGE UP (ref 3.5–5.3)
PROT SERPL-MCNC: 6.7 G/DL — SIGNIFICANT CHANGE UP (ref 6–8.3)
PROTHROM AB SERPL-ACNC: 12.4 SEC — SIGNIFICANT CHANGE UP (ref 9.5–13)
RBC # BLD: 4.64 M/UL — SIGNIFICANT CHANGE UP (ref 3.8–5.2)
RBC # FLD: 17.2 % — HIGH (ref 10.3–14.5)
RBC BLD AUTO: ABNORMAL
SODIUM SERPL-SCNC: 141 MMOL/L — SIGNIFICANT CHANGE UP (ref 135–145)
WBC # BLD: 6.96 K/UL — SIGNIFICANT CHANGE UP (ref 3.8–10.5)
WBC # FLD AUTO: 6.96 K/UL — SIGNIFICANT CHANGE UP (ref 3.8–10.5)

## 2024-06-14 PROCEDURE — 72125 CT NECK SPINE W/O DYE: CPT | Mod: 26,MC

## 2024-06-14 PROCEDURE — 70450 CT HEAD/BRAIN W/O DYE: CPT | Mod: 26,MC

## 2024-06-14 PROCEDURE — 72170 X-RAY EXAM OF PELVIS: CPT | Mod: 26

## 2024-06-14 PROCEDURE — 71045 X-RAY EXAM CHEST 1 VIEW: CPT | Mod: 26

## 2024-06-14 PROCEDURE — 99285 EMERGENCY DEPT VISIT HI MDM: CPT

## 2024-06-14 PROCEDURE — 73564 X-RAY EXAM KNEE 4 OR MORE: CPT | Mod: 26,50

## 2024-06-14 PROCEDURE — 99223 1ST HOSP IP/OBS HIGH 75: CPT

## 2024-06-14 RX ORDER — ACETAMINOPHEN 325 MG
650 TABLET ORAL EVERY 6 HOURS
Refills: 0 | Status: DISCONTINUED | OUTPATIENT
Start: 2024-06-14 | End: 2024-06-24

## 2024-06-14 RX ORDER — ACETAMINOPHEN 325 MG
1000 TABLET ORAL ONCE
Refills: 0 | Status: COMPLETED | OUTPATIENT
Start: 2024-06-14 | End: 2024-06-14

## 2024-06-14 RX ORDER — SODIUM CHLORIDE 0.9 % (FLUSH) 0.9 %
1000 SYRINGE (ML) INJECTION ONCE
Refills: 0 | Status: COMPLETED | OUTPATIENT
Start: 2024-06-14 | End: 2024-06-14

## 2024-06-14 RX ADMIN — Medication 1000 MILLILITER(S): at 14:07

## 2024-06-14 RX ADMIN — Medication 400 MILLIGRAM(S): at 14:07

## 2024-06-15 DIAGNOSIS — Z29.9 ENCOUNTER FOR PROPHYLACTIC MEASURES, UNSPECIFIED: ICD-10-CM

## 2024-06-15 DIAGNOSIS — R26.2 DIFFICULTY IN WALKING, NOT ELSEWHERE CLASSIFIED: ICD-10-CM

## 2024-06-15 DIAGNOSIS — I10 ESSENTIAL (PRIMARY) HYPERTENSION: ICD-10-CM

## 2024-06-15 DIAGNOSIS — E11.9 TYPE 2 DIABETES MELLITUS WITHOUT COMPLICATIONS: ICD-10-CM

## 2024-06-15 DIAGNOSIS — Z86.73 PERSONAL HISTORY OF TRANSIENT ISCHEMIC ATTACK (TIA), AND CEREBRAL INFARCTION WITHOUT RESIDUAL DEFICITS: ICD-10-CM

## 2024-06-15 DIAGNOSIS — E78.5 HYPERLIPIDEMIA, UNSPECIFIED: ICD-10-CM

## 2024-06-15 DIAGNOSIS — G20.A1 PARKINSON'S DISEASE WITHOUT DYSKINESIA, WITHOUT MENTION OF FLUCTUATIONS: ICD-10-CM

## 2024-06-15 DIAGNOSIS — I73.9 PERIPHERAL VASCULAR DISEASE, UNSPECIFIED: ICD-10-CM

## 2024-06-15 DIAGNOSIS — I25.10 ATHEROSCLEROTIC HEART DISEASE OF NATIVE CORONARY ARTERY WITHOUT ANGINA PECTORIS: ICD-10-CM

## 2024-06-15 LAB
A1C WITH ESTIMATED AVERAGE GLUCOSE RESULT: 9.2 % — HIGH (ref 4–5.6)
ALBUMIN SERPL ELPH-MCNC: 4.4 G/DL — SIGNIFICANT CHANGE UP (ref 3.3–5)
ALP SERPL-CCNC: 92 U/L — SIGNIFICANT CHANGE UP (ref 40–120)
ALT FLD-CCNC: 21 U/L — SIGNIFICANT CHANGE UP (ref 10–45)
ANION GAP SERPL CALC-SCNC: 15 MMOL/L — SIGNIFICANT CHANGE UP (ref 5–17)
AST SERPL-CCNC: 20 U/L — SIGNIFICANT CHANGE UP (ref 10–40)
BILIRUB SERPL-MCNC: 0.5 MG/DL — SIGNIFICANT CHANGE UP (ref 0.2–1.2)
BUN SERPL-MCNC: 8 MG/DL — SIGNIFICANT CHANGE UP (ref 7–23)
CALCIUM SERPL-MCNC: 9.6 MG/DL — SIGNIFICANT CHANGE UP (ref 8.4–10.5)
CHLORIDE SERPL-SCNC: 104 MMOL/L — SIGNIFICANT CHANGE UP (ref 96–108)
CHOLEST SERPL-MCNC: 138 MG/DL — SIGNIFICANT CHANGE UP
CO2 SERPL-SCNC: 22 MMOL/L — SIGNIFICANT CHANGE UP (ref 22–31)
CREAT SERPL-MCNC: 0.53 MG/DL — SIGNIFICANT CHANGE UP (ref 0.5–1.3)
EGFR: 102 ML/MIN/1.73M2 — SIGNIFICANT CHANGE UP
ESTIMATED AVERAGE GLUCOSE: 217 MG/DL — HIGH (ref 68–114)
GLUCOSE BLDC GLUCOMTR-MCNC: 227 MG/DL — HIGH (ref 70–99)
GLUCOSE BLDC GLUCOMTR-MCNC: 234 MG/DL — HIGH (ref 70–99)
GLUCOSE BLDC GLUCOMTR-MCNC: 268 MG/DL — HIGH (ref 70–99)
GLUCOSE SERPL-MCNC: 216 MG/DL — HIGH (ref 70–99)
HCT VFR BLD CALC: 38 % — SIGNIFICANT CHANGE UP (ref 34.5–45)
HDLC SERPL-MCNC: 51 MG/DL — SIGNIFICANT CHANGE UP
HGB BLD-MCNC: 11.3 G/DL — LOW (ref 11.5–15.5)
LIPID PNL WITH DIRECT LDL SERPL: 75 MG/DL — SIGNIFICANT CHANGE UP
MCHC RBC-ENTMCNC: 21.5 PG — LOW (ref 27–34)
MCHC RBC-ENTMCNC: 29.7 GM/DL — LOW (ref 32–36)
MCV RBC AUTO: 72.2 FL — LOW (ref 80–100)
NON HDL CHOLESTEROL: 87 MG/DL — SIGNIFICANT CHANGE UP
NRBC # BLD: 0 /100 WBCS — SIGNIFICANT CHANGE UP (ref 0–0)
PLATELET # BLD AUTO: 293 K/UL — SIGNIFICANT CHANGE UP (ref 150–400)
POTASSIUM SERPL-MCNC: 3.7 MMOL/L — SIGNIFICANT CHANGE UP (ref 3.5–5.3)
POTASSIUM SERPL-SCNC: 3.7 MMOL/L — SIGNIFICANT CHANGE UP (ref 3.5–5.3)
PROT SERPL-MCNC: 7.7 G/DL — SIGNIFICANT CHANGE UP (ref 6–8.3)
RBC # BLD: 5.26 M/UL — HIGH (ref 3.8–5.2)
RBC # FLD: 17.2 % — HIGH (ref 10.3–14.5)
SODIUM SERPL-SCNC: 141 MMOL/L — SIGNIFICANT CHANGE UP (ref 135–145)
TRIGL SERPL-MCNC: 57 MG/DL — SIGNIFICANT CHANGE UP
WBC # BLD: 7.49 K/UL — SIGNIFICANT CHANGE UP (ref 3.8–10.5)
WBC # FLD AUTO: 7.49 K/UL — SIGNIFICANT CHANGE UP (ref 3.8–10.5)

## 2024-06-15 PROCEDURE — 99232 SBSQ HOSP IP/OBS MODERATE 35: CPT

## 2024-06-15 RX ORDER — DEXTROSE MONOHYDRATE AND SODIUM CHLORIDE 5; .3 G/100ML; G/100ML
1000 INJECTION, SOLUTION INTRAVENOUS
Refills: 0 | Status: DISCONTINUED | OUTPATIENT
Start: 2024-06-15 | End: 2024-06-24

## 2024-06-15 RX ORDER — GABAPENTIN 400 MG/1
1 CAPSULE ORAL
Refills: 0 | DISCHARGE

## 2024-06-15 RX ORDER — GLUCAGON HYDROCHLORIDE 1 MG/ML
1 INJECTION, POWDER, FOR SOLUTION INTRAMUSCULAR; INTRAVENOUS; SUBCUTANEOUS ONCE
Refills: 0 | Status: DISCONTINUED | OUTPATIENT
Start: 2024-06-15 | End: 2024-06-24

## 2024-06-15 RX ORDER — ASPIRIN/CALCIUM CARB/MAGNESIUM 324 MG
1 TABLET ORAL
Qty: 0 | Refills: 0 | DISCHARGE

## 2024-06-15 RX ORDER — ISOSORBIDE MONONITRATE 30 MG/1
30 TABLET, EXTENDED RELEASE ORAL DAILY
Refills: 0 | Status: DISCONTINUED | OUTPATIENT
Start: 2024-06-15 | End: 2024-06-24

## 2024-06-15 RX ORDER — DEXTROSE 30 % IN WATER 30 %
15 VIAL (ML) INTRAVENOUS ONCE
Refills: 0 | Status: DISCONTINUED | OUTPATIENT
Start: 2024-06-15 | End: 2024-06-24

## 2024-06-15 RX ORDER — DOXAZOSIN MESYLATE 2 MG/1
2 TABLET ORAL AT BEDTIME
Refills: 0 | Status: DISCONTINUED | OUTPATIENT
Start: 2024-06-15 | End: 2024-06-24

## 2024-06-15 RX ORDER — DEXTROSE 30 % IN WATER 30 %
12.5 VIAL (ML) INTRAVENOUS ONCE
Refills: 0 | Status: DISCONTINUED | OUTPATIENT
Start: 2024-06-15 | End: 2024-06-24

## 2024-06-15 RX ORDER — CARBIDOPA AND LEVODOPA 25; 100 MG/1; MG/1
1 TABLET ORAL
Refills: 0 | DISCHARGE

## 2024-06-15 RX ORDER — INSULIN LISPRO 100 [IU]/ML
INJECTION, SOLUTION SUBCUTANEOUS AT BEDTIME
Refills: 0 | Status: DISCONTINUED | OUTPATIENT
Start: 2024-06-15 | End: 2024-06-24

## 2024-06-15 RX ORDER — FAMOTIDINE 10 MG/ML
1 INJECTION INTRAVENOUS
Refills: 0 | DISCHARGE

## 2024-06-15 RX ORDER — CLOPIDOGREL BISULFATE 75 MG/1
1 TABLET, FILM COATED ORAL
Qty: 0 | Refills: 0 | DISCHARGE

## 2024-06-15 RX ORDER — FAMOTIDINE 40 MG
20 TABLET ORAL DAILY
Refills: 0 | Status: DISCONTINUED | OUTPATIENT
Start: 2024-06-15 | End: 2024-06-24

## 2024-06-15 RX ORDER — CLOPIDOGREL BISULFATE 75 MG/1
75 TABLET, FILM COATED ORAL DAILY
Refills: 0 | Status: DISCONTINUED | OUTPATIENT
Start: 2024-06-15 | End: 2024-06-24

## 2024-06-15 RX ORDER — LOSARTAN POTASSIUM 100 MG/1
25 TABLET, FILM COATED ORAL DAILY
Refills: 0 | Status: DISCONTINUED | OUTPATIENT
Start: 2024-06-15 | End: 2024-06-24

## 2024-06-15 RX ORDER — INSULIN LISPRO 100 [IU]/ML
INJECTION, SOLUTION SUBCUTANEOUS
Refills: 0 | Status: DISCONTINUED | OUTPATIENT
Start: 2024-06-15 | End: 2024-06-24

## 2024-06-15 RX ORDER — ISOSORBIDE MONONITRATE 60 MG/1
1 TABLET, EXTENDED RELEASE ORAL
Refills: 0 | DISCHARGE

## 2024-06-15 RX ORDER — DEXTROSE MONOHYDRATE 100 MG/ML
125 INJECTION, SOLUTION INTRAVENOUS ONCE
Refills: 0 | Status: DISCONTINUED | OUTPATIENT
Start: 2024-06-15 | End: 2024-06-24

## 2024-06-15 RX ORDER — GABAPENTIN
100 POWDER (GRAM) MISCELLANEOUS THREE TIMES A DAY
Refills: 0 | Status: DISCONTINUED | OUTPATIENT
Start: 2024-06-15 | End: 2024-06-24

## 2024-06-15 RX ORDER — CARBIDOPA AND LEVODOPA 10; 100 MG/1; MG/1
1 TABLET ORAL DAILY
Refills: 0 | Status: DISCONTINUED | OUTPATIENT
Start: 2024-06-15 | End: 2024-06-24

## 2024-06-15 RX ORDER — DEXTROSE 30 % IN WATER 30 %
25 VIAL (ML) INTRAVENOUS ONCE
Refills: 0 | Status: DISCONTINUED | OUTPATIENT
Start: 2024-06-15 | End: 2024-06-24

## 2024-06-15 RX ORDER — ATORVASTATIN CALCIUM 20 MG/1
40 TABLET, FILM COATED ORAL AT BEDTIME
Refills: 0 | Status: DISCONTINUED | OUTPATIENT
Start: 2024-06-15 | End: 2024-06-24

## 2024-06-15 RX ORDER — ASPIRIN 325 MG/1
81 TABLET, FILM COATED ORAL DAILY
Refills: 0 | Status: DISCONTINUED | OUTPATIENT
Start: 2024-06-15 | End: 2024-06-24

## 2024-06-15 RX ORDER — ENOXAPARIN SODIUM 100 MG/ML
40 INJECTION SUBCUTANEOUS EVERY 24 HOURS
Refills: 0 | Status: DISCONTINUED | OUTPATIENT
Start: 2024-06-15 | End: 2024-06-24

## 2024-06-15 RX ORDER — ATORVASTATIN CALCIUM 80 MG/1
1 TABLET, FILM COATED ORAL
Refills: 0 | DISCHARGE

## 2024-06-15 RX ADMIN — ENOXAPARIN SODIUM 40 MILLIGRAM(S): 100 INJECTION SUBCUTANEOUS at 12:56

## 2024-06-15 RX ADMIN — INSULIN LISPRO 4: 100 INJECTION, SOLUTION SUBCUTANEOUS at 12:54

## 2024-06-15 RX ADMIN — ATORVASTATIN CALCIUM 40 MILLIGRAM(S): 20 TABLET, FILM COATED ORAL at 21:49

## 2024-06-15 RX ADMIN — CARBIDOPA AND LEVODOPA 1 TABLET(S): 10; 100 TABLET ORAL at 12:56

## 2024-06-15 RX ADMIN — DOXAZOSIN MESYLATE 2 MILLIGRAM(S): 2 TABLET ORAL at 21:49

## 2024-06-15 RX ADMIN — Medication 100 MILLIGRAM(S): at 15:28

## 2024-06-15 RX ADMIN — LOSARTAN POTASSIUM 25 MILLIGRAM(S): 100 TABLET, FILM COATED ORAL at 17:28

## 2024-06-15 RX ADMIN — ISOSORBIDE MONONITRATE 30 MILLIGRAM(S): 30 TABLET, EXTENDED RELEASE ORAL at 12:55

## 2024-06-15 RX ADMIN — ASPIRIN 81 MILLIGRAM(S): 325 TABLET, FILM COATED ORAL at 12:55

## 2024-06-15 RX ADMIN — Medication 30 MILLIGRAM(S): at 21:51

## 2024-06-15 RX ADMIN — Medication 30 MILLIGRAM(S): at 00:05

## 2024-06-15 RX ADMIN — Medication 100 MILLIGRAM(S): at 21:49

## 2024-06-15 RX ADMIN — CLOPIDOGREL BISULFATE 75 MILLIGRAM(S): 75 TABLET, FILM COATED ORAL at 17:28

## 2024-06-15 RX ADMIN — INSULIN LISPRO 6: 100 INJECTION, SOLUTION SUBCUTANEOUS at 17:28

## 2024-06-15 RX ADMIN — Medication 20 MILLIGRAM(S): at 12:55

## 2024-06-15 RX ADMIN — Medication 30 MILLIGRAM(S): at 10:51

## 2024-06-16 DIAGNOSIS — R47.01 APHASIA: ICD-10-CM

## 2024-06-16 LAB
ANION GAP SERPL CALC-SCNC: 16 MMOL/L — SIGNIFICANT CHANGE UP (ref 5–17)
BUN SERPL-MCNC: 12 MG/DL — SIGNIFICANT CHANGE UP (ref 7–23)
CALCIUM SERPL-MCNC: 9.9 MG/DL — SIGNIFICANT CHANGE UP (ref 8.4–10.5)
CHLORIDE SERPL-SCNC: 102 MMOL/L — SIGNIFICANT CHANGE UP (ref 96–108)
CO2 SERPL-SCNC: 23 MMOL/L — SIGNIFICANT CHANGE UP (ref 22–31)
CREAT SERPL-MCNC: 0.67 MG/DL — SIGNIFICANT CHANGE UP (ref 0.5–1.3)
EGFR: 96 ML/MIN/1.73M2 — SIGNIFICANT CHANGE UP
GLUCOSE BLDC GLUCOMTR-MCNC: 260 MG/DL — HIGH (ref 70–99)
GLUCOSE BLDC GLUCOMTR-MCNC: 263 MG/DL — HIGH (ref 70–99)
GLUCOSE BLDC GLUCOMTR-MCNC: 264 MG/DL — HIGH (ref 70–99)
GLUCOSE BLDC GLUCOMTR-MCNC: 302 MG/DL — HIGH (ref 70–99)
GLUCOSE SERPL-MCNC: 191 MG/DL — HIGH (ref 70–99)
HCT VFR BLD CALC: 36.1 % — SIGNIFICANT CHANGE UP (ref 34.5–45)
HGB BLD-MCNC: 11.2 G/DL — LOW (ref 11.5–15.5)
MCHC RBC-ENTMCNC: 22 PG — LOW (ref 27–34)
MCHC RBC-ENTMCNC: 31 GM/DL — LOW (ref 32–36)
MCV RBC AUTO: 70.9 FL — LOW (ref 80–100)
NRBC # BLD: 0 /100 WBCS — SIGNIFICANT CHANGE UP (ref 0–0)
PLATELET # BLD AUTO: 311 K/UL — SIGNIFICANT CHANGE UP (ref 150–400)
POTASSIUM SERPL-MCNC: 3.6 MMOL/L — SIGNIFICANT CHANGE UP (ref 3.5–5.3)
POTASSIUM SERPL-SCNC: 3.6 MMOL/L — SIGNIFICANT CHANGE UP (ref 3.5–5.3)
RBC # BLD: 5.09 M/UL — SIGNIFICANT CHANGE UP (ref 3.8–5.2)
RBC # FLD: 17.7 % — HIGH (ref 10.3–14.5)
SODIUM SERPL-SCNC: 141 MMOL/L — SIGNIFICANT CHANGE UP (ref 135–145)
WBC # BLD: 7.41 K/UL — SIGNIFICANT CHANGE UP (ref 3.8–10.5)
WBC # FLD AUTO: 7.41 K/UL — SIGNIFICANT CHANGE UP (ref 3.8–10.5)

## 2024-06-16 PROCEDURE — 99233 SBSQ HOSP IP/OBS HIGH 50: CPT

## 2024-06-16 PROCEDURE — 71045 X-RAY EXAM CHEST 1 VIEW: CPT | Mod: 26

## 2024-06-16 RX ORDER — INSULIN LISPRO 100 [IU]/ML
5 INJECTION, SOLUTION SUBCUTANEOUS
Refills: 0 | Status: DISCONTINUED | OUTPATIENT
Start: 2024-06-16 | End: 2024-06-17

## 2024-06-16 RX ORDER — SODIUM CHLORIDE 0.9 % (FLUSH) 0.9 %
1000 SYRINGE (ML) INJECTION
Refills: 0 | Status: DISCONTINUED | OUTPATIENT
Start: 2024-06-16 | End: 2024-06-18

## 2024-06-16 RX ORDER — ACETAMINOPHEN 325 MG
1000 TABLET ORAL ONCE
Refills: 0 | Status: COMPLETED | OUTPATIENT
Start: 2024-06-16 | End: 2024-06-16

## 2024-06-16 RX ORDER — POLYETHYLENE GLYCOL 3350 1 G/G
17 POWDER ORAL DAILY
Refills: 0 | Status: DISCONTINUED | OUTPATIENT
Start: 2024-06-16 | End: 2024-06-19

## 2024-06-16 RX ORDER — INSULIN GLARGINE 100 [IU]/ML
10 INJECTION, SOLUTION SUBCUTANEOUS AT BEDTIME
Refills: 0 | Status: DISCONTINUED | OUTPATIENT
Start: 2024-06-16 | End: 2024-06-17

## 2024-06-16 RX ORDER — OLANZAPINE 2.5 MG/1
2.5 TABLET, FILM COATED ORAL ONCE
Refills: 0 | Status: COMPLETED | OUTPATIENT
Start: 2024-06-16 | End: 2024-06-17

## 2024-06-16 RX ADMIN — LOSARTAN POTASSIUM 25 MILLIGRAM(S): 100 TABLET, FILM COATED ORAL at 05:53

## 2024-06-16 RX ADMIN — INSULIN LISPRO 6: 100 INJECTION, SOLUTION SUBCUTANEOUS at 13:08

## 2024-06-16 RX ADMIN — Medication 100 MILLIGRAM(S): at 13:07

## 2024-06-16 RX ADMIN — INSULIN LISPRO 8: 100 INJECTION, SOLUTION SUBCUTANEOUS at 17:44

## 2024-06-16 RX ADMIN — ASPIRIN 81 MILLIGRAM(S): 325 TABLET, FILM COATED ORAL at 13:06

## 2024-06-16 RX ADMIN — CLOPIDOGREL BISULFATE 75 MILLIGRAM(S): 75 TABLET, FILM COATED ORAL at 13:06

## 2024-06-16 RX ADMIN — INSULIN LISPRO 6: 100 INJECTION, SOLUTION SUBCUTANEOUS at 08:29

## 2024-06-16 RX ADMIN — Medication 100 MILLIGRAM(S): at 05:53

## 2024-06-16 RX ADMIN — CARBIDOPA AND LEVODOPA 1 TABLET(S): 10; 100 TABLET ORAL at 13:06

## 2024-06-16 RX ADMIN — ENOXAPARIN SODIUM 40 MILLIGRAM(S): 100 INJECTION SUBCUTANEOUS at 13:05

## 2024-06-16 RX ADMIN — Medication 30 MILLIGRAM(S): at 17:16

## 2024-06-16 RX ADMIN — Medication 400 MILLIGRAM(S): at 23:16

## 2024-06-16 RX ADMIN — ISOSORBIDE MONONITRATE 30 MILLIGRAM(S): 30 TABLET, EXTENDED RELEASE ORAL at 13:06

## 2024-06-16 RX ADMIN — INSULIN LISPRO 5 UNIT(S): 100 INJECTION, SOLUTION SUBCUTANEOUS at 17:45

## 2024-06-16 RX ADMIN — Medication 20 MILLIGRAM(S): at 13:06

## 2024-06-16 RX ADMIN — Medication 30 MILLIGRAM(S): at 05:53

## 2024-06-16 RX ADMIN — Medication 60 MILLILITER(S): at 15:13

## 2024-06-17 LAB
ALBUMIN SERPL ELPH-MCNC: 4 G/DL — SIGNIFICANT CHANGE UP (ref 3.3–5)
ALP SERPL-CCNC: 82 U/L — SIGNIFICANT CHANGE UP (ref 40–120)
ALT FLD-CCNC: 17 U/L — SIGNIFICANT CHANGE UP (ref 10–45)
ANION GAP SERPL CALC-SCNC: 15 MMOL/L — SIGNIFICANT CHANGE UP (ref 5–17)
AST SERPL-CCNC: 20 U/L — SIGNIFICANT CHANGE UP (ref 10–40)
BASOPHILS # BLD AUTO: 0.05 K/UL — SIGNIFICANT CHANGE UP (ref 0–0.2)
BASOPHILS NFR BLD AUTO: 0.7 % — SIGNIFICANT CHANGE UP (ref 0–2)
BILIRUB SERPL-MCNC: 0.5 MG/DL — SIGNIFICANT CHANGE UP (ref 0.2–1.2)
BUN SERPL-MCNC: 14 MG/DL — SIGNIFICANT CHANGE UP (ref 7–23)
CALCIUM SERPL-MCNC: 9.3 MG/DL — SIGNIFICANT CHANGE UP (ref 8.4–10.5)
CHLORIDE SERPL-SCNC: 107 MMOL/L — SIGNIFICANT CHANGE UP (ref 96–108)
CHOLEST SERPL-MCNC: 140 MG/DL — SIGNIFICANT CHANGE UP
CO2 SERPL-SCNC: 21 MMOL/L — LOW (ref 22–31)
CREAT SERPL-MCNC: 0.69 MG/DL — SIGNIFICANT CHANGE UP (ref 0.5–1.3)
D DIMER BLD IA.RAPID-MCNC: 305 NG/ML DDU — HIGH
EGFR: 96 ML/MIN/1.73M2 — SIGNIFICANT CHANGE UP
EOSINOPHIL # BLD AUTO: 0.51 K/UL — HIGH (ref 0–0.5)
EOSINOPHIL NFR BLD AUTO: 7.5 % — HIGH (ref 0–6)
GLUCOSE BLDC GLUCOMTR-MCNC: 191 MG/DL — HIGH (ref 70–99)
GLUCOSE BLDC GLUCOMTR-MCNC: 203 MG/DL — HIGH (ref 70–99)
GLUCOSE BLDC GLUCOMTR-MCNC: 237 MG/DL — HIGH (ref 70–99)
GLUCOSE BLDC GLUCOMTR-MCNC: 280 MG/DL — HIGH (ref 70–99)
GLUCOSE BLDC GLUCOMTR-MCNC: 289 MG/DL — HIGH (ref 70–99)
GLUCOSE BLDC GLUCOMTR-MCNC: 308 MG/DL — HIGH (ref 70–99)
GLUCOSE SERPL-MCNC: 236 MG/DL — HIGH (ref 70–99)
HCT VFR BLD CALC: 35 % — SIGNIFICANT CHANGE UP (ref 34.5–45)
HDLC SERPL-MCNC: 45 MG/DL — LOW
HGB BLD-MCNC: 11.2 G/DL — LOW (ref 11.5–15.5)
IMM GRANULOCYTES NFR BLD AUTO: 0.4 % — SIGNIFICANT CHANGE UP (ref 0–0.9)
LIPID PNL WITH DIRECT LDL SERPL: 81 MG/DL — SIGNIFICANT CHANGE UP
LYMPHOCYTES # BLD AUTO: 1.36 K/UL — SIGNIFICANT CHANGE UP (ref 1–3.3)
LYMPHOCYTES # BLD AUTO: 20.1 % — SIGNIFICANT CHANGE UP (ref 13–44)
MAGNESIUM SERPL-MCNC: 2 MG/DL — SIGNIFICANT CHANGE UP (ref 1.6–2.6)
MCHC RBC-ENTMCNC: 22.3 PG — LOW (ref 27–34)
MCHC RBC-ENTMCNC: 32 GM/DL — SIGNIFICANT CHANGE UP (ref 32–36)
MCV RBC AUTO: 69.6 FL — LOW (ref 80–100)
MONOCYTES # BLD AUTO: 0.67 K/UL — SIGNIFICANT CHANGE UP (ref 0–0.9)
MONOCYTES NFR BLD AUTO: 9.9 % — SIGNIFICANT CHANGE UP (ref 2–14)
NEUTROPHILS # BLD AUTO: 4.14 K/UL — SIGNIFICANT CHANGE UP (ref 1.8–7.4)
NEUTROPHILS NFR BLD AUTO: 61.4 % — SIGNIFICANT CHANGE UP (ref 43–77)
NON HDL CHOLESTEROL: 95 MG/DL — SIGNIFICANT CHANGE UP
NRBC # BLD: 0 /100 WBCS — SIGNIFICANT CHANGE UP (ref 0–0)
PHOSPHATE SERPL-MCNC: 3.5 MG/DL — SIGNIFICANT CHANGE UP (ref 2.5–4.5)
PLATELET # BLD AUTO: 294 K/UL — SIGNIFICANT CHANGE UP (ref 150–400)
POTASSIUM SERPL-MCNC: 3.5 MMOL/L — SIGNIFICANT CHANGE UP (ref 3.5–5.3)
POTASSIUM SERPL-SCNC: 3.5 MMOL/L — SIGNIFICANT CHANGE UP (ref 3.5–5.3)
PROT SERPL-MCNC: 7.1 G/DL — SIGNIFICANT CHANGE UP (ref 6–8.3)
RBC # BLD: 5.03 M/UL — SIGNIFICANT CHANGE UP (ref 3.8–5.2)
RBC # FLD: 18 % — HIGH (ref 10.3–14.5)
SODIUM SERPL-SCNC: 143 MMOL/L — SIGNIFICANT CHANGE UP (ref 135–145)
TRIGL SERPL-MCNC: 67 MG/DL — SIGNIFICANT CHANGE UP
WBC # BLD: 6.76 K/UL — SIGNIFICANT CHANGE UP (ref 3.8–10.5)
WBC # FLD AUTO: 6.76 K/UL — SIGNIFICANT CHANGE UP (ref 3.8–10.5)

## 2024-06-17 PROCEDURE — 70450 CT HEAD/BRAIN W/O DYE: CPT | Mod: 26

## 2024-06-17 PROCEDURE — 99233 SBSQ HOSP IP/OBS HIGH 50: CPT

## 2024-06-17 PROCEDURE — 70551 MRI BRAIN STEM W/O DYE: CPT | Mod: 26

## 2024-06-17 RX ORDER — INSULIN LISPRO 100 [IU]/ML
8 INJECTION, SOLUTION SUBCUTANEOUS
Refills: 0 | Status: DISCONTINUED | OUTPATIENT
Start: 2024-06-17 | End: 2024-06-19

## 2024-06-17 RX ORDER — INSULIN GLARGINE 100 [IU]/ML
15 INJECTION, SOLUTION SUBCUTANEOUS AT BEDTIME
Refills: 0 | Status: DISCONTINUED | OUTPATIENT
Start: 2024-06-17 | End: 2024-06-20

## 2024-06-17 RX ORDER — OLANZAPINE 2.5 MG/1
2.5 TABLET, FILM COATED ORAL ONCE
Refills: 0 | Status: COMPLETED | OUTPATIENT
Start: 2024-06-17 | End: 2024-06-17

## 2024-06-17 RX ADMIN — Medication 100 MILLIGRAM(S): at 15:39

## 2024-06-17 RX ADMIN — Medication 30 MILLIGRAM(S): at 17:31

## 2024-06-17 RX ADMIN — Medication 30 MILLIGRAM(S): at 07:44

## 2024-06-17 RX ADMIN — CARBIDOPA AND LEVODOPA 1 TABLET(S): 10; 100 TABLET ORAL at 12:50

## 2024-06-17 RX ADMIN — INSULIN LISPRO 6: 100 INJECTION, SOLUTION SUBCUTANEOUS at 12:49

## 2024-06-17 RX ADMIN — LOSARTAN POTASSIUM 25 MILLIGRAM(S): 100 TABLET, FILM COATED ORAL at 07:44

## 2024-06-17 RX ADMIN — INSULIN LISPRO 2: 100 INJECTION, SOLUTION SUBCUTANEOUS at 01:17

## 2024-06-17 RX ADMIN — INSULIN GLARGINE 15 UNIT(S): 100 INJECTION, SOLUTION SUBCUTANEOUS at 21:35

## 2024-06-17 RX ADMIN — Medication 100 MILLIGRAM(S): at 07:44

## 2024-06-17 RX ADMIN — Medication 60 MILLILITER(S): at 17:37

## 2024-06-17 RX ADMIN — ENOXAPARIN SODIUM 40 MILLIGRAM(S): 100 INJECTION SUBCUTANEOUS at 12:50

## 2024-06-17 RX ADMIN — INSULIN GLARGINE 10 UNIT(S): 100 INJECTION, SOLUTION SUBCUTANEOUS at 01:17

## 2024-06-17 RX ADMIN — Medication 20 MILLIGRAM(S): at 12:50

## 2024-06-17 RX ADMIN — OLANZAPINE 2.5 MILLIGRAM(S): 2.5 TABLET, FILM COATED ORAL at 10:12

## 2024-06-17 RX ADMIN — CLOPIDOGREL BISULFATE 75 MILLIGRAM(S): 75 TABLET, FILM COATED ORAL at 12:50

## 2024-06-17 RX ADMIN — INSULIN LISPRO 8 UNIT(S): 100 INJECTION, SOLUTION SUBCUTANEOUS at 17:31

## 2024-06-17 RX ADMIN — INSULIN LISPRO 8: 100 INJECTION, SOLUTION SUBCUTANEOUS at 17:31

## 2024-06-17 RX ADMIN — ISOSORBIDE MONONITRATE 30 MILLIGRAM(S): 30 TABLET, EXTENDED RELEASE ORAL at 12:50

## 2024-06-17 RX ADMIN — INSULIN LISPRO 5 UNIT(S): 100 INJECTION, SOLUTION SUBCUTANEOUS at 08:34

## 2024-06-17 RX ADMIN — INSULIN LISPRO 4: 100 INJECTION, SOLUTION SUBCUTANEOUS at 08:34

## 2024-06-17 RX ADMIN — OLANZAPINE 2.5 MILLIGRAM(S): 2.5 TABLET, FILM COATED ORAL at 00:51

## 2024-06-17 RX ADMIN — INSULIN LISPRO 5 UNIT(S): 100 INJECTION, SOLUTION SUBCUTANEOUS at 12:49

## 2024-06-17 RX ADMIN — ASPIRIN 81 MILLIGRAM(S): 325 TABLET, FILM COATED ORAL at 12:50

## 2024-06-18 LAB
ALBUMIN SERPL ELPH-MCNC: 3.9 G/DL — SIGNIFICANT CHANGE UP (ref 3.3–5)
ALP SERPL-CCNC: 85 U/L — SIGNIFICANT CHANGE UP (ref 40–120)
ALT FLD-CCNC: 20 U/L — SIGNIFICANT CHANGE UP (ref 10–45)
ANION GAP SERPL CALC-SCNC: 15 MMOL/L — SIGNIFICANT CHANGE UP (ref 5–17)
APTT BLD: 27.5 SEC — SIGNIFICANT CHANGE UP (ref 24.5–35.6)
AST SERPL-CCNC: 21 U/L — SIGNIFICANT CHANGE UP (ref 10–40)
BASE EXCESS BLDV CALC-SCNC: -1.6 MMOL/L — SIGNIFICANT CHANGE UP (ref -2–3)
BASOPHILS # BLD AUTO: 0.04 K/UL — SIGNIFICANT CHANGE UP (ref 0–0.2)
BASOPHILS NFR BLD AUTO: 0.5 % — SIGNIFICANT CHANGE UP (ref 0–2)
BILIRUB SERPL-MCNC: 0.3 MG/DL — SIGNIFICANT CHANGE UP (ref 0.2–1.2)
BUN SERPL-MCNC: 13 MG/DL — SIGNIFICANT CHANGE UP (ref 7–23)
CA-I SERPL-SCNC: 1.1 MMOL/L — LOW (ref 1.15–1.33)
CALCIUM SERPL-MCNC: 9.3 MG/DL — SIGNIFICANT CHANGE UP (ref 8.4–10.5)
CHLORIDE BLDV-SCNC: 105 MMOL/L — SIGNIFICANT CHANGE UP (ref 96–108)
CHLORIDE SERPL-SCNC: 105 MMOL/L — SIGNIFICANT CHANGE UP (ref 96–108)
CO2 BLDV-SCNC: 24 MMOL/L — SIGNIFICANT CHANGE UP (ref 22–26)
CO2 SERPL-SCNC: 21 MMOL/L — LOW (ref 22–31)
CREAT SERPL-MCNC: 0.59 MG/DL — SIGNIFICANT CHANGE UP (ref 0.5–1.3)
EGFR: 99 ML/MIN/1.73M2 — SIGNIFICANT CHANGE UP
EOSINOPHIL # BLD AUTO: 0.71 K/UL — HIGH (ref 0–0.5)
EOSINOPHIL NFR BLD AUTO: 8.2 % — HIGH (ref 0–6)
GAS PNL BLDV: 138 MMOL/L — SIGNIFICANT CHANGE UP (ref 136–145)
GAS PNL BLDV: SIGNIFICANT CHANGE UP
GLUCOSE BLDC GLUCOMTR-MCNC: 214 MG/DL — HIGH (ref 70–99)
GLUCOSE BLDC GLUCOMTR-MCNC: 243 MG/DL — HIGH (ref 70–99)
GLUCOSE BLDC GLUCOMTR-MCNC: 251 MG/DL — HIGH (ref 70–99)
GLUCOSE BLDC GLUCOMTR-MCNC: 256 MG/DL — HIGH (ref 70–99)
GLUCOSE BLDV-MCNC: 243 MG/DL — HIGH (ref 70–99)
GLUCOSE SERPL-MCNC: 251 MG/DL — HIGH (ref 70–99)
HCO3 BLDV-SCNC: 23 MMOL/L — SIGNIFICANT CHANGE UP (ref 22–29)
HCT VFR BLD CALC: 35.6 % — SIGNIFICANT CHANGE UP (ref 34.5–45)
HCT VFR BLDA CALC: 35 % — SIGNIFICANT CHANGE UP (ref 34.5–46.5)
HGB BLD CALC-MCNC: 11.5 G/DL — LOW (ref 11.7–16.1)
HGB BLD-MCNC: 11 G/DL — LOW (ref 11.5–15.5)
IMM GRANULOCYTES NFR BLD AUTO: 0.3 % — SIGNIFICANT CHANGE UP (ref 0–0.9)
INR BLD: 1.25 RATIO — HIGH (ref 0.85–1.18)
LACTATE BLDV-MCNC: 2.5 MMOL/L — HIGH (ref 0.5–2)
LACTATE SERPL-SCNC: 1.2 MMOL/L — SIGNIFICANT CHANGE UP (ref 0.5–2)
LYMPHOCYTES # BLD AUTO: 1.25 K/UL — SIGNIFICANT CHANGE UP (ref 1–3.3)
LYMPHOCYTES # BLD AUTO: 14.5 % — SIGNIFICANT CHANGE UP (ref 13–44)
MAGNESIUM SERPL-MCNC: 1.9 MG/DL — SIGNIFICANT CHANGE UP (ref 1.6–2.6)
MCHC RBC-ENTMCNC: 21.8 PG — LOW (ref 27–34)
MCHC RBC-ENTMCNC: 30.9 GM/DL — LOW (ref 32–36)
MCV RBC AUTO: 70.5 FL — LOW (ref 80–100)
MONOCYTES # BLD AUTO: 0.8 K/UL — SIGNIFICANT CHANGE UP (ref 0–0.9)
MONOCYTES NFR BLD AUTO: 9.3 % — SIGNIFICANT CHANGE UP (ref 2–14)
NEUTROPHILS # BLD AUTO: 5.79 K/UL — SIGNIFICANT CHANGE UP (ref 1.8–7.4)
NEUTROPHILS NFR BLD AUTO: 67.2 % — SIGNIFICANT CHANGE UP (ref 43–77)
NRBC # BLD: 0 /100 WBCS — SIGNIFICANT CHANGE UP (ref 0–0)
PCO2 BLDV: 37 MMHG — LOW (ref 39–42)
PH BLDV: 7.4 — SIGNIFICANT CHANGE UP (ref 7.32–7.43)
PHOSPHATE SERPL-MCNC: 2.7 MG/DL — SIGNIFICANT CHANGE UP (ref 2.5–4.5)
PLATELET # BLD AUTO: 305 K/UL — SIGNIFICANT CHANGE UP (ref 150–400)
PO2 BLDV: 129 MMHG — HIGH (ref 25–45)
POTASSIUM BLDV-SCNC: 3.9 MMOL/L — SIGNIFICANT CHANGE UP (ref 3.5–5.1)
POTASSIUM SERPL-MCNC: 4 MMOL/L — SIGNIFICANT CHANGE UP (ref 3.5–5.3)
POTASSIUM SERPL-SCNC: 4 MMOL/L — SIGNIFICANT CHANGE UP (ref 3.5–5.3)
PROT SERPL-MCNC: 7.1 G/DL — SIGNIFICANT CHANGE UP (ref 6–8.3)
PROTHROM AB SERPL-ACNC: 13 SEC — SIGNIFICANT CHANGE UP (ref 9.5–13)
RBC # BLD: 5.05 M/UL — SIGNIFICANT CHANGE UP (ref 3.8–5.2)
RBC # FLD: 18.6 % — HIGH (ref 10.3–14.5)
SAO2 % BLDV: 99.3 % — HIGH (ref 67–88)
SODIUM SERPL-SCNC: 141 MMOL/L — SIGNIFICANT CHANGE UP (ref 135–145)
WBC # BLD: 8.62 K/UL — SIGNIFICANT CHANGE UP (ref 3.8–10.5)
WBC # FLD AUTO: 8.62 K/UL — SIGNIFICANT CHANGE UP (ref 3.8–10.5)

## 2024-06-18 PROCEDURE — 99233 SBSQ HOSP IP/OBS HIGH 50: CPT

## 2024-06-18 PROCEDURE — 74230 X-RAY XM SWLNG FUNCJ C+: CPT | Mod: 26

## 2024-06-18 RX ORDER — POLYETHYLENE GLYCOL 3350 1 G/G
17 POWDER ORAL ONCE
Refills: 0 | Status: COMPLETED | OUTPATIENT
Start: 2024-06-18 | End: 2024-06-18

## 2024-06-18 RX ADMIN — Medication 100 MILLIGRAM(S): at 06:10

## 2024-06-18 RX ADMIN — INSULIN LISPRO 8 UNIT(S): 100 INJECTION, SOLUTION SUBCUTANEOUS at 08:26

## 2024-06-18 RX ADMIN — Medication 100 MILLIGRAM(S): at 15:50

## 2024-06-18 RX ADMIN — CLOPIDOGREL BISULFATE 75 MILLIGRAM(S): 75 TABLET, FILM COATED ORAL at 12:58

## 2024-06-18 RX ADMIN — Medication 30 MILLIGRAM(S): at 17:49

## 2024-06-18 RX ADMIN — DOXAZOSIN MESYLATE 2 MILLIGRAM(S): 2 TABLET ORAL at 21:34

## 2024-06-18 RX ADMIN — LOSARTAN POTASSIUM 25 MILLIGRAM(S): 100 TABLET, FILM COATED ORAL at 06:08

## 2024-06-18 RX ADMIN — INSULIN LISPRO 2: 100 INJECTION, SOLUTION SUBCUTANEOUS at 21:33

## 2024-06-18 RX ADMIN — CARBIDOPA AND LEVODOPA 1 TABLET(S): 10; 100 TABLET ORAL at 13:01

## 2024-06-18 RX ADMIN — INSULIN LISPRO 4: 100 INJECTION, SOLUTION SUBCUTANEOUS at 13:13

## 2024-06-18 RX ADMIN — Medication 20 MILLIGRAM(S): at 12:57

## 2024-06-18 RX ADMIN — INSULIN LISPRO 8 UNIT(S): 100 INJECTION, SOLUTION SUBCUTANEOUS at 17:48

## 2024-06-18 RX ADMIN — INSULIN LISPRO 6: 100 INJECTION, SOLUTION SUBCUTANEOUS at 08:25

## 2024-06-18 RX ADMIN — Medication 100 MILLIGRAM(S): at 21:32

## 2024-06-18 RX ADMIN — INSULIN LISPRO 4: 100 INJECTION, SOLUTION SUBCUTANEOUS at 17:47

## 2024-06-18 RX ADMIN — ATORVASTATIN CALCIUM 40 MILLIGRAM(S): 20 TABLET, FILM COATED ORAL at 21:33

## 2024-06-18 RX ADMIN — INSULIN GLARGINE 15 UNIT(S): 100 INJECTION, SOLUTION SUBCUTANEOUS at 21:32

## 2024-06-18 RX ADMIN — ASPIRIN 81 MILLIGRAM(S): 325 TABLET, FILM COATED ORAL at 12:57

## 2024-06-18 RX ADMIN — Medication 30 MILLIGRAM(S): at 06:10

## 2024-06-18 RX ADMIN — POLYETHYLENE GLYCOL 3350 17 GRAM(S): 1 POWDER ORAL at 13:13

## 2024-06-18 RX ADMIN — ISOSORBIDE MONONITRATE 30 MILLIGRAM(S): 30 TABLET, EXTENDED RELEASE ORAL at 12:58

## 2024-06-18 RX ADMIN — ENOXAPARIN SODIUM 40 MILLIGRAM(S): 100 INJECTION SUBCUTANEOUS at 13:06

## 2024-06-19 DIAGNOSIS — G93.41 METABOLIC ENCEPHALOPATHY: ICD-10-CM

## 2024-06-19 LAB
ANION GAP SERPL CALC-SCNC: 15 MMOL/L — SIGNIFICANT CHANGE UP (ref 5–17)
BASOPHILS # BLD AUTO: 0.03 K/UL — SIGNIFICANT CHANGE UP (ref 0–0.2)
BASOPHILS NFR BLD AUTO: 0.4 % — SIGNIFICANT CHANGE UP (ref 0–2)
BUN SERPL-MCNC: 12 MG/DL — SIGNIFICANT CHANGE UP (ref 7–23)
CALCIUM SERPL-MCNC: 9.6 MG/DL — SIGNIFICANT CHANGE UP (ref 8.4–10.5)
CHLORIDE SERPL-SCNC: 102 MMOL/L — SIGNIFICANT CHANGE UP (ref 96–108)
CO2 SERPL-SCNC: 23 MMOL/L — SIGNIFICANT CHANGE UP (ref 22–31)
CREAT SERPL-MCNC: 0.63 MG/DL — SIGNIFICANT CHANGE UP (ref 0.5–1.3)
EGFR: 98 ML/MIN/1.73M2 — SIGNIFICANT CHANGE UP
EOSINOPHIL # BLD AUTO: 0.79 K/UL — HIGH (ref 0–0.5)
EOSINOPHIL NFR BLD AUTO: 11.6 % — HIGH (ref 0–6)
GLUCOSE BLDC GLUCOMTR-MCNC: 197 MG/DL — HIGH (ref 70–99)
GLUCOSE BLDC GLUCOMTR-MCNC: 237 MG/DL — HIGH (ref 70–99)
GLUCOSE BLDC GLUCOMTR-MCNC: 263 MG/DL — HIGH (ref 70–99)
GLUCOSE BLDC GLUCOMTR-MCNC: 283 MG/DL — HIGH (ref 70–99)
GLUCOSE SERPL-MCNC: 223 MG/DL — HIGH (ref 70–99)
HCT VFR BLD CALC: 37.4 % — SIGNIFICANT CHANGE UP (ref 34.5–45)
HGB BLD-MCNC: 11.5 G/DL — SIGNIFICANT CHANGE UP (ref 11.5–15.5)
IMM GRANULOCYTES NFR BLD AUTO: 0.3 % — SIGNIFICANT CHANGE UP (ref 0–0.9)
LYMPHOCYTES # BLD AUTO: 1.4 K/UL — SIGNIFICANT CHANGE UP (ref 1–3.3)
LYMPHOCYTES # BLD AUTO: 20.6 % — SIGNIFICANT CHANGE UP (ref 13–44)
MCHC RBC-ENTMCNC: 22 PG — LOW (ref 27–34)
MCHC RBC-ENTMCNC: 30.7 GM/DL — LOW (ref 32–36)
MCV RBC AUTO: 71.6 FL — LOW (ref 80–100)
MONOCYTES # BLD AUTO: 0.7 K/UL — SIGNIFICANT CHANGE UP (ref 0–0.9)
MONOCYTES NFR BLD AUTO: 10.3 % — SIGNIFICANT CHANGE UP (ref 2–14)
NEUTROPHILS # BLD AUTO: 3.87 K/UL — SIGNIFICANT CHANGE UP (ref 1.8–7.4)
NEUTROPHILS NFR BLD AUTO: 56.8 % — SIGNIFICANT CHANGE UP (ref 43–77)
NRBC # BLD: 0 /100 WBCS — SIGNIFICANT CHANGE UP (ref 0–0)
PLATELET # BLD AUTO: 316 K/UL — SIGNIFICANT CHANGE UP (ref 150–400)
POTASSIUM SERPL-MCNC: 3.8 MMOL/L — SIGNIFICANT CHANGE UP (ref 3.5–5.3)
POTASSIUM SERPL-SCNC: 3.8 MMOL/L — SIGNIFICANT CHANGE UP (ref 3.5–5.3)
RBC # BLD: 5.22 M/UL — HIGH (ref 3.8–5.2)
RBC # FLD: 18.7 % — HIGH (ref 10.3–14.5)
SODIUM SERPL-SCNC: 140 MMOL/L — SIGNIFICANT CHANGE UP (ref 135–145)
WBC # BLD: 6.81 K/UL — SIGNIFICANT CHANGE UP (ref 3.8–10.5)
WBC # FLD AUTO: 6.81 K/UL — SIGNIFICANT CHANGE UP (ref 3.8–10.5)

## 2024-06-19 PROCEDURE — 99233 SBSQ HOSP IP/OBS HIGH 50: CPT

## 2024-06-19 PROCEDURE — 95720 EEG PHY/QHP EA INCR W/VEEG: CPT

## 2024-06-19 RX ORDER — INSULIN LISPRO 100 [IU]/ML
10 INJECTION, SOLUTION SUBCUTANEOUS
Refills: 0 | Status: DISCONTINUED | OUTPATIENT
Start: 2024-06-19 | End: 2024-06-20

## 2024-06-19 RX ORDER — POLYETHYLENE GLYCOL 3350 1 G/G
17 POWDER ORAL DAILY
Refills: 0 | Status: DISCONTINUED | OUTPATIENT
Start: 2024-06-19 | End: 2024-06-24

## 2024-06-19 RX ORDER — SENNOSIDES 8.6 MG
2 TABLET ORAL AT BEDTIME
Refills: 0 | Status: DISCONTINUED | OUTPATIENT
Start: 2024-06-19 | End: 2024-06-24

## 2024-06-19 RX ORDER — LACTULOSE 10 G/15ML
15 SOLUTION ORAL ONCE
Refills: 0 | Status: COMPLETED | OUTPATIENT
Start: 2024-06-19 | End: 2024-06-19

## 2024-06-19 RX ADMIN — CARBIDOPA AND LEVODOPA 1 TABLET(S): 10; 100 TABLET ORAL at 13:00

## 2024-06-19 RX ADMIN — INSULIN LISPRO 6: 100 INJECTION, SOLUTION SUBCUTANEOUS at 13:01

## 2024-06-19 RX ADMIN — Medication 2 TABLET(S): at 21:27

## 2024-06-19 RX ADMIN — INSULIN LISPRO 10 UNIT(S): 100 INJECTION, SOLUTION SUBCUTANEOUS at 18:02

## 2024-06-19 RX ADMIN — Medication 100 MILLIGRAM(S): at 18:01

## 2024-06-19 RX ADMIN — INSULIN GLARGINE 15 UNIT(S): 100 INJECTION, SOLUTION SUBCUTANEOUS at 21:27

## 2024-06-19 RX ADMIN — CLOPIDOGREL BISULFATE 75 MILLIGRAM(S): 75 TABLET, FILM COATED ORAL at 13:01

## 2024-06-19 RX ADMIN — INSULIN LISPRO 8 UNIT(S): 100 INJECTION, SOLUTION SUBCUTANEOUS at 13:02

## 2024-06-19 RX ADMIN — POLYETHYLENE GLYCOL 3350 17 GRAM(S): 1 POWDER ORAL at 13:46

## 2024-06-19 RX ADMIN — Medication 30 MILLIGRAM(S): at 05:19

## 2024-06-19 RX ADMIN — ASPIRIN 81 MILLIGRAM(S): 325 TABLET, FILM COATED ORAL at 12:59

## 2024-06-19 RX ADMIN — Medication 20 MILLIGRAM(S): at 13:00

## 2024-06-19 RX ADMIN — INSULIN LISPRO 2: 100 INJECTION, SOLUTION SUBCUTANEOUS at 08:59

## 2024-06-19 RX ADMIN — ISOSORBIDE MONONITRATE 30 MILLIGRAM(S): 30 TABLET, EXTENDED RELEASE ORAL at 13:00

## 2024-06-19 RX ADMIN — Medication 100 MILLIGRAM(S): at 21:27

## 2024-06-19 RX ADMIN — INSULIN LISPRO 8 UNIT(S): 100 INJECTION, SOLUTION SUBCUTANEOUS at 08:59

## 2024-06-19 RX ADMIN — Medication 30 MILLIGRAM(S): at 18:02

## 2024-06-19 RX ADMIN — INSULIN LISPRO 6: 100 INJECTION, SOLUTION SUBCUTANEOUS at 18:03

## 2024-06-19 RX ADMIN — LOSARTAN POTASSIUM 25 MILLIGRAM(S): 100 TABLET, FILM COATED ORAL at 05:20

## 2024-06-19 RX ADMIN — Medication 100 MILLIGRAM(S): at 05:19

## 2024-06-19 RX ADMIN — ATORVASTATIN CALCIUM 40 MILLIGRAM(S): 20 TABLET, FILM COATED ORAL at 21:27

## 2024-06-19 RX ADMIN — ENOXAPARIN SODIUM 40 MILLIGRAM(S): 100 INJECTION SUBCUTANEOUS at 13:01

## 2024-06-19 RX ADMIN — DOXAZOSIN MESYLATE 2 MILLIGRAM(S): 2 TABLET ORAL at 21:27

## 2024-06-20 DIAGNOSIS — I95.1 ORTHOSTATIC HYPOTENSION: ICD-10-CM

## 2024-06-20 LAB
GLUCOSE BLDC GLUCOMTR-MCNC: 177 MG/DL — HIGH (ref 70–99)
GLUCOSE BLDC GLUCOMTR-MCNC: 181 MG/DL — HIGH (ref 70–99)
GLUCOSE BLDC GLUCOMTR-MCNC: 207 MG/DL — HIGH (ref 70–99)
GLUCOSE BLDC GLUCOMTR-MCNC: 300 MG/DL — HIGH (ref 70–99)
GLUCOSE BLDC GLUCOMTR-MCNC: 302 MG/DL — HIGH (ref 70–99)
GLUCOSE BLDC GLUCOMTR-MCNC: 475 MG/DL — CRITICAL HIGH (ref 70–99)

## 2024-06-20 PROCEDURE — 95720 EEG PHY/QHP EA INCR W/VEEG: CPT

## 2024-06-20 PROCEDURE — 93970 EXTREMITY STUDY: CPT | Mod: 26

## 2024-06-20 PROCEDURE — 99233 SBSQ HOSP IP/OBS HIGH 50: CPT

## 2024-06-20 RX ORDER — INSULIN GLARGINE 100 [IU]/ML
15 INJECTION, SOLUTION SUBCUTANEOUS AT BEDTIME
Refills: 0 | Status: DISCONTINUED | OUTPATIENT
Start: 2024-06-20 | End: 2024-06-20

## 2024-06-20 RX ORDER — INSULIN LISPRO 100 [IU]/ML
13 INJECTION, SOLUTION SUBCUTANEOUS
Refills: 0 | Status: DISCONTINUED | OUTPATIENT
Start: 2024-06-20 | End: 2024-06-21

## 2024-06-20 RX ORDER — INSULIN GLARGINE 100 [IU]/ML
20 INJECTION, SOLUTION SUBCUTANEOUS AT BEDTIME
Refills: 0 | Status: DISCONTINUED | OUTPATIENT
Start: 2024-06-20 | End: 2024-06-24

## 2024-06-20 RX ORDER — SODIUM CHLORIDE 0.9 % (FLUSH) 0.9 %
500 SYRINGE (ML) INJECTION ONCE
Refills: 0 | Status: COMPLETED | OUTPATIENT
Start: 2024-06-20 | End: 2024-06-20

## 2024-06-20 RX ORDER — METOPROLOL TARTRATE 50 MG
50 TABLET ORAL
Refills: 0 | Status: DISCONTINUED | OUTPATIENT
Start: 2024-06-20 | End: 2024-06-24

## 2024-06-20 RX ADMIN — Medication 20 MILLIGRAM(S): at 13:51

## 2024-06-20 RX ADMIN — Medication 250 MILLILITER(S): at 14:43

## 2024-06-20 RX ADMIN — INSULIN LISPRO 8: 100 INJECTION, SOLUTION SUBCUTANEOUS at 13:53

## 2024-06-20 RX ADMIN — Medication 650 MILLIGRAM(S): at 07:45

## 2024-06-20 RX ADMIN — Medication 100 MILLIGRAM(S): at 14:40

## 2024-06-20 RX ADMIN — Medication 100 MILLIGRAM(S): at 22:27

## 2024-06-20 RX ADMIN — INSULIN LISPRO 6: 100 INJECTION, SOLUTION SUBCUTANEOUS at 17:43

## 2024-06-20 RX ADMIN — ASPIRIN 81 MILLIGRAM(S): 325 TABLET, FILM COATED ORAL at 13:51

## 2024-06-20 RX ADMIN — ISOSORBIDE MONONITRATE 30 MILLIGRAM(S): 30 TABLET, EXTENDED RELEASE ORAL at 14:40

## 2024-06-20 RX ADMIN — INSULIN LISPRO 13 UNIT(S): 100 INJECTION, SOLUTION SUBCUTANEOUS at 17:43

## 2024-06-20 RX ADMIN — Medication 2 TABLET(S): at 22:27

## 2024-06-20 RX ADMIN — CARBIDOPA AND LEVODOPA 1 TABLET(S): 10; 100 TABLET ORAL at 14:40

## 2024-06-20 RX ADMIN — Medication 50 MILLIGRAM(S): at 17:44

## 2024-06-20 RX ADMIN — POLYETHYLENE GLYCOL 3350 17 GRAM(S): 1 POWDER ORAL at 14:41

## 2024-06-20 RX ADMIN — ATORVASTATIN CALCIUM 40 MILLIGRAM(S): 20 TABLET, FILM COATED ORAL at 22:27

## 2024-06-20 RX ADMIN — Medication 30 MILLIGRAM(S): at 17:46

## 2024-06-20 RX ADMIN — INSULIN LISPRO 4: 100 INJECTION, SOLUTION SUBCUTANEOUS at 09:16

## 2024-06-20 RX ADMIN — LOSARTAN POTASSIUM 25 MILLIGRAM(S): 100 TABLET, FILM COATED ORAL at 05:55

## 2024-06-20 RX ADMIN — Medication 650 MILLIGRAM(S): at 08:15

## 2024-06-20 RX ADMIN — ENOXAPARIN SODIUM 40 MILLIGRAM(S): 100 INJECTION SUBCUTANEOUS at 13:51

## 2024-06-20 RX ADMIN — CLOPIDOGREL BISULFATE 75 MILLIGRAM(S): 75 TABLET, FILM COATED ORAL at 13:51

## 2024-06-20 RX ADMIN — Medication 100 MILLIGRAM(S): at 05:57

## 2024-06-20 RX ADMIN — INSULIN GLARGINE 20 UNIT(S): 100 INJECTION, SOLUTION SUBCUTANEOUS at 22:26

## 2024-06-20 RX ADMIN — Medication 30 MILLIGRAM(S): at 05:57

## 2024-06-20 RX ADMIN — DOXAZOSIN MESYLATE 2 MILLIGRAM(S): 2 TABLET ORAL at 22:27

## 2024-06-20 RX ADMIN — INSULIN LISPRO 10 UNIT(S): 100 INJECTION, SOLUTION SUBCUTANEOUS at 09:16

## 2024-06-21 LAB
GLUCOSE BLDC GLUCOMTR-MCNC: 172 MG/DL — HIGH (ref 70–99)
GLUCOSE BLDC GLUCOMTR-MCNC: 199 MG/DL — HIGH (ref 70–99)
GLUCOSE BLDC GLUCOMTR-MCNC: 223 MG/DL — HIGH (ref 70–99)
GLUCOSE BLDC GLUCOMTR-MCNC: 265 MG/DL — HIGH (ref 70–99)

## 2024-06-21 PROCEDURE — 99233 SBSQ HOSP IP/OBS HIGH 50: CPT

## 2024-06-21 PROCEDURE — 95718 EEG PHYS/QHP 2-12 HR W/VEEG: CPT

## 2024-06-21 RX ORDER — INSULIN LISPRO 100 [IU]/ML
14 INJECTION, SOLUTION SUBCUTANEOUS
Refills: 0 | Status: DISCONTINUED | OUTPATIENT
Start: 2024-06-21 | End: 2024-06-24

## 2024-06-21 RX ADMIN — ENOXAPARIN SODIUM 40 MILLIGRAM(S): 100 INJECTION SUBCUTANEOUS at 12:45

## 2024-06-21 RX ADMIN — ISOSORBIDE MONONITRATE 30 MILLIGRAM(S): 30 TABLET, EXTENDED RELEASE ORAL at 12:46

## 2024-06-21 RX ADMIN — LOSARTAN POTASSIUM 25 MILLIGRAM(S): 100 TABLET, FILM COATED ORAL at 05:58

## 2024-06-21 RX ADMIN — Medication 20 MILLIGRAM(S): at 12:46

## 2024-06-21 RX ADMIN — INSULIN LISPRO 4: 100 INJECTION, SOLUTION SUBCUTANEOUS at 18:07

## 2024-06-21 RX ADMIN — Medication 30 MILLIGRAM(S): at 05:57

## 2024-06-21 RX ADMIN — CLOPIDOGREL BISULFATE 75 MILLIGRAM(S): 75 TABLET, FILM COATED ORAL at 12:46

## 2024-06-21 RX ADMIN — INSULIN GLARGINE 20 UNIT(S): 100 INJECTION, SOLUTION SUBCUTANEOUS at 21:32

## 2024-06-21 RX ADMIN — INSULIN LISPRO 14 UNIT(S): 100 INJECTION, SOLUTION SUBCUTANEOUS at 18:07

## 2024-06-21 RX ADMIN — CARBIDOPA AND LEVODOPA 1 TABLET(S): 10; 100 TABLET ORAL at 12:46

## 2024-06-21 RX ADMIN — Medication 100 MILLIGRAM(S): at 21:33

## 2024-06-21 RX ADMIN — ATORVASTATIN CALCIUM 40 MILLIGRAM(S): 20 TABLET, FILM COATED ORAL at 21:33

## 2024-06-21 RX ADMIN — Medication 2 TABLET(S): at 21:33

## 2024-06-21 RX ADMIN — Medication 100 MILLIGRAM(S): at 14:59

## 2024-06-21 RX ADMIN — INSULIN LISPRO 6: 100 INJECTION, SOLUTION SUBCUTANEOUS at 12:51

## 2024-06-21 RX ADMIN — Medication 50 MILLIGRAM(S): at 18:08

## 2024-06-21 RX ADMIN — POLYETHYLENE GLYCOL 3350 17 GRAM(S): 1 POWDER ORAL at 12:45

## 2024-06-21 RX ADMIN — INSULIN LISPRO 13 UNIT(S): 100 INJECTION, SOLUTION SUBCUTANEOUS at 12:51

## 2024-06-21 RX ADMIN — INSULIN LISPRO 13 UNIT(S): 100 INJECTION, SOLUTION SUBCUTANEOUS at 08:58

## 2024-06-21 RX ADMIN — INSULIN LISPRO 2: 100 INJECTION, SOLUTION SUBCUTANEOUS at 08:58

## 2024-06-21 RX ADMIN — Medication 50 MILLIGRAM(S): at 05:58

## 2024-06-21 RX ADMIN — Medication 30 MILLIGRAM(S): at 18:08

## 2024-06-21 RX ADMIN — ASPIRIN 81 MILLIGRAM(S): 325 TABLET, FILM COATED ORAL at 12:46

## 2024-06-21 RX ADMIN — Medication 100 MILLIGRAM(S): at 05:58

## 2024-06-21 RX ADMIN — DOXAZOSIN MESYLATE 2 MILLIGRAM(S): 2 TABLET ORAL at 21:33

## 2024-06-22 ENCOUNTER — TRANSCRIPTION ENCOUNTER (OUTPATIENT)
Age: 64
End: 2024-06-22

## 2024-06-22 LAB
CULTURE RESULTS: SIGNIFICANT CHANGE UP
CULTURE RESULTS: SIGNIFICANT CHANGE UP
GLUCOSE BLDC GLUCOMTR-MCNC: 134 MG/DL — HIGH (ref 70–99)
GLUCOSE BLDC GLUCOMTR-MCNC: 179 MG/DL — HIGH (ref 70–99)
GLUCOSE BLDC GLUCOMTR-MCNC: 187 MG/DL — HIGH (ref 70–99)
GLUCOSE BLDC GLUCOMTR-MCNC: 290 MG/DL — HIGH (ref 70–99)
SPECIMEN SOURCE: SIGNIFICANT CHANGE UP
SPECIMEN SOURCE: SIGNIFICANT CHANGE UP

## 2024-06-22 PROCEDURE — 99233 SBSQ HOSP IP/OBS HIGH 50: CPT

## 2024-06-22 RX ORDER — OLANZAPINE 2.5 MG/1
2.5 TABLET, FILM COATED ORAL ONCE
Refills: 0 | Status: COMPLETED | OUTPATIENT
Start: 2024-06-22 | End: 2024-06-22

## 2024-06-22 RX ADMIN — Medication 50 MILLIGRAM(S): at 06:25

## 2024-06-22 RX ADMIN — Medication 100 MILLIGRAM(S): at 13:00

## 2024-06-22 RX ADMIN — INSULIN GLARGINE 20 UNIT(S): 100 INJECTION, SOLUTION SUBCUTANEOUS at 21:35

## 2024-06-22 RX ADMIN — Medication 30 MILLIGRAM(S): at 06:26

## 2024-06-22 RX ADMIN — INSULIN LISPRO 14 UNIT(S): 100 INJECTION, SOLUTION SUBCUTANEOUS at 13:00

## 2024-06-22 RX ADMIN — ENOXAPARIN SODIUM 40 MILLIGRAM(S): 100 INJECTION SUBCUTANEOUS at 12:58

## 2024-06-22 RX ADMIN — INSULIN LISPRO 14 UNIT(S): 100 INJECTION, SOLUTION SUBCUTANEOUS at 09:00

## 2024-06-22 RX ADMIN — INSULIN LISPRO 2: 100 INJECTION, SOLUTION SUBCUTANEOUS at 08:59

## 2024-06-22 RX ADMIN — ISOSORBIDE MONONITRATE 30 MILLIGRAM(S): 30 TABLET, EXTENDED RELEASE ORAL at 12:59

## 2024-06-22 RX ADMIN — LOSARTAN POTASSIUM 25 MILLIGRAM(S): 100 TABLET, FILM COATED ORAL at 06:26

## 2024-06-22 RX ADMIN — Medication 50 MILLIGRAM(S): at 17:38

## 2024-06-22 RX ADMIN — CLOPIDOGREL BISULFATE 75 MILLIGRAM(S): 75 TABLET, FILM COATED ORAL at 12:59

## 2024-06-22 RX ADMIN — OLANZAPINE 2.5 MILLIGRAM(S): 2.5 TABLET, FILM COATED ORAL at 01:24

## 2024-06-22 RX ADMIN — DOXAZOSIN MESYLATE 2 MILLIGRAM(S): 2 TABLET ORAL at 21:35

## 2024-06-22 RX ADMIN — Medication 20 MILLIGRAM(S): at 12:59

## 2024-06-22 RX ADMIN — ASPIRIN 81 MILLIGRAM(S): 325 TABLET, FILM COATED ORAL at 12:58

## 2024-06-22 RX ADMIN — CARBIDOPA AND LEVODOPA 1 TABLET(S): 10; 100 TABLET ORAL at 12:59

## 2024-06-22 RX ADMIN — Medication 100 MILLIGRAM(S): at 06:27

## 2024-06-22 RX ADMIN — Medication 100 MILLIGRAM(S): at 21:35

## 2024-06-22 RX ADMIN — INSULIN LISPRO 6: 100 INJECTION, SOLUTION SUBCUTANEOUS at 13:00

## 2024-06-22 RX ADMIN — Medication 30 MILLIGRAM(S): at 17:39

## 2024-06-22 RX ADMIN — ATORVASTATIN CALCIUM 40 MILLIGRAM(S): 20 TABLET, FILM COATED ORAL at 21:35

## 2024-06-22 RX ADMIN — Medication 2 TABLET(S): at 21:35

## 2024-06-23 LAB
GLUCOSE BLDC GLUCOMTR-MCNC: 138 MG/DL — HIGH (ref 70–99)
GLUCOSE BLDC GLUCOMTR-MCNC: 150 MG/DL — HIGH (ref 70–99)
GLUCOSE BLDC GLUCOMTR-MCNC: 216 MG/DL — HIGH (ref 70–99)
GLUCOSE BLDC GLUCOMTR-MCNC: 283 MG/DL — HIGH (ref 70–99)

## 2024-06-23 PROCEDURE — 99232 SBSQ HOSP IP/OBS MODERATE 35: CPT

## 2024-06-23 RX ORDER — LIDOCAINE HCL 28 MG/G
1 GEL TOPICAL EVERY 24 HOURS
Refills: 0 | Status: DISCONTINUED | OUTPATIENT
Start: 2024-06-23 | End: 2024-06-24

## 2024-06-23 RX ADMIN — Medication 50 MILLIGRAM(S): at 18:19

## 2024-06-23 RX ADMIN — ISOSORBIDE MONONITRATE 30 MILLIGRAM(S): 30 TABLET, EXTENDED RELEASE ORAL at 13:07

## 2024-06-23 RX ADMIN — LIDOCAINE HCL 1 PATCH: 28 GEL TOPICAL at 19:00

## 2024-06-23 RX ADMIN — Medication 30 MILLIGRAM(S): at 18:19

## 2024-06-23 RX ADMIN — Medication 30 MILLIGRAM(S): at 05:21

## 2024-06-23 RX ADMIN — CARBIDOPA AND LEVODOPA 1 TABLET(S): 10; 100 TABLET ORAL at 13:06

## 2024-06-23 RX ADMIN — DOXAZOSIN MESYLATE 2 MILLIGRAM(S): 2 TABLET ORAL at 21:17

## 2024-06-23 RX ADMIN — ATORVASTATIN CALCIUM 40 MILLIGRAM(S): 20 TABLET, FILM COATED ORAL at 21:16

## 2024-06-23 RX ADMIN — LOSARTAN POTASSIUM 25 MILLIGRAM(S): 100 TABLET, FILM COATED ORAL at 05:20

## 2024-06-23 RX ADMIN — INSULIN LISPRO 4: 100 INJECTION, SOLUTION SUBCUTANEOUS at 09:06

## 2024-06-23 RX ADMIN — LIDOCAINE HCL 1 PATCH: 28 GEL TOPICAL at 13:10

## 2024-06-23 RX ADMIN — INSULIN LISPRO 14 UNIT(S): 100 INJECTION, SOLUTION SUBCUTANEOUS at 09:06

## 2024-06-23 RX ADMIN — INSULIN GLARGINE 20 UNIT(S): 100 INJECTION, SOLUTION SUBCUTANEOUS at 22:06

## 2024-06-23 RX ADMIN — POLYETHYLENE GLYCOL 3350 17 GRAM(S): 1 POWDER ORAL at 13:06

## 2024-06-23 RX ADMIN — INSULIN LISPRO 14 UNIT(S): 100 INJECTION, SOLUTION SUBCUTANEOUS at 13:05

## 2024-06-23 RX ADMIN — Medication 100 MILLIGRAM(S): at 05:21

## 2024-06-23 RX ADMIN — Medication 100 MILLIGRAM(S): at 13:10

## 2024-06-23 RX ADMIN — Medication 100 MILLIGRAM(S): at 21:16

## 2024-06-23 RX ADMIN — Medication 2 TABLET(S): at 21:16

## 2024-06-23 RX ADMIN — ENOXAPARIN SODIUM 40 MILLIGRAM(S): 100 INJECTION SUBCUTANEOUS at 13:06

## 2024-06-23 RX ADMIN — Medication 50 MILLIGRAM(S): at 05:21

## 2024-06-23 RX ADMIN — CLOPIDOGREL BISULFATE 75 MILLIGRAM(S): 75 TABLET, FILM COATED ORAL at 13:06

## 2024-06-23 RX ADMIN — INSULIN LISPRO 6: 100 INJECTION, SOLUTION SUBCUTANEOUS at 13:05

## 2024-06-23 RX ADMIN — ASPIRIN 81 MILLIGRAM(S): 325 TABLET, FILM COATED ORAL at 13:07

## 2024-06-23 RX ADMIN — INSULIN LISPRO 14 UNIT(S): 100 INJECTION, SOLUTION SUBCUTANEOUS at 18:19

## 2024-06-23 RX ADMIN — Medication 20 MILLIGRAM(S): at 13:07

## 2024-06-24 ENCOUNTER — TRANSCRIPTION ENCOUNTER (OUTPATIENT)
Age: 64
End: 2024-06-24

## 2024-06-24 VITALS
SYSTOLIC BLOOD PRESSURE: 151 MMHG | RESPIRATION RATE: 18 BRPM | TEMPERATURE: 98 F | HEART RATE: 85 BPM | DIASTOLIC BLOOD PRESSURE: 85 MMHG | OXYGEN SATURATION: 98 %

## 2024-06-24 LAB
GLUCOSE BLDC GLUCOMTR-MCNC: 208 MG/DL — HIGH (ref 70–99)
GLUCOSE BLDC GLUCOMTR-MCNC: 311 MG/DL — HIGH (ref 70–99)
SARS-COV-2 RNA SPEC QL NAA+PROBE: SIGNIFICANT CHANGE UP

## 2024-06-24 PROCEDURE — 92611 MOTION FLUOROSCOPY/SWALLOW: CPT

## 2024-06-24 PROCEDURE — 73564 X-RAY EXAM KNEE 4 OR MORE: CPT

## 2024-06-24 PROCEDURE — 85014 HEMATOCRIT: CPT

## 2024-06-24 PROCEDURE — 36415 COLL VENOUS BLD VENIPUNCTURE: CPT

## 2024-06-24 PROCEDURE — 83605 ASSAY OF LACTIC ACID: CPT

## 2024-06-24 PROCEDURE — 87040 BLOOD CULTURE FOR BACTERIA: CPT

## 2024-06-24 PROCEDURE — 82435 ASSAY OF BLOOD CHLORIDE: CPT

## 2024-06-24 PROCEDURE — 82803 BLOOD GASES ANY COMBINATION: CPT

## 2024-06-24 PROCEDURE — 84295 ASSAY OF SERUM SODIUM: CPT

## 2024-06-24 PROCEDURE — 85610 PROTHROMBIN TIME: CPT

## 2024-06-24 PROCEDURE — 97162 PT EVAL MOD COMPLEX 30 MIN: CPT

## 2024-06-24 PROCEDURE — 84132 ASSAY OF SERUM POTASSIUM: CPT

## 2024-06-24 PROCEDURE — 72125 CT NECK SPINE W/O DYE: CPT | Mod: MC

## 2024-06-24 PROCEDURE — 80061 LIPID PANEL: CPT

## 2024-06-24 PROCEDURE — 82330 ASSAY OF CALCIUM: CPT

## 2024-06-24 PROCEDURE — 92610 EVALUATE SWALLOWING FUNCTION: CPT

## 2024-06-24 PROCEDURE — 95714 VEEG EA 12-26 HR UNMNTR: CPT

## 2024-06-24 PROCEDURE — 96374 THER/PROPH/DIAG INJ IV PUSH: CPT

## 2024-06-24 PROCEDURE — 87635 SARS-COV-2 COVID-19 AMP PRB: CPT

## 2024-06-24 PROCEDURE — 83036 HEMOGLOBIN GLYCOSYLATED A1C: CPT

## 2024-06-24 PROCEDURE — 85025 COMPLETE CBC W/AUTO DIFF WBC: CPT

## 2024-06-24 PROCEDURE — 95711 VEEG 2-12 HR UNMONITORED: CPT

## 2024-06-24 PROCEDURE — 70551 MRI BRAIN STEM W/O DYE: CPT | Mod: MC

## 2024-06-24 PROCEDURE — 70450 CT HEAD/BRAIN W/O DYE: CPT | Mod: MC

## 2024-06-24 PROCEDURE — 93970 EXTREMITY STUDY: CPT

## 2024-06-24 PROCEDURE — 82962 GLUCOSE BLOOD TEST: CPT

## 2024-06-24 PROCEDURE — 82947 ASSAY GLUCOSE BLOOD QUANT: CPT

## 2024-06-24 PROCEDURE — 84100 ASSAY OF PHOSPHORUS: CPT

## 2024-06-24 PROCEDURE — 97530 THERAPEUTIC ACTIVITIES: CPT

## 2024-06-24 PROCEDURE — 80053 COMPREHEN METABOLIC PANEL: CPT

## 2024-06-24 PROCEDURE — 71045 X-RAY EXAM CHEST 1 VIEW: CPT

## 2024-06-24 PROCEDURE — 85018 HEMOGLOBIN: CPT

## 2024-06-24 PROCEDURE — 72170 X-RAY EXAM OF PELVIS: CPT

## 2024-06-24 PROCEDURE — 80048 BASIC METABOLIC PNL TOTAL CA: CPT

## 2024-06-24 PROCEDURE — 85730 THROMBOPLASTIN TIME PARTIAL: CPT

## 2024-06-24 PROCEDURE — 83735 ASSAY OF MAGNESIUM: CPT

## 2024-06-24 PROCEDURE — 95700 EEG CONT REC W/VID EEG TECH: CPT

## 2024-06-24 PROCEDURE — 85379 FIBRIN DEGRADATION QUANT: CPT

## 2024-06-24 PROCEDURE — 97116 GAIT TRAINING THERAPY: CPT

## 2024-06-24 PROCEDURE — 74230 X-RAY XM SWLNG FUNCJ C+: CPT

## 2024-06-24 PROCEDURE — 85027 COMPLETE CBC AUTOMATED: CPT

## 2024-06-24 PROCEDURE — 99239 HOSP IP/OBS DSCHRG MGMT >30: CPT

## 2024-06-24 PROCEDURE — 99285 EMERGENCY DEPT VISIT HI MDM: CPT | Mod: 25

## 2024-06-24 RX ORDER — SENNOSIDES 8.6 MG
2 TABLET ORAL
Qty: 0 | Refills: 0 | DISCHARGE
Start: 2024-06-24

## 2024-06-24 RX ORDER — METOPROLOL TARTRATE 50 MG
1 TABLET ORAL
Qty: 0 | Refills: 0 | DISCHARGE
Start: 2024-06-24

## 2024-06-24 RX ORDER — LOSARTAN POTASSIUM 100 MG/1
1 TABLET, FILM COATED ORAL
Qty: 0 | Refills: 0 | DISCHARGE
Start: 2024-06-24

## 2024-06-24 RX ORDER — DOXAZOSIN MESYLATE 2 MG/1
1 TABLET ORAL
Qty: 0 | Refills: 0 | DISCHARGE
Start: 2024-06-24

## 2024-06-24 RX ORDER — ACETAMINOPHEN 325 MG
2 TABLET ORAL
Qty: 0 | Refills: 0 | DISCHARGE
Start: 2024-06-24

## 2024-06-24 RX ORDER — POLYETHYLENE GLYCOL 3350 1 G/G
17 POWDER ORAL
Qty: 0 | Refills: 0 | DISCHARGE
Start: 2024-06-24

## 2024-06-24 RX ORDER — LIDOCAINE HCL 28 MG/G
1 GEL TOPICAL
Qty: 30 | Refills: 0
Start: 2024-06-24 | End: 2024-07-23

## 2024-06-24 RX ADMIN — INSULIN LISPRO 14 UNIT(S): 100 INJECTION, SOLUTION SUBCUTANEOUS at 12:55

## 2024-06-24 RX ADMIN — Medication 30 MILLIGRAM(S): at 05:51

## 2024-06-24 RX ADMIN — INSULIN LISPRO 14 UNIT(S): 100 INJECTION, SOLUTION SUBCUTANEOUS at 08:51

## 2024-06-24 RX ADMIN — LIDOCAINE HCL 1 PATCH: 28 GEL TOPICAL at 07:32

## 2024-06-24 RX ADMIN — Medication 20 MILLIGRAM(S): at 12:54

## 2024-06-24 RX ADMIN — Medication 100 MILLIGRAM(S): at 05:51

## 2024-06-24 RX ADMIN — Medication 100 MILLIGRAM(S): at 12:58

## 2024-06-24 RX ADMIN — CLOPIDOGREL BISULFATE 75 MILLIGRAM(S): 75 TABLET, FILM COATED ORAL at 12:54

## 2024-06-24 RX ADMIN — ASPIRIN 81 MILLIGRAM(S): 325 TABLET, FILM COATED ORAL at 12:54

## 2024-06-24 RX ADMIN — INSULIN LISPRO 4: 100 INJECTION, SOLUTION SUBCUTANEOUS at 08:50

## 2024-06-24 RX ADMIN — ISOSORBIDE MONONITRATE 30 MILLIGRAM(S): 30 TABLET, EXTENDED RELEASE ORAL at 12:54

## 2024-06-24 RX ADMIN — Medication 50 MILLIGRAM(S): at 05:52

## 2024-06-24 RX ADMIN — LOSARTAN POTASSIUM 25 MILLIGRAM(S): 100 TABLET, FILM COATED ORAL at 05:51

## 2024-06-24 RX ADMIN — CARBIDOPA AND LEVODOPA 1 TABLET(S): 10; 100 TABLET ORAL at 12:54

## 2024-06-24 RX ADMIN — INSULIN LISPRO 8: 100 INJECTION, SOLUTION SUBCUTANEOUS at 12:55

## 2024-07-17 ENCOUNTER — APPOINTMENT (OUTPATIENT)
Dept: VASCULAR SURGERY | Facility: CLINIC | Age: 64
End: 2024-07-17
Payer: MEDICARE

## 2024-07-17 VITALS
TEMPERATURE: 97.9 F | WEIGHT: 130 LBS | BODY MASS INDEX: 23.92 KG/M2 | HEART RATE: 73 BPM | SYSTOLIC BLOOD PRESSURE: 124 MMHG | DIASTOLIC BLOOD PRESSURE: 75 MMHG | HEIGHT: 62 IN

## 2024-07-17 DIAGNOSIS — E11.42 TYPE 2 DIABETES MELLITUS WITH DIABETIC POLYNEUROPATHY: ICD-10-CM

## 2024-07-17 DIAGNOSIS — Z86.73 PERSONAL HISTORY OF TRANSIENT ISCHEMIC ATTACK (TIA), AND CEREBRAL INFARCTION W/OUT RESIDUAL DEFICITS: ICD-10-CM

## 2024-07-17 DIAGNOSIS — I65.21 OCCLUSION AND STENOSIS OF RIGHT CAROTID ARTERY: ICD-10-CM

## 2024-07-17 PROCEDURE — 99213 OFFICE O/P EST LOW 20 MIN: CPT

## 2024-07-17 PROCEDURE — 93880 EXTRACRANIAL BILAT STUDY: CPT

## 2024-07-24 ENCOUNTER — APPOINTMENT (OUTPATIENT)
Dept: NEUROLOGY | Facility: CLINIC | Age: 64
End: 2024-07-24

## 2024-08-30 ENCOUNTER — APPOINTMENT (OUTPATIENT)
Dept: PODIATRY | Facility: CLINIC | Age: 64
End: 2024-08-30

## 2024-09-03 ENCOUNTER — APPOINTMENT (OUTPATIENT)
Dept: PODIATRY | Facility: CLINIC | Age: 64
End: 2024-09-03
Payer: MEDICARE

## 2024-09-03 DIAGNOSIS — B35.1 TINEA UNGUIUM: ICD-10-CM

## 2024-09-03 DIAGNOSIS — L60.3 NAIL DYSTROPHY: ICD-10-CM

## 2024-09-03 DIAGNOSIS — E11.49 TYPE 2 DIABETES MELLITUS WITH OTHER DIABETIC NEUROLOGICAL COMPLICATION: ICD-10-CM

## 2024-09-03 PROCEDURE — 11720 DEBRIDE NAIL 1-5: CPT

## 2024-09-05 NOTE — PHYSICAL EXAM
[Ankle Swelling Bilaterally] : bilaterally  [Varicose Veins Of Lower Extremities] : bilaterally [2+] : left foot dorsalis pedis 2+ [Vibration Dec.] : diminished vibratory sensation at the level of the toes [Position Sense Dec.] : diminished position sense at the level of the toes [Ankle Swelling (On Exam)] : not present [FreeTextEntry3] : Temperature gradient warmer to cool. CFT: 3 seconds x10.  [de-identified] : Negative pain on palpation or ROM of the pedal joints.  [Diminished Throughout Right Foot] : normal sensation with monofilament testing throughout right foot [Diminished Throughout Left Foot] : normal sensation with monofilament testing throughout left foot [FreeTextEntry1] : Paresthesias and burning bilateral. Achilles reflexes and patellar decreased, bilateral.

## 2024-09-05 NOTE — PHYSICAL EXAM
[Ankle Swelling Bilaterally] : bilaterally  [Varicose Veins Of Lower Extremities] : bilaterally [2+] : left foot dorsalis pedis 2+ [Vibration Dec.] : diminished vibratory sensation at the level of the toes [Position Sense Dec.] : diminished position sense at the level of the toes [Ankle Swelling (On Exam)] : not present [FreeTextEntry3] : Temperature gradient warmer to cool. CFT: 3 seconds x10.  [de-identified] : Negative pain on palpation or ROM of the pedal joints.  [Diminished Throughout Right Foot] : normal sensation with monofilament testing throughout right foot [Diminished Throughout Left Foot] : normal sensation with monofilament testing throughout left foot [FreeTextEntry1] : Paresthesias and burning bilateral. Achilles reflexes and patellar decreased, bilateral.

## 2024-09-05 NOTE — HISTORY OF PRESENT ILLNESS
[FreeTextEntry1] : Patient presents today for follow-up pedal care along with cutting of elongated toenails and a mycotic nail on the right foot, which they state they cannot cut themselves. Patient and formal caregiver who presents with her deny any changes in medications or medical history. Her most recent hemoglobin A1c was from 5/24/24 and was 9.6.  She does not have her most recent hemoglobin A1c. Her finger stick was approximately 160 this morning. She last saw her medical doctor 2 months ago. She ambulates with a rolling walker. She has residual speech deficits due to her CVA from previous strokes.

## 2024-09-05 NOTE — PHYSICAL EXAM
[Ankle Swelling Bilaterally] : bilaterally  [Varicose Veins Of Lower Extremities] : bilaterally [2+] : left foot dorsalis pedis 2+ [Vibration Dec.] : diminished vibratory sensation at the level of the toes [Position Sense Dec.] : diminished position sense at the level of the toes [Ankle Swelling (On Exam)] : not present [FreeTextEntry3] : Temperature gradient warmer to cool. CFT: 3 seconds x10.  [de-identified] : Negative pain on palpation or ROM of the pedal joints.  [Diminished Throughout Right Foot] : normal sensation with monofilament testing throughout right foot [Diminished Throughout Left Foot] : normal sensation with monofilament testing throughout left foot [FreeTextEntry1] : Paresthesias and burning bilateral. Achilles reflexes and patellar decreased, bilateral.

## 2024-09-05 NOTE — ASSESSMENT
[FreeTextEntry1] : Impression: Onychodystrophy. Onychomycosis. Diabetic with neurological manifestations and complications.  Treatment: Discussed findings and conditions with the patient and patient's caregiver. All nails were prepped and the elongated and dystrophic nails were trimmed with a sterile #15 blade without incidence. They were smoothed with a rotary christelle. The nails were trimmed to avoid any breaks in skin and risks of infection for the patient. The mycotic nail was prepped and debrided with sterile nippers. Height of nails were decreased and smoothed with a rotary christelle.  Subungual debris was curettaged and offending portions of nail on the right hallux was removed via distal slant-back. Antibiotic ointment was applied to the areas. They are to check her feet daily. She is to avoid walking barefoot. She presents today with shoes, no socks. She was encouraged to wear socks to prevent any breaks in skin and risk of infection. Shoe gear modifications were also discussed. She is to continue with glycemic control as per her medical doctors. Patient is to return in approximately 2 1/2 to 3 months. If there is any increase in pain, redness, problems or concerns, she is to contact the office immediately.

## 2024-09-23 ENCOUNTER — INPATIENT (INPATIENT)
Facility: HOSPITAL | Age: 64
LOS: 10 days | Discharge: NURSING, REHAB OR LONG-TERM CARE | End: 2024-10-04
Attending: INTERNAL MEDICINE | Admitting: INTERNAL MEDICINE
Payer: MEDICARE

## 2024-09-23 VITALS
DIASTOLIC BLOOD PRESSURE: 129 MMHG | SYSTOLIC BLOOD PRESSURE: 250 MMHG | HEART RATE: 91 BPM | RESPIRATION RATE: 18 BRPM | OXYGEN SATURATION: 99 %

## 2024-09-23 DIAGNOSIS — Z29.9 ENCOUNTER FOR PROPHYLACTIC MEASURES, UNSPECIFIED: ICD-10-CM

## 2024-09-23 DIAGNOSIS — Z79.899 OTHER LONG TERM (CURRENT) DRUG THERAPY: ICD-10-CM

## 2024-09-23 DIAGNOSIS — I25.10 ATHEROSCLEROTIC HEART DISEASE OF NATIVE CORONARY ARTERY WITHOUT ANGINA PECTORIS: ICD-10-CM

## 2024-09-23 DIAGNOSIS — R53.1 WEAKNESS: ICD-10-CM

## 2024-09-23 DIAGNOSIS — R41.82 ALTERED MENTAL STATUS, UNSPECIFIED: ICD-10-CM

## 2024-09-23 DIAGNOSIS — G20.A1 PARKINSON'S DISEASE WITHOUT DYSKINESIA, WITHOUT MENTION OF FLUCTUATIONS: ICD-10-CM

## 2024-09-23 DIAGNOSIS — Z90.710 ACQUIRED ABSENCE OF BOTH CERVIX AND UTERUS: Chronic | ICD-10-CM

## 2024-09-23 DIAGNOSIS — E11.9 TYPE 2 DIABETES MELLITUS WITHOUT COMPLICATIONS: ICD-10-CM

## 2024-09-23 LAB
ALBUMIN SERPL ELPH-MCNC: 4.4 G/DL — SIGNIFICANT CHANGE UP (ref 3.3–5)
ALP SERPL-CCNC: 99 U/L — SIGNIFICANT CHANGE UP (ref 40–120)
ALT FLD-CCNC: 18 U/L — SIGNIFICANT CHANGE UP (ref 4–33)
ANION GAP SERPL CALC-SCNC: 14 MMOL/L — SIGNIFICANT CHANGE UP (ref 7–14)
ANISOCYTOSIS BLD QL: SLIGHT — SIGNIFICANT CHANGE UP
APTT BLD: 32.7 SEC — SIGNIFICANT CHANGE UP (ref 24.5–35.6)
AST SERPL-CCNC: 16 U/L — SIGNIFICANT CHANGE UP (ref 4–32)
BASOPHILS # BLD AUTO: 0.06 K/UL — SIGNIFICANT CHANGE UP (ref 0–0.2)
BASOPHILS NFR BLD AUTO: 0.9 % — SIGNIFICANT CHANGE UP (ref 0–2)
BILIRUB SERPL-MCNC: 0.2 MG/DL — SIGNIFICANT CHANGE UP (ref 0.2–1.2)
BUN SERPL-MCNC: 14 MG/DL — SIGNIFICANT CHANGE UP (ref 7–23)
CALCIUM SERPL-MCNC: 8.7 MG/DL — SIGNIFICANT CHANGE UP (ref 8.4–10.5)
CHLORIDE SERPL-SCNC: 103 MMOL/L — SIGNIFICANT CHANGE UP (ref 98–107)
CO2 SERPL-SCNC: 25 MMOL/L — SIGNIFICANT CHANGE UP (ref 22–31)
CREAT SERPL-MCNC: 0.7 MG/DL — SIGNIFICANT CHANGE UP (ref 0.5–1.3)
EGFR: 95 ML/MIN/1.73M2 — SIGNIFICANT CHANGE UP
EOSINOPHIL # BLD AUTO: 0.1 K/UL — SIGNIFICANT CHANGE UP (ref 0–0.5)
EOSINOPHIL NFR BLD AUTO: 1.7 % — SIGNIFICANT CHANGE UP (ref 0–6)
GIANT PLATELETS BLD QL SMEAR: PRESENT — SIGNIFICANT CHANGE UP
GLUCOSE BLDC GLUCOMTR-MCNC: 135 MG/DL — HIGH (ref 70–99)
GLUCOSE SERPL-MCNC: 212 MG/DL — HIGH (ref 70–99)
HCT VFR BLD CALC: 41.7 % — SIGNIFICANT CHANGE UP (ref 34.5–45)
HGB BLD-MCNC: 13.2 G/DL — SIGNIFICANT CHANGE UP (ref 11.5–15.5)
HYPOCHROMIA BLD QL: SLIGHT — SIGNIFICANT CHANGE UP
IANC: 3.72 K/UL — SIGNIFICANT CHANGE UP (ref 1.8–7.4)
INR BLD: 1.09 RATIO — SIGNIFICANT CHANGE UP (ref 0.85–1.18)
LYMPHOCYTES # BLD AUTO: 1.97 K/UL — SIGNIFICANT CHANGE UP (ref 1–3.3)
LYMPHOCYTES # BLD AUTO: 32.2 % — SIGNIFICANT CHANGE UP (ref 13–44)
MCHC RBC-ENTMCNC: 22.8 PG — LOW (ref 27–34)
MCHC RBC-ENTMCNC: 31.7 GM/DL — LOW (ref 32–36)
MCV RBC AUTO: 72 FL — LOW (ref 80–100)
MICROCYTES BLD QL: SLIGHT — SIGNIFICANT CHANGE UP
MONOCYTES # BLD AUTO: 0.53 K/UL — SIGNIFICANT CHANGE UP (ref 0–0.9)
MONOCYTES NFR BLD AUTO: 8.7 % — SIGNIFICANT CHANGE UP (ref 2–14)
NEUTROPHILS # BLD AUTO: 3.25 K/UL — SIGNIFICANT CHANGE UP (ref 1.8–7.4)
NEUTROPHILS NFR BLD AUTO: 53 % — SIGNIFICANT CHANGE UP (ref 43–77)
OVALOCYTES BLD QL SMEAR: SLIGHT — SIGNIFICANT CHANGE UP
PLAT MORPH BLD: NORMAL — SIGNIFICANT CHANGE UP
PLATELET # BLD AUTO: 316 K/UL — SIGNIFICANT CHANGE UP (ref 150–400)
PLATELET COUNT - ESTIMATE: NORMAL — SIGNIFICANT CHANGE UP
POIKILOCYTOSIS BLD QL AUTO: SLIGHT — SIGNIFICANT CHANGE UP
POLYCHROMASIA BLD QL SMEAR: SLIGHT — SIGNIFICANT CHANGE UP
POTASSIUM SERPL-MCNC: 3.7 MMOL/L — SIGNIFICANT CHANGE UP (ref 3.5–5.3)
POTASSIUM SERPL-SCNC: 3.7 MMOL/L — SIGNIFICANT CHANGE UP (ref 3.5–5.3)
PROT SERPL-MCNC: 7.4 G/DL — SIGNIFICANT CHANGE UP (ref 6–8.3)
PROTHROM AB SERPL-ACNC: 12.2 SEC — SIGNIFICANT CHANGE UP (ref 9.5–13)
RBC # BLD: 5.79 M/UL — HIGH (ref 3.8–5.2)
RBC # FLD: 15.7 % — HIGH (ref 10.3–14.5)
RBC BLD AUTO: ABNORMAL
SODIUM SERPL-SCNC: 142 MMOL/L — SIGNIFICANT CHANGE UP (ref 135–145)
TROPONIN T, HIGH SENSITIVITY RESULT: <6 NG/L — SIGNIFICANT CHANGE UP
VARIANT LYMPHS # BLD: 3.5 % — SIGNIFICANT CHANGE UP (ref 0–6)
WBC # BLD: 6.13 K/UL — SIGNIFICANT CHANGE UP (ref 3.8–10.5)
WBC # FLD AUTO: 6.13 K/UL — SIGNIFICANT CHANGE UP (ref 3.8–10.5)

## 2024-09-23 PROCEDURE — 0042T: CPT | Mod: MC

## 2024-09-23 PROCEDURE — 99291 CRITICAL CARE FIRST HOUR: CPT

## 2024-09-23 PROCEDURE — 70496 CT ANGIOGRAPHY HEAD: CPT | Mod: 26,MC

## 2024-09-23 PROCEDURE — 99223 1ST HOSP IP/OBS HIGH 75: CPT

## 2024-09-23 PROCEDURE — 70498 CT ANGIOGRAPHY NECK: CPT | Mod: 26,MC

## 2024-09-23 RX ORDER — ALCOHOL ANTISEPTIC PADS
25 PADS, MEDICATED (EA) TOPICAL ONCE
Refills: 0 | Status: DISCONTINUED | OUTPATIENT
Start: 2024-09-23 | End: 2024-10-04

## 2024-09-23 RX ORDER — ALCOHOL ANTISEPTIC PADS
12.5 PADS, MEDICATED (EA) TOPICAL ONCE
Refills: 0 | Status: DISCONTINUED | OUTPATIENT
Start: 2024-09-23 | End: 2024-10-04

## 2024-09-23 RX ORDER — ENOXAPARIN SODIUM 150 MG/ML
40 INJECTION SUBCUTANEOUS EVERY 24 HOURS
Refills: 0 | Status: DISCONTINUED | OUTPATIENT
Start: 2024-09-23 | End: 2024-10-04

## 2024-09-23 RX ORDER — CARBIDOPA/LEVODOPA 25MG-100MG
1 TABLET ORAL DAILY
Refills: 0 | Status: DISCONTINUED | OUTPATIENT
Start: 2024-09-23 | End: 2024-10-04

## 2024-09-23 RX ORDER — ALCOHOL ANTISEPTIC PADS
15 PADS, MEDICATED (EA) TOPICAL ONCE
Refills: 0 | Status: DISCONTINUED | OUTPATIENT
Start: 2024-09-23 | End: 2024-10-04

## 2024-09-23 RX ORDER — GLUCAGON INJECTION, SOLUTION 0.5 MG/.1ML
1 INJECTION, SOLUTION SUBCUTANEOUS ONCE
Refills: 0 | Status: DISCONTINUED | OUTPATIENT
Start: 2024-09-23 | End: 2024-10-04

## 2024-09-23 RX ORDER — SODIUM CHLORIDE IRRIG SOLUTION 0.9 %
1000 SOLUTION, IRRIGATION IRRIGATION
Refills: 0 | Status: DISCONTINUED | OUTPATIENT
Start: 2024-09-23 | End: 2024-10-04

## 2024-09-23 RX ORDER — INSULIN LISPRO 100/ML
VIAL (ML) SUBCUTANEOUS
Refills: 0 | Status: DISCONTINUED | OUTPATIENT
Start: 2024-09-23 | End: 2024-10-04

## 2024-09-23 RX ORDER — ASPIRIN 325 MG
81 TABLET ORAL DAILY
Refills: 0 | Status: DISCONTINUED | OUTPATIENT
Start: 2024-09-23 | End: 2024-10-04

## 2024-09-23 RX ORDER — INSULIN LISPRO 100/ML
VIAL (ML) SUBCUTANEOUS AT BEDTIME
Refills: 0 | Status: DISCONTINUED | OUTPATIENT
Start: 2024-09-23 | End: 2024-10-04

## 2024-09-23 NOTE — CONSULT NOTE ADULT - ASSESSMENT
ASSESSMENT: 64F with a PMHx of HTN, T2DM, dementia, Parkinson's Disease, stroke (2022 with residual L sided deficits) presenting to [facility] ED as code stroke, with LKW 6:00 AM on 9/23/2024. Patient's  states that when he went to work at 6 AM on 9/23/2024, patient was in her usual state of health, which is alert, tracking with eyes, can be confused but forms words, ambulates with walker at baseline. When he returned from work at around 13:30 on 9/23/2024, home health aide reported that patient was falling forward, not responsive to questions or commands, moving her eyes only and with rigid body. Per EMS, BP was 250/129 on presentation and blood glucose was 211. Repeat BP in ED was 170/90 and blood glucose of 214.  Patient takes aspirin and plavix at home.     LKW: 6 AM on 9/23/2024  NIHSS: 20  Baseline MRS: 4  Not a tenecteplase candidate due to presentation outside of window.   Not a thrombectomy candidate due to no LVO.     Impression: Altered mental status likely due to hypertensive urgency vs diabetic ketoacidosis, less likely acute ischemic stroke. Mechanism unknown, pending workup.     Recommendations:  [] Continue home Aspirin  [] Continue home Plavix   [] Atorvastatin 40-80 mg daily titrated per LDL < 70  [] HgbA1C, fasting lipid panel, CBC, CMP, coag panel, troponin  [] vEEG  [] MRI brain w/o con if not contraindicated  [] TTE   [] Telemetry to check for arrhythmia, EKG, will discuss loop recorder  [] Tight glucose control (long-term goal HgbA1c < 6%)  [] Stroke education and counseling  [] Neuro-checks and VS q4h  [] Permissive HTN up to 220/120 for 24-48h from symptom onset  [] Dysphagia screen. If fails, speech/swallow eval  [] Aspiration, fall precautions  [] STAT CT head non-contrast for change in neuro exam.   [] PT/ OT / DVT ppx per primary team     Discussed with stroke fellow Dr. Mei and under supervision of attending Dr. Libman regarding decision against candidacy for tenecteplase/ thrombectomy. Will be formally staffed on morning rounds with attending. Recommendations will be complete once signed by attending. ASSESSMENT: 64F with a PMHx of HTN, T2DM, dementia, Parkinson's Disease, stroke (2022 with residual L sided deficits) presenting to [facility] ED as code stroke, with LKW 6:00 AM on 9/23/2024. Patient's  states that when he went to work at 6 AM on 9/23/2024, patient was in her usual state of health, which is alert, tracking with eyes, can be confused but forms words, ambulates with walker at baseline. When he returned from work at around 13:30 on 9/23/2024, home health aide reported that patient was falling forward, not responsive to questions or commands, moving her eyes only and with rigid body. Per EMS, BP was 250/129 on presentation and blood glucose was 211. Repeat BP in ED was 170/90 and blood glucose of 214.  Patient takes aspirin and plavix at home.     LKW: 6 AM on 9/23/2024  NIHSS: 20  Baseline MRS: 4  Not a tenecteplase candidate due to presentation outside of window.   Not a thrombectomy candidate due to no LVO.     Impression: Altered mental status likely due to hypertensive urgency vs diabetic ketoacidosis, less likely acute ischemic stroke. Mechanism unknown, pending workup.     Recommendations:  [] Continue home Aspirin  [] Continue home Plavix   [] Atorvastatin 40-80 mg daily titrated per LDL < 70  [] HgbA1C, fasting lipid panel, CBC, CMP, coag panel, troponin  [] vEEG  [] MRI brain w/o con if not contraindicated  [] TTE   [] Telemetry to check for arrhythmia, EKG, will discuss loop recorder  [] Tight glucose control (long-term goal HgbA1c < 6%)  [] Stroke education and counseling  [] Neuro-checks and VS q4h  [] Permissive HTN up to 220/120 for 24-48h from symptom onset  [] Dysphagia screen. If fails, speech/swallow eval  [] Aspiration, fall precautions  [] STAT CT head non-contrast for change in neuro exam.   [] PT/ OT / DVT ppx per primary team     Discussed with stroke fellow Dr. Mei

## 2024-09-23 NOTE — ED ADULT TRIAGE NOTE - CHIEF COMPLAINT QUOTE
Notification called for code stroke. As per , pt. was last known well at 6am. Aide called EMS for pt. becoming altered and unresponsive. Pt. unable to follow any commands in triage. Code stroke called and patient brought straight to CT. Unable to complete BEFAST due to pt. being non-verbal.

## 2024-09-23 NOTE — CONSULT NOTE ADULT - CRITICAL CARE ATTENDING COMMENT
64F w/ HTN, T2DM, dementia, Parkinson's Disease, stroke (2022 with residual L sided deficits), known bilateral ICA stenosis, frequent falls and gait instability, binocular catarct surgery presented with confusion and ?stiffness for which stroke code was called. Not tnk or thrombectomy candidate  Of note on aspirin and plavix at home. Previously admitted to [facility] in June 2024 for similar episode with normal EEG at that time.   prior LDL 81, A1c 9.2  Had prior R sided symptoms in Feb 2024 with diagnostic angio at that time not reflecting symptomatic carotid stenosis   o/e AAOx2, hypomimia, L hemiparesis, tremor and inc tone     Parkinsons  Prior stroke  ICA stenosis   R/o seizure     - continue aspirin and statin for secondary stroke prevention  - ok to continue plavix for now  - MRI brain w/o contrast  - carotid duplex (last eval in Feb)  - routine eeg   - continue home sinemet dose  - gradual normotension  - delirium precautions  - seizure and fall precautions  pt/ot./slp  dvt ppx

## 2024-09-23 NOTE — H&P ADULT - HISTORY OF PRESENT ILLNESS
65 y/o female w/ PMHx CAD s/p PCI, PAD s/p L popliteal angioplasty and atherectomy, CVAs w/ residual L sided weakness, R and L ICA stenosis, Parkinson's disease, HTN, HLD, T2DM, and decreased vision  65 y/o female w/ PMHx CAD s/p PCI, PAD s/p L popliteal angioplasty and atherectomy, CVAs (2022) w/ residual L sided weakness, R and L ICA stenosis, Parkinson's disease, HTN, HLD, T2DM, and decreased vision presenting to [facility] ED as code stroke. Patient's  states that when he went to work at 6 AM on 9/23/2024, patient was in her usual state of health,  ambulates with walker at baseline. When he returned from work at around 13:30 on 9/23/2024, home health aide reported that patient was not responsive to questions or commands, moving her eyes only and with rigid body. Per EMS, BP was 250/129 on presentation and blood glucose was 211. Repeat BP in ED was 170/90 and blood glucose of 214.  Patient takes aspirin and plavix at home.   Pt with similar episode while hospitalized for recurrent fall workup at [facility] in June 2024; RRT then called for episode of unresponsiveness possible seizure lilke activity but eeg done was negative; pt returned to baseline. Pt not on AED  At time of my exam, pt with affect/and speech pattern of someone not oriented, but she is a/ox3, follows commands and answers most questions appropriately. Unable to provide meaningful  hpi to me.  Spoke with  on phone who had been here earlier  and noted  that pt had returned to her baseline. Pt afebrile, normal wbc. CTA h/n , CT head non-acute. UA ordered

## 2024-09-23 NOTE — H&P ADULT - NSHPREVIEWOFSYSTEMS_GEN_ALL_CORE
Review of Systems:   CONSTITUTIONAL: No fever  EYES: No eye pain, visual disturbances, or discharge  ENMT:  No difficulty hearing, tinnitus, vertigo; No sinus or throat pain  NECK: No pain or stiffness  RESPIRATORY: No cough, wheezing, chills or hemoptysis; No shortness of breath  CARDIOVASCULAR: No chest pain, palpitations, dizziness, or leg swelling  GASTROINTESTINAL: No abdominal pain  NEUROLOGICAL: No headaches  MUSCULOSKELETAL: No joint pain or swelling; No muscle, back, or extremity pain

## 2024-09-23 NOTE — STROKE CODE NOTE - NIH STROKE SCALE: 8. SENSORY, QM
(2) Severe to total sensory loss; patient is not aware of being touched in the face, arm, and leg (0) Normal; no sensory loss

## 2024-09-23 NOTE — ED ADULT NURSE NOTE - OBJECTIVE STATEMENT
Charlotte RN: received pt to CT area for code stroke notification. pt brought in by EMS for altered mental status with last known well 6am today. per , pt ambulatory with assist at baseline and verbal. per  today at 1400, pt rigid in the bed and not speaking or answering questions. upon arrival pt not participating in assessment, not speaking, not following commands. PIV #18 left AC established. labs drawn and sent. pt brought to CT upstairs as 3 code strokes at same time, ANGELESM Linda aware. pt hypertensive with EMS [id number]. upon arrival, [id number]. respirations even and unlabored. NSR on cardiac monitor.  in triage. EKG done. stroke scale 20 at this time. report given to primary RN Shlomo

## 2024-09-23 NOTE — STROKE CODE NOTE - NIH STROKE SCALE: 5A. MOTOR ARM, LEFT, QM
(3) No effort against gravity; limb falls (1) Drift; limb holds 90 (or 45) degrees, but drifts down before full 10 seconds; does not hit bed or other support

## 2024-09-23 NOTE — CONSULT NOTE ADULT - ASSESSMENT
EKG pending  TTE 2/20/2024    1. Agitated saline injection was negative for intracardiac shunt.   2. Left ventricular cavity is normal in size. Left ventricular wall thickness is normal. Left ventricular systolic function is hyperdynamic. There are no regional wall motion abnormalities seen.   3. Normal left ventricular diastolic function, with normal filling pressure.   4. Normal right ventricular cavity size, with wall thickness, and normal systolic function.   5. Normal left and right atrial size.   6. No significant valvular disease.   7. No pericardial effusion seen.    NST 5/3/2024  1. Normal myocardial perfusion scan, with no evidence of infarction or inducible ischemia.   2. Stress electrocardiogram: No ischemic ST segment changes.   3. Normal left ventricular regional wall motion.   4. The left ventricle is normal in function and normal in size. Normal left ventricular diastolic function. The post stress end diastolic volume is 59 ml and systolic volume is 13 ml.   5. The left ventricle is normal in function and normal in size. Normal left ventricular diastolic function.   6. Normal right ventricular function. There is no right ventricular dilation. RVEF = 48%, RVEDV = 93 mL, RVESV = 49 mL.      Patient is a 63 year old female with a PMHx of  HTN, DM, HLD, CAD (s/p 3 stents placed at [facility] in 2014 to the prox LAD and then in 2022 - on ASA and Plavix), PAD s/p L popliteal angioplasty and atherectomy 12/23, R ICA and L ICA stenosis, prior stroke with L side weakness, Parkinson's disease, B/L cataract surgery with blurred vision at baseline who presents with falls        1. AMS  - Pt is responsive now, able to move all extremities  - CTA shows  NECK: Severe right and moderate left internal carotid artery origin stenosis, as above. No occlusion or dissection.  HEAD: No significant change. No large vessel occlusion. 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  - neurologist(outpt) ordered MRI of neck to reassess stenosis, patient didn't do this yet pt claustrophobic    - home meds ASA, plavix, lipitor 40mg    2. Carotid artery stenosis  - CTA shows  NECK: Severe right and moderate left internal carotid artery origin stenosis, as above. No occlusion or dissection.  HEAD: No significant change. No large vessel occlusion. 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  - neurologist(outpt) ordered MRI of neck to reassess stenosis, patient didn't do this yet pt claustrophobic    - home meds ASA, plavix, lipitor 40mg      3. Hypertension  - Renal artery doppler 8/24(outpt): bilateral tardus parvus waveforms and segmental renal arteries which can be a secondary sign of renal artery stenosis. Recommend either MR angiogram of the renal arteries or CT angiogram of the abdomen for further evaluation.  - MR angiogram advised outpt, but patient needed sedation, pt was advised CT angiogram instead  - Pt was not tolerated with BP meds with c/o headache, medications been adjusted   - Currently home BP meds with  lopressor 50 bid, imdur 30, Nifedipine 30 BID(PCP decreased it to 30mg once daily but patient self increased it to 30mg BID), losartan 25    4. CAD s/p stents  - s/p 3 stents placed at [facility] in 2014 to the prox LAD and then in 2022  - TTE with preserved EF and no WMA  - c/w ASA, Plavix and statin    5. HLD  - c/w atorvastatin 80mg PO daily    6. hx of palpitations s/p Falls   - TTE with preserved EF and no WMA  -s/p loop monitor   EKG pending  TTE 2/20/2024    1. Agitated saline injection was negative for intracardiac shunt.   2. Left ventricular cavity is normal in size. Left ventricular wall thickness is normal. Left ventricular systolic function is hyperdynamic. There are no regional wall motion abnormalities seen.   3. Normal left ventricular diastolic function, with normal filling pressure.   4. Normal right ventricular cavity size, with wall thickness, and normal systolic function.   5. Normal left and right atrial size.   6. No significant valvular disease.   7. No pericardial effusion seen.    NST 5/3/2024  1. Normal myocardial perfusion scan, with no evidence of infarction or inducible ischemia.   2. Stress electrocardiogram: No ischemic ST segment changes.   3. Normal left ventricular regional wall motion.   4. The left ventricle is normal in function and normal in size. Normal left ventricular diastolic function. The post stress end diastolic volume is 59 ml and systolic volume is 13 ml.   5. The left ventricle is normal in function and normal in size. Normal left ventricular diastolic function.   6. Normal right ventricular function. There is no right ventricular dilation. RVEF = 48%, RVEDV = 93 mL, RVESV = 49 mL.      Patient is a 63 year old female with a PMHx of  HTN, DM, HLD, CAD (s/p 3 stents placed at [facility] in 2014 to the prox LAD and then in 2022 - on ASA and Plavix), PAD s/p L popliteal angioplasty and atherectomy 12/23, R ICA and L ICA stenosis, prior stroke with L side weakness, Parkinson's disease, B/L cataract surgery with blurred vision at baseline who presents with falls        1. AMS  - Pt is responsive now, able to move all extremities  - CTA shows  NECK: Severe right and moderate left internal carotid artery origin stenosis, as above. No occlusion or dissection.  HEAD: No significant change. No large vessel occlusion. 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  - neurologist(outpt) ordered MRI of neck to reassess stenosis, patient didn't do this yet pt claustrophobic    - f/u neuro recs  - home meds ASA, plavix, lipitor 40mg    2. Carotid artery stenosis  - CTA shows  NECK: Severe right and moderate left internal carotid artery origin stenosis, as above. No occlusion or dissection.  HEAD: No significant change. No large vessel occlusion. 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  - neurologist(outpt) ordered MRI of neck to reassess stenosis, patient didn't do this yet pt claustrophobic    - home meds ASA, plavix, lipitor 40mg      3. Hypertension  - Renal artery doppler 8/24(outpt): bilateral tardus parvus waveforms and segmental renal arteries which can be a secondary sign of renal artery stenosis. Recommend either MR angiogram of the renal arteries or CT angiogram of the abdomen for further evaluation.  - MR angiogram advised outpt, but patient needed sedation, pt was advised CT angiogram instead  - Pt was not tolerated with BP meds with c/o headache, medications been adjusted   - Currently home BP meds with  lopressor 50 bid, imdur 30, Nifedipine 30 BID(PCP decreased it to 30mg once daily but patient self increased it to 30mg BID), losartan 25    4. CAD s/p stents  - s/p 3 stents placed at [facility] in 2014 to the prox LAD and then in 2022  - TTE with preserved EF and no WMA  - c/w ASA, Plavix and statin    5. HLD  - c/w atorvastatin 80mg PO daily    6. hx of palpitations s/p Falls   - TTE with preserved EF and no WMA  -s/p loop monitor   EKG pending  TTE 2/20/2024    1. Agitated saline injection was negative for intracardiac shunt.   2. Left ventricular cavity is normal in size. Left ventricular wall thickness is normal. Left ventricular systolic function is hyperdynamic. There are no regional wall motion abnormalities seen.   3. Normal left ventricular diastolic function, with normal filling pressure.   4. Normal right ventricular cavity size, with wall thickness, and normal systolic function.   5. Normal left and right atrial size.   6. No significant valvular disease.   7. No pericardial effusion seen.    NST 5/3/2024  1. Normal myocardial perfusion scan, with no evidence of infarction or inducible ischemia.   2. Stress electrocardiogram: No ischemic ST segment changes.   3. Normal left ventricular regional wall motion.   4. The left ventricle is normal in function and normal in size. Normal left ventricular diastolic function. The post stress end diastolic volume is 59 ml and systolic volume is 13 ml.   5. The left ventricle is normal in function and normal in size. Normal left ventricular diastolic function.   6. Normal right ventricular function. There is no right ventricular dilation. RVEF = 48%, RVEDV = 93 mL, RVESV = 49 mL.      Patient is a 63 year old female with a PMHx of  HTN, DM, HLD, CAD (s/p 3 stents placed at [facility] in 2014 to the prox LAD and then in 2022 - on ASA and Plavix), PAD s/p L popliteal angioplasty and atherectomy 12/23, R ICA and L ICA stenosis, prior stroke with L side weakness, Parkinson's disease, B/L cataract surgery with blurred vision at baseline who presents with unresponsiveness        1. AMS  - Pt is responsive now, able to move all extremities  - CTA shows  NECK: Severe right and moderate left internal carotid artery origin stenosis, as above. No occlusion or dissection.  HEAD: No significant change. No large vessel occlusion. 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  - neurologist(outpt) ordered MRI of neck to reassess stenosis, patient didn't do this yet pt claustrophobic    - f/u neuro recs  - home meds ASA, plavix, lipitor 40mg    2. Carotid artery stenosis  - CTA shows  NECK: Severe right and moderate left internal carotid artery origin stenosis, as above. No occlusion or dissection.  HEAD: No significant change. No large vessel occlusion. 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  - neurologist(outpt) ordered MRI of neck to reassess stenosis, patient didn't do this yet pt claustrophobic    - home meds ASA, plavix, lipitor 40mg      3. Hypertension  - permissive hypertension hold bp meds    4. CAD s/p stents  - s/p 3 stents placed at [facility] in 2014 to the prox LAD and then in 2022  - TTE with preserved EF and no WMA  - c/w ASA, Plavix and statin    5. HLD  - c/w atorvastatin 80mg PO daily    6. hx of palpitations s/p Falls   - TTE with preserved EF and no WMA  -s/p loop monitor

## 2024-09-23 NOTE — ED PROVIDER NOTE - PHYSICAL EXAMINATION
GEN - chronically ill appearing  CARD -s1s2, RRR, no M,G,R;   PULM - CTA b/l, symmetric breath sounds;   ABD -  +BS, ND, NT, soft, no guarding, no rebound, no masses;   BACK - no CVA tenderness, Normal  spine;   EXT - symmetric pulses, 2+ dp, capillary refill < 2 seconds, no cyanosis, no edema;   NEURO - unable to lift against gravity L and R arm and legs falls, mute not speaking.

## 2024-09-23 NOTE — H&P ADULT - NSHPLABSRESULTS_GEN_ALL_CORE
13.2   6.13  )-----------( 316      ( 23 Sep 2024 14:53 )             41.7     09-23    142  |  103  |  14  ----------------------------<  212[H]  3.7   |  25  |  0.70    Ca    8.7      23 Sep 2024 14:53    TPro  7.4  /  Alb  4.4  /  TBili  0.2  /  DBili  x   /  AST  16  /  ALT  18  /  AlkPhos  99  09-23    CAPILLARY BLOOD GLUCOSE  214 (23 Sep 2024 14:49)      POCT Blood Glucose.: 214 mg/dL (23 Sep 2024 14:42)    PT/INR - ( 23 Sep 2024 14:53 )   PT: 12.2 sec;   INR: 1.09 ratio         PTT - ( 23 Sep 2024 14:53 )  PTT:32.7 sec  Urinalysis Basic - ( 23 Sep 2024 14:53 )    Color: x / Appearance: x / SG: x / pH: x  Gluc: 212 mg/dL / Ketone: x  / Bili: x / Urobili: x   Blood: x / Protein: x / Nitrite: x   Leuk Esterase: x / RBC: x / WBC x   Sq Epi: x / Non Sq Epi: x / Bacteria: x      Vital Signs Last 24 Hrs  T(C): 36.7 (23 Sep 2024 21:04), Max: 36.7 (23 Sep 2024 21:04)  T(F): 98 (23 Sep 2024 21:04), Max: 98 (23 Sep 2024 21:04)  HR: 95 (23 Sep 2024 21:04) (84 - 95)  BP: 164/91 (23 Sep 2024 21:04) (164/91 - 250/129)  BP(mean): --  RR: 18 (23 Sep 2024 21:04) (18 - 22)  SpO2: 100% (23 Sep 2024 21:04) (98% - 100%)    Parameters below as of 23 Sep 2024 21:04  Patient On (Oxygen Delivery Method): room air

## 2024-09-23 NOTE — CONSULT NOTE ADULT - SUBJECTIVE AND OBJECTIVE BOX
Johnathan Tejeda MD  Interventional Cardiology / Advance Heart Failure and Cardiac Transplant Specialist  [place] Office : [address] [place] [address] [address]. [address]  Tel:   [place] Office : [address] [facility] [place] [address]  Tel: [phone]  Cell : [phone]    HISTORY OF PRESENTING ILLNESS:    65 yo F CAD s/p PCI, PAD s/p L popliteal angioplasty and atherectomy, CVAs w/ residual L sided weakness, R and L ICA stenosis, Parkinson's disease, HTN, HLD, T2DM, and decreased vision a/w AMS and unresponsiveness.  Last known well was 6am.  Code stroke activated.  Pt noted to have bilateral upper and lower extremity weakness.  Pt unable to lift extremities against gravity.     	  MEDICATIONS:        PAST MEDICAL/SURGICAL HISTORY  PAST MEDICAL & SURGICAL HISTORY:  HTN (hypertension)      HLD (hyperlipidemia)      CAD (coronary artery disease)      Type II diabetes mellitus      PAD (peripheral artery disease)      Mild dementia      Parkinson disease      S/P hysterectomy          SOCIAL HISTORY: Substance Use (street drugs): ( x ) never used  (  ) other:    FAMILY HISTORY:      PHYSICAL EXAM:  T(C): 36.5 (09-23-24 @ 15:30), Max: 36.5 (09-23-24 @ 15:30)  HR: 93 (09-23-24 @ 15:30) (84 - 93)  BP: 189/92 (09-23-24 @ 15:30) (177/90 - 250/129)  RR: 22 (09-23-24 @ 15:30) (18 - 22)  SpO2: 100% (09-23-24 @ 15:30) (98% - 100%)  Wt(kg): --  I&O's Summary        GENERAL: NAD   EYES: EOMI, PERRLA, conjunctiva and sclera clear  ENMT: No tonsillar erythema, exudates, or enlargement; Moist mucous membranes, Good dentition, No lesions  Cardiovascular: Normal S1 S2, No JVD, No murmurs, No edema  Respiratory: Lungs clear to auscultation	  Gastrointestinal:  Soft, Non-tender, + BS	  Extremities: no edema                                      13.2   6.13  )-----------( 316      ( 23 Sep 2024 14:53 )             41.7     09-23    142  |  103  |  14  ----------------------------<  212[H]  3.7   |  25  |  0.70    Ca    8.7      23 Sep 2024 14:53    TPro  7.4  /  Alb  4.4  /  TBili  0.2  /  DBili  x   /  AST  16  /  ALT  18  /  AlkPhos  99  09-23    proBNP:   Lipid Profile:   HgA1c:   TSH:     Consultant(s) Notes Reviewed:  [x ] YES  [ ] NO    Care Discussed with Consultants/Other Providers [ x] YES  [ ] NO    Imaging Personally Reviewed independently:  [x] YES  [ ] NO    All labs, radiologic studies, vitals, orders and medications list reviewed. Patient is seen and examined at bedside. Case discussed with medical team.

## 2024-09-23 NOTE — CONSULT NOTE ADULT - SUBJECTIVE AND OBJECTIVE BOX
LKW:  NIHSS:    Baseline MRS:  Not a tenecteplase candidate due to   Not a thrombectomy candidate due to      CT head:   CTA/P:    Impression:      Mechanism: Cardioembolic, Large artery atherosclerosis (>50%), Small vessel disease, Embolic stroke of undetermined source, Stroke of other determined etiology    Recommendations:  [] Aspirin 81mg daily +/- Plavix 75 mg daily OR c/w full dose anticoagulation (i.e if hx of afib and requires anticoagulation). If administering plavix as well, can load with 300mg x1 then 75mg daily. Would continue with aspirin 81mg and Plavix 75mg for 3 weeks followed by aspirin 81 alone (as per CHANCE and POINT trials).  [] Atorvastatin 40-80 mg daily titrated per LDL < 70  [] HgbA1C, fasting lipid panel, CBC, CMP, coag panel, troponin  [] MRI brain w/o con, MRA head w/o con, MRA neck w contrast (vessel imaging without contrast) if not contraindicated  [] TTE w/ bubble study  [] telemetry to check for arrhythmia, EKG, will discuss loop recorder  - FOR PATIENTS WHO PASSED DYSPHAGIA SCREEN PLEASE ORDER PUREED DIET. ALL PATIENTS SHOULD HAVE SPEECH LANGUAGE EVALLUATION. IF PATIENTS npo OR FAILS DYSPHAGIA SCREEN, NEED SPEECH AND SWALLOW CONSULT.   SPEECH LANGUAGE EVALUATION AND S/S EVALUATION ARE TWO SEPARATE ORDERS       - Tight glucose control (long-term goal HgbA1c < 6%)  - Stroke education and counseling  - Neuro-checks and VS q4h  - Permissive HTN up to 220/120 for 24-48h from symptom onset  - Dysphagia screen. If fails, speech/swallow eval  - aspiration, fall precautions  - STAT CT head non-contrast for change in neuro exam.   - PT/ OT / DVT ppx per primary team     Discussed with stroke fellow       and under supervision of attending       regarding decision against candidacy for tenecteplase/ thrombectomy. Will be formally staffed on morning rounds with attending. Recommendations will be complete once signed by attending. Neurology - Consult Note    -  Spectra: [phone] ([facility]), [phone] ([facility])  -    HPI: Patient ALYSE AVENDAÑO is a 64y (1960) woman with a PMHx of HTN, T2DM, dementia, Parkinson's Disease, stroke (2022 with residual L sided deficits) presenting to [facility] ED as code stroke, with LKW 6:00 AM on 9/23/2024. Patient's  states that when he went to work at 6 AM on 9/23/2024, patient was in her usual state of health, which is alert, tracking with eyes, can be confused but forms words, ambulates with walker at baseline. When he returned from work at around 13:30 on 9/23/2024, home health aide reported that patient was falling forward, not responsive to questions or commands, moving her eyes only and with rigid body. Per EMS, BP was 250/129 on presentation and blood glucose was 211. Repeat BP in ED was 170/90 and blood glucose of 214.  Patient takes aspirin and plavix at home. Code stroke was pre-notified by EMS. CT delayed as there were 3 code strokes simultaneously, with one code stroke as a candidate for tenecteplase, requiring CT scanning immediately.    Review of Systems: unable to assess due to mental status    Allergies:  No Known Allergies      PMHx/PSHx/Family Hx: As above, otherwise see below   No pertinent past medical history    HTN (hypertension)    HLD (hyperlipidemia)    CAD (coronary artery disease)    Type II diabetes mellitus    PAD (peripheral artery disease)    Mild dementia    Parkinson disease        Social Hx: unable to assess due to mental status  Lives with  at home    Medications:  MEDICATIONS  (STANDING):    MEDICATIONS  (PRN):      Vitals:  T(C): --  HR: 93 (09-23-24 @ 15:30) (84 - 93)  BP: 189/92 (09-23-24 @ 15:30) (177/90 - 250/129)  RR: 22 (09-23-24 @ 15:30) (18 - 22)  SpO2: 100% (09-23-24 @ 15:30) (98% - 100%)    Physical Examination:   General - NAD  Cardiovascular - no edema    Neurologic Exam:  Mental status - Eyes open, staring ahead. Not tracking examiner, with left gaze preference that can be overcome with Doll's eyes. Not verbalizing to any questions or commands.     Cranial nerves - PERRL, visual fields responsive to threat, EOMI to left gaze, patient not participating in testing extraocular movements to right, face with no asymmetry b/l    Motor - Normal bulk throughout.     Strength testing  RUE drift that hits bed immediately  LUE drift that hits bed after 10 seconds  RLE drift that hits bed immediately  LLE drift that hits bed immediately    Sensation - Light touch intact throughout. On initial exam, patient did not withdraw extremities to painful stimuli.     DTR's -             Biceps         Brachioradialis      Patellar    Ankle     R             3+                    3+                  3+          3+                   L              3+                    3+                  3+          3+                     Coordination - HUDSON due to mental status    Gait and station - HUDSON due to concern for safety considering mental status    Labs:                        13.2   6.13  )-----------( 316      ( 23 Sep 2024 14:53 )             41.7     09-23    142  |  103  |  14  ----------------------------<  212[H]  3.7   |  25  |  0.70    Ca    8.7      23 Sep 2024 14:53    TPro  7.4  /  Alb  4.4  /  TBili  0.2  /  DBili  x   /  AST  16  /  ALT  18  /  AlkPhos  99  09-23    CAPILLARY BLOOD GLUCOSE  214 (23 Sep 2024 14:49)      POCT Blood Glucose.: 214 mg/dL (23 Sep 2024 14:42)    LIVER FUNCTIONS - ( 23 Sep 2024 14:53 )  Alb: 4.4 g/dL / Pro: 7.4 g/dL / ALK PHOS: 99 U/L / ALT: 18 U/L / AST: 16 U/L / GGT: x             PT/INR - ( 23 Sep 2024 14:53 )   PT: 12.2 sec;   INR: 1.09 ratio         PTT - ( 23 Sep 2024 14:53 )  PTT:32.7 sec  CSF:                  Radiology:

## 2024-09-23 NOTE — ED PROVIDER NOTE - OBJECTIVE STATEMENT
65 yo F CAD s/p PCI, PAD s/p L popliteal angioplasty and atherectomy, CVAs w/ residual L sided weakness, R and L ICA stenosis, Parkinson's disease, HTN, HLD, T2DM, and decreased vision a/w ams and unresponsiveness.  Last known well was 6am.  Code stroke activated.  Pt noted to have bilateral upper and lower extremity weakness.  Pt unable to lift extremities against gravity. .

## 2024-09-23 NOTE — ED PROVIDER NOTE - PROGRESS NOTE DETAILS
Regan Burris MD: I have been given all relevant clinical information regarding this patient and will be assuming care from the previous provider. Patient seen as CODE STROKE, neurology considering seizure vs ischemia, recommend admission for MRI, TTE, EEG. Case discussed with Dr. Echevarria, her cardiologist, and Dr. Urena who will admit primarily.

## 2024-09-23 NOTE — ED ADULT TRIAGE NOTE - CODE STROKE DETAILS
Code stroke called at 14:30 prior to patient's arrival. Pt. brought straight to CT at 14:42 on arrival.

## 2024-09-23 NOTE — H&P ADULT - NSHPPHYSICALEXAM_GEN_ALL_CORE
PHYSICAL EXAM:      Constitutional: NAD, well-groomed, well-developed  HEENT:  EOMI, Normal Hearing, chronic left eye ptosis  Neck: No LAD, No JVD  Back: Normal spine flexure, No CVA tenderness  Respiratory: CTAB  Cardiovascular: S1 and S2, RRR  Gastrointestinal: BS+, soft, NT/ND  Extremities: No peripheral edema  Vascular: 2+ peripheral pulses  Neurological: A/O x 3, follows commands, answers questions appropriately   Psychiatric: Normal mood, normal affect  Musculoskeletal: 5/5 strength b/l upper and lower extremities  Skin: No rashes

## 2024-09-23 NOTE — ED ADULT NURSE NOTE - NSFALLRISKINTERV_ED_ALL_ED
Assistance OOB with selected safe patient handling equipment if applicable/Assistance with ambulation/Communicate fall risk and risk factors to all staff, patient, and family/Monitor gait and stability/Monitor for mental status changes and reorient to person, place, and time, as needed/Provide visual cue: yellow wristband, yellow gown, etc/Reinforce activity limits and safety measures with patient and family/Toileting schedule using arm’s reach rule for commode and bathroom/Use of alarms - bed, stretcher, chair and/or video monitoring/Call bell, personal items and telephone in reach/Instruct patient to call for assistance before getting out of bed/chair/stretcher/Non-slip footwear applied when patient is off stretcher/[place] to call system/Physically safe environment - no spills, clutter or unnecessary equipment/Purposeful Proactive Rounding/Room/bathroom lighting operational, light cord in reach

## 2024-09-23 NOTE — ED PROVIDER NOTE - ATTENDING CONTRIBUTION TO CARE
Patient was critically ill with a high probability of imminent or life threatening deterioration.    Stroke code for ams and worsening weakness.  Not a TNK candidate as patient was out of the window.  Will need admission for further work up.      I have performed direct patient care (not related to procedure), additional history taking, interpretation of diagnostic studies, documentation, consultation with other physicians, consultation with the patient's family    I have personally and independently provided the amount of critical care time documented below excluding time spent on separate procedures: 50 mins.

## 2024-09-23 NOTE — ED PROVIDER NOTE - CLINICAL SUMMARY MEDICAL DECISION MAKING FREE TEXT BOX
65 yo F with CAD s/p PCI, PAD s/p L popliteal angioplasty and atherectomy, CVAs w/ residual L sided weakness, R and L ICA stenosis, Parkinson's disease, HTN, HLD, T2DM, and decreased vision a/w code stroke.  Concern for possible acute on chronic weakness, though no focal symptoms.  Initial stroke scale 23.  BP with EMS noted to SBP 260s, repeat initial here is 170s. Will obtain labs, ct scans and re-eval.

## 2024-09-24 LAB
A1C WITH ESTIMATED AVERAGE GLUCOSE RESULT: 8 % — HIGH (ref 4–5.6)
ALBUMIN SERPL ELPH-MCNC: 4.6 G/DL — SIGNIFICANT CHANGE UP (ref 3.3–5)
ALP SERPL-CCNC: 118 U/L — SIGNIFICANT CHANGE UP (ref 40–120)
ALT FLD-CCNC: 19 U/L — SIGNIFICANT CHANGE UP (ref 4–33)
ANION GAP SERPL CALC-SCNC: 14 MMOL/L — SIGNIFICANT CHANGE UP (ref 7–14)
APPEARANCE UR: CLEAR — SIGNIFICANT CHANGE UP
AST SERPL-CCNC: 28 U/L — SIGNIFICANT CHANGE UP (ref 4–32)
BASOPHILS # BLD AUTO: 0.02 K/UL — SIGNIFICANT CHANGE UP (ref 0–0.2)
BASOPHILS NFR BLD AUTO: 0.3 % — SIGNIFICANT CHANGE UP (ref 0–2)
BILIRUB SERPL-MCNC: 0.5 MG/DL — SIGNIFICANT CHANGE UP (ref 0.2–1.2)
BILIRUB UR-MCNC: NEGATIVE — SIGNIFICANT CHANGE UP
BUN SERPL-MCNC: 15 MG/DL — SIGNIFICANT CHANGE UP (ref 7–23)
CALCIUM SERPL-MCNC: 9.9 MG/DL — SIGNIFICANT CHANGE UP (ref 8.4–10.5)
CHLORIDE SERPL-SCNC: 103 MMOL/L — SIGNIFICANT CHANGE UP (ref 98–107)
CO2 SERPL-SCNC: 26 MMOL/L — SIGNIFICANT CHANGE UP (ref 22–31)
COLOR SPEC: YELLOW — SIGNIFICANT CHANGE UP
CREAT SERPL-MCNC: 0.59 MG/DL — SIGNIFICANT CHANGE UP (ref 0.5–1.3)
DIFF PNL FLD: NEGATIVE — SIGNIFICANT CHANGE UP
EGFR: 99 ML/MIN/1.73M2 — SIGNIFICANT CHANGE UP
EOSINOPHIL # BLD AUTO: 0.25 K/UL — SIGNIFICANT CHANGE UP (ref 0–0.5)
EOSINOPHIL NFR BLD AUTO: 3.5 % — SIGNIFICANT CHANGE UP (ref 0–6)
ESTIMATED AVERAGE GLUCOSE: 183 — SIGNIFICANT CHANGE UP
GLUCOSE BLDC GLUCOMTR-MCNC: 122 MG/DL — HIGH (ref 70–99)
GLUCOSE BLDC GLUCOMTR-MCNC: 129 MG/DL — HIGH (ref 70–99)
GLUCOSE BLDC GLUCOMTR-MCNC: 147 MG/DL — HIGH (ref 70–99)
GLUCOSE BLDC GLUCOMTR-MCNC: 159 MG/DL — HIGH (ref 70–99)
GLUCOSE BLDC GLUCOMTR-MCNC: 173 MG/DL — HIGH (ref 70–99)
GLUCOSE SERPL-MCNC: 145 MG/DL — HIGH (ref 70–99)
GLUCOSE UR QL: NEGATIVE MG/DL — SIGNIFICANT CHANGE UP
HCT VFR BLD CALC: 41.7 % — SIGNIFICANT CHANGE UP (ref 34.5–45)
HGB BLD-MCNC: 13.4 G/DL — SIGNIFICANT CHANGE UP (ref 11.5–15.5)
IANC: 4.84 K/UL — SIGNIFICANT CHANGE UP (ref 1.8–7.4)
IMM GRANULOCYTES NFR BLD AUTO: 0.3 % — SIGNIFICANT CHANGE UP (ref 0–0.9)
KETONES UR-MCNC: NEGATIVE MG/DL — SIGNIFICANT CHANGE UP
LEUKOCYTE ESTERASE UR-ACNC: NEGATIVE — SIGNIFICANT CHANGE UP
LYMPHOCYTES # BLD AUTO: 1.36 K/UL — SIGNIFICANT CHANGE UP (ref 1–3.3)
LYMPHOCYTES # BLD AUTO: 18.8 % — SIGNIFICANT CHANGE UP (ref 13–44)
MCHC RBC-ENTMCNC: 23.1 PG — LOW (ref 27–34)
MCHC RBC-ENTMCNC: 32.1 GM/DL — SIGNIFICANT CHANGE UP (ref 32–36)
MCV RBC AUTO: 71.8 FL — LOW (ref 80–100)
MONOCYTES # BLD AUTO: 0.73 K/UL — SIGNIFICANT CHANGE UP (ref 0–0.9)
MONOCYTES NFR BLD AUTO: 10.1 % — SIGNIFICANT CHANGE UP (ref 2–14)
NEUTROPHILS # BLD AUTO: 4.84 K/UL — SIGNIFICANT CHANGE UP (ref 1.8–7.4)
NEUTROPHILS NFR BLD AUTO: 67 % — SIGNIFICANT CHANGE UP (ref 43–77)
NITRITE UR-MCNC: NEGATIVE — SIGNIFICANT CHANGE UP
NRBC # BLD: 0 /100 WBCS — SIGNIFICANT CHANGE UP (ref 0–0)
NRBC # FLD: 0 K/UL — SIGNIFICANT CHANGE UP (ref 0–0)
PH UR: 6 — SIGNIFICANT CHANGE UP (ref 5–8)
PLATELET # BLD AUTO: 302 K/UL — SIGNIFICANT CHANGE UP (ref 150–400)
POTASSIUM SERPL-MCNC: 4.1 MMOL/L — SIGNIFICANT CHANGE UP (ref 3.5–5.3)
POTASSIUM SERPL-SCNC: 4.1 MMOL/L — SIGNIFICANT CHANGE UP (ref 3.5–5.3)
PROT SERPL-MCNC: 8.1 G/DL — SIGNIFICANT CHANGE UP (ref 6–8.3)
PROT UR-MCNC: NEGATIVE MG/DL — SIGNIFICANT CHANGE UP
RBC # BLD: 5.81 M/UL — HIGH (ref 3.8–5.2)
RBC # FLD: 15.9 % — HIGH (ref 10.3–14.5)
SODIUM SERPL-SCNC: 143 MMOL/L — SIGNIFICANT CHANGE UP (ref 135–145)
SP GR SPEC: 1.03 — HIGH (ref 1–1.03)
UROBILINOGEN FLD QL: 0.2 MG/DL — SIGNIFICANT CHANGE UP (ref 0.2–1)
WBC # BLD: 7.22 K/UL — SIGNIFICANT CHANGE UP (ref 3.8–10.5)
WBC # FLD AUTO: 7.22 K/UL — SIGNIFICANT CHANGE UP (ref 3.8–10.5)

## 2024-09-24 PROCEDURE — 99222 1ST HOSP IP/OBS MODERATE 55: CPT

## 2024-09-24 RX ORDER — LOSARTAN POTASSIUM 100 MG/1
25 TABLET, FILM COATED ORAL DAILY
Refills: 0 | Status: DISCONTINUED | OUTPATIENT
Start: 2024-09-24 | End: 2024-10-04

## 2024-09-24 RX ORDER — METOPROLOL TARTRATE 50 MG
50 TABLET ORAL
Refills: 0 | Status: DISCONTINUED | OUTPATIENT
Start: 2024-09-24 | End: 2024-10-04

## 2024-09-24 RX ORDER — ATORVASTATIN CALCIUM 10 MG/1
40 TABLET, FILM COATED ORAL AT BEDTIME
Refills: 0 | Status: DISCONTINUED | OUTPATIENT
Start: 2024-09-24 | End: 2024-10-04

## 2024-09-24 RX ORDER — DOXAZOSIN 2 MG/1
2 TABLET ORAL AT BEDTIME
Refills: 0 | Status: DISCONTINUED | OUTPATIENT
Start: 2024-09-24 | End: 2024-10-04

## 2024-09-24 RX ORDER — GABAPENTIN 800 MG/1
100 TABLET, FILM COATED ORAL THREE TIMES A DAY
Refills: 0 | Status: DISCONTINUED | OUTPATIENT
Start: 2024-09-24 | End: 2024-10-04

## 2024-09-24 RX ORDER — ISOSORBIDE MONONITRATE 30 MG
30 TABLET, EXTENDED RELEASE 24 HR ORAL DAILY
Refills: 0 | Status: DISCONTINUED | OUTPATIENT
Start: 2024-09-24 | End: 2024-10-04

## 2024-09-24 RX ADMIN — Medication 75 MILLIGRAM(S): at 11:25

## 2024-09-24 RX ADMIN — Medication 1: at 17:44

## 2024-09-24 RX ADMIN — Medication 30 MILLIGRAM(S): at 10:15

## 2024-09-24 RX ADMIN — GABAPENTIN 100 MILLIGRAM(S): 800 TABLET, FILM COATED ORAL at 14:34

## 2024-09-24 RX ADMIN — Medication 81 MILLIGRAM(S): at 11:24

## 2024-09-24 RX ADMIN — ATORVASTATIN CALCIUM 40 MILLIGRAM(S): 10 TABLET, FILM COATED ORAL at 23:57

## 2024-09-24 RX ADMIN — Medication 30 MILLIGRAM(S): at 11:26

## 2024-09-24 RX ADMIN — Medication 1 TABLET(S): at 11:25

## 2024-09-24 RX ADMIN — GABAPENTIN 100 MILLIGRAM(S): 800 TABLET, FILM COATED ORAL at 23:57

## 2024-09-24 RX ADMIN — Medication 50 MILLIGRAM(S): at 19:26

## 2024-09-24 NOTE — PATIENT PROFILE ADULT - FALL HARM RISK - HARM RISK INTERVENTIONS
Assistance with ambulation/Assistance OOB with selected safe patient handling equipment/Communicate Risk of Fall with Harm to all staff/Discuss with provider need for PT consult/Monitor gait and stability/Provide patient with walking aids - walker, cane, crutches/Reinforce activity limits and safety measures with patient and family/Tailored Fall Risk Interventions/Use of alarms - bed, chair and/or voice tab/Visual Cue: Yellow wristband and red socks/Bed in lowest position, wheels locked, appropriate side rails in place/Call bell, personal items and telephone in reach/Instruct patient to call for assistance before getting out of bed or chair/Non-slip footwear when patient is out of bed/[place] to call system/Physically safe environment - no spills, clutter or unnecessary equipment/Purposeful Proactive Rounding/Room/bathroom lighting operational, light cord in reach

## 2024-09-24 NOTE — PROGRESS NOTE ADULT - SUBJECTIVE AND OBJECTIVE BOX
Name of Patient : ALYSE AVENDAÑO  MRN: [id number]  Date of visit: 09-25-24       Subjective: Patient seen and examined. No new events except as noted.     REVIEW OF SYSTEMS:    CONSTITUTIONAL: No weakness, fevers or chills  EYES/ENT: No visual changes;  No vertigo or throat pain   NECK: No pain or stiffness  RESPIRATORY: No cough, wheezing, hemoptysis; No shortness of breath  CARDIOVASCULAR: No chest pain or palpitations  GASTROINTESTINAL: No abdominal or epigastric pain. No nausea, vomiting, or hematemesis; No diarrhea or constipation. No melena or hematochezia.  GENITOURINARY: No dysuria, frequency or hematuria  NEUROLOGICAL: No numbness or weakness  SKIN: No itching, burning, rashes, or lesions   All other review of systems is negative unless indicated above.    MEDICATIONS:  MEDICATIONS  (STANDING):  aspirin enteric coated 81 milliGRAM(s) Oral daily  atorvastatin 40 milliGRAM(s) Oral at bedtime  carbidopa/levodopa  25/100 1 Tablet(s) Oral daily  clopidogrel Tablet 75 milliGRAM(s) Oral daily  dextrose 5%. 1000 milliLiter(s) (100 mL/Hr) IV Continuous <Continuous>  dextrose 5%. 1000 milliLiter(s) (50 mL/Hr) IV Continuous <Continuous>  dextrose 50% Injectable 25 Gram(s) IV Push once  dextrose 50% Injectable 25 Gram(s) IV Push once  dextrose 50% Injectable 12.5 Gram(s) IV Push once  doxazosin 2 milliGRAM(s) Oral at bedtime  enoxaparin Injectable 40 milliGRAM(s) SubCutaneous every 24 hours  gabapentin 100 milliGRAM(s) Oral three times a day  glucagon  Injectable 1 milliGRAM(s) IntraMuscular once  influenza  Vaccine (HIGH DOSE) 0.5 milliLiter(s) IntraMuscular once  insulin lispro (ADMELOG) corrective regimen sliding scale   SubCutaneous at bedtime  insulin lispro (ADMELOG) corrective regimen sliding scale   SubCutaneous three times a day before meals  isosorbide   mononitrate ER Tablet (IMDUR) 30 milliGRAM(s) Oral daily  losartan 25 milliGRAM(s) Oral daily  metoprolol tartrate 50 milliGRAM(s) Oral two times a day  NIFEdipine XL 30 milliGRAM(s) Oral daily      PHYSICAL EXAM:  T(C): 36.9 (09-25-24 @ 12:11), Max: 36.9 (09-25-24 @ 12:11)  HR: 68 (09-25-24 @ 12:11) (63 - 86)  BP: 152/87 (09-25-24 @ 12:11) (144/66 - 172/89)  RR: 19 (09-25-24 @ 12:11) (16 - 19)  SpO2: 98% (09-25-24 @ 12:11) (97% - 100%)  Wt(kg): --  I&O's Summary        Appearance: Normal	  HEENT:  PERRLA   Lymphatic: No lymphadenopathy   Cardiovascular: Normal S1 S2, no JVD  Respiratory: normal effort , clear  Gastrointestinal:  Soft, Non-tender  Skin: No rashes,  warm to touch  Psychiatry:  Mood & affect appropriate  Musculuskeletal: No edema    recent labs, Imaging and EKGs personally reviewed

## 2024-09-24 NOTE — PHARMACOTHERAPY INTERVENTION NOTE - COMMENTS
Medication history is complete. Medication list updated in Outpatient Medication Record (OMR). Please call spectra [phone] if you have any questions.

## 2024-09-24 NOTE — PROGRESS NOTE ADULT - ASSESSMENT
EKG NSR 88  TTE 2/20/2024    1. Agitated saline injection was negative for intracardiac shunt.   2. Left ventricular cavity is normal in size. Left ventricular wall thickness is normal. Left ventricular systolic function is hyperdynamic. There are no regional wall motion abnormalities seen.   3. Normal left ventricular diastolic function, with normal filling pressure.   4. Normal right ventricular cavity size, with wall thickness, and normal systolic function.   5. Normal left and right atrial size.   6. No significant valvular disease.   7. No pericardial effusion seen.    NST 5/3/2024  1. Normal myocardial perfusion scan, with no evidence of infarction or inducible ischemia.   2. Stress electrocardiogram: No ischemic ST segment changes.   3. Normal left ventricular regional wall motion.   4. The left ventricle is normal in function and normal in size. Normal left ventricular diastolic function. The post stress end diastolic volume is 59 ml and systolic volume is 13 ml.   5. The left ventricle is normal in function and normal in size. Normal left ventricular diastolic function.   6. Normal right ventricular function. There is no right ventricular dilation. RVEF = 48%, RVEDV = 93 mL, RVESV = 49 mL.    Cerebral angiogram 2/22/2024 There is atherosclerotic plaque at the right carotid bifurcation   which causes 45% stenosis at the origin of the right cervical internal carotid artery, There is atherosclerotic   plaque at the left carotid bifurcation which causes 33% stenosis at the origin of the left cervical internal carotid artery. Diagnostic cerebral angiogram demonstrating mild left and moderate right cervical internal carotid artery atherosclerotic stenosis      Patient is a 63 year old female with a PMHx of  HTN, DM, HLD, CAD (s/p 3 stents placed at [facility] in 2014 to the prox LAD and then in 2022 - on ASA and Plavix), PAD s/p L popliteal angioplasty and atherectomy 12/23, R ICA and L ICA stenosis, prior stroke with L side weakness, Parkinson's disease, B/L cataract surgery with blurred vision at baseline who presents with unresponsiveness        1. AMS  - Pt is responsive now, able to move all extremities  - CTA   NECK: Severe right and moderate left internal carotid artery origin stenosis, as above. No occlusion or dissection.   HEAD: No significant change. No large vessel occlusion. 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   - Cerebral angiogram 2/22/2024 There is atherosclerotic plaque at the right carotid bifurcation   which causes 45% stenosis at the origin of the right cervical internal carotid artery, There is atherosclerotic   plaque at the left carotid bifurcation which causes 33% stenosis at the origin of the left cervical internal carotid artery. Diagnostic cerebral angiogram demonstrating mild left and moderate right cervical internal carotid artery atherosclerotic stenosis  - f/u neuro recs, vascular on board  - home meds ASA, plavix, lipitor 40mg    2. Carotid artery stenosis  - CTA shows  NECK: Severe right and moderate left internal carotid artery origin stenosis, as above. No occlusion or dissection.    HEAD: No significant change. No large vessel occlusion. 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  - Cerebral angiogram 2/22/2024 There is atherosclerotic plaque at the right carotid bifurcation   which causes 45% stenosis at the origin of the right cervical internal carotid artery, There is atherosclerotic   plaque at the left carotid bifurcation which causes 33% stenosis at the origin of the left cervical internal carotid artery. Diagnostic cerebral angiogram demonstrating mild left and moderate right cervical internal carotid artery atherosclerotic stenosis.  - pt f/u with outpt neurologist  -vascular on board  - home meds ASA, plavix, lipitor 40mg      3. Hypertension  - BP meds resumed    4. CAD s/p stents  - s/p 3 stents placed at [facility] in 2014 to the prox LAD and then in 2022  - TTE with preserved EF and no WMA  - c/w ASA, Plavix and statin    5. HLD  - c/w atorvastatin 80mg PO daily    6. hx of palpitations s/p Falls   - TTE with preserved EF and no WMA  -s/p loop monitor

## 2024-09-24 NOTE — CONSULT NOTE ADULT - SUBJECTIVE AND OBJECTIVE BOX
*** SURGERY CONSULT NOTE    Consulting Team:     Patient: ALYSE AVENDAÑO , 64y ([birth year])Female   MRN: [id number]  Location: [facility] 07  Visit: 09-23-24 Inpatient  Date: 09-24-24 @ 11:50      HPI:  64F w/ PMHx CAD s/p PCI, PAD s/p L popliteal angioplasty and atherectomy, CVAs (2022) w/ residual L sided weakness, R and L ICA stenosis, Parkinson's disease, HTN, HLD, T2DM, and decreased vision presenting to [facility] ED as code stroke. Patient's  states that when he went to work at 6 AM on 9/23/2024, patient was in her usual state of health,  ambulates with walker at baseline. When he returned from work at around 13:30 on 9/23/2024, home health aide reported that patient was not responsive to questions or commands, moving her eyes only and with rigid body. Per EMS, BP was 250/129 on presentation and blood glucose was 211. Repeat BP in ED was 170/90 and blood glucose of 214. Patient takes aspirin and plavix at home. Of note, Pt with similar episode while hospitalized for recurrent fall workup at [facility] in June 2024. Patient underwent CT head with non-acute findings and CTA head/neck showing no LVO but severe right and moderate left internal carotid artery origin stenosis.     Patient seen by vascular team,  reports she is back to her baseline, following commands but minimally verbal. AVSS. Vascular c/f stenosis of R and L ICA.       PAST MEDICAL HISTORY:  No pertinent past medical history    HTN (hypertension)    HLD (hyperlipidemia)    CAD (coronary artery disease)    Type II diabetes mellitus    PAD (peripheral artery disease)    Mild dementia    Parkinson disease        PAST SURGICAL HISTORY:  S/P hysterectomy        MEDICATIONS:  aspirin enteric coated 81 milliGRAM(s) Oral daily  atorvastatin 40 milliGRAM(s) Oral at bedtime  carbidopa/levodopa  25/100 1 Tablet(s) Oral daily  clopidogrel Tablet 75 milliGRAM(s) Oral daily  dextrose 5%. 1000 milliLiter(s) IV Continuous <Continuous>  dextrose 5%. 1000 milliLiter(s) IV Continuous <Continuous>  dextrose 50% Injectable 25 Gram(s) IV Push once  dextrose 50% Injectable 25 Gram(s) IV Push once  dextrose 50% Injectable 12.5 Gram(s) IV Push once  dextrose Oral Gel 15 Gram(s) Oral once PRN  doxazosin 2 milliGRAM(s) Oral at bedtime  enoxaparin Injectable 40 milliGRAM(s) SubCutaneous every 24 hours  gabapentin 100 milliGRAM(s) Oral three times a day  glucagon  Injectable 1 milliGRAM(s) IntraMuscular once  insulin lispro (ADMELOG) corrective regimen sliding scale   SubCutaneous at bedtime  insulin lispro (ADMELOG) corrective regimen sliding scale   SubCutaneous three times a day before meals  isosorbide   mononitrate ER Tablet (IMDUR) 30 milliGRAM(s) Oral daily  losartan 25 milliGRAM(s) Oral daily  metoprolol tartrate 50 milliGRAM(s) Oral two times a day  NIFEdipine XL 30 milliGRAM(s) Oral daily      ALLERGIES:  No Known Allergies      VITALS & I/Os:  Vital Signs Last 24 Hrs  T(C): 36.7 (24 Sep 2024 11:20), Max: 36.7 (23 Sep 2024 21:04)  T(F): 98 (24 Sep 2024 11:20), Max: 98 (23 Sep 2024 21:04)  HR: 85 (24 Sep 2024 11:20) (75 - 98)  BP: 153/77 (24 Sep 2024 11:20) (153/77 - 250/129)  BP(mean): 95 (24 Sep 2024 00:27) (95 - 95)  RR: 20 (24 Sep 2024 11:20) (16 - 22)  SpO2: 99% (24 Sep 2024 11:20) (98% - 100%)    Parameters below as of 24 Sep 2024 11:20  Patient On (Oxygen Delivery Method): room air        I&O's Summary      PHYSICAL EXAM:  General Appearance: no acute distress, NTND   Neuro: no facial asymmetry, no tongue deviation, moving b/l UE and LE spontaneously with LS weakeness appreciated c/w previous stroke deficits  Chest: airway intact, non-labored breathing  CV: no JVD, palpable pulses b/l  Abdomen: soft, non-tender, non-distended, +BS   Extremities: WWP     LABS:                        13.4   7.22  )-----------( 302      ( 24 Sep 2024 09:15 )             41.7     09-24    143  |  103  |  15  ----------------------------<  145[H]  4.1   |  26  |  0.59    Ca    9.9      24 Sep 2024 09:15    TPro  8.1  /  Alb  4.6  /  TBili  0.5  /  DBili  x   /  AST  28  /  ALT  19  /  AlkPhos  118  09-24    Lactate:    PT/INR - ( 23 Sep 2024 14:53 )   PT: 12.2 sec;   INR: 1.09 ratio         PTT - ( 23 Sep 2024 14:53 )  PTT:32.7 sec          Urinalysis Basic - ( 24 Sep 2024 09:15 )    Color: x / Appearance: x / SG: x / pH: x  Gluc: 145 mg/dL / Ketone: x  / Bili: x / Urobili: x   Blood: x / Protein: x / Nitrite: x   Leuk Esterase: x / RBC: x / WBC x   Sq Epi: x / Non Sq Epi: x / Bacteria: x          IMAGING:  CT HEAD:  1. No significant change compared with 6/17/2024.  2. No evidence of acute hemorrhage, hydrocephalus or mass effect.  3. Additional findings described in detail above.    CT PERFUSION:  1. No predicted core infarct.  2. No evidence of active ischemia-tissue at risk.    CTA NECK:  1. No significant change.  2. Bilateral vertebral arteries patent and codominant.  3. Severe right and moderate left internal carotid artery origin   stenosis, as above. No occlusion or dissection.  4. Additional findings described in detail above.    CTA HEAD:  1. No significant change.  2. No large vessel occlusion.  3. 1-2 mm aneurysm versus infundibulum terminal segment left internal   carotid artery.  4. Additional findings, including multifocal stenosis and anatomic   variants, described in detail above.

## 2024-09-24 NOTE — CONSULT NOTE ADULT - ATTENDING COMMENTS
64 year old woman with bilateral carotid artery stenosis, right worse than left  presents with hypertensive crisis and altered mental status  no evidence of stroke  continue best medical therapy  obtain carotid artery duplex  will likely need R CEA in the near future  will follow

## 2024-09-24 NOTE — PHYSICAL THERAPY INITIAL EVALUATION ADULT - PERTINENT HX OF CURRENT PROBLEM, REHAB EVAL
Patient is a 64 year old Female, PMH stated below, presents as a code stroke; as per home health aide patient was not responsive to questions or commands, moving her eyes only and with rigid body. Per EMS, BP was 250/129 on presentation and blood glucose was 211. Repeat BP in ED was 170/90 and blood glucose of 214. Pt with similar episode while hospitalized for recurrent fall workup at [facility] in June 2024; RRT then called for episode of unresponsiveness possible seizure like activity however EEG negative; pt returned to baseline.

## 2024-09-24 NOTE — PROGRESS NOTE ADULT - SUBJECTIVE AND OBJECTIVE BOX
Johnathan Tejeda MD  Interventional Cardiology / Advance Heart Failure and Cardiac Transplant Specialist  [place] Office : [phone] [place] [address] [address]. [address]  Tel:   [place] Office : [phone] [facility] [place] [address]  Tel: [phone]       Pt is lying in bed comfortable not in distress, pt seen and examined at bedside,  at bedside   	  MEDICATIONS:  aspirin enteric coated 81 milliGRAM(s) Oral daily  clopidogrel Tablet 75 milliGRAM(s) Oral daily  doxazosin 2 milliGRAM(s) Oral at bedtime  enoxaparin Injectable 40 milliGRAM(s) SubCutaneous every 24 hours  isosorbide   mononitrate ER Tablet (IMDUR) 30 milliGRAM(s) Oral daily  losartan 25 milliGRAM(s) Oral daily  metoprolol tartrate 50 milliGRAM(s) Oral two times a day  NIFEdipine XL 30 milliGRAM(s) Oral daily        carbidopa/levodopa  25/100 1 Tablet(s) Oral daily  gabapentin 100 milliGRAM(s) Oral three times a day      atorvastatin 40 milliGRAM(s) Oral at bedtime  dextrose 50% Injectable 25 Gram(s) IV Push once  dextrose 50% Injectable 25 Gram(s) IV Push once  dextrose 50% Injectable 12.5 Gram(s) IV Push once  dextrose Oral Gel 15 Gram(s) Oral once PRN  glucagon  Injectable 1 milliGRAM(s) IntraMuscular once  insulin lispro (ADMELOG) corrective regimen sliding scale   SubCutaneous at bedtime  insulin lispro (ADMELOG) corrective regimen sliding scale   SubCutaneous three times a day before meals    dextrose 5%. 1000 milliLiter(s) IV Continuous <Continuous>  dextrose 5%. 1000 milliLiter(s) IV Continuous <Continuous>      PAST MEDICAL/SURGICAL HISTORY  PAST MEDICAL & SURGICAL HISTORY:  HTN (hypertension)      HLD (hyperlipidemia)      CAD (coronary artery disease)      Type II diabetes mellitus      PAD (peripheral artery disease)      Mild dementia      Parkinson disease      S/P hysterectomy          SOCIAL HISTORY: Substance Use (street drugs): ( x ) never used  (  ) other:    FAMILY HISTORY:      PHYSICAL EXAM:  T(C): 36.7 (09-24-24 @ 11:20), Max: 36.7 (09-23-24 @ 21:04)  HR: 85 (09-24-24 @ 11:20) (75 - 98)  BP: 153/77 (09-24-24 @ 11:20) (153/77 - 214/85)  RR: 20 (09-24-24 @ 11:20) (16 - 22)  SpO2: 99% (09-24-24 @ 11:20) (99% - 100%)  Wt(kg): --  I&O's Summary      Weight (kg): 66.7 (09-24 @ 00:33)    GENERAL: NAD   EYES: EOMI, PERRLA, conjunctiva and sclera clear  ENMT: No tonsillar erythema, exudates, or enlargement; Moist mucous membranes, Good dentition, No lesions  Cardiovascular: Normal S1 S2, No JVD, No murmurs, No edema  Respiratory: Lungs clear to auscultation	  Gastrointestinal:  Soft, Non-tender, + BS	  Extremities: no edema                                    13.4   7.22  )-----------( 302      ( 24 Sep 2024 09:15 )             41.7     09-24    143  |  103  |  15  ----------------------------<  145[H]  4.1   |  26  |  0.59    Ca    9.9      24 Sep 2024 09:15    TPro  8.1  /  Alb  4.6  /  TBili  0.5  /  DBili  x   /  AST  28  /  ALT  19  /  AlkPhos  118  09-24    proBNP:   Lipid Profile:   HgA1c:   TSH:     Consultant(s) Notes Reviewed:  [x ] YES  [ ] NO    Care Discussed with Consultants/Other Providers [ x] YES  [ ] NO    Imaging Personally Reviewed independently:  [x] YES  [ ] NO    All labs, radiologic studies, vitals, orders and medications list reviewed. Patient is seen and examined at bedside. Case discussed with medical team.

## 2024-09-24 NOTE — CHART NOTE - NSCHARTNOTEFT_GEN_A_CORE
Overnight Medicine ACP Coverage     Code stroke called in ED for AMS, unresponsiveness and nonverbal on nursing assessment. As per RN patient was previously A&Ox3. When euro residents arrival exam improved to baseline. Overnight Medicine ACP Coverage     Code stroke called in ED for AMS, unresponsiveness and nonverbal on nursing assessment. As per RN patient was previously A&Ox3. When neuro resident arrival exam improved to baseline. On assessment patient was able to follows commands and answers most questions appropriately, moving all extremities. Upon chart review patient noted similar episodes (see chart note from June 17th 2024). Pt returned to room, slightly agitated when being asked questions. Will continue to monitor. D/w neuro regarding recommendations, continue DAPT and neuro checks. Plan for vEEG when able.    Sawyer Cherry PA-C  Department of Medicine  [facility]  [phone]

## 2024-09-24 NOTE — ED ADULT NURSE REASSESSMENT NOTE - NS ED NURSE REASSESS COMMENT FT1
#20g IV placed to R forearm, lab work collected as ordered. #18g IV to LAC, removed intact, dressing applied. Pt tolerated well. Will continue to monitor.
Attempted to perform dysphagia screening. Pt refusing and stating "NO". ACP made aware. Will reattempt dysphagia screening. Respirations are even and unlabored, NAD, CM in progress. Safety precautions implemented as per protocol, awaiting further MD orders, plan of care ongoing.
Break RN: Patient awake and resting in stretcher; respirations even and unlabored, no signs/symptoms of acute distress. Patient denies dyspnea, shortness of breath, and chest pain. Patient denies pain and offers no complaints at this time. Patient placed on primafit, safety measures in place and family at bedside.
Pt A&Ox2-3, returning to normal baseline. Pt moving all extremities spontaneously, Stating name and . Admitting team at bedside. CM in progress. Respirations are even and unlabored, NAD, no other complaints at this moment. Pt admitted, awaiting for bed assignment. Safety precautions implemented as per protocol, awaiting further MD orders, plan of care ongoing.
Pt refusing VS, morning blood work and FS. Pt states "NO" and attempts to hit staff. ACP made aware. Was recommended to hold off until patient is seen by admitted team. Respirations are even and unlabored, NAD, CM in progress. Safety precautions implemented as per protocol, awaiting further MD orders, plan of care ongoing.
Received report from Day RN: Pt A&Ox4, appears to be resting comfortably, NAD, respirations are even and unlabored, no complaints at this moment, VS noted. NIH as per flowsheet. CM on progress. Pt admitted, awaiting for bed assignment. Safety precautions implemented as per protocol, awaiting further MD orders, plan of care ongoing.
Received report from nightshift Rn. Pt resting in bed, requesting to brush teeth. Pt provided with supplies. Pt denies headache, dizziness, numbness or tingling. Pt pending admission bed assignment, will continue to monitor.
Break RN: Pt received in stretcher in room 3. Pt resting comfortably. pt offered no complains at present. respiration even and non-labored. in NAD. Admitted to tele, NSR on monitor. pending bed assignment.
Pt woke up for NIH, upon assessment, pt appears to be lethargic and not able to state their name. Pt suddenly unresponsive and staring blank ahead. Pt noted to be aphasic. MD Cohen at bedside for evaluation. VS noted, FS performed (LS=[id number]), Code stroke called. Admitting team at bedside for further evaluation.

## 2024-09-24 NOTE — PHYSICAL THERAPY INITIAL EVALUATION ADULT - ADDITIONAL COMMENTS
As per  pt has fallen 9x in the past 6 months.     Pt left seated in wheelchair with PCA for transport to bathroom for hygiene care in NAD, all lines intact, call bell in reach, SPO2 100%, and RN aware.

## 2024-09-24 NOTE — PHYSICAL THERAPY INITIAL EVALUATION ADULT - ACTIVE RANGE OF MOTION EXAMINATION, REHAB EVAL
erica. upper extremity Active ROM was WNL (within normal limits)/bilateral lower extremity Active ROM was WNL (within normal limits)

## 2024-09-24 NOTE — PATIENT PROFILE ADULT - NSPROPTRIGHTSUPPORTPERSON_GEN_A_NUR
Physician Documentation                                                                           

 [facility]                                                                 

Name: Robert Tietz                                                                                

Age: 51 yrs                                                                                       

Sex: Male                                                                                         

: 1968                                                                                   

MRN: [id number]                                                                                   

Arrival Date: 2020                                                                          

Time: 00:29                                                                                       

Account#: [id number]                                                                            

Bed 18                                                                                            

Private MD: Reese Avila T                                                                        

ED Physician Armando Mcclain                                                                             

HPI:                                                                                              

                                                                                             

01:01 This 51 yrs old  Male presents to ER via Ambulatory with complaints of Eye      pkl 

      Problem.                                                                                    

01:01 Twitching left eye. Onset: The symptoms/episode began/occurred 3 day(s) ago. Associated pkl 

      signs and symptoms: Pertinent positives: None.                                              

                                                                                                  

Historical:                                                                                       

- Allergies:                                                                                      

01:00 No Known Allergies;                                                                     bb  

- Home Meds:                                                                                      

01:00 None [Active];                                                                          bb  

- PMHx:                                                                                           

01:00 Deaf;                                                                                   bb  

- PSHx:                                                                                           

01:00 Tonsillectomy;                                                                          bb  

                                                                                                  

- Immunization history:: Adult Immunizations up to date.                                          

- Social history:: Smoking status: Patient/guardian denies using tobacco.                         

- Ebola Screening: : No symptoms or risks identified at this time.                                

                                                                                                  

                                                                                                  

ROS:                                                                                              

01:01 Eyes: Positive for of the left  eye, Twitching.                                         pkl 

01:01 ENT: Negative for acute changes.                                                            

01:01 Neck: Negative for stiffness.                                                               

01:01 Cardiovascular: Negative for chest pain.                                                    

01:01 Respiratory: Negative for cough, shortness of breath.                                       

01:01 Abdomen/GI: Negative for abdominal pain, nausea, vomiting, and diarrhea.                    

01:01 Back: Negative for acute changes.                                                           

01:01 : Negative for urinary symptoms.                                                          

01:01 MS/extremity: Negative for acute changes.                                                   

01:01 Skin: Negative for rash.                                                                    

01:01 Neuro: Negative for altered mental status, headache, loss of consciousness.                 

                                                                                                  

Exam:                                                                                             

01:01 Visual Acuity: I have reviewed the nursing documentation.                               pkl 

01:01 ENT:  Nares patent. No nasal discharge, no septal abnormalities noted.  Tympanic            

      membranes are normal and external auditory canals are clear.  Oropharynx with no            

      redness, swelling, or masses, exudates, or evidence of obstruction, uvula midline.          

      Mucous membranes moist.                                                                     

01:01 Eyes: Periorbital structures: appear normal, Pupils: no acute changes.                      

01:01 Neck: Exam negative for nuchal rigidity.                                                    

01:01 Chest/axilla: Exam negative for acute changes.                                              

01:01 Cardiovascular: Rate: normal, Rhythm: regular.                                              

01:01 Respiratory: the patient does not display signs of respiratory distress,  Respirations:     

      normal, Breath sounds: are clear throughout.                                                

01:01 Abdomen/GI: Exam negative for acute changes.                                                

01:01 Back: Exam negative for acute changes.                                                      

01:01 : Exam negative for acute changes.                                                        

01:01 Musculoskeletal/extremity: Exam is negative for acute changes.                              

01:01 Skin: Exam negative for rash.                                                               

01:01 Neuro: Orientation: is normal, Mentation: is normal, Cranial nerves: grossly normal,        

      Motor: is normal.                                                                           

                                                                                                  

Vital Signs:                                                                                      

01:00  / 109; Pulse 89; Resp 14 S; Temp 98.5(O); Pulse Ox 96% on R/A; Weight 70.76 kg   bb  

      (R); Height 5 ft. 5 in. (165.10 cm) (R); Pain 0/10;                                         

01:22  / 99; Pulse 96; Resp 18; Pulse Ox 97% ;                                          fc  

01:50  / 104; Pulse 75; Resp 18; Pulse Ox 98% on R/A; Pain 0/10;                        fc  

02:19  / 94; Pulse 78; Resp 18; Pulse Ox 98% on R/A;                                    fc  

02:47  / 89; Pulse 72; Resp 18; Pulse Ox 96% on R/A; Pain 0/10;                         fc  

01:00 Body Mass Index 25.96 (70.76 kg, 165.10 cm)                                             bb  

                                                                                                  

Visual Acuity:                                                                                    

01:02 Left Eye Visual acuity 20/25, Pupil size 4 mm, Reactive To Accomodation; Right Eye      bb  

      Visual acuity 20/25, Pupil size 4 mm, Reactive To Accomodation; Both Eyes Visual acuity     

      20/20; With Lenses;                                                                         

                                                                                                  

MDM:                                                                                              

00:44 Patient medically screened.                                                             pkl 

02:28 Data reviewed: vital signs, nurses notes, lab test result(s), radiologic studies, CT    pkl 

      scan.                                                                                       

                                                                                                  

                                                                                             

00:52 Order name: CBC with Diff; Complete Time: 02:22                                         pkl 

                                                                                             

00:52 Order name: Chem 7; Complete Time: 02:22                                                pkl 

                                                                                             

00:52 Order name: CT Head Brain wo Cont                                                       pkl 

                                                                                                  

Administered Medications:                                                                         

01:54 Not Given (Physician Discretion): cloNIDine 0.1 mg PO once                              bb  

01:57 Drug: Metoprolol TARTRATE (Lopressor) 50 mg Route: PO;                                  fc  

03:04 Follow up: Response: No adverse reaction; Marked relief of symptoms; Blood pressure is  fc  

      lowered                                                                                     

                                                                                                  

                                                                                                  

Disposition:                                                                                      

20 02:29 Discharged to Home. Impression: Twitching left eye. Hypertension.                  

- Condition is Stable.                                                                            

                                                                                                  

- Prescriptions for Carvedilol 6.25 mg Oral Tablet - take 1 tablet by ORAL route 2                

  times per day with food; 60 tablet.                                                             

- Medication Reconciliation Form, Thank You Letter, Antibiotic Education, Prescription            

  Opioid Use, Work release form form.                                                             

- Follow up: Amada Aguilar MD; When: 1 - 2 days; Reason: Re-evaluation by your                  

  physician.                                                                                      

- Problem is new.                                                                                 

- Symptoms are unchanged.                                                                         

                                                                                                  

                                                                                                  

                                                                                                  

Signatures:                                                                                       

Dispatcher [employer]                           EDMS                                                 

Armando Mcclain MD MD   pkl                                                  

Leigha Jalloh RN                   RN   Norah Boston RN                     RN   bb                                                   

                                                                                                  

Corrections: (The following items were deleted from the chart)                                    

03:03 02:29 2020 02:29 Discharged to Home. Impression: Twitching left eye.              fc  

      Hypertension. Condition is Stable. Forms are Medication Reconciliation Form, Thank You      

      Letter, Antibiotic Education, Prescription Opioid Use. Follow up: Amada Aguilar; When:      

      1 - 2 days; Reason: Re-evaluation by your physician. Problem is new. Symptoms are           

      unchanged. pkl                                                                              

                                                                                                  

**************************************************************************************************
declines

## 2024-09-24 NOTE — CONSULT NOTE ADULT - ASSESSMENT
64F w/ PMHx CAD s/p PCI, PAD s/p L popliteal angioplasty and atherectomy, CVAs (2022) w/ residual L sided weakness, R and L ICA stenosis, Parkinson's disease, HTN, HLD, T2DM, and decreased vision presenting to [facility] ED as code stroke iso /129 and not responding to questions/commands with rigid body. Patient improved to baseline while in ED and underwent CT head with non-acute findings and CTA head/neck showing no LVO but severe right and moderate left internal carotid artery origin stenosis. Vascular surgery c/f evaluation.       Rec:  - can obtain b/l carotid duplex to further evaluate carotid stenosis    - c/w asa/plavix  - f/u neuro recs > vEEG   - vascular surgery to follow  - discussed with fellow of behalf of Dr. Emerson BORJAS Team Surgery [id number]  Hermann Chamorro MD (PGY2)

## 2024-09-24 NOTE — PATIENT PROFILE ADULT - FALL HARM RISK - FALL HARM RISK
HPI:  Diet: gt fed  Naps: 1-2  Stools: soft  Accidents/Illness: nil  Lead Assessment: Low Risk    Downs syndrome    Growth and Development:  Gives directions to other childrenNo  Toilet trained No  Rides around on trike/bikeNo  Draws a circleNo  Asks questions, who, what, when, where, why, howNo    Physical:  Visit Vitals  Ht 2' 5.5\" (0.749 m)   Wt (!) 8.94 kg   HC 41 cm (16.14\")   BMI 15.92 kg/m²     GENERAL: The patient is awake, alert, and interactive, in no acute distress.  downs facies     HEENT: Atraumatic.   . Pupils: Equal, round, and reactive to light bilaterally.  Red reflex is intact bilaterally. TMs are small.  Nares are patent.  Oropharynx and mucous membranes are moist.  Tonsils are normal in size and appearance.    LUNGS: Clear to auscultation bilaterally.  No wheezes, rales, or rhonchi.    CARDIAC: Regular rate and rhythm without murmurs, rubs, or gallops.    ABDOMEN: Soft, nontender, nondistended, with normal abdominal bowel sounds.  No hepatosplenomegaly.gt site looks good    EXTREMITIES: hypotonic    NEUROLOGIC:  delayed   normal    Diagnosis:  Downs syndrome  Gt dependent  Will start dental check ups  Followed by pulmonology and Gi    Immunizations Given: per EMR    Education:  Diet - 3 meals a day with snacks, low fat  Behavior -Fast moving, value judgement, very aware of peers  Accident Prevention -Street dangers, cars, falls, knives, car restraints, caution with strangers.  Guidance - Bowel and bladder problems, stuttering, exercise, peer play, and TV programs.          
Other

## 2024-09-25 ENCOUNTER — RESULT REVIEW (OUTPATIENT)
Age: 64
End: 2024-09-25

## 2024-09-25 LAB
GLUCOSE BLDC GLUCOMTR-MCNC: 160 MG/DL — HIGH (ref 70–99)
GLUCOSE BLDC GLUCOMTR-MCNC: 172 MG/DL — HIGH (ref 70–99)
GLUCOSE BLDC GLUCOMTR-MCNC: 192 MG/DL — HIGH (ref 70–99)
GLUCOSE BLDC GLUCOMTR-MCNC: 208 MG/DL — HIGH (ref 70–99)

## 2024-09-25 PROCEDURE — 93880 EXTRACRANIAL BILAT STUDY: CPT | Mod: 26

## 2024-09-25 PROCEDURE — 93306 TTE W/DOPPLER COMPLETE: CPT | Mod: 26

## 2024-09-25 PROCEDURE — 95720 EEG PHY/QHP EA INCR W/VEEG: CPT

## 2024-09-25 PROCEDURE — 93291 INTERROG DEV EVAL SCRMS IP: CPT | Mod: 26

## 2024-09-25 RX ORDER — INFLUENZA VIRUS VACCINE 15; 15; 15; 15 UG/.5ML; UG/.5ML; UG/.5ML; UG/.5ML
0.5 SUSPENSION INTRAMUSCULAR ONCE
Refills: 0 | Status: DISCONTINUED | OUTPATIENT
Start: 2024-09-25 | End: 2024-10-04

## 2024-09-25 RX ADMIN — Medication 81 MILLIGRAM(S): at 13:11

## 2024-09-25 RX ADMIN — Medication 1: at 08:51

## 2024-09-25 RX ADMIN — ENOXAPARIN SODIUM 40 MILLIGRAM(S): 150 INJECTION SUBCUTANEOUS at 05:49

## 2024-09-25 RX ADMIN — Medication 30 MILLIGRAM(S): at 05:49

## 2024-09-25 RX ADMIN — LOSARTAN POTASSIUM 25 MILLIGRAM(S): 100 TABLET, FILM COATED ORAL at 05:48

## 2024-09-25 RX ADMIN — DOXAZOSIN 2 MILLIGRAM(S): 2 TABLET ORAL at 01:15

## 2024-09-25 RX ADMIN — Medication 1 TABLET(S): at 13:11

## 2024-09-25 RX ADMIN — Medication 1: at 12:55

## 2024-09-25 RX ADMIN — Medication 50 MILLIGRAM(S): at 05:49

## 2024-09-25 RX ADMIN — Medication 75 MILLIGRAM(S): at 13:11

## 2024-09-25 RX ADMIN — Medication 50 MILLIGRAM(S): at 18:29

## 2024-09-25 RX ADMIN — Medication 1: at 18:23

## 2024-09-25 RX ADMIN — GABAPENTIN 100 MILLIGRAM(S): 800 TABLET, FILM COATED ORAL at 21:49

## 2024-09-25 RX ADMIN — GABAPENTIN 100 MILLIGRAM(S): 800 TABLET, FILM COATED ORAL at 05:48

## 2024-09-25 RX ADMIN — Medication 30 MILLIGRAM(S): at 13:11

## 2024-09-25 RX ADMIN — ATORVASTATIN CALCIUM 40 MILLIGRAM(S): 10 TABLET, FILM COATED ORAL at 21:49

## 2024-09-25 RX ADMIN — GABAPENTIN 100 MILLIGRAM(S): 800 TABLET, FILM COATED ORAL at 13:11

## 2024-09-25 RX ADMIN — DOXAZOSIN 2 MILLIGRAM(S): 2 TABLET ORAL at 21:49

## 2024-09-25 NOTE — PROGRESS NOTE ADULT - SUBJECTIVE AND OBJECTIVE BOX
General Surgery Progress Note    Overnight: ELENITA  Subjective: Patient seen and examined on rounds.    Objective:  Vitals:  T(C): 36.6 (09-25-24 @ 06:45), Max: 36.8 (09-24-24 @ 23:45)  HR: 70 (09-25-24 @ 06:45) (63 - 86)  BP: 144/66 (09-25-24 @ 06:45) (144/66 - 172/89)  RR: 18 (09-25-24 @ 06:45) (16 - 20)  SpO2: 97% (09-25-24 @ 06:45) (97% - 100%)  Wt(kg): --      Physical Exam:  General Appearance: no acute distress, NTND   Neuro: no facial asymmetry, no tongue deviation, moving b/l UE and LE spontaneously with LS weakeness appreciated c/w previous stroke deficits  Chest: airway intact, non-labored breathing  CV: no JVD, palpable pulses b/l  Abdomen: soft, non-tender, non-distended, +BS   Extremities:       Labs:                        13.4   7.22  )-----------( 302      ( 24 Sep 2024 09:15 )             41.7     09-24    143  |  103  |  15  ----------------------------<  145[H]  4.1   |  26  |  0.59    Ca    9.9      24 Sep 2024 09:15    TPro  8.1  /  Alb  4.6  /  TBili  0.5  /  DBili  x   /  AST  28  /  ALT  19  /  AlkPhos  118  09-24    LIVER FUNCTIONS - ( 24 Sep 2024 09:15 )  Alb: 4.6 g/dL / Pro: 8.1 g/dL / ALK PHOS: 118 U/L / ALT: 19 U/L / AST: 28 U/L / GGT: x           PT/INR - ( 23 Sep 2024 14:53 )   PT: 12.2 sec;   INR: 1.09 ratio         PTT - ( 23 Sep 2024 14:53 )  PTT:32.7 sec  Urinalysis Basic - ( 24 Sep 2024 09:15 )    Color: x / Appearance: x / SG: x / pH: x  Gluc: 145 mg/dL / Ketone: x  / Bili: x / Urobili: x   Blood: x / Protein: x / Nitrite: x   Leuk Esterase: x / RBC: x / WBC x   Sq Epi: x / Non Sq Epi: x / Bacteria: x

## 2024-09-25 NOTE — PROCEDURE NOTE - ADDITIONAL PROCEDURE DETAILS
ILR implanted on 2/26/2024  for Cryptogenic stroke  Normal function with good sensing   Battery is good  One episode of sinus tachycardia with PVC noted on 6/20/2024, No Afib.   No reprogramming   pt. presents with AMS and EP is called for interrogation.

## 2024-09-25 NOTE — PROGRESS NOTE ADULT - ASSESSMENT
Patient is a 33 Y/o Female BMI 43, prev on [id number] for diffuse ICAD, hx HTN, HLD, uncontrolled DM2 x15 yrs, sickle cell trait, CAD s/p 4 stents and CABG, recent p/f Lt hand/arm numb (8/15/24), MRI w/ Rt side strokes, angio w/ Dr. Pulliam 8/23/24 w/ Rt distal cav/supraclinoid ICA svr stenosis, LICA occlusion w/ distal recon, also read on NOVA 8/16. Was d/c'd on DAPT 8/24 by neuro. Now p/w new Lt sided sx, MRI 9/23/24 w/ new RMCA terr strokes (appear embolic/watershed). Exam: AOx3, LUE drift, Lt facial numbness V1-V3, mild L facial, L HG 3/5 o/w LUE 4+/5, LLE 4+/5 (feels heavy, LUE finger paresthesias

## 2024-09-25 NOTE — PROGRESS NOTE ADULT - SUBJECTIVE AND OBJECTIVE BOX
Johnathan Tejeda MD  Interventional Cardiology / [facility] Heart Failure [facility] Cardiac Transplant Specialist  [place] Office : [phone] [place] [address] [place]. [address]  Tel:   [place] Office : [address] [facility] [place] [address]  Tel: [phone]       Pt is lying in bed comfortable not in distress   	  MEDICATIONS:  aspirin enteric coated 81 milliGRAM(s) Oral daily  clopidogrel Tablet 75 milliGRAM(s) Oral daily  doxazosin 2 milliGRAM(s) Oral at bedtime  enoxaparin Injectable 40 milliGRAM(s) SubCutaneous every 24 hours  isosorbide   mononitrate ER Tablet (IMDUR) 30 milliGRAM(s) Oral daily  losartan 25 milliGRAM(s) Oral daily  metoprolol tartrate 50 milliGRAM(s) Oral two times a day  NIFEdipine XL 30 milliGRAM(s) Oral daily  carbidopa/levodopa  25/100 1 Tablet(s) Oral daily  gabapentin 100 milliGRAM(s) Oral three times a day  atorvastatin 40 milliGRAM(s) Oral at bedtime  dextrose 50% Injectable 25 Gram(s) IV Push once  dextrose 50% Injectable 25 Gram(s) IV Push once  dextrose 50% Injectable 12.5 Gram(s) IV Push once  dextrose Oral Gel 15 Gram(s) Oral once PRN  glucagon  Injectable 1 milliGRAM(s) IntraMuscular once  insulin lispro (ADMELOG) corrective regimen sliding scale   SubCutaneous three times a day before meals  insulin lispro (ADMELOG) corrective regimen sliding scale   SubCutaneous at bedtime  dextrose 5%. 1000 milliLiter(s) IV Continuous <Continuous>  dextrose 5%. 1000 milliLiter(s) IV Continuous <Continuous>  influenza  Vaccine (HIGH DOSE) 0.5 milliLiter(s) IntraMuscular once      PAST MEDICAL/SURGICAL HISTORY  PAST MEDICAL & SURGICAL HISTORY:  HTN (hypertension)      HLD (hyperlipidemia)      CAD (coronary artery disease)      Type II diabetes mellitus      PAD (peripheral artery disease)      Mild dementia      Parkinson disease      S/P hysterectomy          SOCIAL HISTORY: Substance Use (street drugs): ( x ) never used  (  ) other:    FAMILY HISTORY:         PHYSICAL EXAM:  T(C): 36.9 (09-25-24 @ 20:34), Max: 36.9 (09-25-24 @ 12:11)  HR: 73 (09-25-24 @ 20:34) (63 - 73)  BP: 169/80 (09-25-24 @ 20:34) (144/66 - 169/80)  RR: 18 (09-25-24 @ 20:34) (18 - 19)  SpO2: 100% (09-25-24 @ 20:34) (97% - 100%)  Wt(kg): --  I&O's Summary           EYES:   PERRLA   ENMT:   Moist mucous membranes, Good dentition, No lesions  Cardiovascular: Normal S1 S2, No JVD, No murmurs, No edema  Respiratory: Lungs clear to auscultation	  Gastrointestinal:  Soft, Non-tender, + BS	  Extremities: no edema                                    13.4   7.22  )-----------( 302      ( 24 Sep 2024 09:15 )             41.7     09-24    143  |  103  |  15  ----------------------------<  145[H]  4.1   |  26  |  0.59    Ca    9.9      24 Sep 2024 09:15    TPro  8.1  /  Alb  4.6  /  TBili  0.5  /  DBili  x   /  AST  28  /  ALT  19  /  AlkPhos  118  09-24    proBNP:   Lipid Profile:   HgA1c:   TSH:     Consultant(s) Notes Reviewed:  [x ] YES  [ ] NO    Care Discussed with Consultants/Other Providers [ x] YES  [ ] NO    Imaging Personally Reviewed independently:  [x] YES  [ ] NO    All labs, radiologic studies, vitals, orders and medications list reviewed. Patient is seen and examined at bedside. Case discussed with medical team.

## 2024-09-25 NOTE — PROGRESS NOTE ADULT - ASSESSMENT
4F w/ HTN, T2DM, dementia, Parkinson's Disease, stroke (2022 with residual L sided deficits), known bilateral ICA stenosis, frequent falls and gait instability, binocular catarct surgery presented with confusion and ?stiffness for which stroke code was called. Not tnk or thrombectomy candidate  Of note on aspirin and plavix at home. Previously admitted to [facility] in June 2024 for similar episode with normal EEG at that time.   prior LDL 81, A1c 9.2  Had prior R sided symptoms in Feb 2024 with diagnostic angio at that time not reflecting symptomatic carotid stenosis   o/e AAOx2, hypomimia, L hemiparesis, tremor and inc tone     Parkinsons  Prior stroke  ICA stenosis   R/o seizure     - continue aspirin and statin for secondary stroke prevention  - ok to continue plavix for now  - MRI brain w/o contrast  - carotid duplex (last eval in Feb)  - routine eeg   - continue home sinemet dose  - gradual normotension  - delirium precautions  - seizure and fall precautions  pt/ot/slp  dvt ppx.   4F w/ HTN, T2DM, dementia, Parkinson's Disease, stroke (2022 with residual L sided deficits), known bilateral ICA stenosis, frequent falls and gait instability, binocular catarct surgery presented with confusion and ?stiffness for which stroke code was called. Not tnk or thrombectomy candidate  Of note on aspirin and plavix at home. Previously admitted to [facility] in June 2024 for similar episode with normal EEG at that time.   prior LDL 81, A1c 9.2  Had prior R sided symptoms in Feb 2024 with diagnostic angio at that time not reflecting symptomatic carotid stenosis   o/e AAOx2, hypomimia, L hemiparesis, tremor and inc tone     Parkinsons  Prior stroke  ICA stenosis   R/o seizure     - continue aspirin and statin for secondary stroke prevention  - ok to continue plavix for now  - MRI brain w/o contrast  - carotid duplex (last eval in Feb)  - routine eeg   - continue home sinemet dose  - gradual normotension  - delirium precautions  - seizure and fall precautions  - pt/ot/slp  - dvt ppx    Viki Gray DO  Vascular Neurology  Office [phone]

## 2024-09-25 NOTE — PROGRESS NOTE ADULT - SUBJECTIVE AND OBJECTIVE BOX
Neurology Progress Note    S: Patient seen and examined. No new events overnight.     Medication:  aspirin enteric coated 81 milliGRAM(s) Oral daily  atorvastatin 40 milliGRAM(s) Oral at bedtime  carbidopa/levodopa  25/100 1 Tablet(s) Oral daily  clopidogrel Tablet 75 milliGRAM(s) Oral daily  dextrose 5%. 1000 milliLiter(s) IV Continuous <Continuous>  dextrose 5%. 1000 milliLiter(s) IV Continuous <Continuous>  dextrose 50% Injectable 25 Gram(s) IV Push once  dextrose 50% Injectable 25 Gram(s) IV Push once  dextrose 50% Injectable 12.5 Gram(s) IV Push once  dextrose Oral Gel 15 Gram(s) Oral once PRN  doxazosin 2 milliGRAM(s) Oral at bedtime  enoxaparin Injectable 40 milliGRAM(s) SubCutaneous every 24 hours  gabapentin 100 milliGRAM(s) Oral three times a day  glucagon  Injectable 1 milliGRAM(s) IntraMuscular once  influenza  Vaccine (HIGH DOSE) 0.5 milliLiter(s) IntraMuscular once  insulin lispro (ADMELOG) corrective regimen sliding scale   SubCutaneous three times a day before meals  insulin lispro (ADMELOG) corrective regimen sliding scale   SubCutaneous at bedtime  isosorbide   mononitrate ER Tablet (IMDUR) 30 milliGRAM(s) Oral daily  losartan 25 milliGRAM(s) Oral daily  metoprolol tartrate 50 milliGRAM(s) Oral two times a day  NIFEdipine XL 30 milliGRAM(s) Oral daily      Vitals:  Vital Signs Last 24 Hrs  T(C): 36.6 (25 Sep 2024 06:45), Max: 36.8 (24 Sep 2024 23:45)  T(F): 97.9 (25 Sep 2024 06:45), Max: 98.2 (24 Sep 2024 23:45)  HR: 70 (25 Sep 2024 06:45) (63 - 86)  BP: 144/66 (25 Sep 2024 06:45) (144/66 - 172/89)  BP(mean): --  RR: 18 (25 Sep 2024 06:45) (16 - 20)  SpO2: 97% (25 Sep 2024 06:45) (97% - 100%)    Parameters below as of 25 Sep 2024 06:45  Patient On (Oxygen Delivery Method): room air        General Exam:   General Appearance: Appropriately dressed and in no acute distress       Head: Normocephalic, atraumatic and no dysmorphic features  Ear, Nose, and Throat: Moist mucous membranes  CVS: S1S2+  Resp: No SOB, no wheeze or rhonchi  Abd: soft NTND  Extremities: No edema, no cyanosis  Skin: No bruises, no rashes     Neurological Exam:  Mental Status: Awake, alert and oriented x 3.  Able to follow simple and complex verbal commands. Able to name and repeat. fluent speech. No obvious aphasia or dysarthria noted.   Cranial Nerves: PERRL, EOMI, VFFC, sensation V1-V3 intact,  no obvious facial asymmetry , equal elevation of palate, scm/trap 5/5, tongue is midline on protrusion. no obvious papilledema on fundoscopic exam. Hearing is grossly intact.   Motor: Normal bulk, tone and strength throughout. Fine finger movements were intact and symmetric. no tremors or drift noted.    Sensation: Intact to light touch and pinprick throughout. no right/left confusion. no extinction to tactile on DSS. Romberg was negative.   Reflexes: 1+ throughout at biceps, brachioradialis, triceps, patellars and ankles bilaterally and equal. No clonus. R toe and L toe were both downgoing.  Coordination: No dysmetria on FNF or HKS  Gait: Narrow based and steady. Able to tandem. no limitations in gait.     I personally reviewed the below data/images/labs:      CBC Full  -  ( 24 Sep 2024 09:15 )  WBC Count : 7.22 K/uL  RBC Count : 5.81 M/uL  Hemoglobin : 13.4 g/dL  Hematocrit : 41.7 %  Platelet Count - Automated : 302 K/uL  Mean Cell Volume : 71.8 fL  Mean Cell Hemoglobin : 23.1 pg  Mean Cell Hemoglobin Concentration : 32.1 gm/dL  Auto Neutrophil # : 4.84 K/uL  Auto Lymphocyte # : 1.36 K/uL  Auto Monocyte # : 0.73 K/uL  Auto Eosinophil # : 0.25 K/uL  Auto Basophil # : 0.02 K/uL  Auto Neutrophil % : 67.0 %  Auto Lymphocyte % : 18.8 %  Auto Monocyte % : 10.1 %  Auto Eosinophil % : 3.5 %  Auto Basophil % : 0.3 %    09-24    143  |  103  |  15  ----------------------------<  145[H]  4.1   |  26  |  0.59    Ca    9.9      24 Sep 2024 09:15    TPro  8.1  /  Alb  4.6  /  TBili  0.5  /  DBili  x   /  AST  28  /  ALT  19  /  AlkPhos  118  09-24    LIVER FUNCTIONS - ( 24 Sep 2024 09:15 )  Alb: 4.6 g/dL / Pro: 8.1 g/dL / ALK PHOS: 118 U/L / ALT: 19 U/L / AST: 28 U/L / GGT: x           PT/INR - ( 23 Sep 2024 14:53 )   PT: 12.2 sec;   INR: 1.09 ratio         PTT - ( 23 Sep 2024 14:53 )  PTT:32.7 sec  Urinalysis Basic - ( 24 Sep 2024 09:15 )    Color: x / Appearance: x / SG: x / pH: x  Gluc: 145 mg/dL / Ketone: x  / Bili: x / Urobili: x   Blood: x / Protein: x / Nitrite: x   Leuk Esterase: x / RBC: x / WBC x   Sq Epi: x / Non Sq Epi: x / Bacteria: x      ACC: [id number] EXAM:  CT ANGIO BRAIN STROKE PROTC IC   ORDERED BY: BENJI DE PAZ     ACC: [id number] EXAM:  CT ANGIO NECK STROKE PROTCL IC   ORDERED BY: BENJI DE PAZ     ACC: [id number] EXAM:  CT BRAIN STROKE PROTOCOL   ORDERED BY: BENJI DE PAZ     ACC: [id number] EXAM:  CT BRAIN PERFUSION MAPS STROKE   ORDERED BY: BENJI DE PAZ     PROCEDURE DATE:  09/23/2024          INTERPRETATION:  CLINICAL INFORMATION: Stroke code. Clinical concern for   CVA.    COMPARISON: CT brain 6/17/2024. CTA examinations 2/19/24.    CONTRAST:  IV Contrast: NONE (accession [id number]), Omnipaque 350 (accession   [id number])  80 cc administered  0 cc discarded  Complications: None reported at time of study completion    TECHNIQUE: CT of the head was performed with multiplanar reformats   without IV contrast. CT perfusion and CTA of the head and neck were   performed following the intravenous administration of IV contrast. MIP   reconstructions were performed on a separate workstation and reviewed.   RAPID softwarewas utilized for perfusion analysis.    FINDINGS:    CT BRAIN:    VENTRICLES AND SULCI: Mild, age-appropriate atrophy.  INTRA-AXIAL: No evidence of intraparenchymal mass, acute hemorrhage, or   midline shift.  No definitive acute territorial infarct.Stable   appearance of mild bihemispheric white matter hypodensities; a   nonspecific finding which statistically reflects chronic microvascular   ischemic change.  EXTRA-AXIAL: No mass or fluid collection. Basal cisterns are normal in   appearance.    VISUALIZED SINUSES:  No air-fluid levels or opacification.  TYMPANOMASTOID CAVITIES: No effusion.  VISUALIZED ORBITS: Bilateral lens replacement.  CALVARIUM: No new aggressive lesion.    MISCELLANEOUS: Left greater than right temporomandibular arthrosis.    CT PERFUSION:    TECHNICAL LIMITATIONS: Motion.    CBF<30%/CORE INFARCTION: 0 mL  TMAX>6s/UNDERPERFUSED TISSUE: 0 mL  MISMATCH VOLUME/TISSUE AT RISK: 0 mL  MISMATCH RATIO: None.    MISCELLANEOUS: None.    CTA NECK:    AORTIC ARCH AND VISUALIZED GREAT VESSELS: Unremarkable with the exception   of scattered calcified plaque, most pronounced at the left subclavian   artery origin, resulting in probable mild stenosis in this region,   however suboptimally evaluated secondary to motion.    RIGHT:  COMMON CAROTID/INTERNAL CAROTID ARTERY: Again seen is circumferential   calcified plaque extending from the carotid bifurcation into the   ipsilateral internal carotid artery origin, resulting in up to severe   (approximately 70-80%) stenosis inthis region by NASCET criteria.   Aberrant medial course proximal internal carotid artery.  VERTEBRAL ARTERY: Patent and codominant.    LEFT:  COMMON CAROTID/INTERNAL CAROTID ARTERY: Again seen is a circumferential   partially calcified plaque extending from the bifurcation into the   ipsilateral internal carotid artery origin, resulting in up to moderate   (approximately 60-70%) stenosis in this region by NASCET criteria.   Aberrant medial course proximal internal carotid artery.  VERTEBRAL ARTERY: Patent and codominant.    VISUALIZED LUNGS: No suspicious upper lung mass.    MISCELLANEOUS: Approximate 3.6 cm hypodense left thyroid nodule again   noted; a finding characterized on thyroid ultrasound 2/22/2024.   Multifocal degenerative change.    CAROTID STENOSIS REFERENCE: Percent (%) stenosis is expressed in terms of   NASCET Criteria. (NASCET = 100x1-(N/D)). N=greatest narrowing. D=normal   distal diameter - MILD = <50% stenosis. - MODERATE = 50-69% stenosis. -   SEVERE = 70-89% stenosis. - HAIRLINE/CRITICAL = 90-99% stenosis. -   OCCLUDED = 100% stenosis.    CTA HEAD:    INTERNAL CAROTID ARTERIES: Again seen is bilateral calcified plaque,   resulting in up to moderate stenosis bilateral cavernous segments. No   occlusion.    CIRCLEOF GARZA: 1-2 mm aneurysm versus infundibulum emanating   posteriorly inferiorly from the terminal segment left internal carotid   artery (series 6 image [id number]).    ANTERIOR CEREBRAL ARTERIES: No significant stenosis or occlusion. 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  MIDDLE CEREBRAL ARTERIES: No significant stenosis or occlusion.  POSTERIOR CEREBRAL ARTERIES: No occlusion. Fetal origin right posterior   cerebral artery. Again seen is evidence of moderate severe focal stenosis   at the origin of the left medial occipital artery.    DISTAL VERTEBRAL / BASILAR ARTERIES: No significant stenosis or   occlusion. Again seen is calcified plaque left proximal V4 segment,   resulting in mild focal stenosis.    VENOUS STRUCTURES: No definitive filling defect to suggest thrombus, as   on the prior.    MISCELLANEOUS: No other vascular abnormality is seen.    IMPRESSION:    CT HEAD:  1. No significant change compared with 6/17/2024.  2. No evidence of acute hemorrhage, hydrocephalus or mass effect.  3. Additional findings described in detail above.    CT PERFUSION:  1. No predicted core infarct.  2. No evidence of active ischemia-tissue at risk.    CTA NECK:  1. No significant change.  2. Bilateral vertebral arteries patent and codominant.  3. Severe right and moderate left internal carotid artery origin   stenosis, as above. No occlusion or dissection.  4. Additional findings described in detail above.    CTA HEAD:  1. No significant change.  2. No large vessel occlusion.  3. 1-2 mm aneurysm versus infundibulum terminal segment left internal   carotid artery.  4. Additional findings, including multifocal stenosis and anatomic   variants, described in detail above.    If clinical symptoms persist consider further imaging with MRI, provided   there are no medical contraindications. Results of CT brain discussed   with Dr. De Paz at 3:19 PM the day of this exam.    --- End of Report ---            JANNETTE TRINH M.D., ATTENDING RADIOLOGIST  This document has been electronically signed. Sep 23 2024  4:07PM     Neurology Progress Note    S: Patient seen and examined. No new events overnight.     Medication:  aspirin enteric coated 81 milliGRAM(s) Oral daily  atorvastatin 40 milliGRAM(s) Oral at bedtime  carbidopa/levodopa  25/100 1 Tablet(s) Oral daily  clopidogrel Tablet 75 milliGRAM(s) Oral daily  dextrose 5%. 1000 milliLiter(s) IV Continuous <Continuous>  dextrose 5%. 1000 milliLiter(s) IV Continuous <Continuous>  dextrose 50% Injectable 25 Gram(s) IV Push once  dextrose 50% Injectable 25 Gram(s) IV Push once  dextrose 50% Injectable 12.5 Gram(s) IV Push once  dextrose Oral Gel 15 Gram(s) Oral once PRN  doxazosin 2 milliGRAM(s) Oral at bedtime  enoxaparin Injectable 40 milliGRAM(s) SubCutaneous every 24 hours  gabapentin 100 milliGRAM(s) Oral three times a day  glucagon  Injectable 1 milliGRAM(s) IntraMuscular once  influenza  Vaccine (HIGH DOSE) 0.5 milliLiter(s) IntraMuscular once  insulin lispro (ADMELOG) corrective regimen sliding scale   SubCutaneous three times a day before meals  insulin lispro (ADMELOG) corrective regimen sliding scale   SubCutaneous at bedtime  isosorbide   mononitrate ER Tablet (IMDUR) 30 milliGRAM(s) Oral daily  losartan 25 milliGRAM(s) Oral daily  metoprolol tartrate 50 milliGRAM(s) Oral two times a day  NIFEdipine XL 30 milliGRAM(s) Oral daily      Vitals:  Vital Signs Last 24 Hrs  T(C): 36.6 (25 Sep 2024 06:45), Max: 36.8 (24 Sep 2024 23:45)  T(F): 97.9 (25 Sep 2024 06:45), Max: 98.2 (24 Sep 2024 23:45)  HR: 70 (25 Sep 2024 06:45) (63 - 86)  BP: 144/66 (25 Sep 2024 06:45) (144/66 - 172/89)  BP(mean): --  RR: 18 (25 Sep 2024 06:45) (16 - 20)  SpO2: 97% (25 Sep 2024 06:45) (97% - 100%)    Parameters below as of 25 Sep 2024 06:45  Patient On (Oxygen Delivery Method): room air        General Exam:   General Appearance: Appropriately dressed and in no acute distress       Head: Normocephalic, atraumatic and no dysmorphic features  Ear, Nose, and Throat: Moist mucous membranes  CVS: S1S2+  Resp: No SOB, no wheeze or rhonchi  Abd: soft NTND  Extremities: No edema, no cyanosis  Skin: No bruises, no rashes     Neurological Exam:  Mental Status: Awake, alert and oriented x 2.  Able to follow simple commands. Fluent speech. No obvious aphasia or dysarthria noted.   Cranial Nerves: PERRL, EOMI, VFFC, sensation V1-V3 intact,  no obvious facial asymmetry , hypomimia, equal elevation of palate, scm/trap 5/5, tongue is midline on protrusion.   Motor: slight inc tone throughout with resting tremor , no drift.    Sensation: Intact to light touch  Reflexes: 1+ throughout at biceps, brachioradialis, triceps, patellars and ankles bilaterally and equal. No clonus. R toe and L toe were both downgoing.  Coordination: No dysmetria on FNF   Gait: deferred    I personally reviewed the below data/images/labs:      CBC Full  -  ( 24 Sep 2024 09:15 )  WBC Count : 7.22 K/uL  RBC Count : 5.81 M/uL  Hemoglobin : 13.4 g/dL  Hematocrit : 41.7 %  Platelet Count - Automated : 302 K/uL  Mean Cell Volume : 71.8 fL  Mean Cell Hemoglobin : 23.1 pg  Mean Cell Hemoglobin Concentration : 32.1 gm/dL  Auto Neutrophil # : 4.84 K/uL  Auto Lymphocyte # : 1.36 K/uL  Auto Monocyte # : 0.73 K/uL  Auto Eosinophil # : 0.25 K/uL  Auto Basophil # : 0.02 K/uL  Auto Neutrophil % : 67.0 %  Auto Lymphocyte % : 18.8 %  Auto Monocyte % : 10.1 %  Auto Eosinophil % : 3.5 %  Auto Basophil % : 0.3 %    09-24    143  |  103  |  15  ----------------------------<  145[H]  4.1   |  26  |  0.59    Ca    9.9      24 Sep 2024 09:15    TPro  8.1  /  Alb  4.6  /  TBili  0.5  /  DBili  x   /  AST  28  /  ALT  19  /  AlkPhos  118  09-24    LIVER FUNCTIONS - ( 24 Sep 2024 09:15 )  Alb: 4.6 g/dL / Pro: 8.1 g/dL / ALK PHOS: 118 U/L / ALT: 19 U/L / AST: 28 U/L / GGT: x           PT/INR - ( 23 Sep 2024 14:53 )   PT: 12.2 sec;   INR: 1.09 ratio         PTT - ( 23 Sep 2024 14:53 )  PTT:32.7 sec  Urinalysis Basic - ( 24 Sep 2024 09:15 )    Color: x / Appearance: x / SG: x / pH: x  Gluc: 145 mg/dL / Ketone: x  / Bili: x / Urobili: x   Blood: x / Protein: x / Nitrite: x   Leuk Esterase: x / RBC: x / WBC x   Sq Epi: x / Non Sq Epi: x / Bacteria: x      ACC: [id number] EXAM:  CT ANGIO BRAIN STROKE PROTC IC   ORDERED BY: BENJI DE PAZ     ACC: [id number] EXAM:  CT ANGIO NECK STROKE PROTCL IC   ORDERED BY: BENJI DE PAZ     ACC: [id number] EXAM:  CT BRAIN STROKE PROTOCOL   ORDERED BY: BENJI DE PAZ     ACC: [id number] EXAM:  CT BRAIN PERFUSION MAPS STROKE   ORDERED BY: BENJI DE PAZ     PROCEDURE DATE:  09/23/2024          INTERPRETATION:  CLINICAL INFORMATION: Stroke code. Clinical concern for   CVA.    COMPARISON: CT brain 6/17/2024. CTA examinations 2/19/24.    CONTRAST:  IV Contrast: NONE (accession [id number]), Omnipaque 350 (accession   [id number])  80 cc administered  0 cc discarded  Complications: None reported at time of study completion    TECHNIQUE: CT of the head was performed with multiplanar reformats   without IV contrast. CT perfusion and CTA of the head and neck were   performed following the intravenous administration of IV contrast. MIP   reconstructions were performed on a separate workstation and reviewed.   RAPID softwarewas utilized for perfusion analysis.    FINDINGS:    CT BRAIN:    VENTRICLES AND SULCI: Mild, age-appropriate atrophy.  INTRA-AXIAL: No evidence of intraparenchymal mass, acute hemorrhage, or   midline shift.  No definitive acute territorial infarct.Stable   appearance of mild bihemispheric white matter hypodensities; a   nonspecific finding which statistically reflects chronic microvascular   ischemic change.  EXTRA-AXIAL: No mass or fluid collection. Basal cisterns are normal in   appearance.    VISUALIZED SINUSES:  No air-fluid levels or opacification.  TYMPANOMASTOID CAVITIES: No effusion.  VISUALIZED ORBITS: Bilateral lens replacement.  CALVARIUM: No new aggressive lesion.    MISCELLANEOUS: Left greater than right temporomandibular arthrosis.    CT PERFUSION:    TECHNICAL LIMITATIONS: Motion.    CBF<30%/CORE INFARCTION: 0 mL  TMAX>6s/UNDERPERFUSED TISSUE: 0 mL  MISMATCH VOLUME/TISSUE AT RISK: 0 mL  MISMATCH RATIO: None.    MISCELLANEOUS: None.    CTA NECK:    AORTIC ARCH AND VISUALIZED GREAT VESSELS: Unremarkable with the exception   of scattered calcified plaque, most pronounced at the left subclavian   artery origin, resulting in probable mild stenosis in this region,   however suboptimally evaluated secondary to motion.    RIGHT:  COMMON CAROTID/INTERNAL CAROTID ARTERY: Again seen is circumferential   calcified plaque extending from the carotid bifurcation into the   ipsilateral internal carotid artery origin, resulting in up to severe   (approximately 70-80%) stenosis inthis region by NASCET criteria.   Aberrant medial course proximal internal carotid artery.  VERTEBRAL ARTERY: Patent and codominant.    LEFT:  COMMON CAROTID/INTERNAL CAROTID ARTERY: Again seen is a circumferential   partially calcified plaque extending from the bifurcation into the   ipsilateral internal carotid artery origin, resulting in up to moderate   (approximately 60-70%) stenosis in this region by NASCET criteria.   Aberrant medial course proximal internal carotid artery.  VERTEBRAL ARTERY: Patent and codominant.    VISUALIZED LUNGS: No suspicious upper lung mass.    MISCELLANEOUS: Approximate 3.6 cm hypodense left thyroid nodule again   noted; a finding characterized on thyroid ultrasound 2/22/2024.   Multifocal degenerative change.    CAROTID STENOSIS REFERENCE: Percent (%) stenosis is expressed in terms of   NASCET Criteria. (NASCET = 100x1-(N/D)). N=greatest narrowing. D=normal   distal diameter - MILD = <50% stenosis. - MODERATE = 50-69% stenosis. -   SEVERE = 70-89% stenosis. - HAIRLINE/CRITICAL = 90-99% stenosis. -   OCCLUDED = 100% stenosis.    CTA HEAD:    INTERNAL CAROTID ARTERIES: Again seen is bilateral calcified plaque,   resulting in up to moderate stenosis bilateral cavernous segments. No   occlusion.    CIRCLEOF GARZA: 1-2 mm aneurysm versus infundibulum emanating   posteriorly inferiorly from the terminal segment left internal carotid   artery (series 6 image [id number]).    ANTERIOR CEREBRAL ARTERIES: No significant stenosis or occlusion. 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  MIDDLE CEREBRAL ARTERIES: No significant stenosis or occlusion.  POSTERIOR CEREBRAL ARTERIES: No occlusion. Fetal origin right posterior   cerebral artery. Again seen is evidence of moderate severe focal stenosis   at the origin of the left medial occipital artery.    DISTAL VERTEBRAL / BASILAR ARTERIES: No significant stenosis or   occlusion. Again seen is calcified plaque left proximal V4 segment,   resulting in mild focal stenosis.    VENOUS STRUCTURES: No definitive filling defect to suggest thrombus, as   on the prior.    MISCELLANEOUS: No other vascular abnormality is seen.    IMPRESSION:    CT HEAD:  1. No significant change compared with 6/17/2024.  2. No evidence of acute hemorrhage, hydrocephalus or mass effect.  3. Additional findings described in detail above.    CT PERFUSION:  1. No predicted core infarct.  2. No evidence of active ischemia-tissue at risk.    CTA NECK:  1. No significant change.  2. Bilateral vertebral arteries patent and codominant.  3. Severe right and moderate left internal carotid artery origin   stenosis, as above. No occlusion or dissection.  4. Additional findings described in detail above.    CTA HEAD:  1. No significant change.  2. No large vessel occlusion.  3. 1-2 mm aneurysm versus infundibulum terminal segment left internal   carotid artery.  4. Additional findings, including multifocal stenosis and anatomic   variants, described in detail above.    If clinical symptoms persist consider further imaging with MRI, provided   there are no medical contraindications. Results of CT brain discussed   with Dr. De Paz at 3:19 PM the day of this exam.    --- End of Report ---            JANNETTE TRINH M.D., ATTENDING RADIOLOGIST  This document has been electronically signed. Sep 23 2024  4:07PM

## 2024-09-25 NOTE — PROGRESS NOTE ADULT - ASSESSMENT
64F w/ PMHx CAD s/p PCI, PAD s/p L popliteal angioplasty and atherectomy, CVAs (2022) w/ residual L sided weakness, R and L ICA stenosis, Parkinson's disease, HTN, HLD, T2DM, and decreased vision presenting to [facility] ED as code stroke iso /129 and not responding to questions/commands with rigid body. Patient improved to baseline while in ED and underwent CT head with non-acute findings and CTA head/neck showing no LVO but severe right and moderate left internal carotid artery origin stenosis. Vascular surgery c/f evaluation.       Rec:  - plan to f/u b/l carotid duplex to further evaluate carotid stenosis    - c/w asa/plavix  - vascular surgery to follow    C Team Surgery [id number]  Hermann Chamorro MD (PGY2)

## 2024-09-25 NOTE — PROGRESS NOTE ADULT - SUBJECTIVE AND OBJECTIVE BOX
Name of Patient : ALYSE AVENDAÑO  MRN: [id number]  Date of visit: 09-25-24 @ 22:47      Subjective: Patient seen and examined. No new events except as noted.     REVIEW OF SYSTEMS:    CONSTITUTIONAL: No weakness, fevers or chills  EYES/ENT: No visual changes;  No vertigo or throat pain   NECK: No pain or stiffness  RESPIRATORY: No cough, wheezing, hemoptysis; No shortness of breath  CARDIOVASCULAR: No chest pain or palpitations  GASTROINTESTINAL: No abdominal or epigastric pain. No nausea, vomiting, or hematemesis; No diarrhea or constipation. No melena or hematochezia.  GENITOURINARY: No dysuria, frequency or hematuria  NEUROLOGICAL: No numbness or weakness  SKIN: No itching, burning, rashes, or lesions   All other review of systems is negative unless indicated above.    MEDICATIONS:  MEDICATIONS  (STANDING):  aspirin enteric coated 81 milliGRAM(s) Oral daily  atorvastatin 40 milliGRAM(s) Oral at bedtime  carbidopa/levodopa  25/100 1 Tablet(s) Oral daily  clopidogrel Tablet 75 milliGRAM(s) Oral daily  dextrose 5%. 1000 milliLiter(s) (100 mL/Hr) IV Continuous <Continuous>  dextrose 5%. 1000 milliLiter(s) (50 mL/Hr) IV Continuous <Continuous>  dextrose 50% Injectable 25 Gram(s) IV Push once  dextrose 50% Injectable 25 Gram(s) IV Push once  dextrose 50% Injectable 12.5 Gram(s) IV Push once  doxazosin 2 milliGRAM(s) Oral at bedtime  enoxaparin Injectable 40 milliGRAM(s) SubCutaneous every 24 hours  gabapentin 100 milliGRAM(s) Oral three times a day  glucagon  Injectable 1 milliGRAM(s) IntraMuscular once  influenza  Vaccine (HIGH DOSE) 0.5 milliLiter(s) IntraMuscular once  insulin lispro (ADMELOG) corrective regimen sliding scale   SubCutaneous at bedtime  insulin lispro (ADMELOG) corrective regimen sliding scale   SubCutaneous three times a day before meals  isosorbide   mononitrate ER Tablet (IMDUR) 30 milliGRAM(s) Oral daily  losartan 25 milliGRAM(s) Oral daily  metoprolol tartrate 50 milliGRAM(s) Oral two times a day  NIFEdipine XL 30 milliGRAM(s) Oral daily      PHYSICAL EXAM:  T(C): 36.9 (09-25-24 @ 20:34), Max: 36.9 (09-25-24 @ 12:11)  HR: 73 (09-25-24 @ 20:34) (63 - 73)  BP: 169/80 (09-25-24 @ 20:34) (144/66 - 169/80)  RR: 18 (09-25-24 @ 20:34) (18 - 19)  SpO2: 100% (09-25-24 @ 20:34) (97% - 100%)  Wt(kg): --  I&O's Summary        Appearance: Normal	  HEENT:  PERRLA   Lymphatic: No lymphadenopathy   Cardiovascular: Normal S1 S2, no JVD  Respiratory: normal effort , clear  Gastrointestinal:  Soft, Non-tender  Skin: No rashes,  warm to touch  Psychiatry:  Mood & affect appropriate  Musculuskeletal: No edema    recent labs, Imaging and EKGs personally reviewed                           13.4   7.22  )-----------( 302      ( 24 Sep 2024 09:15 )             41.7               09-24    143  |  103  |  15  ----------------------------<  145[H]  4.1   |  26  |  0.59    Ca    9.9      24 Sep 2024 09:15    TPro  8.1  /  Alb  4.6  /  TBili  0.5  /  DBili  x   /  AST  28  /  ALT  19  /  AlkPhos  118  09-24                       Urinalysis Basic - ( 24 Sep 2024 09:15 )    Color: x / Appearance: x / SG: x / pH: x  Gluc: 145 mg/dL / Ketone: x  / Bili: x / Urobili: x   Blood: x / Protein: x / Nitrite: x   Leuk Esterase: x / RBC: x / WBC x   Sq Epi: x / Non Sq Epi: x / Bacteria: x

## 2024-09-25 NOTE — PROGRESS NOTE ADULT - ASSESSMENT
EKG NSR 88  TTE 2/20/2024    1. Agitated saline injection was negative for intracardiac shunt.   2. Left ventricular cavity is normal in size. Left ventricular wall thickness is normal. Left ventricular systolic function is hyperdynamic. There are no regional wall motion abnormalities seen.   3. Normal left ventricular diastolic function, with normal filling pressure.   4. Normal right ventricular cavity size, with wall thickness, and normal systolic function.   5. Normal left and right atrial size.   6. No significant valvular disease.   7. No pericardial effusion seen.    NST 5/3/2024  1. Normal myocardial perfusion scan, with no evidence of infarction or inducible ischemia.   2. Stress electrocardiogram: No ischemic ST segment changes.   3. Normal left ventricular regional wall motion.   4. The left ventricle is normal in function and normal in size. Normal left ventricular diastolic function. The post stress end diastolic volume is 59 ml and systolic volume is 13 ml.   5. The left ventricle is normal in function and normal in size. Normal left ventricular diastolic function.   6. Normal right ventricular function. There is no right ventricular dilation. RVEF = 48%, RVEDV = 93 mL, RVESV = 49 mL.    Cerebral angiogram 2/22/2024 There is atherosclerotic plaque at the right carotid bifurcation   which causes 45% stenosis at the origin of the right cervical internal carotid artery, There is atherosclerotic   plaque at the left carotid bifurcation which causes 33% stenosis at the origin of the left cervical internal carotid artery. Diagnostic cerebral angiogram demonstrating mild left and moderate right cervical internal carotid artery atherosclerotic stenosis      Patient is a 63 year old female with a PMHx of  HTN, DM, HLD, CAD (s/p 3 stents placed at [facility] in 2014 to the prox LAD and then in 2022 - on ASA and Plavix), PAD s/p L popliteal angioplasty and atherectomy 12/23, R ICA and L ICA stenosis, prior stroke with L side weakness, Parkinson's disease, B/L cataract surgery with blurred vision at baseline who presents with unresponsiveness        1. AMS  - Pt is responsive now, able to move all extremities  - CTA   NECK: Severe right and moderate left internal carotid artery origin stenosis, as above. No occlusion or dissection.   HEAD: No significant change. No large vessel occlusion. 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   - Cerebral angiogram 2/22/2024 There is atherosclerotic plaque at the right carotid bifurcation   which causes 45% stenosis at the origin of the right cervical internal carotid artery, There is atherosclerotic   plaque at the left carotid bifurcation which causes 33% stenosis at the origin of the left cervical internal carotid artery. Diagnostic cerebral angiogram demonstrating mild left and moderate right cervical internal carotid artery atherosclerotic stenosis  - f/u neuro recs, vascular on board  - home meds ASA, plavix, lipitor 40mg    2. Carotid artery stenosis  - CTA shows  NECK: Severe right and moderate left internal carotid artery origin stenosis, as above. No occlusion or dissection.    HEAD: No significant change. No large vessel occlusion. 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  - Cerebral angiogram 2/22/2024 There is atherosclerotic plaque at the right carotid bifurcation   which causes 45% stenosis at the origin of the right cervical internal carotid artery, There is atherosclerotic   plaque at the left carotid bifurcation which causes 33% stenosis at the origin of the left cervical internal carotid artery. Diagnostic cerebral angiogram demonstrating mild left and moderate right cervical internal carotid artery atherosclerotic stenosis.  - pt f/u with outpt neurologist  -vascular on board  - home meds ASA, plavix, lipitor 40mg      3. Hypertension  - BP meds resumed    4. CAD s/p stents  - s/p 3 stents placed at [facility] in 2014 to the prox LAD and then in 2022  - TTE with preserved EF and no WMA  - c/w ASA, Plavix and statin    5. HLD  - c/w atorvastatin 80mg PO daily    6. hx of palpitations s/p Falls   - TTE with preserved EF and no WMA  -s/p loop monitor , no arrythmias on interrogation

## 2024-09-26 LAB
ANION GAP SERPL CALC-SCNC: 12 MMOL/L — SIGNIFICANT CHANGE UP (ref 7–14)
BUN SERPL-MCNC: 10 MG/DL — SIGNIFICANT CHANGE UP (ref 7–23)
CALCIUM SERPL-MCNC: 9.2 MG/DL — SIGNIFICANT CHANGE UP (ref 8.4–10.5)
CHLORIDE SERPL-SCNC: 104 MMOL/L — SIGNIFICANT CHANGE UP (ref 98–107)
CO2 SERPL-SCNC: 24 MMOL/L — SIGNIFICANT CHANGE UP (ref 22–31)
CREAT SERPL-MCNC: 0.57 MG/DL — SIGNIFICANT CHANGE UP (ref 0.5–1.3)
EGFR: 100 ML/MIN/1.73M2 — SIGNIFICANT CHANGE UP
GLUCOSE BLDC GLUCOMTR-MCNC: 188 MG/DL — HIGH (ref 70–99)
GLUCOSE BLDC GLUCOMTR-MCNC: 200 MG/DL — HIGH (ref 70–99)
GLUCOSE BLDC GLUCOMTR-MCNC: 241 MG/DL — HIGH (ref 70–99)
GLUCOSE BLDC GLUCOMTR-MCNC: 264 MG/DL — HIGH (ref 70–99)
GLUCOSE SERPL-MCNC: 178 MG/DL — HIGH (ref 70–99)
HCT VFR BLD CALC: 37 % — SIGNIFICANT CHANGE UP (ref 34.5–45)
HGB BLD-MCNC: 11.9 G/DL — SIGNIFICANT CHANGE UP (ref 11.5–15.5)
MAGNESIUM SERPL-MCNC: 2.1 MG/DL — SIGNIFICANT CHANGE UP (ref 1.6–2.6)
MCHC RBC-ENTMCNC: 23 PG — LOW (ref 27–34)
MCHC RBC-ENTMCNC: 32.2 GM/DL — SIGNIFICANT CHANGE UP (ref 32–36)
MCV RBC AUTO: 71.4 FL — LOW (ref 80–100)
NRBC # BLD: 0 /100 WBCS — SIGNIFICANT CHANGE UP (ref 0–0)
NRBC # FLD: 0 K/UL — SIGNIFICANT CHANGE UP (ref 0–0)
PHOSPHATE SERPL-MCNC: 4.1 MG/DL — SIGNIFICANT CHANGE UP (ref 2.5–4.5)
PLATELET # BLD AUTO: 254 K/UL — SIGNIFICANT CHANGE UP (ref 150–400)
POTASSIUM SERPL-MCNC: 3.8 MMOL/L — SIGNIFICANT CHANGE UP (ref 3.5–5.3)
POTASSIUM SERPL-SCNC: 3.8 MMOL/L — SIGNIFICANT CHANGE UP (ref 3.5–5.3)
RBC # BLD: 5.18 M/UL — SIGNIFICANT CHANGE UP (ref 3.8–5.2)
RBC # FLD: 15.6 % — HIGH (ref 10.3–14.5)
SODIUM SERPL-SCNC: 140 MMOL/L — SIGNIFICANT CHANGE UP (ref 135–145)
WBC # BLD: 5.1 K/UL — SIGNIFICANT CHANGE UP (ref 3.8–10.5)
WBC # FLD AUTO: 5.1 K/UL — SIGNIFICANT CHANGE UP (ref 3.8–10.5)

## 2024-09-26 PROCEDURE — 95718 EEG PHYS/QHP 2-12 HR W/VEEG: CPT

## 2024-09-26 RX ORDER — 5-HYDROXYTRYPTOPHAN (5-HTP) 100 MG
3 TABLET,DISINTEGRATING ORAL AT BEDTIME
Refills: 0 | Status: DISCONTINUED | OUTPATIENT
Start: 2024-09-26 | End: 2024-10-04

## 2024-09-26 RX ADMIN — Medication 81 MILLIGRAM(S): at 12:01

## 2024-09-26 RX ADMIN — Medication 75 MILLIGRAM(S): at 12:01

## 2024-09-26 RX ADMIN — Medication 3: at 13:14

## 2024-09-26 RX ADMIN — Medication 50 MILLIGRAM(S): at 17:59

## 2024-09-26 RX ADMIN — Medication 1: at 09:21

## 2024-09-26 RX ADMIN — LOSARTAN POTASSIUM 25 MILLIGRAM(S): 100 TABLET, FILM COATED ORAL at 04:59

## 2024-09-26 RX ADMIN — Medication 30 MILLIGRAM(S): at 12:01

## 2024-09-26 RX ADMIN — Medication 2: at 17:59

## 2024-09-26 RX ADMIN — DOXAZOSIN 2 MILLIGRAM(S): 2 TABLET ORAL at 21:30

## 2024-09-26 RX ADMIN — ENOXAPARIN SODIUM 40 MILLIGRAM(S): 150 INJECTION SUBCUTANEOUS at 04:55

## 2024-09-26 RX ADMIN — ATORVASTATIN CALCIUM 40 MILLIGRAM(S): 10 TABLET, FILM COATED ORAL at 21:29

## 2024-09-26 RX ADMIN — GABAPENTIN 100 MILLIGRAM(S): 800 TABLET, FILM COATED ORAL at 04:56

## 2024-09-26 RX ADMIN — Medication 50 MILLIGRAM(S): at 04:59

## 2024-09-26 RX ADMIN — GABAPENTIN 100 MILLIGRAM(S): 800 TABLET, FILM COATED ORAL at 13:03

## 2024-09-26 RX ADMIN — GABAPENTIN 100 MILLIGRAM(S): 800 TABLET, FILM COATED ORAL at 21:29

## 2024-09-26 RX ADMIN — Medication 3 MILLIGRAM(S): at 21:29

## 2024-09-26 RX ADMIN — Medication 1 TABLET(S): at 12:01

## 2024-09-26 RX ADMIN — Medication 30 MILLIGRAM(S): at 04:59

## 2024-09-26 NOTE — PROGRESS NOTE ADULT - ASSESSMENT
EKG NSR 88  TTE 2/20/2024    1. Agitated saline injection was negative for intracardiac shunt.   2. Left ventricular cavity is normal in size. Left ventricular wall thickness is normal. Left ventricular systolic function is hyperdynamic. There are no regional wall motion abnormalities seen.   3. Normal left ventricular diastolic function, with normal filling pressure.   4. Normal right ventricular cavity size, with wall thickness, and normal systolic function.   5. Normal left and right atrial size.   6. No significant valvular disease.   7. No pericardial effusion seen.    NST 5/3/2024  1. Normal myocardial perfusion scan, with no evidence of infarction or inducible ischemia.   2. Stress electrocardiogram: No ischemic ST segment changes.   3. Normal left ventricular regional wall motion.   4. The left ventricle is normal in function and normal in size. Normal left ventricular diastolic function. The post stress end diastolic volume is 59 ml and systolic volume is 13 ml.   5. The left ventricle is normal in function and normal in size. Normal left ventricular diastolic function.   6. Normal right ventricular function. There is no right ventricular dilation. RVEF = 48%, RVEDV = 93 mL, RVESV = 49 mL.    Cerebral angiogram 2/22/2024 There is atherosclerotic plaque at the right carotid bifurcation   which causes 45% stenosis at the origin of the right cervical internal carotid artery, There is atherosclerotic   plaque at the left carotid bifurcation which causes 33% stenosis at the origin of the left cervical internal carotid artery. Diagnostic cerebral angiogram demonstrating mild left and moderate right cervical internal carotid artery atherosclerotic stenosis        A/P    Patient is a 63 year old female with a PMHx of  HTN, DM, HLD, CAD (s/p 3 stents placed at [facility] in 2014 to the prox LAD and then in 2022 - on ASA and Plavix), PAD s/p L popliteal angioplasty and atherectomy 12/23, R ICA and L ICA stenosis, prior stroke with L side weakness, Parkinson's disease, B/L cataract surgery with blurred vision at baseline who presents with unresponsiveness        1. AMS  - Pt is responsive now, able to move all extremities  - CTA   NECK: Severe right and moderate left internal carotid artery origin stenosis, as above. No occlusion or dissection.   HEAD: No significant change. No large vessel occlusion. 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   - Cerebral angiogram 2/22/2024 There is atherosclerotic plaque at the right carotid bifurcation   which causes 45% stenosis at the origin of the right cervical internal carotid artery, There is atherosclerotic   plaque at the left carotid bifurcation which causes 33% stenosis at the origin of the left cervical internal carotid artery. Diagnostic cerebral angiogram demonstrating mild left and moderate right cervical internal carotid artery atherosclerotic stenosis  - f/u neuro recs, f/u vascular recs  - home meds ASA, plavix, lipitor 40mg    2. Carotid artery stenosis  - CTA shows  NECK: Severe right and moderate left internal carotid artery origin stenosis, as above. No occlusion or dissection.    HEAD: No significant change. No large vessel occlusion. 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  - Cerebral angiogram 2/22/2024 There is atherosclerotic plaque at the right carotid bifurcation   which causes 45% stenosis at the origin of the right cervical internal carotid artery, There is atherosclerotic   plaque at the left carotid bifurcation which causes 33% stenosis at the origin of the left cervical internal carotid artery. Diagnostic cerebral angiogram demonstrating mild left and moderate right cervical internal carotid artery atherosclerotic stenosis.  - pt f/u with outpt neurologist  - carotid doppler Rt pICA moderate, Lt pICA mild   - f/u vascular  - home meds ASA, plavix, lipitor 40mg      3. Hypertension  - BP meds resumed  -c/w nifedipine 30 mg QD, lopressor 50mg BID, Losartan 25mg QD, imdur 20mg QD    4. CAD s/p stents  - s/p 3 stents placed at [facility] in 2014 to the prox LAD and then in 2022  - TTE with preserved EF and no WMA  - c/w ASA, Plavix and statin    5. HLD  - c/w atorvastatin 80mg PO daily    6. hx of palpitations s/p Falls   - TTE with preserved EF and no WMA  -s/p loop monitor , no arrhythmias on interrogation

## 2024-09-26 NOTE — PROGRESS NOTE ADULT - ASSESSMENT
4F w/ HTN, T2DM, dementia, Parkinson's Disease, stroke (2022 with residual L sided deficits), known bilateral ICA stenosis, frequent falls and gait instability, binocular catarct surgery presented with confusion and ?stiffness for which stroke code was called. Not tnk or thrombectomy candidate  Of note on aspirin and plavix at home. Previously admitted to [facility] in June 2024 for similar episode with normal EEG at that time.   prior LDL 81, A1c 9.2  Had prior R sided symptoms in Feb 2024 with diagnostic angio at that time not reflecting symptomatic carotid stenosis   o/e AAOx2, hypomimia, L hemiparesis, tremor and inc tone   routine eeg neg     Parkinsons  Prior stroke  R ICA stenosis   R/o seizure     - continue aspirin and statin for secondary stroke prevention  - ok to continue plavix for now  - MRI brain w/o contrast to determine if carotid stenosis symptomatic   - carotid duplex with R ICA 50-79% stenosis, L ICA < 50%   - vasc surgery following  - routine eeg neg  - continue home sinemet dose  - gradual normotension  - delirium precautions  - seizure and fall precautions  - pt/ot/slp  - dvt ppx    Viki Gray DO  Vascular Neurology  Office [phone]

## 2024-09-26 NOTE — PROGRESS NOTE ADULT - ASSESSMENT
64F w/ PMHx CAD s/p PCI, PAD s/p L popliteal angioplasty and atherectomy, CVAs (2022) w/ residual L sided weakness, R and L ICA stenosis, Parkinson's disease, HTN, HLD, T2DM, and decreased vision presenting to [facility] ED as code stroke iso /129 and not responding to questions/commands with rigid body. Patient improved to baseline while in ED and underwent CT head with non-acute findings and CTA head/neck showing no LVO but severe right and moderate left internal carotid artery origin stenosis. Vascular surgery c/f evaluation.       Rec:  - will review carotid duplex and CT for potential OP surgery vs medical management  - please document med/cards operative risk stratification while inpatient **  - c/w asa/plavix  - vascular surgery to follow    C Team Surgery [id number]  Hermann Chamorro MD (PGY2)

## 2024-09-26 NOTE — PROGRESS NOTE ADULT - SUBJECTIVE AND OBJECTIVE BOX
Name of Patient : ALYSE AVENDAÑO  MRN: [id number]  Date of visit: 09-26-24       Subjective: Patient seen and examined. No new events except as noted.   Doing okay    at the bedside  pn EEG     REVIEW OF SYSTEMS:    CONSTITUTIONAL: No weakness, fevers or chills  EYES/ENT: No visual changes;  No vertigo or throat pain   NECK: No pain or stiffness  RESPIRATORY: No cough, wheezing, hemoptysis; No shortness of breath  CARDIOVASCULAR: No chest pain or palpitations  GASTROINTESTINAL: No abdominal or epigastric pain. No nausea, vomiting, or hematemesis; No diarrhea or constipation. No melena or hematochezia.  GENITOURINARY: No dysuria, frequency or hematuria  NEUROLOGICAL: No numbness or weakness  SKIN: No itching, burning, rashes, or lesions   All other review of systems is negative unless indicated above.    MEDICATIONS:  MEDICATIONS  (STANDING):  aspirin enteric coated 81 milliGRAM(s) Oral daily  atorvastatin 40 milliGRAM(s) Oral at bedtime  carbidopa/levodopa  25/100 1 Tablet(s) Oral daily  clopidogrel Tablet 75 milliGRAM(s) Oral daily  dextrose 5%. 1000 milliLiter(s) (50 mL/Hr) IV Continuous <Continuous>  dextrose 5%. 1000 milliLiter(s) (100 mL/Hr) IV Continuous <Continuous>  dextrose 50% Injectable 25 Gram(s) IV Push once  dextrose 50% Injectable 25 Gram(s) IV Push once  dextrose 50% Injectable 12.5 Gram(s) IV Push once  doxazosin 2 milliGRAM(s) Oral at bedtime  enoxaparin Injectable 40 milliGRAM(s) SubCutaneous every 24 hours  gabapentin 100 milliGRAM(s) Oral three times a day  glucagon  Injectable 1 milliGRAM(s) IntraMuscular once  influenza  Vaccine (HIGH DOSE) 0.5 milliLiter(s) IntraMuscular once  insulin lispro (ADMELOG) corrective regimen sliding scale   SubCutaneous at bedtime  insulin lispro (ADMELOG) corrective regimen sliding scale   SubCutaneous three times a day before meals  isosorbide   mononitrate ER Tablet (IMDUR) 30 milliGRAM(s) Oral daily  losartan 25 milliGRAM(s) Oral daily  metoprolol tartrate 50 milliGRAM(s) Oral two times a day  NIFEdipine XL 30 milliGRAM(s) Oral daily      PHYSICAL EXAM:  T(C): 37 (09-26-24 @ 12:42), Max: 37 (09-26-24 @ 12:42)  HR: 87 (09-26-24 @ 12:42) (69 - 87)  BP: 161/79 (09-26-24 @ 12:42) (159/63 - 180/93)  RR: 18 (09-26-24 @ 12:42) (18 - 18)  SpO2: 96% (09-26-24 @ 12:42) (96% - 100%)  Wt(kg): --  I&O's Summary        Appearance: Normal	  HEENT:  PERRLA   Lymphatic: No lymphadenopathy   Cardiovascular: Normal S1 S2, no JVD  Respiratory: normal effort , clear  Gastrointestinal:  Soft, Non-tender  Skin: No rashes,  warm to touch  Psychiatry:  Mood & affect appropriate  Musculuskeletal: No edema    recent labs, Imaging and EKGs personally reviewed                           11.9   5.10  )-----------( 254      ( 26 Sep 2024 06:30 )             37.0               09-26    140  |  104  |  10  ----------------------------<  178[H]  3.8   |  24  |  0.57    Ca    9.2      26 Sep 2024 06:30  Phos  4.1     09-26  Mg     2.10     09-26                         Urinalysis Basic - ( 26 Sep 2024 06:30 )    Color: x / Appearance: x / SG: x / pH: x  Gluc: 178 mg/dL / Ketone: x  / Bili: x / Urobili: x   Blood: x / Protein: x / Nitrite: x   Leuk Esterase: x / RBC: x / WBC x   Sq Epi: x / Non Sq Epi: x / Bacteria: x

## 2024-09-26 NOTE — EEG REPORT - NS EEG TEXT BOX
[facility]   COMPREHENSIVE EPILEPSY CENTER   REPORT OF LONG-TERM VIDEO EEG     [facility]: [address], [place] [address], Ph#: [phone]  [facility]: [address] [address] [place] [address], Ph#: [phone]  [facility]: [address] [place] [address], Ph#: [phone]    Patient Name: ALYSE AVENDAÑO  Age and : 64y ([birth year])  MRN #: [id number]  Location: [address]  Referring Physician: Victorino Urena    Start Time/Date: 16:36 on   End Time/Date: 08:00 on   Duration: 17 hours 24 minutes    _____________________________________________________________  STUDY INFORMATION    EEG Recording Technique:  The patient underwent continuous Video-EEG monitoring, using Telemetry System hardware on the XLTek Digital System. EEG and video data were stored on a computer hard drive with important events saved in digital archive files. The material was reviewed by a physician (electroencephalographer / epileptologist) on a daily basis. Greg and seizure detection algorithms were utilized and reviewed. An EEG Technician attended to the patient, and was available throughout daytime work hours.  The epilepsy center neurologist was available in person or on call 24-hours per day.    EEG Placement and Labeling of Electrodes:  The EEG was performed utilizing 20 channel referential EEG connections (coronal over temporal over parasagittal montage) using all standard 10-20 electrode placements with EKG, with additional electrodes placed in the inferior temporal region using the modified 10-10 montage electrode placements for elective admissions, or if deemed necessary. Recording was at a sampling rate of 256 samples per second per channel. Time synchronized digital video recording was done simultaneously with EEG recording. A low light infrared camera was used for low light recording.     _____________________________________________________________  HISTORY    Patient is a 64y old  Female who presents with a chief complaint of AMS (25 Sep 2024 11:47)      PERTINENT MEDICATION:  MEDICATIONS  (STANDING):  aspirin enteric coated 81 milliGRAM(s) Oral daily  atorvastatin 40 milliGRAM(s) Oral at bedtime  carbidopa/levodopa  25/100 1 Tablet(s) Oral daily  clopidogrel Tablet 75 milliGRAM(s) Oral daily  dextrose 5%. 1000 milliLiter(s) (100 mL/Hr) IV Continuous <Continuous>  dextrose 5%. 1000 milliLiter(s) (50 mL/Hr) IV Continuous <Continuous>  dextrose 50% Injectable 25 Gram(s) IV Push once  dextrose 50% Injectable 25 Gram(s) IV Push once  dextrose 50% Injectable 12.5 Gram(s) IV Push once  doxazosin 2 milliGRAM(s) Oral at bedtime  enoxaparin Injectable 40 milliGRAM(s) SubCutaneous every 24 hours  gabapentin 100 milliGRAM(s) Oral three times a day  glucagon  Injectable 1 milliGRAM(s) IntraMuscular once  influenza  Vaccine (HIGH DOSE) 0.5 milliLiter(s) IntraMuscular once  insulin lispro (ADMELOG) corrective regimen sliding scale   SubCutaneous at bedtime  insulin lispro (ADMELOG) corrective regimen sliding scale   SubCutaneous three times a day before meals  isosorbide   mononitrate ER Tablet (IMDUR) 30 milliGRAM(s) Oral daily  losartan 25 milliGRAM(s) Oral daily  metoprolol tartrate 50 milliGRAM(s) Oral two times a day  NIFEdipine XL 30 milliGRAM(s) Oral daily    _____________________________________________________________  STUDY INTERPRETATION      FINDINGS:      Background:  Continuity: continuous  Symmetry: symmetric  PDR: 11 Hz activity, with amplitude to 40 uV, that attenuated to eye opening.  Low amplitude frontal beta noted in wakefulness.  Reactivity: present  Voltage: normal, mostly 20-150uV  Anterior Posterior Gradient: present  Other background findings: none  Breach: absent    Background Slowing:  Generalized slowing: none was present.  Focal slowing: none was present.    State Changes:   -Drowsiness noted with increased slowing, attenuation of fast activity, vertex transients.  -Present with N2 sleep transients with symmetric spindles and K-complexes.    Sporadic Epileptiform Discharges:    None    Rhythmic and Periodic Patterns (RPPs):  None     Electrographic and Electroclinical seizures:  None    Other Clinical Events:  None    Activation Procedures:   -Hyperventilation was not performed.    -Photic stimulation was not performed.    Artifacts:  Intermittent myogenic and movement artifacts were noted.    ECG:  The heart rate on single channel ECG was regular.    Summary:  Normal EEG in the awake / drowsy / asleep state(s).    Clinical Impression:  There were no epileptiform abnormalities recorded.      -------------------------------------------------------------------------------------------------------    Niurka Saini MD  Director, Epilepsy - [facility]    Questions please call [phone]  After hours (5 PM - 8:30 AM) please call the epilepsy answering service at [phone]

## 2024-09-26 NOTE — PROGRESS NOTE ADULT - SUBJECTIVE AND OBJECTIVE BOX
General Surgery Progress Note    Overnight: ELENITA  Subjective: Patient seen and examined on rounds, vEEG in progress.     Objective:  Vitals:  T(C): 36.7 (09-26-24 @ 04:35), Max: 36.9 (09-25-24 @ 12:11)  HR: 83 (09-26-24 @ 04:35) (68 - 83)  BP: 180/93 (09-26-24 @ 04:35) (152/87 - 180/93)  RR: 18 (09-26-24 @ 04:35) (18 - 19)  SpO2: 98% (09-26-24 @ 04:35) (98% - 100%)  Wt(kg): --      Physical Exam:  General Appearance: no acute distress, NTND   Neuro: no facial asymmetry, no tongue deviation, moving b/l UE and LE spontaneously with LS weakeness appreciated c/w previous stroke deficits  Chest: airway intact, non-labored breathing  CV: no JVD, palpable pulses b/l  Abdomen: soft, non-tender, non-distended, +BS   Extremities: WWP      Labs:                        11.9   5.10  )-----------( 254      ( 26 Sep 2024 06:30 )             37.0     09-26    140  |  104  |  10  ----------------------------<  178[H]  3.8   |  24  |  0.57    Ca    9.2      26 Sep 2024 06:30  Phos  4.1     09-26  Mg     2.10     09-26          Urinalysis Basic - ( 26 Sep 2024 06:30 )    Color: x / Appearance: x / SG: x / pH: x  Gluc: 178 mg/dL / Ketone: x  / Bili: x / Urobili: x   Blood: x / Protein: x / Nitrite: x   Leuk Esterase: x / RBC: x / WBC x   Sq Epi: x / Non Sq Epi: x / Bacteria: x

## 2024-09-26 NOTE — PROGRESS NOTE ADULT - SUBJECTIVE AND OBJECTIVE BOX
Johnathan Tejeda MD  Interventional Cardiology / Advance Heart Failure and Cardiac Transplant Specialist  [place] Office : [address] [place] [address] [place]. [address]  Tel:   [place] Office : [address] [facility] [place] [address]  Tel: [phone]       Pt is lying in bed comfortable not in distress, pt seen and examined at bedside  	  MEDICATIONS:  aspirin enteric coated 81 milliGRAM(s) Oral daily  clopidogrel Tablet 75 milliGRAM(s) Oral daily  doxazosin 2 milliGRAM(s) Oral at bedtime  enoxaparin Injectable 40 milliGRAM(s) SubCutaneous every 24 hours  isosorbide   mononitrate ER Tablet (IMDUR) 30 milliGRAM(s) Oral daily  losartan 25 milliGRAM(s) Oral daily  metoprolol tartrate 50 milliGRAM(s) Oral two times a day  NIFEdipine XL 30 milliGRAM(s) Oral daily        carbidopa/levodopa  25/100 1 Tablet(s) Oral daily  gabapentin 100 milliGRAM(s) Oral three times a day      atorvastatin 40 milliGRAM(s) Oral at bedtime  dextrose 50% Injectable 25 Gram(s) IV Push once  dextrose 50% Injectable 25 Gram(s) IV Push once  dextrose 50% Injectable 12.5 Gram(s) IV Push once  dextrose Oral Gel 15 Gram(s) Oral once PRN  glucagon  Injectable 1 milliGRAM(s) IntraMuscular once  insulin lispro (ADMELOG) corrective regimen sliding scale   SubCutaneous at bedtime  insulin lispro (ADMELOG) corrective regimen sliding scale   SubCutaneous three times a day before meals    dextrose 5%. 1000 milliLiter(s) IV Continuous <Continuous>  dextrose 5%. 1000 milliLiter(s) IV Continuous <Continuous>  influenza  Vaccine (HIGH DOSE) 0.5 milliLiter(s) IntraMuscular once      PAST MEDICAL/SURGICAL HISTORY  PAST MEDICAL & SURGICAL HISTORY:  HTN (hypertension)      HLD (hyperlipidemia)      CAD (coronary artery disease)      Type II diabetes mellitus      PAD (peripheral artery disease)      Mild dementia      Parkinson disease      S/P hysterectomy          SOCIAL HISTORY: Substance Use (street drugs): ( x ) never used  (  ) other:    FAMILY HISTORY:         PHYSICAL EXAM:  T(C): 37 (09-26-24 @ 12:42), Max: 37 (09-26-24 @ 12:42)  HR: 87 (09-26-24 @ 12:42) (69 - 87)  BP: 161/79 (09-26-24 @ 12:42) (159/63 - 180/93)  RR: 18 (09-26-24 @ 12:42) (18 - 18)  SpO2: 96% (09-26-24 @ 12:42) (96% - 100%)  Wt(kg): --  I&O's Summary        GENERAL: NAD  EYES:   PERRLA   ENMT:   Moist mucous membranes, Good dentition, No lesions  Cardiovascular: Normal S1 S2, No JVD, No murmurs, No edema  Respiratory: Lungs clear to auscultation	  Gastrointestinal:  Soft, Non-tender, + BS	  Extremities: no edema                                    11.9   5.10  )-----------( 254      ( 26 Sep 2024 06:30 )             37.0     09-26    140  |  104  |  10  ----------------------------<  178[H]  3.8   |  24  |  0.57    Ca    9.2      26 Sep 2024 06:30  Phos  4.1     09-26  Mg     2.10     09-26      proBNP:   Lipid Profile:   HgA1c:   TSH:     Consultant(s) Notes Reviewed:  [x ] YES  [ ] NO    Care Discussed with Consultants/Other Providers [ x] YES  [ ] NO    Imaging Personally Reviewed independently:  [x] YES  [ ] NO    All labs, radiologic studies, vitals, orders and medications list reviewed. Patient is seen and examined at bedside. Case discussed with medical team.

## 2024-09-26 NOTE — PROGRESS NOTE ADULT - SUBJECTIVE AND OBJECTIVE BOX
Neurology Progress Note    S: Patient seen and examined. No new events overnight. EEG neg    Medications: MEDICATIONS  (STANDING):  aspirin enteric coated 81 milliGRAM(s) Oral daily  atorvastatin 40 milliGRAM(s) Oral at bedtime  carbidopa/levodopa  25/100 1 Tablet(s) Oral daily  clopidogrel Tablet 75 milliGRAM(s) Oral daily  dextrose 5%. 1000 milliLiter(s) (50 mL/Hr) IV Continuous <Continuous>  dextrose 5%. 1000 milliLiter(s) (100 mL/Hr) IV Continuous <Continuous>  dextrose 50% Injectable 25 Gram(s) IV Push once  dextrose 50% Injectable 25 Gram(s) IV Push once  dextrose 50% Injectable 12.5 Gram(s) IV Push once  doxazosin 2 milliGRAM(s) Oral at bedtime  enoxaparin Injectable 40 milliGRAM(s) SubCutaneous every 24 hours  gabapentin 100 milliGRAM(s) Oral three times a day  glucagon  Injectable 1 milliGRAM(s) IntraMuscular once  influenza  Vaccine (HIGH DOSE) 0.5 milliLiter(s) IntraMuscular once  insulin lispro (ADMELOG) corrective regimen sliding scale   SubCutaneous at bedtime  insulin lispro (ADMELOG) corrective regimen sliding scale   SubCutaneous three times a day before meals  isosorbide   mononitrate ER Tablet (IMDUR) 30 milliGRAM(s) Oral daily  losartan 25 milliGRAM(s) Oral daily  metoprolol tartrate 50 milliGRAM(s) Oral two times a day  NIFEdipine XL 30 milliGRAM(s) Oral daily    MEDICATIONS  (PRN):  dextrose Oral Gel 15 Gram(s) Oral once PRN Blood Glucose LESS THAN 70 milliGRAM(s)/deciliter       Vitals:  Vital Signs Last 24 Hrs  T(C): 37 (26 Sep 2024 12:42), Max: 37 (26 Sep 2024 12:42)  T(F): 98.6 (26 Sep 2024 12:42), Max: 98.6 (26 Sep 2024 12:42)  HR: 87 (26 Sep 2024 12:42) (69 - 87)  BP: 161/79 (26 Sep 2024 12:42) (159/63 - 180/93)  BP(mean): --  RR: 18 (26 Sep 2024 12:42) (18 - 18)  SpO2: 96% (26 Sep 2024 12:42) (96% - 100%)    Parameters below as of 26 Sep 2024 04:35  Patient On (Oxygen Delivery Method): room air        General Exam:   General Appearance: Appropriately dressed and in no acute distress       Head: Normocephalic, atraumatic and no dysmorphic features  Ear, Nose, and Throat: Moist mucous membranes  CVS: S1S2+  Resp: No SOB, no wheeze or rhonchi  Abd: soft NTND  Extremities: No edema, no cyanosis  Skin: No bruises, no rashes     Neurological Exam:  Mental Status: Awake, alert and oriented x 2.  Able to follow simple commands. Fluent speech. No obvious aphasia or dysarthria noted.   Cranial Nerves: PERRL, EOMI, VFFC, sensation V1-V3 intact,  no obvious facial asymmetry , hypomimia, equal elevation of palate, scm/trap 5/5, tongue is midline on protrusion.   Motor: slight inc tone throughout with resting tremor , no drift.    Sensation: Intact to light touch  Reflexes: 1+ throughout at biceps, brachioradialis, triceps, patellars and ankles bilaterally and equal. No clonus. R toe and L toe were both downgoing.  Coordination: No dysmetria on FNF   Gait: deferred    I personally reviewed the below data/images/labs:    LABS:                          11.9   5.10  )-----------( 254      ( 26 Sep 2024 06:30 )             37.0     09-26    140  |  104  |  10  ----------------------------<  178[H]  3.8   |  24  |  0.57    Ca    9.2      26 Sep 2024 06:30  Phos  4.1     09-26  Mg     2.10     09-26          Urinalysis Basic - ( 26 Sep 2024 06:30 )    Color: x / Appearance: x / SG: x / pH: x  Gluc: 178 mg/dL / Ketone: x  / Bili: x / Urobili: x   Blood: x / Protein: x / Nitrite: x   Leuk Esterase: x / RBC: x / WBC x   Sq Epi: x / Non Sq Epi: x / Bacteria: x        ACC: [id number] EXAM:  CT ANGIO BRAIN STROKE PROTC IC   ORDERED BY: BENJI DE PAZ     ACC: [id number] EXAM:  CT ANGIO NECK STROKE PROTCL IC   ORDERED BY: BENJI DE PAZ     ACC: [id number] EXAM:  CT BRAIN STROKE PROTOCOL   ORDERED BY: BENJI DE PAZ     ACC: [id number] EXAM:  CT BRAIN PERFUSION MAPS STROKE   ORDERED BY: BENJI DE PAZ     PROCEDURE DATE:  09/23/2024          INTERPRETATION:  CLINICAL INFORMATION: Stroke code. Clinical concern for   CVA.    COMPARISON: CT brain 6/17/2024. CTA examinations 2/19/24.    CONTRAST:  IV Contrast: NONE (accession [id number]), Omnipaque 350 (accession   [id number])  80 cc administered  0 cc discarded  Complications: None reported at time of study completion    TECHNIQUE: CT of the head was performed with multiplanar reformats   without IV contrast. CT perfusion and CTA of the head and neck were   performed following the intravenous administration of IV contrast. MIP   reconstructions were performed on a separate workstation and reviewed.   RAPID softwarewas utilized for perfusion analysis.    FINDINGS:    CT BRAIN:    VENTRICLES AND SULCI: Mild, age-appropriate atrophy.  INTRA-AXIAL: No evidence of intraparenchymal mass, acute hemorrhage, or   midline shift.  No definitive acute territorial infarct.Stable   appearance of mild bihemispheric white matter hypodensities; a   nonspecific finding which statistically reflects chronic microvascular   ischemic change.  EXTRA-AXIAL: No mass or fluid collection. Basal cisterns are normal in   appearance.    VISUALIZED SINUSES:  No air-fluid levels or opacification.  TYMPANOMASTOID CAVITIES: No effusion.  VISUALIZED ORBITS: Bilateral lens replacement.  CALVARIUM: No new aggressive lesion.    MISCELLANEOUS: Left greater than right temporomandibular arthrosis.    CT PERFUSION:    TECHNICAL LIMITATIONS: Motion.    CBF<30%/CORE INFARCTION: 0 mL  TMAX>6s/UNDERPERFUSED TISSUE: 0 mL  MISMATCH VOLUME/TISSUE AT RISK: 0 mL  MISMATCH RATIO: None.    MISCELLANEOUS: None.    CTA NECK:    AORTIC ARCH AND VISUALIZED GREAT VESSELS: Unremarkable with the exception   of scattered calcified plaque, most pronounced at the left subclavian   artery origin, resulting in probable mild stenosis in this region,   however suboptimally evaluated secondary to motion.    RIGHT:  COMMON CAROTID/INTERNAL CAROTID ARTERY: Again seen is circumferential   calcified plaque extending from the carotid bifurcation into the   ipsilateral internal carotid artery origin, resulting in up to severe   (approximately 70-80%) stenosis inthis region by NASCET criteria.   Aberrant medial course proximal internal carotid artery.  VERTEBRAL ARTERY: Patent and codominant.    LEFT:  COMMON CAROTID/INTERNAL CAROTID ARTERY: Again seen is a circumferential   partially calcified plaque extending from the bifurcation into the   ipsilateral internal carotid artery origin, resulting in up to moderate   (approximately 60-70%) stenosis in this region by NASCET criteria.   Aberrant medial course proximal internal carotid artery.  VERTEBRAL ARTERY: Patent and codominant.    VISUALIZED LUNGS: No suspicious upper lung mass.    MISCELLANEOUS: Approximate 3.6 cm hypodense left thyroid nodule again   noted; a finding characterized on thyroid ultrasound 2/22/2024.   Multifocal degenerative change.    CAROTID STENOSIS REFERENCE: Percent (%) stenosis is expressed in terms of   NASCET Criteria. (NASCET = 100x1-(N/D)). N=greatest narrowing. D=normal   distal diameter - MILD = <50% stenosis. - MODERATE = 50-69% stenosis. -   SEVERE = 70-89% stenosis. - HAIRLINE/CRITICAL = 90-99% stenosis. -   OCCLUDED = 100% stenosis.    CTA HEAD:    INTERNAL CAROTID ARTERIES: Again seen is bilateral calcified plaque,   resulting in up to moderate stenosis bilateral cavernous segments. No   occlusion.    CIRCLEOF GARZA: 1-2 mm aneurysm versus infundibulum emanating   posteriorly inferiorly from the terminal segment left internal carotid   artery (series 6 image [id number]).    ANTERIOR CEREBRAL ARTERIES: No significant stenosis or occlusion. 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  MIDDLE CEREBRAL ARTERIES: No significant stenosis or occlusion.  POSTERIOR CEREBRAL ARTERIES: No occlusion. Fetal origin right posterior   cerebral artery. Again seen is evidence of moderate severe focal stenosis   at the origin of the left medial occipital artery.    DISTAL VERTEBRAL / BASILAR ARTERIES: No significant stenosis or   occlusion. Again seen is calcified plaque left proximal V4 segment,   resulting in mild focal stenosis.    VENOUS STRUCTURES: No definitive filling defect to suggest thrombus, as   on the prior.    MISCELLANEOUS: No other vascular abnormality is seen.    IMPRESSION:    CT HEAD:  1. No significant change compared with 6/17/2024.  2. No evidence of acute hemorrhage, hydrocephalus or mass effect.  3. Additional findings described in detail above.    CT PERFUSION:  1. No predicted core infarct.  2. No evidence of active ischemia-tissue at risk.    CTA NECK:  1. No significant change.  2. Bilateral vertebral arteries patent and codominant.  3. Severe right and moderate left internal carotid artery origin   stenosis, as above. No occlusion or dissection.  4. Additional findings described in detail above.    CTA HEAD:  1. No significant change.  2. No large vessel occlusion.  3. 1-2 mm aneurysm versus infundibulum terminal segment left internal   carotid artery.  4. Additional findings, including multifocal stenosis and anatomic   variants, described in detail above.    If clinical symptoms persist consider further imaging with MRI, provided   there are no medical contraindications. Results of CT brain discussed   with Dr. De Paz at 3:19 PM the day of this exam.    --- End of Report ---            JANNETTE TRINH M.D., ATTENDING RADIOLOGIST  This document has been electronically signed. Sep 23 2024  4:07PM      Summary:  Normal EEG in the awake / drowsy / asleep state(s).    Clinical Impression:  There were no epileptiform abnormalities recorded.      CONCLUSIONS:      1. Right: There is moderate calcific plaque noted within the proximal right internal carotid artery, consistent with a 50-79% stenosis.   2. Left: There is mild calcific plaque noted within the proximal left internal carotid artery, consistent with a 16-49% stenosis. No hemodynamically significant stenosis.   3. Vertebral arteries: antegrade flow bilaterally.   4. Subclavian arteries: no significant atherosclerosis bilaterally.   5. Hypoechoic avascular collection likely representing a thyroid cyst noted in the left neck. Recommend dedicated thyroid ultrasound for further characterization.

## 2024-09-27 LAB
ANION GAP SERPL CALC-SCNC: 14 MMOL/L — SIGNIFICANT CHANGE UP (ref 7–14)
BUN SERPL-MCNC: 10 MG/DL — SIGNIFICANT CHANGE UP (ref 7–23)
CALCIUM SERPL-MCNC: 9.4 MG/DL — SIGNIFICANT CHANGE UP (ref 8.4–10.5)
CHLORIDE SERPL-SCNC: 103 MMOL/L — SIGNIFICANT CHANGE UP (ref 98–107)
CO2 SERPL-SCNC: 24 MMOL/L — SIGNIFICANT CHANGE UP (ref 22–31)
CREAT SERPL-MCNC: 0.58 MG/DL — SIGNIFICANT CHANGE UP (ref 0.5–1.3)
EGFR: 100 ML/MIN/1.73M2 — SIGNIFICANT CHANGE UP
GLUCOSE BLDC GLUCOMTR-MCNC: 201 MG/DL — HIGH (ref 70–99)
GLUCOSE BLDC GLUCOMTR-MCNC: 206 MG/DL — HIGH (ref 70–99)
GLUCOSE BLDC GLUCOMTR-MCNC: 212 MG/DL — HIGH (ref 70–99)
GLUCOSE BLDC GLUCOMTR-MCNC: 266 MG/DL — HIGH (ref 70–99)
GLUCOSE SERPL-MCNC: 190 MG/DL — HIGH (ref 70–99)
HCT VFR BLD CALC: 37.9 % — SIGNIFICANT CHANGE UP (ref 34.5–45)
HGB BLD-MCNC: 12.2 G/DL — SIGNIFICANT CHANGE UP (ref 11.5–15.5)
MAGNESIUM SERPL-MCNC: 2.1 MG/DL — SIGNIFICANT CHANGE UP (ref 1.6–2.6)
MCHC RBC-ENTMCNC: 23.2 PG — LOW (ref 27–34)
MCHC RBC-ENTMCNC: 32.2 GM/DL — SIGNIFICANT CHANGE UP (ref 32–36)
MCV RBC AUTO: 72.1 FL — LOW (ref 80–100)
NRBC # BLD: 0 /100 WBCS — SIGNIFICANT CHANGE UP (ref 0–0)
NRBC # FLD: 0 K/UL — SIGNIFICANT CHANGE UP (ref 0–0)
PHOSPHATE SERPL-MCNC: 4.6 MG/DL — HIGH (ref 2.5–4.5)
PLATELET # BLD AUTO: 260 K/UL — SIGNIFICANT CHANGE UP (ref 150–400)
POTASSIUM SERPL-MCNC: 3.8 MMOL/L — SIGNIFICANT CHANGE UP (ref 3.5–5.3)
POTASSIUM SERPL-SCNC: 3.8 MMOL/L — SIGNIFICANT CHANGE UP (ref 3.5–5.3)
RBC # BLD: 5.26 M/UL — HIGH (ref 3.8–5.2)
RBC # FLD: 15.8 % — HIGH (ref 10.3–14.5)
SODIUM SERPL-SCNC: 141 MMOL/L — SIGNIFICANT CHANGE UP (ref 135–145)
WBC # BLD: 5.43 K/UL — SIGNIFICANT CHANGE UP (ref 3.8–10.5)
WBC # FLD AUTO: 5.43 K/UL — SIGNIFICANT CHANGE UP (ref 3.8–10.5)

## 2024-09-27 RX ORDER — INSULIN GLARGINE 300 U/ML
8 INJECTION, SOLUTION SUBCUTANEOUS AT BEDTIME
Refills: 0 | Status: DISCONTINUED | OUTPATIENT
Start: 2024-09-27 | End: 2024-09-30

## 2024-09-27 RX ADMIN — ATORVASTATIN CALCIUM 40 MILLIGRAM(S): 10 TABLET, FILM COATED ORAL at 22:05

## 2024-09-27 RX ADMIN — Medication 30 MILLIGRAM(S): at 05:23

## 2024-09-27 RX ADMIN — DOXAZOSIN 2 MILLIGRAM(S): 2 TABLET ORAL at 22:05

## 2024-09-27 RX ADMIN — GABAPENTIN 100 MILLIGRAM(S): 800 TABLET, FILM COATED ORAL at 05:23

## 2024-09-27 RX ADMIN — Medication 81 MILLIGRAM(S): at 12:13

## 2024-09-27 RX ADMIN — ENOXAPARIN SODIUM 40 MILLIGRAM(S): 150 INJECTION SUBCUTANEOUS at 05:23

## 2024-09-27 RX ADMIN — Medication 1 TABLET(S): at 12:13

## 2024-09-27 RX ADMIN — GABAPENTIN 100 MILLIGRAM(S): 800 TABLET, FILM COATED ORAL at 12:11

## 2024-09-27 RX ADMIN — Medication 30 MILLIGRAM(S): at 12:12

## 2024-09-27 RX ADMIN — LOSARTAN POTASSIUM 25 MILLIGRAM(S): 100 TABLET, FILM COATED ORAL at 05:23

## 2024-09-27 RX ADMIN — Medication 3: at 12:27

## 2024-09-27 RX ADMIN — Medication 50 MILLIGRAM(S): at 05:23

## 2024-09-27 RX ADMIN — Medication 75 MILLIGRAM(S): at 12:13

## 2024-09-27 RX ADMIN — INSULIN GLARGINE 8 UNIT(S): 300 INJECTION, SOLUTION SUBCUTANEOUS at 22:05

## 2024-09-27 RX ADMIN — Medication 2: at 17:43

## 2024-09-27 RX ADMIN — Medication 50 MILLIGRAM(S): at 17:44

## 2024-09-27 RX ADMIN — Medication 3 MILLIGRAM(S): at 23:54

## 2024-09-27 RX ADMIN — Medication 2: at 08:47

## 2024-09-27 RX ADMIN — GABAPENTIN 100 MILLIGRAM(S): 800 TABLET, FILM COATED ORAL at 22:04

## 2024-09-27 NOTE — PROGRESS NOTE ADULT - ASSESSMENT
4F w/ HTN, T2DM, dementia, Parkinson's Disease, stroke (2022 with residual L sided deficits), known bilateral ICA stenosis, frequent falls and gait instability, binocular catarct surgery presented with confusion and ?stiffness for which stroke code was called. Not tnk or thrombectomy candidate  Of note on aspirin and plavix at home. Previously admitted to [facility] in June 2024 for similar episode with normal EEG at that time.   prior LDL 81, A1c 9.2  Had prior R sided symptoms in Feb 2024 with diagnostic angio at that time not reflecting symptomatic carotid stenosis   o/e AAOx2, hypomimia, L hemiparesis, tremor and inc tone   routine eeg neg     Parkinsons  Prior stroke  R ICA stenosis   R/o seizure     - continue aspirin and statin for secondary stroke prevention  - ok to continue plavix for now  - MRI brain w/o contrast to determine if carotid stenosis symptomatic   - carotid duplex with R ICA 50-79% stenosis, L ICA < 50%   - vasc surgery following  - routine eeg neg  - continue home sinemet dose  - gradual normotension  - delirium precautions  - seizure and fall precautions  - pt/ot/slp  - dvt ppx    Viki Gray DO  Vascular Neurology  Office [phone]

## 2024-09-27 NOTE — PROGRESS NOTE ADULT - SUBJECTIVE AND OBJECTIVE BOX
General Surgery Progress Note    Overnight: ELENITA  Subjective: Patient seen and examined on rounds.    Objective:  Vitals:  T(C): 36.7 (09-27-24 @ 11:46), Max: 37.1 (09-26-24 @ 20:07)  HR: 76 (09-27-24 @ 11:46) (69 - 100)  BP: 144/74 (09-27-24 @ 11:46) (144/74 - 186/95)  RR: 20 (09-27-24 @ 11:46) (18 - 20)  SpO2: 98% (09-27-24 @ 11:46) (95% - 100%)  Wt(kg): --      Physical Exam:  General Appearance: no acute distress, NTND   Neuro: no facial asymmetry, no tongue deviation, moving b/l UE and LE spontaneously with LS weakeness appreciated c/w previous stroke deficits  Chest: airway intact, non-labored breathing  CV: no JVD, palpable pulses b/l  Abdomen: soft, non-tender, non-distended, +BS   Extremities: WWP      Labs:                        12.2   5.43  )-----------( 260      ( 27 Sep 2024 06:20 )             37.9     09-27    141  |  103  |  10  ----------------------------<  190[H]  3.8   |  24  |  0.58    Ca    9.4      27 Sep 2024 06:20  Phos  4.6     09-27  Mg     2.10     09-27          Urinalysis Basic - ( 27 Sep 2024 06:20 )    Color: x / Appearance: x / SG: x / pH: x  Gluc: 190 mg/dL / Ketone: x  / Bili: x / Urobili: x   Blood: x / Protein: x / Nitrite: x   Leuk Esterase: x / RBC: x / WBC x   Sq Epi: x / Non Sq Epi: x / Bacteria: x

## 2024-09-27 NOTE — PROGRESS NOTE ADULT - ASSESSMENT
EKG NSR 88  TTE 2/20/2024    1. Agitated saline injection was negative for intracardiac shunt.   2. Left ventricular cavity is normal in size. Left ventricular wall thickness is normal. Left ventricular systolic function is hyperdynamic. There are no regional wall motion abnormalities seen.   3. Normal left ventricular diastolic function, with normal filling pressure.   4. Normal right ventricular cavity size, with wall thickness, and normal systolic function.   5. Normal left and right atrial size.   6. No significant valvular disease.   7. No pericardial effusion seen.    NST 5/3/2024  1. Normal myocardial perfusion scan, with no evidence of infarction or inducible ischemia.   2. Stress electrocardiogram: No ischemic ST segment changes.   3. Normal left ventricular regional wall motion.   4. The left ventricle is normal in function and normal in size. Normal left ventricular diastolic function. The post stress end diastolic volume is 59 ml and systolic volume is 13 ml.   5. The left ventricle is normal in function and normal in size. Normal left ventricular diastolic function.   6. Normal right ventricular function. There is no right ventricular dilation. RVEF = 48%, RVEDV = 93 mL, RVESV = 49 mL.    Cerebral angiogram 2/22/2024 There is atherosclerotic plaque at the right carotid bifurcation   which causes 45% stenosis at the origin of the right cervical internal carotid artery, There is atherosclerotic   plaque at the left carotid bifurcation which causes 33% stenosis at the origin of the left cervical internal carotid artery. Diagnostic cerebral angiogram demonstrating mild left and moderate right cervical internal carotid artery atherosclerotic stenosis        A/P    Patient is a 63 year old female with a PMHx of  HTN, DM, HLD, CAD (s/p 3 stents placed at [facility] in 2014 to the prox LAD and then in 2022 - on ASA and Plavix), PAD s/p L popliteal angioplasty and atherectomy 12/23, R ICA and L ICA stenosis, prior stroke with L side weakness, Parkinson's disease, B/L cataract surgery with blurred vision at baseline who presents with unresponsiveness      1. Cardiac risk stratification  - vascular recs appreciated, CTA head/neck showing no LVO but severe right and moderate left internal carotid artery origin stenosis. planning on outpt surgery  - no CP/SOB, euvolemic on exam  - EKG ok, Echo ok, NST ok  - BP labile, on Imdur 30mg, losartan 25, lopressor 50 BID, nifedipine 30, will f/u  - renal artery doppler 8/2024(outpt) suggesting renal artery stenosis, will get abd CTA to r/o renal artery stenosis      2. AMS  - Pt is responsive now, able to move all extremities  - CTA   NECK: Severe right and moderate left internal carotid artery origin stenosis, as above. No occlusion or dissection.   HEAD: No significant change. No large vessel occlusion. 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   - Cerebral angiogram 2/22/2024 There is atherosclerotic plaque at the right carotid bifurcation   which causes 45% stenosis at the origin of the right cervical internal carotid artery, There is atherosclerotic   plaque at the left carotid bifurcation which causes 33% stenosis at the origin of the left cervical internal carotid artery. Diagnostic cerebral angiogram demonstrating mild left and moderate right cervical internal carotid artery atherosclerotic stenosis  - f/u neuro recs, f/u vascular recs  - home meds ASA, plavix, lipitor 40mg    3. Carotid artery stenosis  - CTA shows  NECK: Severe right and moderate left internal carotid artery origin stenosis, as above. No occlusion or dissection.    HEAD: No significant change. No large vessel occlusion. 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  - Cerebral angiogram 2/22/2024 There is atherosclerotic plaque at the right carotid bifurcation   which causes 45% stenosis at the origin of the right cervical internal carotid artery, There is atherosclerotic   plaque at the left carotid bifurcation which causes 33% stenosis at the origin of the left cervical internal carotid artery. Diagnostic cerebral angiogram demonstrating mild left and moderate right cervical internal carotid artery atherosclerotic stenosis.  - pt f/u with outpt neurologist  - carotid doppler Rt pICA moderate, Lt pICA mild   - vascular recs appreciated, CTA head/neck showing no LVO but severe right and moderate left internal carotid artery origin stenosis. planning on outpt surgery  - home meds ASA, plavix, lipitor 40mg      4. Hypertension  - BP meds resumed  -c/w nifedipine 30 mg QD, lopressor 50mg BID, Losartan 25mg QD, imdur 30 mg QD    5. CAD s/p stents  - s/p 3 stents placed at [facility] in 2014 to the prox LAD and then in 2022  - TTE with preserved EF and no WMA  - c/w ASA, Plavix and statin    6. HLD  - c/w atorvastatin 80mg PO daily    7. hx of palpitations s/p Falls   - TTE with preserved EF and no WMA  -s/p loop monitor , no arrhythmias on interrogation

## 2024-09-27 NOTE — PROGRESS NOTE ADULT - SUBJECTIVE AND OBJECTIVE BOX
Name of Patient : ALYSE AVENDAÑO  MRN: [id number]  Date of visit: 09-27-24 @ 15:14      Subjective: Patient seen and examined. No new events except as noted.     REVIEW OF SYSTEMS:    CONSTITUTIONAL: No weakness, fevers or chills  EYES/ENT: No visual changes;  No vertigo or throat pain   NECK: No pain or stiffness  RESPIRATORY: No cough, wheezing, hemoptysis; No shortness of breath  CARDIOVASCULAR: No chest pain or palpitations  GASTROINTESTINAL: No abdominal or epigastric pain. No nausea, vomiting, or hematemesis; No diarrhea or constipation. No melena or hematochezia.  GENITOURINARY: No dysuria, frequency or hematuria  NEUROLOGICAL: No numbness or weakness  SKIN: No itching, burning, rashes, or lesions   All other review of systems is negative unless indicated above.    MEDICATIONS:  MEDICATIONS  (STANDING):  aspirin enteric coated 81 milliGRAM(s) Oral daily  atorvastatin 40 milliGRAM(s) Oral at bedtime  carbidopa/levodopa  25/100 1 Tablet(s) Oral daily  clopidogrel Tablet 75 milliGRAM(s) Oral daily  dextrose 5%. 1000 milliLiter(s) (100 mL/Hr) IV Continuous <Continuous>  dextrose 5%. 1000 milliLiter(s) (50 mL/Hr) IV Continuous <Continuous>  dextrose 50% Injectable 25 Gram(s) IV Push once  dextrose 50% Injectable 25 Gram(s) IV Push once  dextrose 50% Injectable 12.5 Gram(s) IV Push once  doxazosin 2 milliGRAM(s) Oral at bedtime  enoxaparin Injectable 40 milliGRAM(s) SubCutaneous every 24 hours  gabapentin 100 milliGRAM(s) Oral three times a day  glucagon  Injectable 1 milliGRAM(s) IntraMuscular once  influenza  Vaccine (HIGH DOSE) 0.5 milliLiter(s) IntraMuscular once  insulin glargine Injectable (LANTUS) 8 Unit(s) SubCutaneous at bedtime  insulin lispro (ADMELOG) corrective regimen sliding scale   SubCutaneous three times a day before meals  insulin lispro (ADMELOG) corrective regimen sliding scale   SubCutaneous at bedtime  isosorbide   mononitrate ER Tablet (IMDUR) 30 milliGRAM(s) Oral daily  losartan 25 milliGRAM(s) Oral daily  melatonin 3 milliGRAM(s) Oral at bedtime  metoprolol tartrate 50 milliGRAM(s) Oral two times a day  NIFEdipine XL 30 milliGRAM(s) Oral daily      PHYSICAL EXAM:  T(C): 36.7 (09-27-24 @ 11:46), Max: 37.1 (09-26-24 @ 20:07)  HR: 76 (09-27-24 @ 11:46) (69 - 100)  BP: 144/74 (09-27-24 @ 11:46) (144/74 - 186/95)  RR: 20 (09-27-24 @ 11:46) (18 - 20)  SpO2: 98% (09-27-24 @ 11:46) (95% - 100%)  Wt(kg): --  I&O's Summary        Appearance: Normal	  HEENT:  PERRLA   Lymphatic: No lymphadenopathy   Cardiovascular: Normal S1 S2, no JVD  Respiratory: normal effort , clear  Gastrointestinal:  Soft, Non-tender  Skin: No rashes,  warm to touch  Psychiatry:  Mood & affect appropriate  Musculuskeletal: No edema    recent labs, Imaging and EKGs personally reviewed                           12.2   5.43  )-----------( 260      ( 27 Sep 2024 06:20 )             37.9               09-27    141  |  103  |  10  ----------------------------<  190[H]  3.8   |  24  |  0.58    Ca    9.4      27 Sep 2024 06:20  Phos  4.6     09-27  Mg     2.10     09-27                         Urinalysis Basic - ( 27 Sep 2024 06:20 )    Color: x / Appearance: x / SG: x / pH: x  Gluc: 190 mg/dL / Ketone: x  / Bili: x / Urobili: x   Blood: x / Protein: x / Nitrite: x   Leuk Esterase: x / RBC: x / WBC x   Sq Epi: x / Non Sq Epi: x / Bacteria: x

## 2024-09-27 NOTE — PROGRESS NOTE ADULT - SUBJECTIVE AND OBJECTIVE BOX
Neurology Progress Note    S: Patient seen and examined. No new events overnight.     Medications: MEDICATIONS  (STANDING):  aspirin enteric coated 81 milliGRAM(s) Oral daily  atorvastatin 40 milliGRAM(s) Oral at bedtime  carbidopa/levodopa  25/100 1 Tablet(s) Oral daily  clopidogrel Tablet 75 milliGRAM(s) Oral daily  dextrose 5%. 1000 milliLiter(s) (100 mL/Hr) IV Continuous <Continuous>  dextrose 5%. 1000 milliLiter(s) (50 mL/Hr) IV Continuous <Continuous>  dextrose 50% Injectable 25 Gram(s) IV Push once  dextrose 50% Injectable 25 Gram(s) IV Push once  dextrose 50% Injectable 12.5 Gram(s) IV Push once  doxazosin 2 milliGRAM(s) Oral at bedtime  enoxaparin Injectable 40 milliGRAM(s) SubCutaneous every 24 hours  gabapentin 100 milliGRAM(s) Oral three times a day  glucagon  Injectable 1 milliGRAM(s) IntraMuscular once  influenza  Vaccine (HIGH DOSE) 0.5 milliLiter(s) IntraMuscular once  insulin glargine Injectable (LANTUS) 8 Unit(s) SubCutaneous at bedtime  insulin lispro (ADMELOG) corrective regimen sliding scale   SubCutaneous at bedtime  insulin lispro (ADMELOG) corrective regimen sliding scale   SubCutaneous three times a day before meals  isosorbide   mononitrate ER Tablet (IMDUR) 30 milliGRAM(s) Oral daily  losartan 25 milliGRAM(s) Oral daily  melatonin 3 milliGRAM(s) Oral at bedtime  metoprolol tartrate 50 milliGRAM(s) Oral two times a day  NIFEdipine XL 30 milliGRAM(s) Oral daily    MEDICATIONS  (PRN):  dextrose Oral Gel 15 Gram(s) Oral once PRN Blood Glucose LESS THAN 70 milliGRAM(s)/deciliter  LORazepam     Tablet 0.5 milliGRAM(s) Oral once PRN pre-medication for MRI       Vitals:  Vital Signs Last 24 Hrs  T(C): 36.7 (27 Sep 2024 11:46), Max: 37.1 (26 Sep 2024 20:07)  T(F): 98.1 (27 Sep 2024 11:46), Max: 98.8 (26 Sep 2024 20:07)  HR: 76 (27 Sep 2024 11:46) (69 - 80)  BP: 144/74 (27 Sep 2024 11:46) (144/74 - 186/95)  BP(mean): --  RR: 20 (27 Sep 2024 11:46) (18 - 20)  SpO2: 98% (27 Sep 2024 11:46) (95% - 100%)    Parameters below as of 27 Sep 2024 09:20  Patient On (Oxygen Delivery Method): room air            General Exam:   General Appearance: Appropriately dressed and in no acute distress       Head: Normocephalic, atraumatic and no dysmorphic features  Ear, Nose, and Throat: Moist mucous membranes  CVS: S1S2+  Resp: No SOB, no wheeze or rhonchi  Abd: soft NTND  Extremities: No edema, no cyanosis  Skin: No bruises, no rashes     Neurological Exam:  Mental Status: Awake, alert and oriented x 2.  Able to follow simple commands. Fluent speech. No obvious aphasia or dysarthria noted.   Cranial Nerves: PERRL, EOMI, VFFC, sensation V1-V3 intact,  no obvious facial asymmetry , hypomimia, equal elevation of palate, scm/trap 5/5, tongue is midline on protrusion.   Motor: slight inc tone throughout with resting tremor , no drift.    Sensation: Intact to light touch  Reflexes: 1+ throughout at biceps, brachioradialis, triceps, patellars and ankles bilaterally and equal. No clonus. R toe and L toe were both downgoing.  Coordination: No dysmetria on FNF   Gait: deferred    I personally reviewed the below data/images/labs:    LABS:                          12.2   5.43  )-----------( 260      ( 27 Sep 2024 06:20 )             37.9     09-27    141  |  103  |  10  ----------------------------<  190[H]  3.8   |  24  |  0.58    Ca    9.4      27 Sep 2024 06:20  Phos  4.6     09-27  Mg     2.10     09-27          Urinalysis Basic - ( 27 Sep 2024 06:20 )    Color: x / Appearance: x / SG: x / pH: x  Gluc: 190 mg/dL / Ketone: x  / Bili: x / Urobili: x   Blood: x / Protein: x / Nitrite: x   Leuk Esterase: x / RBC: x / WBC x   Sq Epi: x / Non Sq Epi: x / Bacteria: x            ACC: [id number] EXAM:  CT ANGIO BRAIN STROKE PROTC IC   ORDERED BY: BENJI DE PAZ     ACC: [id number] EXAM:  CT ANGIO NECK STROKE PROTCL IC   ORDERED BY: BENJI DE PAZ     ACC: [id number] EXAM:  CT BRAIN STROKE PROTOCOL   ORDERED BY: BENJI DE PAZ     ACC: [id number] EXAM:  CT BRAIN PERFUSION MAPS STROKE   ORDERED BY: BENJI DE PAZ     PROCEDURE DATE:  09/23/2024          INTERPRETATION:  CLINICAL INFORMATION: Stroke code. Clinical concern for   CVA.    COMPARISON: CT brain 6/17/2024. CTA examinations 2/19/24.    CONTRAST:  IV Contrast: NONE (accession [id number]), Omnipaque 350 (accession   [id number])  80 cc administered  0 cc discarded  Complications: None reported at time of study completion    TECHNIQUE: CT of the head was performed with multiplanar reformats   without IV contrast. CT perfusion and CTA of the head and neck were   performed following the intravenous administration of IV contrast. MIP   reconstructions were performed on a separate workstation and reviewed.   RAPID softwarewas utilized for perfusion analysis.    FINDINGS:    CT BRAIN:    VENTRICLES AND SULCI: Mild, age-appropriate atrophy.  INTRA-AXIAL: No evidence of intraparenchymal mass, acute hemorrhage, or   midline shift.  No definitive acute territorial infarct.Stable   appearance of mild bihemispheric white matter hypodensities; a   nonspecific finding which statistically reflects chronic microvascular   ischemic change.  EXTRA-AXIAL: No mass or fluid collection. Basal cisterns are normal in   appearance.    VISUALIZED SINUSES:  No air-fluid levels or opacification.  TYMPANOMASTOID CAVITIES: No effusion.  VISUALIZED ORBITS: Bilateral lens replacement.  CALVARIUM: No new aggressive lesion.    MISCELLANEOUS: Left greater than right temporomandibular arthrosis.    CT PERFUSION:    TECHNICAL LIMITATIONS: Motion.    CBF<30%/CORE INFARCTION: 0 mL  TMAX>6s/UNDERPERFUSED TISSUE: 0 mL  MISMATCH VOLUME/TISSUE AT RISK: 0 mL  MISMATCH RATIO: None.    MISCELLANEOUS: None.    CTA NECK:    AORTIC ARCH AND VISUALIZED GREAT VESSELS: Unremarkable with the exception   of scattered calcified plaque, most pronounced at the left subclavian   artery origin, resulting in probable mild stenosis in this region,   however suboptimally evaluated secondary to motion.    RIGHT:  COMMON CAROTID/INTERNAL CAROTID ARTERY: Again seen is circumferential   calcified plaque extending from the carotid bifurcation into the   ipsilateral internal carotid artery origin, resulting in up to severe   (approximately 70-80%) stenosis inthis region by NASCET criteria.   Aberrant medial course proximal internal carotid artery.  VERTEBRAL ARTERY: Patent and codominant.    LEFT:  COMMON CAROTID/INTERNAL CAROTID ARTERY: Again seen is a circumferential   partially calcified plaque extending from the bifurcation into the   ipsilateral internal carotid artery origin, resulting in up to moderate   (approximately 60-70%) stenosis in this region by NASCET criteria.   Aberrant medial course proximal internal carotid artery.  VERTEBRAL ARTERY: Patent and codominant.    VISUALIZED LUNGS: No suspicious upper lung mass.    MISCELLANEOUS: Approximate 3.6 cm hypodense left thyroid nodule again   noted; a finding characterized on thyroid ultrasound 2/22/2024.   Multifocal degenerative change.    CAROTID STENOSIS REFERENCE: Percent (%) stenosis is expressed in terms of   NASCET Criteria. (NASCET = 100x1-(N/D)). N=greatest narrowing. D=normal   distal diameter - MILD = <50% stenosis. - MODERATE = 50-69% stenosis. -   SEVERE = 70-89% stenosis. - HAIRLINE/CRITICAL = 90-99% stenosis. -   OCCLUDED = 100% stenosis.    CTA HEAD:    INTERNAL CAROTID ARTERIES: Again seen is bilateral calcified plaque,   resulting in up to moderate stenosis bilateral cavernous segments. No   occlusion.    CIRCLEOF GARZA: 1-2 mm aneurysm versus infundibulum emanating   posteriorly inferiorly from the terminal segment left internal carotid   artery (series 6 image [id number]).    ANTERIOR CEREBRAL ARTERIES: No significant stenosis or occlusion. 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  MIDDLE CEREBRAL ARTERIES: No significant stenosis or occlusion.  POSTERIOR CEREBRAL ARTERIES: No occlusion. Fetal origin right posterior   cerebral artery. Again seen is evidence of moderate severe focal stenosis   at the origin of the left medial occipital artery.    DISTAL VERTEBRAL / BASILAR ARTERIES: No significant stenosis or   occlusion. Again seen is calcified plaque left proximal V4 segment,   resulting in mild focal stenosis.    VENOUS STRUCTURES: No definitive filling defect to suggest thrombus, as   on the prior.    MISCELLANEOUS: No other vascular abnormality is seen.    IMPRESSION:    CT HEAD:  1. No significant change compared with 6/17/2024.  2. No evidence of acute hemorrhage, hydrocephalus or mass effect.  3. Additional findings described in detail above.    CT PERFUSION:  1. No predicted core infarct.  2. No evidence of active ischemia-tissue at risk.    CTA NECK:  1. No significant change.  2. Bilateral vertebral arteries patent and codominant.  3. Severe right and moderate left internal carotid artery origin   stenosis, as above. No occlusion or dissection.  4. Additional findings described in detail above.    CTA HEAD:  1. No significant change.  2. No large vessel occlusion.  3. 1-2 mm aneurysm versus infundibulum terminal segment left internal   carotid artery.  4. Additional findings, including multifocal stenosis and anatomic   variants, described in detail above.    If clinical symptoms persist consider further imaging with MRI, provided   there are no medical contraindications. Results of CT brain discussed   with Dr. De Paz at 3:19 PM the day of this exam.    --- End of Report ---            JANNETTE TRINH M.D., ATTENDING RADIOLOGIST  This document has been electronically signed. Sep 23 2024  4:07PM      Summary:  Normal EEG in the awake / drowsy / asleep state(s).    Clinical Impression:  There were no epileptiform abnormalities recorded.      CONCLUSIONS:      1. Right: There is moderate calcific plaque noted within the proximal right internal carotid artery, consistent with a 50-79% stenosis.   2. Left: There is mild calcific plaque noted within the proximal left internal carotid artery, consistent with a 16-49% stenosis. No hemodynamically significant stenosis.   3. Vertebral arteries: antegrade flow bilaterally.   4. Subclavian arteries: no significant atherosclerosis bilaterally.   5. Hypoechoic avascular collection likely representing a thyroid cyst noted in the left neck. Recommend dedicated thyroid ultrasound for further characterization.

## 2024-09-27 NOTE — PROGRESS NOTE ADULT - ASSESSMENT
Patient is a 35 Y/o Female BMI 43, prev on ASA81/Plvx75 for diffuse ICAD, hx HTN, HLD, uncontrolled DM2 x15 yrs, sickle cell trait, CAD s/p 4 stents and CABG, recent p/f Lt hand/arm numb (8/15/24), MRI w/ Rt side strokes, angio w/ Dr. Pulliam 8/23/24 w/ Rt distal cav/supraclinoid ICA svr stenosis, LICA occlusion w/ distal recon, also read on NOVA 8/16. Was d/c'd on DAPT 8/24 by neuro. Now p/w new Lt sided sx, MRI 9/23/24 w/ new RMCA terr strokes (appear embolic/watershed). Exam: AOx3, LUE drift, Lt facial numbness V1-V3, mild L facial, L HG 3/5 o/w LUE 4+/5, LLE 4+/5 (feels heavy, LUE finger paresthesias

## 2024-09-27 NOTE — CONSULT NOTE ADULT - SUBJECTIVE AND OBJECTIVE BOX
[facility]   MD MARITA PALM MD ANGELA WONG, PA QIAN CHEN, NP      TEL:  OFFICE: [phone]  From 5pm-7am answering service [phone]    --- INITIAL RENAL CONSULT NOTE ---date of service 09-27-24 @ 19:14    HPI:  63 y/o female w/ PMHx CAD s/p PCI, PAD s/p L popliteal angioplasty and atherectomy, CVAs (2022) w/ residual L sided weakness, R and L ICA stenosis, Parkinson's disease, HTN, HLD, T2DM, and decreased vision presenting to [facility] ED as code stroke. Patient's  states that when he went to work at 6 AM on 9/23/2024, patient was in her usual state of health,  ambulates with walker at baseline. When he returned from work at around 13:30 on 9/23/2024, home health aide reported that patient was not responsive to questions or commands, moving her eyes only and with rigid body. Per EMS, BP was 250/129 on presentation and blood glucose was 211. Repeat BP in ED was 170/90 and blood glucose of 214.  Patient takes aspirin and plavix at home.    Allergies:  No Known Allergies      PAST MEDICAL & SURGICAL HISTORY:  HTN (hypertension)    HLD (hyperlipidemia)    CAD (coronary artery disease)    Type II diabetes mellitus    PAD (peripheral artery disease)    Mild dementia    Parkinson disease    S/P hysterectomy          Home Medications Reviewed    Hospital Medications:   MEDICATIONS  (STANDING):  aspirin enteric coated 81 milliGRAM(s) Oral daily  atorvastatin 40 milliGRAM(s) Oral at bedtime  carbidopa/levodopa  25/100 1 Tablet(s) Oral daily  clopidogrel Tablet 75 milliGRAM(s) Oral daily  dextrose 5%. 1000 milliLiter(s) (50 mL/Hr) IV Continuous <Continuous>  dextrose 5%. 1000 milliLiter(s) (100 mL/Hr) IV Continuous <Continuous>  dextrose 50% Injectable 25 Gram(s) IV Push once  dextrose 50% Injectable 25 Gram(s) IV Push once  dextrose 50% Injectable 12.5 Gram(s) IV Push once  doxazosin 2 milliGRAM(s) Oral at bedtime  enoxaparin Injectable 40 milliGRAM(s) SubCutaneous every 24 hours  gabapentin 100 milliGRAM(s) Oral three times a day  glucagon  Injectable 1 milliGRAM(s) IntraMuscular once  influenza  Vaccine (HIGH DOSE) 0.5 milliLiter(s) IntraMuscular once  insulin glargine Injectable (LANTUS) 8 Unit(s) SubCutaneous at bedtime  insulin lispro (ADMELOG) corrective regimen sliding scale   SubCutaneous three times a day before meals  insulin lispro (ADMELOG) corrective regimen sliding scale   SubCutaneous at bedtime  isosorbide   mononitrate ER Tablet (IMDUR) 30 milliGRAM(s) Oral daily  losartan 25 milliGRAM(s) Oral daily  melatonin 3 milliGRAM(s) Oral at bedtime  metoprolol tartrate 50 milliGRAM(s) Oral two times a day  NIFEdipine XL 30 milliGRAM(s) Oral daily      SOCIAL HISTORY:  Denies ETOh, Smoking,     FAMILY HISTORY:      REVIEW OF SYSTEMS:  Deferred d/t AMS.    VITALS:  T(F): 98.6 (09-27-24 @ 17:40), Max: 98.8 (09-26-24 @ 20:07)  HR: 82 (09-27-24 @ 17:40)  BP: 141/74 (09-27-24 @ 17:40)  RR: 18 (09-27-24 @ 17:40)  SpO2: 100% (09-27-24 @ 17:40)  Wt(kg): --    09-27 @ 07:01  -  09-27 @ 19:14  --------------------------------------------------------  IN: 0 mL / OUT: 800 mL / NET: -800 mL        PHYSICAL EXAM:  General: NAD  HEENT: anicteric sclera, oropharynx clear, MMM  Neck: No JVD  Respiratory: CTAB, no wheezes, rales or rhonchi  Cardiovascular: S1, S2, RRR  Gastrointestinal: BS+, soft, NT/ND  Extremities: No cyanosis or clubbing. No peripheral edema  Neurological: A/O x 3, confused and restless.  Psychiatric: Normal mood, normal affect  : No CVA tenderness. No vasques.   Skin: No rashes        LABS:  09-27    141  |  103  |  10  ----------------------------<  190[H]  3.8   |  24  |  0.58    Ca    9.4      27 Sep 2024 06:20  Phos  4.6     09-27  Mg     2.10     09-27      Creatinine Trend: 0.58 <--, 0.57 <--, 0.59 <--, 0.70 <--                        12.2   5.43  )-----------( 260      ( 27 Sep 2024 06:20 )             37.9     Urine Studies:  Urinalysis Basic - ( 27 Sep 2024 06:20 )    Color:  / Appearance:  / SG:  / pH:   Gluc: 190 mg/dL / Ketone:   / Bili:  / Urobili:    Blood:  / Protein:  / Nitrite:    Leuk Esterase:  / RBC:  / WBC    Sq Epi:  / Non Sq Epi:  / Bacteria:           RADIOLOGY & ADDITIONAL STUDIES:

## 2024-09-27 NOTE — PROGRESS NOTE ADULT - ASSESSMENT
64F w/ PMHx CAD s/p PCI, PAD s/p L popliteal angioplasty and atherectomy, CVAs (2022) w/ residual L sided weakness, R and L ICA stenosis, Parkinson's disease, HTN, HLD, T2DM, and decreased vision presenting to [facility] ED as code stroke iso /129 and not responding to questions/commands with rigid body. Patient improved to baseline while in ED and underwent CT head with non-acute findings and CTA head/neck showing no LVO but severe right and moderate left internal carotid artery origin stenosis. Vascular surgery c/f evaluation.       Rec:  - carotid duplex and CT reviewed will plan for OP surgery   - please document med/cards operative risk stratification while inpatient **  - c/w asa/plavix  - vascular surgery to follow    C Team Surgery [id number]  Hermann Chamorro MD (PGY2)

## 2024-09-27 NOTE — CONSULT NOTE ADULT - ASSESSMENT
63 y/o female w/ PMHx CAD s/p PCI, PAD s/p L popliteal angioplasty and atherectomy, CVAs (2022) w/ residual L sided weakness, R and L ICA stenosis, Parkinson's disease, HTN, HLD, T2DM, and decreased vision presenting to [facility] ED as code stroke. Patient's  states that when he went to work at 6 AM on 9/23/2024, patient was in her usual state of health,  ambulates with walker at baseline. When he returned from work at around 13:30 on 9/23/2024, home health aide reported that patient was not responsive to questions or commands, moving her eyes only and with rigid body. Per EMS, BP was 250/129 on presentation and blood glucose was 211. Repeat BP in ED was 170/90 and blood glucose of 214.  Patient takes aspirin and plavix at home.  Nephrology consulted to r/o renal stenosis.    A/P:  Renal Stenosis:  Outpatient renal doppler (8/2024) suggesting renal stenosis.  Pending CTA A/P   BP labile.  Normal renal function.  S/p CTA head and neck 09/23.  Monitor for DEVYN.  Monitor BMP and UO.  Cardiology and vascular following.    HTN:  Labile; suboptimal.  C/W losartan 25mg qd, isosorbide 30mg qd, metoprolol tart 50mg BID, and nifedipine 30mg qd.  Up titrate losartan as needed.  Low salt diet.  Monitor BP.    Hyperphosphatemia:  Marginal.  Low PO4 diet.  Monitor PO4.

## 2024-09-27 NOTE — PROGRESS NOTE ADULT - SUBJECTIVE AND OBJECTIVE BOX
Johnathan Tejeda MD  Interventional Cardiology / Advance Heart Failure and Cardiac Transplant Specialist  [place] Office : [address] [place] [address] [place] [address]  Tel:   [place] Office : [address] [facility] [place] [address]  Tel: [phone]       Pt is lying in bed comfortable not in distress, pt seen and examined at bedside  	  MEDICATIONS:  aspirin enteric coated 81 milliGRAM(s) Oral daily  clopidogrel Tablet 75 milliGRAM(s) Oral daily  doxazosin 2 milliGRAM(s) Oral at bedtime  enoxaparin Injectable 40 milliGRAM(s) SubCutaneous every 24 hours  isosorbide   mononitrate ER Tablet (IMDUR) 30 milliGRAM(s) Oral daily  losartan 25 milliGRAM(s) Oral daily  metoprolol tartrate 50 milliGRAM(s) Oral two times a day  NIFEdipine XL 30 milliGRAM(s) Oral daily        carbidopa/levodopa  25/100 1 Tablet(s) Oral daily  gabapentin 100 milliGRAM(s) Oral three times a day  LORazepam     Tablet 0.5 milliGRAM(s) Oral once PRN  melatonin 3 milliGRAM(s) Oral at bedtime      atorvastatin 40 milliGRAM(s) Oral at bedtime  dextrose 50% Injectable 25 Gram(s) IV Push once  dextrose 50% Injectable 25 Gram(s) IV Push once  dextrose 50% Injectable 12.5 Gram(s) IV Push once  dextrose Oral Gel 15 Gram(s) Oral once PRN  glucagon  Injectable 1 milliGRAM(s) IntraMuscular once  insulin glargine Injectable (LANTUS) 8 Unit(s) SubCutaneous at bedtime  insulin lispro (ADMELOG) corrective regimen sliding scale   SubCutaneous at bedtime  insulin lispro (ADMELOG) corrective regimen sliding scale   SubCutaneous three times a day before meals    dextrose 5%. 1000 milliLiter(s) IV Continuous <Continuous>  dextrose 5%. 1000 milliLiter(s) IV Continuous <Continuous>  influenza  Vaccine (HIGH DOSE) 0.5 milliLiter(s) IntraMuscular once      PAST MEDICAL/SURGICAL HISTORY  PAST MEDICAL & SURGICAL HISTORY:  HTN (hypertension)      HLD (hyperlipidemia)      CAD (coronary artery disease)      Type II diabetes mellitus      PAD (peripheral artery disease)      Mild dementia      Parkinson disease      S/P hysterectomy          SOCIAL HISTORY: Substance Use (street drugs): ( x ) never used  (  ) other:    FAMILY HISTORY:      PHYSICAL EXAM:  T(C): 36.7 (09-27-24 @ 11:46), Max: 37.1 (09-26-24 @ 20:07)  HR: 76 (09-27-24 @ 11:46) (69 - 100)  BP: 144/74 (09-27-24 @ 11:46) (144/74 - 186/95)  RR: 20 (09-27-24 @ 11:46) (18 - 20)  SpO2: 98% (09-27-24 @ 11:46) (95% - 100%)  Wt(kg): --  I&O's Summary      GENERAL: NAD  EYES:   PERRLA   ENMT:   Moist mucous membranes, Good dentition, No lesions  Cardiovascular: Normal S1 S2, No JVD, No murmurs, No edema  Respiratory: Lungs clear to auscultation	  Gastrointestinal:  Soft, Non-tender, + BS	  Extremities: no edema                                      12.2   5.43  )-----------( 260      ( 27 Sep 2024 06:20 )             37.9     09-27    141  |  103  |  10  ----------------------------<  190[H]  3.8   |  24  |  0.58    Ca    9.4      27 Sep 2024 06:20  Phos  4.6     09-27  Mg     2.10     09-27      proBNP:   Lipid Profile:   HgA1c:   TSH:     Consultant(s) Notes Reviewed:  [x ] YES  [ ] NO    Care Discussed with Consultants/Other Providers [ x] YES  [ ] NO    Imaging Personally Reviewed independently:  [x] YES  [ ] NO    All labs, radiologic studies, vitals, orders and medications list reviewed. Patient is seen and examined at bedside. Case discussed with medical team.

## 2024-09-27 NOTE — EEG REPORT - NS EEG TEXT BOX
ALYSE AVENDAÑO [id number]     Study Date: 		09-26-24 (1615-0150)  Duration: 10hr 24 min    --------------------------------------------------------------------------------------------------  History:  CC/ HPI Patient is a 64y old  Female who presents with a chief complaint of AMS (26 Sep 2024 16:30)    MEDICATIONS  (STANDING):    --------------------------------------------------------------------------------------------------  Study Interpretation:    [[[Abbreviation Key:  PDR=alpha rhythm/posterior dominant rhythm. A-P=anterior posterior gradient.  Amplitude: ‘very low’:<20; ‘low’:20-50; ‘medium’:; ‘high’:>200uV.  Persistence for periodic/rhythmic patterns (% of epoch) ‘rare’:<1%; ‘occasional’:1-10%; ‘frequent’:10-50%; ‘abundant’:50-90%; ‘continuous’:>90%.  Persistence for sporadic discharges: ‘rare’:<1/hr; ‘occasional’:1/min-1/hr; ‘frequent’:>1/min; ‘abundant’:>1/10 sec.  GRDA=generalized rhythmic delta activity; FIRDA=frontal intermittent GRDA; LRDA=lateralized rhythmic delta activity; TIRDA=temporal intermittent rhythmic delta activity;  LPD=PLED=lateralized periodic discharges; GPD=generalized periodic discharges; BiPDs=BiPLEDs=bilateral independent periodic epileptiform discharges; SIRPID=stimulus induced rhythmic, periodic, or ictal appearing discharges; BIRDs=brief potentially ictal rhythmic discharges >4 Hz, lasting .5-10s; PFA=paroxysmal bursts of beta/gamma; LVFA=low voltage fast activity.  Modifiers: +F=with fast component; +S=with spike component; +R=with rhythmic component.  S-B=burst suppression pattern.  Max=maximal. N1-drowsy; N2-stage II sleep; N3-slow wave sleep. SSS/BETS=small sharp spikes/benign epileptiform transients of sleep. HV=hyperventilation; PS=photic stimulation]]]    Daily EEG Visual Analysis    FINDINGS:      Background:  Continuity: continuous  Symmetry: symmetric  PDR: 8.5-9 Hz activity, with amplitude to 40 uV, that attenuated to eye opening.  Low amplitude frontal beta noted in wakefulness.  Reactivity: present  Voltage: normal, mostly 20-150uV  Anterior Posterior Gradient: present  Other background findings: none  Breach: absent    Background Slowing:  Generalized slowing: none was present.  Focal slowing: none was present.    State Changes:   -None visualized, although copious lead artifact did obscure review for the majority of this recording     Sporadic Epileptiform Discharges:    None    Rhythmic and Periodic Patterns (RPPs):  None     Electrographic and Electroclinical seizures:  None    Other Clinical Events:  None    Activation Procedures:   -Hyperventilation was not performed.    -Photic stimulation was not performed.    Artifacts:  Copious lead artifact did obscure the review for the majority of this recording. Initially, left temporal artifact was noticed, which, as of about 1345 became diffuse.   Intermittent myogenic and movement artifacts were noted.    ECG:  The heart rate on single channel ECG was predominantly between 70-90 BPM.    EEG Classification / Summary:  Normal  EEG in the awake state (within limitation of review afforded by copious lead artifact).     Clinical Impression:  There were no epileptiform abnormalities recorded (within limitation of review afforded by copious lead artifact). A normal EEG does not refute the diagnosis of epilepsy.      *PRELIM READ, ATTENDING REVIEW PENDING*       -------------------------------------------------------------------------------------------------------  [facility] EEG Reading Room Ph#: [phone]  Epilepsy Answering Service after 5PM and before 8:30AM: Ph#: [phone]    Lg Hinson DO   Epilepsy Fellow    ALYSE AVENDAÑO [id number]     Study Date: 09-26-24 (08:00-18:24)  Duration: 10 hr 23 min  --------------------------------------------------------------------------------------------------  History:  CC/ HPI Patient is a 64y old  Female who presents with a chief complaint of AMS (26 Sep 2024 16:30)    MEDICATIONS  (STANDING):    --------------------------------------------------------------------------------------------------  Study Interpretation:    [[[Abbreviation Key:  PDR=alpha rhythm/posterior dominant rhythm. A-P=anterior posterior gradient.  Amplitude: ‘very low’:<20; ‘low’:20-50; ‘medium’:; ‘high’:>200uV.  Persistence for periodic/rhythmic patterns (% of epoch) ‘rare’:<1%; ‘occasional’:1-10%; ‘frequent’:10-50%; ‘abundant’:50-90%; ‘continuous’:>90%.  Persistence for sporadic discharges: ‘rare’:<1/hr; ‘occasional’:1/min-1/hr; ‘frequent’:>1/min; ‘abundant’:>1/10 sec.  GRDA=generalized rhythmic delta activity; FIRDA=frontal intermittent GRDA; LRDA=lateralized rhythmic delta activity; TIRDA=temporal intermittent rhythmic delta activity;  LPD=PLED=lateralized periodic discharges; GPD=generalized periodic discharges; BiPDs=BiPLEDs=bilateral independent periodic epileptiform discharges; SIRPID=stimulus induced rhythmic, periodic, or ictal appearing discharges; BIRDs=brief potentially ictal rhythmic discharges >4 Hz, lasting .5-10s; PFA=paroxysmal bursts of beta/gamma; LVFA=low voltage fast activity.  Modifiers: +F=with fast component; +S=with spike component; +R=with rhythmic component.  S-B=burst suppression pattern.  Max=maximal. N1-drowsy; N2-stage II sleep; N3-slow wave sleep. SSS/BETS=small sharp spikes/benign epileptiform transients of sleep. HV=hyperventilation; PS=photic stimulation]]]    Daily EEG Visual Analysis    FINDINGS:      Background:  Continuity: continuous  Symmetry: symmetric  PDR: 9 Hz, attenuated to eye opening. Low-amplitude frontal beta in wakefulness.  Voltage: normal  Anterior Posterior Gradient: present  Other background findings: none  Breach: absent    Background Slowing:  Generalized slowing: none  Focal slowing: none    State Changes:   Drowsiness characterized by fragmentation, attenuation, and slowing of the background activity.  Stage 2 sleep characterized by symmetric K complexes and sleep spindles.     Sporadic Epileptiform Discharges:    None    Rhythmic and Periodic Patterns (RPPs):  None     Electrographic and Electroclinical seizures:  None    Other Clinical Events:  None    Activation Procedures:   -Hyperventilation was not performed.    -Photic stimulation was not performed.    Artifacts:  The left temporal chain is uninterpretable for the duration of recording due to poor impedance and disconnected electrodes.  After 12:05, the study is nearly uninterpretable due to diffusely poor electrode impedance and multiple disconnected electrodes.    Single-lead EKG: Regular rhythm    EEG Classification / Summary:  Normal EEG in the awake, drowsy, asleep states.  No focal or epileptiform abnormalities.  No seizures.  Interpretation limited due to poor electrode impedance and disconnected electrodes (left temporal chain uninterpretable for the duration of recording; rest of the study becomes nearly uninterpretable after 12:05).    Clinical Impression:  Normal video-EEG, within the limits of interpretation.      -------------------------------------------------------------------------------------------------------  [facility] EEG Reading Room Ph#: [phone]  Epilepsy Answering Service after 5PM and before 8:30AM: Ph#: [phone]    Lg Hinson DO   Epilepsy Fellow     Jade Marte MD  Attending Physician, [facility] Epilepsy [place]

## 2024-09-28 LAB
ANION GAP SERPL CALC-SCNC: 14 MMOL/L — SIGNIFICANT CHANGE UP (ref 7–14)
BUN SERPL-MCNC: 13 MG/DL — SIGNIFICANT CHANGE UP (ref 7–23)
CALCIUM SERPL-MCNC: 9.5 MG/DL — SIGNIFICANT CHANGE UP (ref 8.4–10.5)
CHLORIDE SERPL-SCNC: 103 MMOL/L — SIGNIFICANT CHANGE UP (ref 98–107)
CO2 SERPL-SCNC: 22 MMOL/L — SIGNIFICANT CHANGE UP (ref 22–31)
CREAT SERPL-MCNC: 0.63 MG/DL — SIGNIFICANT CHANGE UP (ref 0.5–1.3)
EGFR: 98 ML/MIN/1.73M2 — SIGNIFICANT CHANGE UP
GLUCOSE BLDC GLUCOMTR-MCNC: 196 MG/DL — HIGH (ref 70–99)
GLUCOSE BLDC GLUCOMTR-MCNC: 275 MG/DL — HIGH (ref 70–99)
GLUCOSE BLDC GLUCOMTR-MCNC: 302 MG/DL — HIGH (ref 70–99)
GLUCOSE BLDC GLUCOMTR-MCNC: 350 MG/DL — HIGH (ref 70–99)
GLUCOSE SERPL-MCNC: 176 MG/DL — HIGH (ref 70–99)
HCT VFR BLD CALC: 38.7 % — SIGNIFICANT CHANGE UP (ref 34.5–45)
HGB BLD-MCNC: 12.4 G/DL — SIGNIFICANT CHANGE UP (ref 11.5–15.5)
MAGNESIUM SERPL-MCNC: 2 MG/DL — SIGNIFICANT CHANGE UP (ref 1.6–2.6)
MCHC RBC-ENTMCNC: 23.4 PG — LOW (ref 27–34)
MCHC RBC-ENTMCNC: 32 GM/DL — SIGNIFICANT CHANGE UP (ref 32–36)
MCV RBC AUTO: 73 FL — LOW (ref 80–100)
NRBC # BLD: 0 /100 WBCS — SIGNIFICANT CHANGE UP (ref 0–0)
NRBC # FLD: 0 K/UL — SIGNIFICANT CHANGE UP (ref 0–0)
PHOSPHATE SERPL-MCNC: 4.4 MG/DL — SIGNIFICANT CHANGE UP (ref 2.5–4.5)
PLATELET # BLD AUTO: 269 K/UL — SIGNIFICANT CHANGE UP (ref 150–400)
POTASSIUM SERPL-MCNC: 4 MMOL/L — SIGNIFICANT CHANGE UP (ref 3.5–5.3)
POTASSIUM SERPL-SCNC: 4 MMOL/L — SIGNIFICANT CHANGE UP (ref 3.5–5.3)
RBC # BLD: 5.3 M/UL — HIGH (ref 3.8–5.2)
RBC # FLD: 15.6 % — HIGH (ref 10.3–14.5)
SODIUM SERPL-SCNC: 139 MMOL/L — SIGNIFICANT CHANGE UP (ref 135–145)
WBC # BLD: 6.01 K/UL — SIGNIFICANT CHANGE UP (ref 3.8–10.5)
WBC # FLD AUTO: 6.01 K/UL — SIGNIFICANT CHANGE UP (ref 3.8–10.5)

## 2024-09-28 RX ADMIN — LOSARTAN POTASSIUM 25 MILLIGRAM(S): 100 TABLET, FILM COATED ORAL at 04:57

## 2024-09-28 RX ADMIN — Medication 50 MILLIGRAM(S): at 17:35

## 2024-09-28 RX ADMIN — Medication 1: at 08:57

## 2024-09-28 RX ADMIN — Medication 4: at 17:35

## 2024-09-28 RX ADMIN — GABAPENTIN 100 MILLIGRAM(S): 800 TABLET, FILM COATED ORAL at 21:54

## 2024-09-28 RX ADMIN — Medication 1 TABLET(S): at 11:36

## 2024-09-28 RX ADMIN — Medication 50 MILLIGRAM(S): at 04:56

## 2024-09-28 RX ADMIN — Medication 1: at 21:55

## 2024-09-28 RX ADMIN — INSULIN GLARGINE 8 UNIT(S): 300 INJECTION, SOLUTION SUBCUTANEOUS at 21:54

## 2024-09-28 RX ADMIN — Medication 81 MILLIGRAM(S): at 11:36

## 2024-09-28 RX ADMIN — GABAPENTIN 100 MILLIGRAM(S): 800 TABLET, FILM COATED ORAL at 12:50

## 2024-09-28 RX ADMIN — Medication 4: at 12:50

## 2024-09-28 RX ADMIN — Medication 30 MILLIGRAM(S): at 12:50

## 2024-09-28 RX ADMIN — GABAPENTIN 100 MILLIGRAM(S): 800 TABLET, FILM COATED ORAL at 04:56

## 2024-09-28 RX ADMIN — ATORVASTATIN CALCIUM 40 MILLIGRAM(S): 10 TABLET, FILM COATED ORAL at 21:56

## 2024-09-28 RX ADMIN — Medication 3 MILLIGRAM(S): at 21:57

## 2024-09-28 RX ADMIN — Medication 30 MILLIGRAM(S): at 04:57

## 2024-09-28 RX ADMIN — DOXAZOSIN 2 MILLIGRAM(S): 2 TABLET ORAL at 21:57

## 2024-09-28 RX ADMIN — ENOXAPARIN SODIUM 40 MILLIGRAM(S): 150 INJECTION SUBCUTANEOUS at 04:56

## 2024-09-28 RX ADMIN — Medication 75 MILLIGRAM(S): at 11:36

## 2024-09-28 NOTE — PROGRESS NOTE ADULT - SUBJECTIVE AND OBJECTIVE BOX
Name of Patient : ALYSE AVENDAÑO  MRN: [id number]        Subjective: Patient seen and examined. No new events except as noted.   doing okay     REVIEW OF SYSTEMS:    CONSTITUTIONAL: No weakness, fevers or chills  EYES/ENT: No visual changes;  No vertigo or throat pain   NECK: No pain or stiffness  RESPIRATORY: No cough, wheezing, hemoptysis; No shortness of breath  CARDIOVASCULAR: No chest pain or palpitations  GASTROINTESTINAL: No abdominal or epigastric pain. No nausea, vomiting, or hematemesis; No diarrhea or constipation. No melena or hematochezia.  GENITOURINARY: No dysuria, frequency or hematuria  NEUROLOGICAL: No numbness or weakness  SKIN: No itching, burning, rashes, or lesions   All other review of systems is negative unless indicated above.    MEDICATIONS:  MEDICATIONS  (STANDING):  aspirin enteric coated 81 milliGRAM(s) Oral daily  atorvastatin 40 milliGRAM(s) Oral at bedtime  carbidopa/levodopa  25/100 1 Tablet(s) Oral daily  clopidogrel Tablet 75 milliGRAM(s) Oral daily  dextrose 5%. 1000 milliLiter(s) (50 mL/Hr) IV Continuous <Continuous>  dextrose 5%. 1000 milliLiter(s) (100 mL/Hr) IV Continuous <Continuous>  dextrose 50% Injectable 25 Gram(s) IV Push once  dextrose 50% Injectable 25 Gram(s) IV Push once  dextrose 50% Injectable 12.5 Gram(s) IV Push once  doxazosin 2 milliGRAM(s) Oral at bedtime  enoxaparin Injectable 40 milliGRAM(s) SubCutaneous every 24 hours  gabapentin 100 milliGRAM(s) Oral three times a day  glucagon  Injectable 1 milliGRAM(s) IntraMuscular once  influenza  Vaccine (HIGH DOSE) 0.5 milliLiter(s) IntraMuscular once  insulin glargine Injectable (LANTUS) 8 Unit(s) SubCutaneous at bedtime  insulin lispro (ADMELOG) corrective regimen sliding scale   SubCutaneous at bedtime  insulin lispro (ADMELOG) corrective regimen sliding scale   SubCutaneous three times a day before meals  isosorbide   mononitrate ER Tablet (IMDUR) 30 milliGRAM(s) Oral daily  losartan 25 milliGRAM(s) Oral daily  melatonin 3 milliGRAM(s) Oral at bedtime  metoprolol tartrate 50 milliGRAM(s) Oral two times a day  NIFEdipine XL 30 milliGRAM(s) Oral daily      PHYSICAL EXAM:  T(C): 36.7 (09-28-24 @ 20:45), Max: 36.7 (09-28-24 @ 04:00)  HR: 75 (09-28-24 @ 20:45) (74 - 85)  BP: 150/83 (09-28-24 @ 20:45) (140/69 - 151/80)  RR: 17 (09-28-24 @ 20:45) (17 - 18)  SpO2: 99% (09-28-24 @ 20:45) (95% - 99%)  Wt(kg): --  I&O's Summary    27 Sep 2024 07:01  -  28 Sep 2024 07:00  --------------------------------------------------------  IN: 0 mL / OUT: 800 mL / NET: -800 mL    Appearance: Normal	  HEENT:  PERRLA   Lymphatic: No lymphadenopathy   Cardiovascular: Normal S1 S2, no JVD  Respiratory: normal effort , clear  Gastrointestinal:  Soft, Non-tender  Skin: No rashes,  warm to touch  Psychiatry:  Mood & affect appropriate  Musculuskeletal: No edema    recent labs, Imaging and EKGs personally reviewed     09-27-24 @ 07:01  -  09-28-24 @ 07:00  --------------------------------------------------------  IN: 0 mL / OUT: 800 mL / NET: -800 mL                          12.4   6.01  )-----------( 269      ( 28 Sep 2024 05:00 )             38.7               09-28    139  |  103  |  13  ----------------------------<  176[H]  4.0   |  22  |  0.63    Ca    9.5      28 Sep 2024 05:00  Phos  4.4     09-28  Mg     2.00     09-28                         Urinalysis Basic - ( 28 Sep 2024 05:00 )    Color: x / Appearance: x / SG: x / pH: x  Gluc: 176 mg/dL / Ketone: x  / Bili: x / Urobili: x   Blood: x / Protein: x / Nitrite: x   Leuk Esterase: x / RBC: x / WBC x   Sq Epi: x / Non Sq Epi: x / Bacteria: x

## 2024-09-28 NOTE — PROGRESS NOTE ADULT - SUBJECTIVE AND OBJECTIVE BOX
Johnathan Tejeda MD  Interventional Cardiology / Advance Heart Failure and Cardiac Transplant Specialist  [place] Office : [address] [place] [address] [place] [address]  Tel:   [place] Office : [address] [facility] [place] [address]  Tel: [phone]       Pt is lying in bed comfortable not in distress, pt seen and examined at bedside  	  MEDICATIONS:  aspirin enteric coated 81 milliGRAM(s) Oral daily  clopidogrel Tablet 75 milliGRAM(s) Oral daily  doxazosin 2 milliGRAM(s) Oral at bedtime  enoxaparin Injectable 40 milliGRAM(s) SubCutaneous every 24 hours  isosorbide   mononitrate ER Tablet (IMDUR) 30 milliGRAM(s) Oral daily  losartan 25 milliGRAM(s) Oral daily  metoprolol tartrate 50 milliGRAM(s) Oral two times a day  NIFEdipine XL 30 milliGRAM(s) Oral daily        carbidopa/levodopa  25/100 1 Tablet(s) Oral daily  gabapentin 100 milliGRAM(s) Oral three times a day  LORazepam     Tablet 0.5 milliGRAM(s) Oral once PRN  melatonin 3 milliGRAM(s) Oral at bedtime      atorvastatin 40 milliGRAM(s) Oral at bedtime  dextrose 50% Injectable 25 Gram(s) IV Push once  dextrose 50% Injectable 25 Gram(s) IV Push once  dextrose 50% Injectable 12.5 Gram(s) IV Push once  dextrose Oral Gel 15 Gram(s) Oral once PRN  glucagon  Injectable 1 milliGRAM(s) IntraMuscular once  insulin glargine Injectable (LANTUS) 8 Unit(s) SubCutaneous at bedtime  insulin lispro (ADMELOG) corrective regimen sliding scale   SubCutaneous at bedtime  insulin lispro (ADMELOG) corrective regimen sliding scale   SubCutaneous three times a day before meals    dextrose 5%. 1000 milliLiter(s) IV Continuous <Continuous>  dextrose 5%. 1000 milliLiter(s) IV Continuous <Continuous>  influenza  Vaccine (HIGH DOSE) 0.5 milliLiter(s) IntraMuscular once      PAST MEDICAL/SURGICAL HISTORY  PAST MEDICAL & SURGICAL HISTORY:  HTN (hypertension)      HLD (hyperlipidemia)      CAD (coronary artery disease)      Type II diabetes mellitus      PAD (peripheral artery disease)      Mild dementia      Parkinson disease      S/P hysterectomy          SOCIAL HISTORY: Substance Use (street drugs): ( x ) never used  (  ) other:    FAMILY HISTORY:      PHYSICAL EXAM:  T(C): 36.5 (09-28-24 @ 12:00), Max: 37 (09-27-24 @ 17:40)  HR: 85 (09-28-24 @ 12:00) (74 - 85)  BP: 151/78 (09-28-24 @ 12:00) (138/70 - 151/80)  RR: 18 (09-28-24 @ 12:00) (18 - 18)  SpO2: 95% (09-28-24 @ 12:00) (95% - 100%)  Wt(kg): --  I&O's Summary    27 Sep 2024 07:01  -  28 Sep 2024 07:00  --------------------------------------------------------  IN: 0 mL / OUT: 800 mL / NET: -800 mL      GENERAL: NAD  EYES:   PERRLA   ENMT:   Moist mucous membranes, Good dentition, No lesions  Cardiovascular: Normal S1 S2, No JVD, No murmurs, No edema  Respiratory: Lungs clear to auscultation	  Gastrointestinal:  Soft, Non-tender, + BS	  Extremities: no edema                                12.4   6.01  )-----------( 269      ( 28 Sep 2024 05:00 )             38.7     09-28    139  |  103  |  13  ----------------------------<  176[H]  4.0   |  22  |  0.63    Ca    9.5      28 Sep 2024 05:00  Phos  4.4     09-28  Mg     2.00     09-28      proBNP:   Lipid Profile:   HgA1c:   TSH:     Consultant(s) Notes Reviewed:  [x ] YES  [ ] NO    Care Discussed with Consultants/Other Providers [ x] YES  [ ] NO    Imaging Personally Reviewed independently:  [x] YES  [ ] NO    All labs, radiologic studies, vitals, orders and medications list reviewed. Patient is seen and examined at bedside. Case discussed with medical team.

## 2024-09-28 NOTE — PROGRESS NOTE ADULT - ASSESSMENT
EKG NSR 88  TTE 2/20/2024    1. Agitated saline injection was negative for intracardiac shunt.   2. Left ventricular cavity is normal in size. Left ventricular wall thickness is normal. Left ventricular systolic function is hyperdynamic. There are no regional wall motion abnormalities seen.   3. Normal left ventricular diastolic function, with normal filling pressure.   4. Normal right ventricular cavity size, with wall thickness, and normal systolic function.   5. Normal left and right atrial size.   6. No significant valvular disease.   7. No pericardial effusion seen.    NST 5/3/2024  1. Normal myocardial perfusion scan, with no evidence of infarction or inducible ischemia.   2. Stress electrocardiogram: No ischemic ST segment changes.   3. Normal left ventricular regional wall motion.   4. The left ventricle is normal in function and normal in size. Normal left ventricular diastolic function. The post stress end diastolic volume is 59 ml and systolic volume is 13 ml.   5. The left ventricle is normal in function and normal in size. Normal left ventricular diastolic function.   6. Normal right ventricular function. There is no right ventricular dilation. RVEF = 48%, RVEDV = 93 mL, RVESV = 49 mL.    Cerebral angiogram 2/22/2024 There is atherosclerotic plaque at the right carotid bifurcation   which causes 45% stenosis at the origin of the right cervical internal carotid artery, There is atherosclerotic   plaque at the left carotid bifurcation which causes 33% stenosis at the origin of the left cervical internal carotid artery. Diagnostic cerebral angiogram demonstrating mild left and moderate right cervical internal carotid artery atherosclerotic stenosis        A/P    Patient is a 63 year old female with a PMHx of  HTN, DM, HLD, CAD (s/p 3 stents placed at [facility] in 2014 to the prox LAD and then in 2022 - on ASA and Plavix), PAD s/p L popliteal angioplasty and atherectomy 12/23, R ICA and L ICA stenosis, prior stroke with L side weakness, Parkinson's disease, B/L cataract surgery with blurred vision at baseline who presents with unresponsiveness      1. Cardiac risk stratification  - vascular recs appreciated, CTA head/neck showing no LVO but severe right and moderate left internal carotid artery origin stenosis. planning on outpt surgery  - no CP/SOB, euvolemic on exam  - EKG ok, Echo ok, NST ok  - BP labile, on Imdur 30mg, losartan 25, lopressor 50 BID, nifedipine 30, will f/u  - renal artery doppler 8/2024(outpt) suggesting renal artery stenosis, will get abd CTA to r/o renal artery stenosis      2. AMS  - Pt is responsive now, able to move all extremities  - CTA   NECK: Severe right and moderate left internal carotid artery origin stenosis, as above. No occlusion or dissection.   HEAD: No significant change. No large vessel occlusion. 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   - Cerebral angiogram 2/22/2024 There is atherosclerotic plaque at the right carotid bifurcation   which causes 45% stenosis at the origin of the right cervical internal carotid artery, There is atherosclerotic   plaque at the left carotid bifurcation which causes 33% stenosis at the origin of the left cervical internal carotid artery. Diagnostic cerebral angiogram demonstrating mild left and moderate right cervical internal carotid artery atherosclerotic stenosis  - f/u neuro recs, f/u vascular recs  - home meds ASA, plavix, lipitor 40mg    3. Carotid artery stenosis  - CTA shows  NECK: Severe right and moderate left internal carotid artery origin stenosis, as above. No occlusion or dissection.    HEAD: No significant change. No large vessel occlusion. 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  - Cerebral angiogram 2/22/2024 There is atherosclerotic plaque at the right carotid bifurcation   which causes 45% stenosis at the origin of the right cervical internal carotid artery, There is atherosclerotic   plaque at the left carotid bifurcation which causes 33% stenosis at the origin of the left cervical internal carotid artery. Diagnostic cerebral angiogram demonstrating mild left and moderate right cervical internal carotid artery atherosclerotic stenosis.  - pt f/u with outpt neurologist  - carotid doppler Rt pICA moderate, Lt pICA mild   - vascular recs appreciated, CTA head/neck showing no LVO but severe right and moderate left internal carotid artery origin stenosis. planning on outpt surgery  - home meds ASA, plavix, lipitor 40mg      4. Hypertension  - BP meds resumed  -c/w nifedipine 30 mg QD, lopressor 50mg BID, Losartan 25mg QD, imdur 30 mg QD    5. CAD s/p stents  - s/p 3 stents placed at [facility] in 2014 to the prox LAD and then in 2022  - TTE with preserved EF and no WMA  - c/w ASA, Plavix and statin    6. HLD  - c/w atorvastatin 80mg PO daily    7. hx of palpitations s/p Falls   - TTE with preserved EF and no WMA  -s/p loop monitor , no arrhythmias on interrogation

## 2024-09-29 LAB
ANION GAP SERPL CALC-SCNC: 14 MMOL/L — SIGNIFICANT CHANGE UP (ref 7–14)
BUN SERPL-MCNC: 11 MG/DL — SIGNIFICANT CHANGE UP (ref 7–23)
CALCIUM SERPL-MCNC: 9.5 MG/DL — SIGNIFICANT CHANGE UP (ref 8.4–10.5)
CHLORIDE SERPL-SCNC: 104 MMOL/L — SIGNIFICANT CHANGE UP (ref 98–107)
CO2 SERPL-SCNC: 23 MMOL/L — SIGNIFICANT CHANGE UP (ref 22–31)
CREAT SERPL-MCNC: 0.56 MG/DL — SIGNIFICANT CHANGE UP (ref 0.5–1.3)
EGFR: 101 ML/MIN/1.73M2 — SIGNIFICANT CHANGE UP
GLUCOSE BLDC GLUCOMTR-MCNC: 218 MG/DL — HIGH (ref 70–99)
GLUCOSE BLDC GLUCOMTR-MCNC: 253 MG/DL — HIGH (ref 70–99)
GLUCOSE BLDC GLUCOMTR-MCNC: 255 MG/DL — HIGH (ref 70–99)
GLUCOSE BLDC GLUCOMTR-MCNC: 312 MG/DL — HIGH (ref 70–99)
GLUCOSE SERPL-MCNC: 190 MG/DL — HIGH (ref 70–99)
HCT VFR BLD CALC: 40.8 % — SIGNIFICANT CHANGE UP (ref 34.5–45)
HGB BLD-MCNC: 13 G/DL — SIGNIFICANT CHANGE UP (ref 11.5–15.5)
MAGNESIUM SERPL-MCNC: 2 MG/DL — SIGNIFICANT CHANGE UP (ref 1.6–2.6)
MCHC RBC-ENTMCNC: 23 PG — LOW (ref 27–34)
MCHC RBC-ENTMCNC: 31.9 GM/DL — LOW (ref 32–36)
MCV RBC AUTO: 72.1 FL — LOW (ref 80–100)
NRBC # BLD: 0 /100 WBCS — SIGNIFICANT CHANGE UP (ref 0–0)
NRBC # FLD: 0 K/UL — SIGNIFICANT CHANGE UP (ref 0–0)
PHOSPHATE SERPL-MCNC: 4.5 MG/DL — SIGNIFICANT CHANGE UP (ref 2.5–4.5)
PLATELET # BLD AUTO: 275 K/UL — SIGNIFICANT CHANGE UP (ref 150–400)
POTASSIUM SERPL-MCNC: 4.2 MMOL/L — SIGNIFICANT CHANGE UP (ref 3.5–5.3)
POTASSIUM SERPL-SCNC: 4.2 MMOL/L — SIGNIFICANT CHANGE UP (ref 3.5–5.3)
RBC # BLD: 5.66 M/UL — HIGH (ref 3.8–5.2)
RBC # FLD: 15.1 % — HIGH (ref 10.3–14.5)
SODIUM SERPL-SCNC: 141 MMOL/L — SIGNIFICANT CHANGE UP (ref 135–145)
WBC # BLD: 6.04 K/UL — SIGNIFICANT CHANGE UP (ref 3.8–10.5)
WBC # FLD AUTO: 6.04 K/UL — SIGNIFICANT CHANGE UP (ref 3.8–10.5)

## 2024-09-29 PROCEDURE — 99232 SBSQ HOSP IP/OBS MODERATE 35: CPT

## 2024-09-29 RX ORDER — SENNOSIDES 8.6 MG
2 TABLET ORAL AT BEDTIME
Refills: 0 | Status: DISCONTINUED | OUTPATIENT
Start: 2024-09-29 | End: 2024-10-04

## 2024-09-29 RX ADMIN — Medication 3: at 17:30

## 2024-09-29 RX ADMIN — GABAPENTIN 100 MILLIGRAM(S): 800 TABLET, FILM COATED ORAL at 06:46

## 2024-09-29 RX ADMIN — GABAPENTIN 100 MILLIGRAM(S): 800 TABLET, FILM COATED ORAL at 22:29

## 2024-09-29 RX ADMIN — LOSARTAN POTASSIUM 25 MILLIGRAM(S): 100 TABLET, FILM COATED ORAL at 06:46

## 2024-09-29 RX ADMIN — GABAPENTIN 100 MILLIGRAM(S): 800 TABLET, FILM COATED ORAL at 12:07

## 2024-09-29 RX ADMIN — Medication 2 TABLET(S): at 22:29

## 2024-09-29 RX ADMIN — DOXAZOSIN 2 MILLIGRAM(S): 2 TABLET ORAL at 22:25

## 2024-09-29 RX ADMIN — Medication 3 MILLIGRAM(S): at 22:25

## 2024-09-29 RX ADMIN — Medication 75 MILLIGRAM(S): at 12:08

## 2024-09-29 RX ADMIN — Medication 50 MILLIGRAM(S): at 17:29

## 2024-09-29 RX ADMIN — Medication 1: at 22:33

## 2024-09-29 RX ADMIN — Medication 50 MILLIGRAM(S): at 06:46

## 2024-09-29 RX ADMIN — ATORVASTATIN CALCIUM 40 MILLIGRAM(S): 10 TABLET, FILM COATED ORAL at 22:25

## 2024-09-29 RX ADMIN — Medication 30 MILLIGRAM(S): at 06:46

## 2024-09-29 RX ADMIN — Medication 30 MILLIGRAM(S): at 12:07

## 2024-09-29 RX ADMIN — ENOXAPARIN SODIUM 40 MILLIGRAM(S): 150 INJECTION SUBCUTANEOUS at 06:45

## 2024-09-29 RX ADMIN — Medication 81 MILLIGRAM(S): at 12:07

## 2024-09-29 RX ADMIN — Medication 1 TABLET(S): at 12:07

## 2024-09-29 RX ADMIN — Medication 4: at 12:46

## 2024-09-29 RX ADMIN — Medication 2: at 08:59

## 2024-09-29 RX ADMIN — INSULIN GLARGINE 8 UNIT(S): 300 INJECTION, SOLUTION SUBCUTANEOUS at 22:32

## 2024-09-29 NOTE — PROGRESS NOTE ADULT - SUBJECTIVE AND OBJECTIVE BOX
Johnathan Tejeda MD  Interventional Cardiology / Advance Heart Failure and Cardiac Transplant Specialist  [place] Office : [address] [place] [address] [place] [address]  Tel:   [place] Office : [address] [facility] [place] [address]  Tel: [phone]       Pt is lying in bed comfortable not in distress, pt seen and examined at bedside  	  MEDICATIONS:  aspirin enteric coated 81 milliGRAM(s) Oral daily  clopidogrel Tablet 75 milliGRAM(s) Oral daily  doxazosin 2 milliGRAM(s) Oral at bedtime  enoxaparin Injectable 40 milliGRAM(s) SubCutaneous every 24 hours  isosorbide   mononitrate ER Tablet (IMDUR) 30 milliGRAM(s) Oral daily  losartan 25 milliGRAM(s) Oral daily  metoprolol tartrate 50 milliGRAM(s) Oral two times a day  NIFEdipine XL 30 milliGRAM(s) Oral daily        carbidopa/levodopa  25/100 1 Tablet(s) Oral daily  gabapentin 100 milliGRAM(s) Oral three times a day  LORazepam     Tablet 0.5 milliGRAM(s) Oral once PRN  melatonin 3 milliGRAM(s) Oral at bedtime      atorvastatin 40 milliGRAM(s) Oral at bedtime  dextrose 50% Injectable 25 Gram(s) IV Push once  dextrose 50% Injectable 25 Gram(s) IV Push once  dextrose 50% Injectable 12.5 Gram(s) IV Push once  dextrose Oral Gel 15 Gram(s) Oral once PRN  glucagon  Injectable 1 milliGRAM(s) IntraMuscular once  insulin glargine Injectable (LANTUS) 8 Unit(s) SubCutaneous at bedtime  insulin lispro (ADMELOG) corrective regimen sliding scale   SubCutaneous at bedtime  insulin lispro (ADMELOG) corrective regimen sliding scale   SubCutaneous three times a day before meals    dextrose 5%. 1000 milliLiter(s) IV Continuous <Continuous>  dextrose 5%. 1000 milliLiter(s) IV Continuous <Continuous>  influenza  Vaccine (HIGH DOSE) 0.5 milliLiter(s) IntraMuscular once      PAST MEDICAL/SURGICAL HISTORY  PAST MEDICAL & SURGICAL HISTORY:  HTN (hypertension)      HLD (hyperlipidemia)      CAD (coronary artery disease)      Type II diabetes mellitus      PAD (peripheral artery disease)      Mild dementia      Parkinson disease      S/P hysterectomy          SOCIAL HISTORY: Substance Use (street drugs): ( x ) never used  (  ) other:    FAMILY HISTORY:      PHYSICAL EXAM:  T(C): 36.7 (09-29-24 @ 12:00), Max: 36.7 (09-28-24 @ 20:45)  HR: 65 (09-29-24 @ 12:00) (65 - 79)  BP: 150/91 (09-29-24 @ 12:00) (140/69 - 155/68)  RR: 18 (09-29-24 @ 12:00) (17 - 18)  SpO2: 99% (09-29-24 @ 12:00) (98% - 99%)  Wt(kg): --  I&O's Summary        GENERAL: NAD  EYES:   PERRLA   ENMT:   Moist mucous membranes, Good dentition, No lesions  Cardiovascular: Normal S1 S2, No JVD, No murmurs, No edema  Respiratory: Lungs clear to auscultation	  Gastrointestinal:  Soft, Non-tender, + BS	  Extremities: no edema                              13.0   6.04  )-----------( 275      ( 29 Sep 2024 06:11 )             40.8     09-29    141  |  104  |  11  ----------------------------<  190[H]  4.2   |  23  |  0.56    Ca    9.5      29 Sep 2024 06:11  Phos  4.5     09-29  Mg     2.00     09-29      proBNP:   Lipid Profile:   HgA1c:   TSH:     Consultant(s) Notes Reviewed:  [x ] YES  [ ] NO    Care Discussed with Consultants/Other Providers [ x] YES  [ ] NO    Imaging Personally Reviewed independently:  [x] YES  [ ] NO    All labs, radiologic studies, vitals, orders and medications list reviewed. Patient is seen and examined at bedside. Case discussed with medical team.

## 2024-09-29 NOTE — PROGRESS NOTE ADULT - SUBJECTIVE AND OBJECTIVE BOX
General Surgery Progress Note    Overnight: ELENITA  Subjective: Patient seen and examined on rounds.    Objective:  Vitals:  T(C): 36.7 (09-28-24 @ 20:45), Max: 36.7 (09-28-24 @ 20:45)  HR: 75 (09-28-24 @ 20:45) (75 - 85)  BP: 150/83 (09-28-24 @ 20:45) (140/69 - 151/78)  RR: 17 (09-28-24 @ 20:45) (17 - 18)  SpO2: 99% (09-28-24 @ 20:45) (95% - 99%)  Wt(kg): --      Physical Exam:  General Appearance: no acute distress, NTND   Neuro: no facial asymmetry, no tongue deviation, moving b/l UE and LE spontaneously with LS weakeness appreciated c/w previous stroke deficits  Chest: airway intact, non-labored breathing  CV: no JVD, palpable pulses b/l  Abdomen: soft, non-tender, non-distended, +BS   Extremities: WWP      Labs:                        13.0   6.04  )-----------( 275      ( 29 Sep 2024 06:11 )             40.8     09-28    139  |  103  |  13  ----------------------------<  176[H]  4.0   |  22  |  0.63    Ca    9.5      28 Sep 2024 05:00  Phos  4.4     09-28  Mg     2.00     09-28          Urinalysis Basic - ( 28 Sep 2024 05:00 )    Color: x / Appearance: x / SG: x / pH: x  Gluc: 176 mg/dL / Ketone: x  / Bili: x / Urobili: x   Blood: x / Protein: x / Nitrite: x   Leuk Esterase: x / RBC: x / WBC x   Sq Epi: x / Non Sq Epi: x / Bacteria: x

## 2024-09-29 NOTE — PROGRESS NOTE ADULT - ASSESSMENT
EKG NSR 88  TTE 2/20/2024    1. Agitated saline injection was negative for intracardiac shunt.   2. Left ventricular cavity is normal in size. Left ventricular wall thickness is normal. Left ventricular systolic function is hyperdynamic. There are no regional wall motion abnormalities seen.   3. Normal left ventricular diastolic function, with normal filling pressure.   4. Normal right ventricular cavity size, with wall thickness, and normal systolic function.   5. Normal left and right atrial size.   6. No significant valvular disease.   7. No pericardial effusion seen.    NST 5/3/2024  1. Normal myocardial perfusion scan, with no evidence of infarction or inducible ischemia.   2. Stress electrocardiogram: No ischemic ST segment changes.   3. Normal left ventricular regional wall motion.   4. The left ventricle is normal in function and normal in size. Normal left ventricular diastolic function. The post stress end diastolic volume is 59 ml and systolic volume is 13 ml.   5. The left ventricle is normal in function and normal in size. Normal left ventricular diastolic function.   6. Normal right ventricular function. There is no right ventricular dilation. RVEF = 48%, RVEDV = 93 mL, RVESV = 49 mL.    Cerebral angiogram 2/22/2024 There is atherosclerotic plaque at the right carotid bifurcation   which causes 45% stenosis at the origin of the right cervical internal carotid artery, There is atherosclerotic   plaque at the left carotid bifurcation which causes 33% stenosis at the origin of the left cervical internal carotid artery. Diagnostic cerebral angiogram demonstrating mild left and moderate right cervical internal carotid artery atherosclerotic stenosis        A/P    Patient is a 63 year old female with a PMHx of  HTN, DM, HLD, CAD (s/p 3 stents placed at [facility] in 2014 to the prox LAD and then in 2022 - on ASA and Plavix), PAD s/p L popliteal angioplasty and atherectomy 12/23, R ICA and L ICA stenosis, prior stroke with L side weakness, Parkinson's disease, B/L cataract surgery with blurred vision at baseline who presents with unresponsiveness      1. Cardiac risk stratification  - vascular recs appreciated, CTA head/neck showing no LVO but severe right and moderate left internal carotid artery origin stenosis. planning on outpt surgery  - no CP/SOB, euvolemic on exam  - EKG ok, Echo ok, NST ok  - BP labile, on Imdur 30mg, losartan 25, lopressor 50 BID, nifedipine 30, will f/u  - renal artery doppler 8/2024(outpt) suggesting renal artery stenosis, will get abd CTA to r/o renal artery stenosis      2. AMS  - Pt is responsive now, able to move all extremities  - CTA   NECK: Severe right and moderate left internal carotid artery origin stenosis, as above. No occlusion or dissection.   HEAD: No significant change. No large vessel occlusion. 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   - Cerebral angiogram 2/22/2024 There is atherosclerotic plaque at the right carotid bifurcation   which causes 45% stenosis at the origin of the right cervical internal carotid artery, There is atherosclerotic   plaque at the left carotid bifurcation which causes 33% stenosis at the origin of the left cervical internal carotid artery. Diagnostic cerebral angiogram demonstrating mild left and moderate right cervical internal carotid artery atherosclerotic stenosis  - f/u neuro recs, f/u vascular recs  - home meds ASA, plavix, lipitor 40mg    3. Carotid artery stenosis  - CTA shows  NECK: Severe right and moderate left internal carotid artery origin stenosis, as above. No occlusion or dissection.    HEAD: No significant change. No large vessel occlusion. 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  - Cerebral angiogram 2/22/2024 There is atherosclerotic plaque at the right carotid bifurcation   which causes 45% stenosis at the origin of the right cervical internal carotid artery, There is atherosclerotic   plaque at the left carotid bifurcation which causes 33% stenosis at the origin of the left cervical internal carotid artery. Diagnostic cerebral angiogram demonstrating mild left and moderate right cervical internal carotid artery atherosclerotic stenosis.  - pt f/u with outpt neurologist  - carotid doppler Rt pICA moderate, Lt pICA mild   - vascular recs appreciated, CTA head/neck showing no LVO but severe right and moderate left internal carotid artery origin stenosis. planning on outpt surgery  - home meds ASA, plavix, lipitor 40mg      4. Hypertension  - BP meds resumed  -c/w nifedipine 30 mg QD, lopressor 50mg BID, Losartan 25mg QD, imdur 30 mg QD    5. CAD s/p stents  - s/p 3 stents placed at [facility] in 2014 to the prox LAD and then in 2022  - TTE with preserved EF and no WMA  - c/w ASA, Plavix and statin    6. HLD  - c/w atorvastatin 80mg PO daily    7. hx of palpitations s/p Falls   - TTE with preserved EF and no WMA  -s/p loop monitor , no arrhythmias on interrogation

## 2024-09-29 NOTE — PROGRESS NOTE ADULT - SUBJECTIVE AND OBJECTIVE BOX
Name of Patient : ALYSE AVENDAÑO  MRN: [id number]  Date of visit: 09-29-24       Subjective: Patient seen and examined. No new events except as noted.   doing okay     REVIEW OF SYSTEMS:    CONSTITUTIONAL: No weakness, fevers or chills  EYES/ENT: No visual changes;  No vertigo or throat pain   NECK: No pain or stiffness  RESPIRATORY: No cough, wheezing, hemoptysis; No shortness of breath  CARDIOVASCULAR: No chest pain or palpitations  GASTROINTESTINAL: No abdominal or epigastric pain. No nausea, vomiting, or hematemesis; No diarrhea or constipation. No melena or hematochezia.  GENITOURINARY: No dysuria, frequency or hematuria  NEUROLOGICAL: No numbness or weakness  SKIN: No itching, burning, rashes, or lesions   All other review of systems is negative unless indicated above.    MEDICATIONS:  MEDICATIONS  (STANDING):  aspirin enteric coated 81 milliGRAM(s) Oral daily  atorvastatin 40 milliGRAM(s) Oral at bedtime  carbidopa/levodopa  25/100 1 Tablet(s) Oral daily  clopidogrel Tablet 75 milliGRAM(s) Oral daily  dextrose 5%. 1000 milliLiter(s) (50 mL/Hr) IV Continuous <Continuous>  dextrose 5%. 1000 milliLiter(s) (100 mL/Hr) IV Continuous <Continuous>  dextrose 50% Injectable 25 Gram(s) IV Push once  dextrose 50% Injectable 25 Gram(s) IV Push once  dextrose 50% Injectable 12.5 Gram(s) IV Push once  doxazosin 2 milliGRAM(s) Oral at bedtime  enoxaparin Injectable 40 milliGRAM(s) SubCutaneous every 24 hours  gabapentin 100 milliGRAM(s) Oral three times a day  glucagon  Injectable 1 milliGRAM(s) IntraMuscular once  influenza  Vaccine (HIGH DOSE) 0.5 milliLiter(s) IntraMuscular once  insulin glargine Injectable (LANTUS) 8 Unit(s) SubCutaneous at bedtime  insulin lispro (ADMELOG) corrective regimen sliding scale   SubCutaneous at bedtime  insulin lispro (ADMELOG) corrective regimen sliding scale   SubCutaneous three times a day before meals  isosorbide   mononitrate ER Tablet (IMDUR) 30 milliGRAM(s) Oral daily  losartan 25 milliGRAM(s) Oral daily  melatonin 3 milliGRAM(s) Oral at bedtime  metoprolol tartrate 50 milliGRAM(s) Oral two times a day  NIFEdipine XL 30 milliGRAM(s) Oral daily  senna 2 Tablet(s) Oral at bedtime      PHYSICAL EXAM:  T(C): 36.8 (09-29-24 @ 17:00), Max: 36.8 (09-29-24 @ 17:00)  HR: 70 (09-29-24 @ 17:00) (65 - 79)  BP: 143/75 (09-29-24 @ 17:00) (143/75 - 155/68)  RR: 18 (09-29-24 @ 17:00) (17 - 18)  SpO2: 97% (09-29-24 @ 17:00) (97% - 99%)  Wt(kg): --  I&O's Summary    Appearance: Normal	  HEENT:  PERRLA   Lymphatic: No lymphadenopathy   Cardiovascular: Normal S1 S2, no JVD  Respiratory: normal effort , clear  Gastrointestinal:  Soft, Non-tender  Skin: No rashes,  warm to touch  Psychiatry:  Mood & affect appropriate  Musculuskeletal: No edema    recent labs, Imaging and EKGs personally reviewed                           13.0   6.04  )-----------( 275      ( 29 Sep 2024 06:11 )             40.8               09-29    141  |  104  |  11  ----------------------------<  190[H]  4.2   |  23  |  0.56    Ca    9.5      29 Sep 2024 06:11  Phos  4.5     09-29  Mg     2.00     09-29                         Urinalysis Basic - ( 29 Sep 2024 06:11 )    Color: x / Appearance: x / SG: x / pH: x  Gluc: 190 mg/dL / Ketone: x  / Bili: x / Urobili: x   Blood: x / Protein: x / Nitrite: x   Leuk Esterase: x / RBC: x / WBC x   Sq Epi: x / Non Sq Epi: x / Bacteria: x

## 2024-09-29 NOTE — PROGRESS NOTE ADULT - ATTENDING COMMENTS
64 year old woman with right carotid artery stenosis, asymptomatic  please provide medical and cardiac risk stratification and optimization  patient scheduled for OR on 10/14 for right carotid endarterectomy with patch angioplasty, with neuromonitoring as outpatient  continue best medical therapy  remainder of care as per primary team

## 2024-09-29 NOTE — PROGRESS NOTE ADULT - ASSESSMENT
64F w/ PMHx CAD s/p PCI, PAD s/p L popliteal angioplasty and atherectomy, CVAs (2022) w/ residual L sided weakness, R and L ICA stenosis, Parkinson's disease, HTN, HLD, T2DM, and decreased vision presenting to [facility] ED as code stroke iso /129 and not responding to questions/commands with rigid body. Patient improved to baseline while in ED and underwent CT head with non-acute findings and CTA head/neck showing no LVO but severe right and moderate left internal carotid artery origin stenosis. Vascular surgery c/f evaluation.       Rec:  - carotid duplex and CT reviewed will plan for OP surgery   - please document med/cards operative risk stratification while inpatient **  - c/w asa/plavix  - vascular surgery to follow    C Team Surgery [id number]  Hermann Chamorro MD (PGY2)

## 2024-09-30 LAB
GLUCOSE BLDC GLUCOMTR-MCNC: 214 MG/DL — HIGH (ref 70–99)
GLUCOSE BLDC GLUCOMTR-MCNC: 234 MG/DL — HIGH (ref 70–99)
GLUCOSE BLDC GLUCOMTR-MCNC: 255 MG/DL — HIGH (ref 70–99)
GLUCOSE BLDC GLUCOMTR-MCNC: 304 MG/DL — HIGH (ref 70–99)

## 2024-09-30 RX ORDER — INSULIN GLARGINE 300 U/ML
20 INJECTION, SOLUTION SUBCUTANEOUS AT BEDTIME
Refills: 0 | Status: DISCONTINUED | OUTPATIENT
Start: 2024-09-30 | End: 2024-10-04

## 2024-09-30 RX ORDER — ACETAMINOPHEN 325 MG
650 TABLET ORAL ONCE
Refills: 0 | Status: COMPLETED | OUTPATIENT
Start: 2024-09-30 | End: 2024-09-30

## 2024-09-30 RX ADMIN — Medication 4: at 12:35

## 2024-09-30 RX ADMIN — GABAPENTIN 100 MILLIGRAM(S): 800 TABLET, FILM COATED ORAL at 21:47

## 2024-09-30 RX ADMIN — Medication 2: at 08:58

## 2024-09-30 RX ADMIN — ATORVASTATIN CALCIUM 40 MILLIGRAM(S): 10 TABLET, FILM COATED ORAL at 21:47

## 2024-09-30 RX ADMIN — Medication 50 MILLIGRAM(S): at 05:05

## 2024-09-30 RX ADMIN — Medication 2: at 18:01

## 2024-09-30 RX ADMIN — Medication 650 MILLIGRAM(S): at 22:48

## 2024-09-30 RX ADMIN — ENOXAPARIN SODIUM 40 MILLIGRAM(S): 150 INJECTION SUBCUTANEOUS at 05:05

## 2024-09-30 RX ADMIN — Medication 2 TABLET(S): at 21:48

## 2024-09-30 RX ADMIN — Medication 75 MILLIGRAM(S): at 08:51

## 2024-09-30 RX ADMIN — Medication 3 MILLIGRAM(S): at 21:47

## 2024-09-30 RX ADMIN — LOSARTAN POTASSIUM 25 MILLIGRAM(S): 100 TABLET, FILM COATED ORAL at 05:05

## 2024-09-30 RX ADMIN — Medication 650 MILLIGRAM(S): at 21:48

## 2024-09-30 RX ADMIN — GABAPENTIN 100 MILLIGRAM(S): 800 TABLET, FILM COATED ORAL at 05:05

## 2024-09-30 RX ADMIN — Medication 30 MILLIGRAM(S): at 05:05

## 2024-09-30 RX ADMIN — GABAPENTIN 100 MILLIGRAM(S): 800 TABLET, FILM COATED ORAL at 12:37

## 2024-09-30 RX ADMIN — INSULIN GLARGINE 20 UNIT(S): 300 INJECTION, SOLUTION SUBCUTANEOUS at 21:52

## 2024-09-30 RX ADMIN — DOXAZOSIN 2 MILLIGRAM(S): 2 TABLET ORAL at 23:39

## 2024-09-30 RX ADMIN — Medication 30 MILLIGRAM(S): at 08:51

## 2024-09-30 RX ADMIN — Medication 1 TABLET(S): at 08:52

## 2024-09-30 RX ADMIN — Medication 81 MILLIGRAM(S): at 12:36

## 2024-09-30 RX ADMIN — Medication 50 MILLIGRAM(S): at 18:01

## 2024-09-30 RX ADMIN — Medication 1: at 21:52

## 2024-09-30 NOTE — PROGRESS NOTE ADULT - SUBJECTIVE AND OBJECTIVE BOX
[facility]   MD MARITA PALM MD ANGELA WONG, PA    TEL:  OFFICE: [phone]  From 5pm-7am Answering Service [phone]    -- RENAL FOLLOW UP NOTE ---Date of Service 09-30-24 @ 14:05    Patient is a 64y old  Female who presents with a chief complaint of AMS (30 Sep 2024 11:35)      Patient seen and examined at bedside. No chest pain/sob    VITALS:  T(F): 97.5 (09-30-24 @ 12:11), Max: 98.4 (09-29-24 @ 20:43)  HR: 94 (09-30-24 @ 12:11)  BP: 149/94 (09-30-24 @ 12:11)  RR: 18 (09-30-24 @ 12:11)  SpO2: 98% (09-30-24 @ 12:11)  Wt(kg): --    Height (cm): 149.9 (09-30 @ 09:53)    PHYSICAL EXAM:  General: NAD  Neck: No JVD  Respiratory: CTAB, no wheezes, rales or rhonchi  Cardiovascular: S1, S2, RRR  Gastrointestinal: BS+, soft, NT/ND  Extremities: No peripheral edema    Hospital Medications:   MEDICATIONS  (STANDING):  aspirin enteric coated 81 milliGRAM(s) Oral daily  atorvastatin 40 milliGRAM(s) Oral at bedtime  carbidopa/levodopa  25/100 1 Tablet(s) Oral daily  clopidogrel Tablet 75 milliGRAM(s) Oral daily  dextrose 5%. 1000 milliLiter(s) (50 mL/Hr) IV Continuous <Continuous>  dextrose 5%. 1000 milliLiter(s) (100 mL/Hr) IV Continuous <Continuous>  dextrose 50% Injectable 25 Gram(s) IV Push once  dextrose 50% Injectable 25 Gram(s) IV Push once  dextrose 50% Injectable 12.5 Gram(s) IV Push once  doxazosin 2 milliGRAM(s) Oral at bedtime  enoxaparin Injectable 40 milliGRAM(s) SubCutaneous every 24 hours  gabapentin 100 milliGRAM(s) Oral three times a day  glucagon  Injectable 1 milliGRAM(s) IntraMuscular once  influenza  Vaccine (HIGH DOSE) 0.5 milliLiter(s) IntraMuscular once  insulin glargine Injectable (LANTUS) 20 Unit(s) SubCutaneous at bedtime  insulin lispro (ADMELOG) corrective regimen sliding scale   SubCutaneous three times a day before meals  insulin lispro (ADMELOG) corrective regimen sliding scale   SubCutaneous at bedtime  isosorbide   mononitrate ER Tablet (IMDUR) 30 milliGRAM(s) Oral daily  losartan 25 milliGRAM(s) Oral daily  melatonin 3 milliGRAM(s) Oral at bedtime  metoprolol tartrate 50 milliGRAM(s) Oral two times a day  NIFEdipine XL 30 milliGRAM(s) Oral daily  senna 2 Tablet(s) Oral at bedtime      LABS:  09-29    141  |  104  |  11  ----------------------------<  190[H]  4.2   |  23  |  0.56    Ca    9.5      29 Sep 2024 06:11  Phos  4.5     09-29  Mg     2.00     09-29      Creatinine Trend: 0.56 <--, 0.63 <--, 0.58 <--, 0.57 <--, 0.59 <--, 0.70 <--                                13.0   6.04  )-----------( 275      ( 29 Sep 2024 06:11 )             40.8     Urine Studies:  Urinalysis - [09-29-24 @ 06:11]      Color  / Appearance  / SG  / pH       Gluc 190 / Ketone   / Bili  / Urobili        Blood  / Protein  / Leuk Est  / Nitrite       RBC  / WBC  / Hyaline  / Gran  / Sq Epi  / Non Sq Epi  / Bacteria       TSH 0.90      [02-22-24 @ 07:25]  Lipid: chol 140, TG 67, HDL 45, LDL --      [06-17-24 @ 07:10]        RADIOLOGY & ADDITIONAL STUDIES:

## 2024-09-30 NOTE — DIETITIAN INITIAL EVALUATION ADULT - ADD RECOMMEND
- Continue current diet order, which remains appropriate at this time. Defer food and fluid consistency to SLP/Team.   - Recommend Glucerna 1x daily (provides 220 kcal, 10 gm protein per 8oz serving).  - Consider Endocrinology consult.   - Encourage and assist Pt with meals, Monitor PO diet tolerance.   - Monitor weights, diet tolerance, skin integrity, BMs, pertinent labs.   - RDN services remain available as needed.

## 2024-09-30 NOTE — DIETITIAN INITIAL EVALUATION ADULT - ORAL INTAKE PTA/DIET HISTORY
Pt lives at home with family and has a home health aide. They cook together at home. No difficulty obtaining groceries. No specific diet followed PTA. Multivitamins taken PTA. Patient reports generally good appetite/PO intake PTA. Pt confirms NKFA, food intolerance does note eat pork or beef. Pt denies any recent weight changes.

## 2024-09-30 NOTE — PROGRESS NOTE ADULT - ASSESSMENT
65 y/o female w/ PMHx CAD s/p PCI, PAD s/p L popliteal angioplasty and atherectomy, CVAs (2022) w/ residual L sided weakness, R and L ICA stenosis, Parkinson's disease, HTN, HLD, T2DM, and decreased vision presenting to [facility] ED as code stroke with hypertensive urgency. Nephrology consulted to r/o renal stenosis.    A/P:  Renal Stenosis:  Outpatient renal doppler (8/2024) suggesting renal stenosis.  Pending CTA A/P   BP labile.  Normal renal function.  S/p CTA head and neck 09/23.  Monitor for DEVYN.  Monitor BMP and UO.  Cardiology and vascular following.    HTN:  better  C/W losartan 25mg qd, isosorbide 30mg qd, metoprolol tart 50mg BID, and nifedipine 30mg qd.  Up titrate losartan as needed.  r/o KODY  Low salt diet.  Monitor BP.    Hyperphosphatemia:  Marginal.  Low PO4 diet.  Monitor PO4.   65 y/o female w/ PMHx CAD s/p PCI, PAD s/p L popliteal angioplasty and atherectomy, CVAs (2022) w/ residual L sided weakness, R and L ICA stenosis, Parkinson's disease, HTN, HLD, T2DM, and decreased vision presenting to [facility] ED as code stroke with hypertensive urgency. Nephrology consulted to r/o renal stenosis.    A/P:  Renal Stenosis:  Outpatient renal doppler (8/2024) suggesting renal stenosis.  Pending CTA A/P angio  BP labile.  Normal renal function.  S/p CTA head and neck 09/23.  Monitor for DEVYN.  Monitor BMP and UO.  Cardiology and vascular following.    HTN:  better  C/W losartan 25mg qd, isosorbide 30mg qd, metoprolol tart 50mg BID, and nifedipine 30mg qd.  Up titrate losartan as needed.  r/o KODY  Low salt diet.  Monitor BP.    Hyperphosphatemia:  Marginal.  Low PO4 diet.  Monitor PO4.

## 2024-09-30 NOTE — DIETITIAN INITIAL EVALUATION ADULT - NS FNS DIET ORDER
Diet, Consistent Carbohydrate/No Snacks:   Minced and Moist (MINCEDMOIST)  No Beef  No Pork (09-25-24 @ 13:44) [Active]

## 2024-09-30 NOTE — PROGRESS NOTE ADULT - SUBJECTIVE AND OBJECTIVE BOX
Name of Patient : ALYSE AVENDAÑO  MRN: [id number]        Subjective: Patient seen and examined. No new events except as noted.   doing okay       REVIEW OF SYSTEMS:    CONSTITUTIONAL: No weakness, fevers or chills  EYES/ENT: No visual changes;  No vertigo or throat pain   NECK: No pain or stiffness  RESPIRATORY: No cough, wheezing, hemoptysis; No shortness of breath  CARDIOVASCULAR: No chest pain or palpitations  GASTROINTESTINAL: No abdominal or epigastric pain. No nausea, vomiting, or hematemesis; No diarrhea or constipation. No melena or hematochezia.  GENITOURINARY: No dysuria, frequency or hematuria  NEUROLOGICAL: No numbness or weakness  SKIN: No itching, burning, rashes, or lesions   All other review of systems is negative unless indicated above.    MEDICATIONS:  MEDICATIONS  (STANDING):  aspirin enteric coated 81 milliGRAM(s) Oral daily  atorvastatin 40 milliGRAM(s) Oral at bedtime  carbidopa/levodopa  25/100 1 Tablet(s) Oral daily  clopidogrel Tablet 75 milliGRAM(s) Oral daily  dextrose 5%. 1000 milliLiter(s) (100 mL/Hr) IV Continuous <Continuous>  dextrose 5%. 1000 milliLiter(s) (50 mL/Hr) IV Continuous <Continuous>  dextrose 50% Injectable 25 Gram(s) IV Push once  dextrose 50% Injectable 25 Gram(s) IV Push once  dextrose 50% Injectable 12.5 Gram(s) IV Push once  doxazosin 2 milliGRAM(s) Oral at bedtime  enoxaparin Injectable 40 milliGRAM(s) SubCutaneous every 24 hours  gabapentin 100 milliGRAM(s) Oral three times a day  glucagon  Injectable 1 milliGRAM(s) IntraMuscular once  influenza  Vaccine (HIGH DOSE) 0.5 milliLiter(s) IntraMuscular once  insulin glargine Injectable (LANTUS) 20 Unit(s) SubCutaneous at bedtime  insulin lispro (ADMELOG) corrective regimen sliding scale   SubCutaneous three times a day before meals  insulin lispro (ADMELOG) corrective regimen sliding scale   SubCutaneous at bedtime  isosorbide   mononitrate ER Tablet (IMDUR) 30 milliGRAM(s) Oral daily  losartan 25 milliGRAM(s) Oral daily  melatonin 3 milliGRAM(s) Oral at bedtime  metoprolol tartrate 50 milliGRAM(s) Oral two times a day  NIFEdipine XL 30 milliGRAM(s) Oral daily  senna 2 Tablet(s) Oral at bedtime      PHYSICAL EXAM:  T(C): 36.8 (09-30-24 @ 20:22), Max: 36.8 (09-30-24 @ 04:30)  HR: 73 (09-30-24 @ 20:22) (73 - 94)  BP: 154/69 (09-30-24 @ 20:22) (149/94 - 182/90)  RR: 18 (09-30-24 @ 20:22) (18 - 18)  SpO2: 97% (09-30-24 @ 20:22) (97% - 100%)  Wt(kg): --  I&O's Summary    Height (cm): 149.9 (09-30 @ 09:53)    Appearance: Normal	  HEENT:  PERRLA   Lymphatic: No lymphadenopathy   Cardiovascular: Normal S1 S2, no JVD  Respiratory: normal effort , clear  Gastrointestinal:  Soft, Non-tender  Skin: No rashes,  warm to touch  Psychiatry:  Mood & affect appropriate  Musculuskeletal: No edema    recent labs, Imaging and EKGs personally reviewed

## 2024-09-30 NOTE — PROGRESS NOTE ADULT - SUBJECTIVE AND OBJECTIVE BOX
Johnathan Tejeda MD  Interventional Cardiology / Endovascular Specialist  [place] Office : [phone] [place] [address] [place] [address]  Tel:   [place] Office : [phone] [facility] [place] [address]  Tel: [phone]  Cell : [phone]     Pt is lying in bed comfortable not in distress, pt seen and examined at bedside,  at bedside  	  MEDICATIONS:  aspirin enteric coated 81 milliGRAM(s) Oral daily  clopidogrel Tablet 75 milliGRAM(s) Oral daily  doxazosin 2 milliGRAM(s) Oral at bedtime  enoxaparin Injectable 40 milliGRAM(s) SubCutaneous every 24 hours  isosorbide   mononitrate ER Tablet (IMDUR) 30 milliGRAM(s) Oral daily  losartan 25 milliGRAM(s) Oral daily  metoprolol tartrate 50 milliGRAM(s) Oral two times a day  NIFEdipine XL 30 milliGRAM(s) Oral daily        carbidopa/levodopa  25/100 1 Tablet(s) Oral daily  gabapentin 100 milliGRAM(s) Oral three times a day  LORazepam     Tablet 0.5 milliGRAM(s) Oral once PRN  melatonin 3 milliGRAM(s) Oral at bedtime    senna 2 Tablet(s) Oral at bedtime    atorvastatin 40 milliGRAM(s) Oral at bedtime  dextrose 50% Injectable 25 Gram(s) IV Push once  dextrose 50% Injectable 25 Gram(s) IV Push once  dextrose 50% Injectable 12.5 Gram(s) IV Push once  dextrose Oral Gel 15 Gram(s) Oral once PRN  glucagon  Injectable 1 milliGRAM(s) IntraMuscular once  insulin glargine Injectable (LANTUS) 20 Unit(s) SubCutaneous at bedtime  insulin lispro (ADMELOG) corrective regimen sliding scale   SubCutaneous three times a day before meals  insulin lispro (ADMELOG) corrective regimen sliding scale   SubCutaneous at bedtime    dextrose 5%. 1000 milliLiter(s) IV Continuous <Continuous>  dextrose 5%. 1000 milliLiter(s) IV Continuous <Continuous>  influenza  Vaccine (HIGH DOSE) 0.5 milliLiter(s) IntraMuscular once      PAST MEDICAL/SURGICAL HISTORY  PAST MEDICAL & SURGICAL HISTORY:  HTN (hypertension)      HLD (hyperlipidemia)      CAD (coronary artery disease)      Type II diabetes mellitus      PAD (peripheral artery disease)      Mild dementia      Parkinson disease      S/P hysterectomy          SOCIAL HISTORY: Substance Use (street drugs): ( x ) never used  (  ) other:    FAMILY HISTORY:        PHYSICAL EXAM:  T(C): 36.4 (09-30-24 @ 12:11), Max: 36.9 (09-29-24 @ 20:43)  HR: 94 (09-30-24 @ 12:11) (69 - 94)  BP: 149/94 (09-30-24 @ 12:11) (143/75 - 182/87)  RR: 18 (09-30-24 @ 12:11) (18 - 18)  SpO2: 98% (09-30-24 @ 12:11) (97% - 100%)  Wt(kg): --  I&O's Summary    Height (cm): 149.9 (09-30 @ 09:53)    GENERAL: NAD  EYES:   PERRLA   ENMT:   Moist mucous membranes, Good dentition, No lesions  Cardiovascular: Normal S1 S2, No JVD, No murmurs, No edema  Respiratory: Lungs clear to auscultation	  Gastrointestinal:  Soft, Non-tender, + BS	  Extremities: no edema                                      13.0   6.04  )-----------( 275      ( 29 Sep 2024 06:11 )             40.8     09-29    141  |  104  |  11  ----------------------------<  190[H]  4.2   |  23  |  0.56    Ca    9.5      29 Sep 2024 06:11  Phos  4.5     09-29  Mg     2.00     09-29      proBNP:   Lipid Profile:   HgA1c:   TSH:     Consultant(s) Notes Reviewed:  [x ] YES  [ ] NO    Care Discussed with Consultants/Other Providers [ x] YES  [ ] NO    Imaging Personally Reviewed independently:  [x] YES  [ ] NO    All labs, radiologic studies, vitals, orders and medications list reviewed. Patient is seen and examined at bedside. Case discussed with medical team.

## 2024-09-30 NOTE — PROGRESS NOTE ADULT - ASSESSMENT
EKG NSR 88  TTE 2/20/2024    1. Agitated saline injection was negative for intracardiac shunt.   2. Left ventricular cavity is normal in size. Left ventricular wall thickness is normal. Left ventricular systolic function is hyperdynamic. There are no regional wall motion abnormalities seen.   3. Normal left ventricular diastolic function, with normal filling pressure.   4. Normal right ventricular cavity size, with wall thickness, and normal systolic function.   5. Normal left and right atrial size.   6. No significant valvular disease.   7. No pericardial effusion seen.    NST 5/3/2024  1. Normal myocardial perfusion scan, with no evidence of infarction or inducible ischemia.   2. Stress electrocardiogram: No ischemic ST segment changes.   3. Normal left ventricular regional wall motion.   4. The left ventricle is normal in function and normal in size. Normal left ventricular diastolic function. The post stress end diastolic volume is 59 ml and systolic volume is 13 ml.   5. The left ventricle is normal in function and normal in size. Normal left ventricular diastolic function.   6. Normal right ventricular function. There is no right ventricular dilation. RVEF = 48%, RVEDV = 93 mL, RVESV = 49 mL.    Cerebral angiogram 2/22/2024 There is atherosclerotic plaque at the right carotid bifurcation   which causes 45% stenosis at the origin of the right cervical internal carotid artery, There is atherosclerotic   plaque at the left carotid bifurcation which causes 33% stenosis at the origin of the left cervical internal carotid artery. Diagnostic cerebral angiogram demonstrating mild left and moderate right cervical internal carotid artery atherosclerotic stenosis        A/P    Patient is a 63 year old female with a PMHx of  HTN, DM, HLD, CAD (s/p 3 stents placed at [facility] in 2014 to the prox LAD and then in 2022 - on ASA and Plavix), PAD s/p L popliteal angioplasty and atherectomy 12/23, R ICA and L ICA stenosis, prior stroke with L side weakness, Parkinson's disease, B/L cataract surgery with blurred vision at baseline who presents with unresponsiveness      1. Cardiac risk stratification  - vascular recs appreciated, CTA head/neck showing no LVO but severe right and moderate left internal carotid artery origin stenosis. planning on outpt surgery  - no CP/SOB, euvolemic on exam  - EKG ok, Echo ok, NST ok  - BP labile, on Imdur 30mg, losartan 25, lopressor 50 BID, nifedipine 30  - renal artery doppler 8/2024(outpt) suggesting renal artery stenosis, pending abd CTA to r/o renal artery stenosis      2. AMS  - Pt is responsive now, able to move all extremities  - CTA   NECK: Severe right and moderate left internal carotid artery origin stenosis, as above. No occlusion or dissection.   HEAD: No significant change. No large vessel occlusion. 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   - Cerebral angiogram 2/22/2024 There is atherosclerotic plaque at the right carotid bifurcation   which causes 45% stenosis at the origin of the right cervical internal carotid artery, There is atherosclerotic   plaque at the left carotid bifurcation which causes 33% stenosis at the origin of the left cervical internal carotid artery. Diagnostic cerebral angiogram demonstrating mild left and moderate right cervical internal carotid artery atherosclerotic stenosis  - f/u neuro recs, f/u vascular recs  - home meds ASA, plavix, lipitor 40mg    3. Carotid artery stenosis  - CTA shows  NECK: Severe right and moderate left internal carotid artery origin stenosis, as above. No occlusion or dissection.    HEAD: No significant change. No large vessel occlusion. 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  - Cerebral angiogram 2/22/2024 There is atherosclerotic plaque at the right carotid bifurcation   which causes 45% stenosis at the origin of the right cervical internal carotid artery, There is atherosclerotic   plaque at the left carotid bifurcation which causes 33% stenosis at the origin of the left cervical internal carotid artery. Diagnostic cerebral angiogram demonstrating mild left and moderate right cervical internal carotid artery atherosclerotic stenosis.  - pt f/u with outpt neurologist  - carotid doppler Rt pICA moderate, Lt pICA mild   - vascular recs appreciated, CTA head/neck showing no LVO but severe right and moderate left internal carotid artery origin stenosis. planning on outpt surgery  - home meds ASA, plavix, lipitor 40mg      4. Hypertension  -c/w nifedipine 30 mg QD, lopressor 50mg BID, Losartan 25mg QD, imdur 30 mg QD  - renal artery doppler 8/2024(outpt) suggesting renal artery stenosis, pending abd CTA to r/o renal artery stenosis    5. CAD s/p stents  - s/p 3 stents placed at [facility] in 2014 to the prox LAD and then in 2022  - TTE with preserved EF and no WMA  - c/w ASA, Plavix and statin    6. HLD  - c/w atorvastatin 80mg PO daily    7. hx of palpitations s/p Falls   - TTE with preserved EF and no WMA  -s/p loop monitor , no arrhythmias on interrogation

## 2024-09-30 NOTE — DIETITIAN INITIAL EVALUATION ADULT - PERTINENT MEDS FT
MEDICATIONS  (STANDING):  aspirin enteric coated 81 milliGRAM(s) Oral daily  atorvastatin 40 milliGRAM(s) Oral at bedtime  carbidopa/levodopa  25/100 1 Tablet(s) Oral daily  clopidogrel Tablet 75 milliGRAM(s) Oral daily  dextrose 5%. 1000 milliLiter(s) (100 mL/Hr) IV Continuous <Continuous>  dextrose 5%. 1000 milliLiter(s) (50 mL/Hr) IV Continuous <Continuous>  dextrose 50% Injectable 25 Gram(s) IV Push once  dextrose 50% Injectable 25 Gram(s) IV Push once  dextrose 50% Injectable 12.5 Gram(s) IV Push once  doxazosin 2 milliGRAM(s) Oral at bedtime  enoxaparin Injectable 40 milliGRAM(s) SubCutaneous every 24 hours  gabapentin 100 milliGRAM(s) Oral three times a day  glucagon  Injectable 1 milliGRAM(s) IntraMuscular once  influenza  Vaccine (HIGH DOSE) 0.5 milliLiter(s) IntraMuscular once  insulin glargine Injectable (LANTUS) 8 Unit(s) SubCutaneous at bedtime  insulin lispro (ADMELOG) corrective regimen sliding scale   SubCutaneous at bedtime  insulin lispro (ADMELOG) corrective regimen sliding scale   SubCutaneous three times a day before meals  isosorbide   mononitrate ER Tablet (IMDUR) 30 milliGRAM(s) Oral daily  losartan 25 milliGRAM(s) Oral daily  melatonin 3 milliGRAM(s) Oral at bedtime  metoprolol tartrate 50 milliGRAM(s) Oral two times a day  NIFEdipine XL 30 milliGRAM(s) Oral daily  senna 2 Tablet(s) Oral at bedtime    MEDICATIONS  (PRN):  dextrose Oral Gel 15 Gram(s) Oral once PRN Blood Glucose LESS THAN 70 milliGRAM(s)/deciliter  LORazepam     Tablet 0.5 milliGRAM(s) Oral once PRN pre-medication for MRI

## 2024-09-30 NOTE — DIETITIAN INITIAL EVALUATION ADULT - OTHER CALCULATIONS
Defer fluid needs to MD/Team.   Ideal Body Weight: 98lbs / 44.5kg +10%. Upper end IBW used to calculate nutritional estimated needs.

## 2024-09-30 NOTE — DIETITIAN INITIAL EVALUATION ADULT - PERTINENT LABORATORY DATA
09-29    141  |  104  |  11  ----------------------------<  190[H]  4.2   |  23  |  0.56    Ca    9.5      29 Sep 2024 06:11  Phos  4.5     09-29  Mg     2.00     09-29  CAPILLARY BLOOD GLUCOSE  POCT Blood Glucose.: 214 mg/dL (30 Sep 2024 08:30)  POCT Blood Glucose.: 255 mg/dL (29 Sep 2024 22:13)  POCT Blood Glucose.: 253 mg/dL (29 Sep 2024 17:14)  POCT Blood Glucose.: 312 mg/dL (29 Sep 2024 12:43)    A1C with Estimated Average Glucose Result: 8.0 % (09-24-24 @ 09:15)  A1C with Estimated Average Glucose Result: 9.2 % (06-15-24 @ 09:43)  A1C with Estimated Average Glucose Result: 7.2 % (02-20-24 @ 07:11)

## 2024-09-30 NOTE — DIETITIAN INITIAL EVALUATION ADULT - OTHER INFO
Patient seen at bedside. Pt with fair PO intake, per tray observation. Pt with fair PO intake, Per RN flowsheet -26-50% noted. No food preferences verbalized at this time. Pt amenable to trial of nutritional shake. Patient denies any nausea, vomiting, diarrhea, constipation or difficulty chewing and swallowing at present. Per p, last BM 9/29. Bowel regimen in place. Wt hx: 66.7kg (Feb, 2024), 61.7kg (Apr, 2024), ?74.8kg (Jun, 2024), ?59kg (Jul, 2024), 66.7kg (9/24). Weight accuracy questionable. Obtain weekly weights to monitor weight trend. Labs noted for elevated POCT 214-312mg/dL, HgA1c 8.0%. Diabetes education provided. Recommend endocrinology consult.

## 2024-09-30 NOTE — PROGRESS NOTE ADULT - ASSESSMENT
4F w/ HTN, T2DM, dementia, Parkinson's Disease, stroke (2022 with residual L sided deficits), known bilateral ICA stenosis, frequent falls and gait instability, binocular catarct surgery presented with confusion and ?stiffness for which stroke code was called. Not tnk or thrombectomy candidate  Of note on aspirin and plavix at home. Previously admitted to [facility] in June 2024 for similar episode with normal EEG at that time.   prior LDL 81, A1c 9.2  Had prior R sided symptoms in Feb 2024 with diagnostic angio at that time not reflecting symptomatic carotid stenosis   o/e AAOx2, hypomimia, L hemiparesis, tremor and inc tone   routine eeg neg     Parkinsons  Prior stroke  R ICA stenosis - ?symptomatic  R/o seizure     - continue aspirin and statin for secondary stroke prevention  - ok to continue plavix for now  - MRI brain w/o contrast to determine if carotid stenosis symptomatic   - carotid duplex with R ICA 50-79% stenosis, L ICA < 50%   - vasc surgery following, no plans for inpatient revasc  - routine eeg neg  - continue home sinemet dose  - gradual normotension  - delirium precautions  - seizure and fall precautions  - pt/ot/slp  - dvt ppx    Viki Gray DO  Vascular Neurology  Office [phone]

## 2024-09-30 NOTE — DIETITIAN INITIAL EVALUATION ADULT - REASON FOR ADMISSION
35 Y/o Female BMI 43, prev on [id number] for diffuse ICAD, hx HTN, HLD, uncontrolled DM2 x15 yrs, sickle cell trait, CAD s/p 4 stents and CABG, recent p/f Lt hand/arm numb (8/15/24), MRI w/ Rt side strokes, angio w/ Dr. Pulliam 8/23/24 w/ Rt distal cav/supraclinoid ICA svr stenosis, LICA occlusion w/ distal recon, also read on [facility] 8/16. Was d/c'd on DAPT 8/24 by neuro. Now p/w new Lt sided sx, MRI 9/23/24 w/ new RMCA terr strokes (appear embolic/watershed). Exam: AOx3, LUE drift, Lt facial numbness V1-V3, mild L facial, L HG 3/5 o/w LUE 4+/5, LLE 4+/5 (feels heavy, LUE finger paresthesias

## 2024-09-30 NOTE — PROGRESS NOTE ADULT - SUBJECTIVE AND OBJECTIVE BOX
Neurology Progress Note    S: Patient seen and examined. No new events overnight.     Medications: MEDICATIONS  (STANDING):  aspirin enteric coated 81 milliGRAM(s) Oral daily  atorvastatin 40 milliGRAM(s) Oral at bedtime  carbidopa/levodopa  25/100 1 Tablet(s) Oral daily  clopidogrel Tablet 75 milliGRAM(s) Oral daily  dextrose 5%. 1000 milliLiter(s) (100 mL/Hr) IV Continuous <Continuous>  dextrose 5%. 1000 milliLiter(s) (50 mL/Hr) IV Continuous <Continuous>  dextrose 50% Injectable 12.5 Gram(s) IV Push once  dextrose 50% Injectable 25 Gram(s) IV Push once  dextrose 50% Injectable 25 Gram(s) IV Push once  doxazosin 2 milliGRAM(s) Oral at bedtime  enoxaparin Injectable 40 milliGRAM(s) SubCutaneous every 24 hours  gabapentin 100 milliGRAM(s) Oral three times a day  glucagon  Injectable 1 milliGRAM(s) IntraMuscular once  influenza  Vaccine (HIGH DOSE) 0.5 milliLiter(s) IntraMuscular once  insulin glargine Injectable (LANTUS) 20 Unit(s) SubCutaneous at bedtime  insulin lispro (ADMELOG) corrective regimen sliding scale   SubCutaneous three times a day before meals  insulin lispro (ADMELOG) corrective regimen sliding scale   SubCutaneous at bedtime  isosorbide   mononitrate ER Tablet (IMDUR) 30 milliGRAM(s) Oral daily  losartan 25 milliGRAM(s) Oral daily  melatonin 3 milliGRAM(s) Oral at bedtime  metoprolol tartrate 50 milliGRAM(s) Oral two times a day  NIFEdipine XL 30 milliGRAM(s) Oral daily  senna 2 Tablet(s) Oral at bedtime    MEDICATIONS  (PRN):  dextrose Oral Gel 15 Gram(s) Oral once PRN Blood Glucose LESS THAN 70 milliGRAM(s)/deciliter  LORazepam     Tablet 0.5 milliGRAM(s) Oral once PRN pre-medication for MRI       Vitals:  Vital Signs Last 24 Hrs  T(C): 36.8 (30 Sep 2024 20:22), Max: 36.8 (30 Sep 2024 04:30)  T(F): 98.3 (30 Sep 2024 20:22), Max: 98.3 (30 Sep 2024 20:22)  HR: 73 (30 Sep 2024 20:22) (73 - 94)  BP: 154/69 (30 Sep 2024 20:22) (149/94 - 182/90)  BP(mean): --  RR: 18 (30 Sep 2024 20:22) (18 - 18)  SpO2: 97% (30 Sep 2024 20:22) (97% - 100%)    Parameters below as of 30 Sep 2024 20:22  Patient On (Oxygen Delivery Method): room air                General Exam:   General Appearance: Appropriately dressed and in no acute distress       Head: Normocephalic, atraumatic and no dysmorphic features  Ear, Nose, and Throat: Moist mucous membranes  CVS: S1S2+  Resp: No SOB, no wheeze or rhonchi  Abd: soft NTND  Extremities: No edema, no cyanosis  Skin: No bruises, no rashes     Neurological Exam:  Mental Status: Awake, alert and oriented x 2.  Able to follow simple commands. Fluent speech. No obvious aphasia or dysarthria noted.   Cranial Nerves: PERRL, EOMI, VFFC, sensation V1-V3 intact,  no obvious facial asymmetry , hypomimia, equal elevation of palate, scm/trap 5/5, tongue is midline on protrusion.   Motor: slight inc tone throughout with resting tremor , no drift.    Sensation: Intact to light touch  Reflexes: 1+ throughout at biceps, brachioradialis, triceps, patellars and ankles bilaterally and equal. No clonus. R toe and L toe were both downgoing.  Coordination: No dysmetria on FNF   Gait: deferred    I personally reviewed the below data/images/labs:    LABS:                          13.0   6.04  )-----------( 275      ( 29 Sep 2024 06:11 )             40.8     09-29    141  |  104  |  11  ----------------------------<  190[H]  4.2   |  23  |  0.56    Ca    9.5      29 Sep 2024 06:11  Phos  4.5     09-29  Mg     2.00     09-29          Urinalysis Basic - ( 29 Sep 2024 06:11 )    Color: x / Appearance: x / SG: x / pH: x  Gluc: 190 mg/dL / Ketone: x  / Bili: x / Urobili: x   Blood: x / Protein: x / Nitrite: x   Leuk Esterase: x / RBC: x / WBC x   Sq Epi: x / Non Sq Epi: x / Bacteria: x          ACC: [id number] EXAM:  CT ANGIO BRAIN STROKE PROTC IC   ORDERED BY: BENJI DE PAZ     ACC: [id number] EXAM:  CT ANGIO NECK STROKE PROTCL IC   ORDERED BY: BENJI DE PAZ     ACC: [id number] EXAM:  CT BRAIN STROKE PROTOCOL   ORDERED BY: BENJI DE PAZ     ACC: [id number] EXAM:  CT BRAIN PERFUSION MAPS STROKE   ORDERED BY: BENJI DE PAZ     PROCEDURE DATE:  09/23/2024          INTERPRETATION:  CLINICAL INFORMATION: Stroke code. Clinical concern for   CVA.    COMPARISON: CT brain 6/17/2024. CTA examinations 2/19/24.    CONTRAST:  IV Contrast: NONE (accession [id number]), Omnipaque 350 (accession   [id number])  80 cc administered  0 cc discarded  Complications: None reported at time of study completion    TECHNIQUE: CT of the head was performed with multiplanar reformats   without IV contrast. CT perfusion and CTA of the head and neck were   performed following the intravenous administration of IV contrast. MIP   reconstructions were performed on a separate workstation and reviewed.   RAPID softwarewas utilized for perfusion analysis.    FINDINGS:    CT BRAIN:    VENTRICLES AND SULCI: Mild, age-appropriate atrophy.  INTRA-AXIAL: No evidence of intraparenchymal mass, acute hemorrhage, or   midline shift.  No definitive acute territorial infarct.Stable   appearance of mild bihemispheric white matter hypodensities; a   nonspecific finding which statistically reflects chronic microvascular   ischemic change.  EXTRA-AXIAL: No mass or fluid collection. Basal cisterns are normal in   appearance.    VISUALIZED SINUSES:  No air-fluid levels or opacification.  TYMPANOMASTOID CAVITIES: No effusion.  VISUALIZED ORBITS: Bilateral lens replacement.  CALVARIUM: No new aggressive lesion.    MISCELLANEOUS: Left greater than right temporomandibular arthrosis.    CT PERFUSION:    TECHNICAL LIMITATIONS: Motion.    CBF<30%/CORE INFARCTION: 0 mL  TMAX>6s/UNDERPERFUSED TISSUE: 0 mL  MISMATCH VOLUME/TISSUE AT RISK: 0 mL  MISMATCH RATIO: None.    MISCELLANEOUS: None.    CTA NECK:    AORTIC ARCH AND VISUALIZED GREAT VESSELS: Unremarkable with the exception   of scattered calcified plaque, most pronounced at the left subclavian   artery origin, resulting in probable mild stenosis in this region,   however suboptimally evaluated secondary to motion.    RIGHT:  COMMON CAROTID/INTERNAL CAROTID ARTERY: Again seen is circumferential   calcified plaque extending from the carotid bifurcation into the   ipsilateral internal carotid artery origin, resulting in up to severe   (approximately 70-80%) stenosis inthis region by NASCET criteria.   Aberrant medial course proximal internal carotid artery.  VERTEBRAL ARTERY: Patent and codominant.    LEFT:  COMMON CAROTID/INTERNAL CAROTID ARTERY: Again seen is a circumferential   partially calcified plaque extending from the bifurcation into the   ipsilateral internal carotid artery origin, resulting in up to moderate   (approximately 60-70%) stenosis in this region by NASCET criteria.   Aberrant medial course proximal internal carotid artery.  VERTEBRAL ARTERY: Patent and codominant.    VISUALIZED LUNGS: No suspicious upper lung mass.    MISCELLANEOUS: Approximate 3.6 cm hypodense left thyroid nodule again   noted; a finding characterized on thyroid ultrasound 2/22/2024.   Multifocal degenerative change.    CAROTID STENOSIS REFERENCE: Percent (%) stenosis is expressed in terms of   NASCET Criteria. (NASCET = 100x1-(N/D)). N=greatest narrowing. D=normal   distal diameter - MILD = <50% stenosis. - MODERATE = 50-69% stenosis. -   SEVERE = 70-89% stenosis. - HAIRLINE/CRITICAL = 90-99% stenosis. -   OCCLUDED = 100% stenosis.    CTA HEAD:    INTERNAL CAROTID ARTERIES: Again seen is bilateral calcified plaque,   resulting in up to moderate stenosis bilateral cavernous segments. No   occlusion.    CIRCLEOF GARZA: 1-2 mm aneurysm versus infundibulum emanating   posteriorly inferiorly from the terminal segment left internal carotid   artery (series 6 image [id number]).    ANTERIOR CEREBRAL ARTERIES: No significant stenosis or occlusion. 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  MIDDLE CEREBRAL ARTERIES: No significant stenosis or occlusion.  POSTERIOR CEREBRAL ARTERIES: No occlusion. Fetal origin right posterior   cerebral artery. Again seen is evidence of moderate severe focal stenosis   at the origin of the left medial occipital artery.    DISTAL VERTEBRAL / BASILAR ARTERIES: No significant stenosis or   occlusion. Again seen is calcified plaque left proximal V4 segment,   resulting in mild focal stenosis.    VENOUS STRUCTURES: No definitive filling defect to suggest thrombus, as   on the prior.    MISCELLANEOUS: No other vascular abnormality is seen.    IMPRESSION:    CT HEAD:  1. No significant change compared with 6/17/2024.  2. No evidence of acute hemorrhage, hydrocephalus or mass effect.  3. Additional findings described in detail above.    CT PERFUSION:  1. No predicted core infarct.  2. No evidence of active ischemia-tissue at risk.    CTA NECK:  1. No significant change.  2. Bilateral vertebral arteries patent and codominant.  3. Severe right and moderate left internal carotid artery origin   stenosis, as above. No occlusion or dissection.  4. Additional findings described in detail above.    CTA HEAD:  1. No significant change.  2. No large vessel occlusion.  3. 1-2 mm aneurysm versus infundibulum terminal segment left internal   carotid artery.  4. Additional findings, including multifocal stenosis and anatomic   variants, described in detail above.    If clinical symptoms persist consider further imaging with MRI, provided   there are no medical contraindications. Results of CT brain discussed   with Dr. De Paz at 3:19 PM the day of this exam.    --- End of Report ---            JANNETTE TRINH M.D., ATTENDING RADIOLOGIST  This document has been electronically signed. Sep 23 2024  4:07PM      Summary:  Normal EEG in the awake / drowsy / asleep state(s).    Clinical Impression:  There were no epileptiform abnormalities recorded.      CONCLUSIONS:      1. Right: There is moderate calcific plaque noted within the proximal right internal carotid artery, consistent with a 50-79% stenosis.   2. Left: There is mild calcific plaque noted within the proximal left internal carotid artery, consistent with a 16-49% stenosis. No hemodynamically significant stenosis.   3. Vertebral arteries: antegrade flow bilaterally.   4. Subclavian arteries: no significant atherosclerosis bilaterally.   5. Hypoechoic avascular collection likely representing a thyroid cyst noted in the left neck. Recommend dedicated thyroid ultrasound for further characterization.

## 2024-10-01 LAB
ANION GAP SERPL CALC-SCNC: 14 MMOL/L — SIGNIFICANT CHANGE UP (ref 7–14)
BUN SERPL-MCNC: 14 MG/DL — SIGNIFICANT CHANGE UP (ref 7–23)
CALCIUM SERPL-MCNC: 9.4 MG/DL — SIGNIFICANT CHANGE UP (ref 8.4–10.5)
CHLORIDE SERPL-SCNC: 104 MMOL/L — SIGNIFICANT CHANGE UP (ref 98–107)
CO2 SERPL-SCNC: 22 MMOL/L — SIGNIFICANT CHANGE UP (ref 22–31)
CREAT SERPL-MCNC: 0.53 MG/DL — SIGNIFICANT CHANGE UP (ref 0.5–1.3)
EGFR: 102 ML/MIN/1.73M2 — SIGNIFICANT CHANGE UP
GLUCOSE BLDC GLUCOMTR-MCNC: 185 MG/DL — HIGH (ref 70–99)
GLUCOSE BLDC GLUCOMTR-MCNC: 201 MG/DL — HIGH (ref 70–99)
GLUCOSE BLDC GLUCOMTR-MCNC: 249 MG/DL — HIGH (ref 70–99)
GLUCOSE BLDC GLUCOMTR-MCNC: 349 MG/DL — HIGH (ref 70–99)
GLUCOSE SERPL-MCNC: 173 MG/DL — HIGH (ref 70–99)
HCT VFR BLD CALC: 40.1 % — SIGNIFICANT CHANGE UP (ref 34.5–45)
HGB BLD-MCNC: 12.9 G/DL — SIGNIFICANT CHANGE UP (ref 11.5–15.5)
MAGNESIUM SERPL-MCNC: 2 MG/DL — SIGNIFICANT CHANGE UP (ref 1.6–2.6)
MCHC RBC-ENTMCNC: 23.2 PG — LOW (ref 27–34)
MCHC RBC-ENTMCNC: 32.2 GM/DL — SIGNIFICANT CHANGE UP (ref 32–36)
MCV RBC AUTO: 72.3 FL — LOW (ref 80–100)
NRBC # BLD: 0 /100 WBCS — SIGNIFICANT CHANGE UP (ref 0–0)
NRBC # FLD: 0 K/UL — SIGNIFICANT CHANGE UP (ref 0–0)
PHOSPHATE SERPL-MCNC: 5 MG/DL — HIGH (ref 2.5–4.5)
PLATELET # BLD AUTO: 261 K/UL — SIGNIFICANT CHANGE UP (ref 150–400)
POTASSIUM SERPL-MCNC: 3.9 MMOL/L — SIGNIFICANT CHANGE UP (ref 3.5–5.3)
POTASSIUM SERPL-SCNC: 3.9 MMOL/L — SIGNIFICANT CHANGE UP (ref 3.5–5.3)
RBC # BLD: 5.55 M/UL — HIGH (ref 3.8–5.2)
RBC # FLD: 15.3 % — HIGH (ref 10.3–14.5)
SODIUM SERPL-SCNC: 140 MMOL/L — SIGNIFICANT CHANGE UP (ref 135–145)
WBC # BLD: 5.77 K/UL — SIGNIFICANT CHANGE UP (ref 3.8–10.5)
WBC # FLD AUTO: 5.77 K/UL — SIGNIFICANT CHANGE UP (ref 3.8–10.5)

## 2024-10-01 PROCEDURE — 70551 MRI BRAIN STEM W/O DYE: CPT | Mod: 26

## 2024-10-01 PROCEDURE — 74174 CTA ABD&PLVS W/CONTRAST: CPT | Mod: 26

## 2024-10-01 RX ADMIN — Medication 50 MILLIGRAM(S): at 18:13

## 2024-10-01 RX ADMIN — Medication 2: at 12:46

## 2024-10-01 RX ADMIN — ENOXAPARIN SODIUM 40 MILLIGRAM(S): 150 INJECTION SUBCUTANEOUS at 05:07

## 2024-10-01 RX ADMIN — Medication 3 MILLIGRAM(S): at 21:11

## 2024-10-01 RX ADMIN — ATORVASTATIN CALCIUM 40 MILLIGRAM(S): 10 TABLET, FILM COATED ORAL at 21:10

## 2024-10-01 RX ADMIN — Medication 30 MILLIGRAM(S): at 12:47

## 2024-10-01 RX ADMIN — GABAPENTIN 100 MILLIGRAM(S): 800 TABLET, FILM COATED ORAL at 05:06

## 2024-10-01 RX ADMIN — Medication 0.5 MILLIGRAM(S): at 19:52

## 2024-10-01 RX ADMIN — Medication 1: at 09:02

## 2024-10-01 RX ADMIN — DOXAZOSIN 2 MILLIGRAM(S): 2 TABLET ORAL at 21:10

## 2024-10-01 RX ADMIN — Medication 50 MILLIGRAM(S): at 05:08

## 2024-10-01 RX ADMIN — Medication 81 MILLIGRAM(S): at 12:47

## 2024-10-01 RX ADMIN — Medication 2 TABLET(S): at 21:11

## 2024-10-01 RX ADMIN — LOSARTAN POTASSIUM 25 MILLIGRAM(S): 100 TABLET, FILM COATED ORAL at 05:07

## 2024-10-01 RX ADMIN — GABAPENTIN 100 MILLIGRAM(S): 800 TABLET, FILM COATED ORAL at 21:10

## 2024-10-01 RX ADMIN — INSULIN GLARGINE 20 UNIT(S): 300 INJECTION, SOLUTION SUBCUTANEOUS at 21:50

## 2024-10-01 RX ADMIN — Medication 2: at 21:51

## 2024-10-01 RX ADMIN — Medication 30 MILLIGRAM(S): at 05:07

## 2024-10-01 RX ADMIN — Medication 1 TABLET(S): at 12:46

## 2024-10-01 RX ADMIN — GABAPENTIN 100 MILLIGRAM(S): 800 TABLET, FILM COATED ORAL at 12:51

## 2024-10-01 RX ADMIN — Medication 75 MILLIGRAM(S): at 12:46

## 2024-10-01 RX ADMIN — Medication 2: at 18:13

## 2024-10-01 NOTE — PROGRESS NOTE ADULT - SUBJECTIVE AND OBJECTIVE BOX
[facility]   MD MARITA PALM MD ANGELA WONG, PA    TEL:  OFFICE: [phone]  From 5pm-7am Answering Service [phone]    -- RENAL FOLLOW UP NOTE ---Date of Service 10-01-24 @ 13:53    Patient is a 64y old  Female who presents with a chief complaint of AMS (01 Oct 2024 10:28)      Patient seen and examined at bedside. No chest pain/sob    VITALS:  T(F): 97.6 (10-01-24 @ 12:23), Max: 98.3 (09-30-24 @ 20:22)  HR: 71 (10-01-24 @ 12:23)  BP: 152/74 (10-01-24 @ 12:23)  RR: 17 (10-01-24 @ 12:23)  SpO2: 97% (10-01-24 @ 12:23)  Wt(kg): --    09-30 @ 07:01  -  10-01 @ 07:00  --------------------------------------------------------  IN: 0 mL / OUT: 950 mL / NET: -950 mL          PHYSICAL EXAM:  General: NAD  Neck: No JVD  Respiratory: CTAB, no wheezes, rales or rhonchi  Cardiovascular: S1, S2, RRR  Gastrointestinal: BS+, soft, NT/ND  Extremities: No peripheral edema    Hospital Medications:   MEDICATIONS  (STANDING):  aspirin enteric coated 81 milliGRAM(s) Oral daily  atorvastatin 40 milliGRAM(s) Oral at bedtime  carbidopa/levodopa  25/100 1 Tablet(s) Oral daily  clopidogrel Tablet 75 milliGRAM(s) Oral daily  dextrose 5%. 1000 milliLiter(s) (100 mL/Hr) IV Continuous <Continuous>  dextrose 5%. 1000 milliLiter(s) (50 mL/Hr) IV Continuous <Continuous>  dextrose 50% Injectable 25 Gram(s) IV Push once  dextrose 50% Injectable 25 Gram(s) IV Push once  dextrose 50% Injectable 12.5 Gram(s) IV Push once  doxazosin 2 milliGRAM(s) Oral at bedtime  enoxaparin Injectable 40 milliGRAM(s) SubCutaneous every 24 hours  gabapentin 100 milliGRAM(s) Oral three times a day  glucagon  Injectable 1 milliGRAM(s) IntraMuscular once  influenza  Vaccine (HIGH DOSE) 0.5 milliLiter(s) IntraMuscular once  insulin glargine Injectable (LANTUS) 20 Unit(s) SubCutaneous at bedtime  insulin lispro (ADMELOG) corrective regimen sliding scale   SubCutaneous at bedtime  insulin lispro (ADMELOG) corrective regimen sliding scale   SubCutaneous three times a day before meals  isosorbide   mononitrate ER Tablet (IMDUR) 30 milliGRAM(s) Oral daily  losartan 25 milliGRAM(s) Oral daily  melatonin 3 milliGRAM(s) Oral at bedtime  metoprolol tartrate 50 milliGRAM(s) Oral two times a day  NIFEdipine XL 30 milliGRAM(s) Oral daily  senna 2 Tablet(s) Oral at bedtime      LABS:  10-01    140  |  104  |  14  ----------------------------<  173[H]  3.9   |  22  |  0.53    Ca    9.4      01 Oct 2024 06:54  Phos  5.0     10-01  Mg     2.00     10-01      Creatinine Trend: 0.53 <--, 0.56 <--, 0.63 <--, 0.58 <--, 0.57 <--    Phosphorus: 5.0 mg/dL (10-01 @ 06:54)                              12.9   5.77  )-----------( 261      ( 01 Oct 2024 06:54 )             40.1     Urine Studies:  Urinalysis - [10-01-24 @ 06:54]      Color  / Appearance  / SG  / pH       Gluc 173 / Ketone   / Bili  / Urobili        Blood  / Protein  / Leuk Est  / Nitrite       RBC  / WBC  / Hyaline  / Gran  / Sq Epi  / Non Sq Epi  / Bacteria       TSH 0.90      [02-22-24 @ 07:25]  Lipid: chol 140, TG 67, HDL 45, LDL --      [06-17-24 @ 07:10]        RADIOLOGY & ADDITIONAL STUDIES:

## 2024-10-01 NOTE — PROGRESS NOTE ADULT - SUBJECTIVE AND OBJECTIVE BOX
Neurology Progress Note    S: Patient seen and examined. No new events overnight. MRI today    Medications: MEDICATIONS  (STANDING):  aspirin enteric coated 81 milliGRAM(s) Oral daily  atorvastatin 40 milliGRAM(s) Oral at bedtime  carbidopa/levodopa  25/100 1 Tablet(s) Oral daily  clopidogrel Tablet 75 milliGRAM(s) Oral daily  dextrose 5%. 1000 milliLiter(s) (100 mL/Hr) IV Continuous <Continuous>  dextrose 5%. 1000 milliLiter(s) (50 mL/Hr) IV Continuous <Continuous>  dextrose 50% Injectable 25 Gram(s) IV Push once  dextrose 50% Injectable 25 Gram(s) IV Push once  dextrose 50% Injectable 12.5 Gram(s) IV Push once  doxazosin 2 milliGRAM(s) Oral at bedtime  enoxaparin Injectable 40 milliGRAM(s) SubCutaneous every 24 hours  gabapentin 100 milliGRAM(s) Oral three times a day  glucagon  Injectable 1 milliGRAM(s) IntraMuscular once  influenza  Vaccine (HIGH DOSE) 0.5 milliLiter(s) IntraMuscular once  insulin glargine Injectable (LANTUS) 20 Unit(s) SubCutaneous at bedtime  insulin lispro (ADMELOG) corrective regimen sliding scale   SubCutaneous three times a day before meals  insulin lispro (ADMELOG) corrective regimen sliding scale   SubCutaneous at bedtime  isosorbide   mononitrate ER Tablet (IMDUR) 30 milliGRAM(s) Oral daily  losartan 25 milliGRAM(s) Oral daily  melatonin 3 milliGRAM(s) Oral at bedtime  metoprolol tartrate 50 milliGRAM(s) Oral two times a day  NIFEdipine XL 30 milliGRAM(s) Oral daily  senna 2 Tablet(s) Oral at bedtime    MEDICATIONS  (PRN):  dextrose Oral Gel 15 Gram(s) Oral once PRN Blood Glucose LESS THAN 70 milliGRAM(s)/deciliter       Vitals:  Vital Signs Last 24 Hrs  T(C): 36.3 (01 Oct 2024 20:10), Max: 36.8 (01 Oct 2024 17:55)  T(F): 97.4 (01 Oct 2024 20:10), Max: 98.2 (01 Oct 2024 17:55)  HR: 81 (01 Oct 2024 20:10) (71 - 81)  BP: 173/81 (01 Oct 2024 20:10) (152/74 - 173/81)  BP(mean): --  RR: 16 (01 Oct 2024 20:10) (16 - 18)  SpO2: 97% (01 Oct 2024 20:10) (97% - 98%)    Parameters below as of 01 Oct 2024 20:10  Patient On (Oxygen Delivery Method): room air                    General Exam:   General Appearance: Appropriately dressed and in no acute distress       Head: Normocephalic, atraumatic and no dysmorphic features  Ear, Nose, and Throat: Moist mucous membranes  CVS: S1S2+  Resp: No SOB, no wheeze or rhonchi  Abd: soft NTND  Extremities: No edema, no cyanosis  Skin: No bruises, no rashes     Neurological Exam:  Mental Status: Awake, alert and oriented x 2.  Able to follow simple commands. Fluent speech. No obvious aphasia or dysarthria noted.   Cranial Nerves: PERRL, EOMI, VFFC, sensation V1-V3 intact,  no obvious facial asymmetry , hypomimia, equal elevation of palate, scm/trap 5/5, tongue is midline on protrusion.   Motor: slight inc tone throughout with resting tremor , no drift.    Sensation: Intact to light touch  Reflexes: 1+ throughout at biceps, brachioradialis, triceps, patellars and ankles bilaterally and equal. No clonus. R toe and L toe were both downgoing.  Coordination: No dysmetria on FNF   Gait: deferred    I personally reviewed the below data/images/labs:    LABS:                          12.9   5.77  )-----------( 261      ( 01 Oct 2024 06:54 )             40.1     10-01    140  |  104  |  14  ----------------------------<  173[H]  3.9   |  22  |  0.53    Ca    9.4      01 Oct 2024 06:54  Phos  5.0     10-01  Mg     2.00     10-01          Urinalysis Basic - ( 01 Oct 2024 06:54 )    Color: x / Appearance: x / SG: x / pH: x  Gluc: 173 mg/dL / Ketone: x  / Bili: x / Urobili: x   Blood: x / Protein: x / Nitrite: x   Leuk Esterase: x / RBC: x / WBC x   Sq Epi: x / Non Sq Epi: x / Bacteria: x            ACC: [id number] EXAM:  CT ANGIO BRAIN STROKE PROTC IC   ORDERED BY: BENJI DE PAZ     ACC: [id number] EXAM:  CT ANGIO NECK STROKE PROTCL IC   ORDERED BY: BENJI DE PAZ     ACC: [id number] EXAM:  CT BRAIN STROKE PROTOCOL   ORDERED BY: BENJI DE PAZ     ACC: [id number] EXAM:  CT BRAIN PERFUSION MAPS STROKE   ORDERED BY: BENJI DE PAZ     PROCEDURE DATE:  09/23/2024          INTERPRETATION:  CLINICAL INFORMATION: Stroke code. Clinical concern for   CVA.    COMPARISON: CT brain 6/17/2024. CTA examinations 2/19/24.    CONTRAST:  IV Contrast: NONE (accession [id number]), Omnipaque 350 (accession   [id number])  80 cc administered  0 cc discarded  Complications: None reported at time of study completion    TECHNIQUE: CT of the head was performed with multiplanar reformats   without IV contrast. CT perfusion and CTA of the head and neck were   performed following the intravenous administration of IV contrast. MIP   reconstructions were performed on a separate workstation and reviewed.   RAPID softwarewas utilized for perfusion analysis.    FINDINGS:    CT BRAIN:    VENTRICLES AND SULCI: Mild, age-appropriate atrophy.  INTRA-AXIAL: No evidence of intraparenchymal mass, acute hemorrhage, or   midline shift.  No definitive acute territorial infarct.Stable   appearance of mild bihemispheric white matter hypodensities; a   nonspecific finding which statistically reflects chronic microvascular   ischemic change.  EXTRA-AXIAL: No mass or fluid collection. Basal cisterns are normal in   appearance.    VISUALIZED SINUSES:  No air-fluid levels or opacification.  TYMPANOMASTOID CAVITIES: No effusion.  VISUALIZED ORBITS: Bilateral lens replacement.  CALVARIUM: No new aggressive lesion.    MISCELLANEOUS: Left greater than right temporomandibular arthrosis.    CT PERFUSION:    TECHNICAL LIMITATIONS: Motion.    CBF<30%/CORE INFARCTION: 0 mL  TMAX>6s/UNDERPERFUSED TISSUE: 0 mL  MISMATCH VOLUME/TISSUE AT RISK: 0 mL  MISMATCH RATIO: None.    MISCELLANEOUS: None.    CTA NECK:    AORTIC ARCH AND VISUALIZED GREAT VESSELS: Unremarkable with the exception   of scattered calcified plaque, most pronounced at the left subclavian   artery origin, resulting in probable mild stenosis in this region,   however suboptimally evaluated secondary to motion.    RIGHT:  COMMON CAROTID/INTERNAL CAROTID ARTERY: Again seen is circumferential   calcified plaque extending from the carotid bifurcation into the   ipsilateral internal carotid artery origin, resulting in up to severe   (approximately 70-80%) stenosis inthis region by NASCET criteria.   Aberrant medial course proximal internal carotid artery.  VERTEBRAL ARTERY: Patent and codominant.    LEFT:  COMMON CAROTID/INTERNAL CAROTID ARTERY: Again seen is a circumferential   partially calcified plaque extending from the bifurcation into the   ipsilateral internal carotid artery origin, resulting in up to moderate   (approximately 60-70%) stenosis in this region by NASCET criteria.   Aberrant medial course proximal internal carotid artery.  VERTEBRAL ARTERY: Patent and codominant.    VISUALIZED LUNGS: No suspicious upper lung mass.    MISCELLANEOUS: Approximate 3.6 cm hypodense left thyroid nodule again   noted; a finding characterized on thyroid ultrasound 2/22/2024.   Multifocal degenerative change.    CAROTID STENOSIS REFERENCE: Percent (%) stenosis is expressed in terms of   NASCET Criteria. (NASCET = 100x1-(N/D)). N=greatest narrowing. D=normal   distal diameter - MILD = <50% stenosis. - MODERATE = 50-69% stenosis. -   SEVERE = 70-89% stenosis. - HAIRLINE/CRITICAL = 90-99% stenosis. -   OCCLUDED = 100% stenosis.    CTA HEAD:    INTERNAL CAROTID ARTERIES: Again seen is bilateral calcified plaque,   resulting in up to moderate stenosis bilateral cavernous segments. No   occlusion.    CIRCLEOF GARZA: 1-2 mm aneurysm versus infundibulum emanating   posteriorly inferiorly from the terminal segment left internal carotid   artery (series 6 image [id number]).    ANTERIOR CEREBRAL ARTERIES: No significant stenosis or occlusion. 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  MIDDLE CEREBRAL ARTERIES: No significant stenosis or occlusion.  POSTERIOR CEREBRAL ARTERIES: No occlusion. Fetal origin right posterior   cerebral artery. Again seen is evidence of moderate severe focal stenosis   at the origin of the left medial occipital artery.    DISTAL VERTEBRAL / BASILAR ARTERIES: No significant stenosis or   occlusion. Again seen is calcified plaque left proximal V4 segment,   resulting in mild focal stenosis.    VENOUS STRUCTURES: No definitive filling defect to suggest thrombus, as   on the prior.    MISCELLANEOUS: No other vascular abnormality is seen.    IMPRESSION:    CT HEAD:  1. No significant change compared with 6/17/2024.  2. No evidence of acute hemorrhage, hydrocephalus or mass effect.  3. Additional findings described in detail above.    CT PERFUSION:  1. No predicted core infarct.  2. No evidence of active ischemia-tissue at risk.    CTA NECK:  1. No significant change.  2. Bilateral vertebral arteries patent and codominant.  3. Severe right and moderate left internal carotid artery origin   stenosis, as above. No occlusion or dissection.  4. Additional findings described in detail above.    CTA HEAD:  1. No significant change.  2. No large vessel occlusion.  3. 1-2 mm aneurysm versus infundibulum terminal segment left internal   carotid artery.  4. Additional findings, including multifocal stenosis and anatomic   variants, described in detail above.    If clinical symptoms persist consider further imaging with MRI, provided   there are no medical contraindications. Results of CT brain discussed   with Dr. De Paz at 3:19 PM the day of this exam.    --- End of Report ---            JANNETTE TRINH M.D., ATTENDING RADIOLOGIST  This document has been electronically signed. Sep 23 2024  4:07PM      Summary:  Normal EEG in the awake / drowsy / asleep state(s).    Clinical Impression:  There were no epileptiform abnormalities recorded.      CONCLUSIONS:      1. Right: There is moderate calcific plaque noted within the proximal right internal carotid artery, consistent with a 50-79% stenosis.   2. Left: There is mild calcific plaque noted within the proximal left internal carotid artery, consistent with a 16-49% stenosis. No hemodynamically significant stenosis.   3. Vertebral arteries: antegrade flow bilaterally.   4. Subclavian arteries: no significant atherosclerosis bilaterally.   5. Hypoechoic avascular collection likely representing a thyroid cyst noted in the left neck. Recommend dedicated thyroid ultrasound for further characterization.

## 2024-10-01 NOTE — PROGRESS NOTE ADULT - ASSESSMENT
64F w/ PMHx CAD s/p PCI, PAD s/p L popliteal angioplasty and atherectomy, CVAs (2022) w/ residual L sided weakness, R and L ICA stenosis, Parkinson's disease, HTN, HLD, T2DM, and decreased vision presenting to [facility] ED as code stroke iso /129 and not responding to questions/commands with rigid body. Patient improved to baseline while in ED and underwent CT head with non-acute findings and CTA head/neck showing no LVO but severe right and moderate left internal carotid artery origin stenosis. Vascular surgery c/f evaluation.       Rec:  - carotid duplex and CT reviewed will plan for outpatient right carotid endarterectomy with patch angioplasty, with neuromonitoring on 10/14  - please document med/cards operative risk stratification while inpatient ** > f/u CTA AP for clearance   - c/w asa/plavix  - vascular surgery to follow    C Team Surgery [id number]  Hermann Chamorro MD (PGY2)

## 2024-10-01 NOTE — PROGRESS NOTE ADULT - ASSESSMENT
4F w/ HTN, T2DM, dementia, Parkinson's Disease, stroke (2022 with residual L sided deficits), known bilateral ICA stenosis, frequent falls and gait instability, binocular catarct surgery presented with confusion and ?stiffness for which stroke code was called. Not tnk or thrombectomy candidate  Of note on aspirin and plavix at home. Previously admitted to [facility] in June 2024 for similar episode with normal EEG at that time.   prior LDL 81, A1c 9.2  Had prior R sided symptoms in Feb 2024 with diagnostic angio at that time not reflecting symptomatic carotid stenosis   o/e AAOx2, hypomimia, L hemiparesis, tremor and inc tone   routine eeg neg     Parkinsons  Prior stroke  R ICA stenosis - ?symptomatic  R/o seizure     - continue aspirin and statin for secondary stroke prevention  - ok to continue plavix for now  - MRI brain w/o contrast to determine if carotid stenosis symptomatic pending for today  - carotid duplex with R ICA 50-79% stenosis, L ICA < 50%   - vasc surgery following, no plans for inpatient revasc  - routine eeg neg  - continue home sinemet dose  - gradual normotension  - delirium precautions  - seizure and fall precautions  - pt/ot/slp  - dvt ppx    Viki Gray DO  Vascular Neurology  Office [phone]

## 2024-10-01 NOTE — PROGRESS NOTE ADULT - ASSESSMENT
63 y/o female w/ PMHx CAD s/p PCI, PAD s/p L popliteal angioplasty and atherectomy, CVAs (2022) w/ residual L sided weakness, R and L ICA stenosis, Parkinson's disease, HTN, HLD, T2DM, and decreased vision presenting to [facility] ED as code stroke with hypertensive urgency. Nephrology consulted to r/o renal stenosis.    A/P:  Renal Stenosis:  Outpatient renal doppler (8/2024) suggesting renal stenosis.  Pending CTA A/P angio  BP labile.  Normal renal function.  S/p CTA head and neck 09/23.  Monitor for DEVYN.  Monitor BMP and UO.  Cardiology and vascular following.    HTN:  better  C/W losartan 25mg qd, isosorbide 30mg qd, metoprolol tart 50mg BID, and nifedipine 30mg qd.  Up titrate losartan as needed.  r/o KODY  Low salt diet.  Monitor BP.    Hyperphosphatemia:  Marginal.  Low PO4 diet.  Monitor PO4.

## 2024-10-01 NOTE — PROGRESS NOTE ADULT - SUBJECTIVE AND OBJECTIVE BOX
General Surgery Progress Note    Overnight: ELENITA  Subjective: Patient seen and examined on rounds.    Objective:  Vitals:  T(C): 36.6 (10-01-24 @ 04:30), Max: 36.8 (09-30-24 @ 20:22)  HR: 73 (10-01-24 @ 04:30) (73 - 94)  BP: 171/90 (10-01-24 @ 04:30) (149/94 - 182/90)  RR: 18 (10-01-24 @ 04:30) (18 - 18)  SpO2: 98% (10-01-24 @ 04:30) (97% - 99%)  Wt(kg): --    09-30 @ 07:01  -  10-01 @ 07:00  --------------------------------------------------------  IN:  Total IN: 0 mL    OUT:    Voided (mL): 950 mL  Total OUT: 950 mL    Total NET: -950 mL          Physical Exam:  General Appearance: no acute distress, NTND   Neuro: no facial asymmetry, no tongue deviation, moving b/l UE and LE spontaneously with LS weakeness appreciated c/w previous stroke deficits  Chest: airway intact, non-labored breathing  CV: no JVD, palpable pulses b/l  Abdomen: soft, non-tender, non-distended, +BS   Extremities: WWP      Labs:                        12.9   5.77  )-----------( 261      ( 01 Oct 2024 06:54 )             40.1     10-01    140  |  104  |  14  ----------------------------<  173[H]  3.9   |  22  |  0.53    Ca    9.4      01 Oct 2024 06:54  Phos  5.0     10-01  Mg     2.00     10-01          Urinalysis Basic - ( 01 Oct 2024 06:54 )    Color: x / Appearance: x / SG: x / pH: x  Gluc: 173 mg/dL / Ketone: x  / Bili: x / Urobili: x   Blood: x / Protein: x / Nitrite: x   Leuk Esterase: x / RBC: x / WBC x   Sq Epi: x / Non Sq Epi: x / Bacteria: x

## 2024-10-01 NOTE — PROGRESS NOTE ADULT - SUBJECTIVE AND OBJECTIVE BOX
Johnathan Tejeda MD  Interventional Cardiology / Endovascular Specialist  [place] Office : [phone] [place] [address] [place] [address]  Tel:   [place] Office : [address] [facility] [place] [address]  Tel: [phone]  Cell : [phone]     Pt seen and examined at bedside, not in distress  	  MEDICATIONS:  aspirin enteric coated 81 milliGRAM(s) Oral daily  clopidogrel Tablet 75 milliGRAM(s) Oral daily  doxazosin 2 milliGRAM(s) Oral at bedtime  enoxaparin Injectable 40 milliGRAM(s) SubCutaneous every 24 hours  isosorbide   mononitrate ER Tablet (IMDUR) 30 milliGRAM(s) Oral daily  losartan 25 milliGRAM(s) Oral daily  metoprolol tartrate 50 milliGRAM(s) Oral two times a day  NIFEdipine XL 30 milliGRAM(s) Oral daily        carbidopa/levodopa  25/100 1 Tablet(s) Oral daily  gabapentin 100 milliGRAM(s) Oral three times a day  LORazepam     Tablet 0.5 milliGRAM(s) Oral once PRN  melatonin 3 milliGRAM(s) Oral at bedtime    senna 2 Tablet(s) Oral at bedtime    atorvastatin 40 milliGRAM(s) Oral at bedtime  dextrose 50% Injectable 25 Gram(s) IV Push once  dextrose 50% Injectable 25 Gram(s) IV Push once  dextrose 50% Injectable 12.5 Gram(s) IV Push once  dextrose Oral Gel 15 Gram(s) Oral once PRN  glucagon  Injectable 1 milliGRAM(s) IntraMuscular once  insulin glargine Injectable (LANTUS) 20 Unit(s) SubCutaneous at bedtime  insulin lispro (ADMELOG) corrective regimen sliding scale   SubCutaneous three times a day before meals  insulin lispro (ADMELOG) corrective regimen sliding scale   SubCutaneous at bedtime    dextrose 5%. 1000 milliLiter(s) IV Continuous <Continuous>  dextrose 5%. 1000 milliLiter(s) IV Continuous <Continuous>  influenza  Vaccine (HIGH DOSE) 0.5 milliLiter(s) IntraMuscular once      PAST MEDICAL/SURGICAL HISTORY  PAST MEDICAL & SURGICAL HISTORY:  HTN (hypertension)      HLD (hyperlipidemia)      CAD (coronary artery disease)      Type II diabetes mellitus      PAD (peripheral artery disease)      Mild dementia      Parkinson disease      S/P hysterectomy          SOCIAL HISTORY: Substance Use (street drugs): ( x ) never used  (  ) other:    FAMILY HISTORY:         PHYSICAL EXAM:  T(C): 36.4 (10-01-24 @ 12:23), Max: 36.8 (09-30-24 @ 20:22)  HR: 71 (10-01-24 @ 12:23) (71 - 92)  BP: 152/74 (10-01-24 @ 12:23) (152/74 - 182/90)  RR: 17 (10-01-24 @ 12:23) (17 - 18)  SpO2: 97% (10-01-24 @ 12:23) (97% - 99%)  Wt(kg): --  I&O's Summary    30 Sep 2024 07:01  -  01 Oct 2024 07:00  --------------------------------------------------------  IN: 0 mL / OUT: 950 mL / NET: -950 mL          GENERAL: NAD  EYES:   PERRLA   ENMT:   Moist mucous membranes, Good dentition, No lesions  Cardiovascular: Normal S1 S2, No JVD, No murmurs, No edema  Respiratory: Lungs clear to auscultation	  Gastrointestinal:  Soft, Non-tender, + BS	  Extremities: no edema                                    12.9   5.77  )-----------( 261      ( 01 Oct 2024 06:54 )             40.1     10-01    140  |  104  |  14  ----------------------------<  173[H]  3.9   |  22  |  0.53    Ca    9.4      01 Oct 2024 06:54  Phos  5.0     10-01  Mg     2.00     10-01      proBNP:   Lipid Profile:   HgA1c:   TSH:     Consultant(s) Notes Reviewed:  [x ] YES  [ ] NO    Care Discussed with Consultants/Other Providers [ x] YES  [ ] NO    Imaging Personally Reviewed independently:  [x] YES  [ ] NO    All labs, radiologic studies, vitals, orders and medications list reviewed. Patient is seen and examined at bedside. Case discussed with medical team.

## 2024-10-01 NOTE — PROGRESS NOTE ADULT - SUBJECTIVE AND OBJECTIVE BOX
Name of Patient : ALYSE AVENDAÑO  MRN: [id number]  Date of visit: 10-01-24       Subjective: Patient seen and examined. No new events except as noted.   doing okay     REVIEW OF SYSTEMS:    CONSTITUTIONAL: No weakness, fevers or chills  EYES/ENT: No visual changes;  No vertigo or throat pain   NECK: No pain or stiffness  RESPIRATORY: No cough, wheezing, hemoptysis; No shortness of breath  CARDIOVASCULAR: No chest pain or palpitations  GASTROINTESTINAL: No abdominal or epigastric pain. No nausea, vomiting, or hematemesis; No diarrhea or constipation. No melena or hematochezia.  GENITOURINARY: No dysuria, frequency or hematuria  NEUROLOGICAL: No numbness or weakness  SKIN: No itching, burning, rashes, or lesions   All other review of systems is negative unless indicated above.    MEDICATIONS:  MEDICATIONS  (STANDING):  aspirin enteric coated 81 milliGRAM(s) Oral daily  atorvastatin 40 milliGRAM(s) Oral at bedtime  carbidopa/levodopa  25/100 1 Tablet(s) Oral daily  clopidogrel Tablet 75 milliGRAM(s) Oral daily  dextrose 5%. 1000 milliLiter(s) (50 mL/Hr) IV Continuous <Continuous>  dextrose 5%. 1000 milliLiter(s) (100 mL/Hr) IV Continuous <Continuous>  dextrose 50% Injectable 25 Gram(s) IV Push once  dextrose 50% Injectable 25 Gram(s) IV Push once  dextrose 50% Injectable 12.5 Gram(s) IV Push once  doxazosin 2 milliGRAM(s) Oral at bedtime  enoxaparin Injectable 40 milliGRAM(s) SubCutaneous every 24 hours  gabapentin 100 milliGRAM(s) Oral three times a day  glucagon  Injectable 1 milliGRAM(s) IntraMuscular once  influenza  Vaccine (HIGH DOSE) 0.5 milliLiter(s) IntraMuscular once  insulin glargine Injectable (LANTUS) 20 Unit(s) SubCutaneous at bedtime  insulin lispro (ADMELOG) corrective regimen sliding scale   SubCutaneous at bedtime  insulin lispro (ADMELOG) corrective regimen sliding scale   SubCutaneous three times a day before meals  isosorbide   mononitrate ER Tablet (IMDUR) 30 milliGRAM(s) Oral daily  losartan 25 milliGRAM(s) Oral daily  melatonin 3 milliGRAM(s) Oral at bedtime  metoprolol tartrate 50 milliGRAM(s) Oral two times a day  NIFEdipine XL 30 milliGRAM(s) Oral daily  senna 2 Tablet(s) Oral at bedtime      PHYSICAL EXAM:  T(C): 36.4 (10-01-24 @ 12:23), Max: 36.8 (09-30-24 @ 20:22)  HR: 71 (10-01-24 @ 12:23) (71 - 73)  BP: 152/74 (10-01-24 @ 12:23) (152/74 - 171/90)  RR: 17 (10-01-24 @ 12:23) (17 - 18)  SpO2: 97% (10-01-24 @ 12:23) (97% - 98%)  Wt(kg): --  I&O's Summary    30 Sep 2024 07:01  -  01 Oct 2024 07:00  --------------------------------------------------------  IN: 0 mL / OUT: 950 mL / NET: -950 mL          Appearance: Normal	  HEENT:  PERRLA   Lymphatic: No lymphadenopathy   Cardiovascular: Normal S1 S2, no JVD  Respiratory: normal effort , clear  Gastrointestinal:  Soft, Non-tender  Skin: No rashes,  warm to touch  Psychiatry:  Mood & affect appropriate  Musculuskeletal: No edema    recent labs, Imaging and EKGs personally reviewed     09-30-24 @ 07:01  -  10-01-24 @ 07:00  --------------------------------------------------------  IN: 0 mL / OUT: 950 mL / NET: -950 mL                          12.9   5.77  )-----------( 261      ( 01 Oct 2024 06:54 )             40.1               10-01    140  |  104  |  14  ----------------------------<  173[H]  3.9   |  22  |  0.53    Ca    9.4      01 Oct 2024 06:54  Phos  5.0     10-01  Mg     2.00     10-01                         Urinalysis Basic - ( 01 Oct 2024 06:54 )    Color: x / Appearance: x / SG: x / pH: x  Gluc: 173 mg/dL / Ketone: x  / Bili: x / Urobili: x   Blood: x / Protein: x / Nitrite: x   Leuk Esterase: x / RBC: x / WBC x   Sq Epi: x / Non Sq Epi: x / Bacteria: x

## 2024-10-01 NOTE — PROGRESS NOTE ADULT - ASSESSMENT
EKG NSR 88  TTE 2/20/2024    1. Agitated saline injection was negative for intracardiac shunt.   2. Left ventricular cavity is normal in size. Left ventricular wall thickness is normal. Left ventricular systolic function is hyperdynamic. There are no regional wall motion abnormalities seen.   3. Normal left ventricular diastolic function, with normal filling pressure.   4. Normal right ventricular cavity size, with wall thickness, and normal systolic function.   5. Normal left and right atrial size.   6. No significant valvular disease.   7. No pericardial effusion seen.    NST 5/3/2024  1. Normal myocardial perfusion scan, with no evidence of infarction or inducible ischemia.   2. Stress electrocardiogram: No ischemic ST segment changes.   3. Normal left ventricular regional wall motion.   4. The left ventricle is normal in function and normal in size. Normal left ventricular diastolic function. The post stress end diastolic volume is 59 ml and systolic volume is 13 ml.   5. The left ventricle is normal in function and normal in size. Normal left ventricular diastolic function.   6. Normal right ventricular function. There is no right ventricular dilation. RVEF = 48%, RVEDV = 93 mL, RVESV = 49 mL.    Cerebral angiogram 2/22/2024 There is atherosclerotic plaque at the right carotid bifurcation   which causes 45% stenosis at the origin of the right cervical internal carotid artery, There is atherosclerotic   plaque at the left carotid bifurcation which causes 33% stenosis at the origin of the left cervical internal carotid artery. Diagnostic cerebral angiogram demonstrating mild left and moderate right cervical internal carotid artery atherosclerotic stenosis        A/P    Patient is a 63 year old female with a PMHx of  HTN, DM, HLD, CAD (s/p 3 stents placed at [facility] in 2014 to the prox LAD and then in 2022 - on ASA and Plavix), PAD s/p L popliteal angioplasty and atherectomy 12/23, R ICA and L ICA stenosis, prior stroke with L side weakness, Parkinson's disease, B/L cataract surgery with blurred vision at baseline who presents with unresponsiveness      1. Cardiac risk stratification  - vascular recs appreciated, CTA head/neck showing no LVO but severe right and moderate left internal carotid artery origin stenosis. planning on outpt surgery  - no CP/SOB, euvolemic on exam  - EKG ok, Echo ok, NST ok  - BP labile, on Imdur 30mg, losartan 25, lopressor 50 BID, nifedipine 30  - renal artery doppler 8/2024(outpt) suggesting renal artery stenosis, pending abd CTA to r/o renal artery stenosis      2. AMS  - Pt is responsive now, able to move all extremities  - CTA   NECK: Severe right and moderate left internal carotid artery origin stenosis, as above. No occlusion or dissection.   HEAD: No significant change. No large vessel occlusion. 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   - Cerebral angiogram 2/22/2024 There is atherosclerotic plaque at the right carotid bifurcation   which causes 45% stenosis at the origin of the right cervical internal carotid artery, There is atherosclerotic   plaque at the left carotid bifurcation which causes 33% stenosis at the origin of the left cervical internal carotid artery. Diagnostic cerebral angiogram demonstrating mild left and moderate right cervical internal carotid artery atherosclerotic stenosis  - f/u neuro recs, f/u vascular recs  - home meds ASA, plavix, lipitor 40mg    3. Carotid artery stenosis  - CTA shows  NECK: Severe right and moderate left internal carotid artery origin stenosis, as above. No occlusion or dissection.    HEAD: No significant change. No large vessel occlusion. 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  - Cerebral angiogram 2/22/2024 There is atherosclerotic plaque at the right carotid bifurcation   which causes 45% stenosis at the origin of the right cervical internal carotid artery, There is atherosclerotic   plaque at the left carotid bifurcation which causes 33% stenosis at the origin of the left cervical internal carotid artery. Diagnostic cerebral angiogram demonstrating mild left and moderate right cervical internal carotid artery atherosclerotic stenosis.  - pt f/u with outpt neurologist  - carotid doppler Rt pICA moderate, Lt pICA mild   - vascular recs appreciated, CTA head/neck showing no LVO but severe right and moderate left internal carotid artery origin stenosis. planning on outpt surgery  - home meds ASA, plavix, lipitor 40mg      4. Hypertension  -c/w nifedipine 30 mg QD, lopressor 50mg BID, Losartan 25mg QD, imdur 30 mg QD  - renal artery doppler 8/2024(outpt) suggesting renal artery stenosis, pending abd CTA to r/o renal artery stenosis    5. CAD s/p stents  - s/p 3 stents placed at [facility] in 2014 to the prox LAD and then in 2022  - TTE with preserved EF and no WMA  - c/w ASA, Plavix and statin    6. HLD  - c/w atorvastatin 80mg PO daily    7. hx of palpitations s/p Falls   - TTE with preserved EF and no WMA  -s/p loop monitor , no arrhythmias on interrogation   EKG NSR 88  TTE 2/20/2024    1. Agitated saline injection was negative for intracardiac shunt.   2. Left ventricular cavity is normal in size. Left ventricular wall thickness is normal. Left ventricular systolic function is hyperdynamic. There are no regional wall motion abnormalities seen.   3. Normal left ventricular diastolic function, with normal filling pressure.   4. Normal right ventricular cavity size, with wall thickness, and normal systolic function.   5. Normal left and right atrial size.   6. No significant valvular disease.   7. No pericardial effusion seen.    NST 5/3/2024  1. Normal myocardial perfusion scan, with no evidence of infarction or inducible ischemia.   2. Stress electrocardiogram: No ischemic ST segment changes.   3. Normal left ventricular regional wall motion.   4. The left ventricle is normal in function and normal in size. Normal left ventricular diastolic function. The post stress end diastolic volume is 59 ml and systolic volume is 13 ml.   5. The left ventricle is normal in function and normal in size. Normal left ventricular diastolic function.   6. Normal right ventricular function. There is no right ventricular dilation. RVEF = 48%, RVEDV = 93 mL, RVESV = 49 mL.    Cerebral angiogram 2/22/2024 There is atherosclerotic plaque at the right carotid bifurcation   which causes 45% stenosis at the origin of the right cervical internal carotid artery, There is atherosclerotic   plaque at the left carotid bifurcation which causes 33% stenosis at the origin of the left cervical internal carotid artery. Diagnostic cerebral angiogram demonstrating mild left and moderate right cervical internal carotid artery atherosclerotic stenosis        A/P    Patient is a 63 year old female with a PMHx of  HTN, DM, HLD, CAD (s/p 3 stents placed at [facility] in 2014 to the prox LAD and then in 2022 - on ASA and Plavix), PAD s/p L popliteal angioplasty and atherectomy 12/23, R ICA and L ICA stenosis, prior stroke with L side weakness, Parkinson's disease, B/L cataract surgery with blurred vision at baseline who presents with unresponsiveness      1. Cardiac risk stratification  - vascular recs appreciated, CTA head/neck showing no LVO but severe right and moderate left internal carotid artery origin stenosis. planning on outpt surgery  - no CP/SOB, euvolemic on exam  - EKG ok, Echo ok, NST ok  - BP labile, on Imdur 30mg, losartan 25, lopressor 50 BID, nifedipine 30  - renal artery doppler 8/2024(outpt) suggesting renal artery stenosis,  abd CTA 10/1/2024 show no evidence of renal artery stenosis  - Pt is optimized from cardiac standpoint, RCRI score=3, Class IV risk, 15% MACE risk  - can titrate up Nifedipine to 60mg for better BP control        2. AMS  - Pt is responsive now, able to move all extremities  - CTA   NECK: Severe right and moderate left internal carotid artery origin stenosis, as above. No occlusion or dissection.   HEAD: No significant change. No large vessel occlusion. 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   - Cerebral angiogram 2/22/2024 There is atherosclerotic plaque at the right carotid bifurcation   which causes 45% stenosis at the origin of the right cervical internal carotid artery, There is atherosclerotic   plaque at the left carotid bifurcation which causes 33% stenosis at the origin of the left cervical internal carotid artery. Diagnostic cerebral angiogram demonstrating mild left and moderate right cervical internal carotid artery atherosclerotic stenosis  - f/u neuro recs, f/u vascular recs  - home meds ASA, plavix, lipitor 40mg    3. Carotid artery stenosis  - CTA shows  NECK: Severe right and moderate left internal carotid artery origin stenosis, as above. No occlusion or dissection.    HEAD: No significant change. No large vessel occlusion. 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  - Cerebral angiogram 2/22/2024 There is atherosclerotic plaque at the right carotid bifurcation   which causes 45% stenosis at the origin of the right cervical internal carotid artery, There is atherosclerotic   plaque at the left carotid bifurcation which causes 33% stenosis at the origin of the left cervical internal carotid artery. Diagnostic cerebral angiogram demonstrating mild left and moderate right cervical internal carotid artery atherosclerotic stenosis.  - pt f/u with outpt neurologist  - carotid doppler Rt pICA moderate, Lt pICA mild   - vascular recs appreciated, CTA head/neck showing no LVO but severe right and moderate left internal carotid artery origin stenosis. planning on outpt surgery  - home meds ASA, plavix, lipitor 40mg      4. Hypertension  -c/w nifedipine 30 mg QD, lopressor 50mg BID, Losartan 25mg QD, imdur 30 mg QD  - renal artery doppler 8/2024(outpt) suggesting renal artery stenosis,  abd CTA 10/1/2024 show no evidence of renal artery stenosis  - can titrate up Nifedipine to 60mg for better BP control    5. CAD s/p stents  - s/p 3 stents placed at [facility] in 2014 to the prox LAD and then in 2022  - TTE with preserved EF and no WMA  - c/w ASA, Plavix and statin    6. HLD  - c/w atorvastatin 80mg PO daily    7. hx of palpitations s/p Falls   - TTE with preserved EF and no WMA  -s/p loop monitor , no arrhythmias on interrogation

## 2024-10-02 LAB
GLUCOSE BLDC GLUCOMTR-MCNC: 198 MG/DL — HIGH (ref 70–99)
GLUCOSE BLDC GLUCOMTR-MCNC: 205 MG/DL — HIGH (ref 70–99)
GLUCOSE BLDC GLUCOMTR-MCNC: 242 MG/DL — HIGH (ref 70–99)
GLUCOSE BLDC GLUCOMTR-MCNC: 262 MG/DL — HIGH (ref 70–99)
PA ADP PRP-ACNC: 222 PRU — SIGNIFICANT CHANGE UP (ref 182–335)

## 2024-10-02 PROCEDURE — 70450 CT HEAD/BRAIN W/O DYE: CPT | Mod: 26

## 2024-10-02 RX ADMIN — Medication 3 MILLIGRAM(S): at 22:48

## 2024-10-02 RX ADMIN — Medication 81 MILLIGRAM(S): at 13:15

## 2024-10-02 RX ADMIN — Medication 50 MILLIGRAM(S): at 17:37

## 2024-10-02 RX ADMIN — ATORVASTATIN CALCIUM 40 MILLIGRAM(S): 10 TABLET, FILM COATED ORAL at 22:47

## 2024-10-02 RX ADMIN — Medication 1 TABLET(S): at 13:15

## 2024-10-02 RX ADMIN — LOSARTAN POTASSIUM 25 MILLIGRAM(S): 100 TABLET, FILM COATED ORAL at 05:31

## 2024-10-02 RX ADMIN — Medication 30 MILLIGRAM(S): at 05:31

## 2024-10-02 RX ADMIN — GABAPENTIN 100 MILLIGRAM(S): 800 TABLET, FILM COATED ORAL at 22:47

## 2024-10-02 RX ADMIN — Medication 30 MILLIGRAM(S): at 13:15

## 2024-10-02 RX ADMIN — Medication 2 TABLET(S): at 22:48

## 2024-10-02 RX ADMIN — Medication 3: at 13:21

## 2024-10-02 RX ADMIN — INSULIN GLARGINE 20 UNIT(S): 300 INJECTION, SOLUTION SUBCUTANEOUS at 22:47

## 2024-10-02 RX ADMIN — GABAPENTIN 100 MILLIGRAM(S): 800 TABLET, FILM COATED ORAL at 05:30

## 2024-10-02 RX ADMIN — Medication 50 MILLIGRAM(S): at 05:31

## 2024-10-02 RX ADMIN — Medication 1: at 17:37

## 2024-10-02 RX ADMIN — Medication 2: at 09:06

## 2024-10-02 RX ADMIN — Medication 75 MILLIGRAM(S): at 13:15

## 2024-10-02 RX ADMIN — DOXAZOSIN 2 MILLIGRAM(S): 2 TABLET ORAL at 22:48

## 2024-10-02 RX ADMIN — GABAPENTIN 100 MILLIGRAM(S): 800 TABLET, FILM COATED ORAL at 13:14

## 2024-10-02 NOTE — PROGRESS NOTE ADULT - ASSESSMENT
4F w/ HTN, T2DM, dementia, Parkinson's Disease, stroke (2022 with residual L sided deficits), known bilateral ICA stenosis, frequent falls and gait instability, binocular catarct surgery presented with confusion and ?stiffness for which stroke code was called. Not tnk or thrombectomy candidate  Of note on aspirin and plavix at home. Previously admitted to [facility] in June 2024 for similar episode with normal EEG at that time.   prior LDL 81, A1c 9.2  Had prior R sided symptoms in Feb 2024 with diagnostic angio at that time not reflecting symptomatic carotid stenosis   o/e AAOx2, hypomimia, L hemiparesis, tremor and inc tone   routine eeg neg     Acute R BG infarct  Parkinsons  Prior stroke  R ICA stenosis   R/o seizure     - continue aspirin and statin for secondary stroke prevention  - ok to continue plavix for now - would check P2y12 and if subtherapeutic would switch to ticagrelor if ok with vascular surgery depending on timing for CEA  - MRI brain w/o reviewed  - carotid duplex with R ICA 50-79% stenosis, L ICA < 50%   - vasc surgery following, no plans for inpatient revasc - plan to do as outpatient  - routine eeg neg  - continue home sinemet dose  - gradual normotension, avoid hypotension due to ICA stenosis. -160  - delirium precautions  - seizure and fall precautions  - pt/ot/slp  - dvt ppx    Viki Gray DO  Vascular Neurology  Office [phone]

## 2024-10-02 NOTE — PROGRESS NOTE ADULT - SUBJECTIVE AND OBJECTIVE BOX
Neurology Progress Note    S: Patient seen and examined. No new events overnight. MRI with small R posterior limb IC infarct    Medications: MEDICATIONS  (STANDING):  aspirin enteric coated 81 milliGRAM(s) Oral daily  atorvastatin 40 milliGRAM(s) Oral at bedtime  carbidopa/levodopa  25/100 1 Tablet(s) Oral daily  clopidogrel Tablet 75 milliGRAM(s) Oral daily  dextrose 5%. 1000 milliLiter(s) (50 mL/Hr) IV Continuous <Continuous>  dextrose 5%. 1000 milliLiter(s) (100 mL/Hr) IV Continuous <Continuous>  dextrose 50% Injectable 25 Gram(s) IV Push once  dextrose 50% Injectable 25 Gram(s) IV Push once  dextrose 50% Injectable 12.5 Gram(s) IV Push once  doxazosin 2 milliGRAM(s) Oral at bedtime  enoxaparin Injectable 40 milliGRAM(s) SubCutaneous every 24 hours  gabapentin 100 milliGRAM(s) Oral three times a day  glucagon  Injectable 1 milliGRAM(s) IntraMuscular once  influenza  Vaccine (HIGH DOSE) 0.5 milliLiter(s) IntraMuscular once  insulin glargine Injectable (LANTUS) 20 Unit(s) SubCutaneous at bedtime  insulin lispro (ADMELOG) corrective regimen sliding scale   SubCutaneous three times a day before meals  insulin lispro (ADMELOG) corrective regimen sliding scale   SubCutaneous at bedtime  isosorbide   mononitrate ER Tablet (IMDUR) 30 milliGRAM(s) Oral daily  losartan 25 milliGRAM(s) Oral daily  melatonin 3 milliGRAM(s) Oral at bedtime  metoprolol tartrate 50 milliGRAM(s) Oral two times a day  NIFEdipine XL 30 milliGRAM(s) Oral daily  senna 2 Tablet(s) Oral at bedtime    MEDICATIONS  (PRN):  dextrose Oral Gel 15 Gram(s) Oral once PRN Blood Glucose LESS THAN 70 milliGRAM(s)/deciliter       Vitals:  Vital Signs Last 24 Hrs  T(C): 36.1 (02 Oct 2024 08:53), Max: 36.8 (01 Oct 2024 17:55)  T(F): 96.9 (02 Oct 2024 08:53), Max: 98.2 (01 Oct 2024 17:55)  HR: 64 (02 Oct 2024 08:53) (64 - 84)  BP: 158/78 (02 Oct 2024 08:53) (149/77 - 178/90)  BP(mean): --  RR: 16 (02 Oct 2024 08:53) (16 - 18)  SpO2: 98% (02 Oct 2024 08:53) (95% - 100%)    Parameters below as of 02 Oct 2024 08:53  Patient On (Oxygen Delivery Method): room air                  General Exam:   General Appearance: Appropriately dressed and in no acute distress       Head: Normocephalic, atraumatic and no dysmorphic features  Ear, Nose, and Throat: Moist mucous membranes  CVS: S1S2+  Resp: No SOB, no wheeze or rhonchi  Abd: soft NTND  Extremities: No edema, no cyanosis  Skin: No bruises, no rashes     Neurological Exam:  Mental Status: Awake, alert and oriented x 2.  Able to follow simple commands. Fluent speech. No obvious aphasia or dysarthria noted.   Cranial Nerves: PERRL, EOMI, VFFC, sensation V1-V3 intact,  no obvious facial asymmetry , hypomimia, equal elevation of palate, scm/trap 5/5, tongue is midline on protrusion.   Motor: slight inc tone throughout with resting tremor , subtle LUE drift   Sensation: Intact to light touch  Reflexes: 1+ throughout at biceps, brachioradialis, triceps, patellars and ankles bilaterally and equal. No clonus. R toe and L toe were both downgoing.  Coordination: No dysmetria on FNF   Gait: deferred    I personally reviewed the below data/images/labs:    LABS:                          12.9   5.77  )-----------( 261      ( 01 Oct 2024 06:54 )             40.1     10-01    140  |  104  |  14  ----------------------------<  173[H]  3.9   |  22  |  0.53    Ca    9.4      01 Oct 2024 06:54  Phos  5.0     10-01  Mg     2.00     10-01          Urinalysis Basic - ( 01 Oct 2024 06:54 )    Color: x / Appearance: x / SG: x / pH: x  Gluc: 173 mg/dL / Ketone: x  / Bili: x / Urobili: x   Blood: x / Protein: x / Nitrite: x   Leuk Esterase: x / RBC: x / WBC x   Sq Epi: x / Non Sq Epi: x / Bacteria: x                ACC: [id number] EXAM:  CT ANGIO BRAIN STROKE PROTC IC   ORDERED BY: BENJI DE PAZ     ACC: [id number] EXAM:  CT ANGIO NECK STROKE PROTCL IC   ORDERED BY: BENJI DE PAZ     ACC: [id number] EXAM:  CT BRAIN STROKE PROTOCOL   ORDERED BY: BENJI DE PAZ     ACC: [id number] EXAM:  CT BRAIN PERFUSION MAPS STROKE   ORDERED BY: BENJI DE PAZ     PROCEDURE DATE:  09/23/2024          INTERPRETATION:  CLINICAL INFORMATION: Stroke code. Clinical concern for   CVA.    COMPARISON: CT brain 6/17/2024. CTA examinations 2/19/24.    CONTRAST:  IV Contrast: NONE (accession [id number]), Omnipaque 350 (accession   [id number])  80 cc administered  0 cc discarded  Complications: None reported at time of study completion    TECHNIQUE: CT of the head was performed with multiplanar reformats   without IV contrast. CT perfusion and CTA of the head and neck were   performed following the intravenous administration of IV contrast. MIP   reconstructions were performed on a separate workstation and reviewed.   RAPID softwarewas utilized for perfusion analysis.    FINDINGS:    CT BRAIN:    VENTRICLES AND SULCI: Mild, age-appropriate atrophy.  INTRA-AXIAL: No evidence of intraparenchymal mass, acute hemorrhage, or   midline shift.  No definitive acute territorial infarct.Stable   appearance of mild bihemispheric white matter hypodensities; a   nonspecific finding which statistically reflects chronic microvascular   ischemic change.  EXTRA-AXIAL: No mass or fluid collection. Basal cisterns are normal in   appearance.    VISUALIZED SINUSES:  No air-fluid levels or opacification.  TYMPANOMASTOID CAVITIES: No effusion.  VISUALIZED ORBITS: Bilateral lens replacement.  CALVARIUM: No new aggressive lesion.    MISCELLANEOUS: Left greater than right temporomandibular arthrosis.    CT PERFUSION:    TECHNICAL LIMITATIONS: Motion.    CBF<30%/CORE INFARCTION: 0 mL  TMAX>6s/UNDERPERFUSED TISSUE: 0 mL  MISMATCH VOLUME/TISSUE AT RISK: 0 mL  MISMATCH RATIO: None.    MISCELLANEOUS: None.    CTA NECK:    AORTIC ARCH AND VISUALIZED GREAT VESSELS: Unremarkable with the exception   of scattered calcified plaque, most pronounced at the left subclavian   artery origin, resulting in probable mild stenosis in this region,   however suboptimally evaluated secondary to motion.    RIGHT:  COMMON CAROTID/INTERNAL CAROTID ARTERY: Again seen is circumferential   calcified plaque extending from the carotid bifurcation into the   ipsilateral internal carotid artery origin, resulting in up to severe   (approximately 70-80%) stenosis inthis region by NASCET criteria.   Aberrant medial course proximal internal carotid artery.  VERTEBRAL ARTERY: Patent and codominant.    LEFT:  COMMON CAROTID/INTERNAL CAROTID ARTERY: Again seen is a circumferential   partially calcified plaque extending from the bifurcation into the   ipsilateral internal carotid artery origin, resulting in up to moderate   (approximately 60-70%) stenosis in this region by NASCET criteria.   Aberrant medial course proximal internal carotid artery.  VERTEBRAL ARTERY: Patent and codominant.    VISUALIZED LUNGS: No suspicious upper lung mass.    MISCELLANEOUS: Approximate 3.6 cm hypodense left thyroid nodule again   noted; a finding characterized on thyroid ultrasound 2/22/2024.   Multifocal degenerative change.    CAROTID STENOSIS REFERENCE: Percent (%) stenosis is expressed in terms of   NASCET Criteria. (NASCET = 100x1-(N/D)). N=greatest narrowing. D=normal   distal diameter - MILD = <50% stenosis. - MODERATE = 50-69% stenosis. -   SEVERE = 70-89% stenosis. - HAIRLINE/CRITICAL = 90-99% stenosis. -   OCCLUDED = 100% stenosis.    CTA HEAD:    INTERNAL CAROTID ARTERIES: Again seen is bilateral calcified plaque,   resulting in up to moderate stenosis bilateral cavernous segments. No   occlusion.    CIRCLEOF GARZA: 1-2 mm aneurysm versus infundibulum emanating   posteriorly inferiorly from the terminal segment left internal carotid   artery (series 6 image [id number]).    ANTERIOR CEREBRAL ARTERIES: No significant stenosis or occlusion. 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  MIDDLE CEREBRAL ARTERIES: No significant stenosis or occlusion.  POSTERIOR CEREBRAL ARTERIES: No occlusion. Fetal origin right posterior   cerebral artery. Again seen is evidence of moderate severe focal stenosis   at the origin of the left medial occipital artery.    DISTAL VERTEBRAL / BASILAR ARTERIES: No significant stenosis or   occlusion. Again seen is calcified plaque left proximal V4 segment,   resulting in mild focal stenosis.    VENOUS STRUCTURES: No definitive filling defect to suggest thrombus, as   on the prior.    MISCELLANEOUS: No other vascular abnormality is seen.    IMPRESSION:    CT HEAD:  1. No significant change compared with 6/17/2024.  2. No evidence of acute hemorrhage, hydrocephalus or mass effect.  3. Additional findings described in detail above.    CT PERFUSION:  1. No predicted core infarct.  2. No evidence of active ischemia-tissue at risk.    CTA NECK:  1. No significant change.  2. Bilateral vertebral arteries patent and codominant.  3. Severe right and moderate left internal carotid artery origin   stenosis, as above. No occlusion or dissection.  4. Additional findings described in detail above.    CTA HEAD:  1. No significant change.  2. No large vessel occlusion.  3. 1-2 mm aneurysm versus infundibulum terminal segment left internal   carotid artery.  4. Additional findings, including multifocal stenosis and anatomic   variants, described in detail above.    If clinical symptoms persist consider further imaging with MRI, provided   there are no medical contraindications. Results of CT brain discussed   with Dr. De Paz at 3:19 PM the day of this exam.    --- End of Report ---            JANNETTE TRINH M.D., ATTENDING RADIOLOGIST  This document has been electronically signed. Sep 23 2024  4:07PM      Summary:  Normal EEG in the awake / drowsy / asleep state(s).    Clinical Impression:  There were no epileptiform abnormalities recorded.      CONCLUSIONS:      1. Right: There is moderate calcific plaque noted within the proximal right internal carotid artery, consistent with a 50-79% stenosis.   2. Left: There is mild calcific plaque noted within the proximal left internal carotid artery, consistent with a 16-49% stenosis. No hemodynamically significant stenosis.   3. Vertebral arteries: antegrade flow bilaterally.   4. Subclavian arteries: no significant atherosclerosis bilaterally.   5. Hypoechoic avascular collection likely representing a thyroid cyst noted in the left neck. Recommend dedicated thyroid ultrasound for further characterization.

## 2024-10-02 NOTE — PROGRESS NOTE ADULT - ASSESSMENT
EKG NSR 88  TTE 2/20/2024    1. Agitated saline injection was negative for intracardiac shunt.   2. Left ventricular cavity is normal in size. Left ventricular wall thickness is normal. Left ventricular systolic function is hyperdynamic. There are no regional wall motion abnormalities seen.   3. Normal left ventricular diastolic function, with normal filling pressure.   4. Normal right ventricular cavity size, with wall thickness, and normal systolic function.   5. Normal left and right atrial size.   6. No significant valvular disease.   7. No pericardial effusion seen.    NST 5/3/2024  1. Normal myocardial perfusion scan, with no evidence of infarction or inducible ischemia.   2. Stress electrocardiogram: No ischemic ST segment changes.   3. Normal left ventricular regional wall motion.   4. The left ventricle is normal in function and normal in size. Normal left ventricular diastolic function. The post stress end diastolic volume is 59 ml and systolic volume is 13 ml.   5. The left ventricle is normal in function and normal in size. Normal left ventricular diastolic function.   6. Normal right ventricular function. There is no right ventricular dilation. RVEF = 48%, RVEDV = 93 mL, RVESV = 49 mL.    Cerebral angiogram 2/22/2024 There is atherosclerotic plaque at the right carotid bifurcation   which causes 45% stenosis at the origin of the right cervical internal carotid artery, There is atherosclerotic   plaque at the left carotid bifurcation which causes 33% stenosis at the origin of the left cervical internal carotid artery. Diagnostic cerebral angiogram demonstrating mild left and moderate right cervical internal carotid artery atherosclerotic stenosis        A/P    Patient is a 63 year old female with a PMHx of  HTN, DM, HLD, CAD (s/p 3 stents placed at [facility] in 2014 to the prox LAD and then in 2022 - on ASA and Plavix), PAD s/p L popliteal angioplasty and atherectomy 12/23, R ICA and L ICA stenosis, prior stroke with L side weakness, Parkinson's disease, B/L cataract surgery with blurred vision at baseline who presents with unresponsiveness      1. Cardiac risk stratification  - vascular recs appreciated, CTA head/neck showing no LVO but severe right and moderate left internal carotid artery origin stenosis. planning on outpt surgery  - no CP/SOB, euvolemic on exam  - EKG ok, Echo ok, NST ok  - BP labile, on Imdur 30mg, losartan 25, lopressor 50 BID, nifedipine 30  - renal artery doppler 8/2024(outpt) suggesting renal artery stenosis,  abd CTA 10/1/2024 show no evidence of renal artery stenosis  - Pt is optimized from cardiac standpoint, RCRI score=3, Class IV risk, 15% MACE risk  - can titrate up Nifedipine to 60mg for better BP control      2. AMS  - Pt is responsive now, able to move all extremities  - CTA   NECK: Severe right and moderate left internal carotid artery origin stenosis, as above. No occlusion or dissection.   HEAD: No significant change. No large vessel occlusion. 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   - Cerebral angiogram 2/22/2024 There is atherosclerotic plaque at the right carotid bifurcation   which causes 45% stenosis at the origin of the right cervical internal carotid artery, There is atherosclerotic   plaque at the left carotid bifurcation which causes 33% stenosis at the origin of the left cervical internal carotid artery. Diagnostic cerebral angiogram demonstrating mild left and moderate right cervical internal carotid artery atherosclerotic stenosis  -Head MR shows Acute R BG infarct  - f/u neuro recs, f/u vascular recs  - home meds ASA, plavix, lipitor 40mg    3. Carotid artery stenosis  - CTA shows  NECK: Severe right and moderate left internal carotid artery origin stenosis, as above. No occlusion or dissection.    HEAD: No significant change. No large vessel occlusion. 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  - Cerebral angiogram 2/22/2024 There is atherosclerotic plaque at the right carotid bifurcation   which causes 45% stenosis at the origin of the right cervical internal carotid artery, There is atherosclerotic   plaque at the left carotid bifurcation which causes 33% stenosis at the origin of the left cervical internal carotid artery. Diagnostic cerebral angiogram demonstrating mild left and moderate right cervical internal carotid artery atherosclerotic stenosis.  - pt f/u with outpt neurologist  - carotid doppler Rt pICA moderate, Lt pICA mild   - vascular recs appreciated, CTA head/neck showing no LVO but severe right and moderate left internal carotid artery origin stenosis. planning on outpt surgery  - home meds ASA, plavix, lipitor 40mg      4. Hypertension  -c/w nifedipine 30 mg QD, lopressor 50mg BID, Losartan 25mg QD, imdur 30 mg QD  - renal artery doppler 8/2024(outpt) suggesting renal artery stenosis,  abd CTA 10/1/2024 show no evidence of renal artery stenosis  - can titrate up Nifedipine to 60mg for better BP control      5. CAD s/p stents  - s/p 3 stents placed at [facility] in 2014 to the prox LAD and then in 2022  - TTE with preserved EF and no WMA  - c/w ASA, Plavix and statin    6. HLD  - c/w atorvastatin 80mg PO daily    7. hx of palpitations s/p Falls   - TTE with preserved EF and no WMA  -s/p loop monitor , no arrhythmias on interrogation

## 2024-10-02 NOTE — CHART NOTE - NSCHARTNOTEFT_GEN_A_CORE
*** LATE ENTRY***      MRI Head preliminary results show -> Acute tiny right basal ganglia infarct.    Neurology made aware.      Ariane Belcher PA-C  Department of Medicine [phone]

## 2024-10-02 NOTE — CHART NOTE - NSCHARTNOTEFT_GEN_A_CORE
Post Fall Assessment    HPI:   Patient is a 64F PMH of CAD s/p PCI, PAD s/p L popliteal angioplasty and atherectomy, CVAs w/ residual L sided weakness, R and L ICA stenosis, Parkinson's disease, HTN, HLD, T2DM, and decreased vision a/w AMS and unresponsiveness.    EVENT SUMMARY   Code fall called overhead. Patient seen and examined at bedside. Pt states she did not hit her head. Patient reports that she has to urinate so she lowered herself to the floor. Pt has no complaints at this time. Denies SOB, CP, palpitations, dizziness, LOC, hip / back pain.     Vital Signs Last 24 Hrs  T(C): 36.7 (02 Oct 2024 03:50), Max: 36.8 (01 Oct 2024 17:55)  T(F): 98 (02 Oct 2024 03:50), Max: 98.2 (01 Oct 2024 17:55)  HR: 84 (02 Oct 2024 03:50) (71 - 84)  BP: 178/90 (02 Oct 2024 03:50) (152/74 - 178/90)  RR: 18 (02 Oct 2024 03:50) (16 - 18)  SpO2: 100% (02 Oct 2024 03:50) (97% - 100%)    Parameters below as of 02 Oct 2024 03:50  Patient On (Oxygen Delivery Method): room air        Physical Exam  General: NAD, sitting on floor comfortably   Mental Status: A&O x3  Skin: Warm, dry, no rashes, no lesions, no ecchymosis, no bruises   Head: NCAT  Neuro: Non focal. A&O x3  Cardiac: S1/S2. No murmurs appreciated  Lungs: CTA b/l. No rales, wheezes, rhonchi appreciated b/l.   Abdomen: Soft, +BS, non distended  Extremities: No edema. Full ROM all 4 extremities.     Pt ambulating appropriately with no vertebral / hip tenderness.     Assessment   HPI:  Patient is a 64F PMH of CAD s/p PCI, PAD s/p L popliteal angioplasty and atherectomy, CVAs w/ residual L sided weakness, R and L ICA stenosis, Parkinson's disease, HTN, HLD, T2DM, and decreased vision a/w AMS and unresponsiveness.  Code fall called overnight.     1) s/p fall  - CT head ordered to r/o acute bleed.   - EKG  - Fingerstick  - CBC/BMP  - Bed alarm  - fall precautions   - case discussed with   - Will discuss case with primary team in AM Post Fall Assessment    HPI:   Patient is a 64F PMH of CAD s/p PCI, PAD s/p L popliteal angioplasty and atherectomy, CVAs w/ residual L sided weakness, R and L ICA stenosis, Parkinson's disease, HTN, HLD, T2DM, and decreased vision a/w AMS and unresponsiveness.    EVENT SUMMARY   Code fall called overhead. Patient seen and examined at bedside. Pt states she did not hit her head. Patient reports that she had to urinate so she lowered herself to the floor to use the bathroom.   Pt has no complaints at this time. Denies SOB, CP, palpitations, dizziness, LOC, hip / back pain.     Vital Signs Last 24 Hrs  T(C): 36.7 (02 Oct 2024 03:50), Max: 36.8 (01 Oct 2024 17:55)  T(F): 98 (02 Oct 2024 03:50), Max: 98.2 (01 Oct 2024 17:55)  HR: 84 (02 Oct 2024 03:50) (71 - 84)  BP: 178/90 (02 Oct 2024 03:50) (152/74 - 178/90)  RR: 18 (02 Oct 2024 03:50) (16 - 18)  SpO2: 100% (02 Oct 2024 03:50) (97% - 100%)    Parameters below as of 02 Oct 2024 03:50  Patient On (Oxygen Delivery Method): room air        Physical Exam  General: NAD, sitting on floor comfortably   Mental Status: A&O x3  Skin: Warm, dry, no rashes, no lesions, no ecchymosis, no bruises   Head: NCAT  Neuro: Non focal  Cardiac: S1/S2. No murmurs appreciated  Lungs: CTA b/l. No rales, wheezes, rhonchi appreciated b/l.   Abdomen: Soft, +BS, non distended  Extremities: No edema. Full ROM all 4 extremities.     Patient without  vertebral / hip tenderness.     Assessment   HPI:  Patient is a 64F PMH of CAD s/p PCI, PAD s/p L popliteal angioplasty and atherectomy, CVAs w/ residual L sided weakness, R and L ICA stenosis, Parkinson's disease, HTN, HLD, T2DM, and decreased vision a/w AMS and unresponsiveness.  Code fall called overnight.     1) s/p fall  - CT head ordered to r/o acute bleed.   - Telemetry  - Fingerstick  - CBC/BMP  - Bed alarm  - fall precautions   - vitals q4hrs    Dr. Urena notified overnight    Ariane Belcher PA-C  Department of Medicine [phone]

## 2024-10-02 NOTE — PROGRESS NOTE ADULT - SUBJECTIVE AND OBJECTIVE BOX
Name of Patient : ALYSE AVENDAÑO  MRN: [id number]        Subjective: Patient seen and examined. No new events except as noted.   mouna dotson  fall overnight  no head trauma  imaging stable     REVIEW OF SYSTEMS:    CONSTITUTIONAL: No weakness, fevers or chills  EYES/ENT: No visual changes;  No vertigo or throat pain   NECK: No pain or stiffness  RESPIRATORY: No cough, wheezing, hemoptysis; No shortness of breath  CARDIOVASCULAR: No chest pain or palpitations  GASTROINTESTINAL: No abdominal or epigastric pain. No nausea, vomiting, or hematemesis; No diarrhea or constipation. No melena or hematochezia.  GENITOURINARY: No dysuria, frequency or hematuria  NEUROLOGICAL: No numbness or weakness  SKIN: No itching, burning, rashes, or lesions   All other review of systems is negative unless indicated above.    MEDICATIONS:  MEDICATIONS  (STANDING):  aspirin enteric coated 81 milliGRAM(s) Oral daily  atorvastatin 40 milliGRAM(s) Oral at bedtime  carbidopa/levodopa  25/100 1 Tablet(s) Oral daily  clopidogrel Tablet 75 milliGRAM(s) Oral daily  dextrose 5%. 1000 milliLiter(s) (50 mL/Hr) IV Continuous <Continuous>  dextrose 5%. 1000 milliLiter(s) (100 mL/Hr) IV Continuous <Continuous>  dextrose 50% Injectable 25 Gram(s) IV Push once  dextrose 50% Injectable 25 Gram(s) IV Push once  dextrose 50% Injectable 12.5 Gram(s) IV Push once  doxazosin 2 milliGRAM(s) Oral at bedtime  enoxaparin Injectable 40 milliGRAM(s) SubCutaneous every 24 hours  gabapentin 100 milliGRAM(s) Oral three times a day  glucagon  Injectable 1 milliGRAM(s) IntraMuscular once  influenza  Vaccine (HIGH DOSE) 0.5 milliLiter(s) IntraMuscular once  insulin glargine Injectable (LANTUS) 20 Unit(s) SubCutaneous at bedtime  insulin lispro (ADMELOG) corrective regimen sliding scale   SubCutaneous three times a day before meals  insulin lispro (ADMELOG) corrective regimen sliding scale   SubCutaneous at bedtime  isosorbide   mononitrate ER Tablet (IMDUR) 30 milliGRAM(s) Oral daily  losartan 25 milliGRAM(s) Oral daily  melatonin 3 milliGRAM(s) Oral at bedtime  metoprolol tartrate 50 milliGRAM(s) Oral two times a day  NIFEdipine XL 30 milliGRAM(s) Oral daily  senna 2 Tablet(s) Oral at bedtime      PHYSICAL EXAM:  T(C): 37 (10-02-24 @ 19:58), Max: 37 (10-02-24 @ 19:58)  HR: 67 (10-02-24 @ 19:58) (64 - 84)  BP: 157/78 (10-02-24 @ 19:58) (133/74 - 178/90)  RR: 18 (10-02-24 @ 19:58) (16 - 18)  SpO2: 96% (10-02-24 @ 19:58) (95% - 100%)  Wt(kg): --  I&O's Summary    01 Oct 2024 07:01  -  02 Oct 2024 07:00  --------------------------------------------------------  IN: 0 mL / OUT: 1000 mL / NET: -1000 mL    Appearance: Normal	  HEENT:  PERRLA   Lymphatic: No lymphadenopathy   Cardiovascular: Normal S1 S2, no JVD  Respiratory: normal effort , clear  Gastrointestinal:  Soft, Non-tender  Skin: No rashes,  warm to touch  Psychiatry:  Mood & affect appropriate  Musculuskeletal: No edema    recent labs, Imaging and EKGs personally reviewed     10-01-24 @ 07:01  -  10-02-24 @ 07:00  --------------------------------------------------------  IN: 0 mL / OUT: 1000 mL / NET: -1000 mL                          12.9   5.77  )-----------( 261      ( 01 Oct 2024 06:54 )             40.1               10-01    140  |  104  |  14  ----------------------------<  173[H]  3.9   |  22  |  0.53    Ca    9.4      01 Oct 2024 06:54  Phos  5.0     10-01  Mg     2.00     10-01                         Urinalysis Basic - ( 01 Oct 2024 06:54 )    Color: x / Appearance: x / SG: x / pH: x  Gluc: 173 mg/dL / Ketone: x  / Bili: x / Urobili: x   Blood: x / Protein: x / Nitrite: x   Leuk Esterase: x / RBC: x / WBC x   Sq Epi: x / Non Sq Epi: x / Bacteria: x

## 2024-10-02 NOTE — PROGRESS NOTE ADULT - ASSESSMENT
65 y/o female w/ PMHx CAD s/p PCI, PAD s/p L popliteal angioplasty and atherectomy, CVAs (2022) w/ residual L sided weakness, R and L ICA stenosis, Parkinson's disease, HTN, HLD, T2DM, and decreased vision presenting to [facility] ED as code stroke with hypertensive urgency. Nephrology consulted to r/o renal stenosis.    A/P:  Renal Stenosis:  Outpatient renal doppler (8/2024) suggesting renal stenosis.   CTA A/P angio done without KODY  BP labile.  Normal renal function.  S/p CTA head and neck 09/23.  Monitor for DEVYN.  Monitor BMP and UO.  Cardiology and vascular following.    HTN:  better  C/W losartan 25mg qd, isosorbide 30mg qd, metoprolol tart 50mg BID, and nifedipine 30mg qd.  Up titrate losartan as needed.  neg KODY  Low salt diet.  Monitor BP.    Hyperphosphatemia:  Marginal.  Low PO4 diet.  Monitor PO4.   63 y/o female w/ PMHx CAD s/p PCI, PAD s/p L popliteal angioplasty and atherectomy, CVAs (2022) w/ residual L sided weakness, R and L ICA stenosis, Parkinson's disease, HTN, HLD, T2DM, and decreased vision presenting to [facility] ED as code stroke with hypertensive urgency. Nephrology consulted to r/o renal stenosis.    A/P:  Renal artery  Stenosis:  Outpatient renal doppler (8/2024) suggesting renal stenosis.   CTA A/P angio done without KODY  BP labile.  Normal renal function.  S/p CTA head and neck 09/23.  Monitor for DEVYN.  Monitor BMP and UO.  Cardiology and vascular following.    HTN:  better  C/W losartan 25mg qd, isosorbide 30mg qd, metoprolol tart 50mg BID, and nifedipine 30mg qd.  Up titrate losartan as needed.  neg KODY  Low salt diet.  Monitor BP.    Hyperphosphatemia:  Marginal.  Low PO4 diet.  Monitor PO4.

## 2024-10-02 NOTE — PROGRESS NOTE ADULT - SUBJECTIVE AND OBJECTIVE BOX
[facility] [place] [facility]   MD MARITA PALM MD ANGELA WONG, PA    TEL:  OFFICE: [phone]  From 5pm-7am Answering Service [phone]    -- RENAL FOLLOW UP NOTE ---Date of Service 10-02-24 @ 14:14    Patient is a 64y old  Female who presents with a chief complaint of AMS (02 Oct 2024 12:13)      Patient seen and examined at bedside. No chest pain/sob    VITALS:  T(F): 96.9 (10-02-24 @ 08:53), Max: 98.2 (10-01-24 @ 17:55)  HR: 75 (10-02-24 @ 13:15)  BP: 157/68 (10-02-24 @ 13:15)  RR: 16 (10-02-24 @ 08:53)  SpO2: 98% (10-02-24 @ 08:53)  Wt(kg): --    10-01 @ 07:01  -  10-02 @ 07:00  --------------------------------------------------------  IN: 0 mL / OUT: 1000 mL / NET: -1000 mL          PHYSICAL EXAM:  General: NAD  Neck: No JVD  Respiratory: CTAB, no wheezes, rales or rhonchi  Cardiovascular: S1, S2, RRR  Gastrointestinal: BS+, soft, NT/ND  Extremities: No peripheral edema    Hospital Medications:   MEDICATIONS  (STANDING):  aspirin enteric coated 81 milliGRAM(s) Oral daily  atorvastatin 40 milliGRAM(s) Oral at bedtime  carbidopa/levodopa  25/100 1 Tablet(s) Oral daily  clopidogrel Tablet 75 milliGRAM(s) Oral daily  dextrose 5%. 1000 milliLiter(s) (50 mL/Hr) IV Continuous <Continuous>  dextrose 5%. 1000 milliLiter(s) (100 mL/Hr) IV Continuous <Continuous>  dextrose 50% Injectable 25 Gram(s) IV Push once  dextrose 50% Injectable 25 Gram(s) IV Push once  dextrose 50% Injectable 12.5 Gram(s) IV Push once  doxazosin 2 milliGRAM(s) Oral at bedtime  enoxaparin Injectable 40 milliGRAM(s) SubCutaneous every 24 hours  gabapentin 100 milliGRAM(s) Oral three times a day  glucagon  Injectable 1 milliGRAM(s) IntraMuscular once  influenza  Vaccine (HIGH DOSE) 0.5 milliLiter(s) IntraMuscular once  insulin glargine Injectable (LANTUS) 20 Unit(s) SubCutaneous at bedtime  insulin lispro (ADMELOG) corrective regimen sliding scale   SubCutaneous three times a day before meals  insulin lispro (ADMELOG) corrective regimen sliding scale   SubCutaneous at bedtime  isosorbide   mononitrate ER Tablet (IMDUR) 30 milliGRAM(s) Oral daily  losartan 25 milliGRAM(s) Oral daily  melatonin 3 milliGRAM(s) Oral at bedtime  metoprolol tartrate 50 milliGRAM(s) Oral two times a day  NIFEdipine XL 30 milliGRAM(s) Oral daily  senna 2 Tablet(s) Oral at bedtime      LABS:  10-01    140  |  104  |  14  ----------------------------<  173[H]  3.9   |  22  |  0.53    Ca    9.4      01 Oct 2024 06:54  Phos  5.0     10-01  Mg     2.00     10-01      Creatinine Trend: 0.53 <--, 0.56 <--, 0.63 <--, 0.58 <--, 0.57 <--                                12.9   5.77  )-----------( 261      ( 01 Oct 2024 06:54 )             40.1     Urine Studies:  Urinalysis - [10-01-24 @ 06:54]      Color  / Appearance  / SG  / pH       Gluc 173 / Ketone   / Bili  / Urobili        Blood  / Protein  / Leuk Est  / Nitrite       RBC  / WBC  / Hyaline  / Gran  / Sq Epi  / Non Sq Epi  / Bacteria       TSH 0.90      [02-22-24 @ 07:25]  Lipid: chol 140, TG 67, HDL 45, LDL --      [06-17-24 @ 07:10]        RADIOLOGY & ADDITIONAL STUDIES:

## 2024-10-02 NOTE — PROGRESS NOTE ADULT - SUBJECTIVE AND OBJECTIVE BOX
Johnathan Tejeda MD  Interventional Cardiology / Endovascular Specialist  [place] Office : [address] [place] [address] [place] [address]  Tel:   [place] Office : [address] [facility] [place] [address]  Tel: [phone]  Cell : [phone]     Pt is lying in bed comfortable not in distress, pt seen and examined at bedside,  at bedside  	  MEDICATIONS:  aspirin enteric coated 81 milliGRAM(s) Oral daily  clopidogrel Tablet 75 milliGRAM(s) Oral daily  doxazosin 2 milliGRAM(s) Oral at bedtime  enoxaparin Injectable 40 milliGRAM(s) SubCutaneous every 24 hours  isosorbide   mononitrate ER Tablet (IMDUR) 30 milliGRAM(s) Oral daily  losartan 25 milliGRAM(s) Oral daily  metoprolol tartrate 50 milliGRAM(s) Oral two times a day  NIFEdipine XL 30 milliGRAM(s) Oral daily        carbidopa/levodopa  25/100 1 Tablet(s) Oral daily  gabapentin 100 milliGRAM(s) Oral three times a day  melatonin 3 milliGRAM(s) Oral at bedtime    senna 2 Tablet(s) Oral at bedtime    atorvastatin 40 milliGRAM(s) Oral at bedtime  dextrose 50% Injectable 25 Gram(s) IV Push once  dextrose 50% Injectable 25 Gram(s) IV Push once  dextrose 50% Injectable 12.5 Gram(s) IV Push once  dextrose Oral Gel 15 Gram(s) Oral once PRN  glucagon  Injectable 1 milliGRAM(s) IntraMuscular once  insulin glargine Injectable (LANTUS) 20 Unit(s) SubCutaneous at bedtime  insulin lispro (ADMELOG) corrective regimen sliding scale   SubCutaneous at bedtime  insulin lispro (ADMELOG) corrective regimen sliding scale   SubCutaneous three times a day before meals    dextrose 5%. 1000 milliLiter(s) IV Continuous <Continuous>  dextrose 5%. 1000 milliLiter(s) IV Continuous <Continuous>  influenza  Vaccine (HIGH DOSE) 0.5 milliLiter(s) IntraMuscular once      PAST MEDICAL/SURGICAL HISTORY  PAST MEDICAL & SURGICAL HISTORY:  HTN (hypertension)      HLD (hyperlipidemia)      CAD (coronary artery disease)      Type II diabetes mellitus      PAD (peripheral artery disease)      Mild dementia      Parkinson disease      S/P hysterectomy          SOCIAL HISTORY: Substance Use (street drugs): ( x ) never used  (  ) other:    FAMILY HISTORY:           PHYSICAL EXAM:  T(C): 36.1 (10-02-24 @ 08:53), Max: 36.8 (10-01-24 @ 17:55)  HR: 75 (10-02-24 @ 13:15) (64 - 84)  BP: 157/68 (10-02-24 @ 13:15) (149/77 - 178/90)  RR: 16 (10-02-24 @ 08:53) (16 - 18)  SpO2: 98% (10-02-24 @ 08:53) (95% - 100%)  Wt(kg): --  I&O's Summary    01 Oct 2024 07:01  -  02 Oct 2024 07:00  --------------------------------------------------------  IN: 0 mL / OUT: 1000 mL / NET: -1000 mL      GENERAL: NAD  EYES:   PERRLA   ENMT:   Moist mucous membranes, Good dentition, No lesions  Cardiovascular: Normal S1 S2, No JVD, No murmurs, No edema  Respiratory: Lungs clear to auscultation	  Gastrointestinal:  Soft, Non-tender, + BS	  Extremities: no edema                                        12.9   5.77  )-----------( 261      ( 01 Oct 2024 06:54 )             40.1     10-01    140  |  104  |  14  ----------------------------<  173[H]  3.9   |  22  |  0.53    Ca    9.4      01 Oct 2024 06:54  Phos  5.0     10-01  Mg     2.00     10-01      proBNP:   Lipid Profile:   HgA1c:   TSH:     Consultant(s) Notes Reviewed:  [x ] YES  [ ] NO    Care Discussed with Consultants/Other Providers [ x] YES  [ ] NO    Imaging Personally Reviewed independently:  [x] YES  [ ] NO    All labs, radiologic studies, vitals, orders and medications list reviewed. Patient is seen and examined at bedside. Case discussed with medical team.

## 2024-10-02 NOTE — PROGRESS NOTE ADULT - SUBJECTIVE AND OBJECTIVE BOX
General Surgery Progress Note    Overnight: O/N > code fall, patient reports no HS/LOC but actually lowered herself to ground to use bathroom > neuro eval and rec MRI > MRI showed acute tiny right basal ganglia infarct.    Subjective: Patient seen and examined on rounds. No new issues to report resting comfortably.     Objective:  Vitals:  T(C): 36.1 (10-02-24 @ 08:53), Max: 36.8 (10-01-24 @ 17:55)  HR: 64 (10-02-24 @ 08:53) (64 - 84)  BP: 158/78 (10-02-24 @ 08:53) (149/77 - 178/90)  RR: 16 (10-02-24 @ 08:53) (16 - 18)  SpO2: 98% (10-02-24 @ 08:53) (95% - 100%)  Wt(kg): --    10-01 @ 07:01  -  10-02 @ 07:00  --------------------------------------------------------  IN:  Total IN: 0 mL    OUT:    Incontinent per Collection Bag (mL): 1000 mL  Total OUT: 1000 mL    Total NET: -1000 mL          Physical Exam:  General Appearance: no acute distress, NTND   Neuro: no facial asymmetry, no tongue deviation, moving b/l UE and LE spontaneously with LS weakeness appreciated c/w previous stroke deficits  Chest: airway intact, non-labored breathing  CV: no JVD, palpable pulses b/l  Abdomen: soft, non-tender, non-distended, +BS   Extremities: WWP      Labs:                        12.9   5.77  )-----------( 261      ( 01 Oct 2024 06:54 )             40.1     10-01    140  |  104  |  14  ----------------------------<  173[H]  3.9   |  22  |  0.53    Ca    9.4      01 Oct 2024 06:54  Phos  5.0     10-01  Mg     2.00     10-01          Urinalysis Basic - ( 01 Oct 2024 06:54 )    Color: x / Appearance: x / SG: x / pH: x  Gluc: 173 mg/dL / Ketone: x  / Bili: x / Urobili: x   Blood: x / Protein: x / Nitrite: x   Leuk Esterase: x / RBC: x / WBC x   Sq Epi: x / Non Sq Epi: x / Bacteria: x

## 2024-10-02 NOTE — PROGRESS NOTE ADULT - ASSESSMENT
64F w/ PMHx CAD s/p PCI, PAD s/p L popliteal angioplasty and atherectomy, CVAs (2022) w/ residual L sided weakness, R and L ICA stenosis, Parkinson's disease, HTN, HLD, T2DM, and decreased vision presenting to [facility] ED as code stroke iso /129 and not responding to questions/commands with rigid body. Patient improved to baseline while in ED and underwent CT head with non-acute findings and CTA head/neck showing no LVO but severe right and moderate left internal carotid artery origin stenosis. Vascular surgery c/f evaluation.       Rec:  - carotid duplex and CT reviewed will plan for outpatient right carotid endarterectomy with patch angioplasty, with neuromonitoring on 10/14  - CTA negative renal artery stenosis > cards clearance documented  - c/w asa/plavix  - vascular surgery to follow while inpatient     C Team Surgery [id number]  Hermann Chamorro MD (PGY2)  64F w/ PMHx CAD s/p PCI, PAD s/p L popliteal angioplasty and atherectomy, CVAs (2022) w/ residual L sided weakness, R and L ICA stenosis, Parkinson's disease, HTN, HLD, T2DM, and decreased vision presenting to [facility] ED as code stroke iso /129 and not responding to questions/commands with rigid body. Patient improved to baseline while in ED and underwent CT head with non-acute findings and CTA head/neck showing no LVO but severe right and moderate left internal carotid artery origin stenosis. Vascular surgery c/f evaluation.       Rec:  - carotid duplex and CT reviewed   - Will plan for outpatient right carotid endarterectomy with patch angioplasty, with neuromonitoring on 10/14  - CTA negative renal artery stenosis > cards clearance documented  - c/w asa/plavix  - vascular surgery to sign off; please reconsult as needed     C Team Surgery [id number]  Hermann Chamorro MD (PGY2)

## 2024-10-03 LAB
ANION GAP SERPL CALC-SCNC: 14 MMOL/L — SIGNIFICANT CHANGE UP (ref 7–14)
BUN SERPL-MCNC: 17 MG/DL — SIGNIFICANT CHANGE UP (ref 7–23)
CALCIUM SERPL-MCNC: 9.2 MG/DL — SIGNIFICANT CHANGE UP (ref 8.4–10.5)
CHLORIDE SERPL-SCNC: 102 MMOL/L — SIGNIFICANT CHANGE UP (ref 98–107)
CO2 SERPL-SCNC: 22 MMOL/L — SIGNIFICANT CHANGE UP (ref 22–31)
CREAT SERPL-MCNC: 0.56 MG/DL — SIGNIFICANT CHANGE UP (ref 0.5–1.3)
EGFR: 101 ML/MIN/1.73M2 — SIGNIFICANT CHANGE UP
GLUCOSE BLDC GLUCOMTR-MCNC: 178 MG/DL — HIGH (ref 70–99)
GLUCOSE BLDC GLUCOMTR-MCNC: 190 MG/DL — HIGH (ref 70–99)
GLUCOSE BLDC GLUCOMTR-MCNC: 201 MG/DL — HIGH (ref 70–99)
GLUCOSE BLDC GLUCOMTR-MCNC: 218 MG/DL — HIGH (ref 70–99)
GLUCOSE BLDC GLUCOMTR-MCNC: 353 MG/DL — HIGH (ref 70–99)
GLUCOSE SERPL-MCNC: 245 MG/DL — HIGH (ref 70–99)
HCT VFR BLD CALC: 39.1 % — SIGNIFICANT CHANGE UP (ref 34.5–45)
HGB BLD-MCNC: 12.3 G/DL — SIGNIFICANT CHANGE UP (ref 11.5–15.5)
MAGNESIUM SERPL-MCNC: 1.9 MG/DL — SIGNIFICANT CHANGE UP (ref 1.6–2.6)
MCHC RBC-ENTMCNC: 22.9 PG — LOW (ref 27–34)
MCHC RBC-ENTMCNC: 31.5 GM/DL — LOW (ref 32–36)
MCV RBC AUTO: 72.9 FL — LOW (ref 80–100)
NRBC # BLD: 0 /100 WBCS — SIGNIFICANT CHANGE UP (ref 0–0)
NRBC # FLD: 0 K/UL — SIGNIFICANT CHANGE UP (ref 0–0)
PA ADP PRP-ACNC: 193 PRU — SIGNIFICANT CHANGE UP (ref 182–335)
PHOSPHATE SERPL-MCNC: 3.9 MG/DL — SIGNIFICANT CHANGE UP (ref 2.5–4.5)
PLATELET # BLD AUTO: 254 K/UL — SIGNIFICANT CHANGE UP (ref 150–400)
POTASSIUM SERPL-MCNC: 4.4 MMOL/L — SIGNIFICANT CHANGE UP (ref 3.5–5.3)
POTASSIUM SERPL-SCNC: 4.4 MMOL/L — SIGNIFICANT CHANGE UP (ref 3.5–5.3)
RBC # BLD: 5.36 M/UL — HIGH (ref 3.8–5.2)
RBC # FLD: 15.1 % — HIGH (ref 10.3–14.5)
SODIUM SERPL-SCNC: 138 MMOL/L — SIGNIFICANT CHANGE UP (ref 135–145)
WBC # BLD: 5.75 K/UL — SIGNIFICANT CHANGE UP (ref 3.8–10.5)
WBC # FLD AUTO: 5.75 K/UL — SIGNIFICANT CHANGE UP (ref 3.8–10.5)

## 2024-10-03 PROCEDURE — 70450 CT HEAD/BRAIN W/O DYE: CPT | Mod: 26

## 2024-10-03 RX ORDER — DIPHENHYDRAMINE HCL 12.5MG/5ML
50 LIQUID (ML) ORAL ONCE
Refills: 0 | Status: COMPLETED | OUTPATIENT
Start: 2024-10-03 | End: 2024-10-03

## 2024-10-03 RX ORDER — HALOPERIDOL LACTATE 2 MG/ML
2 CONCENTRATE, ORAL ORAL ONCE
Refills: 0 | Status: COMPLETED | OUTPATIENT
Start: 2024-10-03 | End: 2024-10-03

## 2024-10-03 RX ADMIN — Medication 1: at 08:56

## 2024-10-03 RX ADMIN — Medication 3 MILLIGRAM(S): at 22:21

## 2024-10-03 RX ADMIN — Medication 50 MILLIGRAM(S): at 18:02

## 2024-10-03 RX ADMIN — Medication 2: at 17:57

## 2024-10-03 RX ADMIN — INSULIN GLARGINE 20 UNIT(S): 300 INJECTION, SOLUTION SUBCUTANEOUS at 22:21

## 2024-10-03 RX ADMIN — Medication 2 MILLIGRAM(S): at 04:32

## 2024-10-03 RX ADMIN — Medication 2 TABLET(S): at 22:21

## 2024-10-03 RX ADMIN — Medication 1 MILLIGRAM(S): at 04:36

## 2024-10-03 RX ADMIN — Medication 50 MILLIGRAM(S): at 04:45

## 2024-10-03 RX ADMIN — ATORVASTATIN CALCIUM 40 MILLIGRAM(S): 10 TABLET, FILM COATED ORAL at 22:21

## 2024-10-03 RX ADMIN — DOXAZOSIN 2 MILLIGRAM(S): 2 TABLET ORAL at 22:21

## 2024-10-03 RX ADMIN — Medication 1: at 12:27

## 2024-10-03 RX ADMIN — GABAPENTIN 100 MILLIGRAM(S): 800 TABLET, FILM COATED ORAL at 11:39

## 2024-10-03 RX ADMIN — GABAPENTIN 100 MILLIGRAM(S): 800 TABLET, FILM COATED ORAL at 22:24

## 2024-10-03 RX ADMIN — Medication 3: at 22:22

## 2024-10-03 NOTE — PROGRESS NOTE ADULT - ASSESSMENT
EKG NSR 88  TTE 2/20/2024    1. Agitated saline injection was negative for intracardiac shunt.   2. Left ventricular cavity is normal in size. Left ventricular wall thickness is normal. Left ventricular systolic function is hyperdynamic. There are no regional wall motion abnormalities seen.   3. Normal left ventricular diastolic function, with normal filling pressure.   4. Normal right ventricular cavity size, with wall thickness, and normal systolic function.   5. Normal left and right atrial size.   6. No significant valvular disease.   7. No pericardial effusion seen.    NST 5/3/2024  1. Normal myocardial perfusion scan, with no evidence of infarction or inducible ischemia.   2. Stress electrocardiogram: No ischemic ST segment changes.   3. Normal left ventricular regional wall motion.   4. The left ventricle is normal in function and normal in size. Normal left ventricular diastolic function. The post stress end diastolic volume is 59 ml and systolic volume is 13 ml.   5. The left ventricle is normal in function and normal in size. Normal left ventricular diastolic function.   6. Normal right ventricular function. There is no right ventricular dilation. RVEF = 48%, RVEDV = 93 mL, RVESV = 49 mL.    Cerebral angiogram 2/22/2024 There is atherosclerotic plaque at the right carotid bifurcation   which causes 45% stenosis at the origin of the right cervical internal carotid artery, There is atherosclerotic   plaque at the left carotid bifurcation which causes 33% stenosis at the origin of the left cervical internal carotid artery. Diagnostic cerebral angiogram demonstrating mild left and moderate right cervical internal carotid artery atherosclerotic stenosis        A/P    Patient is a 63 year old female with a PMHx of  HTN, DM, HLD, CAD (s/p 3 stents placed at [facility] in 2014 to the prox LAD and then in 2022 - on ASA and Plavix), PAD s/p L popliteal angioplasty and atherectomy 12/23, R ICA and L ICA stenosis, prior stroke with L side weakness, Parkinson's disease, B/L cataract surgery with blurred vision at baseline who presents with unresponsiveness      1. Cardiac risk stratification  - vascular recs appreciated, CTA head/neck showing no LVO but severe right and moderate left internal carotid artery origin stenosis. planning on outpt surgery  - no CP/SOB, euvolemic on exam  - EKG ok, Echo ok, NST ok  - BP labile, on Imdur 30mg, losartan 25, lopressor 50 BID, nifedipine 30  - renal artery doppler 8/2024(outpt) suggesting renal artery stenosis,  abd CTA 10/1/2024 show no evidence of renal artery stenosis  - Pt is optimized from cardiac standpoint, RCRI score=3, Class IV risk, 15% MACE risk  - can titrate up Nifedipine to 60mg for better BP control      2. AMS  - Pt is responsive now, able to move all extremities  - CTA   NECK: Severe right and moderate left internal carotid artery origin stenosis, as above. No occlusion or dissection.   HEAD: No significant change. No large vessel occlusion. 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   - Cerebral angiogram 2/22/2024 There is atherosclerotic plaque at the right carotid bifurcation   which causes 45% stenosis at the origin of the right cervical internal carotid artery, There is atherosclerotic   plaque at the left carotid bifurcation which causes 33% stenosis at the origin of the left cervical internal carotid artery. Diagnostic cerebral angiogram demonstrating mild left and moderate right cervical internal carotid artery atherosclerotic stenosis  -Head MR shows Acute R BG infarct  - f/u neuro recs, f/u vascular recs  - home meds ASA, plavix, lipitor 40mg  - Neuro recs appreciated, check P2y12 and if subtherapeutic would switch to ticagrelor    3. Carotid artery stenosis  - CTA shows  NECK: Severe right and moderate left internal carotid artery origin stenosis, as above. No occlusion or dissection.    HEAD: No significant change. No large vessel occlusion. 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  - Cerebral angiogram 2/22/2024 There is atherosclerotic plaque at the right carotid bifurcation   which causes 45% stenosis at the origin of the right cervical internal carotid artery, There is atherosclerotic   plaque at the left carotid bifurcation which causes 33% stenosis at the origin of the left cervical internal carotid artery. Diagnostic cerebral angiogram demonstrating mild left and moderate right cervical internal carotid artery atherosclerotic stenosis.  - pt f/u with outpt neurologist  - carotid doppler Rt pICA moderate, Lt pICA mild   - vascular recs appreciated, CTA head/neck showing no LVO but severe right and moderate left internal carotid artery origin stenosis. planning on outpt surgery  - home meds ASA, plavix, lipitor 40mg      4. Hypertension  -c/w nifedipine 30 mg QD, lopressor 50mg BID, Losartan 25mg QD, imdur 30 mg QD  - renal artery doppler 8/2024(outpt) suggesting renal artery stenosis,  abd CTA 10/1/2024 show no evidence of renal artery stenosis  - can titrate up Nifedipine to 60mg for better BP control      5. CAD s/p stents  - s/p 3 stents placed at [facility] in 2014 to the prox LAD and then in 2022  - TTE with preserved EF and no WMA  - c/w ASA, Plavix and statin    6. HLD  - c/w atorvastatin 80mg PO daily    7. hx of palpitations s/p Falls   - TTE with preserved EF and no WMA  -s/p loop monitor , no arrhythmias on interrogation

## 2024-10-03 NOTE — PROGRESS NOTE ADULT - SUBJECTIVE AND OBJECTIVE BOX
SUBJECTIVE/ OVERNIGHT EVENTS:  --- Coverage for Dr. Urena ---  code GIOVANNI overnight with agitations  now sedated this am  arousable though poor historian  ROS limited due to pt's condition.   -------------------------------------------------------------  LABS:            CAPILLARY BLOOD GLUCOSE      POCT Blood Glucose.: 178 mg/dL (03 Oct 2024 12:08)  POCT Blood Glucose.: 190 mg/dL (03 Oct 2024 08:31)  POCT Blood Glucose.: 242 mg/dL (02 Oct 2024 21:55)  POCT Blood Glucose.: 198 mg/dL (02 Oct 2024 17:10)            RADIOLOGY & ADDITIONAL TESTS:    Imaging Personally Reviewed:  [x] YES  [ ] NO    Consultant(s) Notes Reviewed:  [x] YES  [ ] NO    MEDICATIONS  (STANDING):  aspirin enteric coated 81 milliGRAM(s) Oral daily  atorvastatin 40 milliGRAM(s) Oral at bedtime  carbidopa/levodopa  25/100 1 Tablet(s) Oral daily  clopidogrel Tablet 75 milliGRAM(s) Oral daily  dextrose 5%. 1000 milliLiter(s) (50 mL/Hr) IV Continuous <Continuous>  dextrose 5%. 1000 milliLiter(s) (100 mL/Hr) IV Continuous <Continuous>  dextrose 50% Injectable 25 Gram(s) IV Push once  dextrose 50% Injectable 25 Gram(s) IV Push once  dextrose 50% Injectable 12.5 Gram(s) IV Push once  doxazosin 2 milliGRAM(s) Oral at bedtime  enoxaparin Injectable 40 milliGRAM(s) SubCutaneous every 24 hours  gabapentin 100 milliGRAM(s) Oral three times a day  glucagon  Injectable 1 milliGRAM(s) IntraMuscular once  influenza  Vaccine (HIGH DOSE) 0.5 milliLiter(s) IntraMuscular once  insulin glargine Injectable (LANTUS) 20 Unit(s) SubCutaneous at bedtime  insulin lispro (ADMELOG) corrective regimen sliding scale   SubCutaneous at bedtime  insulin lispro (ADMELOG) corrective regimen sliding scale   SubCutaneous three times a day before meals  isosorbide   mononitrate ER Tablet (IMDUR) 30 milliGRAM(s) Oral daily  losartan 25 milliGRAM(s) Oral daily  melatonin 3 milliGRAM(s) Oral at bedtime  metoprolol tartrate 50 milliGRAM(s) Oral two times a day  NIFEdipine XL 30 milliGRAM(s) Oral daily  senna 2 Tablet(s) Oral at bedtime    MEDICATIONS  (PRN):  dextrose Oral Gel 15 Gram(s) Oral once PRN Blood Glucose LESS THAN 70 milliGRAM(s)/deciliter      Care Discussed with Consultants/Other Providers [x] YES  [ ] NO    Vital Signs Last 24 Hrs  T(C): 36.9 (03 Oct 2024 11:37), Max: 37 (02 Oct 2024 19:58)  T(F): 98.5 (03 Oct 2024 11:37), Max: 98.6 (02 Oct 2024 19:58)  HR: 80 (03 Oct 2024 11:47) (67 - 85)  BP: 180/87 (03 Oct 2024 11:47) (133/74 - 180/87)  BP(mean): --  RR: 18 (03 Oct 2024 11:37) (18 - 18)  SpO2: 99% (03 Oct 2024 11:37) (96% - 99%)    Parameters below as of 03 Oct 2024 04:00  Patient On (Oxygen Delivery Method): room air      I&O's Summary      PHYSICAL EXAM:  GENERAL: NAD, comfortable, confused, sleepy.   HEAD:  Atraumatic, Normocephalic  EYES: EOMI, PERRLA, conjunctiva and sclera clear  NECK: Supple, No JVD  CHEST/LUNG: mild decrease breath sounds bilaterally; No wheeze   HEART: Regular rate and rhythm; No murmurs, rubs, or gallops  ABDOMEN: Soft, Nontender, Nondistended; Bowel sounds present  Neuro: AAOx1, sleepy.  neuro exam limited.   EXTREMITIES:  2+ Peripheral Pulses, No clubbing, cyanosis, or edema  SKIN: No rashes or lesions

## 2024-10-03 NOTE — PROGRESS NOTE ADULT - ASSESSMENT
63 Y/o Female previously on ASA81/Plvx75 for diffuse ICAD, hx HTN, HLD, uncontrolled DM2 x15 yrs, sickle cell trait, CAD s/p 4 stents and CABG, recent p/f Lt hand/arm numb (8/15/24), MRI w/ Rt side strokes, angio w/ Dr. Pulliam 8/23/24 w/ Rt distal cav/supraclinoid ICA svr stenosis, LICA occlusion w/ distal recon, also read on NOVA 8/16. Was d/c'd on DAPT 8/24 by neuro. Now p/w new Lt sided sx, MRI 9/23/24 w/ new RMCA terr strokes (appear embolic/watershed). Exam: AOx3, LUE drift, Lt facial numbness V1-V3, mild L facial, L HG 3/5 o/w LUE 4+/5, LLE 4+/5 (feels heavy, LUE finger paresthesias.

## 2024-10-03 NOTE — PROVIDER CONTACT NOTE (OTHER) - BACKGROUND
patient admitted for AMS and stroke workup
Patient has PMHx of mild dementia, Parkinson's disease, and HTN.
Patient is admitted for weakness. PMH of Parkinson, mild dementia, PAD, T2DM, HTN, CAD, HLD.

## 2024-10-03 NOTE — PROGRESS NOTE ADULT - NS ATTEND AMEND GEN_ALL_CORE FT

## 2024-10-03 NOTE — PROVIDER CONTACT NOTE (OTHER) - ASSESSMENT
Patient is AOx3, pt is asymptomatic. No signs of acute distress.
Patient shows no acute signs of chest pain or sob.
patient AO3, no c/o of pain or discomfort  ACP at bedside for head to toe assessment - no deficits

## 2024-10-03 NOTE — PROVIDER CONTACT NOTE (OTHER) - SITUATION
patient code FALL at 0352, post code refusing tele monitoring because "it's too heavy"
/95
Patient adamantly refuses 6AM medications.

## 2024-10-03 NOTE — PROVIDER CONTACT NOTE (OTHER) - ACTION/TREATMENT ORDERED:
ACP made aware. 6AM meds marked as not done as per ACP.
acp aware - attempt again at later time
MIKA Veras. 6AM Metoprolol, Losartan, and Nifedipine given.

## 2024-10-03 NOTE — CHART NOTE - NSCHARTNOTEFT_GEN_A_CORE
MEDICINE ACP NIGHT COVERAGE    Writer responded to CODE GIOVANNI overhead for patient with agitation. Patient kicking, screaming and spitting at staff. She had previously received, IV haldol moments prior. Additional 1mg IV ativan ordered. Will assess response to medication and consider need for restraints.    Jalil Reyes PA-C  Medicine ACP  [id number] MEDICINE ACP NIGHT COVERAGE    Writer responded to CODE GIOVANNI called over head for patient with agitation. Patient kicking, screaming and spitting at staff. She had previously received IV haldol moments prior. Additional 1mg IV ativan then ordered, still with inadequate response. IV benadryl then ordered, patient eventually calm enough to end GIOVANNI, security dismissed from bedside. Will continue to assess response to medication and consider need for restraints. Will maintain constant observation overnight. RN aware of plan.    Jalil Reyes PA-C  Medicine ACP  [id number]

## 2024-10-03 NOTE — PROGRESS NOTE ADULT - SUBJECTIVE AND OBJECTIVE BOX
Johnathan Tejeda MD  Interventional Cardiology / Endovascular Specialist  [place] Office : [address] [place] [address] [place] [address]  Tel:   [place] Office : [address] [facility] [place] [address]  Tel: [phone]  Cell : [phone]     Pt seen and examined at bedside, not in distress  	  MEDICATIONS:  aspirin enteric coated 81 milliGRAM(s) Oral daily  clopidogrel Tablet 75 milliGRAM(s) Oral daily  doxazosin 2 milliGRAM(s) Oral at bedtime  enoxaparin Injectable 40 milliGRAM(s) SubCutaneous every 24 hours  isosorbide   mononitrate ER Tablet (IMDUR) 30 milliGRAM(s) Oral daily  losartan 25 milliGRAM(s) Oral daily  metoprolol tartrate 50 milliGRAM(s) Oral two times a day  NIFEdipine XL 30 milliGRAM(s) Oral daily        carbidopa/levodopa  25/100 1 Tablet(s) Oral daily  gabapentin 100 milliGRAM(s) Oral three times a day  melatonin 3 milliGRAM(s) Oral at bedtime    senna 2 Tablet(s) Oral at bedtime    atorvastatin 40 milliGRAM(s) Oral at bedtime  dextrose 50% Injectable 25 Gram(s) IV Push once  dextrose 50% Injectable 25 Gram(s) IV Push once  dextrose 50% Injectable 12.5 Gram(s) IV Push once  dextrose Oral Gel 15 Gram(s) Oral once PRN  glucagon  Injectable 1 milliGRAM(s) IntraMuscular once  insulin glargine Injectable (LANTUS) 20 Unit(s) SubCutaneous at bedtime  insulin lispro (ADMELOG) corrective regimen sliding scale   SubCutaneous three times a day before meals  insulin lispro (ADMELOG) corrective regimen sliding scale   SubCutaneous at bedtime    dextrose 5%. 1000 milliLiter(s) IV Continuous <Continuous>  dextrose 5%. 1000 milliLiter(s) IV Continuous <Continuous>  influenza  Vaccine (HIGH DOSE) 0.5 milliLiter(s) IntraMuscular once      PAST MEDICAL/SURGICAL HISTORY  PAST MEDICAL & SURGICAL HISTORY:  HTN (hypertension)      HLD (hyperlipidemia)      CAD (coronary artery disease)      Type II diabetes mellitus      PAD (peripheral artery disease)      Mild dementia      Parkinson disease      S/P hysterectomy          SOCIAL HISTORY: Substance Use (street drugs): ( x ) never used  (  ) other:    FAMILY HISTORY:           PHYSICAL EXAM:  T(C): 36.9 (10-03-24 @ 11:37), Max: 37 (10-02-24 @ 19:58)  HR: 80 (10-03-24 @ 11:47) (67 - 85)  BP: 180/87 (10-03-24 @ 11:47) (133/74 - 180/87)  RR: 18 (10-03-24 @ 11:37) (18 - 18)  SpO2: 99% (10-03-24 @ 11:37) (96% - 99%)  Wt(kg): --  I&O's Summary      GENERAL: NAD  EYES:   PERRLA   ENMT:   Moist mucous membranes, Good dentition, No lesions  Cardiovascular: Normal S1 S2, No JVD, No murmurs, No edema  Respiratory: Lungs clear to auscultation	  Gastrointestinal:  Soft, Non-tender, + BS	  Extremities: no edema                                    12.3   5.75  )-----------( 254      ( 03 Oct 2024 16:04 )             39.1     10-03    138  |  102  |  17  ----------------------------<  245[H]  4.4   |  22  |  0.56    Ca    9.2      03 Oct 2024 16:04  Phos  3.9     10-03  Mg     1.90     10-03      proBNP:   Lipid Profile:   HgA1c:   TSH:     Consultant(s) Notes Reviewed:  [x ] YES  [ ] NO    Care Discussed with Consultants/Other Providers [ x] YES  [ ] NO    Imaging Personally Reviewed independently:  [x] YES  [ ] NO    All labs, radiologic studies, vitals, orders and medications list reviewed. Patient is seen and examined at bedside. Case discussed with medical team.

## 2024-10-04 ENCOUNTER — TRANSCRIPTION ENCOUNTER (OUTPATIENT)
Age: 64
End: 2024-10-04

## 2024-10-04 VITALS
OXYGEN SATURATION: 100 % | SYSTOLIC BLOOD PRESSURE: 146 MMHG | HEART RATE: 75 BPM | TEMPERATURE: 98 F | DIASTOLIC BLOOD PRESSURE: 63 MMHG | RESPIRATION RATE: 17 BRPM

## 2024-10-04 LAB
ANION GAP SERPL CALC-SCNC: 16 MMOL/L — HIGH (ref 7–14)
BUN SERPL-MCNC: 17 MG/DL — SIGNIFICANT CHANGE UP (ref 7–23)
CALCIUM SERPL-MCNC: 9.4 MG/DL — SIGNIFICANT CHANGE UP (ref 8.4–10.5)
CHLORIDE SERPL-SCNC: 104 MMOL/L — SIGNIFICANT CHANGE UP (ref 98–107)
CO2 SERPL-SCNC: 21 MMOL/L — LOW (ref 22–31)
CREAT SERPL-MCNC: 0.63 MG/DL — SIGNIFICANT CHANGE UP (ref 0.5–1.3)
EGFR: 98 ML/MIN/1.73M2 — SIGNIFICANT CHANGE UP
GLUCOSE BLDC GLUCOMTR-MCNC: 184 MG/DL — HIGH (ref 70–99)
GLUCOSE BLDC GLUCOMTR-MCNC: 329 MG/DL — HIGH (ref 70–99)
GLUCOSE SERPL-MCNC: 185 MG/DL — HIGH (ref 70–99)
HCT VFR BLD CALC: 41.4 % — SIGNIFICANT CHANGE UP (ref 34.5–45)
HGB BLD-MCNC: 13.3 G/DL — SIGNIFICANT CHANGE UP (ref 11.5–15.5)
MAGNESIUM SERPL-MCNC: 2.1 MG/DL — SIGNIFICANT CHANGE UP (ref 1.6–2.6)
MCHC RBC-ENTMCNC: 23.3 PG — LOW (ref 27–34)
MCHC RBC-ENTMCNC: 32.1 GM/DL — SIGNIFICANT CHANGE UP (ref 32–36)
MCV RBC AUTO: 72.4 FL — LOW (ref 80–100)
NRBC # BLD: 0 /100 WBCS — SIGNIFICANT CHANGE UP (ref 0–0)
NRBC # FLD: 0 K/UL — SIGNIFICANT CHANGE UP (ref 0–0)
PHOSPHATE SERPL-MCNC: 4.4 MG/DL — SIGNIFICANT CHANGE UP (ref 2.5–4.5)
PLATELET # BLD AUTO: 275 K/UL — SIGNIFICANT CHANGE UP (ref 150–400)
POTASSIUM SERPL-MCNC: 4 MMOL/L — SIGNIFICANT CHANGE UP (ref 3.5–5.3)
POTASSIUM SERPL-SCNC: 4 MMOL/L — SIGNIFICANT CHANGE UP (ref 3.5–5.3)
RBC # BLD: 5.72 M/UL — HIGH (ref 3.8–5.2)
RBC # FLD: 15 % — HIGH (ref 10.3–14.5)
SODIUM SERPL-SCNC: 141 MMOL/L — SIGNIFICANT CHANGE UP (ref 135–145)
WBC # BLD: 5.09 K/UL — SIGNIFICANT CHANGE UP (ref 3.8–10.5)
WBC # FLD AUTO: 5.09 K/UL — SIGNIFICANT CHANGE UP (ref 3.8–10.5)

## 2024-10-04 RX ORDER — CARBIDOPA/LEVODOPA 25MG-100MG
1 TABLET ORAL
Qty: 0 | Refills: 0 | DISCHARGE
Start: 2024-10-04

## 2024-10-04 RX ORDER — ISOSORBIDE MONONITRATE 30 MG
1 TABLET, EXTENDED RELEASE 24 HR ORAL
Qty: 0 | Refills: 0 | DISCHARGE
Start: 2024-10-04

## 2024-10-04 RX ORDER — LOSARTAN POTASSIUM 100 MG/1
1 TABLET, FILM COATED ORAL
Qty: 0 | Refills: 0 | DISCHARGE
Start: 2024-10-04

## 2024-10-04 RX ORDER — LOSARTAN POTASSIUM 100 MG/1
1 TABLET, FILM COATED ORAL
Refills: 0 | DISCHARGE

## 2024-10-04 RX ORDER — ATORVASTATIN CALCIUM 10 MG/1
1 TABLET, FILM COATED ORAL
Qty: 0 | Refills: 0 | DISCHARGE
Start: 2024-10-04

## 2024-10-04 RX ORDER — ASPIRIN 325 MG
1 TABLET ORAL
Qty: 0 | Refills: 0 | DISCHARGE
Start: 2024-10-04

## 2024-10-04 RX ORDER — SENNOSIDES 8.6 MG
2 TABLET ORAL
Qty: 0 | Refills: 0 | DISCHARGE
Start: 2024-10-04

## 2024-10-04 RX ORDER — METOPROLOL TARTRATE 50 MG
1 TABLET ORAL
Qty: 0 | Refills: 0 | DISCHARGE
Start: 2024-10-04

## 2024-10-04 RX ORDER — ACETAMINOPHEN 325 MG
650 TABLET ORAL ONCE
Refills: 0 | Status: COMPLETED | OUTPATIENT
Start: 2024-10-04 | End: 2024-10-04

## 2024-10-04 RX ORDER — GABAPENTIN 800 MG/1
1 TABLET, FILM COATED ORAL
Qty: 0 | Refills: 0 | DISCHARGE
Start: 2024-10-04

## 2024-10-04 RX ORDER — DOXAZOSIN 2 MG/1
1 TABLET ORAL
Qty: 0 | Refills: 0 | DISCHARGE
Start: 2024-10-04

## 2024-10-04 RX ADMIN — Medication 1 TABLET(S): at 12:19

## 2024-10-04 RX ADMIN — ENOXAPARIN SODIUM 40 MILLIGRAM(S): 150 INJECTION SUBCUTANEOUS at 06:19

## 2024-10-04 RX ADMIN — LOSARTAN POTASSIUM 25 MILLIGRAM(S): 100 TABLET, FILM COATED ORAL at 06:19

## 2024-10-04 RX ADMIN — GABAPENTIN 100 MILLIGRAM(S): 800 TABLET, FILM COATED ORAL at 06:21

## 2024-10-04 RX ADMIN — Medication 30 MILLIGRAM(S): at 12:19

## 2024-10-04 RX ADMIN — Medication 75 MILLIGRAM(S): at 12:19

## 2024-10-04 RX ADMIN — Medication 30 MILLIGRAM(S): at 06:19

## 2024-10-04 RX ADMIN — Medication 50 MILLIGRAM(S): at 06:20

## 2024-10-04 RX ADMIN — Medication 4: at 12:42

## 2024-10-04 RX ADMIN — Medication 81 MILLIGRAM(S): at 12:19

## 2024-10-04 RX ADMIN — GABAPENTIN 100 MILLIGRAM(S): 800 TABLET, FILM COATED ORAL at 12:18

## 2024-10-04 RX ADMIN — Medication 1: at 09:05

## 2024-10-04 RX ADMIN — Medication 650 MILLIGRAM(S): at 14:49

## 2024-10-04 NOTE — DISCHARGE NOTE PROVIDER - CARE PROVIDER_API CALL
Naga Beltre  [address] [place] [address]  Phone: [phone]  Fax: ()-  Follow Up Time:     Michelle Klein  Podiatric Medicine  [address] [place] [address] [place] [address]  Phone: [phone]  Fax: [phone]  Follow Up Time:    Naga Beltre  [address] [place] [address]  Phone: [phone]  Fax: ()-  Follow Up Time:     Michelle Klein  Podiatric Medicine  [address] [place] [address]  Phone: [phone]  Fax: [phone]  Follow Up Time:     Santiago Norman  Vascular Surgery  [address] [place] [address]  Phone: [phone]  Fax: [phone]  Follow Up Time:     Viki Gray  Neurology  [address] [facility], [address]  [place] [address]  Phone: [phone]  Fax: [phone]  Follow Up Time:

## 2024-10-04 NOTE — DISCHARGE NOTE PROVIDER - NSDCFUSCHEDAPPT_GEN_ALL_CORE_FT
[facility]  PODIATRY [address]  Scheduled Appointment: 12/02/2024

## 2024-10-04 NOTE — PROGRESS NOTE ADULT - PROBLEM SELECTOR PLAN 2
ISS  diab diet  monitor BS   A1C
ISS  diab diet  monitor BS   A1C 8%
ISS  diab diet  monitor BS   A1C
ISS  diab diet  monitor BS   A1C 8%
ISS

## 2024-10-04 NOTE — PROGRESS NOTE ADULT - PROBLEM SELECTOR PROBLEM 3
Parkinson disease

## 2024-10-04 NOTE — PROGRESS NOTE ADULT - PROBLEM SELECTOR PLAN 3
c/w daily sinemet

## 2024-10-04 NOTE — DISCHARGE NOTE PROVIDER - CARE PROVIDERS DIRECT ADDRESSES
,DirectAddress_Unknown,[email or web address] ,DirectAddress_Unknown,[email or web address],DirectAddress_Unknown

## 2024-10-04 NOTE — DISCHARGE NOTE PROVIDER - PROVIDER TOKENS
PROVIDER:[TOKEN:[[id number]]],PROVIDER:[TOKEN:[[id number]:[id number]]] PROVIDER:[TOKEN:[[id number]]],PROVIDER:[TOKEN:[[id number]]],PROVIDER:[TOKEN:[[id number]]],PROVIDER:[TOKEN:[[id number]]]

## 2024-10-04 NOTE — PROGRESS NOTE ADULT - PROBLEM SELECTOR PLAN 6
resume home meds
pharmacy emailed for med rec as  unable to confirm  most meds
resume home meds

## 2024-10-04 NOTE — DISCHARGE NOTE NURSING/CASE MANAGEMENT/SOCIAL WORK - NSDCPEFALRISK_GEN_ALL_CORE
For information on Fall & Injury Prevention, visit: [email or web address] OR  [email or web address] OR  https://www.cdc.gov/steadi/patient.html

## 2024-10-04 NOTE — PROGRESS NOTE ADULT - PROVIDER SPECIALTY LIST ADULT
Cardiology
Neurology
Vascular Surgery
Cardiology
Internal Medicine
Internal Medicine
Neurology
Vascular Surgery
Cardiology
Internal Medicine
Nephrology
Nephrology
Vascular Surgery
Cardiology
Nephrology
Neurology
Neurology
Internal Medicine

## 2024-10-04 NOTE — DISCHARGE NOTE NURSING/CASE MANAGEMENT/SOCIAL WORK - NSDCVIVACCINE_GEN_ALL_CORE_FT
Tdap; 02-Nov-2021 17:55; Marychuy Farrell (REGINE); [employer]; [id number] (Exp. Date: 29-Jun-2023); IntraMuscular; Deltoid Left.; 0.5 milliLiter(s); VIS (VIS Published: 09-May-2013, VIS Presented: 02-Nov-2021);

## 2024-10-04 NOTE — PROGRESS NOTE ADULT - NSPROGADDITIONALINFOA_GEN_ALL_CORE
I reviewed the overnight course of events on the unit, re-confirming the patient history. I discussed the care with the patient and their family. The plan of care was discussed with the ACP team and modifications were made to the notation where appropriate. Differential diagnosis and plan of care discussed with patient after the evaluation. Advanced care planning was discussed with patient and family.  Advanced care planning forms were reviewed and discussed.  Risks, benefits and alternatives of cardiac procedures were discussed in detail and all questions were answered. 35 minutes spent on total encounter of which more than fifty percent of the encounter was spent counseling and/or coordinating care by the attending physician.
Code GIOVANNI. lethargic after.  now back to baseline, awake alert.   sugars stable. monitor mental status,    outpt vascular f/u for possible carotid endarterectomy.   Physical therapy: dc planning to rehab.  d/w WHITNEY Dorsey.     --- Coverage for Dr. Urena ---  - Dr. ASHLEY Bhardwaj   - [phone]
Code GIOVANNI. lethargic this am post meds.  sugars stable. monitor mental status, if remain sleepy, repeat CT head.   if recurrent agitations, then would need psyc consult.   Physical therapy. Out of bed to chair with assistance.   d/w WHITNEY Kate.     --- Coverage for Dr. Urena ---  - Dr. ASHLEY Mcqueen   - [phone]

## 2024-10-04 NOTE — DISCHARGE NOTE PROVIDER - NSFOLLOWUPCLINICS_GEN_ALL_ED_FT
[facility]  Neurology  [address] [address]  Phone: [phone]  Fax:     [facility]  General Surgery  [address] [address]  Phone: [phone]  Fax:      [facility]  Neurology  [address] [place] [address] [address] [place] [address]  Phone: [phone]  Fax:      Nsaids Counseling: NSAID Counseling: I discussed with the patient that NSAIDs should be taken with food. Prolonged use of NSAIDs can result in the development of stomach ulcers.  Patient advised to stop taking NSAIDs if abdominal pain occurs.  The patient verbalized understanding of the proper use and possible adverse effects of NSAIDs.  All of the patient's questions and concerns were addressed.

## 2024-10-04 NOTE — PROGRESS NOTE ADULT - SUBJECTIVE AND OBJECTIVE BOX
SUBJECTIVE/ OVERNIGHT EVENTS:  --- Coverage for Dr. Urena ---  pt now awake alert  feels well.  comfortable  no cp, no sob, no n/v/d. no abdominal pain.  no headache, no dizziness.   denied pain.       --------------------------------------------------------------------------------------------  LABS:                        13.3   5.09  )-----------( 275      ( 04 Oct 2024 06:58 )             41.4     10-04    141  |  104  |  17  ----------------------------<  185[H]  4.0   |  21[L]  |  0.63    Ca    9.4      04 Oct 2024 06:58  Phos  4.4     10-04  Mg     2.10     10-04        CAPILLARY BLOOD GLUCOSE      POCT Blood Glucose.: 329 mg/dL (04 Oct 2024 12:30)  POCT Blood Glucose.: 184 mg/dL (04 Oct 2024 08:58)  POCT Blood Glucose.: 353 mg/dL (03 Oct 2024 21:50)  POCT Blood Glucose.: 201 mg/dL (03 Oct 2024 19:36)  POCT Blood Glucose.: 218 mg/dL (03 Oct 2024 17:29)        Urinalysis Basic - ( 04 Oct 2024 06:58 )    Color: x / Appearance: x / SG: x / pH: x  Gluc: 185 mg/dL / Ketone: x  / Bili: x / Urobili: x   Blood: x / Protein: x / Nitrite: x   Leuk Esterase: x / RBC: x / WBC x   Sq Epi: x / Non Sq Epi: x / Bacteria: x        RADIOLOGY & ADDITIONAL TESTS:    Imaging Personally Reviewed:  [x] YES  [ ] NO    Consultant(s) Notes Reviewed:  [x] YES  [ ] NO    MEDICATIONS  (STANDING):  aspirin enteric coated 81 milliGRAM(s) Oral daily  atorvastatin 40 milliGRAM(s) Oral at bedtime  carbidopa/levodopa  25/100 1 Tablet(s) Oral daily  clopidogrel Tablet 75 milliGRAM(s) Oral daily  dextrose 5%. 1000 milliLiter(s) (50 mL/Hr) IV Continuous <Continuous>  dextrose 5%. 1000 milliLiter(s) (100 mL/Hr) IV Continuous <Continuous>  dextrose 50% Injectable 12.5 Gram(s) IV Push once  dextrose 50% Injectable 25 Gram(s) IV Push once  dextrose 50% Injectable 25 Gram(s) IV Push once  doxazosin 2 milliGRAM(s) Oral at bedtime  enoxaparin Injectable 40 milliGRAM(s) SubCutaneous every 24 hours  gabapentin 100 milliGRAM(s) Oral three times a day  glucagon  Injectable 1 milliGRAM(s) IntraMuscular once  influenza  Vaccine (HIGH DOSE) 0.5 milliLiter(s) IntraMuscular once  insulin glargine Injectable (LANTUS) 20 Unit(s) SubCutaneous at bedtime  insulin lispro (ADMELOG) corrective regimen sliding scale   SubCutaneous at bedtime  insulin lispro (ADMELOG) corrective regimen sliding scale   SubCutaneous three times a day before meals  isosorbide   mononitrate ER Tablet (IMDUR) 30 milliGRAM(s) Oral daily  losartan 25 milliGRAM(s) Oral daily  melatonin 3 milliGRAM(s) Oral at bedtime  metoprolol tartrate 50 milliGRAM(s) Oral two times a day  NIFEdipine XL 60 milliGRAM(s) Oral daily  senna 2 Tablet(s) Oral at bedtime    MEDICATIONS  (PRN):  dextrose Oral Gel 15 Gram(s) Oral once PRN Blood Glucose LESS THAN 70 milliGRAM(s)/deciliter      Care Discussed with Consultants/Other Providers [x] YES  [ ] NO    Vital Signs Last 24 Hrs  T(C): 36.4 (04 Oct 2024 11:54), Max: 36.9 (03 Oct 2024 22:00)  T(F): 97.5 (04 Oct 2024 11:54), Max: 98.4 (03 Oct 2024 22:00)  HR: 75 (04 Oct 2024 11:54) (61 - 103)  BP: 146/63 (04 Oct 2024 11:54) (146/63 - 151/85)  BP(mean): --  RR: 17 (04 Oct 2024 11:54) (17 - 18)  SpO2: 100% (04 Oct 2024 11:54) (99% - 100%)    Parameters below as of 04 Oct 2024 06:00  Patient On (Oxygen Delivery Method): room air      I&O's Summary        PHYSICAL EXAM:  GENERAL: NAD, comfortable, now awake alert, pleasant.   HEAD:  Atraumatic, Normocephalic  EYES: EOMI, PERRLA, conjunctiva and sclera clear  NECK: Supple, No JVD  CHEST/LUNG: mild decrease breath sounds bilaterally; No wheeze   HEART: Regular rate and rhythm; No murmurs, rubs, or gallops  ABDOMEN: Soft, Nontender, Nondistended; Bowel sounds present  Neuro: AAOx3, awake alert. poor historian.   EXTREMITIES:  2+ Peripheral Pulses, No clubbing, cyanosis, or edema  SKIN: No rashes or lesions

## 2024-10-04 NOTE — DISCHARGE NOTE PROVIDER - HOSPITAL COURSE
63 Y/o Female previously on ASA81/Plvx75 for diffuse ICAD, hx HTN, HLD, uncontrolled DM2 x15 yrs, sickle cell trait, CAD s/p 4 stents and CABG, recent p/f Lt hand/arm numb (8/15/24), MRI w/ Rt side strokes, angio w/ Dr. Pulliam 8/23/24 w/ Rt distal cav/supraclinoid ICA svr stenosis, LICA occlusion w/ distal recon, also read on NOVA 8/16. Was d/c'd on DAPT 8/24 by neuro. Now p/w new Lt sided sx, MRI 9/23/24 w/ new RMCA terr strokes (appear embolic/watershed). Exam: AOx3, LUE drift, Lt facial numbness V1-V3, mild L facial, L HG 3/5 o/w LUE 4+/5, LLE 4+/5 (feels heavy, LUE finger paresthesias.    AMS  - EEG w/o seizures  - TTE w/ EF 69%, mild LV diastolic dysfunction, trace MR, c/w extracardiac shunt  - carotid duplex w/ R ICA 50-79% stenosis, L ICA <50%  - ILR & stents [facility] @ [facility]  - neuro: c/w ASA, statin; recs MRI head which showed acute tiny right basal ganglia infarct, P2y12 is 222 (within normal limits)  - 10/3: GIOVANNI, rpt CTH neg, patient is now A&Ox3     Carotid stenosis   - carotid duplex with R ICA 50-79% stenosis and L ICA 16-49% stenosis   - CT reviewed w/ Severe right and moderate left internal carotid artery origin stenosis, as above. No occlusion or dissection.  - will plan for outpatient right carotid endarterectomy with patch angioplasty, with neuromonitoring on 10/14  - Nephrology recs CTA AP - negative for KODY  - Cardiology provided procedural risk stratification and rec increasing nifedipine to 60mg QD  - c/w asa/plavix  - vascular surgery to follow outpatient    DM (diabetes mellitus).   - ISS  - diab diet  - monitor BS   - A1C 8%    Parkinson disease.   - c/w daily sinemet    CAD (coronary artery disease).   - c/w dapt    On 10/4/24, case was discussed with , patient is medically cleared and optimized for discharge today. All medications were reviewed with attending, and sent to mutually agreed upon pharmacy. 65 Y/o Female previously on ASA81/Plvx75 for diffuse ICAD, hx HTN, HLD, uncontrolled DM2 x15 yrs, sickle cell trait, CAD s/p 4 stents and CABG, recent p/f Lt hand/arm numb (8/15/24), MRI w/ Rt side strokes, angio w/ Dr. Pulliam 8/23/24 w/ Rt distal cav/supraclinoid ICA svr stenosis, LICA occlusion w/ distal recon, also read on NOVA 8/16. Was d/c'd on DAPT 8/24 by neuro. Now p/w new Lt sided sx, MRI 9/23/24 w/ new RMCA terr strokes (appear embolic/watershed). Exam: AOx3, LUE drift, Lt facial numbness V1-V3, mild L facial, L HG 3/5 o/w LUE 4+/5, LLE 4+/5 (feels heavy, LUE finger paresthesias.    AMS  - EEG w/o seizures  - TTE w/ EF 69%, mild LV diastolic dysfunction, trace MR, c/w extracardiac shunt  - carotid duplex w/ R ICA 50-79% stenosis, L ICA <50%  - ILR & stents placed @ [facility]  - neuro: c/w ASA, statin; recs MRI head which showed acute tiny right basal ganglia infarct, P2y12 is 222 (within normal limits)  - 10/3: GIOVANNI, rpt CTH neg, patient is now A&Ox3     Carotid stenosis   - carotid duplex with R ICA 50-79% stenosis and L ICA 16-49% stenosis   - CT reviewed w/ Severe right and moderate left internal carotid artery origin stenosis, as above. No occlusion or dissection.  - will plan for outpatient right carotid endarterectomy with patch angioplasty, with neuromonitoring on 10/14  - Nephrology recs CTA AP - negative for KODY  - Cardiology provided procedural risk stratification and rec increasing nifedipine to 60mg QD  - c/w asa/plavix  - vascular surgery to follow outpatient.    DM (diabetes mellitus).   - ISS  - diab diet  - monitor BS   - A1C 8%    Parkinson disease.   - c/w daily sinemet    CAD (coronary artery disease).   - c/w dapt    On 10/4/24, case was discussed with , patient is medically cleared and optimized for discharge today. All medications were reviewed with attending, and sent to mutually agreed upon pharmacy.

## 2024-10-04 NOTE — DISCHARGE NOTE PROVIDER - NSDCCPCAREPLAN_GEN_ALL_CORE_FT
PRINCIPAL DISCHARGE DIAGNOSIS  Diagnosis: Altered mental state  Assessment and Plan of Treatment: You came to the hospital for altered mental status. You were seen by the neurology team. EEG (brain study) was negative for seizures. Ultrasound of the heart was negative for poor pumping function. You had an MRI which showed a small stroke. CTH showed no acute changes. Continue medications as prescribed. Follow up with your primary care doctor and neurologist for further evaluation and management. Please call to make an appointment within 1-2 weeks of discharge.   Call your local emergency number (911 in the [place]) if:   •You have trouble breathing or shortness of breath at rest.  •You have chest pain or pressure that lasts longer than 5 minutes.  •You become confused or hard to wake.  •Your lips or face are blue.  Seek care immediately if:   •You have a fever of 104°F (40°C) or higher.      SECONDARY DISCHARGE DIAGNOSES  Diagnosis: Carotid stenosis  Assessment and Plan of Treatment: You were found to have carotid stenosis (narrow blood vessels) on an ultrasound of your neck and CAT Scan of your neck. You were seen by the vascular surgery team. You have an outpatient appointment established for 10/14/24. Cardiology increased the dose of your medication nifedipine. Continue medications as prescribed. Follow up with your primary care doctor, cardiologist, and vascular surgeon for further evaluation and management. Please call to make an appointment within 1-2 weeks of discharge.

## 2024-10-04 NOTE — DISCHARGE NOTE PROVIDER - NSDCMRMEDTOKEN_GEN_ALL_CORE_FT
Afrezza 12 units inhalation powder: 12 unit(s) inhaled 3 times a day  aspirin 81 mg oral delayed release tablet: 1 tab(s) orally once a day  atorvastatin 40 mg oral tablet: 1 tab(s) orally once a day (at bedtime)  Basaglar KwikPen 100 units/mL subcutaneous solution: 20 unit(s) subcutaneous 2 times a day  carbidopa-levodopa 25 mg-100 mg oral tablet: 1 tab(s) orally once a day  clopidogrel 75 mg oral tablet: 1 tab(s) orally once a day  doxazosin 2 mg oral tablet: 1 tab(s) orally once a day (at bedtime)  gabapentin 100 mg oral capsule: 1 cap(s) orally 3 times a day  isosorbide mononitrate 30 mg oral tablet, extended release: 1 tab(s) orally once a day  Janumet 50 mg-500 mg oral tablet: 1 tab(s) orally 2 times a day  losartan 25 mg oral tablet: 1 tab(s) orally once a day  metoprolol tartrate 50 mg oral tablet: 1 tab(s) orally 2 times a day  NIFEdipine 60 mg oral tablet, extended release: 1 tab(s) orally once a day  senna leaf extract oral tablet: 2 tab(s) orally once a day (at bedtime)

## 2024-10-04 NOTE — PROGRESS NOTE ADULT - PROBLEM SELECTOR PLAN 1
- EEG neg (similar presentation in June with neg eeg), place on seizure, aspiration, fall precautions  - neuro eval appreciated.   - swallow eval. mince and moist diabetic diet, as pt passed dyspragia screen (diet based on order form [facility] in June S&S recommending minced diet)  - vascular eval for carotid disease. no plan for OR inpatient this time, discussed with team   - Car Rt US noted   - brain MRI, noted, acute small ischemic stroke basal ganglian.   - plan for OR outpatient for carotid stenosis.   - neuro follow up.  p2y12 - 222  - c/w Asa, Plavix, statin.
-low suspicion for stroke;  -would reorder eeg (similar  presentation in June with neg eeg), place on seizure, aspiration, fall precautions  -await full neurology team input before ordering additional studies per stroke protocol given low suspicion, although relevant orders within stroke order set to be placed (eg neuro checks, dysphagia screen etc)  - mince and moist diabetic diet, as  pt passed dyspragia screen (diet based on order form [facility] in June S&S recommending minced diet)  - vascular eval for carotid disease. no plan for OR, discussed with team   - Car ARt US noted   - Check brain MRI
-low suspicion for stroke;  -would reorder eeg (similar  presentation in June with neg eeg), place on seizure, aspiration, fall precautions  -await full neurology team input before ordering additional studies per stroke protocol given low suspicion, although relevant orders within stroke order set to be placed (eg neuro checks, dysphagia screen etc)  - mince and moist diabetic diet, as  pt passed dyspragia screen (diet based on order form [facility] in June S&S recommending minced diet)  -UA ordered  - vascular eval for carotid diseas s
-low suspicion for stroke;  -would reorder eeg (similar  presentation in June with neg eeg), place on seizure, aspiration, fall precautions  -await full neurology team input before ordering additional studies per stroke protocol given low suspicion, although relevant orders within stroke order set to be placed (eg neuro checks, dysphagia screen etc)  - mince and moist diabetic diet, as  pt passed dyspragia screen (diet based on order form [facility] in June S&S recommending minced diet)  - vascular eval for carotid disease. no plan for OR, discussed with team   - Car ARt US noted   - Check brain MRI, noted, acute small ischemic stroke   - plan for OR outpatient, patient is medically optimized for OR, elevated riskl, card optimization
-low suspicion for stroke;  -would reorder eeg (similar  presentation in June with neg eeg), place on seizure, aspiration, fall precautions  -await full neurology team input before ordering additional studies per stroke protocol given low suspicion, although relevant orders within stroke order set to be placed (eg neuro checks, dysphagia screen etc)  - mince and moist diabetic diet, as  pt passed dyspragia screen (diet based on order form [facility] in June S&S recommending minced diet)  - vascular eval for carotid disease  - Car ARt US  - Check brain MRI
-low suspicion for stroke;  -would reorder eeg (similar  presentation in June with neg eeg), place on seizure, aspiration, fall precautions  -await full neurology team input before ordering additional studies per stroke protocol given low suspicion, although relevant orders within stroke order set to be placed (eg neuro checks, dysphagia screen etc)  - mince and moist diabetic diet, as  pt passed dyspragia screen (diet based on order form [facility] in June S&S recommending minced diet)  - vascular eval for carotid disease. no plan for OR, discussed with team   - Car ARt US noted   - Check brain MRI
- EEG neg (similar  presentation in June with neg eeg), place on seizure, aspiration, fall precautions  - neuro eval appreciated.   - swallow eval. mince and moist diabetic diet, as pt passed dyspragia screen (diet based on order form [facility] in June S&S recommending minced diet)  - vascular eval for carotid disease. no plan for OR, discussed with team   - Car Rt US noted   - brain MRI, noted, acute small ischemic stroke basal ganglian.   - plan for OR outpatient for carotid stenosis.   - neuro follow up.  p2y12 - 222  - c/w Asa, Plavix, statin.
-low suspicion for stroke;  -would reorder eeg (similar  presentation in June with neg eeg), place on seizure, aspiration, fall precautions  -await full neurology team input before ordering additional studies per stroke protocol given low suspicion, although relevant orders within stroke order set to be placed (eg neuro checks, dysphagia screen etc)  - mince and moist diabetic diet, as  pt passed dyspragia screen (diet based on order form [facility] in June S&S recommending minced diet)  - vascular eval for carotid disease. no plan for OR, discussed with team   - Car ARt US noted   - Check brain MRI
-low suspicion for stroke;  -would reorder eeg (similar  presentation in June with neg eeg), place on seizure, aspiration, fall precautions  -await full neurology team input before ordering additional studies per stroke protocol given low suspicion, although relevant orders within stroke order set to be placed (eg neuro checks, dysphagia screen etc)  - mince and moist diabetic diet, as  pt passed dyspragia screen (diet based on order form [facility] in June S&S recommending minced diet)  - vascular eval for carotid disease. no plan for OR, discussed with team   - Car ARt US noted   - Check brain MRI
-low suspicion for stroke;  -would reorder eeg (similar  presentation in June with neg eeg), place on seizure, aspiration, fall precautions  -await full neurology team input before ordering additional studies per stroke protocol given low suspicion, although relevant orders within stroke order set to be placed (eg neuro checks, dysphagia screen etc)  - mince and moist diabetic diet, as  pt passed dyspragia screen (diet based on order form [facility] in June S&S recommending minced diet)  - vascular eval for carotid disease. no plan for OR, discussed with team   - Car ARt US noted   - Check brain MRI
-low suspicion for stroke;  -would reorder eeg (similar  presentation in June with neg eeg), place on seizure, aspiration, fall precautions  -await full neurology team input before ordering additional studies per stroke protocol given low suspicion, although relevant orders within stroke order set to be placed (eg neuro checks, dysphagia screen etc)  - mince and moist diabetic diet, as  pt passed dyspragia screen (diet based on order form [facility] in June S&S recommending minced diet)  - vascular eval for carotid disease. no plan for OR, discussed with team   - Car ARt US noted   - Check brain MRI

## 2024-10-04 NOTE — PROGRESS NOTE ADULT - PROBLEM SELECTOR PROBLEM 5
Encounter for deep vein thrombosis (DVT) prophylaxis

## 2024-10-04 NOTE — CHART NOTE - NSCHARTNOTEFT_GEN_A_CORE
Patient s/p code GIOVANNI on 10/3 and repeat CT of head with No acute intracranial hemorrhage, mass effect, or shift of the midline structures. Seen at bedside and A&O x3 to person, place, and thing. Spoke with medicine attending, cleared for discharge to [facility].

## 2024-10-04 NOTE — PROGRESS NOTE ADULT - ASSESSMENT
65 Y/o Female previously on ASA81/Plvx75 for diffuse ICAD, hx HTN, HLD, uncontrolled DM2 x15 yrs, sickle cell trait, CAD s/p 4 stents and CABG, recent p/f Lt hand/arm numb (8/15/24), MRI w/ Rt side strokes, angio w/ Dr. Pulliam 8/23/24 w/ Rt distal cav/supraclinoid ICA svr stenosis, LICA occlusion w/ distal recon, also read on NOVA 8/16. Was d/c'd on DAPT 8/24 by neuro. Now p/w new Lt sided sx, MRI 9/23/24 w/ new RMCA terr strokes (appear embolic/watershed). Exam: AOx3, LUE drift, Lt facial numbness V1-V3, mild L facial, L HG 3/5 o/w LUE 4+/5, LLE 4+/5 (feels heavy, LUE finger paresthesias.

## 2024-10-04 NOTE — PROGRESS NOTE ADULT - PROBLEM SELECTOR PROBLEM 1
Altered mental state

## 2024-10-04 NOTE — DISCHARGE NOTE NURSING/CASE MANAGEMENT/SOCIAL WORK - PATIENT PORTAL LINK FT
You can access the FollowMyHealth Patient Portal offered by [facility] by registering at the following website: [email or web address]. By joining [facility]’s FollowMyHealth portal, you will also be able to view your health information using other applications (apps) compatible with our system.

## 2024-10-11 NOTE — ASU PATIENT PROFILE, ADULT - FALL HARM RISK - HARM RISK INTERVENTIONS
Assistance with ambulation/Assistance OOB with selected safe patient handling equipment/Communicate Risk of Fall with Harm to all staff/Discuss with provider need for PT consult/Monitor gait and stability/Reinforce activity limits and safety measures with patient and family/Tailored Fall Risk Interventions/Visual Cue: Yellow wristband and red socks/Bed in lowest position, wheels locked, appropriate side rails in place/Call bell, personal items and telephone in reach/Instruct patient to call for assistance before getting out of bed or chair/Non-slip footwear when patient is out of bed/[place] to call system/Physically safe environment - no spills, clutter or unnecessary equipment/Purposeful Proactive Rounding/Room/bathroom lighting operational, light cord in reach

## 2024-10-14 ENCOUNTER — TRANSCRIPTION ENCOUNTER (OUTPATIENT)
Age: 64
End: 2024-10-14

## 2024-10-14 ENCOUNTER — APPOINTMENT (OUTPATIENT)
Dept: VASCULAR SURGERY | Facility: HOSPITAL | Age: 64
End: 2024-10-14

## 2024-10-14 ENCOUNTER — INPATIENT (INPATIENT)
Facility: HOSPITAL | Age: 64
LOS: 0 days | Discharge: HOME HEALTH | End: 2024-10-15
Attending: STUDENT IN AN ORGANIZED HEALTH CARE EDUCATION/TRAINING PROGRAM | Admitting: STUDENT IN AN ORGANIZED HEALTH CARE EDUCATION/TRAINING PROGRAM
Payer: MEDICARE

## 2024-10-14 ENCOUNTER — RESULT REVIEW (OUTPATIENT)
Age: 64
End: 2024-10-14

## 2024-10-14 VITALS
SYSTOLIC BLOOD PRESSURE: 130 MMHG | RESPIRATION RATE: 17 BRPM | HEIGHT: 62 IN | DIASTOLIC BLOOD PRESSURE: 72 MMHG | HEART RATE: 68 BPM | OXYGEN SATURATION: 100 % | WEIGHT: 136.91 LBS | TEMPERATURE: 98 F

## 2024-10-14 DIAGNOSIS — Z90.710 ACQUIRED ABSENCE OF BOTH CERVIX AND UTERUS: Chronic | ICD-10-CM

## 2024-10-14 DIAGNOSIS — I65.21 OCCLUSION AND STENOSIS OF RIGHT CAROTID ARTERY: ICD-10-CM

## 2024-10-14 DIAGNOSIS — I65.23 OCCLUSION AND STENOSIS OF BILATERAL CAROTID ARTERIES: ICD-10-CM

## 2024-10-14 LAB
ANION GAP SERPL CALC-SCNC: 14 MMOL/L — SIGNIFICANT CHANGE UP (ref 7–14)
BUN SERPL-MCNC: 17 MG/DL — SIGNIFICANT CHANGE UP (ref 7–23)
CALCIUM SERPL-MCNC: 8.7 MG/DL — SIGNIFICANT CHANGE UP (ref 8.4–10.5)
CHLORIDE SERPL-SCNC: 103 MMOL/L — SIGNIFICANT CHANGE UP (ref 98–107)
CO2 SERPL-SCNC: 22 MMOL/L — SIGNIFICANT CHANGE UP (ref 22–31)
CREAT SERPL-MCNC: 0.59 MG/DL — SIGNIFICANT CHANGE UP (ref 0.5–1.3)
EGFR: 99 ML/MIN/1.73M2 — SIGNIFICANT CHANGE UP
GLUCOSE BLDC GLUCOMTR-MCNC: 182 MG/DL — HIGH (ref 70–99)
GLUCOSE BLDC GLUCOMTR-MCNC: 190 MG/DL — HIGH (ref 70–99)
GLUCOSE BLDC GLUCOMTR-MCNC: 218 MG/DL — HIGH (ref 70–99)
GLUCOSE BLDC GLUCOMTR-MCNC: 225 MG/DL — HIGH (ref 70–99)
GLUCOSE SERPL-MCNC: 237 MG/DL — HIGH (ref 70–99)
MAGNESIUM SERPL-MCNC: 1.6 MG/DL — SIGNIFICANT CHANGE UP (ref 1.6–2.6)
PHOSPHATE SERPL-MCNC: 4.9 MG/DL — HIGH (ref 2.5–4.5)
POTASSIUM SERPL-MCNC: 4.1 MMOL/L — SIGNIFICANT CHANGE UP (ref 3.5–5.3)
POTASSIUM SERPL-SCNC: 4.1 MMOL/L — SIGNIFICANT CHANGE UP (ref 3.5–5.3)
SODIUM SERPL-SCNC: 139 MMOL/L — SIGNIFICANT CHANGE UP (ref 135–145)

## 2024-10-14 PROCEDURE — 88300 SURGICAL PATH GROSS: CPT | Mod: 26

## 2024-10-14 PROCEDURE — 35301 RECHANNELING OF ARTERY: CPT | Mod: RT

## 2024-10-14 DEVICE — PATCH VASC PERIPHERAL 0.8X8CM: Type: IMPLANTABLE DEVICE | Site: RIGHT | Status: FUNCTIONAL

## 2024-10-14 DEVICE — SHUNT ARGYLE CAROTID ARTERY KIT 6": Type: IMPLANTABLE DEVICE | Site: RIGHT | Status: FUNCTIONAL

## 2024-10-14 DEVICE — SURGICEL FIBRILLAR 2 X 4": Type: IMPLANTABLE DEVICE | Site: RIGHT | Status: FUNCTIONAL

## 2024-10-14 DEVICE — SURGICEL NU-KNIT 6 X 9": Type: IMPLANTABLE DEVICE | Site: RIGHT | Status: FUNCTIONAL

## 2024-10-14 DEVICE — LIGATING CLIPS WECK HORIZON MEDIUM (BLUE) 24: Type: IMPLANTABLE DEVICE | Site: RIGHT | Status: FUNCTIONAL

## 2024-10-14 DEVICE — LIGATING CLIPS WECK HORIZON SMALL-WIDE (RED) 24: Type: IMPLANTABLE DEVICE | Site: RIGHT | Status: FUNCTIONAL

## 2024-10-14 DEVICE — SURGIFOAM PAD 8CM X 12.5CM X 2MM (100C): Type: IMPLANTABLE DEVICE | Site: RIGHT | Status: FUNCTIONAL

## 2024-10-14 DEVICE — SURGICEL POWDER 3 GRAMS: Type: IMPLANTABLE DEVICE | Site: RIGHT | Status: FUNCTIONAL

## 2024-10-14 RX ORDER — HYDROMORPHONE HYDROCHLORIDE 1 MG/ML
0.2 INJECTION, SOLUTION INTRAMUSCULAR; INTRAVENOUS; SUBCUTANEOUS
Refills: 0 | Status: DISCONTINUED | OUTPATIENT
Start: 2024-10-14 | End: 2024-10-14

## 2024-10-14 RX ORDER — GLUCAGON INJECTION, SOLUTION 0.5 MG/.1ML
1 INJECTION, SOLUTION SUBCUTANEOUS ONCE
Refills: 0 | Status: DISCONTINUED | OUTPATIENT
Start: 2024-10-14 | End: 2024-10-15

## 2024-10-14 RX ORDER — ACETAMINOPHEN 325 MG
650 TABLET ORAL EVERY 6 HOURS
Refills: 0 | Status: DISCONTINUED | OUTPATIENT
Start: 2024-10-14 | End: 2024-10-15

## 2024-10-14 RX ORDER — ACETAMINOPHEN 325 MG
1000 TABLET ORAL ONCE
Refills: 0 | Status: COMPLETED | OUTPATIENT
Start: 2024-10-14 | End: 2024-10-14

## 2024-10-14 RX ORDER — OXYCODONE HYDROCHLORIDE 30 MG/1
2.5 TABLET, FILM COATED, EXTENDED RELEASE ORAL EVERY 4 HOURS
Refills: 0 | Status: DISCONTINUED | OUTPATIENT
Start: 2024-10-14 | End: 2024-10-15

## 2024-10-14 RX ORDER — HYDROMORPHONE HYDROCHLORIDE 1 MG/ML
0.5 INJECTION, SOLUTION INTRAMUSCULAR; INTRAVENOUS; SUBCUTANEOUS
Refills: 0 | Status: DISCONTINUED | OUTPATIENT
Start: 2024-10-14 | End: 2024-10-14

## 2024-10-14 RX ORDER — GABAPENTIN 800 MG/1
100 TABLET, FILM COATED ORAL THREE TIMES A DAY
Refills: 0 | Status: DISCONTINUED | OUTPATIENT
Start: 2024-10-14 | End: 2024-10-15

## 2024-10-14 RX ORDER — ATORVASTATIN CALCIUM 10 MG/1
40 TABLET, FILM COATED ORAL AT BEDTIME
Refills: 0 | Status: DISCONTINUED | OUTPATIENT
Start: 2024-10-14 | End: 2024-10-15

## 2024-10-14 RX ORDER — MAGNESIUM SULFATE 500 MG/ML
2 VIAL (ML) INJECTION ONCE
Refills: 0 | Status: COMPLETED | OUTPATIENT
Start: 2024-10-14 | End: 2024-10-14

## 2024-10-14 RX ORDER — ALCOHOL ANTISEPTIC PADS
25 PADS, MEDICATED (EA) TOPICAL ONCE
Refills: 0 | Status: DISCONTINUED | OUTPATIENT
Start: 2024-10-14 | End: 2024-10-15

## 2024-10-14 RX ORDER — LABETALOL HYDROCHLORIDE 200 MG/1
10 TABLET, FILM COATED ORAL ONCE
Refills: 0 | Status: DISCONTINUED | OUTPATIENT
Start: 2024-10-14 | End: 2024-10-14

## 2024-10-14 RX ORDER — LABETALOL HYDROCHLORIDE 200 MG/1
10 TABLET, FILM COATED ORAL ONCE
Refills: 0 | Status: COMPLETED | OUTPATIENT
Start: 2024-10-14 | End: 2024-10-14

## 2024-10-14 RX ORDER — ONDANSETRON HCL/PF 4 MG/2 ML
4 VIAL (ML) INJECTION EVERY 6 HOURS
Refills: 0 | Status: DISCONTINUED | OUTPATIENT
Start: 2024-10-14 | End: 2024-10-14

## 2024-10-14 RX ORDER — SENNOSIDES 8.6 MG
2 TABLET ORAL AT BEDTIME
Refills: 0 | Status: DISCONTINUED | OUTPATIENT
Start: 2024-10-14 | End: 2024-10-15

## 2024-10-14 RX ORDER — INSULIN HUMAN 12 1/1
12 POWDER, METERED RESPIRATORY (INHALATION)
Refills: 0 | DISCHARGE

## 2024-10-14 RX ORDER — ASPIRIN 325 MG
81 TABLET ORAL DAILY
Refills: 0 | Status: DISCONTINUED | OUTPATIENT
Start: 2024-10-14 | End: 2024-10-15

## 2024-10-14 RX ORDER — OXYCODONE HYDROCHLORIDE 30 MG/1
5 TABLET, FILM COATED, EXTENDED RELEASE ORAL ONCE
Refills: 0 | Status: DISCONTINUED | OUTPATIENT
Start: 2024-10-14 | End: 2024-10-14

## 2024-10-14 RX ORDER — SITAGLIPTIN AND METFORMIN HYDROCHLORIDE 500; 50 MG/1; MG/1
1 TABLET, FILM COATED ORAL
Refills: 0 | DISCHARGE

## 2024-10-14 RX ORDER — CARBIDOPA/LEVODOPA 25MG-100MG
1 TABLET ORAL DAILY
Refills: 0 | Status: DISCONTINUED | OUTPATIENT
Start: 2024-10-14 | End: 2024-10-15

## 2024-10-14 RX ORDER — INSULIN LISPRO 100/ML
VIAL (ML) SUBCUTANEOUS AT BEDTIME
Refills: 0 | Status: DISCONTINUED | OUTPATIENT
Start: 2024-10-14 | End: 2024-10-15

## 2024-10-14 RX ORDER — SODIUM CHLORIDE IRRIG SOLUTION 0.9 %
1000 SOLUTION, IRRIGATION IRRIGATION
Refills: 0 | Status: DISCONTINUED | OUTPATIENT
Start: 2024-10-14 | End: 2024-10-15

## 2024-10-14 RX ORDER — INSULIN GLARGINE 300 U/ML
20 INJECTION, SOLUTION SUBCUTANEOUS
Refills: 0 | DISCHARGE

## 2024-10-14 RX ORDER — HYDROMORPHONE HYDROCHLORIDE 1 MG/ML
0.3 INJECTION, SOLUTION INTRAMUSCULAR; INTRAVENOUS; SUBCUTANEOUS
Refills: 0 | Status: DISCONTINUED | OUTPATIENT
Start: 2024-10-14 | End: 2024-10-14

## 2024-10-14 RX ORDER — ALCOHOL ANTISEPTIC PADS
15 PADS, MEDICATED (EA) TOPICAL ONCE
Refills: 0 | Status: DISCONTINUED | OUTPATIENT
Start: 2024-10-14 | End: 2024-10-15

## 2024-10-14 RX ORDER — POTASSIUM CHLORIDE, SODIUM CHLORIDE, CALCIUM CHLORIDE, SODIUM LACTATE, AND DEXTROSE MONOHYDRATE 1.79; 6; .2; 3.1; 5 G/1000ML; G/1000ML; G/1000ML; G/1000ML; G/1000ML
1000 INJECTION, SOLUTION INTRAVENOUS
Refills: 0 | Status: DISCONTINUED | OUTPATIENT
Start: 2024-10-14 | End: 2024-10-15

## 2024-10-14 RX ORDER — INSULIN LISPRO 100/ML
VIAL (ML) SUBCUTANEOUS
Refills: 0 | Status: DISCONTINUED | OUTPATIENT
Start: 2024-10-14 | End: 2024-10-15

## 2024-10-14 RX ORDER — ALCOHOL ANTISEPTIC PADS
12.5 PADS, MEDICATED (EA) TOPICAL ONCE
Refills: 0 | Status: DISCONTINUED | OUTPATIENT
Start: 2024-10-14 | End: 2024-10-15

## 2024-10-14 RX ADMIN — LABETALOL HYDROCHLORIDE 10 MILLIGRAM(S): 200 TABLET, FILM COATED ORAL at 14:55

## 2024-10-14 RX ADMIN — Medication 1000 MILLIGRAM(S): at 21:05

## 2024-10-14 RX ADMIN — HYDROMORPHONE HYDROCHLORIDE 0.5 MILLIGRAM(S): 1 INJECTION, SOLUTION INTRAMUSCULAR; INTRAVENOUS; SUBCUTANEOUS at 13:55

## 2024-10-14 RX ADMIN — HYDROMORPHONE HYDROCHLORIDE 0.2 MILLIGRAM(S): 1 INJECTION, SOLUTION INTRAMUSCULAR; INTRAVENOUS; SUBCUTANEOUS at 15:45

## 2024-10-14 RX ADMIN — HYDROMORPHONE HYDROCHLORIDE 0.5 MILLIGRAM(S): 1 INJECTION, SOLUTION INTRAMUSCULAR; INTRAVENOUS; SUBCUTANEOUS at 13:40

## 2024-10-14 RX ADMIN — Medication 2: at 16:19

## 2024-10-14 RX ADMIN — POTASSIUM CHLORIDE, SODIUM CHLORIDE, CALCIUM CHLORIDE, SODIUM LACTATE, AND DEXTROSE MONOHYDRATE 75 MILLILITER(S): 1.79; 6; .2; 3.1; 5 INJECTION, SOLUTION INTRAVENOUS at 14:40

## 2024-10-14 RX ADMIN — HYDROMORPHONE HYDROCHLORIDE 0.2 MILLIGRAM(S): 1 INJECTION, SOLUTION INTRAMUSCULAR; INTRAVENOUS; SUBCUTANEOUS at 16:00

## 2024-10-14 RX ADMIN — LABETALOL HYDROCHLORIDE 10 MILLIGRAM(S): 200 TABLET, FILM COATED ORAL at 20:07

## 2024-10-14 RX ADMIN — OXYCODONE HYDROCHLORIDE 5 MILLIGRAM(S): 30 TABLET, FILM COATED, EXTENDED RELEASE ORAL at 20:38

## 2024-10-14 RX ADMIN — Medication 25 GRAM(S): at 18:16

## 2024-10-14 RX ADMIN — Medication 400 MILLIGRAM(S): at 20:43

## 2024-10-14 RX ADMIN — OXYCODONE HYDROCHLORIDE 5 MILLIGRAM(S): 30 TABLET, FILM COATED, EXTENDED RELEASE ORAL at 21:00

## 2024-10-14 NOTE — CHART NOTE - NSCHARTNOTEFT_GEN_A_CORE
SURGERY POST OP CHECK    STATUS POST PROCEDURE: R CEA    SUBJECTIVE: Pt seen and examined without complaints. Pain is controlled. Denies CP/SOB/N/V/palpitations        OBJECTIVE:  Vital Signs Last 24 Hrs  T(C): 36.3 (14 Oct 2024 17:00), Max: 37.5 (14 Oct 2024 13:20)  T(F): 97.3 (14 Oct 2024 17:00), Max: 99.5 (14 Oct 2024 13:20)  HR: 101 (14 Oct 2024 18:30) (68 - 104)  BP: 135/73 (14 Oct 2024 18:00) (126/68 - 166/79)  BP(mean): 93 (14 Oct 2024 18:00) (86 - 115)  RR: 18 (14 Oct 2024 18:30) (8 - 21)  SpO2: 100% (14 Oct 2024 18:30) (92% - 100%)    Parameters below as of 14 Oct 2024 15:04  Patient On (Oxygen Delivery Method): room air      I&O's Summary    14 Oct 2024 07:01  -  14 Oct 2024 18:33  --------------------------------------------------------  IN: 300 mL / OUT: 0 mL / NET: 300 mL        PHYSICAL EXAM:  Gen: NAD, baseline dementia but awake, alert, responsive, AO to self, place, month  Pulm: No respiratory distress, no subcostal retractions  CV: tachycardic  Neuo: baseline left sided motor deficits but pupils equal round and reactive, facial sensation intact b/l, tongue with slight right deviation, smiles symmetrically, phonating  UE: squeezes hands b/l, arms flexion and extension intact  LE: wiggles toes to command, extends both legs, plantar flexion and dorsiflexion intact b/l    ASSESSMENT/PLAN: HPI:  65 Y/o Female previously on ASA81/Plvx75 for diffuse ICAD, hx HTN, HLD, uncontrolled DM2 x15 yrs, sickle cell trait, CAD s/p 4 stents and CABG, recent p/f Lt hand/arm numb (8/15/24), MRI w/ Rt side strokes, angio w/ Dr. Pulliam 8/23/24 w/ Rt distal cav/supraclinoid ICA svr stenosis, LICA occlusion w/ distal recon, also read on NOVA 8/16. Was d/c'd on DAPT 8/24 by neuro. Recently admitted [facility] with new Lt sided sx, MRI 9/23/24 w/ new RMCA terr strokes (appear embolic/watershed).  . Pt is s/p ; POD #0 R CEA. Appearing well. Tachycardic but asymptomatic. Recommend anesthesia eval prior to leaving PACU.     - Neuro checks per CEA protocol  - DAPT  - SBP goal 110-160  - Analgesia and antiemetics as needed  - Strict I&O's  - Incentive spirometry  - DVT prophylaxis: SCD  - Monitor overnight

## 2024-10-14 NOTE — ASU PREOP CHECKLIST - 1.
CVA in 2022 ith l;eft residual weakness , internal cardiac monitor on left chest side. CVA in 2022 with left residual weakness , internal cardiac monitor on left chest side.

## 2024-10-14 NOTE — CONSULT NOTE ADULT - NS_MD_PANP_GEN_ALL_CORE
RASHID Rodarte R Ortho Provider Msg Pool; KIARRA Garvey Fp Nurse Msg Pool      Â       Reason for missed visit: pt declined visit today due to holiday     Attempts to reschedule the visit: physical therapist to see patient on Thursday for reassessment     Physician notification: per policy; dr. Breezy Castle office and dr. Betty Powers offices notified Attending and PA/NP shared services statement (NON-critical care):

## 2024-10-15 ENCOUNTER — TRANSCRIPTION ENCOUNTER (OUTPATIENT)
Age: 64
End: 2024-10-15

## 2024-10-15 VITALS
OXYGEN SATURATION: 98 % | TEMPERATURE: 98 F | SYSTOLIC BLOOD PRESSURE: 149 MMHG | DIASTOLIC BLOOD PRESSURE: 95 MMHG | HEART RATE: 98 BPM | RESPIRATION RATE: 17 BRPM

## 2024-10-15 LAB
ANION GAP SERPL CALC-SCNC: 12 MMOL/L — SIGNIFICANT CHANGE UP (ref 7–14)
BUN SERPL-MCNC: 10 MG/DL — SIGNIFICANT CHANGE UP (ref 7–23)
CALCIUM SERPL-MCNC: 8.7 MG/DL — SIGNIFICANT CHANGE UP (ref 8.4–10.5)
CHLORIDE SERPL-SCNC: 104 MMOL/L — SIGNIFICANT CHANGE UP (ref 98–107)
CO2 SERPL-SCNC: 23 MMOL/L — SIGNIFICANT CHANGE UP (ref 22–31)
CREAT SERPL-MCNC: 0.52 MG/DL — SIGNIFICANT CHANGE UP (ref 0.5–1.3)
EGFR: 102 ML/MIN/1.73M2 — SIGNIFICANT CHANGE UP
GLUCOSE BLDC GLUCOMTR-MCNC: 225 MG/DL — HIGH (ref 70–99)
GLUCOSE BLDC GLUCOMTR-MCNC: 237 MG/DL — HIGH (ref 70–99)
GLUCOSE SERPL-MCNC: 258 MG/DL — HIGH (ref 70–99)
HCT VFR BLD CALC: 34.1 % — LOW (ref 34.5–45)
HGB BLD-MCNC: 11.1 G/DL — LOW (ref 11.5–15.5)
MAGNESIUM SERPL-MCNC: 2 MG/DL — SIGNIFICANT CHANGE UP (ref 1.6–2.6)
MCHC RBC-ENTMCNC: 23.7 PG — LOW (ref 27–34)
MCHC RBC-ENTMCNC: 32.6 GM/DL — SIGNIFICANT CHANGE UP (ref 32–36)
MCV RBC AUTO: 72.7 FL — LOW (ref 80–100)
MRSA PCR RESULT.: SIGNIFICANT CHANGE UP
NRBC # BLD: 0 /100 WBCS — SIGNIFICANT CHANGE UP (ref 0–0)
NRBC # FLD: 0 K/UL — SIGNIFICANT CHANGE UP (ref 0–0)
PHOSPHATE SERPL-MCNC: 2.9 MG/DL — SIGNIFICANT CHANGE UP (ref 2.5–4.5)
PLATELET # BLD AUTO: 245 K/UL — SIGNIFICANT CHANGE UP (ref 150–400)
POTASSIUM SERPL-MCNC: 3.9 MMOL/L — SIGNIFICANT CHANGE UP (ref 3.5–5.3)
POTASSIUM SERPL-SCNC: 3.9 MMOL/L — SIGNIFICANT CHANGE UP (ref 3.5–5.3)
RBC # BLD: 4.69 M/UL — SIGNIFICANT CHANGE UP (ref 3.8–5.2)
RBC # FLD: 14.7 % — HIGH (ref 10.3–14.5)
S AUREUS DNA NOSE QL NAA+PROBE: SIGNIFICANT CHANGE UP
SODIUM SERPL-SCNC: 139 MMOL/L — SIGNIFICANT CHANGE UP (ref 135–145)
WBC # BLD: 8.93 K/UL — SIGNIFICANT CHANGE UP (ref 3.8–10.5)
WBC # FLD AUTO: 8.93 K/UL — SIGNIFICANT CHANGE UP (ref 3.8–10.5)

## 2024-10-15 RX ORDER — INFLUENZA VIRUS VACCINE 15; 15; 15; 15 UG/.5ML; UG/.5ML; UG/.5ML; UG/.5ML
0.5 SUSPENSION INTRAMUSCULAR ONCE
Refills: 0 | Status: DISCONTINUED | OUTPATIENT
Start: 2024-10-15 | End: 2024-10-15

## 2024-10-15 RX ORDER — CHLORHEXIDINE GLUCONATE ORAL RINSE 1.2 MG/ML
1 SOLUTION DENTAL DAILY
Refills: 0 | Status: DISCONTINUED | OUTPATIENT
Start: 2024-10-15 | End: 2024-10-15

## 2024-10-15 RX ORDER — ACETAMINOPHEN 325 MG
2 TABLET ORAL
Qty: 0 | Refills: 0 | DISCHARGE
Start: 2024-10-15

## 2024-10-15 RX ADMIN — Medication 81 MILLIGRAM(S): at 13:04

## 2024-10-15 RX ADMIN — Medication 5000 UNIT(S): at 14:40

## 2024-10-15 RX ADMIN — GABAPENTIN 100 MILLIGRAM(S): 800 TABLET, FILM COATED ORAL at 00:21

## 2024-10-15 RX ADMIN — Medication 1 TABLET(S): at 12:56

## 2024-10-15 RX ADMIN — ATORVASTATIN CALCIUM 40 MILLIGRAM(S): 10 TABLET, FILM COATED ORAL at 00:21

## 2024-10-15 RX ADMIN — CHLORHEXIDINE GLUCONATE ORAL RINSE 1 APPLICATION(S): 1.2 SOLUTION DENTAL at 12:58

## 2024-10-15 RX ADMIN — Medication 75 MILLIGRAM(S): at 12:56

## 2024-10-15 RX ADMIN — Medication 5000 UNIT(S): at 00:20

## 2024-10-15 RX ADMIN — GABAPENTIN 100 MILLIGRAM(S): 800 TABLET, FILM COATED ORAL at 13:40

## 2024-10-15 RX ADMIN — GABAPENTIN 100 MILLIGRAM(S): 800 TABLET, FILM COATED ORAL at 07:13

## 2024-10-15 RX ADMIN — Medication 2: at 13:07

## 2024-10-15 RX ADMIN — Medication 5000 UNIT(S): at 07:12

## 2024-10-15 RX ADMIN — POTASSIUM CHLORIDE, SODIUM CHLORIDE, CALCIUM CHLORIDE, SODIUM LACTATE, AND DEXTROSE MONOHYDRATE 75 MILLILITER(S): 1.79; 6; .2; 3.1; 5 INJECTION, SOLUTION INTRAVENOUS at 04:13

## 2024-10-15 RX ADMIN — Medication 2 TABLET(S): at 00:21

## 2024-10-15 RX ADMIN — Medication 2: at 09:41

## 2024-10-15 NOTE — CONSULT NOTE ADULT - SUBJECTIVE AND OBJECTIVE BOX
Johnathan Tejeda MD  Interventional Cardiology / Endovascular Specialist  [place] Office : [phone] [place] [address] [place] [address]  Tel:   [place] Office : [address] [facility] [place] [address]  Tel: [phone]  Cell : [phone]    HISTORY OF PRESENTING ILLNESS:  HPI:  63 Y/o Female previously on ASA81/Plvx75 for diffuse ICAD, hx HTN, HLD, uncontrolled DM2 x15 yrs, sickle cell trait, CAD s/p 4 stents and CABG, recent p/f Lt hand/arm numb (8/15/24), MRI w/ Rt side strokes, angio w/ Dr. Pulliam 8/23/24 w/ Rt distal cav/supraclinoid ICA svr stenosis, LICA occlusion w/ distal recon, also read on NOVA 8/16. Was d/c'd on DAPT 8/24 by neuro. Recently admitted [facility] with new Lt sided sx, MRI 9/23/24 w/ new RMCA terr strokes (appear embolic/watershed).   Pt presents today for LT CEA, s/p Lt CEA, pt seen and examined at bedside.      	  MEDICATIONS:  aspirin enteric coated 81 milliGRAM(s) Oral daily  clopidogrel Tablet 75 milliGRAM(s) Oral daily  heparin   Injectable 5000 Unit(s) SubCutaneous every 8 hours  labetalol Injectable 10 milliGRAM(s) IV Push once  labetalol Injectable 10 milliGRAM(s) IV Push once PRN        acetaminophen     Tablet .. 650 milliGRAM(s) Oral every 6 hours PRN  carbidopa/levodopa  25/100 1 Tablet(s) Oral daily  gabapentin 100 milliGRAM(s) Oral three times a day  HYDROmorphone  Injectable 0.2 milliGRAM(s) IV Push every 10 minutes PRN  HYDROmorphone  Injectable 0.5 milliGRAM(s) IV Push every 10 minutes PRN  ondansetron Injectable 4 milliGRAM(s) IV Push every 6 hours PRN  oxyCODONE    IR 2.5 milliGRAM(s) Oral every 4 hours PRN    senna 2 Tablet(s) Oral at bedtime    atorvastatin 40 milliGRAM(s) Oral at bedtime  dextrose 50% Injectable 25 Gram(s) IV Push once  dextrose 50% Injectable 25 Gram(s) IV Push once  dextrose 50% Injectable 12.5 Gram(s) IV Push once  dextrose Oral Gel 15 Gram(s) Oral once PRN  glucagon  Injectable 1 milliGRAM(s) IntraMuscular once  insulin lispro (ADMELOG) corrective regimen sliding scale   SubCutaneous at bedtime  insulin lispro (ADMELOG) corrective regimen sliding scale   SubCutaneous three times a day before meals    dextrose 5% + sodium chloride 0.45% with potassium chloride 20 mEq/L 1000 milliLiter(s) IV Continuous <Continuous>  dextrose 5%. 1000 milliLiter(s) IV Continuous <Continuous>  dextrose 5%. 1000 milliLiter(s) IV Continuous <Continuous>      PAST MEDICAL/SURGICAL HISTORY  PAST MEDICAL & SURGICAL HISTORY:  HTN (hypertension)      HLD (hyperlipidemia)      CAD (coronary artery disease)      Type II diabetes mellitus      PAD (peripheral artery disease)      Mild dementia      Parkinson disease      S/P hysterectomy          SOCIAL HISTORY: Substance Use (street drugs): ( x ) never used  (  ) other:    FAMILY HISTORY:      PHYSICAL EXAM:  T(C): 36.3 (10-14-24 @ 17:00), Max: 37.5 (10-14-24 @ 13:20)  HR: 100 (10-14-24 @ 17:00) (68 - 104)  BP: 126/68 (10-14-24 @ 17:00) (126/68 - 166/79)  RR: 15 (10-14-24 @ 17:00) (8 - 21)  SpO2: 97% (10-14-24 @ 17:00) (92% - 100%)  Wt(kg): --  I&O's Summary    14 Oct 2024 07:01  -  14 Oct 2024 17:15  --------------------------------------------------------  IN: 300 mL / OUT: 0 mL / NET: 300 mL      Height (cm): 157.5 (10-14 @ 06:50)  Weight (kg): 62.1 (10-14 @ 06:50)  BMI (kg/m2): 25 (10-14 @ 06:50)  BSA (m2): 1.63 (10-14 @ 06:50)    GENERAL: NAD, sedated, Rt neck op site covered  EYES: EOMI, PERRLA, conjunctiva and sclera clear  ENMT: No tonsillar erythema, exudates, or enlargement; Moist mucous membranes, Good dentition, No lesions  Cardiovascular: Normal S1 S2, No JVD, No murmurs, No edema  Respiratory: b/l lung sound decreased to auscultation	  Gastrointestinal:  Soft, Non-tender, + BS	  Extremities: Normal range of motion, No clubbing, cyanosis or edema                        proBNP:   Lipid Profile:   HgA1c:   TSH:     Consultant(s) Notes Reviewed:  [x ] YES  [ ] NO    Care Discussed with Consultants/Other Providers [ x] YES  [ ] NO    Imaging Personally Reviewed independently:  [x] YES  [ ] NO    All labs, radiologic studies, vitals, orders and medications list reviewed. Patient is seen and examined at bedside. Case discussed with medical team.        
Patient is a 63 Y/o Female previously on ASA81/Plvx75 for diffuse ICAD, hx HTN, HLD, uncontrolled DM2 x15 yrs, sickle cell trait, CAD s/p 4 stents and CABG, recent p/f Lt hand/arm numb (8/15/24), MRI w/ Rt side strokes, angio w/ Dr. Pulliam 8/23/24 w/ Rt distal cav/supraclinoid ICA svr stenosis, LICA occlusion w/ distal recon, also read on NOVA 8/16. Was d/c'd on DAPT 8/24 by neuro. Recently admitted [facility] with new Lt sided sx, MRI 9/23/24 w/ new RMCA terr strokes (appear embolic/watershed). Pt is s/p R CEA on 10/14.      Subjective: Patient seen and examined. No new events except as noted.   S/P OR   doing okay     REVIEW OF SYSTEMS:    CONSTITUTIONAL: No weakness, fevers or chills  EYES/ENT: No visual changes;  No vertigo or throat pain   NECK: No pain or stiffness  RESPIRATORY: No cough, wheezing, hemoptysis; No shortness of breath  CARDIOVASCULAR: No chest pain or palpitations  GASTROINTESTINAL: No abdominal or epigastric pain. No nausea, vomiting, or hematemesis; No diarrhea or constipation. No melena or hematochezia.  GENITOURINARY: No dysuria, frequency or hematuria  NEUROLOGICAL: No numbness or weakness  SKIN: No itching, burning, rashes, or lesions   All other review of systems is negative unless indicated above.    MEDICATIONS:  MEDICATIONS  (STANDING):  aspirin enteric coated 81 milliGRAM(s) Oral daily  atorvastatin 40 milliGRAM(s) Oral at bedtime  carbidopa/levodopa  25/100 1 Tablet(s) Oral daily  chlorhexidine 2% Cloths 1 Application(s) Topical daily  clopidogrel Tablet 75 milliGRAM(s) Oral daily  dextrose 5%. 1000 milliLiter(s) (100 mL/Hr) IV Continuous <Continuous>  dextrose 5%. 1000 milliLiter(s) (50 mL/Hr) IV Continuous <Continuous>  dextrose 50% Injectable 25 Gram(s) IV Push once  dextrose 50% Injectable 25 Gram(s) IV Push once  dextrose 50% Injectable 12.5 Gram(s) IV Push once  gabapentin 100 milliGRAM(s) Oral three times a day  glucagon  Injectable 1 milliGRAM(s) IntraMuscular once  heparin   Injectable 5000 Unit(s) SubCutaneous every 8 hours  insulin lispro (ADMELOG) corrective regimen sliding scale   SubCutaneous three times a day before meals  insulin lispro (ADMELOG) corrective regimen sliding scale   SubCutaneous at bedtime  senna 2 Tablet(s) Oral at bedtime    Home Medications:  aspirin 81 mg oral delayed release tablet: 1 tab(s) orally once a day (14 Oct 2024 07:04)  atorvastatin 40 mg oral tablet: 1 tab(s) orally once a day (at bedtime) (14 Oct 2024 07:04)  Basaglar KwikPen 100 units/mL subcutaneous solution: 20 unit(s) subcutaneous 2 times a day (14 Oct 2024 07:04)  carbidopa-levodopa 25 mg-100 mg oral tablet: 1 tab(s) orally once a day (14 Oct 2024 07:04)  clopidogrel 75 mg oral tablet: 1 tab(s) orally once a day (14 Oct 2024 07:04)  doxazosin 2 mg oral tablet: 1 tab(s) orally once a day (at bedtime) (14 Oct 2024 07:04)  gabapentin 100 mg oral capsule: 1 cap(s) orally 3 times a day (14 Oct 2024 07:04)  isosorbide mononitrate 30 mg oral tablet, extended release: 1 tab(s) orally once a day (14 Oct 2024 07:04)  Janumet 50 mg-500 mg oral tablet: 1 tab(s) orally 2 times a day (14 Oct 2024 07:04)  losartan 25 mg oral tablet: 1 tab(s) orally once a day (14 Oct 2024 07:04)  metoprolol tartrate 50 mg oral tablet: 1 tab(s) orally 2 times a day (14 Oct 2024 07:04)  NIFEdipine 60 mg oral tablet, extended release: 1 tab(s) orally once a day (14 Oct 2024 07:04)  senna leaf extract oral tablet: 2 tab(s) orally once a day (at bedtime) (14 Oct 2024 07:04)    PAST MEDICAL & SURGICAL HISTORY:  HTN (hypertension)      HLD (hyperlipidemia)      CAD (coronary artery disease)      Type II diabetes mellitus      PAD (peripheral artery disease)      Mild dementia      Parkinson disease      S/P hysterectomy          PHYSICAL EXAM:  T(C): 36.6 (10-15-24 @ 09:27), Max: 37.5 (10-14-24 @ 13:20)  HR: 90 (10-15-24 @ 09:27) (85 - 105)  BP: 159/77 (10-15-24 @ 09:27) (126/68 - 166/79)  RR: 18 (10-15-24 @ 09:27) (8 - 23)  SpO2: 100% (10-15-24 @ 09:27) (92% - 100%)  Wt(kg): --  I&O's Summary    14 Oct 2024 07:01  -  15 Oct 2024 07:00  --------------------------------------------------------  IN: 1035 mL / OUT: 900 mL / NET: 135 mL          Appearance: Normal	  HEENT:  PERRLA   Lymphatic: No lymphadenopathy   Cardiovascular: Normal S1 S2, no JVD  Respiratory: normal effort , clear  Gastrointestinal:  Soft, Non-tender  Skin: No rashes,  warm to touch  Psychiatry:  Mood & affect appropriate  Musculuskeletal: No edema    recent labs, Imaging and EKGs personally reviewed     10-14-24 @ 07:01  -  10-15-24 @ 07:00  --------------------------------------------------------  IN: 1035 mL / OUT: 900 mL / NET: 135 mL                          11.1   8.93  )-----------( 245      ( 15 Oct 2024 04:10 )             34.1               10-15    139  |  104  |  10  ----------------------------<  258[H]  3.9   |  23  |  0.52    Ca    8.7      15 Oct 2024 04:10  Phos  2.9     10-15  Mg     2.00     10-15                         Urinalysis Basic - ( 15 Oct 2024 04:10 )    Color: x / Appearance: x / SG: x / pH: x  Gluc: 258 mg/dL / Ketone: x  / Bili: x / Urobili: x   Blood: x / Protein: x / Nitrite: x   Leuk Esterase: x / RBC: x / WBC x   Sq Epi: x / Non Sq Epi: x / Bacteria: x

## 2024-10-15 NOTE — DISCHARGE NOTE PROVIDER - NSDCMRMEDTOKEN_GEN_ALL_CORE_FT
acetaminophen 325 mg oral tablet: 2 tab(s) orally every 6 hours As needed Mild Pain (1 - 3), Moderate Pain (4 - 6)  aspirin 81 mg oral delayed release tablet: 1 tab(s) orally once a day  atorvastatin 40 mg oral tablet: 1 tab(s) orally once a day (at bedtime)  Basaglar KwikPen 100 units/mL subcutaneous solution: 20 unit(s) subcutaneous 2 times a day  carbidopa-levodopa 25 mg-100 mg oral tablet: 1 tab(s) orally once a day  clopidogrel 75 mg oral tablet: 1 tab(s) orally once a day  doxazosin 2 mg oral tablet: 1 tab(s) orally once a day (at bedtime)  gabapentin 100 mg oral capsule: 1 cap(s) orally 3 times a day  isosorbide mononitrate 30 mg oral tablet, extended release: 1 tab(s) orally once a day  Janumet 50 mg-500 mg oral tablet: 1 tab(s) orally 2 times a day  losartan 25 mg oral tablet: 1 tab(s) orally once a day  metoprolol tartrate 50 mg oral tablet: 1 tab(s) orally 2 times a day  NIFEdipine 60 mg oral tablet, extended release: 1 tab(s) orally once a day  senna leaf extract oral tablet: 2 tab(s) orally once a day (at bedtime)

## 2024-10-15 NOTE — DISCHARGE NOTE PROVIDER - NSDCCPTREATMENT_GEN_ALL_CORE_FT
PRINCIPAL PROCEDURE  Procedure: Carotid endarterectomy  Findings and Treatment: WOUND CARE:  Please keep incisions clean and dry. Please do not Scrub or rub incisions. Do not use lotion or powder on incisions.   BATHING: You may shower and/or sponge bathe. You may use warm soapy water in the shower and rinse, pat dry.  ACTIVITY: No heavy lifting or straining. Otherwise, you may return to your usual level of physical activity. If you are taking narcotic pain medication DO NOT drive a car, operate machinery or make important decisions.  DIET: Return to your usual diet.  NOTIFY YOUR SURGEON IF YOU HAVE: any bleeding that does not stop, any pus draining from your wound(s), any fever (over 100.4 F) persistent nausea/vomiting, or if your pain is not controlled on your discharge pain medications, unable to urinate.  Please follow up with your primary care physician in one week regarding your hospitalization, bring copies of your discharge paperwork.  Please follow up with your surgeon, Dr. Norman

## 2024-10-15 NOTE — PATIENT PROFILE ADULT - FALL HARM RISK - HARM RISK INTERVENTIONS
Assistance with ambulation/Assistance OOB with selected safe patient handling equipment/Communicate Risk of Fall with Harm to all staff/Discuss with provider need for PT consult/Monitor gait and stability/Provide patient with walking aids - walker, cane, crutches/Reinforce activity limits and safety measures with patient and family/Sit up slowly, dangle for a short time, stand at bedside before walking/Tailored Fall Risk Interventions/Use of alarms - bed, chair and/or voice tab/Visual Cue: Yellow wristband and red socks/Bed in lowest position, wheels locked, appropriate side rails in place/Call bell, personal items and telephone in reach/Instruct patient to call for assistance before getting out of bed or chair/Non-slip footwear when patient is out of bed/[place] to call system/Physically safe environment - no spills, clutter or unnecessary equipment/Purposeful Proactive Rounding/Room/bathroom lighting operational, light cord in reach

## 2024-10-15 NOTE — PROGRESS NOTE ADULT - ASSESSMENT
63 Y/o Female previously on ASA81/Plvx75 for diffuse ICAD, hx HTN, HLD, uncontrolled DM2 x15 yrs, sickle cell trait, CAD s/p 4 stents and CABG, recent p/f Lt hand/arm numb (8/15/24), MRI w/ Rt side strokes, angio w/ Dr. Pulliam 8/23/24 w/ Rt distal cav/supraclinoid ICA svr stenosis, LICA occlusion w/ distal recon, also read on NOVA 8/16. Was d/c'd on DAPT 8/24 by neuro. Recently admitted [facility] with new Lt sided sx, MRI 9/23/24 w/ new RMCA terr strokes (appear embolic/watershed).  . Pt is s/p ; POD #0 R CEA. Appearing well. Tachycardic but asymptomatic. Recommend anesthesia eval prior to leaving PACU.     PLAN:  - Neuro checks per CEA protocol  - DAPT  - SBP goal 110-160  - Analgesia and antiemetics as needed  - Strict I&O's  - Incentive spirometry  - DVT prophylaxis: SCD  - Continue ASA?plavix  - Dispo: today    Vascular Surgery   [id number] 63 Y/o Female previously on ASA81/Plvx75 for diffuse ICAD, hx HTN, HLD, uncontrolled DM2 x15 yrs, sickle cell trait, CAD s/p 4 stents and CABG, recent p/f Lt hand/arm numb (8/15/24), MRI w/ Rt side strokes, angio w/ Dr. Pulliam 8/23/24 w/ Rt distal cav/supraclinoid ICA svr stenosis, LICA occlusion w/ distal recon, also read on NOVA 8/16. Was d/c'd on DAPT 8/24 by neuro. Recently admitted [facility] with new Lt sided sx, MRI 9/23/24 w/ new RMCA terr strokes (appear embolic/watershed). Pt is s/p R CEA on 10/14.      PLAN:  - Neuro checks per CEA protocol  - DAPT  - SBP goal 110-160  - Analgesia and antiemetics as needed  - Strict I&O's  - Incentive spirometry  - DVT prophylaxis: SCD  - Continue ASA?plavix  - Dispo: today    Vascular Surgery   [id number] 65 Y/o Female previously on ASA81/Plvx75 for diffuse ICAD, hx HTN, HLD, uncontrolled DM2 x15 yrs, sickle cell trait, CAD s/p 4 stents and CABG, recent p/f Lt hand/arm numb (8/15/24), MRI w/ Rt side strokes, angio w/ Dr. Pulliam 8/23/24 w/ Rt distal cav/supraclinoid ICA svr stenosis, LICA occlusion w/ distal recon, also read on NOVA 8/16. Was d/c'd on DAPT 8/24 by neuro. Recently admitted [facility] with new Lt sided sx, MRI 9/23/24 w/ new RMCA terr strokes (appear embolic/watershed). Pt is s/p R CEA on 10/14.      PLAN:  - Neuro checks per CEA protocol  - DAPT  - SBP goal 110-160  - Analgesia and antiemetics as needed  - Strict I&O's  - Incentive spirometry  - DVT prophylaxis: SCD  - Dispo: today    Vascular Surgery   [id number]

## 2024-10-15 NOTE — DISCHARGE NOTE NURSING/CASE MANAGEMENT/SOCIAL WORK - NSDCFUADDAPPT_GEN_ALL_CORE_FT
Please follow up with your outpatient neurologist in 2 weeks of discharge.     Please follow up with your endocrinologist within 2-3 weeks of discharge in regards to controlling your blood sugar.

## 2024-10-15 NOTE — DISCHARGE NOTE NURSING/CASE MANAGEMENT/SOCIAL WORK - PATIENT PORTAL LINK FT
You can access the FollowMyHealth Patient Portal offered by [facility] by registering at the following website: [email or web address]. By joining [facility] FollowMyHealth portal, you will also be able to view your health information using other applications (apps) compatible with our system.

## 2024-10-15 NOTE — CONSULT NOTE ADULT - ASSESSMENT
Patient is a 63 Y/o Female previously on ASA81/Plvx75 for diffuse ICAD, hx HTN, HLD, uncontrolled DM2 x15 yrs, sickle cell trait, CAD s/p 4 stents and CABG, recent p/f Lt hand/arm numb (8/15/24), MRI w/ Rt side strokes, angio w/ Dr. Pulliam 8/23/24 w/ Rt distal cav/supraclinoid ICA svr stenosis, LICA occlusion w/ distal recon, also read on NOVA 8/16. Was d/c'd on DAPT 8/24 by neuro. Recently admitted [facility] with new Lt sided sx, MRI 9/23/24 w/ new RMCA terr strokes (appear embolic/watershed). Pt is s/p R CEA on 10/14.        # Carotid stenosis   S/P R CEA  surg care appreciated  pain control   DAPT and statin     # Hx of CVA   prior imaging noted   dapt and statin   bp control   seen by neurology last admission     # CAD S/P CABG   BP control   adjust as tolerated  card eval appreciated   recent echo noted     # HTN/HLD   lipi dpanel  statin   BP control   adjust as tolerated       # DM  sliding scale  adjust insulin regime for better bs control   A1C noted 8      DVt and gI PPX
TTE 2/20/2024    1. Agitated saline injection was negative for intracardiac shunt.   2. Left ventricular cavity is normal in size. Left ventricular wall thickness is normal. Left ventricular systolic function is hyperdynamic. There are no regional wall motion abnormalities seen.   3. Normal left ventricular diastolic function, with normal filling pressure.   4. Normal right ventricular cavity size, with wall thickness, and normal systolic function.   5. Normal left and right atrial size.   6. No significant valvular disease.   7. No pericardial effusion seen.    NST 5/3/2024  1. Normal myocardial perfusion scan, with no evidence of infarction or inducible ischemia.   2. Stress electrocardiogram: No ischemic ST segment changes.   3. Normal left ventricular regional wall motion.   4. The left ventricle is normal in function and normal in size. Normal left ventricular diastolic function. The post stress end diastolic volume is 59 ml and systolic volume is 13 ml.   5. The left ventricle is normal in function and normal in size. Normal left ventricular diastolic function.   6. Normal right ventricular function. There is no right ventricular dilation. RVEF = 48%, RVEDV = 93 mL, RVESV = 49 mL.    Cerebral angiogram 2/22/2024 There is atherosclerotic plaque at the right carotid bifurcation   which causes 45% stenosis at the origin of the right cervical internal carotid artery, There is atherosclerotic   plaque at the left carotid bifurcation which causes 33% stenosis at the origin of the left cervical internal carotid artery. Diagnostic cerebral angiogram demonstrating mild left and moderate right cervical internal carotid artery atherosclerotic stenosis        A/P    1. Carotid artery stenosis  - CTA shows  NECK: Severe right and moderate left internal carotid artery origin stenosis, as above. No occlusion or dissection.    HEAD: No significant change. No large vessel occlusion. 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  - Cerebral angiogram 2/22/2024 There is atherosclerotic plaque at the right carotid bifurcation   which causes 45% stenosis at the origin of the right cervical internal carotid artery, There is atherosclerotic   plaque at the left carotid bifurcation which causes 33% stenosis at the origin of the left cervical internal carotid artery. Diagnostic cerebral angiogram demonstrating mild left and moderate right cervical internal carotid artery atherosclerotic stenosis.  - pt f/u with outpt neurologist  - carotid doppler Rt pICA moderate, Lt pICA mild   -CTA head/neck showing no LVO but severe right and moderate left internal carotid artery origin stenosis.   - s/p Rt CEA 10/14/2024 pt doing well  - home meds ASA, plavix, lipitor 40mg      2. Hypertension  -c/w nifedipine 30 mg QD, lopressor 50mg BID, Losartan 25mg QD, imdur 30 mg QD  - renal artery doppler 8/2024(outpt) suggesting renal artery stenosis,  abd CTA 10/1/2024 show no evidence of renal artery stenosis        3. CAD s/p stents  - s/p 3 stents placed at [facility] in 2014 to the prox LAD and then in 2022  - TTE with preserved EF and no WMA  - c/w ASA, Plavix and statin    4. HLD  - c/w atorvastatin 80mg PO daily    5. hx of palpitations s/p Falls   - TTE with preserved EF and no WMA  -s/p loop monitor , no arrhythmias on interrogation

## 2024-10-15 NOTE — DISCHARGE NOTE NURSING/CASE MANAGEMENT/SOCIAL WORK - NSDCVIVACCINE_GEN_ALL_CORE_FT
Tdap; 02-Nov-2021 17:55; Marychuy Farrell (REGINE); [employer]; [id number] (Exp. Date: 29-Jun-2023); IntraMuscular; Deltoid Left.; 0.5 milliLiter(s); VIS (VIS Published: 09-May-2013, VIS Presented: 02-Nov-2021);

## 2024-10-15 NOTE — DISCHARGE NOTE PROVIDER - CARE PROVIDER_API CALL
Santiago Norman  Vascular Surgery  [address] [place] [address]  Phone: [phone]  Fax: [phone]  Follow Up Time: 2 weeks

## 2024-10-15 NOTE — DISCHARGE NOTE PROVIDER - NSDCCPCAREPLAN_GEN_ALL_CORE_FT
PRINCIPAL DISCHARGE DIAGNOSIS  Diagnosis: Carotid stenosis, right  Assessment and Plan of Treatment:

## 2024-10-15 NOTE — PROGRESS NOTE ADULT - ASSESSMENT
TTE 2/20/2024    1. Agitated saline injection was negative for intracardiac shunt.   2. Left ventricular cavity is normal in size. Left ventricular wall thickness is normal. Left ventricular systolic function is hyperdynamic. There are no regional wall motion abnormalities seen.   3. Normal left ventricular diastolic function, with normal filling pressure.   4. Normal right ventricular cavity size, with wall thickness, and normal systolic function.   5. Normal left and right atrial size.   6. No significant valvular disease.   7. No pericardial effusion seen.    NST 5/3/2024  1. Normal myocardial perfusion scan, with no evidence of infarction or inducible ischemia.   2. Stress electrocardiogram: No ischemic ST segment changes.   3. Normal left ventricular regional wall motion.   4. The left ventricle is normal in function and normal in size. Normal left ventricular diastolic function. The post stress end diastolic volume is 59 ml and systolic volume is 13 ml.   5. The left ventricle is normal in function and normal in size. Normal left ventricular diastolic function.   6. Normal right ventricular function. There is no right ventricular dilation. RVEF = 48%, RVEDV = 93 mL, RVESV = 49 mL.    Cerebral angiogram 2/22/2024 There is atherosclerotic plaque at the right carotid bifurcation   which causes 45% stenosis at the origin of the right cervical internal carotid artery, There is atherosclerotic   plaque at the left carotid bifurcation which causes 33% stenosis at the origin of the left cervical internal carotid artery. Diagnostic cerebral angiogram demonstrating mild left and moderate right cervical internal carotid artery atherosclerotic stenosis        A/P    1. Carotid artery stenosis  - CTA shows  NECK: Severe right and moderate left internal carotid artery origin stenosis, as above. No occlusion or dissection.    HEAD: No significant change. No large vessel occlusion. 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  - Cerebral angiogram 2/22/2024 There is atherosclerotic plaque at the right carotid bifurcation   which causes 45% stenosis at the origin of the right cervical internal carotid artery, There is atherosclerotic   plaque at the left carotid bifurcation which causes 33% stenosis at the origin of the left cervical internal carotid artery. Diagnostic cerebral angiogram demonstrating mild left and moderate right cervical internal carotid artery atherosclerotic stenosis.  - pt f/u with outpt neurologist  - carotid doppler Rt pICA moderate, Lt pICA mild   -CTA head/neck showing no LVO but severe right and moderate left internal carotid artery origin stenosis.   - s/p Rt CEA 10/14/2024 pt doing well  - home meds ASA, plavix, lipitor 40mg      2. Hypertension  -c/w nifedipine 30 mg QD, lopressor 50mg BID, Losartan 25mg QD, imdur 30 mg QD  - renal artery doppler 8/2024(outpt) suggesting renal artery stenosis,  abd CTA 10/1/2024 show no evidence of renal artery stenosis        3. CAD s/p stents  - s/p 3 stents placed at [facility] in 2014 to the prox LAD and then in 2022  - TTE with preserved EF and no WMA  - c/w ASA, Plavix and statin    4. HLD  - c/w atorvastatin 80mg PO daily    5. hx of palpitations s/p Falls   - TTE with preserved EF and no WMA  -s/p loop monitor , no arrhythmias on interrogation

## 2024-10-15 NOTE — DISCHARGE NOTE PROVIDER - HOSPITAL COURSE
63 Y/o Female previously on ASA81/Plvx75 for diffuse ICAD, hx HTN, HLD, uncontrolled DM2 x15 yrs, sickle cell trait, CAD s/p 4 stents and CABG, recent p/f Lt hand/arm numb (8/15/24), MRI w/ Rt side strokes, angio w/ Dr. Pulliam 8/23/24 w/ Rt distal cav/supraclinoid ICA svr stenosis, LICA occlusion w/ distal recon, also read on NOVA 8/16. Was d/c'd on DAPT 8/24 by neuro. Recently admitted [facility] with new Lt sided sx, MRI 9/23/24 w/ new RMCA terr strokes (appear embolic/watershed). Pt is s/p R CEA on 10/14.      Patient tolerated operation well and there were no post-operative complications identified. Patient remained hemodynamically stable in the PACU and transferred to the surgical floor. Diet was restarted and advanced as tolerated. Pain control was transitioned from IV to PO pain meds. At this time, patient is currently ambulating, voiding, tolerating a regular diet.  Patient has been deemed stable for discharge home with follow up as an outpatient.

## 2024-10-15 NOTE — PROGRESS NOTE ADULT - SUBJECTIVE AND OBJECTIVE BOX
Vascular Surgery Daily Progress Note  =====================================================    SUBJECTIVE: Patient seen and examined at bedside on AM rounds. Patient reports that they're feeling well. Tolerating diet, denies nausea, vomiting. OOB/Amublating as tolerated. Denies fever, chills.    --------------------------------------------------------------------------------------    MEDICATIONS:    Neurologic Medications  acetaminophen     Tablet .. 650 milliGRAM(s) Oral every 6 hours PRN Mild Pain (1 - 3), Moderate Pain (4 - 6)  carbidopa/levodopa  25/100 1 Tablet(s) Oral daily  gabapentin 100 milliGRAM(s) Oral three times a day  oxyCODONE    IR 2.5 milliGRAM(s) Oral every 4 hours PRN Severe Pain (7 - 10)    Respiratory Medications    Cardiovascular Medications    Gastrointestinal Medications  dextrose 5%. 1000 milliLiter(s) IV Continuous <Continuous>  dextrose 5%. 1000 milliLiter(s) IV Continuous <Continuous>  senna 2 Tablet(s) Oral at bedtime    Genitourinary Medications    Hematologic/Oncologic Medications  aspirin enteric coated 81 milliGRAM(s) Oral daily  clopidogrel Tablet 75 milliGRAM(s) Oral daily  heparin   Injectable 5000 Unit(s) SubCutaneous every 8 hours    Antimicrobial/Immunologic Medications    Endocrine/Metabolic Medications  atorvastatin 40 milliGRAM(s) Oral at bedtime  dextrose 50% Injectable 25 Gram(s) IV Push once  dextrose 50% Injectable 12.5 Gram(s) IV Push once  dextrose 50% Injectable 25 Gram(s) IV Push once  dextrose Oral Gel 15 Gram(s) Oral once PRN Blood Glucose LESS THAN 70 milliGRAM(s)/deciliter  glucagon  Injectable 1 milliGRAM(s) IntraMuscular once  insulin lispro (ADMELOG) corrective regimen sliding scale   SubCutaneous three times a day before meals  insulin lispro (ADMELOG) corrective regimen sliding scale   SubCutaneous at bedtime    Topical/Other Medications  chlorhexidine 2% Cloths 1 Application(s) Topical daily    --------------------------------------------------------------------------------------    VITAL SIGNS:  T(C): 36.6 (10-15-24 @ 09:27), Max: 37.5 (10-14-24 @ 13:20)  HR: 90 (10-15-24 @ 09:27) (85 - 105)  BP: 159/77 (10-15-24 @ 09:27) (126/68 - 166/79)  RR: 18 (10-15-24 @ 09:27) (8 - 23)  SpO2: 100% (10-15-24 @ 09:27) (92% - 100%)  --------------------------------------------------------------------------------------    EXAM    Gen: NAD, baseline dementia but awake, alert, responsive, AO to self, place, month  Pulm: No respiratory distress, no subcostal retractions  CV: tachycardic  Neuo: baseline left sided motor deficits but pupils equal round and reactive, facial sensation intact b/l, tongue with slight right deviation, smiles symmetrically, phonating  UE: squeezes hands b/l, arms flexion and extension intact  LE: wiggles toes to command, extends both legs, plantar flexion and dorsiflexion intact b/l    --------------------------------------------------------------------------------------
ANESTHESIA POSTOP CHECK    64y Female POSTOP DAY 1 S/P Right Carotid endarterectomy    Vital Signs Last 24 Hrs  T(C): 36.6 (15 Oct 2024 09:27), Max: 37.5 (14 Oct 2024 13:20)  T(F): 97.8 (15 Oct 2024 09:27), Max: 99.5 (14 Oct 2024 13:20)  HR: 90 (15 Oct 2024 09:27) (85 - 105)  BP: 159/77 (15 Oct 2024 09:27) (126/68 - 166/79)  BP(mean): 83 (14 Oct 2024 22:00) (81 - 115)  RR: 18 (15 Oct 2024 09:27) (8 - 23)  SpO2: 100% (15 Oct 2024 09:27) (92% - 100%)    Parameters below as of 15 Oct 2024 05:29  Patient On (Oxygen Delivery Method): room air      I&O's Summary    14 Oct 2024 07:01  -  15 Oct 2024 07:00  --------------------------------------------------------  IN: 1035 mL / OUT: 900 mL / NET: 135 mL        [x] NO APPARENT ANESTHESIA COMPLICATIONS      Comments:         
Johnathan Tejeda MD  Interventional Cardiology / Endovascular Specialist  [place] Office : [address] [place] [address] [place] [address]  Tel:   [place] Office : [address] [facility] [place] [address]  Tel: [phone]  Cell : [phone]     Pt seen and examined at bedside, not in distress, s/p Rt CEA, pt doing well  	  MEDICATIONS:  aspirin enteric coated 81 milliGRAM(s) Oral daily  clopidogrel Tablet 75 milliGRAM(s) Oral daily  heparin   Injectable 5000 Unit(s) SubCutaneous every 8 hours        acetaminophen     Tablet .. 650 milliGRAM(s) Oral every 6 hours PRN  carbidopa/levodopa  25/100 1 Tablet(s) Oral daily  gabapentin 100 milliGRAM(s) Oral three times a day  oxyCODONE    IR 2.5 milliGRAM(s) Oral every 4 hours PRN    senna 2 Tablet(s) Oral at bedtime    atorvastatin 40 milliGRAM(s) Oral at bedtime  dextrose 50% Injectable 25 Gram(s) IV Push once  dextrose 50% Injectable 25 Gram(s) IV Push once  dextrose 50% Injectable 12.5 Gram(s) IV Push once  dextrose Oral Gel 15 Gram(s) Oral once PRN  glucagon  Injectable 1 milliGRAM(s) IntraMuscular once  insulin lispro (ADMELOG) corrective regimen sliding scale   SubCutaneous three times a day before meals  insulin lispro (ADMELOG) corrective regimen sliding scale   SubCutaneous at bedtime    chlorhexidine 2% Cloths 1 Application(s) Topical daily  dextrose 5%. 1000 milliLiter(s) IV Continuous <Continuous>  dextrose 5%. 1000 milliLiter(s) IV Continuous <Continuous>  influenza  Vaccine (HIGH DOSE) 0.5 milliLiter(s) IntraMuscular once      PAST MEDICAL/SURGICAL HISTORY  PAST MEDICAL & SURGICAL HISTORY:  HTN (hypertension)      HLD (hyperlipidemia)      CAD (coronary artery disease)      Type II diabetes mellitus      PAD (peripheral artery disease)      Mild dementia      Parkinson disease      S/P hysterectomy          SOCIAL HISTORY: Substance Use (street drugs): ( x ) never used  (  ) other:    FAMILY HISTORY:           PHYSICAL EXAM:  T(C): 36.7 (10-15-24 @ 13:35), Max: 37 (10-14-24 @ 15:04)  HR: 98 (10-15-24 @ 13:35) (85 - 105)  BP: 149/95 (10-15-24 @ 13:35) (126/68 - 166/79)  RR: 17 (10-15-24 @ 13:35) (14 - 23)  SpO2: 98% (10-15-24 @ 13:35) (95% - 100%)  Wt(kg): --  I&O's Summary    14 Oct 2024 07:01  -  15 Oct 2024 07:00  --------------------------------------------------------  IN: 1035 mL / OUT: 900 mL / NET: 135 mL    15 Oct 2024 07:01  -  15 Oct 2024 14:56  --------------------------------------------------------  IN: 700 mL / OUT: 700 mL / NET: 0 mL          GENERAL: NAD, Rt neck op site covered, swollen  EYES:   PERRLA   ENMT:   Moist mucous membranes, Good dentition, No lesions  Cardiovascular: Normal S1 S2, No JVD, No murmurs, No edema  Respiratory: b/l lung sound decreased to auscultation	  Gastrointestinal:  Soft, Non-tender, + BS	  Extremities: no edema                                    11.1   8.93  )-----------( 245      ( 15 Oct 2024 04:10 )             34.1     10-15    139  |  104  |  10  ----------------------------<  258[H]  3.9   |  23  |  0.52    Ca    8.7      15 Oct 2024 04:10  Phos  2.9     10-15  Mg     2.00     10-15      proBNP:   Lipid Profile:   HgA1c:   TSH:     Consultant(s) Notes Reviewed:  [x ] YES  [ ] NO    Care Discussed with Consultants/Other Providers [ x] YES  [ ] NO    Imaging Personally Reviewed independently:  [x] YES  [ ] NO    All labs, radiologic studies, vitals, orders and medications list reviewed. Patient is seen and examined at bedside. Case discussed with medical team.

## 2024-10-15 NOTE — PATIENT PROFILE ADULT - FUNCTIONAL ASSESSMENT - BASIC MOBILITY 6.
2-calculated by average/Not able to assess (calculate score using [facility] averaging method)

## 2024-10-15 NOTE — DISCHARGE NOTE PROVIDER - NSDCFUADDAPPT_GEN_ALL_CORE_FT
Please follow up with your outpatient neurologist in 2 weeks of discharge.  Please follow up with your outpatient neurologist in 2 weeks of discharge.     Please follow up with your endocrinologist within 2-3 weeks of discharge in regards to controlling your blood sugar.

## 2024-10-16 ENCOUNTER — APPOINTMENT (OUTPATIENT)
Dept: VASCULAR SURGERY | Facility: CLINIC | Age: 64
End: 2024-10-16

## 2024-10-18 LAB — SURGICAL PATHOLOGY STUDY: SIGNIFICANT CHANGE UP

## 2024-10-23 ENCOUNTER — APPOINTMENT (OUTPATIENT)
Dept: VASCULAR SURGERY | Facility: CLINIC | Age: 64
End: 2024-10-23

## 2024-10-23 VITALS
WEIGHT: 130 LBS | SYSTOLIC BLOOD PRESSURE: 156 MMHG | TEMPERATURE: 97.7 F | BODY MASS INDEX: 23.92 KG/M2 | HEART RATE: 89 BPM | HEIGHT: 62 IN | DIASTOLIC BLOOD PRESSURE: 87 MMHG

## 2024-10-23 PROCEDURE — 93880 EXTRACRANIAL BILAT STUDY: CPT

## 2024-10-23 PROCEDURE — 99024 POSTOP FOLLOW-UP VISIT: CPT

## 2024-11-21 ENCOUNTER — OUTPATIENT (OUTPATIENT)
Dept: OUTPATIENT SERVICES | Facility: HOSPITAL | Age: 64
LOS: 1 days | End: 2024-11-21
Payer: MEDICARE

## 2024-11-21 ENCOUNTER — APPOINTMENT (OUTPATIENT)
Dept: CT IMAGING | Facility: IMAGING CENTER | Age: 64
End: 2024-11-21
Payer: MEDICARE

## 2024-11-21 DIAGNOSIS — R42 DIZZINESS AND GIDDINESS: ICD-10-CM

## 2024-11-21 DIAGNOSIS — Z90.710 ACQUIRED ABSENCE OF BOTH CERVIX AND UTERUS: Chronic | ICD-10-CM

## 2024-11-21 PROCEDURE — 70450 CT HEAD/BRAIN W/O DYE: CPT

## 2024-11-21 PROCEDURE — 70450 CT HEAD/BRAIN W/O DYE: CPT | Mod: 26

## 2024-11-27 NOTE — ED ADULT NURSE NOTE - CAS EDN DISCHARGE INTERVENTIONS
Bed: 22  Expected date: 11/27/24  Expected time: 12:22 PM  Means of arrival: [facility]  Comments:  OL   Arm band on/IV intact

## 2024-12-02 ENCOUNTER — APPOINTMENT (OUTPATIENT)
Dept: PODIATRY | Facility: CLINIC | Age: 64
End: 2024-12-02
Payer: MEDICARE

## 2024-12-02 DIAGNOSIS — E11.49 TYPE 2 DIABETES MELLITUS WITH OTHER DIABETIC NEUROLOGICAL COMPLICATION: ICD-10-CM

## 2024-12-02 DIAGNOSIS — B35.1 TINEA UNGUIUM: ICD-10-CM

## 2024-12-02 DIAGNOSIS — L60.3 NAIL DYSTROPHY: ICD-10-CM

## 2024-12-02 PROCEDURE — 11720 DEBRIDE NAIL 1-5: CPT

## 2024-12-11 NOTE — ED PROVIDER NOTE - HOW PATIENT ADDRESSED, PROFILE
----- Message from Elsi BOYCE RN sent at 12/9/2024  9:19 AM CST -----  Regarding: Thoracic Case Conference  Ernie Garza MD    Your patient's CT Lung Cancer Screening follow up showed abnormal findings as noted in the report below. Per program guidelines, his case will be reviewed Wednesday 12/11/24, 10:00 AM at the Lung Nodule Multidisciplinary Case Conference.  We will let you know the team recommendations post conference.    In the interim, please notify your patient of his lung screening findings and advise him  of the plan for the case conference review. Let me know if you plan any interventions prior to the recommendations from the multidisciplinary team.       === Results for orders placed during the hospital encounter of 12/03/24 ===    CT LUNG CANCER SCREENING WO CONTRAST FOLLOW UP LRADS 3 OR 4    - Impression -  Nodule(s): Increase in size of the now 12 mm right apical nodule.  Category: 4BS - Suspicious with potentially clinically significant  incidental finding [see below].  Follow-up: Consider diagnostic PET/CT, tissue sampling, and/or surgical  consultation.  Imaging Options: PET/CT [[id number]]    Incidental Findings/Recommendations  Suspected hepatic cirrhosis.  Redemonstrated severe coronary calcification.    [facility] invites you to contact us for further information and  decision support regarding lung cancer screening. Please feel free to call  our Lung Screening Clinic at [phone].    ##Thank you for choosing the [facility]##      Electronically Signed by: Castillo Tena MD  Signed on: 12/8/2024 6:24 PM  Created on Workstation ID: [id number]  Signed on Workstation ID: [id number]      Feel free to contact me with any questions.    Elsi Montalvo RN  [facility]  [phone]  
Please review CT and advise on results.     Nodule clinic is asking our office to follow up with patient as they review case.       
Pt notified of his options.  Awaiting recommendation of specialists.   
[facility]

## 2024-12-13 ENCOUNTER — INPATIENT (INPATIENT)
Facility: HOSPITAL | Age: 64
LOS: 0 days | Discharge: HOME HEALTH | End: 2024-12-14
Attending: INTERNAL MEDICINE | Admitting: INTERNAL MEDICINE
Payer: MEDICARE

## 2024-12-13 VITALS
OXYGEN SATURATION: 100 % | DIASTOLIC BLOOD PRESSURE: 81 MMHG | SYSTOLIC BLOOD PRESSURE: 169 MMHG | HEIGHT: 62 IN | TEMPERATURE: 98 F | HEART RATE: 69 BPM | RESPIRATION RATE: 18 BRPM | WEIGHT: 134.04 LBS

## 2024-12-13 DIAGNOSIS — Z90.710 ACQUIRED ABSENCE OF BOTH CERVIX AND UTERUS: Chronic | ICD-10-CM

## 2024-12-13 LAB
ANION GAP SERPL CALC-SCNC: 12 MMOL/L — SIGNIFICANT CHANGE UP (ref 7–14)
BUN SERPL-MCNC: 19 MG/DL — SIGNIFICANT CHANGE UP (ref 7–23)
CALCIUM SERPL-MCNC: 9.4 MG/DL — SIGNIFICANT CHANGE UP (ref 8.4–10.5)
CHLORIDE SERPL-SCNC: 102 MMOL/L — SIGNIFICANT CHANGE UP (ref 98–107)
CO2 SERPL-SCNC: 26 MMOL/L — SIGNIFICANT CHANGE UP (ref 22–31)
CREAT SERPL-MCNC: 0.54 MG/DL — SIGNIFICANT CHANGE UP (ref 0.5–1.3)
EGFR: 101 ML/MIN/1.73M2 — SIGNIFICANT CHANGE UP
EGFR: 101 ML/MIN/1.73M2 — SIGNIFICANT CHANGE UP
GLUCOSE BLDC GLUCOMTR-MCNC: 172 MG/DL — HIGH (ref 70–99)
GLUCOSE BLDC GLUCOMTR-MCNC: 222 MG/DL — HIGH (ref 70–99)
GLUCOSE SERPL-MCNC: 186 MG/DL — HIGH (ref 70–99)
HCT VFR BLD CALC: 36.8 % — SIGNIFICANT CHANGE UP (ref 34.5–45)
HGB BLD-MCNC: 11.8 G/DL — SIGNIFICANT CHANGE UP (ref 11.5–15.5)
MAGNESIUM SERPL-MCNC: 2 MG/DL — SIGNIFICANT CHANGE UP (ref 1.6–2.6)
MCHC RBC-ENTMCNC: 23.4 PG — LOW (ref 27–34)
MCHC RBC-ENTMCNC: 32.1 G/DL — SIGNIFICANT CHANGE UP (ref 32–36)
MCV RBC AUTO: 73 FL — LOW (ref 80–100)
NRBC # BLD AUTO: 0 K/UL — SIGNIFICANT CHANGE UP (ref 0–0)
NRBC # BLD: 0 /100 WBCS — SIGNIFICANT CHANGE UP (ref 0–0)
NRBC # FLD: 0 K/UL — SIGNIFICANT CHANGE UP (ref 0–0)
NRBC BLD-RTO: 0 /100 WBCS — SIGNIFICANT CHANGE UP (ref 0–0)
PLATELET # BLD AUTO: 298 K/UL — SIGNIFICANT CHANGE UP (ref 150–400)
POTASSIUM SERPL-MCNC: 4.2 MMOL/L — SIGNIFICANT CHANGE UP (ref 3.5–5.3)
POTASSIUM SERPL-SCNC: 4.2 MMOL/L — SIGNIFICANT CHANGE UP (ref 3.5–5.3)
RBC # BLD: 5.04 M/UL — SIGNIFICANT CHANGE UP (ref 3.8–5.2)
RBC # FLD: 14.3 % — SIGNIFICANT CHANGE UP (ref 10.3–14.5)
SODIUM SERPL-SCNC: 140 MMOL/L — SIGNIFICANT CHANGE UP (ref 135–145)
WBC # BLD: 7.31 K/UL — SIGNIFICANT CHANGE UP (ref 3.8–10.5)
WBC # FLD AUTO: 7.31 K/UL — SIGNIFICANT CHANGE UP (ref 3.8–10.5)

## 2024-12-13 PROCEDURE — 92928 PRQ TCAT PLMT NTRAC ST 1 LES: CPT | Mod: RC

## 2024-12-13 PROCEDURE — 0523T NTRAPX C FFR W/3D FUNCJL MAP: CPT

## 2024-12-13 PROCEDURE — 93010 ELECTROCARDIOGRAM REPORT: CPT

## 2024-12-13 PROCEDURE — 93460 R&L HRT ART/VENTRICLE ANGIO: CPT | Mod: 26,59

## 2024-12-13 RX ORDER — INSULIN GLARGINE-YFGN 100 [IU]/ML
6 INJECTION, SOLUTION SUBCUTANEOUS
Refills: 0 | Status: DISCONTINUED | OUTPATIENT
Start: 2024-12-13 | End: 2024-12-14

## 2024-12-13 RX ORDER — LOSARTAN POTASSIUM 100 MG/1
50 TABLET, FILM COATED ORAL DAILY
Refills: 0 | Status: DISCONTINUED | OUTPATIENT
Start: 2024-12-14 | End: 2024-12-14

## 2024-12-13 RX ORDER — GABAPENTIN 400 MG/1
100 CAPSULE ORAL THREE TIMES A DAY
Refills: 0 | Status: DISCONTINUED | OUTPATIENT
Start: 2024-12-13 | End: 2024-12-14

## 2024-12-13 RX ORDER — ISOSORBIDE MONONITRATE 60 MG/1
30 TABLET, EXTENDED RELEASE ORAL DAILY
Refills: 0 | Status: DISCONTINUED | OUTPATIENT
Start: 2024-12-13 | End: 2024-12-14

## 2024-12-13 RX ORDER — SODIUM CHLORIDE 9 G/1000ML
1000 INJECTION, SOLUTION INTRAVENOUS
Refills: 0 | Status: DISCONTINUED | OUTPATIENT
Start: 2024-12-13 | End: 2024-12-14

## 2024-12-13 RX ORDER — GLUCAGON 3 MG/1
1 POWDER NASAL ONCE
Refills: 0 | Status: DISCONTINUED | OUTPATIENT
Start: 2024-12-13 | End: 2024-12-14

## 2024-12-13 RX ORDER — INSULIN LISPRO 100 U/ML
INJECTION, SOLUTION INTRAVENOUS; SUBCUTANEOUS
Refills: 0 | Status: DISCONTINUED | OUTPATIENT
Start: 2024-12-13 | End: 2024-12-14

## 2024-12-13 RX ORDER — INSULIN GLARGINE-YFGN 100 [IU]/ML
9 INJECTION, SOLUTION SUBCUTANEOUS EVERY MORNING
Refills: 0 | Status: DISCONTINUED | OUTPATIENT
Start: 2024-12-14 | End: 2024-12-14

## 2024-12-13 RX ORDER — CLOPIDOGREL BISULFATE 75 MG/1
75 TABLET, FILM COATED ORAL DAILY
Refills: 0 | Status: DISCONTINUED | OUTPATIENT
Start: 2024-12-14 | End: 2024-12-14

## 2024-12-13 RX ORDER — ASPIRIN 325 MG
81 TABLET ORAL DAILY
Refills: 0 | Status: DISCONTINUED | OUTPATIENT
Start: 2024-12-14 | End: 2024-12-14

## 2024-12-13 RX ORDER — DEXTROSE 50 % IN WATER 50 %
15 SYRINGE (ML) INTRAVENOUS ONCE
Refills: 0 | Status: DISCONTINUED | OUTPATIENT
Start: 2024-12-13 | End: 2024-12-14

## 2024-12-13 RX ORDER — DEXTROSE 50 % IN WATER 50 %
25 SYRINGE (ML) INTRAVENOUS ONCE
Refills: 0 | Status: DISCONTINUED | OUTPATIENT
Start: 2024-12-13 | End: 2024-12-14

## 2024-12-13 RX ORDER — DEXTROSE 50 % IN WATER 50 %
12.5 SYRINGE (ML) INTRAVENOUS ONCE
Refills: 0 | Status: DISCONTINUED | OUTPATIENT
Start: 2024-12-13 | End: 2024-12-14

## 2024-12-13 RX ORDER — ACETAMINOPHEN 500 MG/5ML
650 LIQUID (ML) ORAL EVERY 6 HOURS
Refills: 0 | Status: DISCONTINUED | OUTPATIENT
Start: 2024-12-13 | End: 2024-12-14

## 2024-12-13 RX ORDER — CARBIDOPA/LEVODOPA 25MG-100MG
1 TABLET ORAL DAILY
Refills: 0 | Status: DISCONTINUED | OUTPATIENT
Start: 2024-12-13 | End: 2024-12-14

## 2024-12-13 RX ORDER — ATORVASTATIN CALCIUM 80 MG/1
40 TABLET, FILM COATED ORAL AT BEDTIME
Refills: 0 | Status: DISCONTINUED | OUTPATIENT
Start: 2024-12-13 | End: 2024-12-14

## 2024-12-13 RX ORDER — INSULIN LISPRO 100 U/ML
INJECTION, SOLUTION INTRAVENOUS; SUBCUTANEOUS AT BEDTIME
Refills: 0 | Status: DISCONTINUED | OUTPATIENT
Start: 2024-12-13 | End: 2024-12-14

## 2024-12-13 RX ORDER — DOXAZOSIN MESYLATE 8 MG/1
2 TABLET ORAL AT BEDTIME
Refills: 0 | Status: DISCONTINUED | OUTPATIENT
Start: 2024-12-13 | End: 2024-12-14

## 2024-12-13 RX ORDER — METOPROLOL SUCCINATE 50 MG/1
50 TABLET, EXTENDED RELEASE ORAL
Refills: 0 | Status: DISCONTINUED | OUTPATIENT
Start: 2024-12-13 | End: 2024-12-14

## 2024-12-13 RX ADMIN — ATORVASTATIN CALCIUM 40 MILLIGRAM(S): 80 TABLET, FILM COATED ORAL at 22:58

## 2024-12-13 RX ADMIN — GABAPENTIN 100 MILLIGRAM(S): 400 CAPSULE ORAL at 22:58

## 2024-12-13 RX ADMIN — Medication 3 MILLILITER(S): at 18:03

## 2024-12-13 RX ADMIN — METOPROLOL SUCCINATE 50 MILLIGRAM(S): 50 TABLET, EXTENDED RELEASE ORAL at 23:07

## 2024-12-13 RX ADMIN — DOXAZOSIN MESYLATE 2 MILLIGRAM(S): 8 TABLET ORAL at 22:58

## 2024-12-13 RX ADMIN — Medication 3 MILLILITER(S): at 23:00

## 2024-12-14 ENCOUNTER — TRANSCRIPTION ENCOUNTER (OUTPATIENT)
Age: 64
End: 2024-12-14

## 2024-12-14 VITALS — HEART RATE: 80 BPM | DIASTOLIC BLOOD PRESSURE: 57 MMHG | SYSTOLIC BLOOD PRESSURE: 149 MMHG

## 2024-12-14 DIAGNOSIS — Z90.710 ACQUIRED ABSENCE OF BOTH CERVIX AND UTERUS: ICD-10-CM

## 2024-12-14 LAB
ALBUMIN SERPL ELPH-MCNC: 3.8 G/DL — SIGNIFICANT CHANGE UP (ref 3.3–5)
ALP SERPL-CCNC: 108 U/L — SIGNIFICANT CHANGE UP (ref 40–120)
ALT FLD-CCNC: 28 U/L — SIGNIFICANT CHANGE UP (ref 4–33)
ANION GAP SERPL CALC-SCNC: 13 MMOL/L — SIGNIFICANT CHANGE UP (ref 7–14)
AST SERPL-CCNC: 26 U/L — SIGNIFICANT CHANGE UP (ref 4–32)
BASOPHILS # BLD AUTO: 0.03 K/UL — SIGNIFICANT CHANGE UP (ref 0–0.2)
BASOPHILS NFR BLD AUTO: 0.5 % — SIGNIFICANT CHANGE UP (ref 0–2)
BILIRUB SERPL-MCNC: 0.4 MG/DL — SIGNIFICANT CHANGE UP (ref 0.2–1.2)
BUN SERPL-MCNC: 16 MG/DL — SIGNIFICANT CHANGE UP (ref 7–23)
CALCIUM SERPL-MCNC: 9.2 MG/DL — SIGNIFICANT CHANGE UP (ref 8.4–10.5)
CHLORIDE SERPL-SCNC: 104 MMOL/L — SIGNIFICANT CHANGE UP (ref 98–107)
CO2 SERPL-SCNC: 26 MMOL/L — SIGNIFICANT CHANGE UP (ref 22–31)
CREAT SERPL-MCNC: 0.52 MG/DL — SIGNIFICANT CHANGE UP (ref 0.5–1.3)
EGFR: 102 ML/MIN/1.73M2 — SIGNIFICANT CHANGE UP
EGFR: 102 ML/MIN/1.73M2 — SIGNIFICANT CHANGE UP
EOSINOPHIL # BLD AUTO: 0.34 K/UL — SIGNIFICANT CHANGE UP (ref 0–0.5)
EOSINOPHIL NFR BLD AUTO: 5.3 % — SIGNIFICANT CHANGE UP (ref 0–6)
GLUCOSE BLDC GLUCOMTR-MCNC: 201 MG/DL — HIGH (ref 70–99)
GLUCOSE BLDC GLUCOMTR-MCNC: 270 MG/DL — HIGH (ref 70–99)
GLUCOSE BLDC GLUCOMTR-MCNC: 355 MG/DL — HIGH (ref 70–99)
GLUCOSE SERPL-MCNC: 174 MG/DL — HIGH (ref 70–99)
HCT VFR BLD CALC: 37.1 % — SIGNIFICANT CHANGE UP (ref 34.5–45)
HGB BLD-MCNC: 11.6 G/DL — SIGNIFICANT CHANGE UP (ref 11.5–15.5)
IANC: 4.19 K/UL — SIGNIFICANT CHANGE UP (ref 1.8–7.4)
IMM GRANULOCYTES NFR BLD AUTO: 0.2 % — SIGNIFICANT CHANGE UP (ref 0–0.9)
LYMPHOCYTES # BLD AUTO: 1.15 K/UL — SIGNIFICANT CHANGE UP (ref 1–3.3)
LYMPHOCYTES # BLD AUTO: 18 % — SIGNIFICANT CHANGE UP (ref 13–44)
MAGNESIUM SERPL-MCNC: 2.1 MG/DL — SIGNIFICANT CHANGE UP (ref 1.6–2.6)
MCHC RBC-ENTMCNC: 23.3 PG — LOW (ref 27–34)
MCHC RBC-ENTMCNC: 31.3 G/DL — LOW (ref 32–36)
MCV RBC AUTO: 74.5 FL — LOW (ref 80–100)
MONOCYTES # BLD AUTO: 0.67 K/UL — SIGNIFICANT CHANGE UP (ref 0–0.9)
MONOCYTES NFR BLD AUTO: 10.5 % — SIGNIFICANT CHANGE UP (ref 2–14)
NEUTROPHILS # BLD AUTO: 4.19 K/UL — SIGNIFICANT CHANGE UP (ref 1.8–7.4)
NEUTROPHILS NFR BLD AUTO: 65.5 % — SIGNIFICANT CHANGE UP (ref 43–77)
NRBC # BLD AUTO: 0 K/UL — SIGNIFICANT CHANGE UP (ref 0–0)
NRBC # BLD: 0 /100 WBCS — SIGNIFICANT CHANGE UP (ref 0–0)
NRBC # FLD: 0 K/UL — SIGNIFICANT CHANGE UP (ref 0–0)
NRBC BLD-RTO: 0 /100 WBCS — SIGNIFICANT CHANGE UP (ref 0–0)
PHOSPHATE SERPL-MCNC: 4.4 MG/DL — SIGNIFICANT CHANGE UP (ref 2.5–4.5)
PLATELET # BLD AUTO: 275 K/UL — SIGNIFICANT CHANGE UP (ref 150–400)
POTASSIUM SERPL-MCNC: 3.9 MMOL/L — SIGNIFICANT CHANGE UP (ref 3.5–5.3)
POTASSIUM SERPL-SCNC: 3.9 MMOL/L — SIGNIFICANT CHANGE UP (ref 3.5–5.3)
PROT SERPL-MCNC: 6.7 G/DL — SIGNIFICANT CHANGE UP (ref 6–8.3)
RBC # BLD: 4.98 M/UL — SIGNIFICANT CHANGE UP (ref 3.8–5.2)
RBC # FLD: 14.7 % — HIGH (ref 10.3–14.5)
SODIUM SERPL-SCNC: 143 MMOL/L — SIGNIFICANT CHANGE UP (ref 135–145)
WBC # BLD: 6.39 K/UL — SIGNIFICANT CHANGE UP (ref 3.8–10.5)
WBC # FLD AUTO: 6.39 K/UL — SIGNIFICANT CHANGE UP (ref 3.8–10.5)

## 2024-12-14 RX ORDER — ACETAMINOPHEN 500 MG/5ML
1 LIQUID (ML) ORAL
Refills: 0 | DISCHARGE

## 2024-12-14 RX ORDER — INFLUENZA A VIRUS A/IDAHO/07/2018 (H1N1) ANTIGEN (MDCK CELL DERIVED, PROPIOLACTONE INACTIVATED, INFLUENZA A VIRUS A/INDIANA/08/2018 (H3N2) ANTIGEN (MDCK CELL DERIVED, PROPIOLACTONE INACTIVATED), INFLUENZA B VIRUS B/SINGAPORE/INFTT-16-0610/2016 ANTIGEN (MDCK CELL DERIVED, PROPIOLACTONE INACTIVATED), INFLUENZA B VIRUS B/IOWA/06/2017 ANTIGEN (MDCK CELL DERIVED, PROPIOLACTONE INACTIVATED) 15; 15; 15; 15 UG/.5ML; UG/.5ML; UG/.5ML; UG/.5ML
0.5 INJECTION, SUSPENSION INTRAMUSCULAR ONCE
Refills: 0 | Status: DISCONTINUED | OUTPATIENT
Start: 2024-12-14 | End: 2024-12-14

## 2024-12-14 RX ORDER — MELATONIN 5 MG
3 TABLET ORAL ONCE
Refills: 0 | Status: COMPLETED | OUTPATIENT
Start: 2024-12-14 | End: 2024-12-14

## 2024-12-14 RX ADMIN — Medication 3 MILLILITER(S): at 05:38

## 2024-12-14 RX ADMIN — Medication 1 TABLET(S): at 12:52

## 2024-12-14 RX ADMIN — INSULIN LISPRO 3: 100 INJECTION, SOLUTION INTRAVENOUS; SUBCUTANEOUS at 12:50

## 2024-12-14 RX ADMIN — METOPROLOL SUCCINATE 50 MILLIGRAM(S): 50 TABLET, EXTENDED RELEASE ORAL at 07:18

## 2024-12-14 RX ADMIN — Medication 20 MILLIGRAM(S): at 12:52

## 2024-12-14 RX ADMIN — Medication 81 MILLIGRAM(S): at 12:50

## 2024-12-14 RX ADMIN — CLOPIDOGREL BISULFATE 75 MILLIGRAM(S): 75 TABLET, FILM COATED ORAL at 12:52

## 2024-12-14 RX ADMIN — INSULIN LISPRO 2: 100 INJECTION, SOLUTION INTRAVENOUS; SUBCUTANEOUS at 08:52

## 2024-12-14 RX ADMIN — LOSARTAN POTASSIUM 50 MILLIGRAM(S): 100 TABLET, FILM COATED ORAL at 07:18

## 2024-12-14 RX ADMIN — GABAPENTIN 100 MILLIGRAM(S): 400 CAPSULE ORAL at 07:19

## 2024-12-14 RX ADMIN — INSULIN GLARGINE-YFGN 9 UNIT(S): 100 INJECTION, SOLUTION SUBCUTANEOUS at 09:32

## 2024-12-14 RX ADMIN — Medication 3 MILLIGRAM(S): at 01:01

## 2024-12-14 RX ADMIN — ISOSORBIDE MONONITRATE 30 MILLIGRAM(S): 60 TABLET, EXTENDED RELEASE ORAL at 12:53

## 2024-12-17 NOTE — PROGRESS NOTE ADULT - SUBJECTIVE AND OBJECTIVE BOX
Cm spoke to pt son Ron [phone]. Provided pt update. Coordinated family mtg tomorrow at 10:30am with provider after rounds.    THE PATIENT WAS SEEN AND EXAMINED BY ME WITH THE HOUSESTAFF AND STROKE TEAM DURING MORNING ROUNDS.   HPI:  62 y/o right handed woman with a PMHx significant for HTN, DM, HLD, CAD(s/p 3 stents placed at [facility] in 2014 to the prox LAD and then 2022),  PAD s/p L popliteal angioplasty and atherectomy 12/23, R ICA and L ICA stenosis, prior stroke with L side weakness, ?Parkinson's disease, B/L cataract surgery with blurred vision at baseline who presented with headache, dizziness, blurred vision and intermittent vision loss L>R, right arm weakness. Patient was last seen well on 2/18 at 11AM. Family reports she woke up with these symptoms on 2/19. Per family, she is A&Ox2 at baseline, uses walker and assistance to walk. She has frequent falls per family, gait instability, and drags her feet while walking. She carries diagnosis of Parkinson's and is on Sinemet but has not yet been evaluated by movement disorder specialist. Patient has been seen previously by Neurology due to concern for falls and AMS. During evaluations in 2022 and 2023, she was found to have B/L ICA stenosis. Also was found to have R corona radiata infarct on MRI brain.   Carotid duplex (08.01.23) showed moderate, 50-69% stenosis of the right internal carotid artery and no hemodynamically significant stenosis of left carotid artery. She was seen by vascular surgery who recommended to continue DAPT and was to be re-assessed in 6 months.   Patient has also been seen in the past by Neurology for headaches (2021) which appeared poorly controlled. LKW: 2/18 11AM. On admission: NIHSS: 3 (2 for right arm drift and 1 for dysarthria)  Baseline MRS: 4. Not a Tenecteplase candidate due to out of window. Not a thrombectomy candidate due to  out of window        SUBJECTIVE: No events overnight.  No new neurologic complaints, ROS reported negative unless otherwise noted.      acetaminophen     Tablet .. 650 milliGRAM(s) Oral every 6 hours PRN  acetaminophen   IVPB .. 1000 milliGRAM(s) IV Intermittent once  acetaminophen   IVPB .. 1000 milliGRAM(s) IV Intermittent once  artificial  tears Solution 1 Drop(s) Both EYES two times a day  aspirin enteric coated 81 milliGRAM(s) Oral daily  atorvastatin 80 milliGRAM(s) Oral at bedtime  carbidopa/levodopa  25/100 1 Tablet(s) Oral daily  clopidogrel Tablet 75 milliGRAM(s) Oral daily  dextrose 5%. 1000 milliLiter(s) IV Continuous <Continuous>  dextrose 5%. 1000 milliLiter(s) IV Continuous <Continuous>  dextrose 50% Injectable 25 Gram(s) IV Push once  dextrose 50% Injectable 25 Gram(s) IV Push once  dextrose 50% Injectable 12.5 Gram(s) IV Push once  dextrose Oral Gel 15 Gram(s) Oral once PRN  enoxaparin Injectable 40 milliGRAM(s) SubCutaneous every 24 hours  famotidine    Tablet 20 milliGRAM(s) Oral daily  gabapentin 100 milliGRAM(s) Oral three times a day  glucagon  Injectable 1 milliGRAM(s) IntraMuscular once  influenza  Vaccine (HIGH DOSE) 0.7 milliLiter(s) IntraMuscular once  insulin lispro (ADMELOG) corrective regimen sliding scale   SubCutaneous three times a day before meals  insulin lispro (ADMELOG) corrective regimen sliding scale   SubCutaneous at bedtime  isosorbide   mononitrate ER Tablet (IMDUR) 30 milliGRAM(s) Oral daily      PHYSICAL EXAM:   Vital Signs Last 24 Hrs  T(C): 37.1 (25 Feb 2024 09:04), Max: 37.3 (24 Feb 2024 17:03)  T(F): 98.8 (25 Feb 2024 09:04), Max: 99.2 (24 Feb 2024 17:03)  HR: 84 (25 Feb 2024 09:04) (80 - 91)  BP: 166/89 (25 Feb 2024 09:04) (147/89 - 194/98)  BP(mean): --  RR: 18 (25 Feb 2024 09:04) (18 - 18)  SpO2: 96% (25 Feb 2024 09:04) (93% - 97%)    Parameters below as of 25 Feb 2024 09:04  Patient On (Oxygen Delivery Method): room air        General: No acute distress  HEENT: EOM intact, visual fields full, decreased visual acuity L>R  Abdomen: Soft, nontender, nondistended   Extremities: No edema    NEUROLOGICAL EXAM:  Mental status: Eyes open, awake, alert, oriented to person, place, and month, not year. Follows 1 and 2 step crossed commands. Attention/concentration, recent and remote memory and fund of knowledge with mild impairment   Language: Speech fluent, repetition and naming intact.   Cranial nerves -No facial droop, VFF, decreased visual acuity L>R, EOMI in lateral gaze but no upward or downgaze noted.   Motor exam:  No drifts RUE 5/5, RLE 5/5, LUE 5/5, LLE 5/5  Sensation: Intact to light touch   Coordination/ Gait: No dysmetria, Gait deferred.          LABS:     None        IMAGING: Reviewed by me.     MRI/MRA/NOVA w/o 2/20:  RCCA 283, MARCO ANTONIO 131, RMCA 90, RACA2 61  LCCA 312, LICA 166, LMCA 102, LACA 110, LACA2 59  RVA 57, LVA 51, BA 46, RPCA 49, RPCOM 43 anterior to posterior, LPCA 32  Significant bilateral carotid stenosis without evidence of acute infarct. Noninvasive flow MR angiography as above.     CT HEAD: No acute intracranial hemorrhage. Gray/white matter differentiation is preserved. High-grade (80-90%) stenosis of the LEFT internal carotid artery due to circumferential calcifications. Critical stenosis (greater than 95%) of the distal RIGHT common carotid artery and origin of the RIGHT internal carotid artery due to extensive calcified plaque.    CTA HEAD: Moderate stenosis of bilateral precavernous and cavernous internal carotid arteries. No evidence of aneurysm. Tiny aneurysms can be beyond the resolution of CTA technique. 3.6 cm hypodense lesion in the left thyroid lobe.    MR Head w/wo IV Cont (08.02.23 @ 11:59)  No evidence of acute infarct, hemorrhage, or mass lesion. There is a chronic lacunar infarct in the right anterior corona radiata. There are multiple small foci of T2/FLAIR hyperintense signal in the periventricular white matter, suggestive of mild chronic microvascular ischemic changes. There is no evidence of abnormal enhancement.    VA Duplex Carotid, Bilat (08.01.23 @ 11:57) There is a moderate, 50-69% stenosis of the right internal carotid artery. No hemodynamically significant left carotid artery stenosis is identified.

## 2025-03-03 ENCOUNTER — APPOINTMENT (OUTPATIENT)
Dept: PODIATRY | Facility: CLINIC | Age: 65
End: 2025-03-03
Payer: MEDICARE

## 2025-03-03 DIAGNOSIS — E11.49 TYPE 2 DIABETES MELLITUS WITH OTHER DIABETIC NEUROLOGICAL COMPLICATION: ICD-10-CM

## 2025-03-03 DIAGNOSIS — B35.1 TINEA UNGUIUM: ICD-10-CM

## 2025-03-03 DIAGNOSIS — L85.3 XEROSIS CUTIS: ICD-10-CM

## 2025-03-03 PROCEDURE — 99212 OFFICE O/P EST SF 10 MIN: CPT | Mod: 25

## 2025-03-03 PROCEDURE — 11720 DEBRIDE NAIL 1-5: CPT

## 2025-03-05 PROBLEM — L85.3 XEROSIS OF SKIN: Status: ACTIVE | Noted: 2025-03-05

## 2025-04-14 NOTE — DISCHARGE NOTE PROVIDER - ATTENDING ATTESTATION STATEMENT
I have personally seen and examined the patient. I have collaborated with and supervised the
room air

## 2025-04-30 ENCOUNTER — APPOINTMENT (OUTPATIENT)
Dept: VASCULAR SURGERY | Facility: CLINIC | Age: 65
End: 2025-04-30
Payer: MEDICARE

## 2025-04-30 VITALS
WEIGHT: 146 LBS | DIASTOLIC BLOOD PRESSURE: 73 MMHG | BODY MASS INDEX: 26.87 KG/M2 | TEMPERATURE: 97.9 F | SYSTOLIC BLOOD PRESSURE: 143 MMHG | HEART RATE: 81 BPM | HEIGHT: 62 IN

## 2025-04-30 PROCEDURE — 99213 OFFICE O/P EST LOW 20 MIN: CPT

## 2025-04-30 PROCEDURE — 93880 EXTRACRANIAL BILAT STUDY: CPT

## 2025-05-05 ENCOUNTER — INPATIENT (INPATIENT)
Facility: HOSPITAL | Age: 65
LOS: 2 days | Discharge: HOME HEALTH | End: 2025-05-08
Attending: INTERNAL MEDICINE | Admitting: INTERNAL MEDICINE
Payer: MEDICARE

## 2025-05-05 VITALS
SYSTOLIC BLOOD PRESSURE: 136 MMHG | RESPIRATION RATE: 18 BRPM | TEMPERATURE: 98 F | DIASTOLIC BLOOD PRESSURE: 85 MMHG | OXYGEN SATURATION: 97 % | HEART RATE: 72 BPM

## 2025-05-05 DIAGNOSIS — E78.5 HYPERLIPIDEMIA, UNSPECIFIED: ICD-10-CM

## 2025-05-05 DIAGNOSIS — Z90.710 ACQUIRED ABSENCE OF BOTH CERVIX AND UTERUS: Chronic | ICD-10-CM

## 2025-05-05 DIAGNOSIS — E11.9 TYPE 2 DIABETES MELLITUS WITHOUT COMPLICATIONS: ICD-10-CM

## 2025-05-05 DIAGNOSIS — Z79.899 OTHER LONG TERM (CURRENT) DRUG THERAPY: ICD-10-CM

## 2025-05-05 DIAGNOSIS — G20.A1 PARKINSON'S DISEASE WITHOUT DYSKINESIA, WITHOUT MENTION OF FLUCTUATIONS: ICD-10-CM

## 2025-05-05 DIAGNOSIS — R20.0 ANESTHESIA OF SKIN: ICD-10-CM

## 2025-05-05 DIAGNOSIS — F03.A0 UNSPECIFIED DEMENTIA, MILD, WITHOUT BEHAVIORAL DISTURBANCE, PSYCHOTIC DISTURBANCE, MOOD DISTURBANCE, AND ANXIETY: ICD-10-CM

## 2025-05-05 DIAGNOSIS — I10 ESSENTIAL (PRIMARY) HYPERTENSION: ICD-10-CM

## 2025-05-05 LAB
A1C WITH ESTIMATED AVERAGE GLUCOSE RESULT: 7.9 % — HIGH (ref 4–5.6)
ALBUMIN SERPL ELPH-MCNC: 4 G/DL — SIGNIFICANT CHANGE UP (ref 3.3–5)
ALP SERPL-CCNC: 91 U/L — SIGNIFICANT CHANGE UP (ref 40–120)
ALT FLD-CCNC: 18 U/L — SIGNIFICANT CHANGE UP (ref 4–33)
ANION GAP SERPL CALC-SCNC: 17 MMOL/L — HIGH (ref 7–14)
APTT BLD: 31.2 SEC — SIGNIFICANT CHANGE UP (ref 26.1–36.8)
AST SERPL-CCNC: 23 U/L — SIGNIFICANT CHANGE UP (ref 4–32)
BASOPHILS # BLD AUTO: 0.03 K/UL — SIGNIFICANT CHANGE UP (ref 0–0.2)
BASOPHILS NFR BLD AUTO: 0.4 % — SIGNIFICANT CHANGE UP (ref 0–2)
BILIRUB SERPL-MCNC: 0.2 MG/DL — SIGNIFICANT CHANGE UP (ref 0.2–1.2)
BUN SERPL-MCNC: 15 MG/DL — SIGNIFICANT CHANGE UP (ref 7–23)
CALCIUM SERPL-MCNC: 9.2 MG/DL — SIGNIFICANT CHANGE UP (ref 8.4–10.5)
CHLORIDE SERPL-SCNC: 97 MMOL/L — LOW (ref 98–107)
CHOLEST SERPL-MCNC: 114 MG/DL — SIGNIFICANT CHANGE UP
CO2 SERPL-SCNC: 24 MMOL/L — SIGNIFICANT CHANGE UP (ref 22–31)
CREAT SERPL-MCNC: 0.56 MG/DL — SIGNIFICANT CHANGE UP (ref 0.5–1.3)
EGFR: 100 ML/MIN/1.73M2 — SIGNIFICANT CHANGE UP
EGFR: 100 ML/MIN/1.73M2 — SIGNIFICANT CHANGE UP
EOSINOPHIL # BLD AUTO: 0.33 K/UL — SIGNIFICANT CHANGE UP (ref 0–0.5)
EOSINOPHIL NFR BLD AUTO: 4.6 % — SIGNIFICANT CHANGE UP (ref 0–6)
ESTIMATED AVERAGE GLUCOSE: 180 — SIGNIFICANT CHANGE UP
GLUCOSE BLDC GLUCOMTR-MCNC: 175 MG/DL — HIGH (ref 70–99)
GLUCOSE SERPL-MCNC: 187 MG/DL — HIGH (ref 70–99)
HCT VFR BLD CALC: 35.7 % — SIGNIFICANT CHANGE UP (ref 34.5–45)
HDLC SERPL-MCNC: 42 MG/DL — LOW
HGB BLD-MCNC: 11.5 G/DL — SIGNIFICANT CHANGE UP (ref 11.5–15.5)
IANC: 4.88 K/UL — SIGNIFICANT CHANGE UP (ref 1.8–7.4)
IMM GRANULOCYTES NFR BLD AUTO: 0.3 % — SIGNIFICANT CHANGE UP (ref 0–0.9)
INR BLD: 1.14 RATIO — SIGNIFICANT CHANGE UP (ref 0.85–1.16)
LDLC SERPL-MCNC: 56 MG/DL — SIGNIFICANT CHANGE UP
LIPID PNL WITH DIRECT LDL SERPL: 56 MG/DL — SIGNIFICANT CHANGE UP
LYMPHOCYTES # BLD AUTO: 1.24 K/UL — SIGNIFICANT CHANGE UP (ref 1–3.3)
LYMPHOCYTES # BLD AUTO: 17.4 % — SIGNIFICANT CHANGE UP (ref 13–44)
MCHC RBC-ENTMCNC: 23.3 PG — LOW (ref 27–34)
MCHC RBC-ENTMCNC: 32.2 G/DL — SIGNIFICANT CHANGE UP (ref 32–36)
MCV RBC AUTO: 72.4 FL — LOW (ref 80–100)
MONOCYTES # BLD AUTO: 0.63 K/UL — SIGNIFICANT CHANGE UP (ref 0–0.9)
MONOCYTES NFR BLD AUTO: 8.8 % — SIGNIFICANT CHANGE UP (ref 2–14)
NEUTROPHILS # BLD AUTO: 4.88 K/UL — SIGNIFICANT CHANGE UP (ref 1.8–7.4)
NEUTROPHILS NFR BLD AUTO: 68.5 % — SIGNIFICANT CHANGE UP (ref 43–77)
NONHDLC SERPL-MCNC: 72 MG/DL — SIGNIFICANT CHANGE UP
NRBC # BLD AUTO: 0 K/UL — SIGNIFICANT CHANGE UP (ref 0–0)
NRBC # FLD: 0 K/UL — SIGNIFICANT CHANGE UP (ref 0–0)
NRBC BLD AUTO-RTO: 0 /100 WBCS — SIGNIFICANT CHANGE UP (ref 0–0)
PLATELET # BLD AUTO: 273 K/UL — SIGNIFICANT CHANGE UP (ref 150–400)
POTASSIUM SERPL-MCNC: 3.7 MMOL/L — SIGNIFICANT CHANGE UP (ref 3.5–5.3)
POTASSIUM SERPL-SCNC: 3.7 MMOL/L — SIGNIFICANT CHANGE UP (ref 3.5–5.3)
PROT SERPL-MCNC: 6.9 G/DL — SIGNIFICANT CHANGE UP (ref 6–8.3)
PROTHROM AB SERPL-ACNC: 13.6 SEC — HIGH (ref 9.9–13.4)
RBC # BLD: 4.93 M/UL — SIGNIFICANT CHANGE UP (ref 3.8–5.2)
RBC # FLD: 14.8 % — HIGH (ref 10.3–14.5)
SODIUM SERPL-SCNC: 138 MMOL/L — SIGNIFICANT CHANGE UP (ref 135–145)
TRIGL SERPL-MCNC: 81 MG/DL — SIGNIFICANT CHANGE UP
WBC # BLD: 7.13 K/UL — SIGNIFICANT CHANGE UP (ref 3.8–10.5)
WBC # FLD AUTO: 7.13 K/UL — SIGNIFICANT CHANGE UP (ref 3.8–10.5)

## 2025-05-05 PROCEDURE — 70498 CT ANGIOGRAPHY NECK: CPT | Mod: 26

## 2025-05-05 PROCEDURE — 99223 1ST HOSP IP/OBS HIGH 75: CPT

## 2025-05-05 PROCEDURE — 70450 CT HEAD/BRAIN W/O DYE: CPT | Mod: 26,XU

## 2025-05-05 PROCEDURE — 70496 CT ANGIOGRAPHY HEAD: CPT | Mod: 26

## 2025-05-05 PROCEDURE — 99285 EMERGENCY DEPT VISIT HI MDM: CPT

## 2025-05-05 RX ORDER — MELATONIN 5 MG
3 TABLET ORAL AT BEDTIME
Refills: 0 | Status: DISCONTINUED | OUTPATIENT
Start: 2025-05-05 | End: 2025-05-08

## 2025-05-05 RX ORDER — DEXTROSE 50 % IN WATER 50 %
25 SYRINGE (ML) INTRAVENOUS ONCE
Refills: 0 | Status: DISCONTINUED | OUTPATIENT
Start: 2025-05-05 | End: 2025-05-08

## 2025-05-05 RX ORDER — SODIUM CHLORIDE 9 G/1000ML
1000 INJECTION, SOLUTION INTRAVENOUS
Refills: 0 | Status: DISCONTINUED | OUTPATIENT
Start: 2025-05-05 | End: 2025-05-08

## 2025-05-05 RX ORDER — DEXTROSE 50 % IN WATER 50 %
12.5 SYRINGE (ML) INTRAVENOUS ONCE
Refills: 0 | Status: DISCONTINUED | OUTPATIENT
Start: 2025-05-05 | End: 2025-05-08

## 2025-05-05 RX ORDER — INSULIN LISPRO 100 U/ML
INJECTION, SOLUTION INTRAVENOUS; SUBCUTANEOUS
Refills: 0 | Status: DISCONTINUED | OUTPATIENT
Start: 2025-05-05 | End: 2025-05-08

## 2025-05-05 RX ORDER — GLUCAGON 3 MG/1
1 POWDER NASAL ONCE
Refills: 0 | Status: DISCONTINUED | OUTPATIENT
Start: 2025-05-05 | End: 2025-05-08

## 2025-05-05 RX ORDER — ATORVASTATIN CALCIUM 80 MG/1
40 TABLET, FILM COATED ORAL AT BEDTIME
Refills: 0 | Status: DISCONTINUED | OUTPATIENT
Start: 2025-05-05 | End: 2025-05-08

## 2025-05-05 RX ORDER — ACETAMINOPHEN 500 MG/5ML
1000 LIQUID (ML) ORAL ONCE
Refills: 0 | Status: COMPLETED | OUTPATIENT
Start: 2025-05-05 | End: 2025-05-05

## 2025-05-05 RX ORDER — CARBIDOPA/LEVODOPA 25MG-100MG
1 TABLET ORAL DAILY
Refills: 0 | Status: DISCONTINUED | OUTPATIENT
Start: 2025-05-05 | End: 2025-05-08

## 2025-05-05 RX ORDER — DEXTROSE 50 % IN WATER 50 %
15 SYRINGE (ML) INTRAVENOUS ONCE
Refills: 0 | Status: DISCONTINUED | OUTPATIENT
Start: 2025-05-05 | End: 2025-05-08

## 2025-05-05 RX ORDER — INSULIN LISPRO 100 U/ML
INJECTION, SOLUTION INTRAVENOUS; SUBCUTANEOUS AT BEDTIME
Refills: 0 | Status: DISCONTINUED | OUTPATIENT
Start: 2025-05-05 | End: 2025-05-08

## 2025-05-05 RX ORDER — MAGNESIUM, ALUMINUM HYDROXIDE 200-200 MG
30 TABLET,CHEWABLE ORAL EVERY 4 HOURS
Refills: 0 | Status: DISCONTINUED | OUTPATIENT
Start: 2025-05-05 | End: 2025-05-08

## 2025-05-05 RX ORDER — METOPROLOL SUCCINATE 50 MG/1
50 TABLET, EXTENDED RELEASE ORAL
Refills: 0 | Status: DISCONTINUED | OUTPATIENT
Start: 2025-05-05 | End: 2025-05-08

## 2025-05-05 RX ORDER — ENOXAPARIN SODIUM 100 MG/ML
40 INJECTION SUBCUTANEOUS EVERY 24 HOURS
Refills: 0 | Status: DISCONTINUED | OUTPATIENT
Start: 2025-05-06 | End: 2025-05-08

## 2025-05-05 RX ORDER — ASPIRIN 325 MG
81 TABLET ORAL DAILY
Refills: 0 | Status: DISCONTINUED | OUTPATIENT
Start: 2025-05-05 | End: 2025-05-08

## 2025-05-05 RX ORDER — CLOPIDOGREL BISULFATE 75 MG/1
75 TABLET, FILM COATED ORAL DAILY
Refills: 0 | Status: DISCONTINUED | OUTPATIENT
Start: 2025-05-05 | End: 2025-05-08

## 2025-05-05 RX ORDER — ONDANSETRON HCL/PF 4 MG/2 ML
4 VIAL (ML) INJECTION EVERY 8 HOURS
Refills: 0 | Status: DISCONTINUED | OUTPATIENT
Start: 2025-05-05 | End: 2025-05-08

## 2025-05-05 RX ORDER — ACETAMINOPHEN 500 MG/5ML
650 LIQUID (ML) ORAL EVERY 6 HOURS
Refills: 0 | Status: DISCONTINUED | OUTPATIENT
Start: 2025-05-05 | End: 2025-05-08

## 2025-05-05 RX ADMIN — Medication 400 MILLIGRAM(S): at 21:25

## 2025-05-05 NOTE — SWALLOW BEDSIDE ASSESSMENT ADULT - SWALLOW EVAL: RECOMMENDED FEEDING/EATING TECHNIQUES
Swallowing Guidelines: Seated Upright during Mealtimes; Slow Pacing; One Bite/Sip at a Time; Allow for Swallow Prior to Next Presentation; Maintain Adequate Oral Hygiene

## 2025-05-05 NOTE — SWALLOW BEDSIDE ASSESSMENT ADULT - ASR SWALLOW REFERRAL
RD consult to discuss patient's dietary preferences (family states no beef/pork)/Registered Dietitian

## 2025-05-05 NOTE — H&P ADULT - PROBLEM SELECTOR PLAN 5
Chronic stable  LDL 56  Continue atorvastatin 40mg daily   [facility]

## 2025-05-05 NOTE — H&P ADULT - NSHPPHYSICALEXAM_GEN_ALL_CORE
PHYSICAL EXAM:  GENERAL: NAD, comfortable at bedside   HEAD:  Atraumatic, Normocephalic  EYES: EOMI, PERRL, conjunctiva and sclera clear  NECK: Supple, No JVD  CHEST/LUNG: Clear to auscultation bilaterally; No wheezes, rales or rhonchi  HEART: Regular rate and rhythm; No murmurs, rubs, or gallops, (+)S1, S2  ABDOMEN: Soft, Nontender, Nondistended; Normal Bowel sounds   EXTREMITIES:  2+ Peripheral Pulses, No clubbing, cyanosis, or edema  PSYCH: normal mood and affect  NEUROLOGY: AAOx to self and place but not year, moving all extremities spontaneously, Reports decreased sensation on the left compared to the right, chronic slurred speech otherwise no other cranial nerve deficit noted at this time, strength 5/5 in bilateral upper and lower extremities, no nystagmus, mild lue pronator drift, gait not assessed at this time   SKIN: No rashes or lesions

## 2025-05-05 NOTE — ED ADULT NURSE NOTE - NSFALLHARMRISKINTERV_ED_ALL_ED

## 2025-05-05 NOTE — H&P ADULT - HISTORY OF PRESENT ILLNESS
65yr old female with a pmh of multiple CVA (2022 & 2024, chronic L-sided weakness and slurred speech), PAD, T2DM on insulin, HLD, HTN, memory loss, CAD s/p stent (on ASA/Plavix), Parkinson's Disease,  who presents with right facial numbness for the past 2-3 weeks. Endorses the numbness to be constant without any other associated tingling/pain/radiation. She does endorse a headache that usually occurs at night at the back of her head that resolves with advil/tylenol.   Denies  headache at present, dizziness, chest pain, palpitations, SOB, abdominal pain, joint pain, diarrhea/constipation, urinary symptoms.   Vitals: T 98. HR 74, /80, RR 18 satting 100% RA

## 2025-05-05 NOTE — H&P ADULT - PROBLEM SELECTOR PLAN 8
Attempted to call phone number listed under emergency contact with no answer as pt did not know her medications

## 2025-05-05 NOTE — H&P ADULT - PROBLEM SELECTOR PLAN 1
New  Imaging as above  Neurology consulted: continue on home aspirin 81mg daily and Plavix 75mg daily. continue on home Lipitor 40mg daily.  could consider outpatient MR brain w/o contrast at discretion of outpatient neurologist.  f/u PT/OT evaluation. neuro checks q8h. neurology will continue to follow."    * consider vascular consult in AM vs f/u outpt for stenosis noted above

## 2025-05-05 NOTE — ED PROVIDER NOTE - OBJECTIVE STATEMENT
see mdm see naseem    DD ED ATTF sent here by cards r/o stroke.  R face numbness x 2-3 weeks.  PMHX CAD with multiple stents, s/p CEA.  Residual LUE LLE weakness and slurred speech due to previous strokes.  Baseline wheelchair bound can walk with wheelchair.  Dry cough as well p drinking water.  LUE/LLE weakness.  R face numbness  - decreased c/w L side.  L shoulder shrug a little weak.  Impression possibly completed recurrent stroke.  Plan Check CTA H/N, neuro consult, admit.

## 2025-05-05 NOTE — H&P ADULT - PROBLEM SELECTOR PLAN 2
Chronic moderate exacerbation   /80  Continue metoprolol tartrate 50mg BID   Pharmacy emailed for [facility]   Monitor and treat as appropriate

## 2025-05-05 NOTE — ED PROVIDER NOTE - PHYSICAL EXAMINATION
Vital signs reviewed.  CONSTITUTIONAL: NAD   HEAD: Normocephalic; atraumatic  EYES: EOMI, PERRL   MOUTH/THROAT:  MMM  NECK: Trachea midline, no JVD  CV: Normal S1, S2; no audible murmurs  RESP: normal work of breathing; CTAB   ABD: soft, non-distended; non-tender to palpation   : Deferred  MSK/EXT: no LE edema, no limited ROM  SKIN: No rashes on exposed skin surfaces  NEURO: Slurred speech, AAO X3, right facial decreased sensation to light touch.    Decreased sensation to light touch of left upper and left lower extremity. Decreased strength in left upper and left lower extremity-   4/5 left elbow extension/flexion, hip flexion, knee flexion, 2/5 left plantar/dorsi flexion.  5/5 strength in right upper/lower extremity.   PSYCH: calm interactive

## 2025-05-05 NOTE — H&P ADULT - PROBLEM SELECTOR PLAN 6
Chronic stable  Continue sinemet 25-100mg daily   [facility]

## 2025-05-05 NOTE — H&P ADULT - NSHPLABSRESULTS_GEN_ALL_CORE
11.5   7.13  )-----------( 273      ( 05 May 2025 13:45 )             35.7     138  |  97[L]  |  15  ----------------------------<  187[H]     05-05  3.7   |  24  |  0.56    Ca    9.2      05 May 2025 13:45    TPro  6.9  /  Alb  4.0  /  TBili  0.2  /  DBili  x   /  AST  23  /  ALT  18  /  AlkPhos  91  05-05    PT/INR: 13.6/1.14 (05-05-25 @ 13:45)  PTT: 31.2 (05-05-25 @ 13:45)    Hgb A1c: 7.9 (05-05-25 @ 13:45)      EKG interpreted by myself: nonischemic     images interpreted by radiology:   CT HEAD: No acute intracranial bleeding.    CTA BRAIN: Multifocal atheromatous irregularity and moderate cavernous ICA stenosis bilaterally. 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    CTA NECK: Interval distal right CCA and proximal right ICA carotid endarterectomy, which is patent. Moderate left ICA origin stenosis due to heavily calcified plaque.

## 2025-05-05 NOTE — H&P ADULT - NSHPSOCIALHISTORY_GEN_ALL_CORE
Does not use tobacco products, consume alcohol or partake in illicit drug use   Lives alone and has an aide during the day

## 2025-05-05 NOTE — H&P ADULT - NSHPREVIEWOFSYSTEMS_GEN_ALL_CORE
REVIEW OF SYSTEMS:    CONSTITUTIONAL: No weakness, fevers or chills  EYES/ENT: No visual changes;  No dysphagia; No sore throat; No rhinorrhea; No sinus pain/pressure  NECK: No pain or stiffness  RESPIRATORY: No cough, wheezing, hemoptysis; No shortness of breath  CARDIOVASCULAR: No chest pain or palpitations; No lower extremity edema  GASTROINTESTINAL: No abdominal or epigastric pain. No nausea, vomiting, or hematemesis; No diarrhea or constipation. No melena or hematochezia.  GENITOURINARY: No dysuria, frequency or hematuria  NEUROLOGICAL: + facial numbness on the right, no new weakness + headaches at night   MSK: ambulates with a walker /wheelchair   SKIN: No itching, burning, rashes, or lesions   All other review of systems is negative unless indicated above.

## 2025-05-05 NOTE — ED PROVIDER NOTE - ATTENDING CONTRIBUTION TO CARE
65F sent here by cards r/o stroke.  R face numbness x 2-3 weeks.  PMHX CAD with multiple stents, s/p CEA.  Residual LUE LLE weakness and slurred speech due to previous strokes.  Baseline wheelchair bound can walk with wheelchair.  Dry cough as well p drinking water.  LUE/LLE weakness.  R face numbness  - decreased c/w L side.  L shoulder shrug a little weak.  Impression possibly completed recurrent stroke.  Plan Check CTA H/N, neuro consult, admit.    VS:  unremarkable    GEN - NAD;  malaise, tearful;   A+O x3   HEAD - NC/AT     ENT - PEERL, EOMI, mucous membranes    moist , no discharge      NECK: Neck supple, non-tender without lymphadenopathy, no masses, no JVD  PULM - CTA b/l,  symmetric breath sounds  COR -  normal heart sounds    ABD - , ND, NT, soft,  BACK - no CVA tenderness, nontender spine     EXTREMS - no edema, no deformity, warm and well perfused    SKIN - no rash    or bruising      NEUROLOGIC - alert, face L side mild droop, speech Slurred, sensation decreased to R lower face, motor LUE, LLE weakness.

## 2025-05-05 NOTE — H&P ADULT - PROBLEM SELECTOR PLAN 7
Chronic stable  Pt is at high risk for delirium, therefore, please have adequate sleeping environment for the patient, light on, curtains open, TV on during the day with regular stimulation and out of bed to chair if possible. During the night, close curtains, TV off, lights off, and minimize interruptions (limit blood draws and vital sign checks if possible). Regular interaction with patient with staff (introducing selves), frequent reorientation, and encouragement of visitors as allowed by hospital and unit policy.

## 2025-05-05 NOTE — SWALLOW BEDSIDE ASSESSMENT ADULT - SWALLOW EVAL: DIAGNOSIS
1- Mild oral stage of puree, soft/bite solids, mildly thick liquids, and thin liquids characterized by adequate oral containment, adequate bolus manipulation, slow mastication for soft/bite solids (patient removed lower dentition mid mastication), adequate anterior to posterior transfer, trace/mild oral residue post soft/bite trial which clears with liquid wash. 2- Pharyngeal stage for puree, soft/bite solids, and mildly thick liquids marked by initiation of the pharyngeal swallow with hyolaryngeal excursion upon palpation. 3- Mild/Moderate pharyngeal stage for thin liquids marked by suspected delayed initiation of the pharyngeal swallow with hyolaryngeal excursion upon palpation with immediate cough post oral intake suggestive of impaired airway protection.

## 2025-05-05 NOTE — SWALLOW BEDSIDE ASSESSMENT ADULT - COMMENTS
ED Provider Note 5/5: "66 y/o F with PMH of CAD(s/p multiple stents, 2022, 12/2024), Right CEA(10/24), HTN, HLD, DM type II, CVA(in 2022 and 2024 with residual left sided upper and lower extremity weakness and slurred speech), Parkinson's disease presented to [facility]   From cardiologist office for concern of stroke because of right facial numbness for 2 to 3 weeks.    and aide at bedside reports patient  baseline ambulatory with one-person assist and uses wheelchair.   Baseline with left sided weakness and paresthesia from past strokes. For the last 2 to 3 weeks has been complaining of right facial numbness.  Went to cardiologist for annual checkup today and was asked to come to the emergency room for stroke eval. Endorse nonproductive cough for a few weeks, aide and  concern for aspiration as patient coughs after drinking water and sometimes after food. Denies fever, chills, nausea, vomiting, chest pain, shortness of breath, leg swelling, abdominal pain, dysuria, hematuria."    Of note, patient seen at [facility] in June 2024 for a clinical swallow evaluation with recommendations of Minced and Moist Solids with Thin Liquids (see notes in [facility])     Patient seen in ED this PM with patient's daughter-in-law and home aide present. Patient's DIL and aide report ongoing difficulty swallowing most not ED Provider Note 5/5: "64 y/o F with PMH of CAD(s/p multiple stents, 2022, 12/2024), Right CEA(10/24), HTN, HLD, DM type II, CVA(in 2022 and 2024 with residual left sided upper and lower extremity weakness and slurred speech), Parkinson's disease presented to [facility]   From cardiologist office for concern of stroke because of right facial numbness for 2 to 3 weeks.    and aide at bedside reports patient  baseline ambulatory with one-person assist and uses wheelchair.   Baseline with left sided weakness and paresthesia from past strokes. For the last 2 to 3 weeks has been complaining of right facial numbness.  Went to cardiologist for annual checkup today and was asked to come to the emergency room for stroke eval. Endorse nonproductive cough for a few weeks, aide and  concern for aspiration as patient coughs after drinking water and sometimes after food. Denies fever, chills, nausea, vomiting, chest pain, shortness of breath, leg swelling, abdominal pain, dysuria, hematuria."    Of note, patient seen at [facility] in June 2024 for a clinical swallow evaluation with recommendations of Minced and Moist Solids with Thin Liquids (see notes in [facility])     Patient seen in ED this PM with patient's daughter-in-law and home aide present. Patient's DIL and aide report ongoing difficulty swallowing most notably by coughing episodes post intake of liquids primarily. The patient's baseline diet is soft cooked solids with liquids. Patient's DIL and aide state the patient's speech is baseline from initial stroke in 2022. Patient following simple one step directives and answers simple questions.

## 2025-05-05 NOTE — CONSULT NOTE ADULT - ASSESSMENT
65y F with Hx multiple CVA (residual L sided weakness, dysarthria), HLD, HTN, DM2, PD, who presents w/ CC of R facial numbness    Impression: 2-3 weeks of constant R facial numbness. Neurological exam with chronic deficits (LUE/LLE proximal weakness, slurred speech, LUE rigidity, LUE pronator drift) and new R V1-V3 diminished sensation. Would rule-out new infarct with CT imaging    Recommendations:  [] continue on home aspirin 81mg daily and Plavix 75mg daily  [] continue on home Lipitor 40mg daily  [x] serum studies: A1c (7.9) and lipid profile (LDL 56)  [] f/u CTH w/o contrast and CTA head/neck w/ contrast. If no new infarct on imaging, ok to discharge patient and have her follow-up in 1-2 weeks with established neurologist; could consider outpatient MR brain w/o contrast at discretion of outpatient neurologist.  [] f/u PT/OT evaluation  [] neuro checks q8h  [] neurology will continue to follow    Case discussed w/ stroke fellow Dr Mei. Attestation to follow.   65y F with Hx multiple CVA (residual L sided weakness, dysarthria), HLD, HTN, DM2, PD, who presents w/ CC of R facial numbness    Impression: 2-3 weeks of constant R facial numbness. Neurological exam with chronic deficits (LUE/LLE proximal weakness, slurred speech, LUE rigidity, LUE pronator drift) and new R V1-V3 diminished sensation. Would rule-out new infarct with CT imaging    Recommendations:  [] continue on home aspirin 81mg daily and Plavix 75mg daily  [] continue on home Lipitor 40mg daily  [x] serum studies: A1c (7.9) and lipid profile (LDL 56)  [] f/u CTH w/o contrast and CTA head/neck w/ contrast. If no new infarct on imaging, ok to discharge patient and have her follow-up in 1-2 weeks with established neurologist; could consider outpatient MR brain w/o contrast at discretion of outpatient neurologist.  [] f/u PT/OT evaluation  [] neuro checks q8h  [] neurology will continue to follow    Case discussed w/ stroke fellow Dr Mei. Attestation to follow.n

## 2025-05-05 NOTE — ED PROVIDER NOTE - CLINICAL SUMMARY MEDICAL DECISION MAKING FREE TEXT BOX
63 y/o F with PMH of CAD(s/p multiple stents, 2022, 12/2024), Right CEA(10/24), HTN, HLD, DM type II, CVA(in 2022 and 2024 with residual left sided upper and lower extremity weakness and not having clear speech), Parkinson's disease presented to [facility] 65 y/o F with PMH of CAD(s/p multiple stents, 2022, 12/2024), Right CEA(10/24), HTN, HLD, DM type II, CVA(in 2022 and 2024 with residual left sided upper and lower extremity weakness and slurred speech), Parkinson's disease presented to [facility]   From cardiologist office for concern of stroke because of right facial numbness for 2 to 3 weeks.    and aide at bedside reports patient  baseline ambulatory with one-person assist and uses wheelchair.   Baseline with left sided weakness and paresthesia from past strokes. For the last 2 to 3 weeks has been complaining of right facial numbness.  Went to cardiologist for annual checkup today and was asked to come to the emergency room for stroke eval.   Endorse nonproductive cough for a few weeks, aide and  concern for aspiration as patient coughs after drinking water and sometimes after food. Denies fever, chills, nausea, vomiting, chest pain, shortness of breath, leg swelling, abdominal pain, dysuria, hematuria.    Vitals on presentation nonactionable.  Exam as above.  Presentation concerning for CVA, will check labs, CTA head and neck, neuroconsult.  Will need admission for MRI and speech and swallow eval. 66 y/o F with PMH of CAD(s/p multiple stents, 2022, 12/2024), Right CEA(10/24), HTN, HLD, DM type II, CVA(in 2022 and 2024 with residual left sided upper and lower extremity weakness and slurred speech), Parkinson's disease presented to [facility]   From cardiologist office for concern of stroke because of right facial numbness for 2 to 3 weeks.    and aide at bedside reports patient  baseline ambulatory with one-person assist and uses wheelchair.   Baseline with left sided weakness and paresthesia from past strokes. For the last 2 to 3 weeks has been complaining of right facial numbness.  Went to cardiologist for annual checkup today and was asked to come to the emergency room for stroke eval.   Endorse nonproductive cough for a few weeks, aide and  concern for aspiration as patient coughs after drinking water and sometimes after food. Denies fever, chills, nausea, vomiting, chest pain, shortness of breath, leg swelling, abdominal pain, dysuria, hematuria.    Vitals on presentation nonactionable.  Exam as above.  Presentation concerning for CVA, will check labs, CTA head and neck, neuroconsult.  Will need admission for MRI and speech and swallow eval.

## 2025-05-05 NOTE — H&P ADULT - NSICDXPASTMEDICALHX_GEN_ALL_CORE_FT
PAST MEDICAL HISTORY:  CAD (coronary artery disease)     CVA (cerebrovascular accident)     HLD (hyperlipidemia)     HTN (hypertension)     Mild dementia     PAD (peripheral artery disease)     Parkinson disease     Type II diabetes mellitus

## 2025-05-05 NOTE — ED ADULT NURSE NOTE - OBJECTIVE STATEMENT
Pt received to 24A. Pt is a 65 year old female with Hx of CVA, DM2, HLD, HTN, CAD. Pt sent from MD for 2-3 weeks of facial numbness. family at bedside reports patient has left sided weakness and slurred speech from prior stroke. Pt is A&Ox2 to self and place, OOB with wheelchair. (see flowsheet for NIH scale). breathing is even and unlabored on room air. skin is intact. no LE edema noted. spontaneous movement of all extremities. patient arrives with left AC 18G WNL. comfort measures provided. stretcher set in lowest position, call bell within reach, safety maintained.

## 2025-05-06 DIAGNOSIS — Z29.9 ENCOUNTER FOR PROPHYLACTIC MEASURES, UNSPECIFIED: ICD-10-CM

## 2025-05-06 DIAGNOSIS — R33.8 OTHER RETENTION OF URINE: ICD-10-CM

## 2025-05-06 LAB
ANION GAP SERPL CALC-SCNC: 14 MMOL/L — SIGNIFICANT CHANGE UP (ref 7–14)
APPEARANCE UR: CLEAR — SIGNIFICANT CHANGE UP
BACTERIA # UR AUTO: ABNORMAL /HPF
BASOPHILS # BLD AUTO: 0.03 K/UL — SIGNIFICANT CHANGE UP (ref 0–0.2)
BASOPHILS NFR BLD AUTO: 0.5 % — SIGNIFICANT CHANGE UP (ref 0–2)
BILIRUB UR-MCNC: NEGATIVE — SIGNIFICANT CHANGE UP
BUN SERPL-MCNC: 11 MG/DL — SIGNIFICANT CHANGE UP (ref 7–23)
CALCIUM SERPL-MCNC: 9.2 MG/DL — SIGNIFICANT CHANGE UP (ref 8.4–10.5)
CAST: 0 /LPF — SIGNIFICANT CHANGE UP (ref 0–4)
CHLORIDE SERPL-SCNC: 101 MMOL/L — SIGNIFICANT CHANGE UP (ref 98–107)
CO2 SERPL-SCNC: 25 MMOL/L — SIGNIFICANT CHANGE UP (ref 22–31)
COLOR SPEC: YELLOW — SIGNIFICANT CHANGE UP
CREAT SERPL-MCNC: 0.5 MG/DL — SIGNIFICANT CHANGE UP (ref 0.5–1.3)
DIFF PNL FLD: ABNORMAL
EGFR: 103 ML/MIN/1.73M2 — SIGNIFICANT CHANGE UP
EGFR: 103 ML/MIN/1.73M2 — SIGNIFICANT CHANGE UP
EOSINOPHIL # BLD AUTO: 0.41 K/UL — SIGNIFICANT CHANGE UP (ref 0–0.5)
EOSINOPHIL NFR BLD AUTO: 6.6 % — HIGH (ref 0–6)
FOLATE SERPL-MCNC: 16.1 NG/ML — SIGNIFICANT CHANGE UP (ref 3.1–17.5)
GLUCOSE BLDC GLUCOMTR-MCNC: 187 MG/DL — HIGH (ref 70–99)
GLUCOSE BLDC GLUCOMTR-MCNC: 194 MG/DL — HIGH (ref 70–99)
GLUCOSE BLDC GLUCOMTR-MCNC: 216 MG/DL — HIGH (ref 70–99)
GLUCOSE BLDC GLUCOMTR-MCNC: 226 MG/DL — HIGH (ref 70–99)
GLUCOSE BLDC GLUCOMTR-MCNC: 245 MG/DL — HIGH (ref 70–99)
GLUCOSE SERPL-MCNC: 217 MG/DL — HIGH (ref 70–99)
GLUCOSE UR QL: 250 MG/DL
HCT VFR BLD CALC: 35 % — SIGNIFICANT CHANGE UP (ref 34.5–45)
HGB BLD-MCNC: 11.1 G/DL — LOW (ref 11.5–15.5)
IANC: 3.69 K/UL — SIGNIFICANT CHANGE UP (ref 1.8–7.4)
IMM GRANULOCYTES NFR BLD AUTO: 0.3 % — SIGNIFICANT CHANGE UP (ref 0–0.9)
KETONES UR-MCNC: NEGATIVE MG/DL — SIGNIFICANT CHANGE UP
LEUKOCYTE ESTERASE UR-ACNC: ABNORMAL
LYMPHOCYTES # BLD AUTO: 1.42 K/UL — SIGNIFICANT CHANGE UP (ref 1–3.3)
LYMPHOCYTES # BLD AUTO: 23 % — SIGNIFICANT CHANGE UP (ref 13–44)
MCHC RBC-ENTMCNC: 23 PG — LOW (ref 27–34)
MCHC RBC-ENTMCNC: 31.7 G/DL — LOW (ref 32–36)
MCV RBC AUTO: 72.6 FL — LOW (ref 80–100)
MONOCYTES # BLD AUTO: 0.6 K/UL — SIGNIFICANT CHANGE UP (ref 0–0.9)
MONOCYTES NFR BLD AUTO: 9.7 % — SIGNIFICANT CHANGE UP (ref 2–14)
NEUTROPHILS # BLD AUTO: 3.69 K/UL — SIGNIFICANT CHANGE UP (ref 1.8–7.4)
NEUTROPHILS NFR BLD AUTO: 59.9 % — SIGNIFICANT CHANGE UP (ref 43–77)
NITRITE UR-MCNC: NEGATIVE — SIGNIFICANT CHANGE UP
NRBC # BLD AUTO: 0 K/UL — SIGNIFICANT CHANGE UP (ref 0–0)
NRBC # FLD: 0 K/UL — SIGNIFICANT CHANGE UP (ref 0–0)
NRBC BLD AUTO-RTO: 0 /100 WBCS — SIGNIFICANT CHANGE UP (ref 0–0)
PH UR: 6.5 — SIGNIFICANT CHANGE UP (ref 5–8)
PLATELET # BLD AUTO: 268 K/UL — SIGNIFICANT CHANGE UP (ref 150–400)
POTASSIUM SERPL-MCNC: 3.7 MMOL/L — SIGNIFICANT CHANGE UP (ref 3.5–5.3)
POTASSIUM SERPL-SCNC: 3.7 MMOL/L — SIGNIFICANT CHANGE UP (ref 3.5–5.3)
PROT UR-MCNC: SIGNIFICANT CHANGE UP MG/DL
RBC # BLD: 4.82 M/UL — SIGNIFICANT CHANGE UP (ref 3.8–5.2)
RBC # FLD: 14.6 % — HIGH (ref 10.3–14.5)
RBC CASTS # UR COMP ASSIST: 66 /HPF — HIGH (ref 0–4)
SODIUM SERPL-SCNC: 140 MMOL/L — SIGNIFICANT CHANGE UP (ref 135–145)
SP GR SPEC: 1.01 — SIGNIFICANT CHANGE UP (ref 1–1.03)
SQUAMOUS # UR AUTO: 0 /HPF — SIGNIFICANT CHANGE UP (ref 0–5)
UROBILINOGEN FLD QL: 0.2 MG/DL — SIGNIFICANT CHANGE UP (ref 0.2–1)
VIT B12 SERPL-MCNC: 621 PG/ML — SIGNIFICANT CHANGE UP (ref 200–900)
WBC # BLD: 6.17 K/UL — SIGNIFICANT CHANGE UP (ref 3.8–10.5)
WBC # FLD AUTO: 6.17 K/UL — SIGNIFICANT CHANGE UP (ref 3.8–10.5)
WBC UR QL: 13 /HPF — HIGH (ref 0–5)

## 2025-05-06 PROCEDURE — 99233 SBSQ HOSP IP/OBS HIGH 50: CPT

## 2025-05-06 PROCEDURE — 99222 1ST HOSP IP/OBS MODERATE 55: CPT | Mod: GC

## 2025-05-06 RX ORDER — FUROSEMIDE 10 MG/ML
1 INJECTION INTRAMUSCULAR; INTRAVENOUS
Refills: 0 | DISCHARGE

## 2025-05-06 RX ORDER — LOSARTAN POTASSIUM AND HYDROCHLOROTHIAZIDE 12.5; 5 MG/1; MG/1
1 TABLET ORAL
Refills: 0 | DISCHARGE

## 2025-05-06 RX ORDER — LINACLOTIDE 290 UG/1
1 CAPSULE, GELATIN COATED ORAL
Refills: 0 | DISCHARGE

## 2025-05-06 RX ORDER — CEFTRIAXONE 500 MG/1
1000 INJECTION, POWDER, FOR SOLUTION INTRAMUSCULAR; INTRAVENOUS EVERY 24 HOURS
Refills: 0 | Status: DISCONTINUED | OUTPATIENT
Start: 2025-05-06 | End: 2025-05-08

## 2025-05-06 RX ORDER — OLANZAPINE 10 MG/1
1.25 TABLET ORAL ONCE
Refills: 0 | Status: COMPLETED | OUTPATIENT
Start: 2025-05-06 | End: 2025-05-07

## 2025-05-06 RX ORDER — OLANZAPINE 10 MG/1
1.25 TABLET ORAL ONCE
Refills: 0 | Status: COMPLETED | OUTPATIENT
Start: 2025-05-06 | End: 2025-05-06

## 2025-05-06 RX ORDER — NIFEDIPINE 30 MG
1 TABLET, EXTENDED RELEASE 24 HR ORAL
Refills: 0 | DISCHARGE

## 2025-05-06 RX ORDER — LOSARTAN POTASSIUM AND HYDROCHLOROTHIAZIDE 12.5; 5 MG/1; MG/1
0.5 TABLET ORAL
Refills: 0 | DISCHARGE

## 2025-05-06 RX ORDER — NIFEDIPINE 30 MG
30 TABLET, EXTENDED RELEASE 24 HR ORAL DAILY
Refills: 0 | Status: DISCONTINUED | OUTPATIENT
Start: 2025-05-06 | End: 2025-05-08

## 2025-05-06 RX ORDER — INSULIN GLARGINE-YFGN 100 [IU]/ML
12 INJECTION, SOLUTION SUBCUTANEOUS
Refills: 0 | DISCHARGE

## 2025-05-06 RX ORDER — INSULIN GLARGINE-YFGN 100 [IU]/ML
8 INJECTION, SOLUTION SUBCUTANEOUS
Refills: 0 | DISCHARGE

## 2025-05-06 RX ADMIN — ENOXAPARIN SODIUM 40 MILLIGRAM(S): 100 INJECTION SUBCUTANEOUS at 18:00

## 2025-05-06 RX ADMIN — OLANZAPINE 1.25 MILLIGRAM(S): 10 TABLET ORAL at 06:18

## 2025-05-06 RX ADMIN — Medication 3 MILLIGRAM(S): at 21:24

## 2025-05-06 RX ADMIN — Medication 81 MILLIGRAM(S): at 11:13

## 2025-05-06 RX ADMIN — METOPROLOL SUCCINATE 50 MILLIGRAM(S): 50 TABLET, EXTENDED RELEASE ORAL at 07:20

## 2025-05-06 RX ADMIN — OLANZAPINE 1.25 MILLIGRAM(S): 10 TABLET ORAL at 05:35

## 2025-05-06 RX ADMIN — CLOPIDOGREL BISULFATE 75 MILLIGRAM(S): 75 TABLET, FILM COATED ORAL at 11:12

## 2025-05-06 RX ADMIN — INSULIN LISPRO 1: 100 INJECTION, SOLUTION INTRAVENOUS; SUBCUTANEOUS at 08:46

## 2025-05-06 RX ADMIN — ATORVASTATIN CALCIUM 40 MILLIGRAM(S): 80 TABLET, FILM COATED ORAL at 21:23

## 2025-05-06 RX ADMIN — METOPROLOL SUCCINATE 50 MILLIGRAM(S): 50 TABLET, EXTENDED RELEASE ORAL at 18:04

## 2025-05-06 RX ADMIN — Medication 1 TABLET(S): at 11:12

## 2025-05-06 RX ADMIN — INSULIN LISPRO 2: 100 INJECTION, SOLUTION INTRAVENOUS; SUBCUTANEOUS at 12:19

## 2025-05-06 RX ADMIN — CEFTRIAXONE 100 MILLIGRAM(S): 500 INJECTION, POWDER, FOR SOLUTION INTRAMUSCULAR; INTRAVENOUS at 21:18

## 2025-05-06 RX ADMIN — INSULIN LISPRO 2: 100 INJECTION, SOLUTION INTRAVENOUS; SUBCUTANEOUS at 17:59

## 2025-05-06 NOTE — OCCUPATIONAL THERAPY INITIAL EVALUATION ADULT - PATIENT/FAMILY/SIGNIFICANT OTHER GOALS STATEMENT, OT EVAL
Pt with impaired cognition did not state goal at this time; As per pt's family present at bedside, pt's family wishes for pt to return home

## 2025-05-06 NOTE — OCCUPATIONAL THERAPY INITIAL EVALUATION ADULT - DIAGNOSIS, OT EVAL
Pt with impaired cognition, strength, and balance impacting ability to complete ADLs, IADLs, functional mobility/transfers.

## 2025-05-06 NOTE — CONSULT NOTE ADULT - ATTENDING COMMENTS
65F with a pmh of multiple CVA (2022 & 2024, chronic L-sided weakness and slurred speech), PAD, T2DM on insulin, HLD, HTN, memory loss, CAD s/p stent (on ASA/Plavix), Parkinson's Disease, R CEA 10/2024 (Dr. Norman) who presents with right facial numbness for the past 2-3 weeks.   Patient is followed by Dr. Norman as an outpatient. Concern for possible recurrent stroke due to facial numbness, but <50% stenosis of ICA bilaterally.  No concern for carotid source of TIA or stroke at this time    Cont current mediations  Follow up as outpatient as scheduled
DOS 5/6/25    Briefly this is a 65 w/ HTN, T2DM, dementia, Parkinson's Disease, stroke (2022 with residual L sided deficits), known bilateral ICA stenosis, frequent falls and gait instability, binocular catarct surgery, prior posterior limb internal capsule infarct 10/24. Of note had prior R sided symptoms in Feb 2024 with diagnostic angio at that time not reflecting symptomatic carotid stenosis now represents from cardiologists office after experiencing R facial numbness x 2-3 weeks   CTH with new R BG infarct since prior imaging but would not explain current symptoms  Since Oct underwent R CEA which now appears patent on CTA  L ICA appears 50-60% stenotic , has known L ICA terminus aneurysm   o/e AAOx2, hypomimia, L hemiparesis, tremor and inc tone - baseline, also with complaints of R facial numbess      Recommendations:  [] continue on home aspirin 81mg daily and Plavix 75mg daily post CEA  [] continue on home Lipitor 40mg daily  [x] serum studies: A1c (7.9) and lipid profile (LDL 56)  [] can do MRI brain as outpatient  [] f/u PT/OT evaluation  [] neuro checks q4h  [] sbp normotensive  [] dvt ppx    outpatient neuro follow up    Viki Gray DO  Vascular Neurology  Office [phone]  Available via Microsoft Teams

## 2025-05-06 NOTE — OCCUPATIONAL THERAPY INITIAL EVALUATION ADULT - GENERAL OBSERVATIONS, REHAB EVAL
Pt received supine in bed +tele +primafit in NAD, +family at bedside, all lines intact. Pt OK to be seen for OT per REGINE Butler. SpO2 98% on RA.

## 2025-05-06 NOTE — OCCUPATIONAL THERAPY INITIAL EVALUATION ADULT - RANGE OF MOTION EXAMINATION, UPPER EXTREMITY
bilateral upper extremity shoulders 0-100* active ROM, left UE shoulder unable to further passively flex. bilateral upper extremity elbow/wrist/hand active ROM was WFL

## 2025-05-06 NOTE — PHYSICAL THERAPY INITIAL EVALUATION ADULT - ADDITIONAL COMMENTS
Lives alone in a house with ~5 stairs to enter. Pt is mostly wheelchair bound. Owns a rolling walker for minimal ambulation with assistance. Pt is dependent for ADLs. Pt receives home aide until 3pm daily. Pt stays in bed after 3 pm.    After session, patient left semi supine in bed with all lines intact in NAD. +Bed alarm. RN aware.

## 2025-05-06 NOTE — PROGRESS NOTE ADULT - PROBLEM SELECTOR PLAN 3
A1c 7.9  Home regimen: Lantus 12U qAM, 8U qPM, janumet 50mg-500mg BID  Hold Janumet while inpatient  FSBG goal 140-180  LDISS TIDAC/qhs Home meds: metoprolol 50mg BID, losartan-HCTZ 50mg-12.5mg qd, nifedipine 30mg qd, imdur 30mg qd, doxazosin 2mg qd, lasix 20mg qd    Continue metoprolol tartrate 50mg BID   Restart nifedipine 30mg qd today  Resume other home meds as needed

## 2025-05-06 NOTE — PROGRESS NOTE ADULT - PROBLEM SELECTOR PLAN 5
LDL 56  Continue atorvastatin 40mg qhs Prior CVAs (2022, 2024) with residual L sided deficits and slurred speech  Continue asa 81mg daily, plavix 75mg daily , atorvastatin 40mg daily

## 2025-05-06 NOTE — PATIENT PROFILE ADULT - NSPROPTRIGHTNOTIFY_GEN_A_NUR
Called and spoke to pt re: results-he VU. He will contact his local pharmacy first to see if they offer hep B vaccines and if not will find somewhere else to get that done. He will also get started on 5000 units of vitamin d3 daily and will contact [facility] to arrange phlebotomy after the first of the year. I went ahead and transferred him to scheduling to arrange US as he has not heard from anyone about arranging that. Pt advised to call me back with any further questions/problems.   
Please let him know that labs show that he is immune to hepatitis A.  He is not immune to hepatitis B.  I recommend that he proceed with the Recombivax vaccine series which is a series of 3 injections for hepatitis B specifically.  This can be performed through [employer] or [employer] etc. without a prescription.    His vitamin D level is very slightly low.  I advised vitamin D supplementation with vitamin D3 5000 units daily.    Ferritin is at a good point to proceed with phlebotomy after the first of the year.  Patient is aware to call for an appointment.      Remind him to complete sono imaging of the liver and assist with scheduling if needed.    Dorothea Sales MD   
declines

## 2025-05-06 NOTE — PROGRESS NOTE ADULT - PROBLEM SELECTOR PLAN 8
DVT ppx: Lovenox  Diet: DASH/TLC, CC, M&M, mildly thick liquids  Code: Full  Dispo: pending workup. PT recs home PT Pt is at high risk for delirium, therefore, please have adequate sleeping environment for the patient, light on, curtains open, TV on during the day with regular stimulation and out of bed to chair if possible. During the night, close curtains, TV off, lights off, and minimize interruptions (limit blood draws and vital sign checks if possible). Regular interaction with patient with staff (introducing selves), frequent reorientation, and encouragement of visitors as allowed by hospital and unit policy.

## 2025-05-06 NOTE — PROGRESS NOTE ADULT - PROBLEM SELECTOR PLAN 2
Home meds: metoprolol 50mg BID, losartan-HCTZ 50mg-12.5mg qd, nifedipine 30mg qd, imdur 30mg qd, doxazosin 2mg qd, lasix 20mg qd    Continue metoprolol tartrate 50mg BID   Restart nifedipine 30mg qd today  Resume other home meds as needed s/p straight cath 880cc  Continue bladder scans  Likely iso UTI, UA with small LE, 13 WBC, many bacteria  Continue CTX (5/6-5/8)  f/u UCx

## 2025-05-06 NOTE — ED ADULT NURSE REASSESSMENT NOTE - NS ED NURSE REASSESS COMMENT FT1
Report given by previous RN. Pt breathing is equal and nonlabored. pt is NSR on cardiac monitor. Pt safety maintained. Pt not in any distress or discomfort. pt waiting for bed assignment.
Break RN; Pt receive outside room 20. Pt resting comfortably in bed, in no apparent distress, following commands, dysarthria noted. NSR on monitor. Admitted to tele, pending bed assignment.
Report received from break RN Gurmeet. Pt remains at baseline mental status, appears comfortable resting in stretcher. Pt offers no complaints at this time. breathing is even and unlabored. NSR on cardiac monitor. Stretcher set in lowest position, safety maintained.

## 2025-05-06 NOTE — CONSULT NOTE ADULT - SUBJECTIVE AND OBJECTIVE BOX
Neurology - Consult Note    -  Spectra: [phone] ([facility]), [phone] ([facility])  -    HPI: Patient ALYSE AVENDAÑO is a 65y (1960) woman with a PMHx significant for multiple CVA (2022, 2024), PD, DM2, HLD, HTN, memory loss, CAD s/p stent (on ASA/Plavix) who presents w/ CC of R facial numbness. Accompanied by daughter-in-law at bedside who is assisting w/ history. Patient went for cardiologist appointment earlier today and was sent to ED for stroke evaluation after complaining of R facial numbness for the past 2-3 weeks. Patient says the numbness is constant and is not accompanied by tinging or pain. No numbness in the arms or legs. No changes in vision, hearing loss, or tinnitus. Denies vertigo. Complains of intermittent headaches at nighttime, located at apex of head, resolves w/ Advil. No nausea or vomiting. No recent falls or illnesses. Denies fevers. Patient lives alone and has HHA who comes to assist for few hours per day. Aide and family assist patient with cooking and taking her medications. Patient has chronic unsteady gait, ambulates with assistance or using wheelchair. She can use toilet and shower by herself. She has chronic L-sided weakness and slurred speech due to prior CVA. In the ED, patient has been hemodynamically stable and afebrile. CBC and CMP unremarkable. A1c 7.9 and lipid profile wnl (LDL 56).    Home medications: Tylenol 650mg tabs, basaglar 24u daily, metoprolol tartrate 50mg BID, Janumet 50-500mg BID, gabapentin 100mg TID, nifedipine 30mg, Sinemet 25-100mg, aspirin 81mg, Lipitor 40mg, doxazosin 2mg, famotidine 20mg, Plavix 75mg, isosorbide mononitrate IR 30mg, losartan-HCTZ 100-12.5mg      Review of Systems:  per HPI    Allergies:  No Known Allergies      PMHx/PSHx/Family Hx: As above, otherwise see below   No pertinent past medical history    HTN (hypertension)    HLD (hyperlipidemia)    CAD (coronary artery disease)    Type II diabetes mellitus    PAD (peripheral artery disease)    Mild dementia    Parkinson disease        Social Hx:  No current use of tobacco, alcohol, or illicit drugs  Lives alone  2 sons    Medications:  MEDICATIONS  (STANDING):    MEDICATIONS  (PRN):      Vitals:  T(C): 36.7 (05-05-25 @ 13:50), Max: 36.7 (05-05-25 @ 11:50)  HR: 74 (05-05-25 @ 13:50) (72 - 74)  BP: 134/58 (05-05-25 @ 13:50) (134/58 - 136/85)  RR: 17 (05-05-25 @ 13:50) (17 - 18)  SpO2: 100% (05-05-25 @ 13:50) (97% - 100%)    Physical Examination:   General - NAD, pleasant cooperative well-developed female  Cardiovascular - Peripheral pulses palpable, no edema  Eyes - clear sclera b/l    Neurologic Exam:  Mental status - Awake, Alert, oriented to self, location, but not month/year. Speech has irregular misha with moderate dysarthria, repetition and naming intact. Follows simple commands. Answering questions and attentive to examiner    Cranial nerves - PERRLA, VFF, EOMI, diminished sensation on R V1-V3, facial strength intact without asymmetry b/l, hearing intact b/l, trapezius 5/5 strength b/l, tongue midline on protrusion    Motor - Normal bulk. Normal tone in RUE. Rigidity in LUE. No tremors at rest. 4/5 strength in L deltoid and L hip flexor. 5/5 strength in R deltoid, bicep, tricep, hand , hip flexor, ankle dorsi/plantarflexion. 5/5 strength in R bicep, tricep, hand , ankle dorsi/plantarflexion. Mild LUE pronator drift. No drift in RUE.    Sensation - Light touch intact throughout. No extinction to DSS    DTR's -             Biceps      Triceps     Brachioradialis      Patellar    Ankle    Toes/plantar response  R             2+             2+                  2+                       1           1                Down  L              2+             2+                 2+                        1        1                Down    Coordination - Finger to Nose intact b/l    Gait and station - did not assess due to fall risk    Labs:                        11.5   7.13  )-----------( 273      ( 05 May 2025 13:45 )             35.7     05-05    138  |  97[L]  |  15  ----------------------------<  187[H]  3.7   |  24  |  0.56    Ca    9.2      05 May 2025 13:45    TPro  6.9  /  Alb  4.0  /  TBili  0.2  /  DBili  x   /  AST  23  /  ALT  18  /  AlkPhos  91  05-05    CAPILLARY BLOOD GLUCOSE      POCT Blood Glucose.: 161 mg/dL (05 May 2025 11:52)    LIVER FUNCTIONS - ( 05 May 2025 13:45 )  Alb: 4.0 g/dL / Pro: 6.9 g/dL / ALK PHOS: 91 U/L / ALT: 18 U/L / AST: 23 U/L / GGT: x             PT/INR - ( 05 May 2025 13:45 )   PT: 13.6 sec;   INR: 1.14 ratio         PTT - ( 05 May 2025 13:45 )  PTT:31.2 sec  CSF:                  Radiology:  pending CTH and CTA  
*** SURGERY CONSULT NOTE    Consulting Team: Vascular Surgery     Patient: ALYSE AVENDAÑO , 65y ([birth year])Female   MRN: [id number]  Location: [place] [address] D  Visit: 25 Inpatient  Date: 25 @ 15:32      HPI: 65F with a pmh of multiple CVA ( & , chronic L-sided weakness and slurred speech), PAD, T2DM on insulin, HLD, HTN, memory loss, CAD s/p stent (on ASA/Plavix), Parkinson's Disease, R CEA 10/2024 (Dr. Norman) who presents with right facial numbness for the past 2-3 weeks. Endorses the numbness to be constant without any other associated tingling/pain/radiation. She does endorse a headache that usually occurs at night at the back of her head that resolves with advil/tylenol. She otherwise denies headache at present, dizziness, chest pain, palpitations, SOB, abdominal pain, joint pain, diarrhea/constipation, urinary symptoms. Patient underwent CTH/CTA which showed no acute hemorrhage, but noted multifocal atheromatous irregularity and moderate cavernous ICA stenosis bilaterally, patent R CEA, and moderate left ICA origin stenosis due to heavily calcified plaque. Of note, patient recently seen at OP visit with Dr. Norman for surveillance, cartodi US showed <50% L ICA stenosis. Vascular consulted to assess.         PAST MEDICAL HISTORY:  No pertinent past medical history    HTN (hypertension)    HLD (hyperlipidemia)    CAD (coronary artery disease)    Type II diabetes mellitus    PAD (peripheral artery disease)    Mild dementia    Parkinson disease    CVA (cerebrovascular accident)        PAST SURGICAL HISTORY:  S/P hysterectomy        MEDICATIONS:  acetaminophen     Tablet .. 650 milliGRAM(s) Oral every 6 hours PRN  aluminum hydroxide/magnesium hydroxide/simethicone Suspension 30 milliLiter(s) Oral every 4 hours PRN  aspirin enteric coated 81 milliGRAM(s) Oral daily  atorvastatin 40 milliGRAM(s) Oral at bedtime  carbidopa/levodopa  25/100 1 Tablet(s) Oral daily  cefTRIAXone   IVPB 1000 milliGRAM(s) IV Intermittent every 24 hours  clopidogrel Tablet 75 milliGRAM(s) Oral daily  dextrose 5%. 1000 milliLiter(s) IV Continuous <Continuous>  dextrose 5%. 1000 milliLiter(s) IV Continuous <Continuous>  dextrose 50% Injectable 25 Gram(s) IV Push once  dextrose 50% Injectable 12.5 Gram(s) IV Push once  dextrose 50% Injectable 25 Gram(s) IV Push once  dextrose Oral Gel 15 Gram(s) Oral once PRN  enoxaparin Injectable 40 milliGRAM(s) SubCutaneous every 24 hours  glucagon  Injectable 1 milliGRAM(s) IntraMuscular once  insulin lispro (ADMELOG) corrective regimen sliding scale   SubCutaneous three times a day before meals  insulin lispro (ADMELOG) corrective regimen sliding scale   SubCutaneous at bedtime  melatonin 3 milliGRAM(s) Oral at bedtime PRN  metoprolol tartrate 50 milliGRAM(s) Oral two times a day  ondansetron Injectable 4 milliGRAM(s) IV Push every 8 hours PRN      ALLERGIES:  No Known Allergies      VITALS & I/Os:  Vital Signs Last 24 Hrs  T(C): 36.8 (06 May 2025 07:15), Max: 36.8 (06 May 2025 05:14)  T(F): 98.3 (06 May 2025 07:15), Max: 98.3 (06 May 2025 07:15)  HR: 86 (06 May 2025 07:15) (67 - 86)  BP: 184/73 (06 May 2025 07:15) (153/80 - 187/79)  BP(mean): --  RR: 18 (06 May 2025 07:15) (18 - 21)  SpO2: 100% (06 May 2025 07:15) (98% - 100%)    Parameters below as of 06 May 2025 07:15  Patient On (Oxygen Delivery Method): room air        I&O's Summary    06 May 2025 07:01  -  06 May 2025 15:32  --------------------------------------------------------  IN: 0 mL / OUT: 880 mL / NET: -880 mL        PHYSICAL EXAM:  General Appearance: no acute distress, NTND   Chest: airway intact, non-labored breathing  CV: no JVD, palpable pulses b/l  Extremities: P     LABS:                        11.1   6.17  )-----------( 268      ( 06 May 2025 05:00 )             35.0     05-06    140  |  101  |  11  ----------------------------<  217[H]  3.7   |  25  |  0.50    Ca    9.2      06 May 2025 05:00    TPro  6.9  /  Alb  4.0  /  TBili  0.2  /  DBili  x   /  AST  23  /  ALT  18  /  AlkPhos  91      Lactate:    PT/INR - ( 05 May 2025 13:45 )   PT: 13.6 sec;   INR: 1.14 ratio         PTT - ( 05 May 2025 13:45 )  PTT:31.2 sec          Urinalysis Basic - ( 06 May 2025 08:30 )    Color: Yellow / Appearance: Clear / S.015 / pH: x  Gluc: x / Ketone: Negative mg/dL  / Bili: Negative / Urobili: 0.2 mg/dL   Blood: x / Protein: Trace mg/dL / Nitrite: Negative   Leuk Esterase: Small / RBC: 66 /HPF / WBC 13 /HPF   Sq Epi: x / Non Sq Epi: 0 /HPF / Bacteria: Many /HPF          IMAGING:  FINDINGS:    CT HEAD:    There is no acute intracranial mass-effect, hemorrhage, midline shift, or   abnormal extra-axial fluid collection.    Scattered cerebral white matter hypoattenuation due to mild chronic   microvascular ischemic changes. Age-indeterminate lacunar type   infarctions in the anterior right gangliocapsular regions extending into   the corona radiata.    Ventricles, sulci, and cisterns are normal in size without hydrocephalus.   Basal cisterns are patent.    Visualized paranasal sinuses and mastoid air cells are clear. Calvarium   is intact.    CTA BRAIN:    INTERNAL CAROTID ARTERIES: Multifocal atheromatous irregularity and mild   to moderate stenoses along the cavernous ICA segments bilaterally.    Redemonstrated 2-3 mm inferiorly directed outpouching from the left ICA   terminus in the region of the expected origin of the posterior   communicating artery, unchanged. There is no associated artery   definitively identified from the outpouching.    ANTERIOR CEREBRAL ARTERIES: Patent without flow-limiting stenosis or   occlusion. Hypoplastic right A1 segment. Anterior communicating artery is   unremarkable without aneurysm.    MIDDLE CEREBRAL ARTERIES: Patent without flow-limiting stenosis or   occlusion. MCA bifurcations are unremarkable without aneurysm.    POSTERIOR CEREBRAL ARTERIES: Patent without flow-limiting stenosis or   occlusion. Fetal right PCA origin with corresponding hypoplastic P1   segment.    VERTEBROBASILAR SYSTEM: Patent without flow-limiting stenosis or   occlusion.    DURAL VENOUS SINUSES: Patent. No filling defect or thrombus.    CTA NECK:    RIGHT CAROTID SYSTEM: There is been interval endarterectomy of the distal   CCA and proximal ICA which is patent without flow-limiting stenosis or   occlusion. Surrounding surgical clips are noted. Medialized course of the   proximal ICA again seen.    LEFT CAROTID SYSTEM: Redemonstrated moderate stenosis of the ICA origin   measuring 50-60% as per NASCET criteria due to heavily calcified   atherosclerotic plaque.    VERTEBRAL SYSTEM: Normal in caliber without flow-limiting stenosis or   occlusion. Origin of the vertebral arteries are unremarkable.    AORTIC ARCH: Scattered calcified plaque along the aortic arch and origin   of the great vessels.    There is a 2.3 cm hypodense left thyroid nodule.    IMPRESSION:    CT HEAD: No acute intracranial bleeding.    CTA BRAIN: Multifocal atheromatous irregularity and moderate cavernous   ICA stenosis bilaterally.    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    CTA NECK: Interval distal right CCA and proximal right ICA carotid   endarterectomy, which is patent.    Moderate left ICA origin stenosis due to heavily calcified plaque.    --- End of Report ---      
  Johnathan Tejeda MD  Interventional Cardiology / Endovascular Specialist  [place] Office : [phone] [place] [address] [place] [address]  Tel:   [place] Office : [address] [facility] [place] [address]  Tel: [phone]    HPI:  65yr old female with a pmh of multiple CVA (2022 & 2024, chronic L-sided weakness and slurred speech), PAD, T2DM on insulin, HLD, HTN, memory loss, CAD s/p stent (on ASA/Plavix), Parkinson's Disease,  who presents with right facial numbness for the past 2-3 weeks. Endorses the numbness to be constant without any other associated tingling/pain/radiation. She does endorse a headache that usually occurs at night at the back of her head that resolves with advil/tylenol.   Denies  headache at present, dizziness, chest pain, palpitations, SOB, abdominal pain, joint pain, diarrhea/constipation, urinary symptoms.       	  MEDICATIONS:  aspirin enteric coated 81 milliGRAM(s) Oral daily  clopidogrel Tablet 75 milliGRAM(s) Oral daily  enoxaparin Injectable 40 milliGRAM(s) SubCutaneous every 24 hours  metoprolol tartrate 50 milliGRAM(s) Oral two times a day    cefTRIAXone   IVPB 1000 milliGRAM(s) IV Intermittent every 24 hours      acetaminophen     Tablet .. 650 milliGRAM(s) Oral every 6 hours PRN  carbidopa/levodopa  25/100 1 Tablet(s) Oral daily  melatonin 3 milliGRAM(s) Oral at bedtime PRN  OLANZapine Injectable 1.25 milliGRAM(s) IntraMuscular once PRN  ondansetron Injectable 4 milliGRAM(s) IV Push every 8 hours PRN    aluminum hydroxide/magnesium hydroxide/simethicone Suspension 30 milliLiter(s) Oral every 4 hours PRN    atorvastatin 40 milliGRAM(s) Oral at bedtime  dextrose 50% Injectable 25 Gram(s) IV Push once  dextrose 50% Injectable 12.5 Gram(s) IV Push once  dextrose 50% Injectable 25 Gram(s) IV Push once  dextrose Oral Gel 15 Gram(s) Oral once PRN  glucagon  Injectable 1 milliGRAM(s) IntraMuscular once  insulin lispro (ADMELOG) corrective regimen sliding scale   SubCutaneous three times a day before meals  insulin lispro (ADMELOG) corrective regimen sliding scale   SubCutaneous at bedtime    dextrose 5%. 1000 milliLiter(s) IV Continuous <Continuous>  dextrose 5%. 1000 milliLiter(s) IV Continuous <Continuous>      PAST MEDICAL/SURGICAL HISTORY  PAST MEDICAL & SURGICAL HISTORY:  HTN (hypertension)      HLD (hyperlipidemia)      CAD (coronary artery disease)      Type II diabetes mellitus      PAD (peripheral artery disease)      Mild dementia      Parkinson disease      CVA (cerebrovascular accident)      S/P hysterectomy          SOCIAL HISTORY: Substance Use (street drugs): ( x ) never used  (  ) other:    FAMILY HISTORY:  No pertinent family history in first degree relatives        PHYSICAL EXAM:  T(C): 36.7 (05-06-25 @ 15:40), Max: 36.8 (05-06-25 @ 05:14)  HR: 90 (05-06-25 @ 15:40) (67 - 90)  BP: 165/88 (05-06-25 @ 15:40) (153/80 - 187/79)  RR: 18 (05-06-25 @ 15:40) (18 - 21)  SpO2: 99% (05-06-25 @ 15:40) (98% - 100%)  Wt(kg): --  I&O's Summary    06 May 2025 07:01  -  06 May 2025 17:05  --------------------------------------------------------  IN: 0 mL / OUT: 880 mL / NET: -880 mL      Height (cm): 157.5 (05-06 @ 09:10)  Weight (kg): 67.7 (05-06 @ 09:10)  BMI (kg/m2): 27.3 (05-06 @ 09:10)  BSA (m2): 1.69 (05-06 @ 09:10)    GENERAL: NAD  EYES: EOMI  Cardiovascular: Normal S1 S2, No JVD, No murmurs, No edema  Respiratory: Lungs clear to auscultation	  Gastrointestinal:  Soft, Non-tender, + BS	  Extremities: Normal range of motion, No clubbing, cyanosis or edema  NERVOUS SYSTEM:  Alert, verbal with slurred speech                                  11.1   6.17  )-----------( 268      ( 06 May 2025 05:00 )             35.0     05-06    140  |  101  |  11  ----------------------------<  217[H]  3.7   |  25  |  0.50    Ca    9.2      06 May 2025 05:00    TPro  6.9  /  Alb  4.0  /  TBili  0.2  /  DBili  x   /  AST  23  /  ALT  18  /  AlkPhos  91  05-05    proBNP:   Lipid Profile:   HgA1c:   TSH:     Consultant(s) Notes Reviewed:  [x ] YES  [ ] NO    Care Discussed with Consultants/Other Providers [ x] YES  [ ] NO    Imaging Personally Reviewed independently:  [x] YES  [ ] NO    All labs, radiologic studies, vitals, orders and medications list reviewed. Patient is seen and examined at bedside. Case discussed with medical team.

## 2025-05-06 NOTE — OCCUPATIONAL THERAPY INITIAL EVALUATION ADULT - MANUAL MUSCLE TESTING RESULTS, REHAB EVAL
bilateral upper extremity shoulders 3-/5, bilateral upper extremity elbow/wrist/hands 3/5/grossly assessed due to

## 2025-05-06 NOTE — CONSULT NOTE ADULT - ASSESSMENT
CT Head/CTA brain/neck  IMPRESSION:    CT HEAD: No acute intracranial bleeding.    CTA BRAIN: Multifocal atheromatous irregularity and moderate cavernous   ICA stenosis bilaterally.    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    CTA NECK: Interval distal right CCA and proximal right ICA carotid   endarterectomy, which is patent.    Moderate left ICA origin stenosis due to heavily calcified plaque.      A/P:  65 yr old female presenting with right sided facial numbness      1. Facial numbness  Neurology consulted  c/w aspirin 81mg daily and Plavix 75mg daily, Lipitor 40mg daily  -neurochecks    2. CVA   Continue asa 81mg daily, plavix 75mg daily , atorvastatin 40mg daily.    3 HTN  Continue metoprolol tartrate 50mg BID   med-rec pending CT Head/CTA brain/neck  IMPRESSION:    CT HEAD: No acute intracranial bleeding.    CTA BRAIN: Multifocal atheromatous irregularity and moderate cavernous   ICA stenosis bilaterally.    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    CTA NECK: Interval distal right CCA and proximal right ICA carotid   endarterectomy, which is patent.    Moderate left ICA origin stenosis due to heavily calcified plaque.      A/P:  65 yr old female presenting with right sided facial numbness      1. Facial numbness  Neurology consulted, MRI pending   c/w aspirin 81mg daily and Plavix 75mg daily, Lipitor 40mg daily  -neurochecks    2. CVA   Continue asa 81mg daily, plavix 75mg daily , atorvastatin 40mg daily.    3 HTN  Continue metoprolol tartrate 50mg BID   med-rec pending

## 2025-05-06 NOTE — PATIENT PROFILE ADULT - VISION (WITH CORRECTIVE LENSES IF THE PATIENT USUALLY WEARS THEM):
Cannot see out of L eye./Partially impaired: cannot see medication labels or newsprint, but can see obstacles in path, and the surrounding layout; can count fingers at arm's length

## 2025-05-06 NOTE — PROVIDER CONTACT NOTE (OTHER) - SITUATION
Patient complaining of L sided numbness in UE and LE.  Family at bedside states that this has been a consistent symptom since she has experienced stroke.  ACP notified.

## 2025-05-06 NOTE — OCCUPATIONAL THERAPY INITIAL EVALUATION ADULT - ADDITIONAL COMMENTS
Pt with impaired cognitive status; Social history obtained from family present at bedside: Pt resides alone in a house, 6 steps to enter. Pt has a home health aide from morning until 3pm. Pt's home health aide assists pt into bed at 3 PM, pt stays bedbound until aide returns in morning and family assist as needed. Pt was dependent for all ADLs and transferred from bed<>wheelchair/commode/chair with assist. Pt was only ambulating a few "short steps." Pt owns a rolling walker, shower chair, grab bars, wheelchair, and had bed rails attached to her bed.

## 2025-05-06 NOTE — PROVIDER CONTACT NOTE (OTHER) - SITUATION
Unable to complete patients 4PM neurological assessment due to patient being combative and agitated.  ACP notified.

## 2025-05-06 NOTE — OCCUPATIONAL THERAPY INITIAL EVALUATION ADULT - IMPAIRMENTS CONTRIBUTING IMPAIRED BED MOBILITY, REHAB EVAL
impaired balance/cognition/impaired postural control/decreased ROM/decreased strength
Significant Other

## 2025-05-06 NOTE — PROVIDER CONTACT NOTE (OTHER) - SITUATION
Unable to complete a full and accurate assessment on patient, pt is combative, security was called in order to assist RN in administering PRN zyprexa in order to reduce patient's agitation.

## 2025-05-06 NOTE — PATIENT PROFILE ADULT - FUNCTIONAL ASSESSMENT - DAILY ACTIVITY 6.
1 = Total assistance Glycopyrrolate Pregnancy And Lactation Text: This medication is Pregnancy Category B and is considered safe during pregnancy. It is unknown if it is excreted breast milk.

## 2025-05-06 NOTE — PHARMACOTHERAPY INTERVENTION NOTE - COMMENTS
Medication history updated; medication list/dosages obtained from outpatient pharmacy ([facility]).     Home Medications:  aspirin 81 mg oral delayed release tablet: 1 tab(s) orally once a day  atorvastatin 40 mg oral tablet: 1 tab(s) orally once a day (at bedtime)   Basaglar KwikPen 100 units/mL subcutaneous solution: 12 unit(s) subcutaneous once a day in the morning before breakfast   Basaglar KwikPen 100 units/mL subcutaneous solution: 8 unit(s) subcutaneous once a day in the evening  carbidopa-levodopa 25 mg-100 mg oral tablet: 1 tab(s) orally once a day (as needed)  clopidogrel 75 mg oral tablet: 1 tab(s) orally once a day  doxazosin 2 mg oral tablet: 1 tab(s) orally once a day (at bedtime)   famotidine 20 mg oral tablet: 1 tab(s) orally once a day  furosemide 20 mg oral tablet: 1 tab(s) orally once a day   gabapentin 100 mg oral capsule: 1 cap(s) orally 3 times a day  isosorbide mononitrate 30 mg oral tablet, extended release: 1 tab(s) orally once a day   Janumet 50 mg-500 mg oral tablet: 1 tab(s) orally 2 times a day   Linzess 72 mcg oral capsule: 1 cap(s) orally once a day   losartan-hydrochlorothiazide 50 mg-12.5 mg oral tablet: 1 tab(s) orally once a day   metoprolol tartrate 50 mg oral tablet: 1 tab(s) orally 2 times a day   NIFEdipine 30 mg oral tablet, extended release: 1 tab(s) orally once a day

## 2025-05-06 NOTE — PATIENT PROFILE ADULT - FUNCTIONAL ASSESSMENT - DAILY ACTIVITY 5.
Immediate Postoperative / Post-Procedure Note    Preoperative Diagnosis: Upper back subcutaneous mass        Postoperative Diagnosis: Upper back subcutaneous mass       Procedure(s): Excision of upper back subcutaneous mass    Surgeon/Proceduralist: Eloy King MD    Assistant name and significant procedure(s) performed: None    Anesthesia Type: Local         Estimated Blood Loss: Minimal    Transfusion Summary: None    Specimen(s):   ID Type Source Tests Collected by Time   A : Upper back subcutaneous mass Tissue Back, Upper SURGICAL PATHOLOGY Eloy King MD 10/7/2021 1014       Grafts/Implants: None       Complications: None      Electronically signed by: Eloy King MD  10/7/2021   1 = Total assistance

## 2025-05-06 NOTE — CONSULT NOTE ADULT - ASSESSMENT
65F with a pmh of multiple CVA (2022 & 2024, chronic L-sided weakness and slurred speech), PAD, T2DM on insulin, HLD, HTN, memory loss, CAD s/p stent (on ASA/Plavix), Parkinson's Disease, R CEA 10/2024 (Dr. Norman) who presents with right facial numbness for the past 2-3 weeks. Patient underwent CTH/CTA which showed no acute hemorrhage, but noted multifocal atheromatous irregularity and moderate cavernous ICA stenosis bilaterally, patent R CEA, and moderate left ICA origin stenosis due to heavily calcified plaque.     Rec:  - no acute vascular intervention patient had recent OP visit with Dr. Norman on 4/30 with carotid US showing <50% L ICA stenosis. Patient can continue to f/u OP with Dr. Norman as OP for surveillance and c/w medical management.   - can consider neuro IR consult for cavernous ICA stenosis  - patient discussed with vasc fellow  - remainder of care per primary     Vascular Surgery [id number]  Hermann Chamorro MD (PGY2)  65F with a pmh of multiple CVA (2022 & 2024, chronic L-sided weakness and slurred speech), PAD, T2DM on insulin, HLD, HTN, memory loss, CAD s/p stent (on ASA/Plavix), Parkinson's Disease, R CEA 10/2024 (Dr. Norman) who presents with right facial numbness for the past 2-3 weeks. Patient underwent CTH/CTA which showed no acute hemorrhage, but noted multifocal atheromatous irregularity and moderate cavernous ICA stenosis bilaterally, patent R CEA, and moderate left ICA origin stenosis due to heavily calcified plaque.     Rec:  - no acute vascular intervention patient had recent OP visit with Dr. Norman on 4/30 with carotid US showing <50% L ICA stenosis. Patient can continue to f/u OP with Dr. Norman as OP for surveillance and c/w medical management.   - patient discussed with vasc fellow  - remainder of care per primary     Vascular Surgery [id number]  Hermann Chamorro MD (PGY2)

## 2025-05-06 NOTE — PROGRESS NOTE ADULT - PROBLEM SELECTOR PLAN 7
Pt is at high risk for delirium, therefore, please have adequate sleeping environment for the patient, light on, curtains open, TV on during the day with regular stimulation and out of bed to chair if possible. During the night, close curtains, TV off, lights off, and minimize interruptions (limit blood draws and vital sign checks if possible). Regular interaction with patient with staff (introducing selves), frequent reorientation, and encouragement of visitors as allowed by hospital and unit policy. Continue sinemet 25-100mg daily

## 2025-05-06 NOTE — PHYSICAL THERAPY INITIAL EVALUATION ADULT - PERTINENT HX OF CURRENT PROBLEM, REHAB EVAL
65 year old Female sent here by cards r/o stroke.  R face numbness x 2-3 weeks.  PMHX CAD with multiple stents, s/p CEA.  Residual LUE LLE weakness and slurred speech due to previous strokes.  Baseline wheelchair bound can walk with wheelchair.

## 2025-05-06 NOTE — OCCUPATIONAL THERAPY INITIAL EVALUATION ADULT - PERTINENT HX OF CURRENT PROBLEM, REHAB EVAL
65 year old female with past medical history of multiple CVA (2022 & 2024, chronic L-sided weakness and slurred speech), PAD, T2DM on insulin, HLD, HTN, memory loss, CAD s/p stent (on ASA/Plavix), Parkinson's Disease,  who presents with right facial numbness for the past 2-3 weeks. Endorses the numbness to be constant without any other associated tingling/pain/radiation. She does endorse a headache that usually occurs at night at the back of her head that resolves with advil/tylenol.

## 2025-05-06 NOTE — PATIENT PROFILE ADULT - FALL HARM RISK - HARM RISK INTERVENTIONS
Assistance with ambulation/Assistance OOB with selected safe patient handling equipment/Communicate Risk of Fall with Harm to all staff/Discuss with provider need for PT consult/Monitor gait and stability/Provide patient with walking aids - walker, cane, crutches/Reinforce activity limits and safety measures with patient and family/Tailored Fall Risk Interventions/Use of alarms - bed, chair and/or voice tab/Visual Cue: Yellow wristband and red socks/Bed in lowest position, wheels locked, appropriate side rails in place/Call bell, personal items and telephone in reach/Instruct patient to call for assistance before getting out of bed or chair/Non-slip footwear when patient is out of bed/[place] to call system/Physically safe environment - no spills, clutter or unnecessary equipment/Purposeful Proactive Rounding/Room/bathroom lighting operational, light cord in reach

## 2025-05-06 NOTE — PROGRESS NOTE ADULT - PROBLEM SELECTOR PLAN 4
Prior CVAs (2022, 2024) with residual L sided deficits and slurred speech  Continue asa 81mg daily, plavix 75mg daily , atorvastatin 40mg daily A1c 7.9  Home regimen: Lantus 12U qAM, 8U qPM, janumet 50mg-500mg BID  Hold Janumet while inpatient  FSBG goal 140-180  LDISS TIDAC/qhs

## 2025-05-06 NOTE — PATIENT PROFILE ADULT - FUNCTIONAL ASSESSMENT - BASIC MOBILITY 4.
Pt demo'd active movement of RLE within limited range/bilateral upper extremity ROM was WNL (within normal limits)/Left LE ROM was WNL (within normal limits)
1 = Total assistance

## 2025-05-06 NOTE — OCCUPATIONAL THERAPY INITIAL EVALUATION ADULT - NSOTDISCHREC_GEN_A_CORE
Anticipate home with continued level of care provided by home health aide and family./No skilled OT needs

## 2025-05-07 LAB
ALBUMIN SERPL ELPH-MCNC: 3.9 G/DL — SIGNIFICANT CHANGE UP (ref 3.3–5)
ALP SERPL-CCNC: 93 U/L — SIGNIFICANT CHANGE UP (ref 40–120)
ALT FLD-CCNC: 21 U/L — SIGNIFICANT CHANGE UP (ref 4–33)
ANION GAP SERPL CALC-SCNC: 15 MMOL/L — HIGH (ref 7–14)
AST SERPL-CCNC: 31 U/L — SIGNIFICANT CHANGE UP (ref 4–32)
BASOPHILS # BLD AUTO: 0.03 K/UL — SIGNIFICANT CHANGE UP (ref 0–0.2)
BASOPHILS NFR BLD AUTO: 0.2 % — SIGNIFICANT CHANGE UP (ref 0–2)
BILIRUB SERPL-MCNC: 0.5 MG/DL — SIGNIFICANT CHANGE UP (ref 0.2–1.2)
BUN SERPL-MCNC: 15 MG/DL — SIGNIFICANT CHANGE UP (ref 7–23)
CALCIUM SERPL-MCNC: 9.6 MG/DL — SIGNIFICANT CHANGE UP (ref 8.4–10.5)
CHLORIDE SERPL-SCNC: 100 MMOL/L — SIGNIFICANT CHANGE UP (ref 98–107)
CO2 SERPL-SCNC: 23 MMOL/L — SIGNIFICANT CHANGE UP (ref 22–31)
CREAT SERPL-MCNC: 0.61 MG/DL — SIGNIFICANT CHANGE UP (ref 0.5–1.3)
EGFR: 98 ML/MIN/1.73M2 — SIGNIFICANT CHANGE UP
EGFR: 98 ML/MIN/1.73M2 — SIGNIFICANT CHANGE UP
EOSINOPHIL # BLD AUTO: 0.36 K/UL — SIGNIFICANT CHANGE UP (ref 0–0.5)
EOSINOPHIL NFR BLD AUTO: 2.9 % — SIGNIFICANT CHANGE UP (ref 0–6)
GLUCOSE BLDC GLUCOMTR-MCNC: 186 MG/DL — HIGH (ref 70–99)
GLUCOSE BLDC GLUCOMTR-MCNC: 241 MG/DL — HIGH (ref 70–99)
GLUCOSE BLDC GLUCOMTR-MCNC: 257 MG/DL — HIGH (ref 70–99)
GLUCOSE BLDC GLUCOMTR-MCNC: 306 MG/DL — HIGH (ref 70–99)
GLUCOSE SERPL-MCNC: 219 MG/DL — HIGH (ref 70–99)
HCT VFR BLD CALC: 39.4 % — SIGNIFICANT CHANGE UP (ref 34.5–45)
HGB BLD-MCNC: 12.6 G/DL — SIGNIFICANT CHANGE UP (ref 11.5–15.5)
IANC: 8.98 K/UL — HIGH (ref 1.8–7.4)
IMM GRANULOCYTES NFR BLD AUTO: 0.2 % — SIGNIFICANT CHANGE UP (ref 0–0.9)
LYMPHOCYTES # BLD AUTO: 1.77 K/UL — SIGNIFICANT CHANGE UP (ref 1–3.3)
LYMPHOCYTES # BLD AUTO: 14.5 % — SIGNIFICANT CHANGE UP (ref 13–44)
MAGNESIUM SERPL-MCNC: 1.9 MG/DL — SIGNIFICANT CHANGE UP (ref 1.6–2.6)
MCHC RBC-ENTMCNC: 23.5 PG — LOW (ref 27–34)
MCHC RBC-ENTMCNC: 32 G/DL — SIGNIFICANT CHANGE UP (ref 32–36)
MCV RBC AUTO: 73.5 FL — LOW (ref 80–100)
MONOCYTES # BLD AUTO: 1.07 K/UL — HIGH (ref 0–0.9)
MONOCYTES NFR BLD AUTO: 8.7 % — SIGNIFICANT CHANGE UP (ref 2–14)
NEUTROPHILS # BLD AUTO: 8.98 K/UL — HIGH (ref 1.8–7.4)
NEUTROPHILS NFR BLD AUTO: 73.5 % — SIGNIFICANT CHANGE UP (ref 43–77)
NRBC # BLD AUTO: 0 K/UL — SIGNIFICANT CHANGE UP (ref 0–0)
NRBC # FLD: 0 K/UL — SIGNIFICANT CHANGE UP (ref 0–0)
NRBC BLD AUTO-RTO: 0 /100 WBCS — SIGNIFICANT CHANGE UP (ref 0–0)
PHOSPHATE SERPL-MCNC: 3.6 MG/DL — SIGNIFICANT CHANGE UP (ref 2.5–4.5)
PLATELET # BLD AUTO: 277 K/UL — SIGNIFICANT CHANGE UP (ref 150–400)
POTASSIUM SERPL-MCNC: 4.1 MMOL/L — SIGNIFICANT CHANGE UP (ref 3.5–5.3)
POTASSIUM SERPL-SCNC: 4.1 MMOL/L — SIGNIFICANT CHANGE UP (ref 3.5–5.3)
PROT SERPL-MCNC: 7.2 G/DL — SIGNIFICANT CHANGE UP (ref 6–8.3)
RBC # BLD: 5.36 M/UL — HIGH (ref 3.8–5.2)
RBC # FLD: 14.7 % — HIGH (ref 10.3–14.5)
SODIUM SERPL-SCNC: 138 MMOL/L — SIGNIFICANT CHANGE UP (ref 135–145)
WBC # BLD: 12.24 K/UL — HIGH (ref 3.8–10.5)
WBC # FLD AUTO: 12.24 K/UL — HIGH (ref 3.8–10.5)

## 2025-05-07 PROCEDURE — 99232 SBSQ HOSP IP/OBS MODERATE 35: CPT

## 2025-05-07 PROCEDURE — 90792 PSYCH DIAG EVAL W/MED SRVCS: CPT

## 2025-05-07 RX ORDER — OLANZAPINE 10 MG/1
1.25 TABLET ORAL ONCE
Refills: 0 | Status: COMPLETED | OUTPATIENT
Start: 2025-05-07 | End: 2025-05-07

## 2025-05-07 RX ORDER — ISOSORBIDE MONONITRATE 60 MG/1
30 TABLET, EXTENDED RELEASE ORAL DAILY
Refills: 0 | Status: DISCONTINUED | OUTPATIENT
Start: 2025-05-07 | End: 2025-05-08

## 2025-05-07 RX ADMIN — INSULIN LISPRO 3: 100 INJECTION, SOLUTION INTRAVENOUS; SUBCUTANEOUS at 12:39

## 2025-05-07 RX ADMIN — ISOSORBIDE MONONITRATE 30 MILLIGRAM(S): 60 TABLET, EXTENDED RELEASE ORAL at 11:49

## 2025-05-07 RX ADMIN — OLANZAPINE 1.25 MILLIGRAM(S): 10 TABLET ORAL at 03:54

## 2025-05-07 RX ADMIN — METOPROLOL SUCCINATE 50 MILLIGRAM(S): 50 TABLET, EXTENDED RELEASE ORAL at 17:29

## 2025-05-07 RX ADMIN — CLOPIDOGREL BISULFATE 75 MILLIGRAM(S): 75 TABLET, FILM COATED ORAL at 11:41

## 2025-05-07 RX ADMIN — Medication 81 MILLIGRAM(S): at 11:41

## 2025-05-07 RX ADMIN — Medication 30 MILLIGRAM(S): at 11:40

## 2025-05-07 RX ADMIN — ENOXAPARIN SODIUM 40 MILLIGRAM(S): 100 INJECTION SUBCUTANEOUS at 17:34

## 2025-05-07 RX ADMIN — Medication 1 TABLET(S): at 11:40

## 2025-05-07 RX ADMIN — ATORVASTATIN CALCIUM 40 MILLIGRAM(S): 80 TABLET, FILM COATED ORAL at 21:43

## 2025-05-07 RX ADMIN — OLANZAPINE 1.25 MILLIGRAM(S): 10 TABLET ORAL at 03:20

## 2025-05-07 RX ADMIN — Medication 20 MILLIGRAM(S): at 11:41

## 2025-05-07 RX ADMIN — METOPROLOL SUCCINATE 50 MILLIGRAM(S): 50 TABLET, EXTENDED RELEASE ORAL at 11:40

## 2025-05-07 RX ADMIN — CEFTRIAXONE 100 MILLIGRAM(S): 500 INJECTION, POWDER, FOR SOLUTION INTRAMUSCULAR; INTRAVENOUS at 21:38

## 2025-05-07 RX ADMIN — INSULIN LISPRO 1: 100 INJECTION, SOLUTION INTRAVENOUS; SUBCUTANEOUS at 08:50

## 2025-05-07 RX ADMIN — INSULIN LISPRO 4: 100 INJECTION, SOLUTION INTRAVENOUS; SUBCUTANEOUS at 17:34

## 2025-05-07 NOTE — PROGRESS NOTE ADULT - PROBLEM SELECTOR PLAN 4
A1c 7.9  Home regimen: Lantus 12U qAM, 8U qPM, janumet 50mg-500mg BID  Hold Janumet while inpatient  FSBG goal 140-180  LDISS TIDAC/qhs

## 2025-05-07 NOTE — PROGRESS NOTE ADULT - PROBLEM SELECTOR PLAN 3
Home meds: metoprolol 50mg BID, losartan-HCTZ 50mg-12.5mg qd, nifedipine 30mg qd, imdur 30mg qd, doxazosin 2mg qd, lasix 20mg qd    Continue metoprolol tartrate 50mg BID, nifedipine 30mg qd  Restart imdur today  Resume other home meds as needed

## 2025-05-07 NOTE — PROVIDER CONTACT NOTE (OTHER) - SITUATION
Unable to complete patients 4PM neurological assessment due to patient being combative and agitated.  ACP notified. GIOVANNI called due to patient's increasing agitation and aggression Unable to complete patients 4AM neurological assessment due to patient being combative and agitated.  ACP notified. GIOVANNI called due to patient's increasing agitation and aggression

## 2025-05-07 NOTE — BH CONSULTATION LIAISON ASSESSMENT NOTE - RISK ASSESSMENT
Risk factors: Multiple medical comorbidities, , age, gradual change in mental status    Protective factors:  no h/o SA/SIB, no h/o psych admissions,  domiciled, social supports.    Overall, pt is a low risk of harm to self/others and does not meet criteria for psychiatric admission.

## 2025-05-07 NOTE — BH CONSULTATION LIAISON ASSESSMENT NOTE - SUMMARY
Patient is a 65F PMHx HTN, HLD, T2DM, PAD, CAD s/p stent, multiple CVA (2022 & 2024, Baseline LUE/LLE proximal weakness, slurred speech, LUE rigidity, LUE pronator drift), bilateral ICA stenosis s/p R CEA 10/2024, frequent falls and gait instability, binocular catarct surgery, memory loss, Parkinson's Disease, p/w right facial numbness. Patient coming from cardiologist office, lives at home. Patient with no psychiatric dx as per chart review. Called psychiatry for agitation, and now with urinary retention, medication recommendations.       PLAN:  - no hx of SA, no SI elicited to team - defer level of observation to primary team  - In order to enhance patient's overall well-being and clinical course, please try avoiding benzodiazepines, anticholinergics, and antihistamines (Can cause worsening confusion/delirium). Additionally, continue reorientation, supportive care, maintaining regular sleep/wake cycle, and optimizing nutritional/medical factors.   - Patient with parkinson's as well as urinary retention - if patient with infrequent agitation, would avoid standing antipsychotics  -- PRN for severe agitation not responding to verbal redirection: Zyprexa 1.25mg q8hrs  --- monitor for urinary retention    Patient is a 65F PMHx HTN, HLD, T2DM, PAD, CAD s/p stent, multiple CVA (2022 & 2024, Baseline LUE/LLE proximal weakness, slurred speech, LUE rigidity, LUE pronator drift), bilateral ICA stenosis s/p R CEA 10/2024, frequent falls and gait instability, binocular catarct surgery, memory loss, Parkinson's Disease, p/w right facial numbness. Patient coming from cardiologist office, lives at home. Patient with no psychiatric dx as per chart review. Called psychiatry for agitation, and now with urinary retention, medication recommendations.       PLAN:  - no hx of SA, no SI elicited to team - defer level of observation to primary team  - In order to enhance patient's overall well-being and clinical course, please try avoiding benzodiazepines, anticholinergics, and antihistamines (Can cause worsening confusion/delirium). Additionally, continue reorientation, supportive care, maintaining regular sleep/wake cycle, and optimizing nutritional/medical factors.   - Patient with parkinson's as well as urinary retention - if patient with infrequent agitation, would avoid standing antipsychotics  -- PRN for severe agitation not responding to verbal redirection: Zyprexa 1.25mg q8hrs if qtc<500  --- monitor for urinary retention   -Dispo; No need for inpatient psych.

## 2025-05-07 NOTE — PROGRESS NOTE ADULT - TIME BILLING
Patient encounter, including chart review, medication review, patient interview, ordering labs and medications, interpreting labs and imaging results, coordination of care with consultants, and discussing plan with patient and healthcare team.
Patient encounter, including chart review, medication review, patient interview, ordering labs and medications, interpreting labs and imaging results, coordination of care with consultants, and discussing plan with healthcare team.

## 2025-05-07 NOTE — BH CONSULTATION LIAISON ASSESSMENT NOTE - CURRENT MEDICATION
MEDICATIONS  (STANDING):  aspirin enteric coated 81 milliGRAM(s) Oral daily  atorvastatin 40 milliGRAM(s) Oral at bedtime  carbidopa/levodopa  25/100 1 Tablet(s) Oral daily  cefTRIAXone   IVPB 1000 milliGRAM(s) IV Intermittent every 24 hours  clopidogrel Tablet 75 milliGRAM(s) Oral daily  dextrose 5%. 1000 milliLiter(s) (50 mL/Hr) IV Continuous <Continuous>  dextrose 5%. 1000 milliLiter(s) (100 mL/Hr) IV Continuous <Continuous>  dextrose 50% Injectable 25 Gram(s) IV Push once  dextrose 50% Injectable 12.5 Gram(s) IV Push once  dextrose 50% Injectable 25 Gram(s) IV Push once  enoxaparin Injectable 40 milliGRAM(s) SubCutaneous every 24 hours  famotidine    Tablet 20 milliGRAM(s) Oral daily  glucagon  Injectable 1 milliGRAM(s) IntraMuscular once  insulin lispro (ADMELOG) corrective regimen sliding scale   SubCutaneous three times a day before meals  insulin lispro (ADMELOG) corrective regimen sliding scale   SubCutaneous at bedtime  isosorbide   mononitrate ER Tablet (IMDUR) 30 milliGRAM(s) Oral daily  metoprolol tartrate 50 milliGRAM(s) Oral two times a day  NIFEdipine XL 30 milliGRAM(s) Oral daily    MEDICATIONS  (PRN):  acetaminophen     Tablet .. 650 milliGRAM(s) Oral every 6 hours PRN Temp greater or equal to 38C (100.4F), Mild Pain (1 - 3)  aluminum hydroxide/magnesium hydroxide/simethicone Suspension 30 milliLiter(s) Oral every 4 hours PRN Dyspepsia  dextrose Oral Gel 15 Gram(s) Oral once PRN Blood Glucose LESS THAN 70 milliGRAM(s)/deciliter  melatonin 3 milliGRAM(s) Oral at bedtime PRN Insomnia  ondansetron Injectable 4 milliGRAM(s) IV Push every 8 hours PRN Nausea and/or Vomiting

## 2025-05-07 NOTE — BH CONSULTATION LIAISON ASSESSMENT NOTE - HPI (INCLUDE ILLNESS QUALITY, SEVERITY, DURATION, TIMING, CONTEXT, MODIFYING FACTORS, ASSOCIATED SIGNS AND SYMPTOMS)
Patient is a 65F PMHx HTN, HLD, T2DM, PAD, CAD s/p stent, multiple CVA (2022 & 2024, Baseline LUE/LLE proximal weakness, slurred speech, LUE rigidity, LUE pronator drift), bilateral ICA stenosis s/p R CEA 10/2024, frequent falls and gait instability, binocular catarct surgery, memory loss, Parkinson's Disease, p/w right facial numbness. Patient coming from cardiologist office, lives at home. Patient with no psychiatric dx as per chart review. Called psychiatry for agitation, and now with urinary retention, medication recommendations.     GIOVANNI called overnight, given 1 dose of PRN zyprexa.    Patient was seen and assessed at bedside. Patient lethargic, unable to tolerate interview. Moans when moved and attempts to speak however falls back asleep. Much of information gathered from chart review.    Spoke with RN at bedside. Patient was awake this am and fell asleep about 30 minutes prior to exam. RN reporting last night agitation, none this am.

## 2025-05-07 NOTE — BH CONSULTATION LIAISON ASSESSMENT NOTE - NSBHATTESTCOMMENTATTENDFT_PSY_A_CORE
Chart reviewed, seen separately, agree with above assessment/recs. Patient AAOX3 ([facility], May, 2025), calm/cooperative, smiling, speech is slurred but able to answer most of the questions, denies avh, denies si and hi. No paranoia or delusions elicited. Patient overall future oriented and cites her two sons as her strength. Care coordinated with pt.'s spouse at bedside (with pt.'s consent), all questions/concerns answered. Plan as above. call with questions.

## 2025-05-07 NOTE — BH CONSULTATION LIAISON ASSESSMENT NOTE - NSBHCHARTREVIEWLAB_PSY_A_CORE FT
12.6   12.24 )-----------( 277      ( 07 May 2025 07:16 )             39.4   05-07    138  |  100  |  15  ----------------------------<  219[H]  4.1   |  23  |  0.61    Ca    9.6      07 May 2025 07:16  Phos  3.6     05-07  Mg     1.90     05-07    TPro  7.2  /  Alb  3.9  /  TBili  0.5  /  DBili  x   /  AST  31  /  ALT  21  /  AlkPhos  93  05-07

## 2025-05-07 NOTE — BH CONSULTATION LIAISON ASSESSMENT NOTE - NSBHCHARTREVIEWVS_PSY_A_CORE FT
Vital Signs Last 24 Hrs  T(C): 36.7 (07 May 2025 08:00), Max: 36.8 (06 May 2025 21:43)  T(F): 98.1 (07 May 2025 08:00), Max: 98.3 (06 May 2025 21:43)  HR: 79 (07 May 2025 08:00) (64 - 90)  BP: 162/82 (07 May 2025 08:00) (147/66 - 168/73)  BP(mean): --  RR: 18 (07 May 2025 08:00) (18 - 18)  SpO2: 100% (07 May 2025 08:00) (98% - 100%)    Parameters below as of 07 May 2025 08:00  Patient On (Oxygen Delivery Method): room air

## 2025-05-07 NOTE — PROGRESS NOTE ADULT - PROBLEM SELECTOR PLAN 1
New, acute R sided facial numbness  CTH neg for acute intracranial bleeding.  CTA brain: Multifocal atheromatous irregularity and moderate cavernous ICA stenosis bilaterally.  CTA neck: Moderate left ICA origin stenosis due to heavily calcified plaque.  Neurology recs appreciated: Outpatient MRI brain    Vascular consulted for L ICA stenosis. f/u carotid dopplers. Outpatient f/u with Dr. Norman. Continue medical management.  Neuro/Neuro IR consult for cavernous ICA stenosis per Vascular  PT recommending home PT
New, acute R sided facial numbness  CTH neg for acute intracranial bleeding.  CTA brain: Multifocal atheromatous irregularity and moderate cavernous ICA stenosis bilaterally.  CTA neck: Moderate left ICA origin stenosis due to heavily calcified plaque.  Neurology recs appreciated: Outpatient MRI brain    Vascular consulted for L ICA stenosis. f/u carotid doppler. Outpatient f/u with Dr. Norman. Continue medical management.  Neuro IR consult in AM for cavernous ICA stenosis per Vascular  PT recommending home PT

## 2025-05-07 NOTE — PROGRESS NOTE ADULT - PROBLEM SELECTOR PLAN 8
Pt is at high risk for delirium, therefore, please have adequate sleeping environment for the patient, light on, curtains open, TV on during the day with regular stimulation and out of bed to chair if possible. During the night, close curtains, TV off, lights off, and minimize interruptions (limit blood draws and vital sign checks if possible). Regular interaction with patient with staff (introducing selves), frequent reorientation, and encouragement of visitors as allowed by hospital and unit policy.    Hospital course c/b agitation, s/p code GIOVANNI  Suspect likely delirium  Currently on abx for UTI  No significant electrolyte derangements  Meds reviewed, resuming home meds as able  BH consult

## 2025-05-07 NOTE — PROGRESS NOTE ADULT - PROBLEM SELECTOR PLAN 2
s/p straight cath 880cc  Continue bladder scans  Likely iso UTI, UA with small LE, 13 WBC, many bacteria  Continue CTX (5/6-5/8)  f/u UCx

## 2025-05-07 NOTE — PROGRESS NOTE ADULT - PROBLEM SELECTOR PLAN 5
Prior CVAs (2022, 2024) with residual L sided deficits and slurred speech  Continue asa 81mg daily, plavix 75mg daily , atorvastatin 40mg daily

## 2025-05-07 NOTE — H&P ADULT - PROBLEM SELECTOR PLAN 1
No change -low suspicion for stroke;  -would reorder eeg (similar  presentation in June with neg eeg), place on seizure, aspiration, fall precautions  -await full neurology team input before ordering additional studies per stroke protocol given low suspicion, although relevant orders within stroke order set to be placed (eg neuro checks, dysphagia screen etc)  - mince and moist diabetic diet, as  pt passed dyspragia screen (diet based on order form [facility] in June S&S recommending minced diet)  -UA ordered

## 2025-05-07 NOTE — PROGRESS NOTE ADULT - PROBLEM SELECTOR PLAN 9
DVT ppx: Lovenox  Diet: DASH/TLC, CC, M&M, mildly thick liquids  Code: Full  Dispo: pending carotid dopplers, Neuro/Vascular recs, improvement in agitation. PT recs home PT
DVT ppx: Lovenox  Diet: DASH/TLC, CC, M&M, mildly thick liquids  Code: Full  Dispo: pending workup. PT recs home PT

## 2025-05-07 NOTE — BH CONSULTATION LIAISON ASSESSMENT NOTE - NSBHATTESTAPPAMEND_PSY_A_CORE
I have personally seen and examined this patient. I fully participated in the care of this patient. I have made amendments to the documentation where appropriate and otherwise agree with the history, physical exam, and plan as documented by the AIME

## 2025-05-08 ENCOUNTER — TRANSCRIPTION ENCOUNTER (OUTPATIENT)
Age: 65
End: 2025-05-08

## 2025-05-08 VITALS — HEART RATE: 96 BPM | DIASTOLIC BLOOD PRESSURE: 80 MMHG | SYSTOLIC BLOOD PRESSURE: 149 MMHG

## 2025-05-08 LAB
ALBUMIN SERPL ELPH-MCNC: 3.7 G/DL — SIGNIFICANT CHANGE UP (ref 3.3–5)
ALP SERPL-CCNC: 87 U/L — SIGNIFICANT CHANGE UP (ref 40–120)
ALT FLD-CCNC: 19 U/L — SIGNIFICANT CHANGE UP (ref 4–33)
ANION GAP SERPL CALC-SCNC: 15 MMOL/L — HIGH (ref 7–14)
AST SERPL-CCNC: 20 U/L — SIGNIFICANT CHANGE UP (ref 4–32)
BASOPHILS # BLD AUTO: 0.03 K/UL — SIGNIFICANT CHANGE UP (ref 0–0.2)
BASOPHILS NFR BLD AUTO: 0.4 % — SIGNIFICANT CHANGE UP (ref 0–2)
BILIRUB SERPL-MCNC: 0.4 MG/DL — SIGNIFICANT CHANGE UP (ref 0.2–1.2)
BUN SERPL-MCNC: 10 MG/DL — SIGNIFICANT CHANGE UP (ref 7–23)
CALCIUM SERPL-MCNC: 9.2 MG/DL — SIGNIFICANT CHANGE UP (ref 8.4–10.5)
CHLORIDE SERPL-SCNC: 106 MMOL/L — SIGNIFICANT CHANGE UP (ref 98–107)
CO2 SERPL-SCNC: 22 MMOL/L — SIGNIFICANT CHANGE UP (ref 22–31)
CREAT SERPL-MCNC: 0.5 MG/DL — SIGNIFICANT CHANGE UP (ref 0.5–1.3)
EGFR: 103 ML/MIN/1.73M2 — SIGNIFICANT CHANGE UP
EGFR: 103 ML/MIN/1.73M2 — SIGNIFICANT CHANGE UP
EOSINOPHIL # BLD AUTO: 0.44 K/UL — SIGNIFICANT CHANGE UP (ref 0–0.5)
EOSINOPHIL NFR BLD AUTO: 5.6 % — SIGNIFICANT CHANGE UP (ref 0–6)
GLUCOSE BLDC GLUCOMTR-MCNC: 266 MG/DL — HIGH (ref 70–99)
GLUCOSE SERPL-MCNC: 227 MG/DL — HIGH (ref 70–99)
HCT VFR BLD CALC: 35.8 % — SIGNIFICANT CHANGE UP (ref 34.5–45)
HGB BLD-MCNC: 11.3 G/DL — LOW (ref 11.5–15.5)
IANC: 5.27 K/UL — SIGNIFICANT CHANGE UP (ref 1.8–7.4)
IMM GRANULOCYTES NFR BLD AUTO: 0.3 % — SIGNIFICANT CHANGE UP (ref 0–0.9)
LYMPHOCYTES # BLD AUTO: 1.42 K/UL — SIGNIFICANT CHANGE UP (ref 1–3.3)
LYMPHOCYTES # BLD AUTO: 18 % — SIGNIFICANT CHANGE UP (ref 13–44)
MAGNESIUM SERPL-MCNC: 2 MG/DL — SIGNIFICANT CHANGE UP (ref 1.6–2.6)
MCHC RBC-ENTMCNC: 23.5 PG — LOW (ref 27–34)
MCHC RBC-ENTMCNC: 31.6 G/DL — LOW (ref 32–36)
MCV RBC AUTO: 74.6 FL — LOW (ref 80–100)
MONOCYTES # BLD AUTO: 0.7 K/UL — SIGNIFICANT CHANGE UP (ref 0–0.9)
MONOCYTES NFR BLD AUTO: 8.9 % — SIGNIFICANT CHANGE UP (ref 2–14)
MRSA PCR RESULT.: SIGNIFICANT CHANGE UP
NEUTROPHILS # BLD AUTO: 5.27 K/UL — SIGNIFICANT CHANGE UP (ref 1.8–7.4)
NEUTROPHILS NFR BLD AUTO: 66.8 % — SIGNIFICANT CHANGE UP (ref 43–77)
NRBC # BLD AUTO: 0 K/UL — SIGNIFICANT CHANGE UP (ref 0–0)
NRBC # FLD: 0 K/UL — SIGNIFICANT CHANGE UP (ref 0–0)
NRBC BLD AUTO-RTO: 0 /100 WBCS — SIGNIFICANT CHANGE UP (ref 0–0)
PHOSPHATE SERPL-MCNC: 3.6 MG/DL — SIGNIFICANT CHANGE UP (ref 2.5–4.5)
PLATELET # BLD AUTO: 257 K/UL — SIGNIFICANT CHANGE UP (ref 150–400)
POTASSIUM SERPL-MCNC: 3.4 MMOL/L — LOW (ref 3.5–5.3)
POTASSIUM SERPL-SCNC: 3.4 MMOL/L — LOW (ref 3.5–5.3)
PROT SERPL-MCNC: 6.7 G/DL — SIGNIFICANT CHANGE UP (ref 6–8.3)
RBC # BLD: 4.8 M/UL — SIGNIFICANT CHANGE UP (ref 3.8–5.2)
RBC # FLD: 14.7 % — HIGH (ref 10.3–14.5)
S AUREUS DNA NOSE QL NAA+PROBE: SIGNIFICANT CHANGE UP
SODIUM SERPL-SCNC: 143 MMOL/L — SIGNIFICANT CHANGE UP (ref 135–145)
WBC # BLD: 7.88 K/UL — SIGNIFICANT CHANGE UP (ref 3.8–10.5)
WBC # FLD AUTO: 7.88 K/UL — SIGNIFICANT CHANGE UP (ref 3.8–10.5)

## 2025-05-08 PROCEDURE — 99239 HOSP IP/OBS DSCHRG MGMT >30: CPT

## 2025-05-08 RX ORDER — OLANZAPINE 10 MG/1
1.25 TABLET ORAL ONCE
Refills: 0 | Status: COMPLETED | OUTPATIENT
Start: 2025-05-08 | End: 2025-05-08

## 2025-05-08 RX ORDER — CEFTRIAXONE 500 MG/1
1000 INJECTION, POWDER, FOR SOLUTION INTRAMUSCULAR; INTRAVENOUS ONCE
Refills: 0 | Status: DISCONTINUED | OUTPATIENT
Start: 2025-05-08 | End: 2025-05-08

## 2025-05-08 RX ORDER — CEFUROXIME SODIUM 1.5 G
1 VIAL (EA) INJECTION
Qty: 2 | Refills: 0
Start: 2025-05-08 | End: 2025-05-08

## 2025-05-08 RX ORDER — CEFUROXIME SODIUM 1.5 G
500 VIAL (EA) INJECTION EVERY 12 HOURS
Refills: 0 | Status: DISCONTINUED | OUTPATIENT
Start: 2025-05-08 | End: 2025-05-08

## 2025-05-08 RX ORDER — QUETIAPINE FUMARATE 25 MG/1
1 TABLET ORAL
Qty: 9 | Refills: 0
Start: 2025-05-08 | End: 2025-05-10

## 2025-05-08 RX ADMIN — OLANZAPINE 1.25 MILLIGRAM(S): 10 TABLET ORAL at 02:26

## 2025-05-08 RX ADMIN — Medication 40 MILLIEQUIVALENT(S): at 09:18

## 2025-05-08 RX ADMIN — ISOSORBIDE MONONITRATE 30 MILLIGRAM(S): 60 TABLET, EXTENDED RELEASE ORAL at 14:23

## 2025-05-08 RX ADMIN — Medication 1 APPLICATION(S): at 14:20

## 2025-05-08 RX ADMIN — Medication 1 TABLET(S): at 14:24

## 2025-05-08 RX ADMIN — Medication 20 MILLIGRAM(S): at 14:23

## 2025-05-08 RX ADMIN — Medication 500 MILLIGRAM(S): at 14:24

## 2025-05-08 RX ADMIN — OLANZAPINE 1.25 MILLIGRAM(S): 10 TABLET ORAL at 03:50

## 2025-05-08 RX ADMIN — OLANZAPINE 1.25 MILLIGRAM(S): 10 TABLET ORAL at 11:46

## 2025-05-08 RX ADMIN — METOPROLOL SUCCINATE 50 MILLIGRAM(S): 50 TABLET, EXTENDED RELEASE ORAL at 06:12

## 2025-05-08 RX ADMIN — OLANZAPINE 1.25 MILLIGRAM(S): 10 TABLET ORAL at 10:55

## 2025-05-08 RX ADMIN — OLANZAPINE 1.25 MILLIGRAM(S): 10 TABLET ORAL at 12:26

## 2025-05-08 RX ADMIN — Medication 81 MILLIGRAM(S): at 14:21

## 2025-05-08 RX ADMIN — CLOPIDOGREL BISULFATE 75 MILLIGRAM(S): 75 TABLET, FILM COATED ORAL at 14:23

## 2025-05-08 RX ADMIN — Medication 30 MILLIGRAM(S): at 06:12

## 2025-05-08 RX ADMIN — INSULIN LISPRO 3: 100 INJECTION, SOLUTION INTRAVENOUS; SUBCUTANEOUS at 09:16

## 2025-05-08 NOTE — PROGRESS NOTE ADULT - ASSESSMENT
CT Head/CTA brain/neck  IMPRESSION:    CT HEAD: No acute intracranial bleeding.    CTA BRAIN: Multifocal atheromatous irregularity and moderate cavernous   ICA stenosis bilaterally.    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    CTA NECK: Interval distal right CCA and proximal right ICA carotid   endarterectomy, which is patent.    Moderate left ICA origin stenosis due to heavily calcified plaque.      A/P:  65 yr old female presenting with right sided facial numbness      1. Facial numbness  ICA stenosis on CT, neurology and vascular following MRI pending   c/w aspirin 81mg daily and Plavix 75mg daily, Lipitor 40mg daily    2. CVA   Continue asa 81mg daily, plavix 75mg daily, atorvastatin 40mg daily.    3 HTN  Continue metoprolol tartrate 50mg BID, imdur, nifedipine
CT Head/CTA brain/neck  IMPRESSION:    CT HEAD: No acute intracranial bleeding.    CTA BRAIN: Multifocal atheromatous irregularity and moderate cavernous   ICA stenosis bilaterally.    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    CTA NECK: Interval distal right CCA and proximal right ICA carotid   endarterectomy, which is patent.    Moderate left ICA origin stenosis due to heavily calcified plaque.      A/P:  65 yr old female presenting with right sided facial numbness      1. Facial numbness  ICA stenosis on CT, neurology and vascular following- recommend outpatient MRI and follow ups  c/w aspirin 81mg daily and Plavix 75mg daily, Lipitor 40mg daily    2. CVA   Continue asa 81mg daily, plavix 75mg daily, atorvastatin 40mg daily.    3 HTN  Continue metoprolol tartrate 50mg BID, imdur, nifedipine  
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 Of note had prior R sided symptoms in Feb 2024 with diagnostic angio at that time not reflecting symptomatic carotid stenosis now represents from cardiologists office after experiencing R facial numbness x 2-3 weeks   CTH with new R BG infarct since prior imaging but would not explain current symptoms  Since Oct underwent R CEA which now appears patent on CTA  L ICA appears 50-60% stenotic , has known L ICA terminus aneurysm   o/e AAOx2, hypomimia, L hemiparesis, tremor and inc tone - baseline, also with complaints of R facial numbess    R facial numbness - pos small stroke not well visualized on CT.   Parkinsons  Known bilateral ICA stenosis - s/p R CEA    Recommendations:  [] continue on home aspirin 81mg daily and Plavix 75mg daily post CEA - this would be optimal med mgmt for intracranial stenosis. Med mgmt is first line unles failed at which point stent becomes consideration. No clear indication that med mgmt was failed and stenosis < 70%. Would f/u with MRI brain as outpatient.   [] continue on home Lipitor 40mg daily  [] cont home sinemet   [x] serum studies: A1c (7.9) and lipid profile (LDL 56)  [] can do MRI brain as outpatient as to not prolong hospitalization and worsen delirium   [] f/u PT/OT evaluation  [] neuro checks q4h  [] sbp normotensive  [] deliirum precautions  [] zyprexa prn for agitation  [] dvt ppx    outpatient neuro follow up    Viki Gray DO  Vascular Neurology  Office [phone]  Available via [employer] Teams .     
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 Of note had prior R sided symptoms in Feb 2024 with diagnostic angio at that time not reflecting symptomatic carotid stenosis now represents from cardiologists office after experiencing R facial numbness x 2-3 weeks   CTH with new R BG infarct since prior imaging but would not explain current symptoms  Since Oct underwent R CEA which now appears patent on CTA  L ICA appears 50-60% stenotic , has known L ICA terminus aneurysm   o/e AAOx2, hypomimia, L hemiparesis, tremor and inc tone - baseline, also with complaints of R facial numbess    R facial numbness - pos small stroke not well visualized on CT.   Parkinsons  Known bilateral ICA stenosis - s/p R CEA    Recommendations:  [] continue on home aspirin 81mg daily and Plavix 75mg daily post CEA - this would be optimal med mgmt for intracranial stenosis. Med mgmt is first line unles failed at which point stent becomes consideration. No clear indication that med mgmt was failed and stenosis < 70%. Would f/u with MRI brain as outpatient.   [] continue on home Lipitor 40mg daily  [] cont home sinemet , symptoms decently controlled  [] may benefit from outpatient nuplazid  [x] serum studies: A1c (7.9) and lipid profile (LDL 56)  [] can do MRI brain as outpatient as to not prolong hospitalization and worsen delirium   [] f/u PT/OT evaluation  [] neuro checks q4h  [] sbp normotensive  [] delirum precautions  [] zyprexa prn for agitation  [] dvt ppx    outpatient neuro follow up on d/c    Viki Gray DO  Vascular Neurology  Office [phone]  Available via [employer] Teams .     
65 yr old female presenting with right sided facial numbness
65 yr old female presenting with right sided facial numbness
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
## 2025-05-08 NOTE — DISCHARGE NOTE PROVIDER - ATTENDING DISCHARGE PHYSICAL EXAMINATION:
GENERAL: No acute distress  HEAD:  Normocephalic  NECK: Supple  CHEST/LUNG: CTAB  HEART: Regular rate and rhythm  ABDOMEN: Soft, non-tender, non-distended  EXTREMITIES:  No clubbing, cyanosis, or edema  NEUROLOGY: Alert, awake, answering simple questions and following simple commands  SKIN: No rashes or lesions to visible skin GENERAL: No acute distress  HEAD:  Normocephalic  NECK: Supple  CHEST/LUNG: CTAB  HEART: Regular rate and rhythm   ABDOMEN: Soft, non-tender, non-distended  EXTREMITIES:  No clubbing, cyanosis, or edema  NEUROLOGY: Alert, awake, answering simple questions and following simple commands  SKIN: No rashes or lesions to visible skin

## 2025-05-08 NOTE — DISCHARGE NOTE NURSING/CASE MANAGEMENT/SOCIAL WORK - PATIENT PORTAL LINK FT
You can access the FollowMyHealth Patient Portal offered by Health system by registering at the following website: [email or web address]. By joining [facility] FollowMyHealth portal, you will also be able to view your health information using other applications (apps) compatible with our system.

## 2025-05-08 NOTE — PROVIDER CONTACT NOTE (OTHER) - REASON
Numbness on L side
Patient bladder scan 406mL
Unable to complete a full and accurate assessment on patient
HTN
Patient off tele due to being combative.
4PM neuro assessment
4PM neuro assessment

## 2025-05-08 NOTE — DISCHARGE NOTE PROVIDER - NSDCFUSCHEDAPPT_GEN_ALL_CORE_FT
Jacob Spangler  [facility]  GASTRO [address] [facility]  Scheduled Appointment: 05/20/2025    Michelle Klein  [facility]  PODIATRY [address]  Scheduled Appointment: 06/09/2025    Fifi Ray  [facility]  ENDOCRIN [address] [facility]  Scheduled Appointment: 06/17/2025

## 2025-05-08 NOTE — PROGRESS NOTE ADULT - SUBJECTIVE AND OBJECTIVE BOX
Johnathan Tejeda MD  Interventional Cardiology / Endovascular Specialist  [place] Office : [address] [place] [address] [place] [address]  Tel:   [place] Office : [address] [facility] [place] [address]  Tel: [phone]    patient laying in bed, NAD      MEDICATIONS:  aspirin enteric coated 81 milliGRAM(s) Oral daily  clopidogrel Tablet 75 milliGRAM(s) Oral daily  enoxaparin Injectable 40 milliGRAM(s) SubCutaneous every 24 hours  isosorbide   mononitrate ER Tablet (IMDUR) 30 milliGRAM(s) Oral daily  metoprolol tartrate 50 milliGRAM(s) Oral two times a day  NIFEdipine XL 30 milliGRAM(s) Oral daily    cefTRIAXone   IVPB 1000 milliGRAM(s) IV Intermittent every 24 hours      acetaminophen     Tablet .. 650 milliGRAM(s) Oral every 6 hours PRN  carbidopa/levodopa  25/100 1 Tablet(s) Oral daily  melatonin 3 milliGRAM(s) Oral at bedtime PRN  ondansetron Injectable 4 milliGRAM(s) IV Push every 8 hours PRN    aluminum hydroxide/magnesium hydroxide/simethicone Suspension 30 milliLiter(s) Oral every 4 hours PRN  famotidine    Tablet 20 milliGRAM(s) Oral daily    atorvastatin 40 milliGRAM(s) Oral at bedtime  dextrose 50% Injectable 25 Gram(s) IV Push once  dextrose 50% Injectable 12.5 Gram(s) IV Push once  dextrose 50% Injectable 25 Gram(s) IV Push once  dextrose Oral Gel 15 Gram(s) Oral once PRN  glucagon  Injectable 1 milliGRAM(s) IntraMuscular once  insulin lispro (ADMELOG) corrective regimen sliding scale   SubCutaneous three times a day before meals  insulin lispro (ADMELOG) corrective regimen sliding scale   SubCutaneous at bedtime    dextrose 5%. 1000 milliLiter(s) IV Continuous <Continuous>  dextrose 5%. 1000 milliLiter(s) IV Continuous <Continuous>      PAST MEDICAL/SURGICAL HISTORY  PAST MEDICAL & SURGICAL HISTORY:  HTN (hypertension)      HLD (hyperlipidemia)      CAD (coronary artery disease)      Type II diabetes mellitus      PAD (peripheral artery disease)      Mild dementia      Parkinson disease      CVA (cerebrovascular accident)      S/P hysterectomy          SOCIAL HISTORY: Substance Use (street drugs): ( x ) never used  (  ) other:    FAMILY HISTORY:  No pertinent family history in first degree relatives          PHYSICAL EXAM:  T(C): 36.7 (05-07-25 @ 08:00), Max: 36.8 (05-06-25 @ 21:43)  HR: 79 (05-07-25 @ 08:00) (64 - 90)  BP: 162/82 (05-07-25 @ 08:00) (147/66 - 168/73)  RR: 18 (05-07-25 @ 08:00) (18 - 18)  SpO2: 100% (05-07-25 @ 08:00) (98% - 100%)  Wt(kg): --  I&O's Summary    06 May 2025 07:01  -  07 May 2025 07:00  --------------------------------------------------------  IN: 0 mL / OUT: 1580 mL / NET: -1580 mL        GENERAL: NAD  EYES: EOMI  Cardiovascular: Normal S1 S2, No JVD, No murmurs, No edema  Respiratory: Lungs clear to auscultation	  Gastrointestinal:  Soft, Non-tender, + BS	  Extremities: Normal range of motion, No clubbing, cyanosis or edema  NERVOUS SYSTEM:  Alert, verbal with slurred speech                              12.6   12.24 )-----------( 277      ( 07 May 2025 07:16 )             39.4     05-07    138  |  100  |  15  ----------------------------<  219[H]  4.1   |  23  |  0.61    Ca    9.6      07 May 2025 07:16  Phos  3.6     05-07  Mg     1.90     05-07    TPro  7.2  /  Alb  3.9  /  TBili  0.5  /  DBili  x   /  AST  31  /  ALT  21  /  AlkPhos  93  05-07    proBNP:   Lipid Profile:   HgA1c:   TSH:     Consultant(s) Notes Reviewed:  [x ] YES  [ ] NO    Care Discussed with Consultants/Other Providers [ x] YES  [ ] NO    Imaging Personally Reviewed independently:  [x] YES  [ ] NO    All labs, radiologic studies, vitals, orders and medications list reviewed. Patient is seen and examined at bedside. Case discussed with medical team.              
Neurology Progress Note    S: Patient seen and examined. got zyprexa overnight     Medication:  acetaminophen     Tablet .. 650 milliGRAM(s) Oral every 6 hours PRN  aluminum hydroxide/magnesium hydroxide/simethicone Suspension 30 milliLiter(s) Oral every 4 hours PRN  aspirin enteric coated 81 milliGRAM(s) Oral daily  atorvastatin 40 milliGRAM(s) Oral at bedtime  carbidopa/levodopa  25/100 1 Tablet(s) Oral daily  cefTRIAXone   IVPB 1000 milliGRAM(s) IV Intermittent every 24 hours  clopidogrel Tablet 75 milliGRAM(s) Oral daily  dextrose 5%. 1000 milliLiter(s) IV Continuous <Continuous>  dextrose 5%. 1000 milliLiter(s) IV Continuous <Continuous>  dextrose 50% Injectable 25 Gram(s) IV Push once  dextrose 50% Injectable 12.5 Gram(s) IV Push once  dextrose 50% Injectable 25 Gram(s) IV Push once  dextrose Oral Gel 15 Gram(s) Oral once PRN  enoxaparin Injectable 40 milliGRAM(s) SubCutaneous every 24 hours  famotidine    Tablet 20 milliGRAM(s) Oral daily  glucagon  Injectable 1 milliGRAM(s) IntraMuscular once  insulin lispro (ADMELOG) corrective regimen sliding scale   SubCutaneous three times a day before meals  insulin lispro (ADMELOG) corrective regimen sliding scale   SubCutaneous at bedtime  isosorbide   mononitrate ER Tablet (IMDUR) 30 milliGRAM(s) Oral daily  melatonin 3 milliGRAM(s) Oral at bedtime PRN  metoprolol tartrate 50 milliGRAM(s) Oral two times a day  NIFEdipine XL 30 milliGRAM(s) Oral daily  ondansetron Injectable 4 milliGRAM(s) IV Push every 8 hours PRN      Vitals:  Vital Signs Last 24 Hrs  T(C): 36.7 (07 May 2025 17:20), Max: 36.8 (06 May 2025 21:43)  T(F): 98 (07 May 2025 17:20), Max: 98.3 (06 May 2025 21:43)  HR: 72 (07 May 2025 17:27) (64 - 79)  BP: 124/62 (07 May 2025 17:27) (124/62 - 162/82)  BP(mean): --  RR: 17 (07 May 2025 17:20) (17 - 18)  SpO2: 100% (07 May 2025 17:20) (98% - 100%)    Parameters below as of 07 May 2025 17:20  Patient On (Oxygen Delivery Method): room air        General Exam:   General Appearance: Appropriately dressed and in no acute distress       Head: Normocephalic, atraumatic and no dysmorphic features  Ear, Nose, and Throat: Moist mucous membranes  CVS: S1S2+  Resp: No SOB, no wheeze or rhonchi  Abd: soft NTND  Extremities: No edema, no cyanosis  Skin: No bruises, no rashes     Neurologic Exam:  Mental status - Eyes closed, refuses to open, mumbles, does not want to participate in exam  Cranial nerves - PERRLA, VFF, EOMI, diminished sensation on R V1-V3, facial strength intact without asymmetry b/l, hypomimia    Motor - Normal bulk. Normal tone in RUE. Rigidity in LUE. No tremors at rest. 4/5 strength in L deltoid and L hip flexor. 5/5 strength in R deltoid, bicep, tricep, hand , hip flexor, ankle dorsi/plantarflexion. 5/5 strength in R bicep, tricep, hand , ankle dorsi/plantarflexion. Mild LUE pronator drift. No drift in RUE.    Sensation - Light touch intact throughout. No extinction to DSS    DTR's -             Biceps      Triceps     Brachioradialis      Patellar    Ankle    Toes/plantar response  R             2+             2+                  2+                       1           1                Down  L              2+             2+                 2+                        1        1                Down    I personally reviewed the below data/images/labs:      CBC Full  -  ( 07 May 2025 07:16 )  WBC Count : 12.24 K/uL  RBC Count : 5.36 M/uL  Hemoglobin : 12.6 g/dL  Hematocrit : 39.4 %  Platelet Count - Automated : 277 K/uL  Mean Cell Volume : 73.5 fL  Mean Cell Hemoglobin : 23.5 pg  Mean Cell Hemoglobin Concentration : 32.0 g/dL  Auto Neutrophil # : x  Auto Lymphocyte # : x  Auto Monocyte # : x  Auto Eosinophil # : x  Auto Basophil # : x  Auto Neutrophil % : x  Auto Lymphocyte % : x  Auto Monocyte % : x  Auto Eosinophil % : x  Auto Basophil % : x    05-07    138  |  100  |  15  ----------------------------<  219[H]  4.1   |  23  |  0.61    Ca    9.6      07 May 2025 07:16  Phos  3.6     05-07  Mg     1.90     05-07    TPro  7.2  /  Alb  3.9  /  TBili  0.5  /  DBili  x   /  AST  31  /  ALT  21  /  AlkPhos  93  05-07    LIVER FUNCTIONS - ( 07 May 2025 07:16 )  Alb: 3.9 g/dL / Pro: 7.2 g/dL / ALK PHOS: 93 U/L / ALT: 21 U/L / AST: 31 U/L / GGT: x             Urinalysis Basic - ( 07 May 2025 07:16 )    Color: x / Appearance: x / SG: x / pH: x  Gluc: 219 mg/dL / Ketone: x  / Bili: x / Urobili: x   Blood: x / Protein: x / Nitrite: x   Leuk Esterase: x / RBC: x / WBC x   Sq Epi: x / Non Sq Epi: x / Bacteria: x    < from: CT Angio Neck w/ IV Cont (05.05.25 @ 17:17) >    ACC: [id number] EXAM:  CT ANGIO BRAIN (W)AW IC   ORDERED BY: TRUDY CASAREZ     ACC: [id number] EXAM:  CT ANGIO NECK (W)AW IC   ORDERED BY: TRUDY CASAREZ     ACC: [id number] EXAM:  CT BRAIN   ORDERED BY: TRUDY CASAREZ     PROCEDURE DATE:  05/05/2025          INTERPRETATION:  HISTORY:  Right facial numbness    COMPARISON: CT/CTA brain and CT neck 9/23/2024    TECHNIQUE: Noncontrast CT head and CT angiogram of the neck and brain was   performed after administration of intravenous contrast. MIP and 3D   reconstructions were performed.    Total of cc Omnipaque 350 intravenous contrast were administered without   complication.    FINDINGS:    CT HEAD:    There is no acute intracranial mass-effect, hemorrhage, midline shift, or   abnormal extra-axial fluid collection.    Scattered cerebral white matter hypoattenuation due to mild chronic   microvascular ischemic changes. Age-indeterminate lacunar type   infarctions in the anterior right gangliocapsular regions extending into   the corona radiata.    Ventricles,sulci, and cisterns are normal in size without hydrocephalus.   Basal cisterns are patent.    Visualized paranasal sinuses and mastoid air cells are clear. Calvarium   is intact.    CTA BRAIN:    INTERNAL CAROTID ARTERIES: Multifocal atheromatous irregularity and mild   to moderate stenoses along the cavernous ICA segments bilaterally.    Redemonstrated 2-3 mm inferiorly directed outpouching from the left ICA   terminus in the region of the expected origin of the posterior   communicating artery,unchanged. There is no associated artery   definitively identified from the outpouching.    ANTERIOR CEREBRAL ARTERIES: Patent without flow-limiting stenosis or   occlusion. Hypoplastic right A1 segment. Anterior communicating artery is   unremarkable without aneurysm.    MIDDLE CEREBRAL ARTERIES: Patent without flow-limiting stenosis or   occlusion. MCA bifurcations are unremarkable without aneurysm.    POSTERIOR CEREBRAL ARTERIES: Patent without flow-limiting stenosis or   occlusion. Fetal right PCA origin with corresponding hypoplastic P1   segment.    VERTEBROBASILAR SYSTEM: Patent without flow-limiting stenosis or   occlusion.    DURAL VENOUS SINUSES: Patent. No filling defect or thrombus.    CTA NECK:    RIGHT CAROTID SYSTEM: There is been interval endarterectomy of the distal   CCA and proximal ICA which is patent without flow-limiting stenosis or   occlusion. Surrounding surgical clips are noted. Medialized course of the   proximal ICA again seen.    LEFT CAROTID SYSTEM: Redemonstrated moderate stenosis of the ICA origin   measuring 50-60% as per NASCET criteria due to heavily calcified   atherosclerotic plaque.    VERTEBRAL SYSTEM: Normal in caliber without flow-limiting stenosis or   occlusion. Origin of the vertebral arteries are unremarkable.    AORTIC ARCH: Scattered calcified plaque along the aortic arch and origin   of the great vessels.    There is a 2.3 cm hypodense left thyroid nodule.    IMPRESSION:    CT HEAD: No acute intracranial bleeding.    CTA BRAIN: Multifocal atheromatous irregularity and moderate cavernous   ICA stenosis bilaterally.    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    CTA NECK: Interval distal right CCA and proximal right ICA carotid   endarterectomy, which is patent.    Moderate left ICA origin stenosis due to heavily calcified plaque.    --- End of Report ---            JAMAR CASTELLON MD; Attending Radiologist  This document has been electronically signed. May  5 2025  6:28PM    < end of copied text >    
Johnathan Tejeda MD  Interventional Cardiology / Advance Heart Failure and Cardiac Transplant Specialist  [place] Office : [address] [place] [address] [place] [address]  Tel:   [place] Office : [address] [facility] [place] [address]  Tel: [phone]       Pt is lying in bed, combative with staff  	  MEDICATIONS:  aspirin enteric coated 81 milliGRAM(s) Oral daily  clopidogrel Tablet 75 milliGRAM(s) Oral daily  enoxaparin Injectable 40 milliGRAM(s) SubCutaneous every 24 hours  isosorbide   mononitrate ER Tablet (IMDUR) 30 milliGRAM(s) Oral daily  metoprolol tartrate 50 milliGRAM(s) Oral two times a day  NIFEdipine XL 30 milliGRAM(s) Oral daily    cefuroxime   Tablet 500 milliGRAM(s) Oral every 12 hours      acetaminophen     Tablet .. 650 milliGRAM(s) Oral every 6 hours PRN  carbidopa/levodopa  25/100 1 Tablet(s) Oral daily  melatonin 3 milliGRAM(s) Oral at bedtime PRN  ondansetron Injectable 4 milliGRAM(s) IV Push every 8 hours PRN    aluminum hydroxide/magnesium hydroxide/simethicone Suspension 30 milliLiter(s) Oral every 4 hours PRN  famotidine    Tablet 20 milliGRAM(s) Oral daily    atorvastatin 40 milliGRAM(s) Oral at bedtime  dextrose 50% Injectable 25 Gram(s) IV Push once  dextrose 50% Injectable 12.5 Gram(s) IV Push once  dextrose 50% Injectable 25 Gram(s) IV Push once  dextrose Oral Gel 15 Gram(s) Oral once PRN  glucagon  Injectable 1 milliGRAM(s) IntraMuscular once  insulin lispro (ADMELOG) corrective regimen sliding scale   SubCutaneous three times a day before meals  insulin lispro (ADMELOG) corrective regimen sliding scale   SubCutaneous at bedtime    chlorhexidine 2% Cloths 1 Application(s) Topical daily  dextrose 5%. 1000 milliLiter(s) IV Continuous <Continuous>  dextrose 5%. 1000 milliLiter(s) IV Continuous <Continuous>      PAST MEDICAL/SURGICAL HISTORY  PAST MEDICAL & SURGICAL HISTORY:  HTN (hypertension)      HLD (hyperlipidemia)      CAD (coronary artery disease)      Type II diabetes mellitus      PAD (peripheral artery disease)      Mild dementia      Parkinson disease      CVA (cerebrovascular accident)      S/P hysterectomy          SOCIAL HISTORY: Substance Use (street drugs): ( x ) never used  (  ) other:    FAMILY HISTORY:  No pertinent family history in first degree relatives           PHYSICAL EXAM:  T(C): 36.3 (05-08-25 @ 08:00), Max: 37 (05-07-25 @ 21:30)  HR: 69 (05-08-25 @ 08:00) (67 - 88)  BP: 133/68 (05-08-25 @ 08:00) (124/62 - 151/76)  RR: 18 (05-08-25 @ 08:00) (17 - 18)  SpO2: 98% (05-08-25 @ 08:00) (97% - 100%)  Wt(kg): --  I&O's Summary    07 May 2025 07:01  -  08 May 2025 07:00  --------------------------------------------------------  IN: 0 mL / OUT: 810 mL / NET: -810 mL        GENERAL: NAD  EYES: EOMI  Cardiovascular: Normal S1 S2, No JVD, No murmurs, No edema  Respiratory: Lungs clear to auscultation	  Gastrointestinal:  Soft, Non-tender, + BS	  Extremities: Normal range of motion, No clubbing, cyanosis or edema  NERVOUS SYSTEM:  Alert, verbal with slurred speech                                11.3   7.88  )-----------( 257      ( 08 May 2025 06:20 )             35.8     05-08    143  |  106  |  10  ----------------------------<  227[H]  3.4[L]   |  22  |  0.50    Ca    9.2      08 May 2025 06:20  Phos  3.6     05-08  Mg     2.00     05-08    TPro  6.7  /  Alb  3.7  /  TBili  0.4  /  DBili  x   /  AST  20  /  ALT  19  /  AlkPhos  87  05-08    proBNP:   Lipid Profile:   HgA1c:   TSH:     Consultant(s) Notes Reviewed:  [x ] YES  [ ] NO    Care Discussed with Consultants/Other Providers [ x] YES  [ ] NO    Imaging Personally Reviewed independently:  [x] YES  [ ] NO    All labs, radiologic studies, vitals, orders and medications list reviewed. Patient is seen and examined at bedside. Case discussed with medical team.        
Neurology Progress Note    S: Patient seen and examined. agitated    Medications: MEDICATIONS  (STANDING):  aspirin enteric coated 81 milliGRAM(s) Oral daily  atorvastatin 40 milliGRAM(s) Oral at bedtime  carbidopa/levodopa  25/100 1 Tablet(s) Oral daily  cefuroxime   Tablet 500 milliGRAM(s) Oral every 12 hours  chlorhexidine 2% Cloths 1 Application(s) Topical daily  clopidogrel Tablet 75 milliGRAM(s) Oral daily  dextrose 5%. 1000 milliLiter(s) (50 mL/Hr) IV Continuous <Continuous>  dextrose 5%. 1000 milliLiter(s) (100 mL/Hr) IV Continuous <Continuous>  dextrose 50% Injectable 25 Gram(s) IV Push once  dextrose 50% Injectable 12.5 Gram(s) IV Push once  dextrose 50% Injectable 25 Gram(s) IV Push once  enoxaparin Injectable 40 milliGRAM(s) SubCutaneous every 24 hours  famotidine    Tablet 20 milliGRAM(s) Oral daily  glucagon  Injectable 1 milliGRAM(s) IntraMuscular once  insulin lispro (ADMELOG) corrective regimen sliding scale   SubCutaneous three times a day before meals  insulin lispro (ADMELOG) corrective regimen sliding scale   SubCutaneous at bedtime  isosorbide   mononitrate ER Tablet (IMDUR) 30 milliGRAM(s) Oral daily  metoprolol tartrate 50 milliGRAM(s) Oral two times a day  NIFEdipine XL 30 milliGRAM(s) Oral daily    MEDICATIONS  (PRN):  acetaminophen     Tablet .. 650 milliGRAM(s) Oral every 6 hours PRN Temp greater or equal to 38C (100.4F), Mild Pain (1 - 3)  aluminum hydroxide/magnesium hydroxide/simethicone Suspension 30 milliLiter(s) Oral every 4 hours PRN Dyspepsia  dextrose Oral Gel 15 Gram(s) Oral once PRN Blood Glucose LESS THAN 70 milliGRAM(s)/deciliter  melatonin 3 milliGRAM(s) Oral at bedtime PRN Insomnia  ondansetron Injectable 4 milliGRAM(s) IV Push every 8 hours PRN Nausea and/or Vomiting       Vitals:  Vital Signs Last 24 Hrs  T(C): 36.3 (08 May 2025 08:00), Max: 37 (07 May 2025 21:30)  T(F): 97.4 (08 May 2025 08:00), Max: 98.6 (07 May 2025 21:30)  HR: 69 (08 May 2025 08:00) (67 - 88)  BP: 133/68 (08 May 2025 08:00) (124/62 - 151/76)  BP(mean): --  RR: 18 (08 May 2025 08:00) (17 - 18)  SpO2: 98% (08 May 2025 08:00) (97% - 100%)    Parameters below as of 08 May 2025 08:00  Patient On (Oxygen Delivery Method): room air            General Exam:   General Appearance: Appropriately dressed and in no acute distress       Head: Normocephalic, atraumatic and no dysmorphic features  Ear, Nose, and Throat: Moist mucous membranes  CVS: S1S2+  Resp: No SOB, no wheeze or rhonchi  Abd: soft NTND  Extremities: No edema, no cyanosis  Skin: No bruises, no rashes     Neurologic Exam:  Mental status - Eyes closed, refuses to open, mumbles, does not want to participate in exam  Cranial nerves - PERRLA, VFF, EOMI, diminished sensation on R V1-V3, facial strength intact without asymmetry b/l, hypomimia    Motor - Normal bulk. Normal tone in RUE. Rigidity in LUE. No tremors at rest. 4/5 strength in L deltoid and L hip flexor. 5/5 strength in R deltoid, bicep, tricep, hand , hip flexor, ankle dorsi/plantarflexion. 5/5 strength in R bicep, tricep, hand , ankle dorsi/plantarflexion. Mild LUE pronator drift. No drift in RUE.    Sensation - Light touch intact throughout. No extinction to DSS    DTR's -             Biceps      Triceps     Brachioradialis      Patellar    Ankle    Toes/plantar response  R             2+             2+                  2+                       1           1                Down  L              2+             2+                 2+                        1        1                Down    I personally reviewed the below data/images/labs:      LABS:                          11.3   7.88  )-----------( 257      ( 08 May 2025 06:20 )             35.8     05-08    143  |  106  |  10  ----------------------------<  227[H]  3.4[L]   |  22  |  0.50    Ca    9.2      08 May 2025 06:20  Phos  3.6     05-08  Mg     2.00     05-08    TPro  6.7  /  Alb  3.7  /  TBili  0.4  /  DBili  x   /  AST  20  /  ALT  19  /  AlkPhos  87  05-08    LIVER FUNCTIONS - ( 08 May 2025 06:20 )  Alb: 3.7 g/dL / Pro: 6.7 g/dL / ALK PHOS: 87 U/L / ALT: 19 U/L / AST: 20 U/L / GGT: x             Urinalysis Basic - ( 08 May 2025 06:20 )    Color: x / Appearance: x / SG: x / pH: x  Gluc: 227 mg/dL / Ketone: x  / Bili: x / Urobili: x   Blood: x / Protein: x / Nitrite: x   Leuk Esterase: x / RBC: x / WBC x   Sq Epi: x / Non Sq Epi: x / Bacteria: x        < from: CT Angio Neck w/ IV Cont (05.05.25 @ 17:17) >    ACC: [id number] EXAM:  CT ANGIO BRAIN (W)AW IC   ORDERED BY: TRUDY CASAREZ     ACC: [id number] EXAM:  CT ANGIO NECK (W)AW IC   ORDERED BY: TRUDY CASAREZ     ACC: [id number] EXAM:  CT BRAIN   ORDERED BY: TRUDY CASAREZ     PROCEDURE DATE:  05/05/2025          INTERPRETATION:  HISTORY:  Right facial numbness    COMPARISON: CT/CTA brain and CT neck 9/23/2024    TECHNIQUE: Noncontrast CT head and CT angiogram of the neck and brain was   performed after administration of intravenous contrast. MIP and 3D   reconstructions were performed.    Total of cc Omnipaque 350 intravenous contrast were administered without   complication.    FINDINGS:    CT HEAD:    There is no acute intracranial mass-effect, hemorrhage, midline shift, or   abnormal extra-axial fluid collection.    Scattered cerebral white matter hypoattenuation due to mild chronic   microvascular ischemic changes. Age-indeterminate lacunar type   infarctions in the anterior right gangliocapsular regions extending into   the corona radiata.    Ventricles,sulci, and cisterns are normal in size without hydrocephalus.   Basal cisterns are patent.    Visualized paranasal sinuses and mastoid air cells are clear. Calvarium   is intact.    CTA BRAIN:    INTERNAL CAROTID ARTERIES: Multifocal atheromatous irregularity and mild   to moderate stenoses along the cavernous ICA segments bilaterally.    Redemonstrated 2-3 mm inferiorly directed outpouching from the left ICA   terminus in the region of the expected origin of the posterior   communicating artery,unchanged. There is no associated artery   definitively identified from the outpouching.    ANTERIOR CEREBRAL ARTERIES: Patent without flow-limiting stenosis or   occlusion. Hypoplastic right A1 segment. Anterior communicating artery is   unremarkable without aneurysm.    MIDDLE CEREBRAL ARTERIES: Patent without flow-limiting stenosis or   occlusion. MCA bifurcations are unremarkable without aneurysm.    POSTERIOR CEREBRAL ARTERIES: Patent without flow-limiting stenosis or   occlusion. Fetal right PCA origin with corresponding hypoplastic P1   segment.    VERTEBROBASILAR SYSTEM: Patent without flow-limiting stenosis or   occlusion.    DURAL VENOUS SINUSES: Patent. No filling defect or thrombus.    CTA NECK:    RIGHT CAROTID SYSTEM: There is been interval endarterectomy of the distal   CCA and proximal ICA which is patent without flow-limiting stenosis or   occlusion. Surrounding surgical clips are noted. Medialized course of the   proximal ICA again seen.    LEFT CAROTID SYSTEM: Redemonstrated moderate stenosis of the ICA origin   measuring 50-60% as per NASCET criteria due to heavily calcified   atherosclerotic plaque.    VERTEBRAL SYSTEM: Normal in caliber without flow-limiting stenosis or   occlusion. Origin of the vertebral arteries are unremarkable.    AORTIC ARCH: Scattered calcified plaque along the aortic arch and origin   of the great vessels.    There is a 2.3 cm hypodense left thyroid nodule.    IMPRESSION:    CT HEAD: No acute intracranial bleeding.    CTA BRAIN: Multifocal atheromatous irregularity and moderate cavernous   ICA stenosis bilaterally.    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    CTA NECK: Interval distal right CCA and proximal right ICA carotid   endarterectomy, which is patent.    Moderate left ICA origin stenosis due to heavily calcified plaque.    --- End of Report ---            JAMAR CASTELLON MD; Attending Radiologist  This document has been electronically signed. May  5 2025  6:28PM    < end of copied text >    
PROGRESS NOTE:   Authored by Brooke Gill Ma, MD  Available on MS Teams; Pager [phone]    Patient is a 65y old  Female who presents with a chief complaint of right facial numbness (06 May 2025 15:32)    SUBJECTIVE / OVERNIGHT EVENTS: Pt c/o ongoing R sided facial numbness and L sided chronic weakness. She denies any new or acute symptoms. Wants further workup done for stenoses    All other review of systems is negative unless indicated above.    MEDICATIONS  (STANDING):  aspirin enteric coated 81 milliGRAM(s) Oral daily  atorvastatin 40 milliGRAM(s) Oral at bedtime  carbidopa/levodopa  25/100 1 Tablet(s) Oral daily  cefTRIAXone   IVPB 1000 milliGRAM(s) IV Intermittent every 24 hours  clopidogrel Tablet 75 milliGRAM(s) Oral daily  dextrose 5%. 1000 milliLiter(s) (50 mL/Hr) IV Continuous <Continuous>  dextrose 5%. 1000 milliLiter(s) (100 mL/Hr) IV Continuous <Continuous>  dextrose 50% Injectable 25 Gram(s) IV Push once  dextrose 50% Injectable 12.5 Gram(s) IV Push once  dextrose 50% Injectable 25 Gram(s) IV Push once  enoxaparin Injectable 40 milliGRAM(s) SubCutaneous every 24 hours  glucagon  Injectable 1 milliGRAM(s) IntraMuscular once  insulin lispro (ADMELOG) corrective regimen sliding scale   SubCutaneous three times a day before meals  insulin lispro (ADMELOG) corrective regimen sliding scale   SubCutaneous at bedtime  metoprolol tartrate 50 milliGRAM(s) Oral two times a day    MEDICATIONS  (PRN):  acetaminophen     Tablet .. 650 milliGRAM(s) Oral every 6 hours PRN Temp greater or equal to 38C (100.4F), Mild Pain (1 - 3)  aluminum hydroxide/magnesium hydroxide/simethicone Suspension 30 milliLiter(s) Oral every 4 hours PRN Dyspepsia  dextrose Oral Gel 15 Gram(s) Oral once PRN Blood Glucose LESS THAN 70 milliGRAM(s)/deciliter  melatonin 3 milliGRAM(s) Oral at bedtime PRN Insomnia  OLANZapine Injectable 1.25 milliGRAM(s) IntraMuscular once PRN Severe agitation  ondansetron Injectable 4 milliGRAM(s) IV Push every 8 hours PRN Nausea and/or Vomiting      CAPILLARY BLOOD GLUCOSE      POCT Blood Glucose.: 216 mg/dL (06 May 2025 11:52)  POCT Blood Glucose.: 194 mg/dL (06 May 2025 08:15)  POCT Blood Glucose.: 187 mg/dL (06 May 2025 05:09)  POCT Blood Glucose.: 175 mg/dL (05 May 2025 21:23)    I&O's Summary    06 May 2025 07:01  -  06 May 2025 17:32  --------------------------------------------------------  IN: 0 mL / OUT: 880 mL / NET: -880 mL        PHYSICAL EXAM:  Vital Signs Last 24 Hrs  T(C): 36.7 (06 May 2025 15:40), Max: 36.8 (06 May 2025 05:14)  T(F): 98.1 (06 May 2025 15:40), Max: 98.3 (06 May 2025 07:15)  HR: 90 (06 May 2025 15:40) (67 - 90)  BP: 165/88 (06 May 2025 15:40) (153/80 - 187/79)  BP(mean): --  RR: 18 (06 May 2025 15:40) (18 - 21)  SpO2: 99% (06 May 2025 15:40) (98% - 100%)    Parameters below as of 06 May 2025 15:40  Patient On (Oxygen Delivery Method): room air        GENERAL: No acute distress  HEAD:  Normocephalic  EYES: Conjunctiva and sclera clear. PERRLA  NECK: Supple  CHEST/LUNG: CTAB  HEART: Regular rate and rhythm  ABDOMEN: Soft, non-tender, non-distended  EXTREMITIES: No clubbing, cyanosis, or edema  NEUROLOGY: A&O x 3. CN II-XII grossly intact, decreased sensation to R face, FNF and heel to shin limited on L d/t residual weakness, otherwise normal on R. Dysarthria. Limited effort on strength exam on L, 5/5 strength in RUE and RLE  SKIN: No rashes or lesions to visible skin    LABS:                        11.1   6.17  )-----------( 268      ( 06 May 2025 05:00 )             35.0     05-06    140  |  101  |  11  ----------------------------<  217[H]  3.7   |  25  |  0.50    Ca    9.2      06 May 2025 05:00    TPro  6.9  /  Alb  4.0  /  TBili  0.2  /  DBili  x   /  AST  23  /  ALT  18  /  AlkPhos  91      PT/INR - ( 05 May 2025 13:45 )   PT: 13.6 sec;   INR: 1.14 ratio         PTT - ( 05 May 2025 13:45 )  PTT:31.2 sec      Urinalysis Basic - ( 06 May 2025 08:30 )    Color: Yellow / Appearance: Clear / S.015 / pH: x  Gluc: x / Ketone: Negative mg/dL  / Bili: Negative / Urobili: 0.2 mg/dL   Blood: x / Protein: Trace mg/dL / Nitrite: Negative   Leuk Esterase: Small / RBC: 66 /HPF / WBC 13 /HPF   Sq Epi: x / Non Sq Epi: 0 /HPF / Bacteria: Many /HPF            RADIOLOGY & ADDITIONAL TESTS: Reviewed  
PROGRESS NOTE:   Authored by Brooke Gill Ma, MD  Available on MS Teams; Pager [phone]    Patient is a 65y old  Female who presents with a chief complaint of right facial numbness (06 May 2025 17:32)    SUBJECTIVE / OVERNIGHT EVENTS: Code GIOVANNI overnight for agitation. Received zyprexa. ROS limited this AM. Pt sleeping, becoming agitated and attempting to slap/hit when woken up by provider. Per staff, pt was A&Ox4 this AM and recently fell asleep.    MEDICATIONS  (STANDING):  aspirin enteric coated 81 milliGRAM(s) Oral daily  atorvastatin 40 milliGRAM(s) Oral at bedtime  carbidopa/levodopa  25/100 1 Tablet(s) Oral daily  cefTRIAXone   IVPB 1000 milliGRAM(s) IV Intermittent every 24 hours  clopidogrel Tablet 75 milliGRAM(s) Oral daily  dextrose 5%. 1000 milliLiter(s) (50 mL/Hr) IV Continuous <Continuous>  dextrose 5%. 1000 milliLiter(s) (100 mL/Hr) IV Continuous <Continuous>  dextrose 50% Injectable 25 Gram(s) IV Push once  dextrose 50% Injectable 12.5 Gram(s) IV Push once  dextrose 50% Injectable 25 Gram(s) IV Push once  enoxaparin Injectable 40 milliGRAM(s) SubCutaneous every 24 hours  famotidine    Tablet 20 milliGRAM(s) Oral daily  glucagon  Injectable 1 milliGRAM(s) IntraMuscular once  insulin lispro (ADMELOG) corrective regimen sliding scale   SubCutaneous three times a day before meals  insulin lispro (ADMELOG) corrective regimen sliding scale   SubCutaneous at bedtime  isosorbide   mononitrate ER Tablet (IMDUR) 30 milliGRAM(s) Oral daily  metoprolol tartrate 50 milliGRAM(s) Oral two times a day  NIFEdipine XL 30 milliGRAM(s) Oral daily    MEDICATIONS  (PRN):  acetaminophen     Tablet .. 650 milliGRAM(s) Oral every 6 hours PRN Temp greater or equal to 38C (100.4F), Mild Pain (1 - 3)  aluminum hydroxide/magnesium hydroxide/simethicone Suspension 30 milliLiter(s) Oral every 4 hours PRN Dyspepsia  dextrose Oral Gel 15 Gram(s) Oral once PRN Blood Glucose LESS THAN 70 milliGRAM(s)/deciliter  melatonin 3 milliGRAM(s) Oral at bedtime PRN Insomnia  ondansetron Injectable 4 milliGRAM(s) IV Push every 8 hours PRN Nausea and/or Vomiting      CAPILLARY BLOOD GLUCOSE      POCT Blood Glucose.: 186 mg/dL (07 May 2025 08:31)  POCT Blood Glucose.: 226 mg/dL (06 May 2025 21:09)  POCT Blood Glucose.: 245 mg/dL (06 May 2025 17:56)  POCT Blood Glucose.: 216 mg/dL (06 May 2025 11:52)    I&O's Summary    06 May 2025 07:01  -  07 May 2025 07:00  --------------------------------------------------------  IN: 0 mL / OUT: 1580 mL / NET: -1580 mL        PHYSICAL EXAM:  Vital Signs Last 24 Hrs  T(C): 36.7 (07 May 2025 08:00), Max: 36.8 (06 May 2025 21:43)  T(F): 98.1 (07 May 2025 08:00), Max: 98.3 (06 May 2025 21:43)  HR: 79 (07 May 2025 08:00) (64 - 90)  BP: 162/82 (07 May 2025 08:00) (147/66 - 168/73)  BP(mean): --  RR: 18 (07 May 2025 08:00) (18 - 18)  SpO2: 100% (07 May 2025 08:00) (98% - 100%)    Parameters below as of 07 May 2025 08:00  Patient On (Oxygen Delivery Method): room air        GENERAL: No acute distress  HEAD:  Normocephalic  NECK: Supple  CHEST/LUNG: CTAB  HEART: Regular rate and rhythm  ABDOMEN: Soft, non-tender, non-distended  EXTREMITIES: No clubbing, cyanosis, or edema  NEUROLOGY: Unable to assess  SKIN: No rashes or lesions to visible skin    LABS:                        12.6   12.24 )-----------( 277      ( 07 May 2025 07:16 )             39.4     05-07    138  |  100  |  15  ----------------------------<  219[H]  4.1   |  23  |  0.61    Ca    9.6      07 May 2025 07:16  Phos  3.6     05-07  Mg     1.90     05-07    TPro  7.2  /  Alb  3.9  /  TBili  0.5  /  DBili  x   /  AST  31  /  ALT  21  /  AlkPhos  93  05-07    PT/INR - ( 05 May 2025 13:45 )   PT: 13.6 sec;   INR: 1.14 ratio         PTT - ( 05 May 2025 13:45 )  PTT:31.2 sec      Urinalysis Basic - ( 07 May 2025 07:16 )    Color: x / Appearance: x / SG: x / pH: x  Gluc: 219 mg/dL / Ketone: x  / Bili: x / Urobili: x   Blood: x / Protein: x / Nitrite: x   Leuk Esterase: x / RBC: x / WBC x   Sq Epi: x / Non Sq Epi: x / Bacteria: x            RADIOLOGY & ADDITIONAL TESTS: Reviewed

## 2025-05-08 NOTE — PROVIDER CONTACT NOTE (OTHER) - DATE AND TIME:
06-May-2025 07:22
08-May-2025 03:38
06-May-2025 09:35
06-May-2025 06:22
07-May-2025 04:00
06-May-2025 15:42
07-May-2025 23:00

## 2025-05-08 NOTE — DISCHARGE NOTE PROVIDER - NSDCCPCAREPLAN_GEN_ALL_CORE_FT
PRINCIPAL DISCHARGE DIAGNOSIS  Diagnosis: Facial numbness  Assessment and Plan of Treatment: You presented to the hospital with numbness in your face. We obtained imaging of your brain, including a CT head and CTAs of your head and neck. There were no imaging findings to explain your symptoms. We had our Neurologists evaluate you and they recommended obtaining an MRI of your brain outpatient. Continue aspirin, plavix, and atorvastatin as prescribed.      SECONDARY DISCHARGE DIAGNOSES  Diagnosis: Acute urinary retention  Assessment and Plan of Treatment: In the hospital, you were found to have acute urinary retention. This improved after a straight catheterization to relieve the urine from your bladder. You were found to have a urinary tract infection and completed 3 days of IV antibiotics for this.    Diagnosis: Delirium with dementia  Assessment and Plan of Treatment: Your hospital course was complicated by delirium, or occasional confusion. This was likely multifactorial- due to your history of your dementia, being in the hospital (an unfamiliar place), having a urine infection and urinary retention. Delirium can take some time to resolve. Make sure you have adequate sleep. Have your light on, curtains open, and TV on during the day with regular stimulation. During the night, close curtains, TV off, lights off, and minimize interruptions. Make sure you wear your glasses, hearing aids, and dentures if you have them.     PRINCIPAL DISCHARGE DIAGNOSIS  Diagnosis: Facial numbness  Assessment and Plan of Treatment: You presented to the hospital with numbness in your face. We obtained imaging of your brain, including a CT head and CTAs of your head and neck. There were no imaging findings to explain your symptoms. We had our Neurologists evaluate you and they recommended obtaining an MRI of your brain outpatient. Continue aspirin, plavix, and atorvastatin as prescribed.   We had our Vascular Surgeons see and evaluate you for your stenosis- given your recent ultrasound and outpatient follow-up, they recommended that you follow up with your Vascular Surgeon Dr. Norman for your intracranial stenosis.      SECONDARY DISCHARGE DIAGNOSES  Diagnosis: Acute urinary retention  Assessment and Plan of Treatment: In the hospital, you were found to have acute urinary retention. This improved after a straight catheterization to relieve the urine from your bladder. You were found to have a urinary tract infection and completed 3 days of IV antibiotics for this.    Diagnosis: Delirium with dementia  Assessment and Plan of Treatment: Your hospital course was complicated by delirium, or occasional confusion. This was likely multifactorial- due to your history of your dementia, being in the hospital (an unfamiliar place), having a urine infection and urinary retention. Delirium can take some time to resolve. Make sure you have adequate sleep. Have your light on, curtains open, and TV on during the day with regular stimulation. During the night, close curtains, TV off, lights off, and minimize interruptions. Make sure you wear your glasses, hearing aids, and dentures if you have them.

## 2025-05-08 NOTE — DISCHARGE NOTE PROVIDER - CARE PROVIDER_API CALL
Naga Beltre  [address] [id number]  Phone: [phone]  Fax: ()-  Established Patient  Follow Up Time: 1 week   Naga Beltre  [address] [place] [address]  Phone: [phone]  Fax: ()-  Established Patient  Follow Up Time: 1 week    Viki Gray  Neurology  [address] [facility] [address]  [place] [address]  Phone: [phone]  Fax: [phone]  Follow Up Time:     Santiago Norman  Vascular Surgery  [address] [place] [address]  Phone: [phone]  Fax: [phone]  Follow Up Time:     Maria C Perla  Internal Medicine  [address] [place] [address]  Phone: [phone]  Fax: [phone]  Follow Up Time:

## 2025-05-08 NOTE — PROGRESS NOTE ADULT - REASON FOR ADMISSION
right facial numbness

## 2025-05-08 NOTE — DISCHARGE NOTE PROVIDER - NSDCFUADDAPPT_GEN_ALL_CORE_FT
APPTS ARE READY TO BE MADE: [X] YES    Best Family or Patient Contact (if needed):    Additional Information about above appointments (if needed):    1: PCP within 1 week for safe transitions of care  2: Neurology within 1 week for MRI brain  3: Vascular in 2-4 weeks, follow up for ICA stenosis    Other comments or requests:    APPTS ARE READY TO BE MADE: [X] YES    Best Family or Patient Contact (if needed):    Additional Information about above appointments (if needed):    1: PCP within 1 week for safe transitions of care  2: Neurology within 1 week for MRI brain  3: Vascular in 2-4 weeks, follow up for ICA stenosis  4: Psychiatry as needed; [facility] [phone] (M-F 9am-7pm)   [facility] -[phone]    Other comments or requests:    APPTS ARE READY TO BE MADE: [X] YES    Best Family or Patient Contact (if needed):    Additional Information about above appointments (if needed):    1: PCP within 1 week for safe transitions of care  2: Neurology within 1 week for MRI brain  3: Vascular in 2-4 weeks, follow up for ICA stenosis  4: Psychiatry as needed; [facility] [phone] (M-F 9am-7pm)   [facility] -[phone]    VASC - Prior to outreaching the patient, it was visible that the patient has secured a follow up appointment which was not scheduled by our team. Patient is scheduled 5/6/26 for vascular and did not want assistance    PSYCH - Provided patient with provider referral information, however patient prefers to schedule the appointments on their own.     NEURO - Provided patient with provider referral information, however patient prefers to schedule the appointments on their own.     PCP - Patient informed us they already have secured a follow up appointment which is not visible on [facility] and declined to provide appointment details.     Other comments or requests:

## 2025-05-08 NOTE — DISCHARGE NOTE PROVIDER - HOSPITAL COURSE
HPI:  65yr old female with a pmh of multiple CVA (2022 & 2024, chronic L-sided weakness and slurred speech), PAD, T2DM on insulin, HLD, HTN, memory loss, CAD s/p stent (on ASA/Plavix), Parkinson's Disease,  who presents with right facial numbness for the past 2-3 weeks. Endorses the numbness to be constant without any other associated tingling/pain/radiation. She does endorse a headache that usually occurs at night at the back of her head that resolves with advil/tylenol.   Denies  headache at present, dizziness, chest pain, palpitations, SOB, abdominal pain, joint pain, diarrhea/constipation, urinary symptoms.   Vitals: T 98. HR 74, /80, RR 18 satting 100% RA   (05 May 2025 23:08)    Hospital Course:  #Facial numbness  Pt presented with new, acute R sided facial numbness  CTH with age indeterminate R BG infarct, however would not explain symptoms. Neg for acute intracranial bleeding.  CTA brain: Multifocal atheromatous irregularity and moderate cavernous ICA stenosis bilaterally.  CTA neck: Moderate left ICA origin stenosis due to heavily calcified plaque.  Neurology recommended outpatient MRI brain  Vascular consulted for L ICA stenosis. They advised to f/u outpatient with Dr. Norman. Continue medical management.  PT recommended home PT    #Acute urinary retention.   s/p straight cath 880cc  Continued bladder scans, pt noted to have improvement  Likely iso UTI: UA with small LE, 13 WBC, many bacteria  Completed 3 days of CTX (5/6-5/8)    #Acute encephalopathy likely 2/2 metabolic, delirium and h/o dementia   Hospital course c/b agitation, s/p code GIOVANNI  Multifactorial iso acute UTI (s/p IV abx x 3 days), urinary retention (improved), hospital delirium, and poor neurocognitive reserve (history of dementia)   No significant electrolyte derangements  Meds reviewed  Required intermittent olanzapine for agitation

## 2025-05-08 NOTE — PROVIDER CONTACT NOTE (OTHER) - SITUATION
Patient was being combative and was using the tele monitoring box as a weapon, therefore tele monitoring was taken off.

## 2025-05-08 NOTE — DISCHARGE NOTE NURSING/CASE MANAGEMENT/SOCIAL WORK - NSDCFUADDAPPT_GEN_ALL_CORE_FT
APPTS ARE READY TO BE MADE: [X] YES    Best Family or Patient Contact (if needed):    Additional Information about above appointments (if needed):    1: PCP within 1 week for safe transitions of care  2: Neurology within 1 week for MRI brain  3: Vascular in 2-4 weeks, follow up for ICA stenosis  4: Psychiatry as needed; [facility] [phone] (M-F 9am-7pm)   [facility] -[phone]    Other comments or requests:

## 2025-05-08 NOTE — DISCHARGE NOTE PROVIDER - NSDCCPTREATMENT_GEN_ALL_CORE_FT
PRINCIPAL PROCEDURE  Procedure: CT head wo con  Findings and Treatment: IMPRESSION:  CT HEAD: No acute intracranial bleeding.  CTA BRAIN: Multifocal atheromatous irregularity and moderate cavernous   ICA stenosis bilaterally.  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  CTA NECK: Interval distal right CCA and proximal right ICA carotid   endarterectomy, which is patent.  Moderate left ICA origin stenosis due to heavily calcified plaque.

## 2025-05-08 NOTE — DISCHARGE NOTE PROVIDER - NSFOLLOWUPCLINICS_GEN_ALL_ED_FT
Stroke Discharge Program  Neurology  [address] [facility], [address]  [place] [address]  Phone: [phone]  Fax:      Stroke Discharge Program  Neurology  [address] [facility], [address]  [place] [address]  Phone: [phone]  Fax:     [facility]  Psychiatry  [address], [place] [address]  Phone: [phone]  Fax:

## 2025-05-08 NOTE — DISCHARGE NOTE PROVIDER - CARE PROVIDERS DIRECT ADDRESSES
,DirectAddress_Unknown ,DirectAddress_Unknown,DirectAddress_Unknown,[email or web address],DirectAddress_Unknown

## 2025-05-08 NOTE — PROVIDER CONTACT NOTE (OTHER) - ACTION/TREATMENT ORDERED:
ACP notified.
ACP notified.
ACP notified.  6AM dose of Metoprolol given.
Provider made aware. Attempted to redirect multiple times. Zyprexa given as per order. Will attempt to put back the tele monitor when patient is calm.
Provider notified. IM zyprexa given
ACP notified.
Provider notified, recheck bladder scan in 6 hrs (at 4AM).

## 2025-05-08 NOTE — DISCHARGE NOTE NURSING/CASE MANAGEMENT/SOCIAL WORK - FINANCIAL ASSISTANCE
[facility] provides services at a reduced cost to those who are determined to be eligible through [facility]’s financial assistance program. Information regarding [facility]’s financial assistance program can be found by going to [email or web address] or by calling [phone].

## 2025-05-08 NOTE — DISCHARGE NOTE PROVIDER - NSDCMRMEDTOKEN_GEN_ALL_CORE_FT
aspirin 81 mg oral delayed release tablet: 1 tab(s) orally once a day  atorvastatin 40 mg oral tablet: 1 tab(s) orally once a day (at bedtime)  Basaglar KwikPen 100 units/mL subcutaneous solution: 12 unit(s) subcutaneous once a day in the morning before breakfast  Basaglar KwikPen 100 units/mL subcutaneous solution: 8 unit(s) subcutaneous once a day in the evening  carbidopa-levodopa 25 mg-100 mg oral tablet: 1 tab(s) orally once a day (as needed)  clopidogrel 75 mg oral tablet: 1 tab(s) orally once a day  doxazosin 2 mg oral tablet: 1 tab(s) orally once a day (at bedtime)  famotidine 20 mg oral tablet: 1 tab(s) orally once a day  furosemide 20 mg oral tablet: 1 tab(s) orally once a day  gabapentin 100 mg oral capsule: 1 cap(s) orally 3 times a day  isosorbide mononitrate 30 mg oral tablet, extended release: 1 tab(s) orally once a day  Janumet 50 mg-500 mg oral tablet: 1 tab(s) orally 2 times a day  Linzess 72 mcg oral capsule: 1 cap(s) orally once a day  losartan-hydrochlorothiazide 50 mg-12.5 mg oral tablet: 1 tab(s) orally once a day  metoprolol tartrate 50 mg oral tablet: 1 tab(s) orally 2 times a day  NIFEdipine 30 mg oral tablet, extended release: 1 tab(s) orally once a day   aspirin 81 mg oral delayed release tablet: 1 tab(s) orally once a day  atorvastatin 40 mg oral tablet: 1 tab(s) orally once a day (at bedtime)  Basaglar KwikPen 100 units/mL subcutaneous solution: 12 unit(s) subcutaneous once a day in the morning before breakfast  Basaglar KwikPen 100 units/mL subcutaneous solution: 8 unit(s) subcutaneous once a day in the evening  carbidopa-levodopa 25 mg-100 mg oral tablet: 1 tab(s) orally once a day (as needed)  cefuroxime 500 mg oral tablet: 1 tab(s) orally every 12 hours  clopidogrel 75 mg oral tablet: 1 tab(s) orally once a day  doxazosin 2 mg oral tablet: 1 tab(s) orally once a day (at bedtime)  famotidine 20 mg oral tablet: 1 tab(s) orally once a day  furosemide 20 mg oral tablet: 1 tab(s) orally once a day  gabapentin 100 mg oral capsule: 1 cap(s) orally 3 times a day  isosorbide mononitrate 30 mg oral tablet, extended release: 1 tab(s) orally once a day  Janumet 50 mg-500 mg oral tablet: 1 tab(s) orally 2 times a day  Linzess 72 mcg oral capsule: 1 cap(s) orally once a day  losartan-hydrochlorothiazide 50 mg-12.5 mg oral tablet: 1 tab(s) orally once a day  metoprolol tartrate 50 mg oral tablet: 1 tab(s) orally 2 times a day  NIFEdipine 30 mg oral tablet, extended release: 1 tab(s) orally once a day

## 2025-05-08 NOTE — PROVIDER CONTACT NOTE (OTHER) - RECOMMENDATIONS
----- Message from Michael A. Wiedemann, MD sent at 6/3/2019  6:09 AM CDT -----  Give her appt 4pm Thursday tell to come 3;:30,  FOR POST OP SECTION, THANKS  
Notify provider, administer PRN zyprexa for agitation
Provider notified.
Pt voicemail not set up  
Notify ACP.
Notify ACP.
Provider made aware
Notify ACP.
Notify ACP.

## 2025-05-08 NOTE — PROVIDER CONTACT NOTE (OTHER) - ASSESSMENT
AOx2, NSR/1st degree HB on cardiac monitor and on room air.  No distress noted.
AOx2-4, NSR/1st degree HB on cardiac monitor and on room air.  No distress noted.
Patient is AOx4, denies any discomfort, no s/s of acute distress.
AOx2, NSR/1st degree HB on cardiac monitor and on room air.  No distress noted.
Patient is very confused, agitated, and being combative. Attempted to redirect multiple times.
Pt was transferred from ED to , pt arrived on unit agitated and combative, unable to assess pt A&O status due to her agitation she is not responding. Multiple RNs and security were needed to administer medication for agitation. Pt is attempting to bite, hit, and spit on staff. unable to complete a full and accurate assessment at this time. Will relay information to day shift who will complete a full assessment. Neuro assessment completed on my shift as accurately as possible at pt's time of arrival
AOx2, NSR/1st degree HB on cardiac monitor and on room air.  No distress noted.

## 2025-05-08 NOTE — DISCHARGE NOTE PROVIDER - PROVIDER TOKENS
PROVIDER:[TOKEN:[[id number]],FOLLOWUP:[1 week],ESTABLISHEDPATIENT:[T]] PROVIDER:[TOKEN:[[id number]],FOLLOWUP:[1 week],ESTABLISHEDPATIENT:[T]],PROVIDER:[TOKEN:[[id number]]],PROVIDER:[TOKEN:[[id number]]],PROVIDER:[TOKEN:[[id number]]]

## 2025-05-08 NOTE — DISCHARGE NOTE NURSING/CASE MANAGEMENT/SOCIAL WORK - NSDCVIVACCINE_GEN_ALL_CORE_FT
Tdap; 02-Nov-2021 17:55; Marychuy Farrell (REGINE); [employer]; [id number] (Exp. Date: 29-Jun-2023); IntraMuscular; Deltoid Left.; 0.5 milliLiter(s); VIS (VIS Published: 09-May-2013, VIS Presented: 02-Nov-2021);

## 2025-05-08 NOTE — PROVIDER CONTACT NOTE (OTHER) - BACKGROUND
65yoF with PMHx of CVA, Parkinson, HTN, HLD, CAD.  Sent by cards to r/o stroke - R face numbness x 2-3 weeks.
65F sent here by cards r/o stroke.  R face numbness x 2-3 weeks.  Baseline wheelchair bound can walk with wheelchair.
65yoF with PMHx of CVA, Parkinson, HTN, HLD, CAD.  Sent by cards to r/o stroke - R face numbness x 2-3 weeks.

## 2025-05-09 LAB
PYRIDOXAL PHOS SERPL-MCNC: 6.3 UG/L — SIGNIFICANT CHANGE UP (ref 3.4–65.2)
VIT B1 SERPL-MCNC: 90 NMOL/L — SIGNIFICANT CHANGE UP (ref 66.5–200)

## 2025-05-22 ENCOUNTER — APPOINTMENT (OUTPATIENT)
Dept: GASTROENTEROLOGY | Facility: CLINIC | Age: 65
End: 2025-05-22
Payer: MEDICARE

## 2025-05-22 VITALS
HEIGHT: 62 IN | BODY MASS INDEX: 28.16 KG/M2 | DIASTOLIC BLOOD PRESSURE: 79 MMHG | OXYGEN SATURATION: 97 % | WEIGHT: 153 LBS | HEART RATE: 75 BPM | SYSTOLIC BLOOD PRESSURE: 118 MMHG

## 2025-05-22 PROCEDURE — 99204 OFFICE O/P NEW MOD 45 MIN: CPT

## 2025-06-09 ENCOUNTER — APPOINTMENT (OUTPATIENT)
Dept: PODIATRY | Facility: CLINIC | Age: 65
End: 2025-06-09
Payer: MEDICARE

## 2025-06-09 PROCEDURE — 11720 DEBRIDE NAIL 1-5: CPT

## 2025-06-17 ENCOUNTER — APPOINTMENT (OUTPATIENT)
Dept: ENDOCRINOLOGY | Facility: CLINIC | Age: 65
End: 2025-06-17
Payer: MEDICARE

## 2025-06-17 VITALS
WEIGHT: 144 LBS | DIASTOLIC BLOOD PRESSURE: 60 MMHG | HEART RATE: 76 BPM | BODY MASS INDEX: 26.5 KG/M2 | OXYGEN SATURATION: 99 % | SYSTOLIC BLOOD PRESSURE: 100 MMHG | HEIGHT: 62 IN

## 2025-06-17 LAB — HBA1C MFR BLD HPLC: 8.9

## 2025-06-17 PROCEDURE — G2211 COMPLEX E/M VISIT ADD ON: CPT

## 2025-06-17 PROCEDURE — 99204 OFFICE O/P NEW MOD 45 MIN: CPT

## 2025-06-17 PROCEDURE — 83036 HEMOGLOBIN GLYCOSYLATED A1C: CPT | Mod: QW

## 2025-06-17 RX ORDER — GLIPIZIDE 2.5 MG/1
2.5 TABLET ORAL
Qty: 90 | Refills: 3 | Status: ACTIVE | COMMUNITY
Start: 2025-06-17

## 2025-06-17 RX ORDER — DULAGLUTIDE 0.75 MG/.5ML
0.75 INJECTION, SOLUTION SUBCUTANEOUS
Qty: 1 | Refills: 0 | Status: ACTIVE | COMMUNITY
Start: 2025-06-17 | End: 1900-01-01

## 2025-06-23 LAB
ALBUMIN SERPL ELPH-MCNC: 4.3 G/DL
ALP BLD-CCNC: 116 U/L
ALT SERPL-CCNC: 24 U/L
ANION GAP SERPL CALC-SCNC: 19 MMOL/L
AST SERPL-CCNC: 26 U/L
BILIRUB SERPL-MCNC: 0.2 MG/DL
BUN SERPL-MCNC: 18 MG/DL
CALCIUM SERPL-MCNC: 10 MG/DL
CHLORIDE SERPL-SCNC: 99 MMOL/L
CHOLEST SERPL-MCNC: 128 MG/DL
CO2 SERPL-SCNC: 23 MMOL/L
CREAT SERPL-MCNC: 0.61 MG/DL
CREAT SPEC-SCNC: 90 MG/DL
EGFRCR SERPLBLD CKD-EPI 2021: 98 ML/MIN/1.73M2
GLUCOSE SERPL-MCNC: 194 MG/DL
HDLC SERPL-MCNC: 40 MG/DL
LDLC SERPL-MCNC: 68 MG/DL
MICROALBUMIN 24H UR DL<=1MG/L-MCNC: <1.2 MG/DL
MICROALBUMIN/CREAT 24H UR-RTO: NORMAL MG/G
NONHDLC SERPL-MCNC: 89 MG/DL
POTASSIUM SERPL-SCNC: 4.3 MMOL/L
PROT SERPL-MCNC: 7.1 G/DL
SODIUM SERPL-SCNC: 141 MMOL/L
TRIGL SERPL-MCNC: 116 MG/DL
TSH SERPL-ACNC: 0.48 UIU/ML

## 2025-09-09 ENCOUNTER — NON-APPOINTMENT (OUTPATIENT)
Age: 65
End: 2025-09-09

## 2025-09-10 ENCOUNTER — RX RENEWAL (OUTPATIENT)
Age: 65
End: 2025-09-10

## 2025-09-11 ENCOUNTER — APPOINTMENT (OUTPATIENT)
Dept: PODIATRY | Facility: CLINIC | Age: 65
End: 2025-09-11
Payer: MEDICARE

## 2025-09-11 DIAGNOSIS — B35.1 TINEA UNGUIUM: ICD-10-CM

## 2025-09-11 DIAGNOSIS — L60.3 NAIL DYSTROPHY: ICD-10-CM

## 2025-09-11 DIAGNOSIS — E11.42 TYPE 2 DIABETES MELLITUS WITH DIABETIC POLYNEUROPATHY: ICD-10-CM

## 2025-09-11 PROCEDURE — 11720 DEBRIDE NAIL 1-5: CPT

## (undated) DEVICE — FOLEY TRAY 16FR 5CC LF UMETER CLOSED

## (undated) DEVICE — NDL HYPO SAFE 25G X 5/8" (ORANGE)

## (undated) DEVICE — WARMING BLANKET FULL ADULT

## (undated) DEVICE — SUT PROLENE 6-0 30" BV-1

## (undated) DEVICE — POSITIONER STRAP ARMBOARD VELCRO TS-30

## (undated) DEVICE — DRAPE 3/4 SHEET 52X76"

## (undated) DEVICE — PROTECTOR HEEL / ELBOW FLUFFY

## (undated) DEVICE — DRAPE TOWEL BLUE 17" X 24"

## (undated) DEVICE — SOL IRR POUR NS 0.9% 500ML

## (undated) DEVICE — POSITIONER FOAM EGG CRATE ULNAR 2PCS (PINK)

## (undated) DEVICE — STAPLER SKIN MULTI DIRECTION W35

## (undated) DEVICE — Device

## (undated) DEVICE — GLV 7.5 PROTEXIS (CREAM) MICRO

## (undated) DEVICE — TAPE SILK 3"

## (undated) DEVICE — SPONGE PEANUT AUTO COUNT

## (undated) DEVICE — VENODYNE/SCD SLEEVE CALF MEDIUM

## (undated) DEVICE — DRAPE MINOR PROCEDURE

## (undated) DEVICE — SUCTION YANKAUER NO CONTROL VENT

## (undated) DEVICE — PACK PERIPHERAL VASC

## (undated) DEVICE — SUT VICRYL 3-0 18" SH UNDYED (POP-OFF)

## (undated) DEVICE — DRSG TAPE UMBILICAL COTTON 2" X 30 X 1/8"

## (undated) DEVICE — SUT PROLENE 6-0 18" C-1

## (undated) DEVICE — SYR LUER LOK 3CC

## (undated) DEVICE — URETERAL CATH RED RUBBER 16FR (ORANGE)

## (undated) DEVICE — ELCTR GROUNDING PAD ADULT COVIDIEN

## (undated) DEVICE — DRSG CURITY GAUZE SPONGE 4 X 4" 12-PLY

## (undated) DEVICE — SUT MONOCRYL 4-0 27" PS-2 UNDYED